# Patient Record
Sex: MALE | Race: WHITE | NOT HISPANIC OR LATINO | Employment: OTHER | URBAN - METROPOLITAN AREA
[De-identification: names, ages, dates, MRNs, and addresses within clinical notes are randomized per-mention and may not be internally consistent; named-entity substitution may affect disease eponyms.]

---

## 2024-10-23 ENCOUNTER — APPOINTMENT (OUTPATIENT)
Dept: DERMATOLOGY | Facility: CLINIC | Age: 74
End: 2024-10-23
Payer: MEDICARE

## 2024-10-23 VITALS — HEART RATE: 76 BPM | DIASTOLIC BLOOD PRESSURE: 75 MMHG | SYSTOLIC BLOOD PRESSURE: 112 MMHG

## 2024-10-23 DIAGNOSIS — D04.4 SQUAMOUS CELL CARCINOMA IN SITU (SCCIS) OF SCALP: ICD-10-CM

## 2024-10-23 PROCEDURE — 99214 OFFICE O/P EST MOD 30 MIN: CPT | Performed by: DERMATOLOGY

## 2024-10-23 PROCEDURE — 17311 MOHS 1 STAGE H/N/HF/G: CPT | Performed by: DERMATOLOGY

## 2024-10-23 RX ORDER — ATORVASTATIN CALCIUM 40 MG/1
40 TABLET, FILM COATED ORAL
COMMUNITY
Start: 2024-01-24

## 2024-10-23 NOTE — PROGRESS NOTES
Mohs Surgery Operative Note    Date of Surgery:  10/23/2024  Surgeon:  Grace Perera MD  Office Location:  [address] [facility]  [address]  [facility] [address]  Dept: [phone]  Dept Fax: [phone]  Referring Provider: Jayson Hilario MD    Assessment/Plan   Pre-procedure:   Obtained informed consent: written from patient  The surgical site was identified and confirmed with the patient.     Intra-operative:   Audible time out called at : 08:48 AM 10/23/24  by: DEONTE MONTOYA RN   Verified patient name, birthdate, site, specimen bottle label & requisition.    The planned procedure(s) was again reviewed with the patient. The risks of bleeding, infection, nerve damage and scarring were reviewed. Written authorization was obtained. The patient identity, surgical site, and planned procedure(s) were verified. The provider acted as both surgeon and pathologist.     Squamous cell carcinoma in situ (SCCIS) of scalp  Frontal Scalp-Medial  Mohs surgery  Consent obtained: written    Universal Protocol:  Procedure explained and questions answered to patient or proxy's satisfaction: Yes    Test results available and properly labeled: Yes    Pathology report reviewed: Yes    External notes reviewed: Yes    Photo or diagram used for site identification: Yes    Site/side marked: Yes    Slide independently reviewed by Mohs surgeon: Yes    Immediately prior to procedure a time out was called: Yes    Patient identity confirmed: verbally with patient  Preparation: Patient was prepped and draped in usual sterile fashion      Anticoagulation:  Is the patient taking prescription anticoagulant and/or aspirin prescribed/recommended by a physician? Yes    Was the anticoagulation regimen changed prior to Mohs? No      Anesthesia:  Anesthesia method: local infiltration  Local anesthetic: lidocaine 2% WITH epi    Procedure Details:  Case ID Number: [id number]  Biopsy accession number:  [id number]  Date of biopsy: 10/9/2024  Pre-Op diagnosis: squamous cell carcinoma  SCC subtype: in situ  Surgical site (from skin exam): Frontal Scalp-Medial  Pre-operative length (cm): 1.3  Pre-operative width (cm): 1.4  Indications for Mohs surgery: anatomic location where tissue conservation is critical  Previously treated? No      Micrographic Surgery Details:  Post-operative length (cm): 1.5  Post-operative width (cm): 1.6  Number of Mohs stages: 1    Stage 1     Comments: The patient was brought into the operating room and placed in the procedure chair in the appropriate position.  The area positive by previous biopsy was identified and confirmed with the patient. The area of clinically obvious tumor was debulked using a curette and/or scalpel as needed. An incision was made following the Mohs approach through the skin. The specimen was taken to the lab, divided into 2 piece(s) and appropriately chromacoded and processed.  Tumor features identified on Mohs section: no tumor identified  Depth of defect: subcutaneous fat    Patient tolerance of procedure: tolerated well, no immediate complications    Reconstruction:  Was the defect reconstructed?: No   Repair: After a discussion with the patient regarding the options for wound closure, a decision was made to proceed with second intention healing.  Dressing/Follow-up: Surgifoam was placed in the wound. A pressure dressing was placed to help stabilize the wound and to minimize the risk of postoperative bleeding. Wound care was discussed, and the patient was given written post-operative wound care instructions.  Hemostasis achieved with: electrodesiccation  Outcome: patient tolerated procedure well with no complications    Post-procedure details: sterile dressing applied and wound care instructions given    Dressing type: Gelfoam and pressure dressing      Repair: After a discussion with the patient regarding the options for wound closure, a decision was made to  proceed with second intention healing.  Dressing F/U: Surgifoam was placed in the wound. A pressure dressing was placed to help stabilize the wound and to minimize the risk of postoperative bleeding. Wound care was discussed, and the patient was given written post-operative wound care instructions.             Wound care was discussed, and the patient was given written post-operative wound care instructions.      The patient will follow up with Grace Perera MD as needed for any post operative problems or concerns, and will follow up with their primary dermatologist as scheduled.

## 2024-10-23 NOTE — PROGRESS NOTES
Office Visit Note  Date: 10/23/2024  Surgeon:  Grace Perera MD  Office Location:  [address] [facility]  [address] [facility] [address]  [facility] [address]  Dept: [phone]  Dept Fax: [phone]  Referring Provider: MD Skye Munoz   Jaime Dominguez is a 74 y.o. male who presents for the following: MOHS Surgery    According to the patient, the lesion has been present for approximately 6 months at the time of diagnosis.  The lesion is not causing symptoms.  The lesion has not been treated previously.    The patient does not have a pacemaker / defibrillator.  The patient does not have a heart valve / joint replacement.    The patient is on blood thinners.  The patient does not have a history of hepatitis B or C.  The patient does not have a history of HIV.  The patient does not have a history of immunosuppression (e.g. organ transplantation, malignancy, medications)    Review of Systems:  No other skin or systemic complaints other than what is documented elsewhere in the note.    MEDICAL HISTORY: clinically relevant history including significant past medical history, medications and allergies was reviewed and documented in Epic.    Objective   Well appearing patient in no apparent distress; mood and affect are within normal limits.  Vital signs: See record.  Noted on the Frontal Scalp-Medial  Is a 1.3 x 1.4 cm scar    The patient confirmed the identified site.    Discussion:  The nature of the diagnosis was explained. The lesion is a skin cancer.  It has a risk of local growth and distant spread. The condition is associated with sun exposure.  Warning signs of non-melanoma skin cancer discussed. Patient was instructed to perform monthly self skin examination.  We recommended that the patient have regular full skin exams given an increased risk of subsequent skin cancers. The patient was instructed to use sun protective behaviors including use of broad spectrum sunscreens  and sun protective clothing to reduce risk of skin cancers.      Risks, benefits, side effects of Mohs surgery were discussed with patient and the patient voiced understanding.  It was explained that even though the cure rate of Mohs is very high it is not 100%. Risks of surgery including but not limited to bleeding, infection, numbness, nerve damage, and scar were reviewed.  Discussion included wound care requirements, activity restrictions, likely scar outcome and time to heal.     After Mohs surgery, the defect may need to be repaired surgically and the scar may be longer than the original lesion.  Reconstruction options, risks, and benefits were reviewed including second intention healing, linear repair (4-1 ratio was explained), local flaps, skin grafts, cartilage grafts and interpolation flaps (the need for multiple surgeries was explained). Possible outcomes were reviewed including likely scar appearance, failure of flap survival, infection, bleeding and the need for revision surgery.     The pathology was reviewed, the photograph was reviewed, and the referring physician's note was reviewed.    Patient elected for Mohs surgery.

## 2024-10-23 NOTE — LETTER
October 29, 2024     Jayson Hilario MD    Patient: Jaime Dominguez   YOB: 1950   Date of Visit: 10/23/2024       Dear Dr. Jayson Hilario MD:    Thank you for referring Jaime Dominguez to me for evaluation. Below are my notes for this consultation.  If you have questions, please do not hesitate to call me. I look forward to following your patient along with you.       Sincerely,     Grace Perera MD      CC: No Recipients  ______________________________________________________________________________________    Mohs Surgery Operative Note    Date of Surgery:  10/23/2024  Surgeon:  Grace Perera MD  Office Location: [address] [address]  Dept: [phone]  Dept Fax: [phone]  Referring Provider: Jayson Hilario MD    Assessment/Plan  Pre-procedure:   Obtained informed consent: written from patient  The surgical site was identified and confirmed with the patient.     Intra-operative:   Audible time out called at : 08:48 AM 10/23/24  by: DEONTE MONTOYA RN   Verified patient name, birthdate, site, specimen bottle label & requisition.    The planned procedure(s) was again reviewed with the patient. The risks of bleeding, infection, nerve damage and scarring were reviewed. Written authorization was obtained. The patient identity, surgical site, and planned procedure(s) were verified. The provider acted as both surgeon and pathologist.     Squamous cell carcinoma in situ (SCCIS) of scalp  Frontal Scalp-Medial  Mohs surgery  Consent obtained: written    Universal Protocol:  Procedure explained and questions answered to patient or proxy's satisfaction: Yes    Test results available and properly labeled: Yes    Pathology report reviewed: Yes    External notes reviewed: Yes    Photo or diagram used for site identification: Yes    Site/side marked: Yes    Slide independently reviewed by Mohs surgeon: Yes    Immediately prior to  procedure a time out was called: Yes    Patient identity confirmed: verbally with patient  Preparation: Patient was prepped and draped in usual sterile fashion      Anticoagulation:  Is the patient taking prescription anticoagulant and/or aspirin prescribed/recommended by a physician? Yes    Was the anticoagulation regimen changed prior to Mohs? No      Anesthesia:  Anesthesia method: local infiltration  Local anesthetic: lidocaine 2% WITH epi    Procedure Details:  Case ID Number: [id number]  Biopsy accession number: [id number]  Date of biopsy: 10/9/2024  Pre-Op diagnosis: squamous cell carcinoma  SCC subtype: in situ  Surgical site (from skin exam): Frontal Scalp-Medial  Pre-operative length (cm): 1.3  Pre-operative width (cm): 1.4  Indications for Mohs surgery: anatomic location where tissue conservation is critical  Previously treated? No      Micrographic Surgery Details:  Post-operative length (cm): 1.5  Post-operative width (cm): 1.6  Number of Mohs stages: 1    Stage 1     Comments: The patient was brought into the operating room and placed in the procedure chair in the appropriate position.  The area positive by previous biopsy was identified and confirmed with the patient. The area of clinically obvious tumor was debulked using a curette and/or scalpel as needed. An incision was made following the Mohs approach through the skin. The specimen was taken to the lab, divided into 2 piece(s) and appropriately chromacoded and processed.  Tumor features identified on Mohs section: no tumor identified  Depth of defect: subcutaneous fat    Patient tolerance of procedure: tolerated well, no immediate complications    Reconstruction:  Was the defect reconstructed?: No   Repair: After a discussion with the patient regarding the options for wound closure, a decision was made to proceed with second intention healing.  Dressing/Follow-up: Surgifoam was placed in the wound. A pressure dressing was placed to help stabilize  the wound and to minimize the risk of postoperative bleeding. Wound care was discussed, and the patient was given written post-operative wound care instructions.  Hemostasis achieved with: electrodesiccation  Outcome: patient tolerated procedure well with no complications    Post-procedure details: sterile dressing applied and wound care instructions given    Dressing type: Gelfoam and pressure dressing      Repair: After a discussion with the patient regarding the options for wound closure, a decision was made to proceed with second intention healing.  Dressing F/U: Surgifoam was placed in the wound. A pressure dressing was placed to help stabilize the wound and to minimize the risk of postoperative bleeding. Wound care was discussed, and the patient was given written post-operative wound care instructions.             Wound care was discussed, and the patient was given written post-operative wound care instructions.      The patient will follow up with Grace Perera MD as needed for any post operative problems or concerns, and will follow up with their primary dermatologist as scheduled.        Office Visit Note  Date: 10/23/2024  Surgeon:  Grace Perera MD  Office Location: [address] [address]  Dept: [phone]  Dept Fax: [phone]  Referring Provider: Jayson Hilario MD    Skye Dominguez is a 74 y.o. male who presents for the following: MOHS Surgery    According to the patient, the lesion has been present for approximately 6 months at the time of diagnosis.  The lesion is not causing symptoms.  The lesion has not been treated previously.    The patient does not have a pacemaker / defibrillator.  The patient does not have a heart valve / joint replacement.    The patient is on blood thinners.  The patient does not have a history of hepatitis B or C.  The patient does not have a history of HIV.  The patient does not  have a history of immunosuppression (e.g. organ transplantation, malignancy, medications)    Review of Systems:  No other skin or systemic complaints other than what is documented elsewhere in the note.    MEDICAL HISTORY: clinically relevant history including significant past medical history, medications and allergies was reviewed and documented in Epic.    Objective  Well appearing patient in no apparent distress; mood and affect are within normal limits.  Vital signs: See record.  Noted on the Frontal Scalp-Medial  Is a 1.3 x 1.4 cm scar    The patient confirmed the identified site.    Discussion:  The nature of the diagnosis was explained. The lesion is a skin cancer.  It has a risk of local growth and distant spread. The condition is associated with sun exposure.  Warning signs of non-melanoma skin cancer discussed. Patient was instructed to perform monthly self skin examination.  We recommended that the patient have regular full skin exams given an increased risk of subsequent skin cancers. The patient was instructed to use sun protective behaviors including use of broad spectrum sunscreens and sun protective clothing to reduce risk of skin cancers.      Risks, benefits, side effects of Mohs surgery were discussed with patient and the patient voiced understanding.  It was explained that even though the cure rate of Mohs is very high it is not 100%. Risks of surgery including but not limited to bleeding, infection, numbness, nerve damage, and scar were reviewed.  Discussion included wound care requirements, activity restrictions, likely scar outcome and time to heal.     After Mohs surgery, the defect may need to be repaired surgically and the scar may be longer than the original lesion.  Reconstruction options, risks, and benefits were reviewed including second intention healing, linear repair (4-1 ratio was explained), local flaps, skin grafts, cartilage grafts and interpolation flaps (the need for multiple  surgeries was explained). Possible outcomes were reviewed including likely scar appearance, failure of flap survival, infection, bleeding and the need for revision surgery.     The pathology was reviewed, the photograph was reviewed, and the referring physician's note was reviewed.    Patient elected for Mohs surgery.

## 2024-11-08 ENCOUNTER — HOSPITAL ENCOUNTER (INPATIENT)
Facility: HOSPITAL | Age: 74
LOS: 2 days | Discharge: HOSPICE | End: 2024-11-10
Attending: EMERGENCY MEDICINE | Admitting: INTERNAL MEDICINE
Payer: MEDICARE

## 2024-11-08 ENCOUNTER — APPOINTMENT (OUTPATIENT)
Dept: RADIOLOGY | Facility: HOSPITAL | Age: 74
DRG: 282 | End: 2024-11-08
Payer: MEDICARE

## 2024-11-08 DIAGNOSIS — I21.3 STEMI (ST ELEVATION MYOCARDIAL INFARCTION) (MULTI): ICD-10-CM

## 2024-11-08 DIAGNOSIS — I21.11 ST ELEVATION (STEMI) MYOCARDIAL INFARCTION INVOLVING RIGHT CORONARY ARTERY (MULTI): ICD-10-CM

## 2024-11-08 DIAGNOSIS — I25.110 CORONARY ARTERY DISEASE INVOLVING NATIVE CORONARY ARTERY OF NATIVE HEART WITH UNSTABLE ANGINA PECTORIS: ICD-10-CM

## 2024-11-08 DIAGNOSIS — I79.8 OTHER DISORDERS OF ARTERIES, ARTERIOLES AND CAPILLARIES IN DISEASES CLASSIFIED ELSEWHERE: ICD-10-CM

## 2024-11-08 DIAGNOSIS — I21.19 ST ELEVATION MYOCARDIAL INFARCTION (STEMI) INVOLVING OTHER CORONARY ARTERY OF INFERIOR WALL: Primary | ICD-10-CM

## 2024-11-08 LAB
ALBUMIN SERPL BCP-MCNC: 4.5 G/DL (ref 3.4–5)
ALP SERPL-CCNC: 46 U/L (ref 33–136)
ALT SERPL W P-5'-P-CCNC: 29 U/L (ref 10–52)
ANION GAP SERPL CALCULATED.3IONS-SCNC: 10 MMOL/L (ref 10–20)
APTT PPP: 23.9 SECONDS (ref 22–32.5)
AST SERPL W P-5'-P-CCNC: 27 U/L (ref 9–39)
BASOPHILS # BLD AUTO: 0.02 X10*3/UL (ref 0–0.1)
BASOPHILS NFR BLD AUTO: 0.2 %
BILIRUB SERPL-MCNC: 0.5 MG/DL (ref 0–1.2)
BUN SERPL-MCNC: 27 MG/DL (ref 6–23)
CALCIUM SERPL-MCNC: 9.3 MG/DL (ref 8.6–10.3)
CARDIAC TROPONIN I PNL SERPL HS: 4 NG/L (ref 0–20)
CHLORIDE SERPL-SCNC: 103 MMOL/L (ref 98–107)
CHOLEST SERPL-MCNC: 228 MG/DL (ref 0–199)
CHOLEST/HDLC SERPL: 6.1 {RATIO}
CO2 SERPL-SCNC: 27 MMOL/L (ref 21–32)
CREAT SERPL-MCNC: 1.06 MG/DL (ref 0.5–1.3)
EGFRCR SERPLBLD CKD-EPI 2021: 75 ML/MIN/1.73M*2
EOSINOPHIL # BLD AUTO: 0.02 X10*3/UL (ref 0–0.4)
EOSINOPHIL NFR BLD AUTO: 0.2 %
ERYTHROCYTE [DISTWIDTH] IN BLOOD BY AUTOMATED COUNT: 12 % (ref 11.5–14.5)
EST. AVERAGE GLUCOSE BLD GHB EST-MCNC: 100 MG/DL
GLUCOSE SERPL-MCNC: 108 MG/DL (ref 74–99)
HBA1C MFR BLD: 5.1 %
HCT VFR BLD AUTO: 42.5 % (ref 41–52)
HDLC SERPL-MCNC: 37.6 MG/DL
HGB BLD-MCNC: 14.5 G/DL (ref 13.5–17.5)
IMM GRANULOCYTES # BLD AUTO: 0.04 X10*3/UL (ref 0–0.5)
IMM GRANULOCYTES NFR BLD AUTO: 0.4 % (ref 0–0.9)
INR PPP: 1.1 (ref 0.9–1.2)
LDLC SERPL CALC-MCNC: 126 MG/DL
LYMPHOCYTES # BLD AUTO: 1.79 X10*3/UL (ref 0.8–3)
LYMPHOCYTES NFR BLD AUTO: 18.6 %
MCH RBC QN AUTO: 31.9 PG (ref 26–34)
MCHC RBC AUTO-ENTMCNC: 34.1 G/DL (ref 32–36)
MCV RBC AUTO: 93 FL (ref 80–100)
MONOCYTES # BLD AUTO: 0.82 X10*3/UL (ref 0.05–0.8)
MONOCYTES NFR BLD AUTO: 8.5 %
NEUTROPHILS # BLD AUTO: 6.93 X10*3/UL (ref 1.6–5.5)
NEUTROPHILS NFR BLD AUTO: 72.1 %
NON HDL CHOLESTEROL: 190 MG/DL (ref 0–149)
NRBC BLD-RTO: 0 /100 WBCS (ref 0–0)
PLATELET # BLD AUTO: 167 X10*3/UL (ref 150–450)
POTASSIUM SERPL-SCNC: 4.1 MMOL/L (ref 3.5–5.3)
PROT SERPL-MCNC: 7.4 G/DL (ref 6.4–8.2)
PROTHROMBIN TIME: 11.1 SECONDS (ref 9.3–12.7)
RBC # BLD AUTO: 4.55 X10*6/UL (ref 4.5–5.9)
SODIUM SERPL-SCNC: 136 MMOL/L (ref 136–145)
TRIGL SERPL-MCNC: 322 MG/DL (ref 0–149)
TSH SERPL-ACNC: 3.07 MIU/L (ref 0.44–3.98)
VLDL: 64 MG/DL (ref 0–40)
WBC # BLD AUTO: 9.6 X10*3/UL (ref 4.4–11.3)

## 2024-11-08 PROCEDURE — 2500000005 HC RX 250 GENERAL PHARMACY W/O HCPCS: Performed by: INTERNAL MEDICINE

## 2024-11-08 PROCEDURE — 2500000004 HC RX 250 GENERAL PHARMACY W/ HCPCS (ALT 636 FOR OP/ED)

## 2024-11-08 PROCEDURE — 94762 N-INVAS EAR/PLS OXIMTRY CONT: CPT

## 2024-11-08 PROCEDURE — 2550000001 HC RX 255 CONTRASTS: Performed by: INTERNAL MEDICINE

## 2024-11-08 PROCEDURE — 2720000007 HC OR 272 NO HCPCS: Performed by: INTERNAL MEDICINE

## 2024-11-08 PROCEDURE — B2111ZZ FLUOROSCOPY OF MULTIPLE CORONARY ARTERIES USING LOW OSMOLAR CONTRAST: ICD-10-PCS | Performed by: INTERNAL MEDICINE

## 2024-11-08 PROCEDURE — 99285 EMERGENCY DEPT VISIT HI MDM: CPT

## 2024-11-08 PROCEDURE — 99152 MOD SED SAME PHYS/QHP 5/>YRS: CPT | Performed by: INTERNAL MEDICINE

## 2024-11-08 PROCEDURE — 99223 1ST HOSP IP/OBS HIGH 75: CPT

## 2024-11-08 PROCEDURE — 2500000001 HC RX 250 WO HCPCS SELF ADMINISTERED DRUGS (ALT 637 FOR MEDICARE OP): Performed by: INTERNAL MEDICINE

## 2024-11-08 PROCEDURE — 83036 HEMOGLOBIN GLYCOSYLATED A1C: CPT | Mod: WESLAB

## 2024-11-08 PROCEDURE — 93458 L HRT ARTERY/VENTRICLE ANGIO: CPT | Performed by: INTERNAL MEDICINE

## 2024-11-08 PROCEDURE — 2500000004 HC RX 250 GENERAL PHARMACY W/ HCPCS (ALT 636 FOR OP/ED): Performed by: EMERGENCY MEDICINE

## 2024-11-08 PROCEDURE — 2020000001 HC ICU ROOM DAILY

## 2024-11-08 PROCEDURE — 85730 THROMBOPLASTIN TIME PARTIAL: CPT

## 2024-11-08 PROCEDURE — 71045 X-RAY EXAM CHEST 1 VIEW: CPT

## 2024-11-08 PROCEDURE — 84484 ASSAY OF TROPONIN QUANT: CPT

## 2024-11-08 PROCEDURE — C1894 INTRO/SHEATH, NON-LASER: HCPCS | Performed by: INTERNAL MEDICINE

## 2024-11-08 PROCEDURE — B2151ZZ FLUOROSCOPY OF LEFT HEART USING LOW OSMOLAR CONTRAST: ICD-10-PCS | Performed by: INTERNAL MEDICINE

## 2024-11-08 PROCEDURE — C1760 CLOSURE DEV, VASC: HCPCS | Performed by: INTERNAL MEDICINE

## 2024-11-08 PROCEDURE — 96374 THER/PROPH/DIAG INJ IV PUSH: CPT | Mod: 59

## 2024-11-08 PROCEDURE — 99222 1ST HOSP IP/OBS MODERATE 55: CPT

## 2024-11-08 PROCEDURE — 2500000001 HC RX 250 WO HCPCS SELF ADMINISTERED DRUGS (ALT 637 FOR MEDICARE OP)

## 2024-11-08 PROCEDURE — 2500000004 HC RX 250 GENERAL PHARMACY W/ HCPCS (ALT 636 FOR OP/ED): Performed by: INTERNAL MEDICINE

## 2024-11-08 PROCEDURE — 71045 X-RAY EXAM CHEST 1 VIEW: CPT | Performed by: RADIOLOGY

## 2024-11-08 PROCEDURE — 80061 LIPID PANEL: CPT

## 2024-11-08 PROCEDURE — 80053 COMPREHEN METABOLIC PANEL: CPT

## 2024-11-08 PROCEDURE — 36415 COLL VENOUS BLD VENIPUNCTURE: CPT

## 2024-11-08 PROCEDURE — 4A023N7 MEASUREMENT OF CARDIAC SAMPLING AND PRESSURE, LEFT HEART, PERCUTANEOUS APPROACH: ICD-10-PCS | Performed by: INTERNAL MEDICINE

## 2024-11-08 PROCEDURE — 85610 PROTHROMBIN TIME: CPT

## 2024-11-08 PROCEDURE — 85025 COMPLETE CBC W/AUTO DIFF WBC: CPT

## 2024-11-08 PROCEDURE — 84443 ASSAY THYROID STIM HORMONE: CPT

## 2024-11-08 RX ORDER — ONDANSETRON HYDROCHLORIDE 2 MG/ML
4 INJECTION, SOLUTION INTRAVENOUS ONCE
Status: DISCONTINUED | OUTPATIENT
Start: 2024-11-08 | End: 2024-11-08

## 2024-11-08 RX ORDER — MORPHINE SULFATE 4 MG/ML
4 INJECTION, SOLUTION INTRAMUSCULAR; INTRAVENOUS ONCE
Status: DISCONTINUED | OUTPATIENT
Start: 2024-11-08 | End: 2024-11-08

## 2024-11-08 RX ORDER — HEPARIN SODIUM 5000 [USP'U]/ML
60 INJECTION, SOLUTION INTRAVENOUS; SUBCUTANEOUS ONCE
Status: COMPLETED | OUTPATIENT
Start: 2024-11-08 | End: 2024-11-08

## 2024-11-08 RX ORDER — SODIUM CHLORIDE, SODIUM LACTATE, POTASSIUM CHLORIDE, CALCIUM CHLORIDE 600; 310; 30; 20 MG/100ML; MG/100ML; MG/100ML; MG/100ML
100 INJECTION, SOLUTION INTRAVENOUS CONTINUOUS
Status: DISCONTINUED | OUTPATIENT
Start: 2024-11-08 | End: 2024-11-09

## 2024-11-08 RX ORDER — ATORVASTATIN CALCIUM 80 MG/1
80 TABLET, FILM COATED ORAL NIGHTLY
Status: DISCONTINUED | OUTPATIENT
Start: 2024-11-08 | End: 2024-11-10 | Stop reason: HOSPADM

## 2024-11-08 RX ORDER — HEPARIN SODIUM 5000 [USP'U]/ML
60 INJECTION, SOLUTION INTRAVENOUS; SUBCUTANEOUS ONCE
Status: DISCONTINUED | OUTPATIENT
Start: 2024-11-08 | End: 2024-11-08

## 2024-11-08 RX ORDER — LIDOCAINE HYDROCHLORIDE 10 MG/ML
INJECTION, SOLUTION EPIDURAL; INFILTRATION; INTRACAUDAL; PERINEURAL AS NEEDED
Status: DISCONTINUED | OUTPATIENT
Start: 2024-11-08 | End: 2024-11-08 | Stop reason: HOSPADM

## 2024-11-08 RX ORDER — HEPARIN SODIUM 10000 [USP'U]/100ML
0-4000 INJECTION, SOLUTION INTRAVENOUS CONTINUOUS
Status: DISCONTINUED | OUTPATIENT
Start: 2024-11-08 | End: 2024-11-10 | Stop reason: HOSPADM

## 2024-11-08 RX ORDER — MORPHINE SULFATE 2 MG/ML
2 INJECTION, SOLUTION INTRAMUSCULAR; INTRAVENOUS EVERY 6 HOURS PRN
Status: DISCONTINUED | OUTPATIENT
Start: 2024-11-08 | End: 2024-11-10 | Stop reason: HOSPADM

## 2024-11-08 RX ORDER — HEPARIN SODIUM 1000 [USP'U]/ML
INJECTION, SOLUTION INTRAVENOUS; SUBCUTANEOUS AS NEEDED
Status: DISCONTINUED | OUTPATIENT
Start: 2024-11-08 | End: 2024-11-08 | Stop reason: HOSPADM

## 2024-11-08 RX ORDER — ACETAMINOPHEN 325 MG/1
650 TABLET ORAL EVERY 6 HOURS PRN
Status: DISCONTINUED | OUTPATIENT
Start: 2024-11-08 | End: 2024-11-10 | Stop reason: HOSPADM

## 2024-11-08 RX ORDER — HEPARIN SODIUM 5000 [USP'U]/ML
2000-4000 INJECTION, SOLUTION INTRAVENOUS; SUBCUTANEOUS AS NEEDED
Status: DISCONTINUED | OUTPATIENT
Start: 2024-11-08 | End: 2024-11-10 | Stop reason: HOSPADM

## 2024-11-08 RX ORDER — ASPIRIN 81 MG/1
81 TABLET ORAL DAILY
Status: DISCONTINUED | OUTPATIENT
Start: 2024-11-08 | End: 2024-11-08

## 2024-11-08 RX ORDER — NITROGLYCERIN 0.4 MG/1
0.4 TABLET SUBLINGUAL EVERY 5 MIN PRN
Status: DISCONTINUED | OUTPATIENT
Start: 2024-11-08 | End: 2024-11-10 | Stop reason: HOSPADM

## 2024-11-08 RX ORDER — ASPIRIN 81 MG/1
81 TABLET ORAL DAILY
Status: DISCONTINUED | OUTPATIENT
Start: 2024-11-08 | End: 2024-11-10 | Stop reason: HOSPADM

## 2024-11-08 RX ORDER — METOPROLOL TARTRATE 25 MG/1
12.5 TABLET, FILM COATED ORAL 2 TIMES DAILY
Status: DISCONTINUED | OUTPATIENT
Start: 2024-11-08 | End: 2024-11-10 | Stop reason: HOSPADM

## 2024-11-08 RX ORDER — MIDAZOLAM HYDROCHLORIDE 1 MG/ML
INJECTION, SOLUTION INTRAMUSCULAR; INTRAVENOUS AS NEEDED
Status: DISCONTINUED | OUTPATIENT
Start: 2024-11-08 | End: 2024-11-08 | Stop reason: HOSPADM

## 2024-11-08 RX ORDER — FENTANYL CITRATE 50 UG/ML
INJECTION, SOLUTION INTRAMUSCULAR; INTRAVENOUS AS NEEDED
Status: DISCONTINUED | OUTPATIENT
Start: 2024-11-08 | End: 2024-11-08 | Stop reason: HOSPADM

## 2024-11-08 RX ORDER — IODIXANOL 320 MG/ML
INJECTION, SOLUTION INTRAVASCULAR AS NEEDED
Status: DISCONTINUED | OUTPATIENT
Start: 2024-11-08 | End: 2024-11-08 | Stop reason: HOSPADM

## 2024-11-08 RX ADMIN — NITROGLYCERIN 0.5 INCH: 20 OINTMENT TOPICAL at 17:51

## 2024-11-08 RX ADMIN — METOPROLOL TARTRATE 12.5 MG: 25 TABLET, FILM COATED ORAL at 20:45

## 2024-11-08 RX ADMIN — HEPARIN SODIUM 1000 UNITS/HR: 10000 INJECTION, SOLUTION INTRAVENOUS at 22:03

## 2024-11-08 RX ADMIN — HEPARIN SODIUM 5000 UNITS: 5000 INJECTION, SOLUTION INTRAVENOUS; SUBCUTANEOUS at 16:14

## 2024-11-08 RX ADMIN — SODIUM CHLORIDE 500 ML: 900 INJECTION, SOLUTION INTRAVENOUS at 17:45

## 2024-11-08 RX ADMIN — SODIUM CHLORIDE, SODIUM LACTATE, POTASSIUM CHLORIDE, AND CALCIUM CHLORIDE 100 ML/HR: 600; 310; 30; 20 INJECTION, SOLUTION INTRAVENOUS at 22:19

## 2024-11-08 RX ADMIN — ATORVASTATIN CALCIUM 80 MG: 80 TABLET, FILM COATED ORAL at 20:45

## 2024-11-08 SDOH — SOCIAL STABILITY: SOCIAL INSECURITY: ARE YOU OR HAVE YOU BEEN THREATENED OR ABUSED PHYSICALLY, EMOTIONALLY, OR SEXUALLY BY ANYONE?: NO

## 2024-11-08 SDOH — SOCIAL STABILITY: SOCIAL NETWORK
DO YOU BELONG TO ANY CLUBS OR ORGANIZATIONS SUCH AS CHURCH GROUPS, UNIONS, FRATERNAL OR ATHLETIC GROUPS, OR SCHOOL GROUPS?: YES

## 2024-11-08 SDOH — ECONOMIC STABILITY: HOUSING INSECURITY: AT ANY TIME IN THE PAST 12 MONTHS, WERE YOU HOMELESS OR LIVING IN A SHELTER (INCLUDING NOW)?: NO

## 2024-11-08 SDOH — SOCIAL STABILITY: SOCIAL INSECURITY: ABUSE: ADULT

## 2024-11-08 SDOH — ECONOMIC STABILITY: TRANSPORTATION INSECURITY: IN THE PAST 12 MONTHS, HAS LACK OF TRANSPORTATION KEPT YOU FROM MEDICAL APPOINTMENTS OR FROM GETTING MEDICATIONS?: NO

## 2024-11-08 SDOH — HEALTH STABILITY: MENTAL HEALTH: EXPERIENCED ANY OF THE FOLLOWING LIFE EVENTS: DEATH OF FAMILY/FRIEND

## 2024-11-08 SDOH — ECONOMIC STABILITY: FOOD INSECURITY: WITHIN THE PAST 12 MONTHS, YOU WORRIED THAT YOUR FOOD WOULD RUN OUT BEFORE YOU GOT THE MONEY TO BUY MORE.: NEVER TRUE

## 2024-11-08 SDOH — SOCIAL STABILITY: SOCIAL INSECURITY: HAS ANYONE EVER THREATENED TO HURT YOUR FAMILY OR YOUR PETS?: NO

## 2024-11-08 SDOH — SOCIAL STABILITY: SOCIAL INSECURITY: ARE YOU MARRIED, WIDOWED, DIVORCED, SEPARATED, NEVER MARRIED, OR LIVING WITH A PARTNER?: MARRIED

## 2024-11-08 SDOH — SOCIAL STABILITY: SOCIAL INSECURITY
WITHIN THE LAST YEAR, HAVE YOU BEEN RAPED OR FORCED TO HAVE ANY KIND OF SEXUAL ACTIVITY BY YOUR PARTNER OR EX-PARTNER?: NO

## 2024-11-08 SDOH — HEALTH STABILITY: MENTAL HEALTH
DO YOU FEEL STRESS - TENSE, RESTLESS, NERVOUS, OR ANXIOUS, OR UNABLE TO SLEEP AT NIGHT BECAUSE YOUR MIND IS TROUBLED ALL THE TIME - THESE DAYS?: NOT AT ALL

## 2024-11-08 SDOH — ECONOMIC STABILITY: INCOME INSECURITY: IN THE PAST 12 MONTHS HAS THE ELECTRIC, GAS, OIL, OR WATER COMPANY THREATENED TO SHUT OFF SERVICES IN YOUR HOME?: NO

## 2024-11-08 SDOH — SOCIAL STABILITY: SOCIAL NETWORK: HOW OFTEN DO YOU ATTEND MEETINGS OF THE CLUBS OR ORGANIZATIONS YOU BELONG TO?: NEVER

## 2024-11-08 SDOH — SOCIAL STABILITY: SOCIAL INSECURITY: WITHIN THE LAST YEAR, HAVE YOU BEEN HUMILIATED OR EMOTIONALLY ABUSED IN OTHER WAYS BY YOUR PARTNER OR EX-PARTNER?: NO

## 2024-11-08 SDOH — SOCIAL STABILITY: SOCIAL INSECURITY: WERE YOU ABLE TO COMPLETE ALL THE BEHAVIORAL HEALTH SCREENINGS?: YES

## 2024-11-08 SDOH — SOCIAL STABILITY: SOCIAL INSECURITY: HAVE YOU HAD THOUGHTS OF HARMING ANYONE ELSE?: NO

## 2024-11-08 SDOH — SOCIAL STABILITY: SOCIAL INSECURITY: HAVE YOU HAD ANY THOUGHTS OF HARMING ANYONE ELSE?: NO

## 2024-11-08 SDOH — SOCIAL STABILITY: SOCIAL INSECURITY
WITHIN THE LAST YEAR, HAVE YOU BEEN KICKED, HIT, SLAPPED, OR OTHERWISE PHYSICALLY HURT BY YOUR PARTNER OR EX-PARTNER?: NO

## 2024-11-08 SDOH — SOCIAL STABILITY: SOCIAL INSECURITY: DO YOU FEEL UNSAFE GOING BACK TO THE PLACE WHERE YOU ARE LIVING?: NO

## 2024-11-08 SDOH — HEALTH STABILITY: PHYSICAL HEALTH
HOW OFTEN DO YOU NEED TO HAVE SOMEONE HELP YOU WHEN YOU READ INSTRUCTIONS, PAMPHLETS, OR OTHER WRITTEN MATERIAL FROM YOUR DOCTOR OR PHARMACY?: NEVER

## 2024-11-08 SDOH — SOCIAL STABILITY: SOCIAL NETWORK: HOW OFTEN DO YOU GET TOGETHER WITH FRIENDS OR RELATIVES?: ONCE A WEEK

## 2024-11-08 SDOH — ECONOMIC STABILITY: HOUSING INSECURITY: IN THE LAST 12 MONTHS, WAS THERE A TIME WHEN YOU WERE NOT ABLE TO PAY THE MORTGAGE OR RENT ON TIME?: NO

## 2024-11-08 SDOH — SOCIAL STABILITY: SOCIAL NETWORK: HOW OFTEN DO YOU ATTEND CHURCH OR RELIGIOUS SERVICES?: MORE THAN 4 TIMES PER YEAR

## 2024-11-08 SDOH — ECONOMIC STABILITY: FOOD INSECURITY: HOW HARD IS IT FOR YOU TO PAY FOR THE VERY BASICS LIKE FOOD, HOUSING, MEDICAL CARE, AND HEATING?: NOT HARD AT ALL

## 2024-11-08 SDOH — ECONOMIC STABILITY: FOOD INSECURITY: WITHIN THE PAST 12 MONTHS, THE FOOD YOU BOUGHT JUST DIDN'T LAST AND YOU DIDN'T HAVE MONEY TO GET MORE.: NEVER TRUE

## 2024-11-08 SDOH — SOCIAL STABILITY: SOCIAL INSECURITY: DO YOU FEEL ANYONE HAS EXPLOITED OR TAKEN ADVANTAGE OF YOU FINANCIALLY OR OF YOUR PERSONAL PROPERTY?: NO

## 2024-11-08 SDOH — SOCIAL STABILITY: SOCIAL INSECURITY: WITHIN THE LAST YEAR, HAVE YOU BEEN AFRAID OF YOUR PARTNER OR EX-PARTNER?: NO

## 2024-11-08 SDOH — SOCIAL STABILITY: SOCIAL NETWORK
IN A TYPICAL WEEK, HOW MANY TIMES DO YOU TALK ON THE PHONE WITH FAMILY, FRIENDS, OR NEIGHBORS?: MORE THAN THREE TIMES A WEEK

## 2024-11-08 SDOH — ECONOMIC STABILITY: HOUSING INSECURITY: IN THE PAST 12 MONTHS, HOW MANY TIMES HAVE YOU MOVED WHERE YOU WERE LIVING?: 0

## 2024-11-08 SDOH — SOCIAL STABILITY: SOCIAL INSECURITY: ARE THERE ANY APPARENT SIGNS OF INJURIES/BEHAVIORS THAT COULD BE RELATED TO ABUSE/NEGLECT?: NO

## 2024-11-08 SDOH — SOCIAL STABILITY: SOCIAL INSECURITY: DOES ANYONE TRY TO KEEP YOU FROM HAVING/CONTACTING OTHER FRIENDS OR DOING THINGS OUTSIDE YOUR HOME?: NO

## 2024-11-08 SDOH — HEALTH STABILITY: PHYSICAL HEALTH: ON AVERAGE, HOW MANY DAYS PER WEEK DO YOU ENGAGE IN MODERATE TO STRENUOUS EXERCISE (LIKE A BRISK WALK)?: 3 DAYS

## 2024-11-08 SDOH — HEALTH STABILITY: PHYSICAL HEALTH: ON AVERAGE, HOW MANY MINUTES DO YOU ENGAGE IN EXERCISE AT THIS LEVEL?: 60 MIN

## 2024-11-08 ASSESSMENT — COGNITIVE AND FUNCTIONAL STATUS - GENERAL
TURNING FROM BACK TO SIDE WHILE IN FLAT BAD: A LITTLE
MOVING FROM LYING ON BACK TO SITTING ON SIDE OF FLAT BED WITH BEDRAILS: A LITTLE
MOBILITY SCORE: 18
CLIMB 3 TO 5 STEPS WITH RAILING: A LITTLE
TOILETING: A LITTLE
DRESSING REGULAR LOWER BODY CLOTHING: A LITTLE
DAILY ACTIVITIY SCORE: 22
PATIENT BASELINE BEDBOUND: NO
WALKING IN HOSPITAL ROOM: A LITTLE
STANDING UP FROM CHAIR USING ARMS: A LITTLE
MOVING TO AND FROM BED TO CHAIR: A LITTLE

## 2024-11-08 ASSESSMENT — LIFESTYLE VARIABLES
AUDIT-C TOTAL SCORE: 4
HAS A RELATIVE, FRIEND, DOCTOR, OR ANOTHER HEALTH PROFESSIONAL EXPRESSED CONCERN ABOUT YOUR DRINKING OR SUGGESTED YOU CUT DOWN: NO
HOW OFTEN DURING THE LAST YEAR HAVE YOU FAILED TO DO WHAT WAS NORMALLY EXPECTED FROM YOU BECAUSE OF DRINKING: NEVER
AUDIT TOTAL SCORE: -1
AUDIT TOTAL SCORE: 0
HOW OFTEN DURING THE LAST YEAR HAVE YOU NEEDED AN ALCOHOLIC DRINK FIRST THING IN THE MORNING TO GET YOURSELF GOING AFTER A NIGHT OF HEAVY DRINKING: NEVER
PRESCIPTION_ABUSE_PAST_12_MONTHS: NO
AUDIT-C TOTAL SCORE: 4
HOW MANY STANDARD DRINKS CONTAINING ALCOHOL DO YOU HAVE ON A TYPICAL DAY: 3 OR 4
HOW OFTEN DURING THE LAST YEAR HAVE YOU HAD A FEELING OF GUILT OR REMORSE AFTER DRINKING: NEVER
HAVE YOU OR SOMEONE ELSE BEEN INJURED AS A RESULT OF YOUR DRINKING: NO
HOW OFTEN DO YOU HAVE 6 OR MORE DRINKS ON ONE OCCASION: NEVER
SKIP TO QUESTIONS 9-10: 0
HOW OFTEN DO YOU HAVE A DRINK CONTAINING ALCOHOL: 2-3 TIMES A WEEK
HOW OFTEN DURING THE LAST YEAR HAVE YOU FOUND THAT YOU WERE NOT ABLE TO STOP DRINKING ONCE YOU HAD STARTED: NEVER
SUBSTANCE_ABUSE_PAST_12_MONTHS: YES

## 2024-11-08 ASSESSMENT — ENCOUNTER SYMPTOMS
SHORTNESS OF BREATH: 1
DIAPHORESIS: 1
NAUSEA: 1

## 2024-11-08 ASSESSMENT — COLUMBIA-SUICIDE SEVERITY RATING SCALE - C-SSRS
6. HAVE YOU EVER DONE ANYTHING, STARTED TO DO ANYTHING, OR PREPARED TO DO ANYTHING TO END YOUR LIFE?: NO
2. HAVE YOU ACTUALLY HAD ANY THOUGHTS OF KILLING YOURSELF?: NO
1. IN THE PAST MONTH, HAVE YOU WISHED YOU WERE DEAD OR WISHED YOU COULD GO TO SLEEP AND NOT WAKE UP?: NO

## 2024-11-08 ASSESSMENT — PATIENT HEALTH QUESTIONNAIRE - PHQ9
SUM OF ALL RESPONSES TO PHQ9 QUESTIONS 1 & 2: 0
1. LITTLE INTEREST OR PLEASURE IN DOING THINGS: NOT AT ALL
2. FEELING DOWN, DEPRESSED OR HOPELESS: NOT AT ALL

## 2024-11-08 ASSESSMENT — ACTIVITIES OF DAILY LIVING (ADL)
HEARING - LEFT EAR: FUNCTIONAL
HEARING - RIGHT EAR: FUNCTIONAL
GROOMING: INDEPENDENT
TOILETING: INDEPENDENT
WALKS IN HOME: INDEPENDENT
JUDGMENT_ADEQUATE_SAFELY_COMPLETE_DAILY_ACTIVITIES: YES
LACK_OF_TRANSPORTATION: NO
PATIENT'S MEMORY ADEQUATE TO SAFELY COMPLETE DAILY ACTIVITIES?: YES
FEEDING YOURSELF: INDEPENDENT
ADEQUATE_TO_COMPLETE_ADL: YES
DRESSING YOURSELF: INDEPENDENT
BATHING: INDEPENDENT

## 2024-11-08 ASSESSMENT — PAIN - FUNCTIONAL ASSESSMENT
PAIN_FUNCTIONAL_ASSESSMENT: 0-10

## 2024-11-08 ASSESSMENT — PAIN SCALES - GENERAL
PAINLEVEL_OUTOF10: 0 - NO PAIN

## 2024-11-08 NOTE — H&P
History Of Present Illness  Jaime Dominguez is a 74 y.o. male presenting with chest pain and syncope. The patient does not have a past cardiac history and does not currently follow with a cardiologist. He does have a past medical history of hyperlipidemia and skin cancer.  The patient reports to me that this morning he was doing yard work and when he came back into the house he began to feel unwell.  He began having significant left-sided chest pain, diaphoresis, shortness of breath and nausea.  The patient notified his wife of this who states that the patient then briefly lost consciousness.  EMS was called and EKG was completed revealing ST elevation in leads II, III and aVF with ST depression in V4 through V6.  A code STEMI was activated en route. The patient was given aspirin, Brilinta and 1 nitroglycerin en route to the emergency department.  On arrival to the urgency department repeat EKG was completed with similar concerning ST changes to the previous.  CBC, CMP, troponin and coag panel were drawn, the patient was given 4000 units of IV heparin and he was taken emergently to the cardiac catheterization lab for diagnostic coronary angiography.     Past Medical History  Past Medical History:   Diagnosis Date    Basal cell carcinoma     Hyperlipidemia        Surgical History  Past Surgical History:   Procedure Laterality Date    SKIN CANCER EXCISION N/A         Social History  He reports that he has never smoked. He has never used smokeless tobacco. He reports that he does not currently use alcohol. He reports that he does not use drugs.    Family History  Family History   Problem Relation Name Age of Onset    Heart attack Mother      Heart attack Brother          Allergies  Patient has no known allergies.    Review of Systems   Constitutional:  Positive for diaphoresis.   Respiratory:  Positive for shortness of breath.    Cardiovascular:  Positive for chest pain.   Gastrointestinal:  Positive for nausea.   All  "other systems reviewed and are negative.       Physical Exam  Constitutional:       General: He is in acute distress.   Cardiovascular:      Rate and Rhythm: Normal rate and regular rhythm.      Pulses: Normal pulses.      Heart sounds: No murmur heard.     No friction rub. No gallop.   Pulmonary:      Breath sounds: Normal breath sounds.   Abdominal:      General: Bowel sounds are normal.      Palpations: Abdomen is soft.   Musculoskeletal:      Right lower leg: No edema.      Left lower leg: No edema.   Skin:     Coloration: Skin is pale.   Neurological:      Mental Status: He is alert and oriented to person, place, and time.   Psychiatric:         Thought Content: Thought content normal.         Judgment: Judgment normal.          Last Recorded Vitals  Blood pressure 124/84, pulse 99, temperature 36.5 °C (97.7 °F), temperature source Temporal, resp. rate 14, height 1.753 m (5' 9\"), SpO2 100%.    Relevant Results  Results for orders placed or performed during the hospital encounter of 11/08/24 (from the past 24 hours)   CBC and Auto Differential   Result Value Ref Range    WBC 9.6 4.4 - 11.3 x10*3/uL    nRBC 0.0 0.0 - 0.0 /100 WBCs    RBC 4.55 4.50 - 5.90 x10*6/uL    Hemoglobin 14.5 13.5 - 17.5 g/dL    Hematocrit 42.5 41.0 - 52.0 %    MCV 93 80 - 100 fL    MCH 31.9 26.0 - 34.0 pg    MCHC 34.1 32.0 - 36.0 g/dL    RDW 12.0 11.5 - 14.5 %    Platelets 167 150 - 450 x10*3/uL    Neutrophils % 72.1 40.0 - 80.0 %    Immature Granulocytes %, Automated 0.4 0.0 - 0.9 %    Lymphocytes % 18.6 13.0 - 44.0 %    Monocytes % 8.5 2.0 - 10.0 %    Eosinophils % 0.2 0.0 - 6.0 %    Basophils % 0.2 0.0 - 2.0 %    Neutrophils Absolute 6.93 (H) 1.60 - 5.50 x10*3/uL    Immature Granulocytes Absolute, Automated 0.04 0.00 - 0.50 x10*3/uL    Lymphocytes Absolute 1.79 0.80 - 3.00 x10*3/uL    Monocytes Absolute 0.82 (H) 0.05 - 0.80 x10*3/uL    Eosinophils Absolute 0.02 0.00 - 0.40 x10*3/uL    Basophils Absolute 0.02 0.00 - 0.10 x10*3/uL        "   Assessment/Plan   Assessment & Plan  Acute ST elevation myocardial infarction (STEMI) of inferior wall (Multi)      Acute ST Elevation Myocardial Infarction  Hyperlipidemia  Basal Cell Carcinoma with recent Mohs Procedure    Impression and Plan:    11/8: Described above.  Patient presenting to [facility] emergency department after sudden onset of chest pain this morning after completing yard work.  He reports diaphoresis, shortness of breath and nausea associated with this left-sided chest pain.  The patient also briefly syncopized as witnessed by his wife.  In the emergency department he does report to me that his chest pain is slightly improved after receiving nitroglycerin en route to the hospital.  EKG revealing ST elevation in leads II, III and aVF with ST depression in leads V4 through V6.  The patient has received IV heparin 4000 units, aspirin, Brilinta and 1 dose of nitroglycerin.  He will be taken emergently to the cardiac catheterization lab for diagnostic coronary angiography with possible percutaneous coronary intervention with Dr. Brady.  I have ordered an echocardiogram to be completed this admission.  Will give further recommendations after cardiac catheterization is complete.         Kinza Ventura, APRN-CNP

## 2024-11-08 NOTE — Clinical Note
Single view of the left ventricle obtained using hand injection. This is an 88F BIBEMS for AMS. Pt was last normal at approximately 1207 AM. Having long with son in [facility]. He took a 5 minute phone call and found her down on the ground. No obvious evidence of trauma. Pt nonverbal, responsive only to pain. FSG in the field 140s. EMS noted R-hemiplegia and facial droop.

## 2024-11-08 NOTE — ED PROVIDER NOTES
HPI   Chief Complaint   Patient presents with    Chest Pain       Patient is a 74-year-old male presents emergency department for code STEMI called from the field.  Patient reports that he was out doing leaves when he became very diaphoretic and nauseous.  He started developing left-sided chest pain and pressure.  He states that he felt very nauseous and felt like he was going to vomit.  Wife reports that when he came inside he had a transient loss of consciousness and possibly passed out for only a few seconds.  Via squad patient had ST elevations in II, III, and aVF.  Patient reports no history of any cardiac disease and does not drink or smoke.  He reports that he does take a daily baby aspirin, but has not for the last 2 days as he was having some nosebleeds because he has very superficial veins in his nose.  Wife reports that he has recently started taking a statin at night, but otherwise does not take any daily medications.  He notes a family history of cardiac disease with his younger brother dying of a heart attack.      History provided by:  Patient, EMS personnel and significant other   used: No            Patient History   Past Medical History:   Diagnosis Date    Basal cell carcinoma     Hyperlipidemia      Past Surgical History:   Procedure Laterality Date    SKIN CANCER EXCISION N/A      Family History   Problem Relation Name Age of Onset    Heart attack Mother      Heart attack Brother       Social History     Tobacco Use    Smoking status: Never    Smokeless tobacco: Never   Substance Use Topics    Alcohol use: Not Currently    Drug use: Never       Physical Exam   ED Triage Vitals   Temp Pulse Resp BP   -- -- -- --      SpO2 Temp src Heart Rate Source Patient Position   -- -- -- --      BP Location FiO2 (%)     -- --       Physical Exam  Constitutional:       Appearance: He is diaphoretic.   Cardiovascular:      Rate and Rhythm: Normal rate and regular rhythm.   Pulmonary:       Effort: Pulmonary effort is normal.      Breath sounds: Normal breath sounds.   Abdominal:      General: Abdomen is flat.      Palpations: Abdomen is soft.   Musculoskeletal:         General: Normal range of motion.   Skin:     General: Skin is warm.   Neurological:      General: No focal deficit present.      Mental Status: He is alert and oriented to person, place, and time.           ED Course & MDM   Diagnoses as of 11/08/24 1647   ST elevation myocardial infarction (STEMI) involving other coronary artery of inferior wall                 No data recorded                                 Medical Decision Making  Patient is a 74-year-old male presents emergency department for evaluation of code STEMI.    EKG was interpreted by attending physician and reviewed by me.    Lab work done today included CMP, CBC, troponins, PT/INR, APTT.  Lab work pending at time of admission and time of departure to the Cath Lab.    Scans done today were interpreted/confirmed by radiologist and also interpreted by me which included chest x-ray.  Chest x-ray pending at time of admission and transfer to Cath Lab.    Medications given at today's visit include IV heparin, IV Zofran, IV fluids, IV morphine    I saw this patient in conjunction with Dr. Ellison.  Prior to arrival with EKG showing STEMI alert, patient was given p.o. Brilinta, aspirin, and nitro prior to arrival.  On arrival he received 4000 units of heparin and was evaluated by cardiologist at bedside Dr. Brady as well as his team as patient has EKG findings concerning for STEMI and RCA distribution.  Patient was medicated and lab work drawn and chest x-ray done and patient immediately transferred to Cath Lab for PCI.  Patient admitted under cardiology service for further care.    The patient/family was counseled on clinical impression, expectations, and plan along with recommendations to admission.  All questions were answered and involved parties were understanding and  agreeable to course of treatment.  Case was discussed with admitting physician and any consultants. Bed type, ED treatment and further ED workup decided by joint decision making with admitting team and any consultants. Patient stable for admission per my assessment and further management of patient will be deferred to the inpatient setting.    Critical care time was billed at 15 minutes by me and is non concurrent with other provider involved.  Time excludes other separately billable procedures.    ** Disclaimer:  Parts of this document were written utilizing a voice to text dictation software.  Note may contain minor transcription or typographical errors that were inadvertently transcribed by the computer software.        Procedure  Procedures     Abida Prieto PA-C  11/08/24 2616

## 2024-11-08 NOTE — POST-PROCEDURE NOTE
Physician Transition of Care Summary  Invasive Cardiovascular Lab    Procedure Date: 11/8/2024  Attending:    * Ale Brady - Primary  Resident/Fellow/Other Assistant: Surgeons and Role:  * No surgeons found with a matching role *    Indications:   Pre-op Diagnosis      * STEMI (ST elevation myocardial infarction) (Multi) [I21.3]    Post-procedure diagnosis:   Post-op Diagnosis     * STEMI (ST elevation myocardial infarction) (Multi) [I21.3]    Procedure(s):   PCI    Left Heart Cath, No LV  74212 - TX L HRT CATH W/NJX L VENTRICULOGRAPHY IMG S&I        Procedure Findings:   Patient underwent emergent cardiac catheterization.    Left main.  There is a distal left main of 90% hazy lesion.  Trifurcation lesion.    Left anterior descending artery.  Left anterior sending artery tortuous vessel has LILY-3 flow nonobstructive disease.  Diagonal 1 has ostial disease    Ramus.  The ramus has LILY II flow may be some ostial disease.    Left circumflex.  The left circumflex is small vessel overall has large obtuse marginal 1.  The proper left circumflex has no significant coronary artery disease.  Obtuse marginal 1 has no significant coronary disease.    Right coronary artery.  Right coronary artery is a right dominant vessel very large vessel tortuous without significant coronary artery disease.  Obtuse marginal 2 actually its aberrant vessel coming off the distal RCA.    Left heart catheterization.  Left ventricular end-diastolic pressure was     Description of the Procedure:   Coronary angiography of vessels.  Left heart catheterization    Complications:   None    Stents/Implants:   Implants       No implant documentation for this case.            Anticoagulation/Antiplatelet Plan:   4000 units of heparin    Estimated Blood Loss:   * No values recorded between 11/8/2024  4:20 PM and 11/8/2024  4:55 PM *    Anesthesia: Moderate Sedation Anesthesia Staff: No anesthesia staff entered.    Any Specimen(s) Removed:   No  specimens collected during this procedure.    Disposition:   Patient will be transferred to intensive care unit.  Recommend CT surgery evaluation.  Patient currently has no chest pain.  No indication for balloon pump and EKG almost normalizing.  There is LILY-3 flow in all his vessels.      Electronically signed by: Ale Brady MD, 11/8/2024 4:55 PM

## 2024-11-08 NOTE — CONSULTS
[facility]       Date:  11/8/2024  Patient:  Jaime Dominguez  YOB: 1950  MRN:  [id number]   Admit Date:  11/8/2024      Chief Complaint   Patient presents with    Chest Pain         History of Present Illness:  niko Dominguez is a 74 y.o. male presenting with chest pain and syncope. The patient does not have a past cardiac history and does not currently follow with a cardiologist. He does have a past medical history of hyperlipidemia, skin cancer ([facility] procedure 10/23/24) and s/p right inguinal hernia repair (3/18/24).  The patient reports to me that this morning he was doing yard work and when he came back into the house he began to feel unwell.  He began having significant left-sided chest pain, diaphoresis, shortness of breath and nausea.  The patient notified his wife of this who states that the patient then briefly lost consciousness.  EMS was called and EKG was completed revealing ST elevation in leads II, III and aVF with ST depression in V4 through V6.  A code STEMI was activated en route. The patient was given aspirin, Brilinta and 1 nitroglycerin en route to the emergency department.  On arrival to the urgency department repeat EKG was completed with similar concerning ST changes to the previous.  CBC, CMP, troponin and coag panel were drawn, the patient was given 4000 units of IV heparin and he was taken emergently to the cardiac catheterization lab for diagnostic coronary angiography.       Interval ICU Events:  11/8: Admitted to ICU following heart catheterization (no stents placed), indicative of triple vessel disease. Awaiting work up from CT surgery. Continue heparin gtt.     Medical History:  Past Medical History:   Diagnosis Date    Basal cell carcinoma     Hyperlipidemia      Past Surgical History:   Procedure Laterality Date    SKIN CANCER EXCISION N/A      Medications Prior to Admission   Medication Sig Dispense Refill Last Dose/Taking    aspirin 81 mg  capsule Aspirin   Unknown    atorvastatin (Lipitor) 40 mg tablet Take 1 tablet (40 mg) by mouth once daily. (Patient not taking: Reported on 11/8/2024)   More than a month     Patient has no known allergies.  Social History     Tobacco Use    Smoking status: Never    Smokeless tobacco: Never   Substance Use Topics    Alcohol use: Not Currently    Drug use: Never     Family History   Problem Relation Name Age of Onset    Heart attack Mother      Heart attack Brother         Hospital Medications:    heparin, 0-4,000 Units/hr          Current Facility-Administered Medications:     acetaminophen (Tylenol) tablet 650 mg, 650 mg, oral, q6h PRN, Ale Brady MD    aspirin EC tablet 81 mg, 81 mg, oral, Daily, Ale Brady MD    atorvastatin (Lipitor) tablet 80 mg, 80 mg, oral, Nightly, Ale Brady MD    heparin (porcine) injection 2,000-4,000 Units, 2,000-4,000 Units, intravenous, PRN, Melony Urbina PA-C    heparin 25,000 Units in dextrose 5% 250 mL (100 Units/mL) infusion (premix), 0-4,000 Units/hr, intravenous, Continuous, Melony Urbina PA-C    metoprolol tartrate (Lopressor) tablet 12.5 mg, 12.5 mg, oral, BID, Melony Urbina PA-C    morphine injection 2 mg, 2 mg, intravenous, q6h PRN, Ale Brady MD    nitroglycerin (Jose G-Bid) 2 % ointment 0.5 inch, 0.5 inch, transdermal, q6h during day, Melony Urbina PA-C, 0.5 inch at 11/08/24 1751    nitroglycerin (Nitrostat) SL tablet 0.4 mg, 0.4 mg, sublingual, q5 min PRN, Ale Brady MD    oxygen (O2) therapy, , inhalation, Continuous PRN - O2/gases, Ale Brady MD    sodium chloride 0.9 % bolus 500 mL, 500 mL, intravenous, Once, Bev Ellison MD, Last Rate: 500 mL/hr at 11/08/24 1745, 500 mL at 11/08/24 1745    Review of Systems:  14 point review of systems was completed and negative except for those specially mention in my HPI    Physical Exam:    Heart Rate:  [67-99]   Temp:  [36.5 °C (97.7 °F)]   Resp:  [11-18]   BP: (124-128)/(84-97)   Height:   "[175.3 cm (5' 9\")]   SpO2:  [100 %]     Physical Exam  Constitutional:       Appearance: Normal appearance.   HENT:      Nose: Nose normal.      Mouth/Throat:      Mouth: Mucous membranes are moist.      Pharynx: Oropharynx is clear.   Eyes:      Conjunctiva/sclera: Conjunctivae normal.      Pupils: Pupils are equal, round, and reactive to light.   Cardiovascular:      Rate and Rhythm: Normal rate and regular rhythm.      Pulses: Normal pulses.      Heart sounds: Normal heart sounds.   Pulmonary:      Effort: Pulmonary effort is normal.      Breath sounds: Normal breath sounds.   Abdominal:      General: Abdomen is flat. Bowel sounds are normal.   Skin:     General: Skin is warm and dry.      Capillary Refill: Capillary refill takes 2 to 3 seconds.   Neurological:      General: No focal deficit present.      Mental Status: He is alert and oriented to person, place, and time.   Psychiatric:         Mood and Affect: Mood normal.         Behavior: Behavior normal.         Objective:    I have reviewed all medications, laboratory results, and imaging pertinent for today's encounter.           Intake/Output Summary (Last 24 hours) at 11/8/2024 1810  Last data filed at 11/8/2024 1703  Gross per 24 hour   Intake --   Output 5 ml   Net -5 ml       Recent Imaging  XR chest 1 view   Final Result   Mild interstitial prominence could be chronic or relate to component   developing interstitial edema. Correlate clinically.        MACRO:   None        Signed by: Kristopher Mariano 11/8/2024 4:23 PM   Dictation workstation:   [id number]      Cardiac Catheterization Procedure    (Results Pending)   Transthoracic Echo (TTE) Complete    (Results Pending)       No echocardiogram results found for the past 14 days    Assessment/Plan:    I am currently managing this critically ill patient for the following problems:    Neuro/Psych/Pain Ctrl/Sedation:  Chest pain-resolved  - Pain: Nitroglycerin PRN, Tylenol PRN  - CAM ICU  - Delirium " precautions    Respiratory/ENT:  No acute concerns  -Room air currently  -Maintain spO2 > 92%    Cardiovascular:  STEMI  Triple Vessel Disease  -Start Metoprolol, ASA, statin   -If HTN, may increased metoprolol   -Continue heparin gtt  -Heart Cath 11/3: LILY 3 flow in all vessels, no stents placed  -CT surgery consult   -Echo ordered   -EKGs as needed  -Continuous cardiac monitoring     GI:  No acute concerns  -Cardiac diet    Renal/Volume Status (Intra & Extravascular):  No acute concerns  -Crt 1.06  -Monitor I/Os    Endocrine  No acute concerns  -Hgb A1c ordered     Infectious Disease:  No acute concerns  -WBCs 9.6  -Monitor for SIRS    Heme/Onc:  No acute concerns  -Continue heparin gtt  -Received Brilinta en route  -Transfuse for Hgb < 7.0    OBGYN/MSK:  No acute concerns  -Ambulatory at baseline    Ethics/Code Status:  Full code    :  DVT Prophylaxis: Heparin gtt  GI Prophylaxis: NA  Bowel Regimen: NA  Diet: Cardiac diet  CVC: NA  Rescue: NA  Hale: NA  Restraints: NA  Dispo: ICU    Critical Care Time:  40 minutes spent in preparing to see patient (I.e. review of medical records), evaluation of diagnostics (I.e. labs, imaging, etc.), documentation, discussing plan of care with patient/ family/ caregiver, and/ or coordination of care with multidisciplinary team. Time does not include completion of procedure time.      Melony Urbina PA-C

## 2024-11-08 NOTE — ED TRIAGE NOTES
Pt came was working outside. Got dizzy and went into house to lay down recognized chest pain and called squad. He was given Asa, nitroglycerin and Brilinta.

## 2024-11-08 NOTE — PROGRESS NOTES
Attestation/Supervisory note for DEANGELO Prieto      The patient is a 74-year-old male presenting to the emergency department by EMS from home for evaluation of left-sided chest pain, shortness of breath and sweatiness.  The patient was reportedly doing hard work about 30 to 45 minutes prior to arrival when he developed the left-sided chest pain, he also felt short of breath and became very sweaty.  EMS was called.  He states that he does take a daily aspirin.  He does also take a statin for hyperlipidemia but he denies having any history of CAD or ACS.  No history of PE or DVT.  No history of hypertension or diabetes.  No recent travel or sick contacts.  No recent surgery.  No recent immobility.  He states he does not have any other chronic medical conditions.  He does not use any blood thinners.  He states that the chest pain is left-sided and substernal.  No better or worse.  No radiation.  It is constant.  He was given aspirin and Brilinta prior to arrival by EMS.  No abdominal pain.  No nausea vomiting.  No diarrhea or constipation.  No urinary complaints.  No cough or congestion.  No fever or chills.  All pertinent positives and negatives are recorded above.  All other systems reviewed and otherwise negative.  Vital signs within normal limits.  Physical exam with a well-nourished well-developed male in no acute distress.  HEENT exam within normal limits.  He has no evidence of airway compromise or respiratory distress.  Abdominal exam is benign.  He does not have any gross motor, neurologic or vascular deficits on exam.  Pulses are equal bilaterally.      EKG with normal sinus rhythm at 60 bpm, normal axis, normal voltage, ST elevation in the inferior leads with some depression in the anterior leads.  Normal T waves      Code STEMI activated.  Dr. Brady, cardiology, responded      Diagnostic labs ordered but the results were pending at the time that the patient was taken to the Cath Lab for intervention.      XR  chest 1 view   Final Result   Mild interstitial prominence could be chronic or relate to component   developing interstitial edema. Correlate clinically.        MACRO:   None        Signed by: Kristopher Mariano 11/8/2024 4:23 PM   Dictation workstation:   [id number]      Cardiac Catheterization Procedure    (Results Pending)   Transthoracic Echo (TTE) Complete    (Results Pending)          The patient has evidence of STEMI on EKG.  It was discussed with cardiology and the patient was taken to cardiac catheterization for further management of this.  Chest x-ray shows some mild interstitial prominence that could be related to developing interstitial edema but otherwise unremarkable.  No evidence of pneumonia or pneumothorax.  No widening of the mediastinum.  Pulses are equal bilaterally.      Impression/diagnosis:  STEMI  Chest pain, left-sided  Dyspnea      Critical care time of  19 minutes billed for management of code STEMI with activation of the code STEMI, consultation with cardiology, initiation of IV heparin, monitoring patient on telemetry, arrangement for cardiac catheterization.  This time excludes time for billable procedures.      critical care time billed for by me is non concurrent with time billed for by DEANGELO Prieto      I personally saw the patient and made/approve the management plan and take responsibility for the patient management.      I independently interpreted the following study (S) EKG and diagnostic labs      I personally discussed the patient's management with the patient, his spouse and the cardiologist      I reviewed the results of the diagnostic labs and diagnostic imaging.  Formal radiology read was completed by the radiologist.      Bev Ellison MD

## 2024-11-09 ENCOUNTER — APPOINTMENT (OUTPATIENT)
Dept: RADIOLOGY | Facility: HOSPITAL | Age: 74
DRG: 282 | End: 2024-11-09
Payer: MEDICARE

## 2024-11-09 ENCOUNTER — APPOINTMENT (OUTPATIENT)
Dept: CARDIOLOGY | Facility: HOSPITAL | Age: 74
End: 2024-11-09
Payer: MEDICARE

## 2024-11-09 ENCOUNTER — APPOINTMENT (OUTPATIENT)
Dept: CARDIOLOGY | Facility: HOSPITAL | Age: 74
DRG: 282 | End: 2024-11-09
Payer: MEDICARE

## 2024-11-09 LAB
ANION GAP SERPL CALCULATED.3IONS-SCNC: 9 MMOL/L (ref 10–20)
AORTIC VALVE MEAN GRADIENT: 3 MMHG
AORTIC VALVE PEAK VELOCITY: 1.17 M/S
APPEARANCE UR: CLEAR
APTT PPP: 35 SECONDS (ref 22–32.5)
APTT PPP: 36.7 SECONDS (ref 22–32.5)
APTT PPP: 55.1 SECONDS (ref 22–32.5)
AV PEAK GRADIENT: 5 MMHG
AVA (PEAK VEL): 2.77 CM2
AVA (VTI): 2.62 CM2
BASOPHILS # BLD AUTO: 0.03 X10*3/UL (ref 0–0.1)
BASOPHILS NFR BLD AUTO: 0.3 %
BILIRUB UR STRIP.AUTO-MCNC: NEGATIVE MG/DL
BUN SERPL-MCNC: 20 MG/DL (ref 6–23)
CALCIUM SERPL-MCNC: 8.7 MG/DL (ref 8.6–10.3)
CHLORIDE SERPL-SCNC: 102 MMOL/L (ref 98–107)
CO2 SERPL-SCNC: 28 MMOL/L (ref 21–32)
COLOR UR: COLORLESS
CREAT SERPL-MCNC: 0.89 MG/DL (ref 0.5–1.3)
EGFRCR SERPLBLD CKD-EPI 2021: >90 ML/MIN/1.73M*2
EJECTION FRACTION APICAL 4 CHAMBER: 55.5
EJECTION FRACTION: 63 %
EOSINOPHIL # BLD AUTO: 0.03 X10*3/UL (ref 0–0.4)
EOSINOPHIL NFR BLD AUTO: 0.3 %
ERYTHROCYTE [DISTWIDTH] IN BLOOD BY AUTOMATED COUNT: 12.1 % (ref 11.5–14.5)
GLUCOSE SERPL-MCNC: 88 MG/DL (ref 74–99)
GLUCOSE UR STRIP.AUTO-MCNC: NORMAL MG/DL
HCT VFR BLD AUTO: 40.1 % (ref 41–52)
HGB BLD-MCNC: 13.9 G/DL (ref 13.5–17.5)
HOLD SPECIMEN: NORMAL
HOLD SPECIMEN: NORMAL
IMM GRANULOCYTES # BLD AUTO: 0.04 X10*3/UL (ref 0–0.5)
IMM GRANULOCYTES NFR BLD AUTO: 0.5 % (ref 0–0.9)
KETONES UR STRIP.AUTO-MCNC: NEGATIVE MG/DL
LEFT ATRIUM VOLUME AREA LENGTH INDEX BSA: 12.1 ML/M2
LEFT VENTRICLE INTERNAL DIMENSION DIASTOLE: 5.18 CM (ref 3.5–6)
LEFT VENTRICULAR OUTFLOW TRACT DIAMETER: 2 CM
LEUKOCYTE ESTERASE UR QL STRIP.AUTO: NEGATIVE
LV EJECTION FRACTION BIPLANE: 58 %
LYMPHOCYTES # BLD AUTO: 2.05 X10*3/UL (ref 0.8–3)
LYMPHOCYTES NFR BLD AUTO: 23.1 %
MAGNESIUM SERPL-MCNC: 2.23 MG/DL (ref 1.6–2.4)
MCH RBC QN AUTO: 32 PG (ref 26–34)
MCHC RBC AUTO-ENTMCNC: 34.7 G/DL (ref 32–36)
MCV RBC AUTO: 92 FL (ref 80–100)
MITRAL VALVE E/A RATIO: 1.2
MONOCYTES # BLD AUTO: 0.81 X10*3/UL (ref 0.05–0.8)
MONOCYTES NFR BLD AUTO: 9.1 %
NEUTROPHILS # BLD AUTO: 5.9 X10*3/UL (ref 1.6–5.5)
NEUTROPHILS NFR BLD AUTO: 66.7 %
NITRITE UR QL STRIP.AUTO: NEGATIVE
NRBC BLD-RTO: 0 /100 WBCS (ref 0–0)
PH UR STRIP.AUTO: 6.5 [PH]
PHOSPHATE SERPL-MCNC: 3.5 MG/DL (ref 2.5–4.9)
PLATELET # BLD AUTO: 173 X10*3/UL (ref 150–450)
POTASSIUM SERPL-SCNC: 4 MMOL/L (ref 3.5–5.3)
PROT UR STRIP.AUTO-MCNC: NEGATIVE MG/DL
RBC # BLD AUTO: 4.34 X10*6/UL (ref 4.5–5.9)
RBC # UR STRIP.AUTO: NEGATIVE /UL
RIGHT VENTRICLE FREE WALL PEAK S': 11.5 CM/S
SODIUM SERPL-SCNC: 135 MMOL/L (ref 136–145)
SP GR UR STRIP.AUTO: 1.01
TRICUSPID ANNULAR PLANE SYSTOLIC EXCURSION: 2.5 CM
UROBILINOGEN UR STRIP.AUTO-MCNC: NORMAL MG/DL
WBC # BLD AUTO: 8.9 X10*3/UL (ref 4.4–11.3)

## 2024-11-09 PROCEDURE — 93306 TTE W/DOPPLER COMPLETE: CPT | Performed by: INTERNAL MEDICINE

## 2024-11-09 PROCEDURE — 93306 TTE W/DOPPLER COMPLETE: CPT

## 2024-11-09 PROCEDURE — 2500000004 HC RX 250 GENERAL PHARMACY W/ HCPCS (ALT 636 FOR OP/ED)

## 2024-11-09 PROCEDURE — 85730 THROMBOPLASTIN TIME PARTIAL: CPT

## 2024-11-09 PROCEDURE — 2500000002 HC RX 250 W HCPCS SELF ADMINISTERED DRUGS (ALT 637 FOR MEDICARE OP, ALT 636 FOR OP/ED): Performed by: INTERNAL MEDICINE

## 2024-11-09 PROCEDURE — 2500000001 HC RX 250 WO HCPCS SELF ADMINISTERED DRUGS (ALT 637 FOR MEDICARE OP): Performed by: INTERNAL MEDICINE

## 2024-11-09 PROCEDURE — 71250 CT THORAX DX C-: CPT

## 2024-11-09 PROCEDURE — 93010 ELECTROCARDIOGRAM REPORT: CPT | Performed by: INTERNAL MEDICINE

## 2024-11-09 PROCEDURE — 2060000001 HC INTERMEDIATE ICU ROOM DAILY

## 2024-11-09 PROCEDURE — 84100 ASSAY OF PHOSPHORUS: CPT

## 2024-11-09 PROCEDURE — 36415 COLL VENOUS BLD VENIPUNCTURE: CPT

## 2024-11-09 PROCEDURE — 85025 COMPLETE CBC W/AUTO DIFF WBC: CPT

## 2024-11-09 PROCEDURE — 81003 URINALYSIS AUTO W/O SCOPE: CPT | Performed by: NURSE PRACTITIONER

## 2024-11-09 PROCEDURE — 2500000001 HC RX 250 WO HCPCS SELF ADMINISTERED DRUGS (ALT 637 FOR MEDICARE OP)

## 2024-11-09 PROCEDURE — 93005 ELECTROCARDIOGRAM TRACING: CPT

## 2024-11-09 PROCEDURE — 80048 BASIC METABOLIC PNL TOTAL CA: CPT

## 2024-11-09 PROCEDURE — 99233 SBSQ HOSP IP/OBS HIGH 50: CPT

## 2024-11-09 PROCEDURE — 83735 ASSAY OF MAGNESIUM: CPT

## 2024-11-09 PROCEDURE — 99291 CRITICAL CARE FIRST HOUR: CPT | Performed by: INTERNAL MEDICINE

## 2024-11-09 PROCEDURE — 87081 CULTURE SCREEN ONLY: CPT | Mod: WESLAB | Performed by: NURSE PRACTITIONER

## 2024-11-09 RX ORDER — MUPIROCIN 20 MG/G
0.5 OINTMENT TOPICAL 2 TIMES DAILY
Status: CANCELLED | OUTPATIENT
Start: 2024-11-09 | End: 2024-11-14

## 2024-11-09 RX ORDER — CHLORHEXIDINE GLUCONATE ORAL RINSE 1.2 MG/ML
15 SOLUTION DENTAL 2 TIMES DAILY
Status: CANCELLED | OUTPATIENT
Start: 2024-11-09 | End: 2024-11-10

## 2024-11-09 RX ORDER — ZOLPIDEM TARTRATE 5 MG/1
5 TABLET ORAL ONCE
Status: COMPLETED | OUTPATIENT
Start: 2024-11-09 | End: 2024-11-09

## 2024-11-09 RX ORDER — CEFAZOLIN SODIUM 2 G/100ML
2 INJECTION, SOLUTION INTRAVENOUS ONCE
Status: CANCELLED | OUTPATIENT
Start: 2024-11-09 | End: 2024-11-09

## 2024-11-09 RX ADMIN — ATORVASTATIN CALCIUM 80 MG: 80 TABLET, FILM COATED ORAL at 20:55

## 2024-11-09 RX ADMIN — ZOLPIDEM TARTRATE 5 MG: 5 TABLET ORAL at 22:20

## 2024-11-09 RX ADMIN — HEPARIN SODIUM 1400 UNITS/HR: 10000 INJECTION, SOLUTION INTRAVENOUS at 18:26

## 2024-11-09 RX ADMIN — METOPROLOL TARTRATE 12.5 MG: 25 TABLET, FILM COATED ORAL at 09:13

## 2024-11-09 RX ADMIN — ASPIRIN 81 MG: 81 TABLET, COATED ORAL at 09:13

## 2024-11-09 RX ADMIN — METOPROLOL TARTRATE 12.5 MG: 25 TABLET, FILM COATED ORAL at 20:52

## 2024-11-09 SDOH — ECONOMIC STABILITY: TRANSPORTATION INSECURITY: IN THE PAST 12 MONTHS, HAS LACK OF TRANSPORTATION KEPT YOU FROM MEDICAL APPOINTMENTS OR FROM GETTING MEDICATIONS?: NO

## 2024-11-09 SDOH — ECONOMIC STABILITY: FOOD INSECURITY: WITHIN THE PAST 12 MONTHS, THE FOOD YOU BOUGHT JUST DIDN'T LAST AND YOU DIDN'T HAVE MONEY TO GET MORE.: NEVER TRUE

## 2024-11-09 SDOH — ECONOMIC STABILITY: INCOME INSECURITY: IN THE PAST 12 MONTHS HAS THE ELECTRIC, GAS, OIL, OR WATER COMPANY THREATENED TO SHUT OFF SERVICES IN YOUR HOME?: NO

## 2024-11-09 SDOH — HEALTH STABILITY: MENTAL HEALTH: HOW OFTEN DO YOU HAVE SIX OR MORE DRINKS ON ONE OCCASION?: NEVER

## 2024-11-09 SDOH — ECONOMIC STABILITY: HOUSING INSECURITY: IN THE PAST 12 MONTHS, HOW MANY TIMES HAVE YOU MOVED WHERE YOU WERE LIVING?: 0

## 2024-11-09 SDOH — ECONOMIC STABILITY: FOOD INSECURITY: WITHIN THE PAST 12 MONTHS, YOU WORRIED THAT YOUR FOOD WOULD RUN OUT BEFORE YOU GOT THE MONEY TO BUY MORE.: NEVER TRUE

## 2024-11-09 SDOH — HEALTH STABILITY: PHYSICAL HEALTH: ON AVERAGE, HOW MANY MINUTES DO YOU ENGAGE IN EXERCISE AT THIS LEVEL?: 60 MIN

## 2024-11-09 SDOH — ECONOMIC STABILITY: HOUSING INSECURITY: AT ANY TIME IN THE PAST 12 MONTHS, WERE YOU HOMELESS OR LIVING IN A SHELTER (INCLUDING NOW)?: NO

## 2024-11-09 SDOH — HEALTH STABILITY: MENTAL HEALTH: HOW OFTEN DO YOU HAVE A DRINK CONTAINING ALCOHOL?: 2-3 TIMES A WEEK

## 2024-11-09 SDOH — SOCIAL STABILITY: SOCIAL INSECURITY: WITHIN THE LAST YEAR, HAVE YOU BEEN HUMILIATED OR EMOTIONALLY ABUSED IN OTHER WAYS BY YOUR PARTNER OR EX-PARTNER?: NO

## 2024-11-09 SDOH — ECONOMIC STABILITY: HOUSING INSECURITY: IN THE LAST 12 MONTHS, WAS THERE A TIME WHEN YOU WERE NOT ABLE TO PAY THE MORTGAGE OR RENT ON TIME?: NO

## 2024-11-09 SDOH — SOCIAL STABILITY: SOCIAL NETWORK: HOW OFTEN DO YOU ATTEND CHURCH OR RELIGIOUS SERVICES?: MORE THAN 4 TIMES PER YEAR

## 2024-11-09 SDOH — HEALTH STABILITY: MENTAL HEALTH: HOW MANY DRINKS CONTAINING ALCOHOL DO YOU HAVE ON A TYPICAL DAY WHEN YOU ARE DRINKING?: 3 OR 4

## 2024-11-09 SDOH — ECONOMIC STABILITY: FOOD INSECURITY: HOW HARD IS IT FOR YOU TO PAY FOR THE VERY BASICS LIKE FOOD, HOUSING, MEDICAL CARE, AND HEATING?: NOT HARD AT ALL

## 2024-11-09 SDOH — SOCIAL STABILITY: SOCIAL INSECURITY: ARE YOU MARRIED, WIDOWED, DIVORCED, SEPARATED, NEVER MARRIED, OR LIVING WITH A PARTNER?: MARRIED

## 2024-11-09 SDOH — SOCIAL STABILITY: SOCIAL INSECURITY: WITHIN THE LAST YEAR, HAVE YOU BEEN AFRAID OF YOUR PARTNER OR EX-PARTNER?: NO

## 2024-11-09 SDOH — SOCIAL STABILITY: SOCIAL NETWORK: HOW OFTEN DO YOU ATTEND MEETINGS OF THE CLUBS OR ORGANIZATIONS YOU BELONG TO?: NEVER

## 2024-11-09 SDOH — HEALTH STABILITY: PHYSICAL HEALTH: ON AVERAGE, HOW MANY DAYS PER WEEK DO YOU ENGAGE IN MODERATE TO STRENUOUS EXERCISE (LIKE A BRISK WALK)?: 3 DAYS

## 2024-11-09 SDOH — SOCIAL STABILITY: SOCIAL NETWORK: HOW OFTEN DO YOU GET TOGETHER WITH FRIENDS OR RELATIVES?: ONCE A WEEK

## 2024-11-09 ASSESSMENT — COGNITIVE AND FUNCTIONAL STATUS - GENERAL
MOBILITY SCORE: 19
TURNING FROM BACK TO SIDE WHILE IN FLAT BAD: A LITTLE
WALKING IN HOSPITAL ROOM: A LITTLE
DAILY ACTIVITIY SCORE: 23
MOVING TO AND FROM BED TO CHAIR: A LITTLE
DRESSING REGULAR LOWER BODY CLOTHING: A LITTLE
STANDING UP FROM CHAIR USING ARMS: A LITTLE
CLIMB 3 TO 5 STEPS WITH RAILING: A LITTLE

## 2024-11-09 ASSESSMENT — PAIN SCALES - GENERAL
PAINLEVEL_OUTOF10: 0 - NO PAIN

## 2024-11-09 ASSESSMENT — ACTIVITIES OF DAILY LIVING (ADL): LACK_OF_TRANSPORTATION: NO

## 2024-11-09 ASSESSMENT — PAIN - FUNCTIONAL ASSESSMENT
PAIN_FUNCTIONAL_ASSESSMENT: 0-10

## 2024-11-09 ASSESSMENT — LIFESTYLE VARIABLES
AUDIT-C TOTAL SCORE: 4
SKIP TO QUESTIONS 9-10: 0

## 2024-11-09 NOTE — PROGRESS NOTES
11/09/24 1648   Discharge Planning   Living Arrangements Spouse/significant other   Support Systems Spouse/significant other;Children   Assistance Needed none   Type of Residence Private residence   Do you have animals or pets at home? No   Who is requesting discharge planning? Provider   Home or Post Acute Services None   Expected Discharge Disposition Home   Does the patient need discharge transport arranged? No   Patient Choice   Provider Choice list and CMS website (https://medicare.gov/care-compare#search) for post-acute Quality and Resource Measure Data were provided and reviewed with: Other (Comment)  (no skilled needs are anticipated for discharge)   Patient / Family choosing to utilize agency / facility established prior to hospitalization No

## 2024-11-09 NOTE — CARE PLAN
Problem: Pain  Goal: Takes deep breaths with improved pain control throughout the shift  Outcome: Progressing  Goal: Turns in bed with improved pain control throughout the shift  Outcome: Progressing  Goal: Walks with improved pain control throughout the shift  Outcome: Progressing  Goal: Performs ADL's with improved pain control throughout shift  Outcome: Progressing  Goal: Participates in PT with improved pain control throughout the shift  Outcome: Progressing  Goal: Free from opioid side effects throughout the shift  Outcome: Progressing  Goal: Free from acute confusion related to pain meds throughout the shift  Outcome: Progressing     Problem: Safety - Adult  Goal: Free from fall injury  Outcome: Progressing   The patient's goals for the shift include pain free    The clinical goals for the shift include no chest pain/arrythmia    Over the shift, the patient did not make progress toward the following goals. Barriers to progression include . Recommendations to address these barriers include .

## 2024-11-09 NOTE — NURSING NOTE
1530hrs:  Having trouble with routine logistics of printing and collecting urinalysis order- tried to dc and replace the order back but still not coming through as usual- no collection button/tab for this specific order. Called lab twice but they cannot help with the above problem. Called [facility] and spoke with Sun, she said she's going to reach out to their Lab department and will give a callback.      1615hrs: callback received from [facility]. After all the troubleshoot but unsuccessful, was instructed to print the order form, label the specimen accordingly and send to lab. Called lab dept and spoke with Darryl, informed of the above details and she acknowledged. Specimen sent with label and order.

## 2024-11-09 NOTE — CONSULTS
[facility]       Date:  11/9/2024  Patient:  Jaime Dominguez  YOB: 1950  MRN:  [id number]   Admit Date:  11/8/2024      Chief Complaint   Patient presents with    Chest Pain         History of Present Illness:  niko Dominguez is a 74 y.o. male presenting with chest pain and syncope. The patient does not have a past cardiac history and does not currently follow with a cardiologist. He does have a past medical history of hyperlipidemia, skin cancer (MOHS procedure 10/23/24) and s/p right inguinal hernia repair (3/18/24).  The patient reports to me that this morning he was doing yard work and when he came back into the house he began to feel unwell.  He began having significant left-sided chest pain, diaphoresis, shortness of breath and nausea.  The patient notified his wife of this who states that the patient then briefly lost consciousness.  EMS was called and EKG was completed revealing ST elevation in leads II, III and aVF with ST depression in V4 through V6.  A code STEMI was activated en route. The patient was given aspirin, Brilinta and 1 nitroglycerin en route to the emergency department.  On arrival to the urgency department repeat EKG was completed with similar concerning ST changes to the previous.  CBC, CMP, troponin and coag panel were drawn, the patient was given 4000 units of IV heparin and he was taken emergently to the cardiac catheterization lab for diagnostic coronary angiography.       Interval ICU Events:  11/8: Admitted to ICU following heart catheterization (no stents placed), indicative of triple vessel disease. Awaiting work up from CT surgery. Continue heparin gtt.     11/9: No acute events overnight. Continue heparin gtt. Downgrade to stepdown.     Medical History:  Past Medical History:   Diagnosis Date    Basal cell carcinoma     Hyperlipidemia      Past Surgical History:   Procedure Laterality Date    SKIN CANCER EXCISION N/A      Medications Prior to  Admission   Medication Sig Dispense Refill Last Dose/Taking    aspirin 81 mg capsule Aspirin   Unknown    atorvastatin (Lipitor) 40 mg tablet Take 1 tablet (40 mg) by mouth once daily. (Patient not taking: Reported on 11/8/2024)   More than a month     Patient has no known allergies.  Social History     Tobacco Use    Smoking status: Never    Smokeless tobacco: Never   Substance Use Topics    Alcohol use: Not Currently    Drug use: Never     Family History   Problem Relation Name Age of Onset    Heart attack Mother      Heart attack Brother         Hospital Medications:    heparin, 0-4,000 Units/hr, Last Rate: 1,200 Units/hr (11/09/24 0521)          Current Facility-Administered Medications:     acetaminophen (Tylenol) tablet 650 mg, 650 mg, oral, q6h PRN, Ale Brady MD    aspirin EC tablet 81 mg, 81 mg, oral, Daily, Ale Brady MD    atorvastatin (Lipitor) tablet 80 mg, 80 mg, oral, Nightly, Ale Brady MD, 80 mg at 11/08/24 2045    heparin (porcine) injection 2,000-4,000 Units, 2,000-4,000 Units, intravenous, PRN, Melony Urbina PA-C    heparin 25,000 Units in dextrose 5% 250 mL (100 Units/mL) infusion (premix), 0-4,000 Units/hr, intravenous, Continuous, Melony Urbina PA-C, Last Rate: 12 mL/hr at 11/09/24 0521, 1,200 Units/hr at 11/09/24 0521    metoprolol tartrate (Lopressor) tablet 12.5 mg, 12.5 mg, oral, BID, Melony Urbina PA-C, 12.5 mg at 11/08/24 2045    morphine injection 2 mg, 2 mg, intravenous, q6h PRN, Ale Brady MD    nitroglycerin (Jose G-Bid) 2 % ointment 0.5 inch, 0.5 inch, transdermal, q6h PRN, Adama Rashid PA-C    nitroglycerin (Nitrostat) SL tablet 0.4 mg, 0.4 mg, sublingual, q5 min PRN, Ale Brady MD    oxygen (O2) therapy, , inhalation, Continuous PRN - O2/gases, Ale Brady MD    Review of Systems:  14 point review of systems was completed and negative except for those specially mention in my HPI    Physical Exam:    Heart Rate:  [61-99]   Temp:  [36.4 °C (97.5  "°F)-36.8 °C (98.2 °F)]   Resp:  [6-20]   BP: ()/(64-97)   Height:  [175.3 cm (5' 9\")]   Weight:  [96.9 kg (213 lb 10 oz)-99.7 kg (219 lb 12.8 oz)]   SpO2:  [94 %-100 %]     Physical Exam  Constitutional:       Appearance: Normal appearance.   HENT:      Nose: Nose normal.      Mouth/Throat:      Mouth: Mucous membranes are moist.      Pharynx: Oropharynx is clear.   Eyes:      Conjunctiva/sclera: Conjunctivae normal.      Pupils: Pupils are equal, round, and reactive to light.   Cardiovascular:      Rate and Rhythm: Normal rate and regular rhythm.      Pulses: Normal pulses.      Heart sounds: Normal heart sounds.   Pulmonary:      Effort: Pulmonary effort is normal.      Breath sounds: Normal breath sounds.   Abdominal:      General: Abdomen is flat. Bowel sounds are normal.   Skin:     General: Skin is warm and dry.      Capillary Refill: Capillary refill takes 2 to 3 seconds.   Neurological:      General: No focal deficit present.      Mental Status: He is alert and oriented to person, place, and time.   Psychiatric:         Mood and Affect: Mood normal.         Behavior: Behavior normal.       Objective:    I have reviewed all medications, laboratory results, and imaging pertinent for today's encounter.           Intake/Output Summary (Last 24 hours) at 11/9/2024 0825  Last data filed at 11/9/2024 0728  Gross per 24 hour   Intake 1687.16 ml   Output 5 ml   Net 1682.16 ml       Recent Imaging  XR chest 1 view   Final Result   Mild interstitial prominence could be chronic or relate to component   developing interstitial edema. Correlate clinically.        MACRO:   None        Signed by: Kristopher Mariano 11/8/2024 4:23 PM   Dictation workstation:   [id number]      Cardiac Catheterization Procedure    (Results Pending)   Transthoracic Echo (TTE) Complete    (Results Pending)       No echocardiogram results found for the past 14 days    Assessment/Plan:    I am currently managing this critically ill patient for " the following problems:    Neuro/Psych/Pain Ctrl/Sedation:  Chest pain-resolved  - Pain: Nitroglycerin PRN, Tylenol PRN  - CAM ICU  - Delirium precautions    Respiratory/ENT:  No acute concerns  -Room air currently  -Maintain spO2 > 92%    Cardiovascular:  STEMI  Triple Vessel Disease  -Continue Metoprolol, ASA, statin   -Continue heparin gtt  -Heart Cath 11/3: 80% stenosis of LAD   -CT surgery consult   -Echo 11/9: EF 60-65%, grade 2 left ventricular diastolic filling with an elevated left atrial pressure.  -EKGs as needed  -Continuous cardiac monitoring     GI:  No acute concerns  -Cardiac diet    Renal/Volume Status (Intra & Extravascular):  No acute concerns  -Crt 0.89 this am  -Monitor I/Os  -Replace electrolyte as indicated     Endocrine  No acute concerns  -Hgb A1c: 5.1    Infectious Disease:  No acute concerns  -WBCs 8.9, afebrile  -Monitor for SIRS    Heme/Onc:  No acute concerns  -Continue heparin gtt  -Transfuse for Hgb < 7.0    OBGYN/MSK:  No acute concerns  -Ambulatory at baseline    Ethics/Code Status:  Full code    :  DVT Prophylaxis: Heparin gtt  GI Prophylaxis: NA  Bowel Regimen: NA  Diet: Cardiac diet  CVC: NA  Mora: NA  Hale: NA  Restraints: NA  Dispo: Downgrade    Critical Care Time:  40 minutes spent in preparing to see patient (I.e. review of medical records), evaluation of diagnostics (I.e. labs, imaging, etc.), documentation, discussing plan of care with patient/ family/ caregiver, and/ or coordination of care with multidisciplinary team. Time does not include completion of procedure time.      Melony Urbina PA-C

## 2024-11-09 NOTE — CONSULTS
"Reason For Consult  Critical coronary artery disease    History Of Present Illness  Jaime Dominguez is a 74 y.o. male presenting with left sided chest pain, shortness of breath, nausea, and diaphoresis while doing yardwork. He came into the house in distress. His wife reported a brief loss of consciousness. EMS was called. He was diagnosed with STEMI in route and given Brilinta at that time. In the ED labwork and CXR were unremarkable. ECG with acute ST elevations. 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 Cardiac surgery has been consulted for CABG evaluation.  He has a strong FH of heart disease. No personal history of prior chest pain.      Past Medical History  Basal cell carcinoma   Hyperlipidemia  Arthritis left hip  Prostate cancer   Epistaxis   Hemorrhoids     Surgical History  Recent skin cancer excision   Bilateral inguinal hernia repair   Prostatectomy  Cervical  spine surgery, fusion of C4-C5 and C5-C6      Social History  Denies tobacco or alcohol use  Vaping/marijuana       Family History  Family History   Problem Relation Name Age of Onset    Heart attack Mother      Heart attack Brother          Allergies  Patient has no known allergies.    Physical Exam  Constitutional:       Normal appearance, well-developed, well-nourished.   HENT:      Head: Normocephalic and atraumatic.   Cardiovascular:      Rate and Rhythm: RRR, no murmur, nl S1/S2.      Pulses: Normal, <3 sec cap refill.   Pulmonary:      Clear bilateral breath sounds.   Abdominal:      Bowel sounds present, soft.   Musculoskeletal:         No gross abnormalities.  Skin:     Warm and dry, no rashes.   Neurological:      Alert, no focal deficits.        Last Recorded Vitals  Blood pressure 116/82, pulse 61, temperature 36.5 °C (97.7 °F), temperature source Oral, resp. rate 14, height 1.753 m (5' 9\"), weight 99.7 kg (219 lb 12.8 oz), SpO2 94%.    Relevant Results  11/8/24 Cardiac cath post-note  Procedure Findings:   Patient " underwent emergent cardiac catheterization.  Left main.  There is a distal left main of 90% hazy lesion.  Trifurcation lesion.  Left anterior descending artery.  Left anterior sending artery tortuous vessel has LILY-3 flow nonobstructive disease.  Diagonal 1 has ostial disease  Ramus.  The ramus has LILY II flow may be some ostial disease.  Left circumflex.  The left circumflex is small vessel overall has large obtuse marginal 1.  The proper left circumflex has no significant coronary artery disease.  Obtuse marginal 1 has no significant coronary disease.  Right coronary artery.  Right coronary artery is a right dominant vessel very large vessel tortuous without significant coronary artery disease.  Obtuse marginal 2 actually its aberrant vessel coming off the distal RCA.        Assessment/Plan   Mr. Dominguez is a 75 yo male who presents as a STEMI. Cardiac catheterization revealed significant LMT disease. His case is to be reviewed for minimally invasive approach. Preop workup underway. Will need Brilinta washout, LD 11/8/24, Pt and family agree with this plan. Thank you for allowing us to participate in his care.     I spent 30 minutes in the professional and overall care of this patient.      Catrina Mahoney, APRN-CNP

## 2024-11-09 NOTE — PROGRESS NOTES
"Jaime Dominguez is a 74 y.o. male on day 1 of admission presenting with ST elevation myocardial infarction (STEMI) involving other coronary artery of inferior wall.    Subjective   Resting comfortably.  No chest pain overnight.  Rare PVCs and 1 triplet overnight.  Hemodynamically stable.  Remains on heparin infusion       Objective     Physical Exam   Gen: NAD   Neck: no JVD, carotid upstroke is brisk and without delay   Heart: rrr, s1s2+ no mrg   Lungs: CTA   Ext: warm no edema  Last Recorded Vitals  Blood pressure 116/82, pulse 61, temperature 36.5 °C (97.7 °F), temperature source Temporal, resp. rate 14, height 1.753 m (5' 9\"), weight 99.7 kg (219 lb 12.8 oz), SpO2 94%.  Intake/Output last 3 Shifts:  I/O last 3 completed shifts:  In: 1067.2 (10.7 mL/kg) [P.O.:240; I.V.:327.2 (3.3 mL/kg); IV Piggyback:500]  Out: 5 (0.1 mL/kg) [Blood:5]  Weight: 99.7 kg       Assessment/Plan   Assessment & Plan  Acute ST elevation myocardial infarction (STEMI) of inferior wall (Multi)    ST elevation myocardial infarction (STEMI) involving other coronary artery of inferior wall    Acute ST Elevation Myocardial Infarction  Hyperlipidemia  Basal Cell Carcinoma with recent Mohs Procedure     Impression and Plan:     11/8: Described above.  Patient presenting to [facility] emergency department after sudden onset of chest pain this morning after completing yard work.  He reports diaphoresis, shortness of breath and nausea associated with this left-sided chest pain.  The patient also briefly syncopized as witnessed by his wife.  In the emergency department he does report to me that his chest pain is slightly improved after receiving nitroglycerin en route to the hospital.  EKG revealing ST elevation in leads II, III and aVF with ST depression in leads V4 through V6.  The patient has received IV heparin 4000 units, aspirin, Brilinta and 1 dose of nitroglycerin.  He will be taken emergently to the cardiac catheterization lab for diagnostic " coronary angiography with possible percutaneous coronary intervention with Dr. Brady.  I have ordered an echocardiogram to be completed this admission.  Will give further recommendations after cardiac catheterization is complete.    11/9: He remains on a heparin infusion and is almost therapeutic.  Cath films reviewed, significant left main and ostial LAD disease of about 80%.  The circumflex is small and nondominant, RCA is extremely dominant giving the right posterior descending artery and a posterolateral/lateral branch.  Cardiothoracic surgery has been consulted, tentative plans are for surgical revascularization.  He has been started on aspirin and a high potency statin as well as a low-dose beta-blocker.  He was given 1 dose of Brilinta prior to arrival we will not continue this.  Remains on a heparin infusion.  He is getting an echocardiogram today.         Thai Castro, DO

## 2024-11-09 NOTE — CARE PLAN
The patient's goals for the shift include pain free    The clinical goals for the shift include maintain hemodynamic stability and chest pain-free      Problem: Skin  Goal: Decreased wound size/increased tissue granulation at next dressing change  Outcome: Progressing  Goal: Participates in plan/prevention/treatment measures  Outcome: Progressing  Goal: Prevent/manage excess moisture  Outcome: Progressing  Goal: Prevent/minimize sheer/friction injuries  Outcome: Progressing  Goal: Promote/optimize nutrition  Outcome: Progressing  Goal: Promote skin healing  Outcome: Progressing     Problem: Pain  Goal: Takes deep breaths with improved pain control throughout the shift  Outcome: Progressing  Goal: Turns in bed with improved pain control throughout the shift  Outcome: Progressing  Goal: Walks with improved pain control throughout the shift  Outcome: Progressing  Goal: Performs ADL's with improved pain control throughout shift  Outcome: Progressing  Goal: Participates in PT with improved pain control throughout the shift  Outcome: Progressing  Goal: Free from opioid side effects throughout the shift  Outcome: Progressing  Goal: Free from acute confusion related to pain meds throughout the shift  Outcome: Progressing     Problem: Pain - Adult  Goal: Verbalizes/displays adequate comfort level or baseline comfort level  Outcome: Progressing     Problem: Safety - Adult  Goal: Free from fall injury  Outcome: Progressing     Problem: Discharge Planning  Goal: Discharge to home or other facility with appropriate resources  Outcome: Progressing     Problem: Chronic Conditions and Co-morbidities  Goal: Patient's chronic conditions and co-morbidity symptoms are monitored and maintained or improved  Outcome: Progressing

## 2024-11-09 NOTE — PROGRESS NOTES
11/09/24 1649   [facility] Disability Status   Are you deaf or do you have serious difficulty hearing? N   Are you blind or do you have serious difficulty seeing, even when wearing glasses? N   Because of a physical, mental, or emotional condition, do you have serious difficulty concentrating, remembering, or making decisions? (5 years old or older) N   Do you have serious difficulty walking or climbing stairs? N   Do you have serious difficulty dressing or bathing? N   Because of a physical, mental, or emotional condition, do you have serious difficulty doing errands alone such as visiting the doctor? N

## 2024-11-09 NOTE — CARE PLAN
Jaime Dominguez is a 74 year old male admitted with STEMI.    Patient lives with his wife in a multi-level house. No use of assistive devices, no issues using the stairs. He is independent with ADLs, daily tasks, and drives. PCP is Dr Frank Mckeon MD.  ADOD 11/15/2024  Patient is to have surgical revascularization later this week. Needs will be redetermined after. Patient is open to [facility] if needed.   Currently, plan is to dc home with no needs, spouse will transport.     Johana Rodriguez RN-BSN

## 2024-11-10 ENCOUNTER — APPOINTMENT (OUTPATIENT)
Dept: CARDIOLOGY | Facility: HOSPITAL | Age: 74
DRG: 003 | End: 2024-11-10
Payer: MEDICARE

## 2024-11-10 ENCOUNTER — HOSPITAL ENCOUNTER (INPATIENT)
Facility: HOSPITAL | Age: 74
DRG: 003 | End: 2024-11-10
Attending: INTERNAL MEDICINE | Admitting: INTERNAL MEDICINE
Payer: MEDICARE

## 2024-11-10 VITALS
HEIGHT: 69 IN | BODY MASS INDEX: 31.4 KG/M2 | TEMPERATURE: 97.3 F | WEIGHT: 212 LBS | RESPIRATION RATE: 16 BRPM | OXYGEN SATURATION: 97 % | HEART RATE: 73 BPM | SYSTOLIC BLOOD PRESSURE: 96 MMHG | DIASTOLIC BLOOD PRESSURE: 74 MMHG

## 2024-11-10 DIAGNOSIS — J94.2 HEMOTHORAX ON LEFT: ICD-10-CM

## 2024-11-10 DIAGNOSIS — Z95.1 S/P CABG (CORONARY ARTERY BYPASS GRAFT): ICD-10-CM

## 2024-11-10 DIAGNOSIS — Z74.09 IMPAIRED MOBILITY: Primary | ICD-10-CM

## 2024-11-10 DIAGNOSIS — I21.3 STEMI (ST ELEVATION MYOCARDIAL INFARCTION) (MULTI): ICD-10-CM

## 2024-11-10 DIAGNOSIS — R57.0 CARDIOGENIC SHOCK (MULTI): ICD-10-CM

## 2024-11-10 DIAGNOSIS — I21.19 ACUTE ST ELEVATION MYOCARDIAL INFARCTION (STEMI) OF INFERIOR WALL (MULTI): ICD-10-CM

## 2024-11-10 DIAGNOSIS — I21.02 ST ELEVATION MYOCARDIAL INFARCTION INVOLVING LEFT ANTERIOR DESCENDING (LAD) CORONARY ARTERY (MULTI): ICD-10-CM

## 2024-11-10 DIAGNOSIS — I25.119 ATHEROSCLEROTIC HEART DISEASE OF NATIVE CORONARY ARTERY WITH UNSPECIFIED ANGINA PECTORIS: ICD-10-CM

## 2024-11-10 DIAGNOSIS — Z01.810 ENCOUNTER FOR PREPROCEDURAL CARDIOVASCULAR EXAMINATION: ICD-10-CM

## 2024-11-10 DIAGNOSIS — I79.8 OTHER DISORDERS OF ARTERIES, ARTERIOLES AND CAPILLARIES IN DISEASES CLASSIFIED ELSEWHERE: ICD-10-CM

## 2024-11-10 LAB
ABO GROUP (TYPE) IN BLOOD: NORMAL
ALBUMIN SERPL BCP-MCNC: 4.6 G/DL (ref 3.4–5)
ANION GAP SERPL CALC-SCNC: 14 MMOL/L (ref 10–20)
ANION GAP SERPL CALCULATED.3IONS-SCNC: 12 MMOL/L (ref 10–20)
ANTIBODY SCREEN: NORMAL
APTT PPP: 52.1 SECONDS (ref 22–32.5)
APTT PPP: 55 SECONDS (ref 27–38)
APTT PPP: 71.3 SECONDS (ref 22–32.5)
BASOPHILS # BLD AUTO: 0.01 X10*3/UL (ref 0–0.1)
BASOPHILS # BLD AUTO: 0.02 X10*3/UL (ref 0–0.1)
BASOPHILS NFR BLD AUTO: 0.1 %
BASOPHILS NFR BLD AUTO: 0.3 %
BNP SERPL-MCNC: 13 PG/ML (ref 0–99)
BUN SERPL-MCNC: 17 MG/DL (ref 6–23)
BUN SERPL-MCNC: 20 MG/DL (ref 6–23)
CALCIUM SERPL-MCNC: 9 MG/DL (ref 8.6–10.3)
CALCIUM SERPL-MCNC: 9.8 MG/DL (ref 8.6–10.6)
CARDIAC TROPONIN I PNL SERPL HS: 2246 NG/L (ref 0–53)
CARDIAC TROPONIN I PNL SERPL HS: 2317 NG/L (ref 0–53)
CHLORIDE SERPL-SCNC: 104 MMOL/L (ref 98–107)
CHLORIDE SERPL-SCNC: 98 MMOL/L (ref 98–107)
CO2 SERPL-SCNC: 25 MMOL/L (ref 21–32)
CO2 SERPL-SCNC: 30 MMOL/L (ref 21–32)
CREAT SERPL-MCNC: 0.86 MG/DL (ref 0.5–1.3)
CREAT SERPL-MCNC: 1.04 MG/DL (ref 0.5–1.3)
EGFRCR SERPLBLD CKD-EPI 2021: 76 ML/MIN/1.73M*2
EGFRCR SERPLBLD CKD-EPI 2021: >90 ML/MIN/1.73M*2
EOSINOPHIL # BLD AUTO: 0.05 X10*3/UL (ref 0–0.4)
EOSINOPHIL # BLD AUTO: 0.06 X10*3/UL (ref 0–0.4)
EOSINOPHIL NFR BLD AUTO: 0.7 %
EOSINOPHIL NFR BLD AUTO: 0.9 %
ERYTHROCYTE [DISTWIDTH] IN BLOOD BY AUTOMATED COUNT: 12 % (ref 11.5–14.5)
ERYTHROCYTE [DISTWIDTH] IN BLOOD BY AUTOMATED COUNT: 12 % (ref 11.5–14.5)
GLUCOSE SERPL-MCNC: 84 MG/DL (ref 74–99)
GLUCOSE SERPL-MCNC: 89 MG/DL (ref 74–99)
HCT VFR BLD AUTO: 40.1 % (ref 41–52)
HCT VFR BLD AUTO: 42.9 % (ref 41–52)
HGB BLD-MCNC: 13.9 G/DL (ref 13.5–17.5)
HGB BLD-MCNC: 14.3 G/DL (ref 13.5–17.5)
IMM GRANULOCYTES # BLD AUTO: 0.03 X10*3/UL (ref 0–0.5)
IMM GRANULOCYTES # BLD AUTO: 0.03 X10*3/UL (ref 0–0.5)
IMM GRANULOCYTES NFR BLD AUTO: 0.4 % (ref 0–0.9)
IMM GRANULOCYTES NFR BLD AUTO: 0.4 % (ref 0–0.9)
INR PPP: 1.1 (ref 0.9–1.1)
LACTATE SERPL-SCNC: 1.2 MMOL/L (ref 0.4–2)
LYMPHOCYTES # BLD AUTO: 1.74 X10*3/UL (ref 0.8–3)
LYMPHOCYTES # BLD AUTO: 2.03 X10*3/UL (ref 0.8–3)
LYMPHOCYTES NFR BLD AUTO: 25.5 %
LYMPHOCYTES NFR BLD AUTO: 28.6 %
MAGNESIUM SERPL-MCNC: 2.22 MG/DL (ref 1.6–2.4)
MAGNESIUM SERPL-MCNC: 2.43 MG/DL (ref 1.6–2.4)
MCH RBC QN AUTO: 31.4 PG (ref 26–34)
MCH RBC QN AUTO: 31.4 PG (ref 26–34)
MCHC RBC AUTO-ENTMCNC: 33.3 G/DL (ref 32–36)
MCHC RBC AUTO-ENTMCNC: 34.7 G/DL (ref 32–36)
MCV RBC AUTO: 91 FL (ref 80–100)
MCV RBC AUTO: 94 FL (ref 80–100)
MONOCYTES # BLD AUTO: 0.62 X10*3/UL (ref 0.05–0.8)
MONOCYTES # BLD AUTO: 0.71 X10*3/UL (ref 0.05–0.8)
MONOCYTES NFR BLD AUTO: 10 %
MONOCYTES NFR BLD AUTO: 9.1 %
NEUTROPHILS # BLD AUTO: 4.27 X10*3/UL (ref 1.6–5.5)
NEUTROPHILS # BLD AUTO: 4.36 X10*3/UL (ref 1.6–5.5)
NEUTROPHILS NFR BLD AUTO: 60 %
NEUTROPHILS NFR BLD AUTO: 64 %
NRBC BLD-RTO: 0 /100 WBCS (ref 0–0)
NRBC BLD-RTO: 0 /100 WBCS (ref 0–0)
PHOSPHATE SERPL-MCNC: 3.5 MG/DL (ref 2.5–4.9)
PHOSPHATE SERPL-MCNC: 3.9 MG/DL (ref 2.5–4.9)
PLATELET # BLD AUTO: 156 X10*3/UL (ref 150–450)
PLATELET # BLD AUTO: 175 X10*3/UL (ref 150–450)
POTASSIUM SERPL-SCNC: 3.7 MMOL/L (ref 3.5–5.3)
POTASSIUM SERPL-SCNC: 4.1 MMOL/L (ref 3.5–5.3)
PROTHROMBIN TIME: 11.9 SECONDS (ref 9.8–12.8)
RBC # BLD AUTO: 4.43 X10*6/UL (ref 4.5–5.9)
RBC # BLD AUTO: 4.56 X10*6/UL (ref 4.5–5.9)
RH FACTOR (ANTIGEN D): NORMAL
SODIUM SERPL-SCNC: 137 MMOL/L (ref 136–145)
SODIUM SERPL-SCNC: 138 MMOL/L (ref 136–145)
UFH PPP CHRO-ACNC: 0.3 IU/ML
UFH PPP CHRO-ACNC: 0.3 IU/ML
WBC # BLD AUTO: 6.8 X10*3/UL (ref 4.4–11.3)
WBC # BLD AUTO: 7.1 X10*3/UL (ref 4.4–11.3)

## 2024-11-10 PROCEDURE — 99232 SBSQ HOSP IP/OBS MODERATE 35: CPT | Performed by: INTERNAL MEDICINE

## 2024-11-10 PROCEDURE — 80048 BASIC METABOLIC PNL TOTAL CA: CPT

## 2024-11-10 PROCEDURE — 2500000004 HC RX 250 GENERAL PHARMACY W/ HCPCS (ALT 636 FOR OP/ED)

## 2024-11-10 PROCEDURE — 85520 HEPARIN ASSAY: CPT

## 2024-11-10 PROCEDURE — 2500000005 HC RX 250 GENERAL PHARMACY W/O HCPCS

## 2024-11-10 PROCEDURE — 83735 ASSAY OF MAGNESIUM: CPT

## 2024-11-10 PROCEDURE — 84484 ASSAY OF TROPONIN QUANT: CPT

## 2024-11-10 PROCEDURE — 85730 THROMBOPLASTIN TIME PARTIAL: CPT

## 2024-11-10 PROCEDURE — 2500000001 HC RX 250 WO HCPCS SELF ADMINISTERED DRUGS (ALT 637 FOR MEDICARE OP)

## 2024-11-10 PROCEDURE — 84100 ASSAY OF PHOSPHORUS: CPT

## 2024-11-10 PROCEDURE — 86850 RBC ANTIBODY SCREEN: CPT

## 2024-11-10 PROCEDURE — 84443 ASSAY THYROID STIM HORMONE: CPT | Performed by: PHYSICIAN ASSISTANT

## 2024-11-10 PROCEDURE — 83605 ASSAY OF LACTIC ACID: CPT

## 2024-11-10 PROCEDURE — 36415 COLL VENOUS BLD VENIPUNCTURE: CPT

## 2024-11-10 PROCEDURE — 2020000001 HC ICU ROOM DAILY

## 2024-11-10 PROCEDURE — 86923 COMPATIBILITY TEST ELECTRIC: CPT

## 2024-11-10 PROCEDURE — 93010 ELECTROCARDIOGRAM REPORT: CPT | Performed by: INTERNAL MEDICINE

## 2024-11-10 PROCEDURE — 85025 COMPLETE CBC W/AUTO DIFF WBC: CPT

## 2024-11-10 PROCEDURE — 93005 ELECTROCARDIOGRAM TRACING: CPT

## 2024-11-10 PROCEDURE — 99223 1ST HOSP IP/OBS HIGH 75: CPT

## 2024-11-10 PROCEDURE — 83880 ASSAY OF NATRIURETIC PEPTIDE: CPT

## 2024-11-10 RX ORDER — HEPARIN SODIUM 10000 [USP'U]/100ML
0-4000 INJECTION, SOLUTION INTRAVENOUS CONTINUOUS
Status: DISCONTINUED | OUTPATIENT
Start: 2024-11-10 | End: 2024-11-13

## 2024-11-10 RX ORDER — POLYETHYLENE GLYCOL 3350 17 G/17G
17 POWDER, FOR SOLUTION ORAL DAILY
Status: DISCONTINUED | OUTPATIENT
Start: 2024-11-10 | End: 2024-11-11

## 2024-11-10 RX ORDER — ATORVASTATIN CALCIUM 80 MG/1
80 TABLET, FILM COATED ORAL NIGHTLY
Status: DISCONTINUED | OUTPATIENT
Start: 2024-11-10 | End: 2024-11-13

## 2024-11-10 RX ORDER — ASPIRIN 81 MG/1
81 TABLET ORAL DAILY
Status: DISCONTINUED | OUTPATIENT
Start: 2024-11-11 | End: 2024-11-13

## 2024-11-10 RX ORDER — TALC
6 POWDER (GRAM) TOPICAL NIGHTLY
Status: DISCONTINUED | OUTPATIENT
Start: 2024-11-10 | End: 2024-11-13

## 2024-11-10 RX ORDER — METOPROLOL TARTRATE 25 MG/1
12.5 TABLET, FILM COATED ORAL 2 TIMES DAILY
Status: DISCONTINUED | OUTPATIENT
Start: 2024-11-10 | End: 2024-11-13

## 2024-11-10 RX ADMIN — ATORVASTATIN CALCIUM 80 MG: 80 TABLET, FILM COATED ORAL at 20:41

## 2024-11-10 RX ADMIN — ASPIRIN 81 MG: 81 TABLET, COATED ORAL at 08:17

## 2024-11-10 RX ADMIN — METOPROLOL TARTRATE 12.5 MG: 25 TABLET, FILM COATED ORAL at 08:17

## 2024-11-10 RX ADMIN — HEPARIN SODIUM 1200 UNITS/HR: 10000 INJECTION, SOLUTION INTRAVENOUS at 17:06

## 2024-11-10 RX ADMIN — HEPARIN SODIUM 1200 UNITS/HR: 10000 INJECTION, SOLUTION INTRAVENOUS at 15:20

## 2024-11-10 RX ADMIN — METOPROLOL TARTRATE 12.5 MG: 25 TABLET, FILM COATED ORAL at 20:41

## 2024-11-10 RX ADMIN — POLYETHYLENE GLYCOL 3350 17 G: 17 POWDER, FOR SOLUTION ORAL at 20:43

## 2024-11-10 RX ADMIN — Medication 6 MG: at 20:41

## 2024-11-10 SDOH — SOCIAL STABILITY: SOCIAL INSECURITY: WITHIN THE LAST YEAR, HAVE YOU BEEN AFRAID OF YOUR PARTNER OR EX-PARTNER?: NO

## 2024-11-10 SDOH — SOCIAL STABILITY: SOCIAL INSECURITY: WITHIN THE LAST YEAR, HAVE YOU BEEN HUMILIATED OR EMOTIONALLY ABUSED IN OTHER WAYS BY YOUR PARTNER OR EX-PARTNER?: NO

## 2024-11-10 SDOH — SOCIAL STABILITY: SOCIAL INSECURITY: ABUSE: ADULT

## 2024-11-10 SDOH — ECONOMIC STABILITY: FOOD INSECURITY: WITHIN THE PAST 12 MONTHS, YOU WORRIED THAT YOUR FOOD WOULD RUN OUT BEFORE YOU GOT THE MONEY TO BUY MORE.: NEVER TRUE

## 2024-11-10 SDOH — SOCIAL STABILITY: SOCIAL INSECURITY: ARE THERE ANY APPARENT SIGNS OF INJURIES/BEHAVIORS THAT COULD BE RELATED TO ABUSE/NEGLECT?: NO

## 2024-11-10 SDOH — ECONOMIC STABILITY: INCOME INSECURITY: IN THE PAST 12 MONTHS HAS THE ELECTRIC, GAS, OIL, OR WATER COMPANY THREATENED TO SHUT OFF SERVICES IN YOUR HOME?: NO

## 2024-11-10 SDOH — SOCIAL STABILITY: SOCIAL INSECURITY: DOES ANYONE TRY TO KEEP YOU FROM HAVING/CONTACTING OTHER FRIENDS OR DOING THINGS OUTSIDE YOUR HOME?: NO

## 2024-11-10 SDOH — SOCIAL STABILITY: SOCIAL INSECURITY: HAVE YOU HAD ANY THOUGHTS OF HARMING ANYONE ELSE?: NO

## 2024-11-10 SDOH — SOCIAL STABILITY: SOCIAL INSECURITY: ARE YOU OR HAVE YOU BEEN THREATENED OR ABUSED PHYSICALLY, EMOTIONALLY, OR SEXUALLY BY ANYONE?: NO

## 2024-11-10 SDOH — SOCIAL STABILITY: SOCIAL INSECURITY: HAS ANYONE EVER THREATENED TO HURT YOUR FAMILY OR YOUR PETS?: NO

## 2024-11-10 SDOH — SOCIAL STABILITY: SOCIAL INSECURITY: DO YOU FEEL UNSAFE GOING BACK TO THE PLACE WHERE YOU ARE LIVING?: NO

## 2024-11-10 SDOH — SOCIAL STABILITY: SOCIAL INSECURITY: DO YOU FEEL ANYONE HAS EXPLOITED OR TAKEN ADVANTAGE OF YOU FINANCIALLY OR OF YOUR PERSONAL PROPERTY?: NO

## 2024-11-10 SDOH — ECONOMIC STABILITY: FOOD INSECURITY: WITHIN THE PAST 12 MONTHS, THE FOOD YOU BOUGHT JUST DIDN'T LAST AND YOU DIDN'T HAVE MONEY TO GET MORE.: NEVER TRUE

## 2024-11-10 SDOH — SOCIAL STABILITY: SOCIAL INSECURITY: WERE YOU ABLE TO COMPLETE ALL THE BEHAVIORAL HEALTH SCREENINGS?: YES

## 2024-11-10 SDOH — SOCIAL STABILITY: SOCIAL INSECURITY: HAVE YOU HAD THOUGHTS OF HARMING ANYONE ELSE?: NO

## 2024-11-10 ASSESSMENT — LIFESTYLE VARIABLES
HOW OFTEN DO YOU HAVE A DRINK CONTAINING ALCOHOL: 2-3 TIMES A WEEK
AUDIT-C TOTAL SCORE: 5
AUDIT-C TOTAL SCORE: 5
HOW MANY STANDARD DRINKS CONTAINING ALCOHOL DO YOU HAVE ON A TYPICAL DAY: 1 OR 2
HOW OFTEN DO YOU HAVE 6 OR MORE DRINKS ON ONE OCCASION: MONTHLY
SKIP TO QUESTIONS 9-10: 0

## 2024-11-10 ASSESSMENT — COLUMBIA-SUICIDE SEVERITY RATING SCALE - C-SSRS
1. IN THE PAST MONTH, HAVE YOU WISHED YOU WERE DEAD OR WISHED YOU COULD GO TO SLEEP AND NOT WAKE UP?: NO
2. HAVE YOU ACTUALLY HAD ANY THOUGHTS OF KILLING YOURSELF?: NO
6. HAVE YOU EVER DONE ANYTHING, STARTED TO DO ANYTHING, OR PREPARED TO DO ANYTHING TO END YOUR LIFE?: NO

## 2024-11-10 ASSESSMENT — ENCOUNTER SYMPTOMS
WHEEZING: 0
APPETITE CHANGE: 0
FREQUENCY: 0
BRUISES/BLEEDS EASILY: 0
ADENOPATHY: 0
WEAKNESS: 0
COUGH: 0
CHILLS: 0
FEVER: 0
BLOOD IN STOOL: 0
POLYDIPSIA: 0
PALPITATIONS: 0
NUMBNESS: 0
EYE REDNESS: 0
NERVOUS/ANXIOUS: 0
BACK PAIN: 0
VOMITING: 0
EYE DISCHARGE: 0
CONSTIPATION: 0
HEADACHES: 0
DYSURIA: 0
ABDOMINAL PAIN: 0
LIGHT-HEADEDNESS: 0
SORE THROAT: 0
NAUSEA: 0
UNEXPECTED WEIGHT CHANGE: 0
DIFFICULTY URINATING: 0
RHINORRHEA: 0
WOUND: 0
MYALGIAS: 0
SHORTNESS OF BREATH: 0
JOINT SWELLING: 0
HEMATURIA: 0
FATIGUE: 0
DIZZINESS: 0
DIARRHEA: 0

## 2024-11-10 ASSESSMENT — ACTIVITIES OF DAILY LIVING (ADL)
GROOMING: INDEPENDENT
ADEQUATE_TO_COMPLETE_ADL: YES
WALKS IN HOME: INDEPENDENT
HEARING - RIGHT EAR: FUNCTIONAL
LACK_OF_TRANSPORTATION: NO
TOILETING: INDEPENDENT
LACK_OF_TRANSPORTATION: NO
JUDGMENT_ADEQUATE_SAFELY_COMPLETE_DAILY_ACTIVITIES: YES
DRESSING YOURSELF: INDEPENDENT
HEARING - LEFT EAR: FUNCTIONAL
BATHING: INDEPENDENT
FEEDING YOURSELF: INDEPENDENT
PATIENT'S MEMORY ADEQUATE TO SAFELY COMPLETE DAILY ACTIVITIES?: YES

## 2024-11-10 ASSESSMENT — PAIN - FUNCTIONAL ASSESSMENT
PAIN_FUNCTIONAL_ASSESSMENT: 0-10
PAIN_FUNCTIONAL_ASSESSMENT: 0-10

## 2024-11-10 ASSESSMENT — COGNITIVE AND FUNCTIONAL STATUS - GENERAL
DAILY ACTIVITIY SCORE: 24
MOVING TO AND FROM BED TO CHAIR: A LITTLE
TURNING FROM BACK TO SIDE WHILE IN FLAT BAD: A LITTLE
PATIENT BASELINE BEDBOUND: NO
WALKING IN HOSPITAL ROOM: A LITTLE
CLIMB 3 TO 5 STEPS WITH RAILING: A LITTLE
STANDING UP FROM CHAIR USING ARMS: A LITTLE
MOBILITY SCORE: 19
MOBILITY SCORE: 24
DRESSING REGULAR LOWER BODY CLOTHING: A LITTLE

## 2024-11-10 ASSESSMENT — PAIN SCALES - GENERAL
PAINLEVEL_OUTOF10: 0 - NO PAIN

## 2024-11-10 ASSESSMENT — PATIENT HEALTH QUESTIONNAIRE - PHQ9
2. FEELING DOWN, DEPRESSED OR HOPELESS: NOT AT ALL
SUM OF ALL RESPONSES TO PHQ9 QUESTIONS 1 & 2: 0
1. LITTLE INTEREST OR PLEASURE IN DOING THINGS: NOT AT ALL

## 2024-11-10 NOTE — Clinical Note
Angioplasty of the middle left anterior descending lesion. Inflation 1: Pressure = 6 emily; Duration = 15 sec. Inflation 2: Pressure = 6 emily; Duration = 15 sec.

## 2024-11-10 NOTE — PROGRESS NOTES
"Jaime Dominguez is a 74 y.o. male on day 2 of admission presenting with ST elevation myocardial infarction (STEMI) involving other coronary artery of inferior wall.    Subjective   No chest pain.  Telemetry shows sinus rhythm without any events.       Objective     Physical Exam   Gen: NAD   Neck: no JVD, carotid upstroke is brisk and without delay   Heart: rrr, s1s2+ no mrg   Lungs: CTA   Ext: warm no edema  Last Recorded Vitals  Blood pressure 116/84, pulse 73, temperature 36.7 °C (98.1 °F), temperature source Temporal, resp. rate 16, height 1.753 m (5' 9\"), weight 96.2 kg (212 lb), SpO2 97%.  Intake/Output last 3 Shifts:  I/O last 3 completed shifts:  In: 3074.4 (32 mL/kg) [P.O.:1280; I.V.:1294.4 (13.5 mL/kg); IV Piggyback:500]  Out: 2150 (22.4 mL/kg) [Urine:2150 (0.6 mL/kg/hr)]  Weight: 96.2 kg       Assessment/Plan   Assessment & Plan  Acute ST elevation myocardial infarction (STEMI) of inferior wall (Multi)    ST elevation myocardial infarction (STEMI) involving other coronary artery of inferior wall    Acute ST Elevation Myocardial Infarction  Hyperlipidemia  Basal Cell Carcinoma with recent Mohs Procedure     Impression and Plan:     11/8: Described above.  Patient presenting to [facility] emergency department after sudden onset of chest pain this morning after completing yard work.  He reports diaphoresis, shortness of breath and nausea associated with this left-sided chest pain.  The patient also briefly syncopized as witnessed by his wife.  In the emergency department he does report to me that his chest pain is slightly improved after receiving nitroglycerin en route to the hospital.  EKG revealing ST elevation in leads II, III and aVF with ST depression in leads V4 through V6.  The patient has received IV heparin 4000 units, aspirin, Brilinta and 1 dose of nitroglycerin.  He will be taken emergently to the cardiac catheterization lab for diagnostic coronary angiography with possible percutaneous coronary " intervention with Dr. Brady.  I have ordered an echocardiogram to be completed this admission.  Will give further recommendations after cardiac catheterization is complete.     11/9: He remains on a heparin infusion and is almost therapeutic.  Cath films reviewed, significant left main and ostial LAD disease of about 80%.  The circumflex is small and nondominant, RCA is extremely dominant giving the right posterior descending artery and a posterolateral/lateral branch.  Cardiothoracic surgery has been consulted, tentative plans are for surgical revascularization.  He has been started on aspirin and a high potency statin as well as a low-dose beta-blocker.  He was given 1 dose of Brilinta prior to arrival we will not continue this.  Remains on a heparin infusion.  He is getting an echocardiogram today.    11/10: Plan is for robotic assisted [facility] at [facility].  Cardiothoracic surgery is facilitating this transfer.  Will keep him on heparin.             Thai Castro, DO

## 2024-11-10 NOTE — NURSING NOTE
Received report, patient sitting up in bed watching tv. Heparin gtt running, currently at 1400 units/hr, therapeutic. Patient aware of POC with plan to transfer downtown for CABG. No current pain, all needs met at this time. Continuing to monitor, call light in reach.

## 2024-11-10 NOTE — Clinical Note
Angioplasty of the middle left anterior descending lesion. Inflation 1: Pressure = 4 emily; Duration = 15 sec. Inflation 2: Pressure = 6 emily; Duration = 16 sec.

## 2024-11-10 NOTE — H&P
History of present illness:  Jamie Dominguez is a 74 y.o. male with a PMHx of HLD, R inguinal hernia repair (3/18/2024), partial C5 corpectomy w/ C4/5, C5/6 ACDF (07/08/20) who presented to [facility] ED on 11/8 for chest pain and syncope. Pt reported that that morning he was doing yard work and when he came back into the house he began to feel unwell.  He began having significant left-sided chest pain, diaphoresis, shortness of breath and nausea.  The patient notified his wife of this who stated that the patient then briefly lost consciousness.  EMS was called and EKG was completed revealing ST elevation in leads II, III and aVF with ST depression in V1-V4.  A code STEMI was activated en route. The patient was given aspirin, Brilinta and 1 nitroglycerin en route to the ED per chart review. In ED, EKG with ST elev II/III/avF with ST depression V1-V4. Patient was given 4000 units of IV heparin and he was taken emergently to the cardiac catheterization lab for diagnostic coronary angiography. LHC 11/8 showed distal L main 90%, LAD tortuous vessel LILY-3 flow nonobstructive disease, diag1 ostial disease, ramus LILY II flow some ostial disease, Lcx large obtuse marginal, proper Lcx no signif CAD, obtuse atif no signif CAD, RCA R dominant vessel very large vessel tortuous no signif CAD, OM2 aberrant vessel coming off distal RCA. Evaluated by cardiac surgery at OSH, transferred to [facility] CICU for evaluation for MIDCAB.     Evaluated patient upon arrival to CICU. He is A&Ox3 and on RA, heparin gtt running. Pt has not had chest pain since being in the ED at OSH on 11/8. Denies any dizziness, lightheadedness, chest pain, SOB, n/v, diarrhea, hematochezia, melena, hematuria, polyuria, or dysuria. Pt reports he had noted worsening fatigue and SOB with moderate exertion over past month. Reported occasional chest pain with severe exertion however no chest pain episodes similar to today. Denies any prior cardiac hx or hx  PCI/CABG etc, however does report significant family hx of multiple deaths in his immediate family from cardiovascular disease (e.g. brother  at age 45 from an MI).     While in the ED:  - Vital Signs: °C 36.6/ /96/ HR 90/ RR 16/ 97% on RA     Labs:  Last CBC:  Lab Results   Component Value Date    WBC 7.1 11/10/2024    HGB 13.9 11/10/2024    HCT 40.1 (L) 11/10/2024    MCV 91 11/10/2024     11/10/2024       Last RFP:  Lab Results   Component Value Date    GLUCOSE 89 11/10/2024    CALCIUM 9.0 11/10/2024     11/10/2024    K 3.7 11/10/2024    CO2 25 11/10/2024     11/10/2024    BUN 17 11/10/2024    CREATININE 0.86 11/10/2024       Last LFTs:  Lab Results   Component Value Date    ALT 29 2024    AST 27 2024    ALKPHOS 46 2024    BILITOT 0.5 2024       Last coags:  Lab Results   Component Value Date    INR 1.1 2024    APTT 52.1 (H) 11/10/2024       Additional labs:  EKG : ST elev II/III/avF with ST depression V1-V4  EKG 11/10 in CICU: ST elev II/III/avF (smaller elev compared to prior), resolution of ST depressions V1-V4    Imaging:  [facility] : Distal L main 90%, LAD tortuous vessel LILY-3 flow nonobstructive disease, diag1 ostial disease, ramus LILY II flow some ostial disease, Lcx large obtuse marginal, proper Lcx no signif CAD, obtuse atif no signif CAD, RCA R dominant vessel very large vessel tortuous no signif CAD,  obtuse marginal 2 aberrant vessel coming off distal RCA    FULL REPORT:  - Left main.  There is a distal left main of 90% hazy lesion.  Trifurcation lesion.  - Left anterior descending artery.  Left anterior sending artery tortuous vessel has LILY-3 flow nonobstructive disease.  Diagonal 1 has ostial disease  - Ramus.  The ramus has LILY II flow may be some ostial disease.  - Left circumflex.  The left circumflex is small vessel overall has large obtuse marginal 1.  The proper left circumflex has no significant coronary artery disease.  Obtuse  marginal 1 has no significant coronary disease.   - Right coronary artery.  Right coronary artery is a right dominant vessel very large vessel tortuous without significant coronary artery disease.  Obtuse marginal 2 actually its aberrant vessel coming off the distal RCA.    TTE 11/8:  1. Left ventricular ejection fraction is normal, by visual estimate at 60-65%.   2. Spectral Doppler shows a Grade II (pseudonormal pattern) of left ventricular diastolic filling with an elevated left atrial pressure.   3. There is normal right ventricular global systolic function.    ED interventions:  Heparin gtt  Aspirin  Atorva 80  Brillinta 11/8    Past Medical History:  See above    Past Surgical History:  He has a past surgical history that includes Skin cancer excision (N/A).     Social history:  Home: lives at home with wife  Smoking: Quit smoking about 35 years ago  Alcohol: 3-5x/wk  Drugs: marijuana    Allergies:  Patient has no known allergies.    Home medications:  Aspirin 81mg daily (stopped taking about 2 days prior to MI d/t nose bleeds)  Atorva 40mg daily (has not taken in years)    Review of systems  Review of Systems   Constitutional:  Negative for appetite change, chills, fatigue, fever and unexpected weight change.   HENT:  Negative for rhinorrhea and sore throat.    Eyes:  Negative for discharge and redness.   Respiratory:  Negative for cough, shortness of breath and wheezing.    Cardiovascular:  Negative for chest pain, palpitations and leg swelling.   Gastrointestinal:  Negative for abdominal pain, blood in stool, constipation, diarrhea, nausea and vomiting.   Endocrine: Negative for polydipsia and polyuria.   Genitourinary:  Negative for difficulty urinating, dysuria, frequency, hematuria and urgency.   Musculoskeletal:  Negative for back pain, joint swelling and myalgias.   Skin:  Negative for rash and wound.   Neurological:  Negative for dizziness, syncope, weakness, light-headedness, numbness and headaches.    Hematological:  Negative for adenopathy. Does not bruise/bleed easily.   Psychiatric/Behavioral:  Negative for behavioral problems. The patient is not nervous/anxious.            Objective     Visit Vitals  BP (!) 147/96 (BP Location: Left arm, Patient Position: Sitting)   Pulse 69   Temp 37 °C (98.6 °F) (Temporal)   Resp 16   Ht 1.829 m (6')   Wt 96.2 kg (212 lb)   SpO2 97%   BMI 28.75 kg/m²   Smoking Status Never   BSA 2.21 m²      Physical exam:  Constitutional: No acute distress, resting comfortably in bed, cooperative  HEENT: Normocephalic, atraumatic, PERRLA, EOMI, moist mucous membranes  Cardiovascular: RRR, normal S1/S2, no murmurs noted  Pulmonary: Clear to auscultation b/l, no wheezes/crackles/rhonchi, no increased work of breathing, no supplemental oxygen  GI: Soft, non-tender, non-distended, normoactive bowel sounds  : No aden catheter  Lower extremities: Warm and well perfused, no lower extremity edema  Neuro: A&O x3, able to move all 4 extremities spontaneously  Skin: No rashes or lesions noted  Psych: Appropriate mood and affect       Assessment/Plan   Jaime Dominguez is a 74 y.o. male with a PMHx of HLD, R inguinal hernia repair (3/18/2024), and partial C5 corpectomy w/ C4/5, C5/6 ACDF (07/08/20) who presented to [facility] ED on 11/8 for chest pain and syncope. EKG w/ ST elevations in leads II, III and aVF with ST depression in V4 through V6.  A code STEMI was activated en route. The patient was given aspirin, Brilinta and 1 nitroglycerin en route to the ED per chart review. Emergent C 11/8 showed distal L main 90%, LAD tortuous vessel LILY-3 flow nonobstructive disease, diag1 ostial disease, Lcx and RCA w/ no significant CAD. Evaluated by cardiac surgery at OSH, transferred to [facility] CICU for evaluation for MIDCAB.       Neuro:  - No active issues    Cardiovascular:  # STEMI  # Distal L main 90% stenosis  # HLD  - EKG 11/8: ST elevations in leads II, III and aVF with ST depression in V4  through V6  - [facility] 11/8: distal L main 90%, LAD tortuous vessel LILY-3 flow nonobstructive disease, diag1 ostial disease, Lcx and RCA w/ no significant CAD  - TTE 11/8: LVEF 60-65%, grade II LV diastolic filling  - HS trop 4, no repeat  - S/p brillinta en route w/ EMS on 11/8  - lipid panel 11/8: , , total chol 228, HDL 37.6  - No chest pain since 11/8  [] C/w aspirin 81mg  [] C/w atorva 80mg at bedtime  [] C/w heparin gtt  [] CT surg consult in am for potential MIDCAB eval  [] Repeat HS trop, if elev trend to peak    Respiratory:  - No active issues    GI:  - No active issues    Renal/:  - No active issues    Heme/Onc:  - No active issues    Endo:  - No active issues  - HbA1C 5.1% on 11/8/24    ID:  - No active issues    MSK/Rheum:  - No active issues    Derm:  # S/p Mohs procedure forehead  [] Consider wound care consult in am    Psych:  - No active issues    Access: 2 PIVs  Tubes/drains: None  O2: RA  Diet: Cardiac diet, npo mn  GI ppx: none  DVT ppx: heparin gtt  Code status: Full code (confirmed on admission)  Surrogate medical decision maker: Mireya (wife, POA) [phone]  Casa (son, #2 for POA) [phone]      Dee Palomares MD   Internal Medicine PGY-3

## 2024-11-10 NOTE — CARE PLAN
Problem: Skin  Goal: Decreased wound size/increased tissue granulation at next dressing change  Outcome: Progressing  Goal: Participates in plan/prevention/treatment measures  Outcome: Progressing  Goal: Prevent/manage excess moisture  Outcome: Progressing  Goal: Prevent/minimize sheer/friction injuries  Outcome: Progressing  Goal: Promote/optimize nutrition  Outcome: Progressing  Goal: Promote skin healing  Outcome: Progressing     Problem: Pain  Goal: Takes deep breaths with improved pain control throughout the shift  Outcome: Progressing  Goal: Turns in bed with improved pain control throughout the shift  Outcome: Progressing  Goal: Walks with improved pain control throughout the shift  Outcome: Progressing  Goal: Performs ADL's with improved pain control throughout shift  Outcome: Progressing  Goal: Participates in PT with improved pain control throughout the shift  Outcome: Progressing  Goal: Free from opioid side effects throughout the shift  Outcome: Progressing  Goal: Free from acute confusion related to pain meds throughout the shift  Outcome: Progressing     Problem: Pain - Adult  Goal: Verbalizes/displays adequate comfort level or baseline comfort level  Outcome: Progressing     Problem: Safety - Adult  Goal: Free from fall injury  Outcome: Progressing     Problem: Discharge Planning  Goal: Discharge to home or other facility with appropriate resources  Outcome: Progressing     Problem: Chronic Conditions and Co-morbidities  Goal: Patient's chronic conditions and co-morbidity symptoms are monitored and maintained or improved  Outcome: Progressing     Problem: Fall/Injury  Goal: Not fall by end of shift  Outcome: Progressing  Goal: Be free from injury by end of the shift  Outcome: Progressing  Goal: Verbalize understanding of personal risk factors for fall in the hospital  Outcome: Progressing  Goal: Verbalize understanding of risk factor reduction measures to prevent injury from fall in the  home  Outcome: Progressing  Goal: Use assistive devices by end of the shift  Outcome: Progressing  Goal: Pace activities to prevent fatigue by end of the shift  Outcome: Progressing   The patient's goals for the shift include pain free    The clinical goals for the shift include no falls this shift.    Over the shift, the patient did not make progress toward the following goals. Barriers to progression include patient being hooked to medical equipment, heparin gtt and telemetry monitor. Recommendations to address these barriers include bed alarm, ambulate with assistance.

## 2024-11-10 NOTE — Clinical Note
Angioplasty of the left anterior descending lesion. Inflation 1: Pressure = 5 emily; Duration = 20 sec. Inflation 2: Pressure = 5 emily; Duration = 20 sec.

## 2024-11-10 NOTE — Clinical Note
Angioplasty of the middle left anterior descending lesion. Inflation 1: Pressure = 10 emily; Duration = 10 sec.

## 2024-11-10 NOTE — NURSING NOTE
Four Eye Skin Check completed by Belle Adamson and Bayron Paula.     Acute findings noted and the following was completed:   head wound was added to LDA, photo taken, wound was mesasured and assessed, Physican notified, assessed for correct mattress (indicate if new mattress added), wound consulted, initiated Nutrition consult if applicable.

## 2024-11-10 NOTE — PROGRESS NOTES
"Jaime Dominguez is a 74 y.o. male on day 2 of admission presenting with ST elevation myocardial infarction (STEMI) involving other coronary artery of inferior wall.    Subjective   \"I feel fine\" No complaints of chest pain or shortness of breath.    Objective   Sitting up, NAD    Physical Exam  Constitutional:       Normal appearance, well-developed, well-nourished.   HENT:      Head: Normocephalic and atraumatic. Head wound from recent Mohs procedure  Cardiovascular:      Rate and Rhythm: RRR, no murmur, nl S1/S2.      Pulses: Normal, <3 sec cap refill.   Pulmonary:      Clear bilateral breath sounds.   Abdominal:      Bowel sounds present, soft.   Musculoskeletal:         No gross abnormalities.  Skin:     Warm and dry, no rashes.   Neurological:      Alert, no focal deficits.       Relevant Results  11/8/24 Cardiac cath post-note  Procedure Findings:   Patient underwent emergent cardiac catheterization.  Left main.  There is a distal left main of 90% hazy lesion.  Trifurcation lesion.  Left anterior descending artery.  Left anterior sending artery tortuous vessel has LILY-3 flow nonobstructive disease.  Diagonal 1 has ostial disease  Ramus.  The ramus has LILY II flow may be some ostial disease.  Left circumflex.  The left circumflex is small vessel overall has large obtuse marginal 1.  The proper left circumflex has no significant coronary artery disease.  Obtuse marginal 1 has no significant coronary disease.  Right coronary artery.  Right coronary artery is a right dominant vessel very large vessel tortuous without significant coronary artery disease.  Obtuse marginal 2 actually its aberrant vessel coming off the distal RCA.     Last Recorded Vitals  Blood pressure 116/84, pulse 73, temperature 36.7 °C (98.1 °F), temperature source Temporal, resp. rate 16, height 1.753 m (5' 9\"), weight 96.2 kg (212 lb), SpO2 97%.  Intake/Output last 3 Shifts:  I/O last 3 completed shifts:  In: 3074.4 (32 mL/kg) [P.O.:1280; " I.V.:1294.4 (13.5 mL/kg); IV Piggyback:500]  Out: 2150 (22.4 mL/kg) [Urine:2150 (0.6 mL/kg/hr)]  Weight: 96.2 kg     ASSESSMENT/PLAN  Admitted with STEMI  Critical coronary disease  Hyperlipidemia  On ASA, statin, BB    Transfer to [facility] for robotic CABG candidacy   Continue Heparin gtt   Brilinta wash out LD 11/8/24    I spent 30 minutes in the professional and overall care of this patient.    Catrina Mahoney, APRN-CNP

## 2024-11-11 ENCOUNTER — APPOINTMENT (OUTPATIENT)
Dept: RADIOLOGY | Facility: HOSPITAL | Age: 74
DRG: 003 | End: 2024-11-11
Payer: MEDICARE

## 2024-11-11 ENCOUNTER — ANESTHESIA EVENT (OUTPATIENT)
Dept: OPERATING ROOM | Facility: HOSPITAL | Age: 74
End: 2024-11-11
Payer: MEDICARE

## 2024-11-11 ENCOUNTER — APPOINTMENT (OUTPATIENT)
Dept: VASCULAR MEDICINE | Facility: HOSPITAL | Age: 74
DRG: 003 | End: 2024-11-11
Payer: MEDICARE

## 2024-11-11 ENCOUNTER — APPOINTMENT (OUTPATIENT)
Dept: CARDIOLOGY | Facility: HOSPITAL | Age: 74
DRG: 003 | End: 2024-11-11
Payer: MEDICARE

## 2024-11-11 ENCOUNTER — APPOINTMENT (OUTPATIENT)
Dept: RESPIRATORY THERAPY | Facility: HOSPITAL | Age: 74
DRG: 003 | End: 2024-11-11
Payer: MEDICARE

## 2024-11-11 LAB
ABO GROUP (TYPE) IN BLOOD: NORMAL
ABO GROUP (TYPE) IN BLOOD: NORMAL
ALBUMIN SERPL BCP-MCNC: 4.1 G/DL (ref 3.4–5)
ALBUMIN SERPL BCP-MCNC: 4.4 G/DL (ref 3.4–5)
ALP SERPL-CCNC: 52 U/L (ref 33–136)
ALT SERPL W P-5'-P-CCNC: 26 U/L (ref 10–52)
ANION GAP BLDA CALCULATED.4IONS-SCNC: 5 MMO/L (ref 10–25)
ANION GAP SERPL CALC-SCNC: 13 MMOL/L (ref 10–20)
ANTIBODY SCREEN: NORMAL
APPEARANCE UR: CLEAR
APTT PPP: 64 SECONDS (ref 27–38)
AST SERPL W P-5'-P-CCNC: 30 U/L (ref 9–39)
BASE EXCESS BLDA CALC-SCNC: 5.4 MMOL/L (ref -2–3)
BASOPHILS # BLD AUTO: 0.02 X10*3/UL (ref 0–0.1)
BASOPHILS NFR BLD AUTO: 0.3 %
BILIRUB DIRECT SERPL-MCNC: 0.2 MG/DL (ref 0–0.3)
BILIRUB SERPL-MCNC: 0.8 MG/DL (ref 0–1.2)
BILIRUB UR STRIP.AUTO-MCNC: NEGATIVE MG/DL
BODY TEMPERATURE: 37 DEGREES CELSIUS
BUN SERPL-MCNC: 19 MG/DL (ref 6–23)
CA-I BLDA-SCNC: 1.2 MMOL/L (ref 1.1–1.33)
CALCIUM SERPL-MCNC: 9.2 MG/DL (ref 8.6–10.6)
CHLORIDE BLDA-SCNC: 102 MMOL/L (ref 98–107)
CHLORIDE SERPL-SCNC: 101 MMOL/L (ref 98–107)
CO2 SERPL-SCNC: 27 MMOL/L (ref 21–32)
COLOR UR: NORMAL
CREAT SERPL-MCNC: 0.87 MG/DL (ref 0.5–1.3)
EGFRCR SERPLBLD CKD-EPI 2021: >90 ML/MIN/1.73M*2
EOSINOPHIL # BLD AUTO: 0.06 X10*3/UL (ref 0–0.4)
EOSINOPHIL NFR BLD AUTO: 0.9 %
ERYTHROCYTE [DISTWIDTH] IN BLOOD BY AUTOMATED COUNT: 12 % (ref 11.5–14.5)
GLUCOSE BLDA-MCNC: 129 MG/DL (ref 74–99)
GLUCOSE SERPL-MCNC: 98 MG/DL (ref 74–99)
GLUCOSE UR STRIP.AUTO-MCNC: NORMAL MG/DL
HCO3 BLDA-SCNC: 29 MMOL/L (ref 22–26)
HCT VFR BLD AUTO: 40.7 % (ref 41–52)
HCT VFR BLD EST: 43 % (ref 41–52)
HGB BLD-MCNC: 13.6 G/DL (ref 13.5–17.5)
HGB BLDA-MCNC: 14.2 G/DL (ref 13.5–17.5)
IMM GRANULOCYTES # BLD AUTO: 0.02 X10*3/UL (ref 0–0.5)
IMM GRANULOCYTES NFR BLD AUTO: 0.3 % (ref 0–0.9)
INR PPP: 1 (ref 0.9–1.1)
KETONES UR STRIP.AUTO-MCNC: NEGATIVE MG/DL
LACTATE BLDA-SCNC: 0.9 MMOL/L (ref 0.4–2)
LEUKOCYTE ESTERASE UR QL STRIP.AUTO: NEGATIVE
LYMPHOCYTES # BLD AUTO: 1.76 X10*3/UL (ref 0.8–3)
LYMPHOCYTES NFR BLD AUTO: 25.7 %
MAGNESIUM SERPL-MCNC: 2.32 MG/DL (ref 1.6–2.4)
MCH RBC QN AUTO: 31.3 PG (ref 26–34)
MCHC RBC AUTO-ENTMCNC: 33.4 G/DL (ref 32–36)
MCV RBC AUTO: 94 FL (ref 80–100)
MONOCYTES # BLD AUTO: 0.74 X10*3/UL (ref 0.05–0.8)
MONOCYTES NFR BLD AUTO: 10.8 %
NEUTROPHILS # BLD AUTO: 4.26 X10*3/UL (ref 1.6–5.5)
NEUTROPHILS NFR BLD AUTO: 62 %
NITRITE UR QL STRIP.AUTO: NEGATIVE
NRBC BLD-RTO: 0 /100 WBCS (ref 0–0)
OXYHGB MFR BLDA: 95.7 % (ref 94–98)
PCO2 BLDA: 38 MM HG (ref 38–42)
PH BLDA: 7.49 PH (ref 7.38–7.42)
PH UR STRIP.AUTO: 7 [PH]
PHOSPHATE SERPL-MCNC: 4 MG/DL (ref 2.5–4.9)
PLATELET # BLD AUTO: 161 X10*3/UL (ref 150–450)
PO2 BLDA: 86 MM HG (ref 85–95)
POTASSIUM BLDA-SCNC: 4.2 MMOL/L (ref 3.5–5.3)
POTASSIUM SERPL-SCNC: 3.8 MMOL/L (ref 3.5–5.3)
PROT SERPL-MCNC: 7.2 G/DL (ref 6.4–8.2)
PROT UR STRIP.AUTO-MCNC: NEGATIVE MG/DL
PROTHROMBIN TIME: 11.6 SECONDS (ref 9.8–12.8)
RBC # BLD AUTO: 4.35 X10*6/UL (ref 4.5–5.9)
RBC # UR STRIP.AUTO: NEGATIVE /UL
RH FACTOR (ANTIGEN D): NORMAL
RH FACTOR (ANTIGEN D): NORMAL
SAO2 % BLDA: 97 % (ref 94–100)
SODIUM BLDA-SCNC: 132 MMOL/L (ref 136–145)
SODIUM SERPL-SCNC: 137 MMOL/L (ref 136–145)
SP GR UR STRIP.AUTO: 1.01
TSH SERPL-ACNC: 3.12 MIU/L (ref 0.44–3.98)
UFH PPP CHRO-ACNC: 0.4 IU/ML
UROBILINOGEN UR STRIP.AUTO-MCNC: NORMAL MG/DL
WBC # BLD AUTO: 6.9 X10*3/UL (ref 4.4–11.3)

## 2024-11-11 PROCEDURE — 94010 BREATHING CAPACITY TEST: CPT | Performed by: INTERNAL MEDICINE

## 2024-11-11 PROCEDURE — 36415 COLL VENOUS BLD VENIPUNCTURE: CPT | Performed by: PHYSICIAN ASSISTANT

## 2024-11-11 PROCEDURE — 84132 ASSAY OF SERUM POTASSIUM: CPT

## 2024-11-11 PROCEDURE — 2500000002 HC RX 250 W HCPCS SELF ADMINISTERED DRUGS (ALT 637 FOR MEDICARE OP, ALT 636 FOR OP/ED)

## 2024-11-11 PROCEDURE — 86901 BLOOD TYPING SEROLOGIC RH(D): CPT | Performed by: PHYSICIAN ASSISTANT

## 2024-11-11 PROCEDURE — 85610 PROTHROMBIN TIME: CPT

## 2024-11-11 PROCEDURE — 36600 WITHDRAWAL OF ARTERIAL BLOOD: CPT

## 2024-11-11 PROCEDURE — 83735 ASSAY OF MAGNESIUM: CPT

## 2024-11-11 PROCEDURE — 99223 1ST HOSP IP/OBS HIGH 75: CPT | Performed by: PHYSICIAN ASSISTANT

## 2024-11-11 PROCEDURE — 36415 COLL VENOUS BLD VENIPUNCTURE: CPT

## 2024-11-11 PROCEDURE — 81003 URINALYSIS AUTO W/O SCOPE: CPT | Performed by: PHYSICIAN ASSISTANT

## 2024-11-11 PROCEDURE — 99291 CRITICAL CARE FIRST HOUR: CPT | Performed by: INTERNAL MEDICINE

## 2024-11-11 PROCEDURE — 74176 CT ABD & PELVIS W/O CONTRAST: CPT

## 2024-11-11 PROCEDURE — 94010 BREATHING CAPACITY TEST: CPT

## 2024-11-11 PROCEDURE — 86923 COMPATIBILITY TEST ELECTRIC: CPT

## 2024-11-11 PROCEDURE — 2500000005 HC RX 250 GENERAL PHARMACY W/O HCPCS: Performed by: PHYSICIAN ASSISTANT

## 2024-11-11 PROCEDURE — 82040 ASSAY OF SERUM ALBUMIN: CPT | Performed by: PHYSICIAN ASSISTANT

## 2024-11-11 PROCEDURE — 2020000001 HC ICU ROOM DAILY

## 2024-11-11 PROCEDURE — 2500000001 HC RX 250 WO HCPCS SELF ADMINISTERED DRUGS (ALT 637 FOR MEDICARE OP): Performed by: PHYSICIAN ASSISTANT

## 2024-11-11 PROCEDURE — 2500000001 HC RX 250 WO HCPCS SELF ADMINISTERED DRUGS (ALT 637 FOR MEDICARE OP)

## 2024-11-11 PROCEDURE — 2500000004 HC RX 250 GENERAL PHARMACY W/ HCPCS (ALT 636 FOR OP/ED)

## 2024-11-11 PROCEDURE — 93005 ELECTROCARDIOGRAM TRACING: CPT

## 2024-11-11 PROCEDURE — 85025 COMPLETE CBC W/AUTO DIFF WBC: CPT

## 2024-11-11 PROCEDURE — 2500000005 HC RX 250 GENERAL PHARMACY W/O HCPCS

## 2024-11-11 PROCEDURE — 93880 EXTRACRANIAL BILAT STUDY: CPT

## 2024-11-11 PROCEDURE — 80053 COMPREHEN METABOLIC PANEL: CPT

## 2024-11-11 PROCEDURE — 85520 HEPARIN ASSAY: CPT

## 2024-11-11 PROCEDURE — 93880 EXTRACRANIAL BILAT STUDY: CPT | Performed by: STUDENT IN AN ORGANIZED HEALTH CARE EDUCATION/TRAINING PROGRAM

## 2024-11-11 RX ORDER — ESOMEPRAZOLE MAGNESIUM 40 MG/1
40 GRANULE, DELAYED RELEASE ORAL
Status: DISCONTINUED | OUTPATIENT
Start: 2024-11-11 | End: 2024-11-13

## 2024-11-11 RX ORDER — PANTOPRAZOLE SODIUM 40 MG/10ML
40 INJECTION, POWDER, LYOPHILIZED, FOR SOLUTION INTRAVENOUS
Status: DISCONTINUED | OUTPATIENT
Start: 2024-11-11 | End: 2024-11-13

## 2024-11-11 RX ORDER — PANTOPRAZOLE SODIUM 40 MG/1
40 TABLET, DELAYED RELEASE ORAL
Status: DISCONTINUED | OUTPATIENT
Start: 2024-11-11 | End: 2024-11-13

## 2024-11-11 RX ORDER — CHLORHEXIDINE GLUCONATE ORAL RINSE 1.2 MG/ML
15 SOLUTION DENTAL 2 TIMES DAILY
Status: COMPLETED | OUTPATIENT
Start: 2024-11-11 | End: 2024-11-11

## 2024-11-11 RX ORDER — AMOXICILLIN 250 MG
1 CAPSULE ORAL DAILY
Status: DISCONTINUED | OUTPATIENT
Start: 2024-11-11 | End: 2024-11-13

## 2024-11-11 RX ORDER — MUPIROCIN 20 MG/G
OINTMENT TOPICAL 2 TIMES DAILY
Status: DISCONTINUED | OUTPATIENT
Start: 2024-11-11 | End: 2024-11-13

## 2024-11-11 RX ORDER — POTASSIUM CHLORIDE 20 MEQ/1
40 TABLET, EXTENDED RELEASE ORAL ONCE
Status: COMPLETED | OUTPATIENT
Start: 2024-11-11 | End: 2024-11-11

## 2024-11-11 RX ADMIN — ASPIRIN 81 MG: 81 TABLET, COATED ORAL at 08:01

## 2024-11-11 RX ADMIN — POTASSIUM CHLORIDE 40 MEQ: 1500 TABLET, EXTENDED RELEASE ORAL at 10:01

## 2024-11-11 RX ADMIN — METOPROLOL TARTRATE 12.5 MG: 25 TABLET, FILM COATED ORAL at 20:20

## 2024-11-11 RX ADMIN — METOPROLOL TARTRATE 12.5 MG: 25 TABLET, FILM COATED ORAL at 08:00

## 2024-11-11 RX ADMIN — PANTOPRAZOLE SODIUM 40 MG: 40 TABLET, DELAYED RELEASE ORAL at 10:01

## 2024-11-11 RX ADMIN — CHLORHEXIDINE GLUCONATE 15 ML: 1.2 SOLUTION ORAL at 20:19

## 2024-11-11 RX ADMIN — ATORVASTATIN CALCIUM 80 MG: 80 TABLET, FILM COATED ORAL at 20:20

## 2024-11-11 RX ADMIN — MUPIROCIN: 20 OINTMENT TOPICAL at 20:20

## 2024-11-11 RX ADMIN — CHLORHEXIDINE GLUCONATE 15 ML: 1.2 SOLUTION ORAL at 11:17

## 2024-11-11 RX ADMIN — Medication 6 MG: at 20:19

## 2024-11-11 RX ADMIN — HEPARIN SODIUM 1200 UNITS/HR: 10000 INJECTION, SOLUTION INTRAVENOUS at 10:40

## 2024-11-11 RX ADMIN — SENNOSIDES AND DOCUSATE SODIUM 1 TABLET: 50; 8.6 TABLET ORAL at 09:00

## 2024-11-11 RX ADMIN — MUPIROCIN: 20 OINTMENT TOPICAL at 10:20

## 2024-11-11 SDOH — ECONOMIC STABILITY: HOUSING INSECURITY: IN THE PAST 12 MONTHS, HOW MANY TIMES HAVE YOU MOVED WHERE YOU WERE LIVING?: 0

## 2024-11-11 SDOH — SOCIAL STABILITY: SOCIAL INSECURITY: WITHIN THE LAST YEAR, HAVE YOU BEEN AFRAID OF YOUR PARTNER OR EX-PARTNER?: NO

## 2024-11-11 SDOH — SOCIAL STABILITY: SOCIAL INSECURITY: WITHIN THE LAST YEAR, HAVE YOU BEEN HUMILIATED OR EMOTIONALLY ABUSED IN OTHER WAYS BY YOUR PARTNER OR EX-PARTNER?: NO

## 2024-11-11 SDOH — ECONOMIC STABILITY: INCOME INSECURITY: IN THE PAST 12 MONTHS HAS THE ELECTRIC, GAS, OIL, OR WATER COMPANY THREATENED TO SHUT OFF SERVICES IN YOUR HOME?: NO

## 2024-11-11 SDOH — SOCIAL STABILITY: SOCIAL INSECURITY: ARE YOU MARRIED, WIDOWED, DIVORCED, SEPARATED, NEVER MARRIED, OR LIVING WITH A PARTNER?: MARRIED

## 2024-11-11 SDOH — ECONOMIC STABILITY: HOUSING INSECURITY: IN THE LAST 12 MONTHS, WAS THERE A TIME WHEN YOU WERE NOT ABLE TO PAY THE MORTGAGE OR RENT ON TIME?: NO

## 2024-11-11 SDOH — ECONOMIC STABILITY: FOOD INSECURITY: WITHIN THE PAST 12 MONTHS, THE FOOD YOU BOUGHT JUST DIDN'T LAST AND YOU DIDN'T HAVE MONEY TO GET MORE.: NEVER TRUE

## 2024-11-11 SDOH — ECONOMIC STABILITY: FOOD INSECURITY: WITHIN THE PAST 12 MONTHS, YOU WORRIED THAT YOUR FOOD WOULD RUN OUT BEFORE YOU GOT THE MONEY TO BUY MORE.: NEVER TRUE

## 2024-11-11 SDOH — ECONOMIC STABILITY: HOUSING INSECURITY: AT ANY TIME IN THE PAST 12 MONTHS, WERE YOU HOMELESS OR LIVING IN A SHELTER (INCLUDING NOW)?: NO

## 2024-11-11 SDOH — ECONOMIC STABILITY: FOOD INSECURITY: HOW HARD IS IT FOR YOU TO PAY FOR THE VERY BASICS LIKE FOOD, HOUSING, MEDICAL CARE, AND HEATING?: NOT VERY HARD

## 2024-11-11 SDOH — ECONOMIC STABILITY: TRANSPORTATION INSECURITY: IN THE PAST 12 MONTHS, HAS LACK OF TRANSPORTATION KEPT YOU FROM MEDICAL APPOINTMENTS OR FROM GETTING MEDICATIONS?: NO

## 2024-11-11 ASSESSMENT — PAIN SCALES - GENERAL
PAINLEVEL_OUTOF10: 0 - NO PAIN

## 2024-11-11 ASSESSMENT — ENCOUNTER SYMPTOMS
EYES NEGATIVE: 1
MUSCULOSKELETAL NEGATIVE: 1
CARDIOVASCULAR NEGATIVE: 1
RESPIRATORY NEGATIVE: 1
HEMATOLOGIC/LYMPHATIC NEGATIVE: 1
CONSTITUTIONAL NEGATIVE: 1
ENDOCRINE NEGATIVE: 1
GASTROINTESTINAL NEGATIVE: 1
NEUROLOGICAL NEGATIVE: 1

## 2024-11-11 ASSESSMENT — PAIN - FUNCTIONAL ASSESSMENT
PAIN_FUNCTIONAL_ASSESSMENT: 0-10

## 2024-11-11 ASSESSMENT — COGNITIVE AND FUNCTIONAL STATUS - GENERAL
MOBILITY SCORE: 24
DAILY ACTIVITIY SCORE: 24

## 2024-11-11 ASSESSMENT — ACTIVITIES OF DAILY LIVING (ADL): LACK_OF_TRANSPORTATION: NO

## 2024-11-11 NOTE — CONSULTS
Reason for Consult: CAD  CARDIAC SURGERY CONSULT NOTE    HISTORY OF PRESENT ILLNESS  Mr. Jaime Dominguez is a 74 y.o. male who initially presented with left sided chest pain, shortness of breath, nausea, and diaphoresis while doing yardwork. He came into the house in distress. His wife reported a brief loss of consciousness. EMS was called. He was diagnosed with STEMI in route and given Brilinta at that time. In the ED labwork and CXR were unremarkable. ECG with acute ST elevations. 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  He has a strong FH of heart disease. No personal history of prior chest pain. Cardiac surgery was consulted for CABG evaluation at [facility].     The patient was transferred to [facility] for robotic MIDCAB evaluation.     LHC: 11/8  CONCLUSIONS:   1. Distal left main hazy lesion of 90% that continues to michel proximal LAD. LILY-3 flow at the level of the left system.   2. 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 There is a bending area in the distal RCA before giving obtuse marginal 3 that could be significant.   3. Recommend coronary artery bypass surgery since patient has LILY-3 flow in all vessels and has severe three-vessel coronary disease. Currently chest pain-free.    TTE 11/9: CONCLUSIONS:   1. Left ventricular ejection fraction is normal, by visual estimate at 60-65%.   2. Spectral Doppler shows a Grade II (pseudonormal pattern) of left ventricular diastolic filling with an elevated left atrial pressure.   3. There is normal right ventricular global systolic function.    Subjective   Past Medical History:   Diagnosis Date    Basal cell carcinoma     Hyperlipidemia      Past Surgical History:   Procedure Laterality Date    CARDIAC CATHETERIZATION N/A 11/8/2024    Procedure: Left Heart Cath, No LV;  Surgeon: Ale Brady MD;  Location: [facility] Cardiac Cath Lab;  Service: Cardiovascular;  Laterality: N/A;    CARDIAC  CATHETERIZATION N/A 11/8/2024    Procedure: PCI;  Surgeon: Ale Brady MD;  Location: [facility] Cardiac Cath Lab;  Service: Cardiovascular;  Laterality: N/A;    SKIN CANCER EXCISION N/A      Social History     Tobacco Use    Smoking status: Never    Smokeless tobacco: Never   Substance Use Topics    Alcohol use: Not Currently    Drug use: Never     Family History   Problem Relation Name Age of Onset    Heart attack Mother      Heart attack Brother         Patient has no known allergies.    Prior to Admission medications    Medication Sig Start Date End Date Taking? Authorizing Provider   aspirin 81 mg EC tablet Take 1 tablet (81 mg) by mouth once daily.   Yes Historical Provider, MD   atorvastatin (Lipitor) 40 mg tablet Take 1 tablet (40 mg) by mouth once daily.  Patient not taking: Reported on 11/8/2024 1/24/24   Historical Provider, MD       Review of Systems  Review of Systems   Constitutional: Negative.    HENT: Negative.     Eyes: Negative.    Respiratory: Negative.     Cardiovascular: Negative.    Gastrointestinal: Negative.    Endocrine: Negative.    Genitourinary: Negative.    Musculoskeletal: Negative.    Neurological: Negative.    Hematological: Negative.            Objective   /71   Pulse 67   Temp 35.8 °C (96.4 °F)   Resp 18   Ht 1.829 m (6')   Wt 95.5 kg (210 lb 8.6 oz)   SpO2 100%   BMI 28.55 kg/m²   0-10 (Numeric) Pain Score: 0 - No pain   Vitals:    11/11/24 0400   Weight: 95.5 kg (210 lb 8.6 oz)          Intake/Output Summary (Last 24 hours) at 11/11/2024 1244  Last data filed at 11/11/2024 0000  Gross per 24 hour   Intake 82.8 ml   Output --   Net 82.8 ml       Physical Exam  Physical Exam  Constitutional:       General: He is not in acute distress.     Appearance: He is not ill-appearing or toxic-appearing.   HENT:      Head: Normocephalic.      Mouth/Throat:      Mouth: Mucous membranes are moist.   Cardiovascular:      Rate and Rhythm: Normal rate and regular rhythm.   Pulmonary:       Effort: Pulmonary effort is normal.      Breath sounds: Normal breath sounds.   Musculoskeletal:         General: Normal range of motion.      Cervical back: Neck supple.      Right lower leg: No edema.      Left lower leg: No edema.   Skin:     General: Skin is warm and dry.   Neurological:      General: No focal deficit present.      Mental Status: He is alert and oriented to person, place, and time.   Psychiatric:         Mood and Affect: Mood normal.         Behavior: Behavior normal.         Medications  Scheduled medications  aspirin, 81 mg, oral, Daily  atorvastatin, 80 mg, oral, Nightly  chlorhexidine, 15 mL, Mouth/Throat, BID  pantoprazole, 40 mg, oral, Daily before breakfast   Or  esomeprazole, 40 mg, nasoduodenal tube, Daily before breakfast   Or  pantoprazole, 40 mg, intravenous, Daily before breakfast  melatonin, 6 mg, oral, Nightly  metoprolol tartrate, 12.5 mg, oral, BID  mupirocin, , Topical, BID  sennosides-docusate sodium, 1 tablet, oral, Daily    Continuous medications  heparin, 0-4,000 Units/hr, Last Rate: 1,200 Units/hr (11/11/24 1040)    PRN medications  PRN medications: heparin    Labs  Results for orders placed or performed during the hospital encounter of 11/10/24 (from the past 24 hours)   Renal function panel   Result Value Ref Range    Glucose 84 74 - 99 mg/dL    Sodium 138 136 - 145 mmol/L    Potassium 4.1 3.5 - 5.3 mmol/L    Chloride 98 98 - 107 mmol/L    Bicarbonate 30 21 - 32 mmol/L    Anion Gap 14 10 - 20 mmol/L    Urea Nitrogen 20 6 - 23 mg/dL    Creatinine 1.04 0.50 - 1.30 mg/dL    eGFR 76 >60 mL/min/1.73m*2    Calcium 9.8 8.6 - 10.6 mg/dL    Phosphorus 3.9 2.5 - 4.9 mg/dL    Albumin 4.6 3.4 - 5.0 g/dL   Magnesium   Result Value Ref Range    Magnesium 2.43 (H) 1.60 - 2.40 mg/dL   Lactate   Result Value Ref Range    Lactate 1.2 0.4 - 2.0 mmol/L   CBC and Auto Differential   Result Value Ref Range    WBC 6.8 4.4 - 11.3 x10*3/uL    nRBC 0.0 0.0 - 0.0 /100 WBCs    RBC 4.56 4.50 - 5.90  x10*6/uL    Hemoglobin 14.3 13.5 - 17.5 g/dL    Hematocrit 42.9 41.0 - 52.0 %    MCV 94 80 - 100 fL    MCH 31.4 26.0 - 34.0 pg    MCHC 33.3 32.0 - 36.0 g/dL    RDW 12.0 11.5 - 14.5 %    Platelets 175 150 - 450 x10*3/uL    Neutrophils % 64.0 40.0 - 80.0 %    Immature Granulocytes %, Automated 0.4 0.0 - 0.9 %    Lymphocytes % 25.5 13.0 - 44.0 %    Monocytes % 9.1 2.0 - 10.0 %    Eosinophils % 0.9 0.0 - 6.0 %    Basophils % 0.1 0.0 - 2.0 %    Neutrophils Absolute 4.36 1.60 - 5.50 x10*3/uL    Immature Granulocytes Absolute, Automated 0.03 0.00 - 0.50 x10*3/uL    Lymphocytes Absolute 1.74 0.80 - 3.00 x10*3/uL    Monocytes Absolute 0.62 0.05 - 0.80 x10*3/uL    Eosinophils Absolute 0.06 0.00 - 0.40 x10*3/uL    Basophils Absolute 0.01 0.00 - 0.10 x10*3/uL   Type and screen   Result Value Ref Range    ABO TYPE A     Rh TYPE POS     ANTIBODY SCREEN NEG    Troponin I, High Sensitivity   Result Value Ref Range    Troponin I, High Sensitivity (CMC) 2,317 (HH) 0 - 53 ng/L   B-type natriuretic peptide   Result Value Ref Range    BNP 13 0 - 99 pg/mL   Electrocardiogram, 12-lead PRN ACS symptoms   Result Value Ref Range    Ventricular Rate 74 BPM    Atrial Rate 74 BPM    RI Interval 182 ms    QRS Duration 96 ms    QT Interval 382 ms    QTC Calculation(Bazett) 424 ms    P Axis 69 degrees    R Axis 56 degrees    T Axis 68 degrees    QRS Count 12 beats    Q Onset 223 ms    P Onset 132 ms    P Offset 186 ms    T Offset 414 ms    QTC Fredericia 410 ms   Heparin Assay, UFH   Result Value Ref Range    Heparin Unfractionated 0.3 See Comment Below for Therapeutic Ranges IU/mL   Coagulation Screen   Result Value Ref Range    Protime 11.9 9.8 - 12.8 seconds    INR 1.1 0.9 - 1.1    aPTT 55 (H) 27 - 38 seconds   Troponin I, High Sensitivity   Result Value Ref Range    Troponin I, High Sensitivity (CMC) 2,246 (HH) 0 - 53 ng/L   Heparin Assay, UFH   Result Value Ref Range    Heparin Unfractionated 0.3 See Comment Below for Therapeutic Ranges IU/mL    TSH with reflex to Free T4 if abnormal   Result Value Ref Range    Thyroid Stimulating Hormone 3.12 0.44 - 3.98 mIU/L   CBC and Auto Differential   Result Value Ref Range    WBC 6.9 4.4 - 11.3 x10*3/uL    nRBC 0.0 0.0 - 0.0 /100 WBCs    RBC 4.35 (L) 4.50 - 5.90 x10*6/uL    Hemoglobin 13.6 13.5 - 17.5 g/dL    Hematocrit 40.7 (L) 41.0 - 52.0 %    MCV 94 80 - 100 fL    MCH 31.3 26.0 - 34.0 pg    MCHC 33.4 32.0 - 36.0 g/dL    RDW 12.0 11.5 - 14.5 %    Platelets 161 150 - 450 x10*3/uL    Neutrophils % 62.0 40.0 - 80.0 %    Immature Granulocytes %, Automated 0.3 0.0 - 0.9 %    Lymphocytes % 25.7 13.0 - 44.0 %    Monocytes % 10.8 2.0 - 10.0 %    Eosinophils % 0.9 0.0 - 6.0 %    Basophils % 0.3 0.0 - 2.0 %    Neutrophils Absolute 4.26 1.60 - 5.50 x10*3/uL    Immature Granulocytes Absolute, Automated 0.02 0.00 - 0.50 x10*3/uL    Lymphocytes Absolute 1.76 0.80 - 3.00 x10*3/uL    Monocytes Absolute 0.74 0.05 - 0.80 x10*3/uL    Eosinophils Absolute 0.06 0.00 - 0.40 x10*3/uL    Basophils Absolute 0.02 0.00 - 0.10 x10*3/uL   Magnesium   Result Value Ref Range    Magnesium 2.32 1.60 - 2.40 mg/dL   Renal Function Panel   Result Value Ref Range    Glucose 98 74 - 99 mg/dL    Sodium 137 136 - 145 mmol/L    Potassium 3.8 3.5 - 5.3 mmol/L    Chloride 101 98 - 107 mmol/L    Bicarbonate 27 21 - 32 mmol/L    Anion Gap 13 10 - 20 mmol/L    Urea Nitrogen 19 6 - 23 mg/dL    Creatinine 0.87 0.50 - 1.30 mg/dL    eGFR >90 >60 mL/min/1.73m*2    Calcium 9.2 8.6 - 10.6 mg/dL    Phosphorus 4.0 2.5 - 4.9 mg/dL    Albumin 4.1 3.4 - 5.0 g/dL   VERIFY ABO/Rh Group Test   Result Value Ref Range    ABO TYPE A     Rh TYPE POS    Hepatic Function Panel   Result Value Ref Range    Albumin 4.4 3.4 - 5.0 g/dL    Bilirubin, Total 0.8 0.0 - 1.2 mg/dL    Bilirubin, Direct 0.2 0.0 - 0.3 mg/dL    Alkaline Phosphatase 52 33 - 136 U/L    ALT 26 10 - 52 U/L    AST 30 9 - 39 U/L    Total Protein 7.2 6.4 - 8.2 g/dL   Coagulation Screen   Result Value Ref Range     Protime 11.6 9.8 - 12.8 seconds    INR 1.0 0.9 - 1.1    aPTT 64 (H) 27 - 38 seconds   Heparin Assay, UFH   Result Value Ref Range    Heparin Unfractionated 0.4 See Comment Below for Therapeutic Ranges IU/mL   Prepare RBC: 4 Units   Result Value Ref Range    PRODUCT CODE J0571F92     Unit Number [id number]     Unit ABO A     Unit RH POS     XM INTEP COMP     Dispense Status XM     Blood Expiration Date 12/3/2024 11:59:00 PM EST     PRODUCT BLOOD TYPE 6200     UNIT VOLUME 350     PRODUCT CODE K8735S23     Unit Number [id number]     Unit ABO A     Unit RH POS     XM INTEP COMP     Dispense Status XM     Blood Expiration Date 12/3/2024 11:59:00 PM EST     PRODUCT BLOOD TYPE 6200     UNIT VOLUME 350     PRODUCT CODE G9866V70     Unit Number [id number]     Unit ABO A     Unit RH POS     XM INTEP COMP     Dispense Status XM     Blood Expiration Date 12/3/2024 11:59:00 PM EST     PRODUCT BLOOD TYPE 6200     UNIT VOLUME 350     PRODUCT CODE P6327O24     Unit Number [id number]     Unit ABO A     Unit RH POS     XM INTEP COMP     Dispense Status XM     Blood Expiration Date 12/3/2024 11:59:00 PM EST     PRODUCT BLOOD TYPE 6200     UNIT VOLUME 350                ASSESSMENT & PLAN  Mr. Jaime Dominguez is a 74 y.o. male who initially presented with left sided chest pain, shortness of breath, nausea, and diaphoresis while doing yardwork. He came into the house in distress. His wife reported a brief loss of consciousness. EMS was called. He was diagnosed with STEMI in route and given Brilinta at that time. In the ED labwork and CXR were unremarkable. ECG with acute ST elevations. 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  He has a strong FH of heart disease. No personal history of prior chest pain. Cardiac surgery was consulted for CABG evaluation [facility].     The patient was transferred to [facility] for robotic MIDCAB evaluation.     [facility]: 11/8  CONCLUSIONS:   1. Distal left main hazy lesion of  90% that continues to michel proximal LAD. LILY-3 flow at the level of the left system.   2. 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 There is a bending area in the distal RCA before giving obtuse marginal 3 that could be significant.   3. Recommend coronary artery bypass surgery since patient has LILY-3 flow in all vessels and has severe three-vessel coronary disease. Currently chest pain-free.    TTE 11/9: CONCLUSIONS:   1. Left ventricular ejection fraction is normal, by visual estimate at 60-65%.   2. Spectral Doppler shows a Grade II (pseudonormal pattern) of left ventricular diastolic filling with an elevated left atrial pressure.   3. There is normal right ventricular global systolic function.    RECOMMENDATIONS/PLAN  Dr. Aldo Harman aware of patient, currently reviewing case and available imaging.   Plan for OR 11/12 for robotic MIDCAB x 2  - LHC & TTE in EMR  - Medical optimization per primary team    Preop risk stratification studies/labs ordered in EMR by our team  --- US Carotids  --- PFTs (spirometry and room air ABG)  --- MRSA, UA/Culture, LFTs, HgbA1c, TSH/T4, Lipid panel  --- CT chest without contrast done  --- CT A/P non con ordered and pending   --- Dental evaluation not indicated for isolated CABG surgeries     Preop orders:  - Continue ASA, high-intensity statin, and BB (or document contraindications for use) for preop CABG patients   - No ACEi/ARBs in the pre-op period (at least 48 hours)  - Hold any SGLT2 inhibitors (Farxiga, Jardiance, etc.) for at least 3 days prior to cardiac surgery to prevent euglycemic DKA  - Hold any daily GLP-1 agonist drugs on the day of surgery. Hold any weekly GLP-1 drugs for 7 days prior to surgery. (Dulaglutide, Exenatide, Liraglutide, Lixisenatide, & Semaglutide)  - No antiplatelets other than ASA, no anticoagulants other than Heparin  - NPO after midnight, blood on hold/T&S, and preop scrubs ordered for  OR 11/12      Will continue to follow along.  Thank you for the consultation.   Patient educated and all questions answered.  Please page the cardiac surgery consult pager [phone] with any questions or changes in patient condition.    Gracie Leon PA-C  Cardiac Surgery Consult [facility]  [facility]  Cardiac Surgery Consult Pager [phone]     11/11/2024  12:44 PM

## 2024-11-11 NOTE — CONSULTS
"Consults  Patient: Barry Dominguez    Procedure Information       Date/Time: 11/12/24 1515    Procedure: MIDCAB x 2 LIMA & Radial, ROBOT-ASSISTED (Left) - second case    Location: [facility] OR 18 / [facility] OR    Surgeons: Rodrigo Harman MD        Transthoracic Echo (TTE) Complete    Height: 1.753 m (5' 9\")   Weight: 99.7 kg (219 lb 12.8 oz)   Blood Pressure: 112/66    Date of Study: 11/9/24   Ordering Provider: Kevin Urbina PA-C   Clinical Indications: STEMI       Reading Physicians  Performing Staff   Cardiology: Thai Castro DO    Tech: Mireya Jordan,         Indications  Priority: Routine  STEMI   Dx: ST elevation myocardial infarction (STEMI) involving other coronary artery of inferior wall [I21.19 (ICD-10-CM)]   Comments: To be read by Dr. Castro     PACS Images     Show images for Transthoracic Echo (TTE) Complete  Interpretation [place] [address]             Phone [phone]     TRANSTHORACIC ECHOCARDIOGRAM REPORT     Patient Name:       BARRY ABEL KYLE     Reading Physician:    [id number] Thai Castro DO  Study Date:         11/9/2024            Ordering Provider:    [id number] KEVIN URBINA  MRN/PID:            [id number]             Fellow:  Accession#:         [id number]         Nurse:  Date of Birth/Age:  1950 / 74 years Sonographer:          Mireya Jordan RDCS  Gender Assigned at  M                    Additional Staff:  Birth:  Height:             175.00 cm            Admit Date:  Weight:             99.00 kg             Admission Status:     Inpatient -                                                                 Routine  BSA / BMI:          2.14 m2 / 32.33      Department Location:  [facility]                "       kg/m2  Blood Pressure: 116 /82 mmHg     Study Type:    TRANSTHORACIC ECHO (TTE) COMPLETE  Diagnosis/ICD: ST elevation (STEMI) myocardial infarction involving other                 coronary artery of inferior wall-I21.19  Indication:    stemi  CPT Codes:     Echo Complete w Full Doppler-[id number]     Patient History:  BMI:               Obese >30  Pertinent History: Chest Pain and Syncope. stemi i.w.     Study Detail: The following Echo studies were performed: 2D, M-Mode, Doppler and                color flow. Technically challenging study due to prominent lung                artifact and body habitus.        PHYSICIAN INTERPRETATION:  Left Ventricle: Left ventricular ejection fraction is normal, by visual estimate at 60-65%. There are no regional wall motion abnormalities. The left ventricular cavity size is normal. There is normal septal and normal posterior left ventricular wall thickness. Spectral Doppler shows a Grade II (pseudonormal pattern) of left ventricular diastolic filling with an elevated left atrial pressure.  Left Atrium: The left atrium is normal in size.  Right Ventricle: The right ventricle is normal in size. There is normal right ventricular global systolic function.  Right Atrium: The right atrium is normal in size.  Aortic Valve: The aortic valve is trileaflet. The aortic valve dimensionless index is 0.84. There is no evidence of aortic valve regurgitation. The peak instantaneous gradient of the aortic valve is 5 mmHg. The mean gradient of the aortic valve is 3 mmHg.  Mitral Valve: The mitral valve is normal in structure. There is trace mitral valve regurgitation.  Tricuspid Valve: The tricuspid valve is structurally normal. No evidence of tricuspid regurgitation.  Pulmonic Valve: The pulmonic valve is not well visualized. The pulmonic valve regurgitation was not well visualized.  Pericardium: No pericardial effusion noted.  Aorta: The aortic root is normal.        CONCLUSIONS:   1. Left  ventricular ejection fraction is normal, by visual estimate at 60-65%.   2. Spectral Doppler shows a Grade II (pseudonormal pattern) of left ventricular diastolic filling with an elevated left atrial pressure.   3. There is normal right ventricular global systolic function.     QUANTITATIVE DATA SUMMARY:     2D MEASUREMENTS:           Normal Ranges:  LAs:             3.00 cm   (2.7-4.0cm)  IVSd:            0.65 cm   (0.6-1.1cm)  LVPWd:           0.84 cm   (0.6-1.1cm)  LVIDd:           5.18 cm   (3.9-5.9cm)  LV Mass Index:   61.8 g/m2        LA VOLUME:                    Normal Ranges:  LA Vol A4C:        26.3 ml    (22+/-6mL/m2)  LA Vol A2C:        25.4 ml  LA Vol BP:         25.9 ml  LA Vol Index A4C:  12.3ml/m2  LA Vol Index A2C:  11.9 ml/m2  LA Vol Index BP:   12.1 ml/m2  LA Area A4C:       12.8 cm2  LA Area A2C:       12.6 cm2  LA Major Axis A4C: 5.3 cm  LA Major Axis A2C: 5.3 cm  LA Volume Index:   11.5 ml/m2  LA Vol A4C:        26.7 ml  LA Vol A2C:        22.8 ml  LA Vol Index BSA:  11.6 ml/m2        LV SYSTOLIC FUNCTION BY 2D PLANIMETRY (MOD):                       Normal Ranges:  EF-A4C View:    55 % (>=55%)  EF-A2C View:    58 %  EF-Biplane:     58 %  EF-Visual:      63 %  LV EF Reported: 63 %        LV DIASTOLIC FUNCTION:           Normal Ranges:  MV Peak E:             0.63 m/s  (0.7-1.2 m/s)  MV Peak A:             0.52 m/s  (0.42-0.7 m/s)  E/A Ratio:             1.20      (1.0-2.2)  MV e'                  0.075 m/s (>8.0)  MV lateral e'          0.07 m/s  MV medial e'           0.08 m/s  E/e' Ratio:            8.45      (<8.0)  a'                     0.07 m/s        MITRAL VALVE:          Normal Ranges:  MV DT:        246 msec (150-240msec)        AORTIC VALVE:                     Normal Ranges:  AoV Vmax:                1.17 m/s (<=1.7m/s)  AoV Peak P.5 mmHg (<20mmHg)  AoV Mean PG:             3.0 mmHg (1.7-11.5mmHg)  LVOT Max Edmond:            1.03 m/s (<=1.1m/s)  AoV VTI:                  24.90 cm (18-25cm)  LVOT VTI:                20.80 cm  LVOT Diameter:           2.00 cm  (1.8-2.4cm)  AoV Area, VTI:           2.62 cm2 (2.5-5.5cm2)  AoV Area,Vmax:           2.77 cm2 (2.5-4.5cm2)  AoV Dimensionless Index: 0.84        RIGHT VENTRICLE:  TAPSE: 24.9 mm  RV s'  0.12 m/s        TRICUSPID VALVE/RVSP:         Normal Ranges:  IVC Diam:             1.69 cm        PULMONIC VALVE:          Normal Ranges:  PV Accel Time:  92 msec  (>120ms)  PV Max Edmond:     1.0 m/s  (0.6-0.9m/s)  PV Max P.2 mmHg        [id number] Thai Castro DO  Electronically signed on 2024 at 12:27:27 PM           ** Final **    Department    Name Address Phone    [address] [address] [phone]      Procedures    Left Heart Cath, No LV   PCI     PACS Images     Show images for Cardiac Catheterization Procedure  Pre Procedure Diagnosis    STEMI    Indications    STEMI (ST elevation myocardial infarction) (Multi) [I21.3 (ICD-10-CM)]     Link to Procedure Log    Procedure Log      Conclusion               [address] [address]             Phone [phone]     Cardiovascular Catheterization Report     Patient Name:     BARRY WRIGHT    Performing Physician:  Nikos Brady MD  Study Date:       2024           Verifying Physician:   Nikos Brady MD  MRN/PID:          [id number]            Cardiologist/Co-Scrub:  Accession#:       [id number]        Ordering Provider:     Nikos BRADY  Date of           1950      Cardiologist:  Birth/Age:        years  Gender:           M                   Fellow:  Encounter#:       [id number]          Surgeon:        Study: Left Heart Cath        Indications:  BARRY WRIGHT is  a 74 year old male who presents with aortic stenosis, hypertension and a chest pain assessment of typical angina. Acute coronary syndrome - STEMI. Patient presented with chest pain happened after working in his backyard. He was cleaning leaves. Retrosternal no radiation. Subsequently he passed out in front of his wife. Then he woke up. EMS was called patient was brought into the emergency room. IN route patient EKG was showing inferior 1 mm ST elevation with ST depression lateral leads. Was taken emergently for cardiac catheterization.        Informed Consent: This patient's clinical condition presents an immediate threat to the life or health of the patient. This was an emergency procedure that, if not done immediately, could lead to serious physical or mental disability or death. Written consent was not able to be obtained from the patient or legal representative prior to the procedure.     Procedure Description:  After infiltration with 2% Lidocaine, the right radial artery was cannulated with a modified Seldinger technique. Subsequently a 6 Croatian sheath was placed in the right radial artery. Selective coronary catheterization was performed using a 5 Fr catheter(s) exchanged over a guide wire to cannulate the coronary arteries.  Additional catheter(s) used to visualize the coronary arteries were: TIG 4.     Coronary Angiography:  The coronary circulation is right dominant.     Left Main Coronary Artery:  The left main has distal hazy lesion of 90%. LILY-3 flow in LAD circumflex and ramus.     Left Anterior Descending Coronary Artery Distribution:  The left anterior descending artery has severe lesion in the proximal segment of 90%. LILY-3 flow. Diagonal 1 has moderate focal lesion of 60% proximally. Diagonal 2 and mid distal LAD patent.     Circumflex Coronary Artery Distribution:  Ostial left circumflex may be involved in the distal left main lesion. Difficult to rule out significant ostial disease. Provides  high obtuse marginal 1 without significant coronary disease. Obtuse marginal 2 larger vessel without significant artery disease. LILY-3 flow. The proper left circumflex become very small and stop at the mid of the LV groove.     Right Coronary Artery Distribution:     The right coronary artery is a right dominant vessel. Patent in the proximal and mid segment. Very tortuous vessel. Actually the RCA distally goes up toward the left AV groove and provides anomalous obtuse marginal 3. And then continues with PDA. At the turn of the distal RCA toward the left AV groove giving obtuse marginal 1. there is sharp bend and area of possible disease although unclear if it is all related to sharp band or there is significant 90% lesion. LILY-3 flow.        Left Ventriculography:  The left ventricular end-diastolic pressure was mildly elevated at 17 mmHg. LV gram showed normal ejection fraction of 50% with mild apical wall hypokinesis. No gradient across the aortic valve.     Hemo Personnel:  +----------------+---------+  Name            Duty       +----------------+---------+  Ale Brady MD 1  +----------------+---------+        Hemodynamic Pressures:     +----+-------------------+---------+------------+-------------+------+---------+  Site     Date Time       Phase    Systolic    Diastolic    ED  Mean mmHg                           Name       mmHg        mmHg      mmHg            +----+-------------------+---------+------------+-------------+------+---------+    AO  11/8/2024 4:37:42 AIR REST         117           81             98                       PM                                                   +----+-------------------+---------+------------+-------------+------+---------+    AO  11/8/2024 4:40:38 AIR REST         109           78             93                       PM                                                    +----+-------------------+---------+------------+-------------+------+---------+    LV  11/8/2024 4:44:50 AIR REST          34           28    30                                PM                                                   +----+-------------------+---------+------------+-------------+------+---------+    LV  11/8/2024 4:44:53 AIR REST          32           27    27                                PM                                                   +----+-------------------+---------+------------+-------------+------+---------+    LV  11/8/2024 4:45:47 AIR REST         138            0    13                                PM                                                   +----+-------------------+---------+------------+-------------+------+---------+    LV  11/8/2024 4:45:57 AIR REST         125            2    19                                PM                                                   +----+-------------------+---------+------------+-------------+------+---------+   LVp  11/8/2024 4:46:03 AIR REST         124            0    17                                PM                                                   +----+-------------------+---------+------------+-------------+------+---------+   AOp  11/8/2024 4:46:14 AIR REST         107          -72             77                       PM                                                   +----+-------------------+---------+------------+-------------+------+---------+    AO  11/8/2024 4:47:57 AIR REST           0            0             -5                       PM                                                   +----+-------------------+---------+------------+-------------+------+---------+        Cardiac Cath Post Procedure Notes:  Post Procedure Diagnosis: Double vessel disease.  Blood Loss:               Estimated blood loss  during the procedure was 10 cc                            mls.  Specimens Removed:        Number of specimen(s) removed: none.     ____________________________________________________________________________________  CONCLUSIONS:   1. Distal left main hazy lesion of 90% that continues to michel proximal LAD. LILY-3 flow at the level of the left system.   2. 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 There is a bending area in the distal RCA before giving obtuse marginal 3 that could be significant.   3. Recommend coronary artery bypass surgery since patient has LILY-3 flow in all vessels and has severe three-vessel coronary disease. Currently chest pain-free.     ICD 10 Codes:  ST elevation (STEMI) myocardial infarction involving right coronary artery-I21.11     CPT Codes:  Left Heart Cath (visualization of coronaries) and LV-[id number]; Moderate Sedation Services initial 15 minutes patient >5 years-[id number]     [id number] Ale Brady MD  Performing Physician  Electronically signed by [id number] Ale Brady MD on 11/11/2024 at 9:07:59 AM      Clinical information reviewed:    Allergies  Meds                Physical Exam    Airway  Mallampati: III  TM distance: <3 FB  Neck ROM: limited  Comments: C5-7 fusion   Cardiovascular    Dental    Pulmonary    Abdominal          Anesthesia Plan    History of general anesthesia?: yes  History of complications of general anesthesia?: no    ASA 4     general     intravenous induction   Anesthetic plan and risks discussed with patient.  Use of blood products discussed with patient who consented to blood products.

## 2024-11-11 NOTE — CARE PLAN
The patient's goals for the shift include      The clinical goals for the shift include remain HDS    Over the shift, the patient did not make progress toward the following goals. Barriers to progression include coronary artery occlusions. Recommendations to address these barriers include continue cardiac monitoring and current plan of care.

## 2024-11-11 NOTE — CARE PLAN
The clinical goals for the shift include Remain safe      Problem: Skin  Goal: Participates in plan/prevention/treatment measures  Outcome: Progressing  Goal: Prevent/manage excess moisture  Outcome: Progressing  Goal: Prevent/minimize sheer/friction injuries  Outcome: Progressing  Goal: Promote/optimize nutrition  Outcome: Progressing     Problem: Pain - Adult  Goal: Verbalizes/displays adequate comfort level or baseline comfort level  Outcome: Progressing     Problem: Safety - Adult  Goal: Free from fall injury  Outcome: Progressing     Problem: Discharge Planning  Goal: Discharge to home or other facility with appropriate resources  Outcome: Progressing     Problem: Chronic Conditions and Co-morbidities  Goal: Patient's chronic conditions and co-morbidity symptoms are monitored and maintained or improved  Outcome: Progressing

## 2024-11-11 NOTE — PROGRESS NOTES
11/11/24 1239   Discharge Planning   Living Arrangements Spouse/significant other   Support Systems Spouse/significant other;Children   Assistance Needed n/a   Type of Residence Private residence   Do you have animals or pets at home? No   Who is requesting discharge planning? Provider   Does the patient need discharge transport arranged? No     - ICU TREATMENT PLAN: Patient was transferred to [facility] CICU from [facility] for evaluation for MIDCAB.  - Payer: [facility] Medicare.  -Support System: Spouse, children  - Planned Disposition: Pending medical outcome and rehab recommendations.  - Additional Information: SDOH and Social Work Discharge Planning assessments were completed with the patient. There were no SDOH issues identified.   - Barriers to discharge: None at this time. SW will continue to follow.

## 2024-11-11 NOTE — H&P (VIEW-ONLY)
Reason for Consult: CAD  CARDIAC SURGERY CONSULT NOTE    HISTORY OF PRESENT ILLNESS  Mr. Jaime Dominguez is a 74 y.o. male who initially presented with left sided chest pain, shortness of breath, nausea, and diaphoresis while doing yardwork. He came into the house in distress. His wife reported a brief loss of consciousness. EMS was called. He was diagnosed with STEMI in route and given Brilinta at that time. In the ED labwork and CXR were unremarkable. ECG with acute ST elevations. 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  He has a strong FH of heart disease. No personal history of prior chest pain. Cardiac surgery was consulted for CABG evaluation at [facility].     The patient was transferred to [facility] for robotic MIDCAB evaluation.     LHC: 11/8  CONCLUSIONS:   1. Distal left main hazy lesion of 90% that continues to michel proximal LAD. LILY-3 flow at the level of the left system.   2. 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 There is a bending area in the distal RCA before giving obtuse marginal 3 that could be significant.   3. Recommend coronary artery bypass surgery since patient has LILY-3 flow in all vessels and has severe three-vessel coronary disease. Currently chest pain-free.    TTE 11/9: CONCLUSIONS:   1. Left ventricular ejection fraction is normal, by visual estimate at 60-65%.   2. Spectral Doppler shows a Grade II (pseudonormal pattern) of left ventricular diastolic filling with an elevated left atrial pressure.   3. There is normal right ventricular global systolic function.    Subjective   Past Medical History:   Diagnosis Date    Basal cell carcinoma     Hyperlipidemia      Past Surgical History:   Procedure Laterality Date    CARDIAC CATHETERIZATION N/A 11/8/2024    Procedure: Left Heart Cath, No LV;  Surgeon: Ale Brady MD;  Location: [facility] Cardiac Cath Lab;  Service: Cardiovascular;  Laterality: N/A;    CARDIAC  CATHETERIZATION N/A 11/8/2024    Procedure: PCI;  Surgeon: Ale Brady MD;  Location: [facility] Cardiac Cath Lab;  Service: Cardiovascular;  Laterality: N/A;    SKIN CANCER EXCISION N/A      Social History     Tobacco Use    Smoking status: Never    Smokeless tobacco: Never   Substance Use Topics    Alcohol use: Not Currently    Drug use: Never     Family History   Problem Relation Name Age of Onset    Heart attack Mother      Heart attack Brother         Patient has no known allergies.    Prior to Admission medications    Medication Sig Start Date End Date Taking? Authorizing Provider   aspirin 81 mg EC tablet Take 1 tablet (81 mg) by mouth once daily.   Yes Historical Provider, MD   atorvastatin (Lipitor) 40 mg tablet Take 1 tablet (40 mg) by mouth once daily.  Patient not taking: Reported on 11/8/2024 1/24/24   Historical Provider, MD       Review of Systems  Review of Systems   Constitutional: Negative.    HENT: Negative.     Eyes: Negative.    Respiratory: Negative.     Cardiovascular: Negative.    Gastrointestinal: Negative.    Endocrine: Negative.    Genitourinary: Negative.    Musculoskeletal: Negative.    Neurological: Negative.    Hematological: Negative.            Objective   /71   Pulse 67   Temp 35.8 °C (96.4 °F)   Resp 18   Ht 1.829 m (6')   Wt 95.5 kg (210 lb 8.6 oz)   SpO2 100%   BMI 28.55 kg/m²   0-10 (Numeric) Pain Score: 0 - No pain   Vitals:    11/11/24 0400   Weight: 95.5 kg (210 lb 8.6 oz)          Intake/Output Summary (Last 24 hours) at 11/11/2024 1244  Last data filed at 11/11/2024 0000  Gross per 24 hour   Intake 82.8 ml   Output --   Net 82.8 ml       Physical Exam  Physical Exam  Constitutional:       General: He is not in acute distress.     Appearance: He is not ill-appearing or toxic-appearing.   HENT:      Head: Normocephalic.      Mouth/Throat:      Mouth: Mucous membranes are moist.   Cardiovascular:      Rate and Rhythm: Normal rate and regular rhythm.   Pulmonary:       Effort: Pulmonary effort is normal.      Breath sounds: Normal breath sounds.   Musculoskeletal:         General: Normal range of motion.      Cervical back: Neck supple.      Right lower leg: No edema.      Left lower leg: No edema.   Skin:     General: Skin is warm and dry.   Neurological:      General: No focal deficit present.      Mental Status: He is alert and oriented to person, place, and time.   Psychiatric:         Mood and Affect: Mood normal.         Behavior: Behavior normal.         Medications  Scheduled medications  aspirin, 81 mg, oral, Daily  atorvastatin, 80 mg, oral, Nightly  chlorhexidine, 15 mL, Mouth/Throat, BID  pantoprazole, 40 mg, oral, Daily before breakfast   Or  esomeprazole, 40 mg, nasoduodenal tube, Daily before breakfast   Or  pantoprazole, 40 mg, intravenous, Daily before breakfast  melatonin, 6 mg, oral, Nightly  metoprolol tartrate, 12.5 mg, oral, BID  mupirocin, , Topical, BID  sennosides-docusate sodium, 1 tablet, oral, Daily    Continuous medications  heparin, 0-4,000 Units/hr, Last Rate: 1,200 Units/hr (11/11/24 1040)    PRN medications  PRN medications: heparin    Labs  Results for orders placed or performed during the hospital encounter of 11/10/24 (from the past 24 hours)   Renal function panel   Result Value Ref Range    Glucose 84 74 - 99 mg/dL    Sodium 138 136 - 145 mmol/L    Potassium 4.1 3.5 - 5.3 mmol/L    Chloride 98 98 - 107 mmol/L    Bicarbonate 30 21 - 32 mmol/L    Anion Gap 14 10 - 20 mmol/L    Urea Nitrogen 20 6 - 23 mg/dL    Creatinine 1.04 0.50 - 1.30 mg/dL    eGFR 76 >60 mL/min/1.73m*2    Calcium 9.8 8.6 - 10.6 mg/dL    Phosphorus 3.9 2.5 - 4.9 mg/dL    Albumin 4.6 3.4 - 5.0 g/dL   Magnesium   Result Value Ref Range    Magnesium 2.43 (H) 1.60 - 2.40 mg/dL   Lactate   Result Value Ref Range    Lactate 1.2 0.4 - 2.0 mmol/L   CBC and Auto Differential   Result Value Ref Range    WBC 6.8 4.4 - 11.3 x10*3/uL    nRBC 0.0 0.0 - 0.0 /100 WBCs    RBC 4.56 4.50 - 5.90  x10*6/uL    Hemoglobin 14.3 13.5 - 17.5 g/dL    Hematocrit 42.9 41.0 - 52.0 %    MCV 94 80 - 100 fL    MCH 31.4 26.0 - 34.0 pg    MCHC 33.3 32.0 - 36.0 g/dL    RDW 12.0 11.5 - 14.5 %    Platelets 175 150 - 450 x10*3/uL    Neutrophils % 64.0 40.0 - 80.0 %    Immature Granulocytes %, Automated 0.4 0.0 - 0.9 %    Lymphocytes % 25.5 13.0 - 44.0 %    Monocytes % 9.1 2.0 - 10.0 %    Eosinophils % 0.9 0.0 - 6.0 %    Basophils % 0.1 0.0 - 2.0 %    Neutrophils Absolute 4.36 1.60 - 5.50 x10*3/uL    Immature Granulocytes Absolute, Automated 0.03 0.00 - 0.50 x10*3/uL    Lymphocytes Absolute 1.74 0.80 - 3.00 x10*3/uL    Monocytes Absolute 0.62 0.05 - 0.80 x10*3/uL    Eosinophils Absolute 0.06 0.00 - 0.40 x10*3/uL    Basophils Absolute 0.01 0.00 - 0.10 x10*3/uL   Type and screen   Result Value Ref Range    ABO TYPE A     Rh TYPE POS     ANTIBODY SCREEN NEG    Troponin I, High Sensitivity   Result Value Ref Range    Troponin I, High Sensitivity (CMC) 2,317 (HH) 0 - 53 ng/L   B-type natriuretic peptide   Result Value Ref Range    BNP 13 0 - 99 pg/mL   Electrocardiogram, 12-lead PRN ACS symptoms   Result Value Ref Range    Ventricular Rate 74 BPM    Atrial Rate 74 BPM    PA Interval 182 ms    QRS Duration 96 ms    QT Interval 382 ms    QTC Calculation(Bazett) 424 ms    P Axis 69 degrees    R Axis 56 degrees    T Axis 68 degrees    QRS Count 12 beats    Q Onset 223 ms    P Onset 132 ms    P Offset 186 ms    T Offset 414 ms    QTC Fredericia 410 ms   Heparin Assay, UFH   Result Value Ref Range    Heparin Unfractionated 0.3 See Comment Below for Therapeutic Ranges IU/mL   Coagulation Screen   Result Value Ref Range    Protime 11.9 9.8 - 12.8 seconds    INR 1.1 0.9 - 1.1    aPTT 55 (H) 27 - 38 seconds   Troponin I, High Sensitivity   Result Value Ref Range    Troponin I, High Sensitivity (CMC) 2,246 (HH) 0 - 53 ng/L   Heparin Assay, UFH   Result Value Ref Range    Heparin Unfractionated 0.3 See Comment Below for Therapeutic Ranges IU/mL    TSH with reflex to Free T4 if abnormal   Result Value Ref Range    Thyroid Stimulating Hormone 3.12 0.44 - 3.98 mIU/L   CBC and Auto Differential   Result Value Ref Range    WBC 6.9 4.4 - 11.3 x10*3/uL    nRBC 0.0 0.0 - 0.0 /100 WBCs    RBC 4.35 (L) 4.50 - 5.90 x10*6/uL    Hemoglobin 13.6 13.5 - 17.5 g/dL    Hematocrit 40.7 (L) 41.0 - 52.0 %    MCV 94 80 - 100 fL    MCH 31.3 26.0 - 34.0 pg    MCHC 33.4 32.0 - 36.0 g/dL    RDW 12.0 11.5 - 14.5 %    Platelets 161 150 - 450 x10*3/uL    Neutrophils % 62.0 40.0 - 80.0 %    Immature Granulocytes %, Automated 0.3 0.0 - 0.9 %    Lymphocytes % 25.7 13.0 - 44.0 %    Monocytes % 10.8 2.0 - 10.0 %    Eosinophils % 0.9 0.0 - 6.0 %    Basophils % 0.3 0.0 - 2.0 %    Neutrophils Absolute 4.26 1.60 - 5.50 x10*3/uL    Immature Granulocytes Absolute, Automated 0.02 0.00 - 0.50 x10*3/uL    Lymphocytes Absolute 1.76 0.80 - 3.00 x10*3/uL    Monocytes Absolute 0.74 0.05 - 0.80 x10*3/uL    Eosinophils Absolute 0.06 0.00 - 0.40 x10*3/uL    Basophils Absolute 0.02 0.00 - 0.10 x10*3/uL   Magnesium   Result Value Ref Range    Magnesium 2.32 1.60 - 2.40 mg/dL   Renal Function Panel   Result Value Ref Range    Glucose 98 74 - 99 mg/dL    Sodium 137 136 - 145 mmol/L    Potassium 3.8 3.5 - 5.3 mmol/L    Chloride 101 98 - 107 mmol/L    Bicarbonate 27 21 - 32 mmol/L    Anion Gap 13 10 - 20 mmol/L    Urea Nitrogen 19 6 - 23 mg/dL    Creatinine 0.87 0.50 - 1.30 mg/dL    eGFR >90 >60 mL/min/1.73m*2    Calcium 9.2 8.6 - 10.6 mg/dL    Phosphorus 4.0 2.5 - 4.9 mg/dL    Albumin 4.1 3.4 - 5.0 g/dL   VERIFY ABO/Rh Group Test   Result Value Ref Range    ABO TYPE A     Rh TYPE POS    Hepatic Function Panel   Result Value Ref Range    Albumin 4.4 3.4 - 5.0 g/dL    Bilirubin, Total 0.8 0.0 - 1.2 mg/dL    Bilirubin, Direct 0.2 0.0 - 0.3 mg/dL    Alkaline Phosphatase 52 33 - 136 U/L    ALT 26 10 - 52 U/L    AST 30 9 - 39 U/L    Total Protein 7.2 6.4 - 8.2 g/dL   Coagulation Screen   Result Value Ref Range     Protime 11.6 9.8 - 12.8 seconds    INR 1.0 0.9 - 1.1    aPTT 64 (H) 27 - 38 seconds   Heparin Assay, UFH   Result Value Ref Range    Heparin Unfractionated 0.4 See Comment Below for Therapeutic Ranges IU/mL   Prepare RBC: 4 Units   Result Value Ref Range    PRODUCT CODE Q0292S65     Unit Number [id number]     Unit ABO A     Unit RH POS     XM INTEP COMP     Dispense Status XM     Blood Expiration Date 12/3/2024 11:59:00 PM EST     PRODUCT BLOOD TYPE 6200     UNIT VOLUME 350     PRODUCT CODE B8699K25     Unit Number [id number]     Unit ABO A     Unit RH POS     XM INTEP COMP     Dispense Status XM     Blood Expiration Date 12/3/2024 11:59:00 PM EST     PRODUCT BLOOD TYPE 6200     UNIT VOLUME 350     PRODUCT CODE M4920T48     Unit Number [id number]     Unit ABO A     Unit RH POS     XM INTEP COMP     Dispense Status XM     Blood Expiration Date 12/3/2024 11:59:00 PM EST     PRODUCT BLOOD TYPE 6200     UNIT VOLUME 350     PRODUCT CODE N5764P33     Unit Number [id number]     Unit ABO A     Unit RH POS     XM INTEP COMP     Dispense Status XM     Blood Expiration Date 12/3/2024 11:59:00 PM EST     PRODUCT BLOOD TYPE 6200     UNIT VOLUME 350                ASSESSMENT & PLAN  Mr. Jaime Dominguez is a 74 y.o. male who initially presented with left sided chest pain, shortness of breath, nausea, and diaphoresis while doing yardwork. He came into the house in distress. His wife reported a brief loss of consciousness. EMS was called. He was diagnosed with STEMI in route and given Brilinta at that time. In the ED labwork and CXR were unremarkable. ECG with acute ST elevations. 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  He has a strong FH of heart disease. No personal history of prior chest pain. Cardiac surgery was consulted for CABG evaluation at [facility].     The patient was transferred to [facility] for robotic MIDCAB evaluation.     [facility]: 11/8  CONCLUSIONS:   1. Distal left main hazy lesion of  90% that continues to michel proximal LAD. LILY-3 flow at the level of the left system.   2. 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 There is a bending area in the distal RCA before giving obtuse marginal 3 that could be significant.   3. Recommend coronary artery bypass surgery since patient has LILY-3 flow in all vessels and has severe three-vessel coronary disease. Currently chest pain-free.    TTE 11/9: CONCLUSIONS:   1. Left ventricular ejection fraction is normal, by visual estimate at 60-65%.   2. Spectral Doppler shows a Grade II (pseudonormal pattern) of left ventricular diastolic filling with an elevated left atrial pressure.   3. There is normal right ventricular global systolic function.    RECOMMENDATIONS/PLAN  Dr. Aldo Harman aware of patient, currently reviewing case and available imaging.   Plan for OR 11/12 for robotic MIDCAB x 2  - LHC & TTE in EMR  - Medical optimization per primary team    Preop risk stratification studies/labs ordered in EMR by our team  --- US Carotids  --- PFTs (spirometry and room air ABG)  --- MRSA, UA/Culture, LFTs, HgbA1c, TSH/T4, Lipid panel  --- CT chest without contrast done  --- CT A/P non con ordered and pending   --- Dental evaluation not indicated for isolated CABG surgeries     Preop orders:  - Continue ASA, high-intensity statin, and BB (or document contraindications for use) for preop CABG patients   - No ACEi/ARBs in the pre-op period (at least 48 hours)  - Hold any SGLT2 inhibitors (Farxiga, Jardiance, etc.) for at least 3 days prior to cardiac surgery to prevent euglycemic DKA  - Hold any daily GLP-1 agonist drugs on the day of surgery. Hold any weekly GLP-1 drugs for 7 days prior to surgery. (Dulaglutide, Exenatide, Liraglutide, Lixisenatide, & Semaglutide)  - No antiplatelets other than ASA, no anticoagulants other than Heparin  - NPO after midnight, blood on hold/T&S, and preop scrubs ordered for  OR 11/12      Will continue to follow along.  Thank you for the consultation.   Patient educated and all questions answered.  Please page the cardiac surgery consult pager [phone] with any questions or changes in patient condition.    Gracie Leon PA-C  Cardiac Surgery Consult [facility]  [facility]  Cardiac Surgery Consult Pager [phone]     11/11/2024  12:44 PM

## 2024-11-11 NOTE — PROGRESS NOTES
Pharmacy Medication History Review    Jaime Dominguez is a 74 y.o. male admitted for STEMI (ST elevation myocardial infarction) ([facility]). Pharmacy reviewed the patient's [id number] medications and allergies for accuracy.    The list below reflects the updated PTA list.   Prior to Admission Medications   Prescriptions Last Dose Informant   aspirin 81 mg EC tablet Past Week Self   Sig: Take 1 tablet (81 mg) by mouth once daily.   atorvastatin (Lipitor) 40 mg tablet More than a month Self   Sig: Take 1 tablet (40 mg) by mouth once daily.   Patient not taking: Reported on 11/8/2024      Facility-Administered Medications: None        The list below reflects the updated allergy list. Please review each documented allergy for additional clarification and justification.  Allergies  Reviewed by Bernie Milian RN on 11/10/2024   No Known Allergies         Patient accepts M2B at discharge. Pharmacy has been updated to [facility].    Sources used to complete the med history include:    [facility]  Pharmacy dispense history  Patient interview Good historian  Chart Review  Care Everywhere     Below are additional concerns with the patient's PTA list.  The patient has not been taking atorvastatin.     Medications ADDED:  none  Medications CHANGED:  none  Medications REMOVED:   none    Dillon Castillo [facility].   Transitions of Care Pharmacist  [facility]  Please reach out via Secure Chat for questions, or if no response call [employer] or [employer]

## 2024-11-11 NOTE — PROGRESS NOTES
Barry Dominguez is a 74 y.o. male on day 1 of admission presenting with STEMI (ST elevation myocardial infarction) (Multi).      Subjective   No acute overnight events. Pt denies dizziness, headache, chest pain, SOB, nausea, vomiting, abdominal pain, or LE swelling. Pt has not had chest pain since initial presentation to ED on 11/8.        Objective     Last Recorded Vitals  /71   Pulse 67   Temp 35.8 °C (96.4 °F)   Resp 18   Wt 95.5 kg (210 lb 8.6 oz)   SpO2 100%   Intake/Output last 3 Shifts:    Intake/Output Summary (Last 24 hours) at 11/11/2024 1336  Last data filed at 11/11/2024 0000  Gross per 24 hour   Intake 82.8 ml   Output --   Net 82.8 ml       Admission Weight  Weight: 96.2 kg (212 lb) (11/10/24 1618)    Daily Weight  11/11/24 : 95.5 kg (210 lb 8.6 oz)    Image Results  Cardiac Catheterization [place] [address]             Phone [phone]    Cardiovascular Catheterization Report    Patient Name:     BARRY DOMINGUEZ    Performing Physician:  [id number] Brady MD  Study Date:       11/8/2024           Verifying Physician:   Nikos Brady MD  MRN/PID:          [id number]            Cardiologist/Co-Scrub:  Accession#:       [id number]        Ordering Provider:     [id number] BRADY  Date of           1950 / 74      Cardiologist:  Birth/Age:        years  Gender:           M                   Fellow:  Encounter#:       [id number]          Surgeon:       Study: Left Heart Cath       Indications:  BARRY DOMINGUEZ is a 74 year old male who presents with aortic stenosis, hypertension and a chest pain assessment of typical angina. Acute coronary syndrome - STEMI. Patient presented with chest pain happened after working in his  backyard. He was cleaning leaves. Retrosternal no radiation. Subsequently he passed out in front of his wife. Then he woke up. EMS was called patient was brought into the emergency room. IN route patient EKG was showing inferior 1 mm ST elevation with ST depression lateral leads. Was taken emergently for cardiac catheterization.       Informed Consent: This patient's clinical condition presents an immediate threat to the life or health of the patient. This was an emergency procedure that, if not done immediately, could lead to serious physical or mental disability or death. Written consent was not able to be obtained from the patient or legal representative prior to the procedure.     Procedure Description:  After infiltration with 2% Lidocaine, the right radial artery was cannulated with a modified Seldinger technique. Subsequently a 6 Algerian sheath was placed in the right radial artery. Selective coronary catheterization was performed using a 5 Fr catheter(s) exchanged over a guide wire to cannulate the coronary arteries.  Additional catheter(s) used to visualize the coronary arteries were: TIG 4.     Coronary Angiography:  The coronary circulation is right dominant.     Left Main Coronary Artery:  The left main has distal hazy lesion of 90%. LILY-3 flow in LAD circumflex and ramus.     Left Anterior Descending Coronary Artery Distribution:  The left anterior descending artery has severe lesion in the proximal segment of 90%. LILY-3 flow. Diagonal 1 has moderate focal lesion of 60% proximally. Diagonal 2 and mid distal LAD patent.     Circumflex Coronary Artery Distribution:  Ostial left circumflex may be involved in the distal left main lesion. Difficult to rule out significant ostial disease. Provides high obtuse marginal 1 without significant coronary disease. Obtuse marginal 2 larger vessel without significant artery disease. LILY-3 flow. The proper left circumflex become very small and stop at the mid of the  LV groove.     Right Coronary Artery Distribution:    The right coronary artery is a right dominant vessel. Patent in the proximal and mid segment. Very tortuous vessel. Actually the RCA distally goes up toward the left AV groove and provides anomalous obtuse marginal 3. And then continues with PDA. At the turn of the distal RCA toward the left AV groove giving obtuse marginal 1. there is sharp bend and area of possible disease although unclear if it is all related to sharp band or there is significant 90% lesion. LILY-3 flow.       Left Ventriculography:  The left ventricular end-diastolic pressure was mildly elevated at 17 mmHg. LV gram showed normal ejection fraction of 50% with mild apical wall hypokinesis. No gradient across the aortic valve.     Hemo Personnel:  +----------------+---------+  Name            Duty       +----------------+---------+  Ale BradyROC MD 1  +----------------+---------+       Hemodynamic Pressures:     +----+-------------------+---------+------------+-------------+------+---------+  Site     Date Time       Phase    Systolic    Diastolic    ED  Mean mmHg                           Name       mmHg        mmHg      mmHg            +----+-------------------+---------+------------+-------------+------+---------+    AO  11/8/2024 4:37:42 AIR REST         117           81             98                       PM                                                   +----+-------------------+---------+------------+-------------+------+---------+    AO  11/8/2024 4:40:38 AIR REST         109           78             93                       PM                                                   +----+-------------------+---------+------------+-------------+------+---------+    LV  11/8/2024 4:44:50 AIR REST          34           28    30                                PM                                                    +----+-------------------+---------+------------+-------------+------+---------+    LV  11/8/2024 4:44:53 AIR REST          32           27    27                                PM                                                   +----+-------------------+---------+------------+-------------+------+---------+    LV  11/8/2024 4:45:47 AIR REST         138            0    13                                PM                                                   +----+-------------------+---------+------------+-------------+------+---------+    LV  11/8/2024 4:45:57 AIR REST         125            2    19                                PM                                                   +----+-------------------+---------+------------+-------------+------+---------+   LVp  11/8/2024 4:46:03 AIR REST         124            0    17                                PM                                                   +----+-------------------+---------+------------+-------------+------+---------+   AOp  11/8/2024 4:46:14 AIR REST         107          -72             77                       PM                                                   +----+-------------------+---------+------------+-------------+------+---------+    AO  11/8/2024 4:47:57 AIR REST           0            0             -5                       PM                                                   +----+-------------------+---------+------------+-------------+------+---------+       Cardiac Cath Post Procedure Notes:  Post Procedure Diagnosis: Double vessel disease.  Blood Loss:               Estimated blood loss during the procedure was 10 cc                            mls.  Specimens Removed:        Number of specimen(s) removed: none.    ____________________________________________________________________________________  CONCLUSIONS:   1. Distal left main  hazy lesion of 90% that continues to michel proximal LAD. LILY-3 flow at the level of the left system.   2. 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 There is a bending area in the distal RCA before giving obtuse marginal 3 that could be significant.   3. Recommend coronary artery bypass surgery since patient has LILY-3 flow in all vessels and has severe three-vessel coronary disease. Currently chest pain-free.    ICD 10 Codes:  ST elevation (STEMI) myocardial infarction involving right coronary artery-I21.11     CPT Codes:  Left Heart Cath (visualization of coronaries) and LV-20295; Moderate Sedation Services initial 15 minutes patient >5 years-14797     [id number] Ale Brady MD  Performing Physician  Electronically signed by [id number] Ale Brady MD on 11/11/2024 at 9:07:59 AM         ** Final **  Electrocardiogram, 12-lead PRN ACS symptoms  Normal sinus rhythm  Incomplete right bundle branch block  Borderline ECG  When compared with ECG of 09-NOV-2024 15:18,  ST elevation now present in Inferior leads  T wave inversion no longer evident in Inferior leads  CT chest wo IV contrast  Narrative: Interpreted By:  Williams Lyles,   STUDY:  CT CHEST WO IV CONTRAST; ;  11/9/2024 3:04 pm      INDICATION:  Signs/Symptoms:preop CABG.          COMPARISON:  None.      ACCESSION NUMBER(S):  [id number]      ORDERING CLINICIAN:  NELDA JENKINS      TECHNIQUE:  Serial axial unenhanced CT images obtained of the chest with EKG  gating. Images reformatted in the coronal and sagittal projection.      All CT examinations are performed with 1 or more of the following  dose reduction techniques: Automated exposure control, adjustment of  mA and/or kv according to patient's size, or use of iterative  reconstruction techniques.      FINDINGS:  Mediastinum demonstrates no lymphadenopathy. Scattered subcentimeter  lymph nodes are demonstrated in the paratracheal and  prevascular  locations esophagus is unremarkable      Heart and great vessels demonstrate mid ascending thoracic aorta to  measure 3.3 cm. Ascending thoracic aorta demonstrates mild vascular  calcification of the left coronary cusp. No significant vascular  calcification of the ascending thoracic aorta. Mild vascular  calcification of the aortic arch. Fluid in the subaortic recess  demonstrated. No cardiomegaly.      Lung parenchyma demonstrates mild basilar dependent atelectasis with  linear appearing scar in the right lower lobe. Also, subtle  ground-glass airspace infiltrate in the anterior segment of the right  upper lobe without airspace consolidation. Other scattered areas of  subpleural ground-glass airspace infiltrate in the right upper lobe  laterally. There is a subpleural linear appearing scar.      Included portions visualized abdomen are unremarkable      Visualized osseous structures demonstrate multilevel anterior  osteophyte formation mid to lower thoracic spine with mild endplate  irregularity in particular lower thoracic spine. No compression  deformity noted.                      Impression: 1. No significant vascular calcification of the ascending thoracic  aorta. No aneurysmal dilatation.      2. Subpleural ground-glass airspace infiltrate in the right upper  lobe laterally may reflect sequela of postinfectious/postinflammatory  etiology with mild linear appearing scar noted.              MACRO:  None      Signed by: Williams Lyles 11/11/2024 8:16 AM  Dictation workstation:   [id number]  Electrocardiogram, 12-lead  Normal sinus rhythm  Inferior infarct , age undetermined  Abnormal ECG  No previous ECGs available      Physical Exam  Constitutional: No acute distress, resting comfortably in bed, cooperative  HEENT: Normocephalic, atraumatic, PERRLA, EOMI, moist mucous membranes  Cardiovascular: RRR, normal S1/S2, no murmurs noted  Pulmonary: Clear to auscultation b/l, no  wheezes/crackles/rhonchi, no increased work of breathing, no supplemental oxygen  GI: Soft, non-tender, non-distended, normoactive bowel sounds  : No aden catheter  Lower extremities: Warm and well perfused, no lower extremity edema  Neuro: A&O x3, able to move all 4 extremities spontaneously  Skin: No rashes or lesions noted  Psych: Appropriate mood and affect       Assessment/Plan   Jaime Dominguez is a 74 y.o. male with a PMHx of HLD, R inguinal hernia repair (3/18/2024), and partial C5 corpectomy w/ C4/5, C5/6 ACDF (07/08/20) who presented to [facility] ED on 11/8 for chest pain and syncope. EKG w/ ST elevations in leads II, III and aVF with ST depression in V4 through V6.  A code STEMI was activated en route. The patient was given aspirin, Brilinta and 1 nitroglycerin en route to the ED per chart review. Emergent C 11/8 showed distal L main 90%, LAD tortuous vessel LILY-3 flow nonobstructive disease, diag1 ostial disease, Lcx and RCA w/ no significant CAD. Evaluated by cardiac surgery at OSH, transferred to [facility] CICU for evaluation for MIDCAB. CT surgery consulted, plan for OR 11/12 for robotic MIDCAB x 2.     Updates 11/11/24:  [] CT surgery consult: plan for OR 11/12 for robotic MIDCAB x 2   Pre-op eval ordered  [] Carotid u/s (ordered, will be completed 11/12 am)  [] CT A/P w/o contrast (ordered, will be completed today)  [] NPO mn          Neuro:  - No active issues     Cardiovascular:  # STEMI  # Distal L main 90% stenosis  # HLD  - EKG 11/8: ST elevations in leads II, III and aVF with ST depression in V4 through V6  - LHC 11/8: distal L main 90%, LAD tortuous vessel LILY-3 flow nonobstructive disease, diag1 ostial disease, Lcx and RCA w/ no significant CAD  - TTE 11/8: LVEF 60-65%, grade II LV diastolic filling  - HS trop 4, no repeat at OSH  - HS trop 2317 > 2246 on admit to CICU (no CP at that time)  - S/p brillinta en route w/ EMS on 11/8  - lipid panel 11/8: , , total chol 228,  HDL 37.6  - No chest pain since 11/8  - Pre-op eval: US Carotids (pending), PFTs (spirometry and room air ABG)(done), MRSA, UA/Culture, LFTs, HgbA1c, TSH/T4, Lipid panel, CT chest without contrast (done), CT A/P non con (pending)  [] C/w aspirin 81mg  [] C/w atorva 80mg at bedtime  [] C/w heparin gtt  [] CT surg consult: plan for OR 11/12 for robotic MIDCAB x 2      Respiratory:  - No active issues     GI:  - No active issues     Renal/:  - No active issues     Heme/Onc:  - No active issues     Endo:  - No active issues  - HbA1C 5.1% on 11/8/24     ID:  - No active issues     MSK/Rheum:  - No active issues     Derm:  # S/p Mohs procedure forehead  - Continue to keep area covered     Psych:  - No active issues     Access: 2 PIVs  Tubes/drains: None  O2: RA  Diet: Cardiac diet, npo mn  GI ppx: none  DVT ppx: heparin gtt  Code status: Full code (confirmed on admission)  Surrogate medical decision maker: Mireya (wife, POA) [phone]  Casa (son, #2 for POA) [phone]        Dee Palomares MD   Internal Medicine PGY-3

## 2024-11-12 ENCOUNTER — HOSPITAL ENCOUNTER (INPATIENT)
Dept: OPERATING ROOM | Facility: HOSPITAL | Age: 74
Discharge: HOME | DRG: 003 | End: 2024-11-12
Payer: MEDICARE

## 2024-11-12 ENCOUNTER — APPOINTMENT (OUTPATIENT)
Dept: RADIOLOGY | Facility: HOSPITAL | Age: 74
DRG: 003 | End: 2024-11-12
Payer: MEDICARE

## 2024-11-12 ENCOUNTER — ANESTHESIA (OUTPATIENT)
Dept: OPERATING ROOM | Facility: HOSPITAL | Age: 74
End: 2024-11-12
Payer: MEDICARE

## 2024-11-12 LAB
ACT BLD: 107 SEC (ref 82–174)
ACT BLD: 120 SEC (ref 82–174)
ACT BLD: 413 SEC (ref 82–174)
ACT BLD: 422 SEC (ref 82–174)
ACT BLD: 429 SEC (ref 82–174)
ACT BLD: 441 SEC (ref 82–174)
ACT BLD: 442 SEC (ref 82–174)
ACT BLD: 443 SEC (ref 82–174)
ACT BLD: 449 SEC (ref 82–174)
ACT BLD: 465 SEC (ref 82–174)
ACT BLD: 487 SEC (ref 82–174)
ACT BLD: 524 SEC (ref 82–174)
ACT BLD: 526 SEC (ref 82–174)
ALBUMIN SERPL BCP-MCNC: 4.4 G/DL (ref 3.4–5)
ANION GAP BLDA CALCULATED.4IONS-SCNC: 10 MMO/L (ref 10–25)
ANION GAP BLDA CALCULATED.4IONS-SCNC: 10 MMO/L (ref 10–25)
ANION GAP BLDA CALCULATED.4IONS-SCNC: 11 MMO/L (ref 10–25)
ANION GAP BLDA CALCULATED.4IONS-SCNC: 13 MMO/L (ref 10–25)
ANION GAP BLDA CALCULATED.4IONS-SCNC: 13 MMO/L (ref 10–25)
ANION GAP BLDA CALCULATED.4IONS-SCNC: 15 MMO/L (ref 10–25)
ANION GAP BLDA CALCULATED.4IONS-SCNC: 16 MMO/L (ref 10–25)
ANION GAP BLDA CALCULATED.4IONS-SCNC: 17 MMO/L (ref 10–25)
ANION GAP BLDA CALCULATED.4IONS-SCNC: 19 MMO/L (ref 10–25)
ANION GAP BLDV CALCULATED.4IONS-SCNC: 14 MMOL/L (ref 10–25)
ANION GAP SERPL CALC-SCNC: 14 MMOL/L (ref 10–20)
APTT PPP: 167 SECONDS (ref 27–38)
ATRIAL RATE: 70 BPM
BASE EXCESS BLDA CALC-SCNC: -0.4 MMOL/L (ref -2–3)
BASE EXCESS BLDA CALC-SCNC: -0.6 MMOL/L (ref -2–3)
BASE EXCESS BLDA CALC-SCNC: -0.8 MMOL/L (ref -2–3)
BASE EXCESS BLDA CALC-SCNC: -0.8 MMOL/L (ref -2–3)
BASE EXCESS BLDA CALC-SCNC: -1.1 MMOL/L (ref -2–3)
BASE EXCESS BLDA CALC-SCNC: -1.5 MMOL/L (ref -2–3)
BASE EXCESS BLDA CALC-SCNC: -1.7 MMOL/L (ref -2–3)
BASE EXCESS BLDA CALC-SCNC: -2.1 MMOL/L (ref -2–3)
BASE EXCESS BLDA CALC-SCNC: -2.3 MMOL/L (ref -2–3)
BASE EXCESS BLDA CALC-SCNC: -3.9 MMOL/L (ref -2–3)
BASE EXCESS BLDA CALC-SCNC: -5.1 MMOL/L (ref -2–3)
BASE EXCESS BLDA CALC-SCNC: -5.8 MMOL/L (ref -2–3)
BASE EXCESS BLDA CALC-SCNC: 0 MMOL/L (ref -2–3)
BASE EXCESS BLDA CALC-SCNC: 1 MMOL/L (ref -2–3)
BASE EXCESS BLDV CALC-SCNC: -0.3 MMOL/L (ref -2–3)
BASOPHILS # BLD AUTO: 0.02 X10*3/UL (ref 0–0.1)
BASOPHILS NFR BLD AUTO: 0.3 %
BLOOD EXPIRATION DATE: NORMAL
BODY TEMPERATURE: 37 DEGREES CELSIUS
BUN SERPL-MCNC: 19 MG/DL (ref 6–23)
CA-I BLDA-SCNC: 0.93 MMOL/L (ref 1.1–1.33)
CA-I BLDA-SCNC: 0.94 MMOL/L (ref 1.1–1.33)
CA-I BLDA-SCNC: 0.95 MMOL/L (ref 1.1–1.33)
CA-I BLDA-SCNC: 0.96 MMOL/L (ref 1.1–1.33)
CA-I BLDA-SCNC: 0.96 MMOL/L (ref 1.1–1.33)
CA-I BLDA-SCNC: 1 MMOL/L (ref 1.1–1.33)
CA-I BLDA-SCNC: 1 MMOL/L (ref 1.1–1.33)
CA-I BLDA-SCNC: 1.02 MMOL/L (ref 1.1–1.33)
CA-I BLDA-SCNC: 1.07 MMOL/L (ref 1.1–1.33)
CA-I BLDA-SCNC: 1.1 MMOL/L (ref 1.1–1.33)
CA-I BLDA-SCNC: 1.1 MMOL/L (ref 1.1–1.33)
CA-I BLDA-SCNC: 1.11 MMOL/L (ref 1.1–1.33)
CA-I BLDA-SCNC: 1.12 MMOL/L (ref 1.1–1.33)
CA-I BLDA-SCNC: 1.28 MMOL/L (ref 1.1–1.33)
CA-I BLDV-SCNC: 0.98 MMOL/L (ref 1.1–1.33)
CALCIUM SERPL-MCNC: 9.1 MG/DL (ref 8.6–10.6)
CFT FORM KAOLIN IND BLD RES TEG: >5 MIN (ref 0.8–2.1)
CHLORIDE BLDA-SCNC: 100 MMOL/L (ref 98–107)
CHLORIDE BLDA-SCNC: 101 MMOL/L (ref 98–107)
CHLORIDE BLDA-SCNC: 102 MMOL/L (ref 98–107)
CHLORIDE BLDA-SCNC: 103 MMOL/L (ref 98–107)
CHLORIDE BLDA-SCNC: 103 MMOL/L (ref 98–107)
CHLORIDE BLDA-SCNC: 104 MMOL/L (ref 98–107)
CHLORIDE BLDA-SCNC: 99 MMOL/L (ref 98–107)
CHLORIDE BLDV-SCNC: 100 MMOL/L (ref 98–107)
CHLORIDE SERPL-SCNC: 100 MMOL/L (ref 98–107)
CLOT ANGLE.KAOLIN INDUCED BLD RES TEG: 41 DEG (ref 63–78)
CLOT INIT KAO IND P HEP NEUT BLD RES TEG: 8.2 MIN (ref 4.3–8.3)
CLOT INIT KAO IND P HEP NEUT BLD RES TEG: >17 MIN (ref 4.6–9.1)
CO2 SERPL-SCNC: 27 MMOL/L (ref 21–32)
COHGB MFR BLDA: 0.8 %
COHGB MFR BLDA: 0.9 %
COHGB MFR BLDA: 1 %
COHGB MFR BLDA: 1 %
COHGB MFR BLDA: 1.1 %
COHGB MFR BLDA: 1.2 %
COHGB MFR BLDA: 1.3 %
COHGB MFR BLDV: 1.2 %
CREAT SERPL-MCNC: 0.87 MG/DL (ref 0.5–1.3)
DISPENSE STATUS: NORMAL
DO-HGB MFR BLDA: 0.5 % (ref 0–5)
DO-HGB MFR BLDA: 0.7 % (ref 0–5)
DO-HGB MFR BLDA: 0.8 % (ref 0–5)
DO-HGB MFR BLDA: 0.9 % (ref 0–5)
DO-HGB MFR BLDA: 0.9 % (ref 0–5)
DO-HGB MFR BLDA: 1 % (ref 0–5)
DO-HGB MFR BLDA: 1.2 % (ref 0–5)
DO-HGB MFR BLDA: 1.2 % (ref 0–5)
DO-HGB MFR BLDA: 1.3 % (ref 0–5)
DO-HGB MFR BLDA: 1.4 % (ref 0–5)
DO-HGB MFR BLDA: 1.4 % (ref 0–5)
DO-HGB MFR BLDA: 1.7 % (ref 0–5)
EGFRCR SERPLBLD CKD-EPI 2021: >90 ML/MIN/1.73M*2
EOSINOPHIL # BLD AUTO: 0.11 X10*3/UL (ref 0–0.4)
EOSINOPHIL NFR BLD AUTO: 1.5 %
ERYTHROCYTE [DISTWIDTH] IN BLOOD BY AUTOMATED COUNT: 11.9 % (ref 11.5–14.5)
FIBRINOGEN BLD CALC-MCNC: 319 MG/DL (ref 278–581)
GLUCOSE BLDA-MCNC: 100 MG/DL (ref 74–99)
GLUCOSE BLDA-MCNC: 102 MG/DL (ref 74–99)
GLUCOSE BLDA-MCNC: 104 MG/DL (ref 74–99)
GLUCOSE BLDA-MCNC: 107 MG/DL (ref 74–99)
GLUCOSE BLDA-MCNC: 112 MG/DL (ref 74–99)
GLUCOSE BLDA-MCNC: 129 MG/DL (ref 74–99)
GLUCOSE BLDA-MCNC: 132 MG/DL (ref 74–99)
GLUCOSE BLDA-MCNC: 132 MG/DL (ref 74–99)
GLUCOSE BLDA-MCNC: 135 MG/DL (ref 74–99)
GLUCOSE BLDA-MCNC: 140 MG/DL (ref 74–99)
GLUCOSE BLDA-MCNC: 152 MG/DL (ref 74–99)
GLUCOSE BLDA-MCNC: 159 MG/DL (ref 74–99)
GLUCOSE BLDA-MCNC: 165 MG/DL (ref 74–99)
GLUCOSE BLDA-MCNC: 188 MG/DL (ref 74–99)
GLUCOSE BLDV-MCNC: 130 MG/DL (ref 74–99)
GLUCOSE SERPL-MCNC: 100 MG/DL (ref 74–99)
HCO3 BLDA-SCNC: 19.9 MMOL/L (ref 22–26)
HCO3 BLDA-SCNC: 20.1 MMOL/L (ref 22–26)
HCO3 BLDA-SCNC: 21.6 MMOL/L (ref 22–26)
HCO3 BLDA-SCNC: 23.2 MMOL/L (ref 22–26)
HCO3 BLDA-SCNC: 23.7 MMOL/L (ref 22–26)
HCO3 BLDA-SCNC: 24.3 MMOL/L (ref 22–26)
HCO3 BLDA-SCNC: 24.8 MMOL/L (ref 22–26)
HCO3 BLDA-SCNC: 24.8 MMOL/L (ref 22–26)
HCO3 BLDA-SCNC: 24.9 MMOL/L (ref 22–26)
HCO3 BLDA-SCNC: 24.9 MMOL/L (ref 22–26)
HCO3 BLDA-SCNC: 25.2 MMOL/L (ref 22–26)
HCO3 BLDA-SCNC: 25.4 MMOL/L (ref 22–26)
HCO3 BLDV-SCNC: 25.4 MMOL/L (ref 22–26)
HCT VFR BLD AUTO: 42 % (ref 41–52)
HCT VFR BLD EST: 29 % (ref 41–52)
HCT VFR BLD EST: 30 % (ref 41–52)
HCT VFR BLD EST: 30 % (ref 41–52)
HCT VFR BLD EST: 32 % (ref 41–52)
HCT VFR BLD EST: 33 % (ref 41–52)
HCT VFR BLD EST: 34 % (ref 41–52)
HCT VFR BLD EST: 34 % (ref 41–52)
HCT VFR BLD EST: 35 % (ref 41–52)
HCT VFR BLD EST: 39 % (ref 41–52)
HCT VFR BLD EST: 41 % (ref 41–52)
HCT VFR BLD EST: 44 % (ref 41–52)
HGB BLD-MCNC: 14.2 G/DL (ref 13.5–17.5)
HGB BLDA-MCNC: 10 G/DL (ref 13.5–17.5)
HGB BLDA-MCNC: 10.5 G/DL (ref 13.5–17.5)
HGB BLDA-MCNC: 10.5 G/DL (ref 13.5–17.5)
HGB BLDA-MCNC: 11.1 G/DL (ref 13.5–17.5)
HGB BLDA-MCNC: 11.1 G/DL (ref 13.5–17.5)
HGB BLDA-MCNC: 11.2 G/DL (ref 13.5–17.5)
HGB BLDA-MCNC: 11.2 G/DL (ref 13.5–17.5)
HGB BLDA-MCNC: 11.4 G/DL (ref 13.5–17.5)
HGB BLDA-MCNC: 11.4 G/DL (ref 13.5–17.5)
HGB BLDA-MCNC: 11.7 G/DL (ref 13.5–17.5)
HGB BLDA-MCNC: 11.7 G/DL (ref 13.5–17.5)
HGB BLDA-MCNC: 11.8 G/DL (ref 13.5–17.5)
HGB BLDA-MCNC: 11.8 G/DL (ref 13.5–17.5)
HGB BLDA-MCNC: 13 G/DL (ref 13.5–17.5)
HGB BLDA-MCNC: 13 G/DL (ref 13.5–17.5)
HGB BLDA-MCNC: 13.5 G/DL (ref 13.5–17.5)
HGB BLDA-MCNC: 13.5 G/DL (ref 13.5–17.5)
HGB BLDA-MCNC: 13.7 G/DL (ref 13.5–17.5)
HGB BLDA-MCNC: 13.7 G/DL (ref 13.5–17.5)
HGB BLDA-MCNC: 13.8 G/DL (ref 13.5–17.5)
HGB BLDA-MCNC: 13.8 G/DL (ref 13.5–17.5)
HGB BLDA-MCNC: 14.5 G/DL (ref 13.5–17.5)
HGB BLDA-MCNC: 14.5 G/DL (ref 13.5–17.5)
HGB BLDA-MCNC: 9.8 G/DL (ref 13.5–17.5)
HGB BLDA-MCNC: 9.8 G/DL (ref 13.5–17.5)
HGB BLDV-MCNC: 11.7 G/DL (ref 13.5–17.5)
HOLD SPECIMEN: NORMAL
IMM GRANULOCYTES # BLD AUTO: 0.02 X10*3/UL (ref 0–0.5)
IMM GRANULOCYTES NFR BLD AUTO: 0.3 % (ref 0–0.9)
INHALED O2 CONCENTRATION: 100 %
INHALED O2 CONCENTRATION: 30 %
INHALED O2 CONCENTRATION: 80 %
INHALED O2 CONCENTRATION: 90 %
INR PPP: 1.1 (ref 0.9–1.1)
LACTATE BLDA-SCNC: 0.6 MMOL/L (ref 0.4–2)
LACTATE BLDA-SCNC: 0.7 MMOL/L (ref 0.4–2)
LACTATE BLDA-SCNC: 0.8 MMOL/L (ref 0.4–2)
LACTATE BLDA-SCNC: 1.1 MMOL/L (ref 0.4–2)
LACTATE BLDA-SCNC: 1.3 MMOL/L (ref 0.4–2)
LACTATE BLDA-SCNC: 1.3 MMOL/L (ref 0.4–2)
LACTATE BLDA-SCNC: 1.4 MMOL/L (ref 0.4–2)
LACTATE BLDA-SCNC: 1.9 MMOL/L (ref 0.4–2)
LACTATE BLDA-SCNC: 2 MMOL/L (ref 0.4–2)
LACTATE BLDA-SCNC: 2.5 MMOL/L (ref 0.4–2)
LACTATE BLDA-SCNC: 3.7 MMOL/L (ref 0.4–2)
LACTATE BLDA-SCNC: 5.5 MMOL/L (ref 0.4–2)
LACTATE BLDV-SCNC: 1.3 MMOL/L (ref 0.4–2)
LYMPHOCYTES # BLD AUTO: 1.98 X10*3/UL (ref 0.8–3)
LYMPHOCYTES NFR BLD AUTO: 26.2 %
MA KAOLIN BLD RES TEG: 46 MM (ref 52–69)
MA KAOLIN+TF BLD RES TEG: 59 MM (ref 52–70)
MA TF IND+IIB-IIIA INH BLD RES TEG: 18 MM (ref 15–32)
MAGNESIUM SERPL-MCNC: 2.25 MG/DL (ref 1.6–2.4)
MCH RBC QN AUTO: 31.3 PG (ref 26–34)
MCHC RBC AUTO-ENTMCNC: 33.8 G/DL (ref 32–36)
MCV RBC AUTO: 93 FL (ref 80–100)
METHGB MFR BLDA: 0 % (ref 0–1.5)
METHGB MFR BLDA: 0.1 % (ref 0–1.5)
METHGB MFR BLDA: 0.3 % (ref 0–1.5)
METHGB MFR BLDA: 0.6 % (ref 0–1.5)
METHGB MFR BLDV: 0.2 % (ref 0–1.5)
MGC ASCENT PFT - FEV1 - PRE: 3.35
MGC ASCENT PFT - FEV1 - PREDICTED: 3.1
MGC ASCENT PFT - FVC - PRE: 4.4
MGC ASCENT PFT - FVC - PREDICTED: 4.15
MONOCYTES # BLD AUTO: 0.72 X10*3/UL (ref 0.05–0.8)
MONOCYTES NFR BLD AUTO: 9.5 %
NEUTROPHILS # BLD AUTO: 4.7 X10*3/UL (ref 1.6–5.5)
NEUTROPHILS NFR BLD AUTO: 62.2 %
NRBC BLD-RTO: 0 /100 WBCS (ref 0–0)
OXYHGB MFR BLDA: 97.2 % (ref 94–98)
OXYHGB MFR BLDA: 97.2 % (ref 94–98)
OXYHGB MFR BLDA: 97.4 % (ref 94–98)
OXYHGB MFR BLDA: 97.4 % (ref 94–98)
OXYHGB MFR BLDA: 97.6 % (ref 94–98)
OXYHGB MFR BLDA: 97.6 % (ref 94–98)
OXYHGB MFR BLDA: 97.7 % (ref 94–98)
OXYHGB MFR BLDA: 97.7 % (ref 94–98)
OXYHGB MFR BLDA: 97.8 % (ref 94–98)
OXYHGB MFR BLDA: 97.8 % (ref 94–98)
OXYHGB MFR BLDA: 97.9 % (ref 94–98)
OXYHGB MFR BLDA: 97.9 % (ref 94–98)
OXYHGB MFR BLDA: 98 % (ref 94–98)
OXYHGB MFR BLDA: 98.1 % (ref 94–98)
OXYHGB MFR BLDA: 98.2 % (ref 94–98)
OXYHGB MFR BLDA: 98.3 % (ref 94–98)
OXYHGB MFR BLDA: 98.3 % (ref 94–98)
OXYHGB MFR BLDV: 84.9 % (ref 45–75)
P AXIS: 13 DEGREES
P OFFSET: 180 MS
P ONSET: 127 MS
PCO2 BLDA: 36 MM HG (ref 38–42)
PCO2 BLDA: 38 MM HG (ref 38–42)
PCO2 BLDA: 40 MM HG (ref 38–42)
PCO2 BLDA: 41 MM HG (ref 38–42)
PCO2 BLDA: 42 MM HG (ref 38–42)
PCO2 BLDA: 44 MM HG (ref 38–42)
PCO2 BLDA: 46 MM HG (ref 38–42)
PCO2 BLDV: 45 MM HG (ref 41–51)
PH BLDA: 7.31 PH (ref 7.38–7.42)
PH BLDA: 7.34 PH (ref 7.38–7.42)
PH BLDA: 7.34 PH (ref 7.38–7.42)
PH BLDA: 7.35 PH (ref 7.38–7.42)
PH BLDA: 7.36 PH (ref 7.38–7.42)
PH BLDA: 7.37 PH (ref 7.38–7.42)
PH BLDA: 7.37 PH (ref 7.38–7.42)
PH BLDA: 7.38 PH (ref 7.38–7.42)
PH BLDA: 7.4 PH (ref 7.38–7.42)
PH BLDA: 7.43 PH (ref 7.38–7.42)
PH BLDV: 7.36 PH (ref 7.33–7.43)
PHOSPHATE SERPL-MCNC: 3.5 MG/DL (ref 2.5–4.9)
PLATELET # BLD AUTO: 171 X10*3/UL (ref 150–450)
PO2 BLDA: 114 MM HG (ref 85–95)
PO2 BLDA: 142 MM HG (ref 85–95)
PO2 BLDA: 169 MM HG (ref 85–95)
PO2 BLDA: 174 MM HG (ref 85–95)
PO2 BLDA: 182 MM HG (ref 85–95)
PO2 BLDA: 237 MM HG (ref 85–95)
PO2 BLDA: 241 MM HG (ref 85–95)
PO2 BLDA: 247 MM HG (ref 85–95)
PO2 BLDA: 275 MM HG (ref 85–95)
PO2 BLDA: 322 MM HG (ref 85–95)
PO2 BLDA: 327 MM HG (ref 85–95)
PO2 BLDA: 386 MM HG (ref 85–95)
PO2 BLDA: 393 MM HG (ref 85–95)
PO2 BLDA: 444 MM HG (ref 85–95)
PO2 BLDV: 55 MM HG (ref 35–45)
POTASSIUM BLDA-SCNC: 3.2 MMOL/L (ref 3.5–5.3)
POTASSIUM BLDA-SCNC: 3.3 MMOL/L (ref 3.5–5.3)
POTASSIUM BLDA-SCNC: 3.9 MMOL/L (ref 3.5–5.3)
POTASSIUM BLDA-SCNC: 4 MMOL/L (ref 3.5–5.3)
POTASSIUM BLDA-SCNC: 4.1 MMOL/L (ref 3.5–5.3)
POTASSIUM BLDA-SCNC: 4.2 MMOL/L (ref 3.5–5.3)
POTASSIUM BLDA-SCNC: 4.2 MMOL/L (ref 3.5–5.3)
POTASSIUM BLDA-SCNC: 4.3 MMOL/L (ref 3.5–5.3)
POTASSIUM BLDA-SCNC: 4.4 MMOL/L (ref 3.5–5.3)
POTASSIUM BLDA-SCNC: 4.4 MMOL/L (ref 3.5–5.3)
POTASSIUM BLDA-SCNC: 4.8 MMOL/L (ref 3.5–5.3)
POTASSIUM BLDA-SCNC: 5.2 MMOL/L (ref 3.5–5.3)
POTASSIUM BLDV-SCNC: 3.9 MMOL/L (ref 3.5–5.3)
POTASSIUM SERPL-SCNC: 4 MMOL/L (ref 3.5–5.3)
PR INTERVAL: 192 MS
PRODUCT BLOOD TYPE: 6200
PRODUCT BLOOD TYPE: 7300
PRODUCT BLOOD TYPE: 7300
PRODUCT CODE: NORMAL
PROTHROMBIN TIME: 12.3 SECONDS (ref 9.8–12.8)
Q ONSET: 223 MS
QRS COUNT: 11 BEATS
QRS DURATION: 96 MS
QT INTERVAL: 400 MS
QTC CALCULATION(BAZETT): 432 MS
QTC FREDERICIA: 421 MS
R AXIS: 19 DEGREES
RBC # BLD AUTO: 4.53 X10*6/UL (ref 4.5–5.9)
SAO2 % BLDA: 100 % (ref 94–100)
SAO2 % BLDA: 98 % (ref 94–100)
SAO2 % BLDA: 99 % (ref 94–100)
SAO2 % BLDV: 86 % (ref 45–75)
SODIUM BLDA-SCNC: 132 MMOL/L (ref 136–145)
SODIUM BLDA-SCNC: 133 MMOL/L (ref 136–145)
SODIUM BLDA-SCNC: 133 MMOL/L (ref 136–145)
SODIUM BLDA-SCNC: 134 MMOL/L (ref 136–145)
SODIUM BLDA-SCNC: 135 MMOL/L (ref 136–145)
SODIUM BLDA-SCNC: 137 MMOL/L (ref 136–145)
SODIUM BLDA-SCNC: 137 MMOL/L (ref 136–145)
SODIUM BLDA-SCNC: 139 MMOL/L (ref 136–145)
SODIUM BLDV-SCNC: 135 MMOL/L (ref 136–145)
SODIUM SERPL-SCNC: 137 MMOL/L (ref 136–145)
STAPHYLOCOCCUS SPEC CULT: NORMAL
T AXIS: -1 DEGREES
T OFFSET: 423 MS
TEST COMMENT: ABNORMAL
UFH PPP CHRO-ACNC: 0.8 IU/ML
UFH PPP CHRO-ACNC: 0.9 IU/ML
UFH PPP CHRO-ACNC: 1 IU/ML
UNIT ABO: NORMAL
UNIT NUMBER: NORMAL
UNIT RH: NORMAL
UNIT VOLUME: 268
UNIT VOLUME: 287
UNIT VOLUME: 350
VENTRICULAR RATE: 70 BPM
WBC # BLD AUTO: 7.6 X10*3/UL (ref 4.4–11.3)
XM INTEP: NORMAL

## 2024-11-12 PROCEDURE — 02HV33Z INSERTION OF INFUSION DEVICE INTO SUPERIOR VENA CAVA, PERCUTANEOUS APPROACH: ICD-10-PCS | Performed by: THORACIC SURGERY (CARDIOTHORACIC VASCULAR SURGERY)

## 2024-11-12 PROCEDURE — 2500000004 HC RX 250 GENERAL PHARMACY W/ HCPCS (ALT 636 FOR OP/ED)

## 2024-11-12 PROCEDURE — A4312 CATH W/O BAG 2-WAY SILICONE: HCPCS | Performed by: THORACIC SURGERY (CARDIOTHORACIC VASCULAR SURGERY)

## 2024-11-12 PROCEDURE — 71045 X-RAY EXAM CHEST 1 VIEW: CPT | Performed by: RADIOLOGY

## 2024-11-12 PROCEDURE — 2720000007 HC OR 272 NO HCPCS: Performed by: THORACIC SURGERY (CARDIOTHORACIC VASCULAR SURGERY)

## 2024-11-12 PROCEDURE — 2500000004 HC RX 250 GENERAL PHARMACY W/ HCPCS (ALT 636 FOR OP/ED): Performed by: THORACIC SURGERY (CARDIOTHORACIC VASCULAR SURGERY)

## 2024-11-12 PROCEDURE — 3600000012 HC PERFUSION TIME - EACH INCREMENTAL 1 MINUTE: Performed by: THORACIC SURGERY (CARDIOTHORACIC VASCULAR SURGERY)

## 2024-11-12 PROCEDURE — 3600000017 HC OR TIME - EACH INCREMENTAL 1 MINUTE - PROCEDURE LEVEL SIX: Performed by: THORACIC SURGERY (CARDIOTHORACIC VASCULAR SURGERY)

## 2024-11-12 PROCEDURE — 2500000005 HC RX 250 GENERAL PHARMACY W/O HCPCS: Performed by: THORACIC SURGERY (CARDIOTHORACIC VASCULAR SURGERY)

## 2024-11-12 PROCEDURE — 2500000002 HC RX 250 W HCPCS SELF ADMINISTERED DRUGS (ALT 637 FOR MEDICARE OP, ALT 636 FOR OP/ED)

## 2024-11-12 PROCEDURE — 99291 CRITICAL CARE FIRST HOUR: CPT | Performed by: INTERNAL MEDICINE

## 2024-11-12 PROCEDURE — 83050 HGB METHEMOGLOBIN QUAN: CPT

## 2024-11-12 PROCEDURE — 3700000001 HC GENERAL ANESTHESIA TIME - INITIAL BASE CHARGE: Performed by: THORACIC SURGERY (CARDIOTHORACIC VASCULAR SURGERY)

## 2024-11-12 PROCEDURE — 82330 ASSAY OF CALCIUM: CPT

## 2024-11-12 PROCEDURE — 36415 COLL VENOUS BLD VENIPUNCTURE: CPT

## 2024-11-12 PROCEDURE — 8E0W4CZ ROBOTIC ASSISTED PROCEDURE OF TRUNK REGION, PERCUTANEOUS ENDOSCOPIC APPROACH: ICD-10-PCS | Performed by: THORACIC SURGERY (CARDIOTHORACIC VASCULAR SURGERY)

## 2024-11-12 PROCEDURE — 85520 HEPARIN ASSAY: CPT

## 2024-11-12 PROCEDURE — 76937 US GUIDE VASCULAR ACCESS: CPT | Performed by: ANESTHESIOLOGY

## 2024-11-12 PROCEDURE — 84132 ASSAY OF SERUM POTASSIUM: CPT

## 2024-11-12 PROCEDURE — A4649 SURGICAL SUPPLIES: HCPCS | Performed by: THORACIC SURGERY (CARDIOTHORACIC VASCULAR SURGERY)

## 2024-11-12 PROCEDURE — P9073 PLATELETS PHERESIS PATH REDU: HCPCS

## 2024-11-12 PROCEDURE — 33534 CABG ARTERIAL TWO: CPT | Performed by: THORACIC SURGERY (CARDIOTHORACIC VASCULAR SURGERY)

## 2024-11-12 PROCEDURE — 33509 NDSC HRV UXTR ART 1 SGM CAB: CPT | Performed by: THORACIC SURGERY (CARDIOTHORACIC VASCULAR SURGERY)

## 2024-11-12 PROCEDURE — 03BC4ZZ EXCISION OF LEFT RADIAL ARTERY, PERCUTANEOUS ENDOSCOPIC APPROACH: ICD-10-PCS | Performed by: THORACIC SURGERY (CARDIOTHORACIC VASCULAR SURGERY)

## 2024-11-12 PROCEDURE — P9045 ALBUMIN (HUMAN), 5%, 250 ML: HCPCS | Mod: JZ

## 2024-11-12 PROCEDURE — 84100 ASSAY OF PHOSPHORUS: CPT

## 2024-11-12 PROCEDURE — 021009W BYPASS CORONARY ARTERY, ONE ARTERY FROM AORTA WITH AUTOLOGOUS VENOUS TISSUE, OPEN APPROACH: ICD-10-PCS | Performed by: THORACIC SURGERY (CARDIOTHORACIC VASCULAR SURGERY)

## 2024-11-12 PROCEDURE — 85347 COAGULATION TIME ACTIVATED: CPT

## 2024-11-12 PROCEDURE — 85025 COMPLETE CBC W/AUTO DIFF WBC: CPT

## 2024-11-12 PROCEDURE — 36556 INSERT NON-TUNNEL CV CATH: CPT | Performed by: ANESTHESIOLOGY

## 2024-11-12 PROCEDURE — A33533 PR CABG, ARTERIAL, SINGLE: Performed by: ANESTHESIOLOGY

## 2024-11-12 PROCEDURE — 2500000001 HC RX 250 WO HCPCS SELF ADMINISTERED DRUGS (ALT 637 FOR MEDICARE OP)

## 2024-11-12 PROCEDURE — 71045 X-RAY EXAM CHEST 1 VIEW: CPT

## 2024-11-12 PROCEDURE — 33572 OPEN CORONARY ENDARTERECTOMY: CPT | Performed by: STUDENT IN AN ORGANIZED HEALTH CARE EDUCATION/TRAINING PROGRAM

## 2024-11-12 PROCEDURE — 82375 ASSAY CARBOXYHB QUANT: CPT

## 2024-11-12 PROCEDURE — 02100Z9 BYPASS CORONARY ARTERY, ONE ARTERY FROM LEFT INTERNAL MAMMARY, OPEN APPROACH: ICD-10-PCS | Performed by: THORACIC SURGERY (CARDIOTHORACIC VASCULAR SURGERY)

## 2024-11-12 PROCEDURE — 2500000005 HC RX 250 GENERAL PHARMACY W/O HCPCS

## 2024-11-12 PROCEDURE — 85384 FIBRINOGEN ACTIVITY: CPT

## 2024-11-12 PROCEDURE — 5A1221Z PERFORMANCE OF CARDIAC OUTPUT, CONTINUOUS: ICD-10-PCS | Performed by: THORACIC SURGERY (CARDIOTHORACIC VASCULAR SURGERY)

## 2024-11-12 PROCEDURE — 2020000001 HC ICU ROOM DAILY

## 2024-11-12 PROCEDURE — 33572 OPEN CORONARY ENDARTERECTOMY: CPT | Performed by: THORACIC SURGERY (CARDIOTHORACIC VASCULAR SURGERY)

## 2024-11-12 PROCEDURE — 83735 ASSAY OF MAGNESIUM: CPT

## 2024-11-12 PROCEDURE — 3600000018 HC OR TIME - INITIAL BASE CHARGE - PROCEDURE LEVEL SIX: Performed by: THORACIC SURGERY (CARDIOTHORACIC VASCULAR SURGERY)

## 2024-11-12 PROCEDURE — 82810 BLOOD GASES O2 SAT ONLY: CPT

## 2024-11-12 PROCEDURE — 33509 NDSC HRV UXTR ART 1 SGM CAB: CPT | Performed by: STUDENT IN AN ORGANIZED HEALTH CARE EDUCATION/TRAINING PROGRAM

## 2024-11-12 PROCEDURE — 3600000011 HC PERFUSION TIME - INITIAL BASE CHARGE: Performed by: THORACIC SURGERY (CARDIOTHORACIC VASCULAR SURGERY)

## 2024-11-12 PROCEDURE — 99100 ANES PT EXTEME AGE<1 YR&>70: CPT | Performed by: ANESTHESIOLOGY

## 2024-11-12 PROCEDURE — 3700000002 HC GENERAL ANESTHESIA TIME - EACH INCREMENTAL 1 MINUTE: Performed by: THORACIC SURGERY (CARDIOTHORACIC VASCULAR SURGERY)

## 2024-11-12 PROCEDURE — 85730 THROMBOPLASTIN TIME PARTIAL: CPT

## 2024-11-12 PROCEDURE — 36430 TRANSFUSION BLD/BLD COMPNT: CPT | Mod: GC

## 2024-11-12 PROCEDURE — 33534 CABG ARTERIAL TWO: CPT | Performed by: STUDENT IN AN ORGANIZED HEALTH CARE EDUCATION/TRAINING PROGRAM

## 2024-11-12 RX ORDER — POTASSIUM CHLORIDE 14.9 MG/ML
INJECTION INTRAVENOUS AS NEEDED
Status: DISCONTINUED | OUTPATIENT
Start: 2024-11-12 | End: 2024-11-13

## 2024-11-12 RX ORDER — PAPAVERINE HYDROCHLORIDE 30 MG/ML
INJECTION INTRAMUSCULAR; INTRAVENOUS AS NEEDED
Status: DISCONTINUED | OUTPATIENT
Start: 2024-11-12 | End: 2024-11-13 | Stop reason: HOSPADM

## 2024-11-12 RX ORDER — MAGNESIUM SULFATE HEPTAHYDRATE 500 MG/ML
INJECTION, SOLUTION INTRAMUSCULAR; INTRAVENOUS AS NEEDED
Status: DISCONTINUED | OUTPATIENT
Start: 2024-11-12 | End: 2024-11-13

## 2024-11-12 RX ORDER — LIDOCAINE HCL/PF 100 MG/5ML
SYRINGE (ML) INTRAVENOUS AS NEEDED
Status: DISCONTINUED | OUTPATIENT
Start: 2024-11-12 | End: 2024-11-13

## 2024-11-12 RX ORDER — CEFAZOLIN 1 G/1
INJECTION, POWDER, FOR SOLUTION INTRAVENOUS AS NEEDED
Status: DISCONTINUED | OUTPATIENT
Start: 2024-11-12 | End: 2024-11-13

## 2024-11-12 RX ORDER — PHENYLEPHRINE HYDROCHLORIDE 10 MG/ML
INJECTION INTRAVENOUS AS NEEDED
Status: DISCONTINUED | OUTPATIENT
Start: 2024-11-12 | End: 2024-11-13

## 2024-11-12 RX ORDER — LIDOCAINE HYDROCHLORIDE 10 MG/ML
INJECTION, SOLUTION INFILTRATION; PERINEURAL AS NEEDED
Status: DISCONTINUED | OUTPATIENT
Start: 2024-11-12 | End: 2024-11-13

## 2024-11-12 RX ORDER — PROPOFOL 10 MG/ML
INJECTION, EMULSION INTRAVENOUS AS NEEDED
Status: DISCONTINUED | OUTPATIENT
Start: 2024-11-12 | End: 2024-11-13

## 2024-11-12 RX ORDER — NOREPINEPHRINE BITARTRATE 0.03 MG/ML
INJECTION, SOLUTION INTRAVENOUS CONTINUOUS PRN
Status: DISCONTINUED | OUTPATIENT
Start: 2024-11-12 | End: 2024-11-13

## 2024-11-12 RX ORDER — PHENYLEPHRINE 10 MG/250 ML(40 MCG/ML)IN 0.9 % SOD.CHLORIDE INTRAVENOUS
CONTINUOUS PRN
Status: DISCONTINUED | OUTPATIENT
Start: 2024-11-12 | End: 2024-11-13

## 2024-11-12 RX ORDER — SODIUM CHLORIDE, SODIUM GLUCONATE, SODIUM ACETATE, POTASSIUM CHLORIDE AND MAGNESIUM CHLORIDE 30; 37; 368; 526; 502 MG/100ML; MG/100ML; MG/100ML; MG/100ML; MG/100ML
INJECTION, SOLUTION INTRAVENOUS CONTINUOUS PRN
Status: DISCONTINUED | OUTPATIENT
Start: 2024-11-12 | End: 2024-11-13

## 2024-11-12 RX ORDER — FENTANYL CITRATE 50 UG/ML
INJECTION, SOLUTION INTRAMUSCULAR; INTRAVENOUS AS NEEDED
Status: DISCONTINUED | OUTPATIENT
Start: 2024-11-12 | End: 2024-11-13

## 2024-11-12 RX ORDER — ALBUMIN HUMAN 50 G/1000ML
SOLUTION INTRAVENOUS AS NEEDED
Status: DISCONTINUED | OUTPATIENT
Start: 2024-11-12 | End: 2024-11-13

## 2024-11-12 RX ORDER — ESMOLOL HYDROCHLORIDE 10 MG/ML
INJECTION INTRAVENOUS AS NEEDED
Status: DISCONTINUED | OUTPATIENT
Start: 2024-11-12 | End: 2024-11-13

## 2024-11-12 RX ORDER — HEPARIN SODIUM 1000 [USP'U]/ML
INJECTION, SOLUTION INTRAVENOUS; SUBCUTANEOUS AS NEEDED
Status: DISCONTINUED | OUTPATIENT
Start: 2024-11-12 | End: 2024-11-13

## 2024-11-12 RX ORDER — EPINEPHRINE IN 0.9 % SOD CHLOR 4MG/250ML
PLASTIC BAG, INJECTION (ML) INTRAVENOUS CONTINUOUS PRN
Status: DISCONTINUED | OUTPATIENT
Start: 2024-11-12 | End: 2024-11-13

## 2024-11-12 RX ORDER — ROCURONIUM BROMIDE 10 MG/ML
INJECTION, SOLUTION INTRAVENOUS AS NEEDED
Status: DISCONTINUED | OUTPATIENT
Start: 2024-11-12 | End: 2024-11-13

## 2024-11-12 RX ORDER — CALCIUM CHLORIDE INJECTION 100 MG/ML
INJECTION, SOLUTION INTRAVENOUS AS NEEDED
Status: DISCONTINUED | OUTPATIENT
Start: 2024-11-12 | End: 2024-11-13

## 2024-11-12 RX ORDER — INDOMETHACIN 25 MG/1
CAPSULE ORAL AS NEEDED
Status: DISCONTINUED | OUTPATIENT
Start: 2024-11-12 | End: 2024-11-13

## 2024-11-12 RX ORDER — PROTAMINE SULFATE 10 MG/ML
INJECTION, SOLUTION INTRAVENOUS AS NEEDED
Status: DISCONTINUED | OUTPATIENT
Start: 2024-11-12 | End: 2024-11-13

## 2024-11-12 RX ORDER — MIDAZOLAM HYDROCHLORIDE 1 MG/ML
INJECTION INTRAMUSCULAR; INTRAVENOUS AS NEEDED
Status: DISCONTINUED | OUTPATIENT
Start: 2024-11-12 | End: 2024-11-13

## 2024-11-12 RX ORDER — GLYCOPYRROLATE 0.2 MG/ML
INJECTION INTRAMUSCULAR; INTRAVENOUS AS NEEDED
Status: DISCONTINUED | OUTPATIENT
Start: 2024-11-12 | End: 2024-11-13

## 2024-11-12 RX ORDER — METHADONE IN SOD CHLOR,ISO-OSM 10 MG/ML
SYRINGE (ML) INTRAVENOUS AS NEEDED
Status: DISCONTINUED | OUTPATIENT
Start: 2024-11-12 | End: 2024-11-13

## 2024-11-12 RX ORDER — SODIUM CHLORIDE 0.9 G/100ML
IRRIGANT IRRIGATION AS NEEDED
Status: DISCONTINUED | OUTPATIENT
Start: 2024-11-12 | End: 2024-11-13 | Stop reason: HOSPADM

## 2024-11-12 RX ORDER — DIPHENHYDRAMINE HYDROCHLORIDE 50 MG/ML
INJECTION INTRAMUSCULAR; INTRAVENOUS AS NEEDED
Status: DISCONTINUED | OUTPATIENT
Start: 2024-11-12 | End: 2024-11-13

## 2024-11-12 RX ORDER — FAMOTIDINE 10 MG/ML
INJECTION INTRAVENOUS AS NEEDED
Status: DISCONTINUED | OUTPATIENT
Start: 2024-11-12 | End: 2024-11-13

## 2024-11-12 RX ADMIN — SENNOSIDES AND DOCUSATE SODIUM 1 TABLET: 50; 8.6 TABLET ORAL at 09:22

## 2024-11-12 RX ADMIN — ASPIRIN 81 MG: 81 TABLET, COATED ORAL at 09:21

## 2024-11-12 RX ADMIN — PANTOPRAZOLE SODIUM 40 MG: 40 TABLET, DELAYED RELEASE ORAL at 09:22

## 2024-11-12 RX ADMIN — HEPARIN SODIUM 1000 UNITS/HR: 10000 INJECTION, SOLUTION INTRAVENOUS at 12:18

## 2024-11-12 RX ADMIN — MUPIROCIN: 20 OINTMENT TOPICAL at 09:23

## 2024-11-12 RX ADMIN — METOPROLOL TARTRATE 12.5 MG: 25 TABLET, FILM COATED ORAL at 09:21

## 2024-11-12 ASSESSMENT — PAIN SCALES - GENERAL
PAINLEVEL_OUTOF10: 0 - NO PAIN

## 2024-11-12 ASSESSMENT — PAIN - FUNCTIONAL ASSESSMENT
PAIN_FUNCTIONAL_ASSESSMENT: 0-10

## 2024-11-12 NOTE — DISCHARGE SUMMARY
Discharge Diagnosis  ST elevation myocardial infarction (STEMI) involving other coronary artery of inferior wall    Issues Requiring Follow-Up  Surgical revascularization     Test Results Pending At Discharge  Pending Labs       Order Current Status    Extra Urine Gray Tube Collected (11/09/24 1512)    Urinalysis with Reflex Culture and Microscopic In process            Hospital Course  11/8/24 Admitted with STEMI  11/10/24 Transferred to [facility] for surgical revascularization plans    Pertinent Physical Exam At Time of Discharge  Physical Exam  See Progress note    Home Medications     Medication List      ASK your doctor about these medications     aspirin 81 mg EC tablet   atorvastatin 40 mg tablet; Commonly known as: Lipitor       Outpatient Follow-Up  As per [facility] stay direction       Catrina Mahoney, APRN-CNP

## 2024-11-12 NOTE — PROGRESS NOTES
Jaime Dominguez is a 74 y.o. male on day 2 of admission presenting with STEMI (ST elevation myocardial infarction) (Multi).      Subjective   No acute overnight events, has been NPO since midnight. Pt denies dizziness, headache, chest pain, SOB, nausea, vomiting, abdominal pain, or LE swelling. Pt still has not had chest pain since initial presentation to ED on 11/8.        Objective     Last Recorded Vitals  /82   Pulse 75   Temp 36.1 °C (97 °F)   Resp 19   Wt 93.4 kg (205 lb 14.6 oz)   SpO2 (!) 81%   Intake/Output last 3 Shifts:    Intake/Output Summary (Last 24 hours) at 11/12/2024 1522  Last data filed at 11/12/2024 1515  Gross per 24 hour   Intake 360 ml   Output 25 ml   Net 335 ml       Admission Weight  Weight: 96.2 kg (212 lb) (11/10/24 1618)    Daily Weight  11/12/24 : 93.4 kg (205 lb 14.6 oz)    Image Results  Electrocardiogram, 12-lead  Normal sinus rhythm  Inferior infarct , age undetermined  Abnormal ECG  No previous ECGs available  Confirmed by Ale Brady ([id number]) on 11/12/2024 9:24:11 AM  Pulmonary function testing  Normal spirometry.      Physical Exam  Constitutional: No acute distress, resting comfortably in bed, cooperative  HEENT: Normocephalic, atraumatic, PERRLA, EOMI, moist mucous membranes  Cardiovascular: RRR, normal S1/S2, no murmurs noted  Pulmonary: Clear to auscultation b/l, no wheezes/crackles/rhonchi, no increased work of breathing, no supplemental oxygen  GI: Soft, non-tender, non-distended, normoactive bowel sounds  : No aden catheter  Lower extremities: Warm and well perfused, no lower extremity edema  Neuro: A&O x3, able to move all 4 extremities spontaneously  Skin: No rashes or lesions noted  Psych: Appropriate mood and affect       Assessment/Plan   Jaime Dominguez is a 74 y.o. male with a PMHx of HLD, R inguinal hernia repair (3/18/2024), and partial C5 corpectomy w/ C4/5, C5/6 ACDF (07/08/20) who presented to [facility] ED on 11/8 for chest pain and syncope.  EKG w/ ST elevations in leads II, III and aVF with ST depression in V4 through V6.  A code STEMI was activated en route. The patient was given aspirin, Brilinta and 1 nitroglycerin en route to the ED per chart review. Emergent [facility] 11/8 showed distal L main 90%, LAD tortuous vessel LILY-3 flow nonobstructive disease, diag1 ostial disease, Lcx and RCA w/ no significant CAD. Evaluated by cardiac surgery at OSH, transferred to [facility] CICU for evaluation for MIDCAB. Completed pre-op eval workup. CT surgery consulted, plan for OR 11/12 for robotic MIDCAB x 2.     Updates 11/12/24:  [] Pending OR today for robotic MIDCAB x 2        Neuro:  - No active issues     Cardiovascular:  # STEMI  # Distal L main 90% stenosis  # HLD  - EKG 11/8: ST elevations in leads II, III and aVF with ST depression in V4 through V6  - [facility] 11/8: distal L main 90%, LAD tortuous vessel LILY-3 flow nonobstructive disease, diag1 ostial disease, Lcx and RCA w/ no significant CAD  - TTE 11/8: LVEF 60-65%, grade II LV diastolic filling  - HS trop 4, no repeat at OSH  - HS trop 2317 > 2246 on admit to CICU (no CP at that time)  - S/p brillinta en route w/ EMS on 11/8  - lipid panel 11/8: , , total chol 228, HDL 37.6  - No chest pain since 11/8  - Pre-op eval: US Carotids (pending), PFTs (spirometry and room air ABG)(done), MRSA, UA/Culture, LFTs, HgbA1c, TSH/T4, Lipid panel, CT chest without contrast (done), CT A/P non con (pending)  [] C/w aspirin 81mg  [] C/w atorva 80mg at bedtime  [] C/w heparin gtt  [] CT surg consult: plan for OR 11/12 for robotic MIDCAB x 2      Respiratory:  - No active issues     GI:  - No active issues     Renal/:  - No active issues     Heme/Onc:  - No active issues     Endo:  - No active issues  - HbA1C 5.1% on 11/8/24     ID:  - No active issues     MSK/Rheum:  - No active issues     Derm:  # S/p Mohs procedure forehead  - Continue to keep area covered     Psych:  - No active issues     Access: 2  PIVs  Tubes/drains: None  O2: RA  Diet: NPO  GI ppx: none  DVT ppx: heparin gtt  Code status: Full code (confirmed on admission)  Surrogate medical decision maker: Mireya (wife, POA) [phone]  Casa (son, #2 for POA) [phone]        Dee Palomares MD   Internal Medicine PGY-3

## 2024-11-12 NOTE — ANESTHESIA PROCEDURE NOTES
Peripheral IV  Date/Time: 11/12/2024 2:48 PM      Placement  Needle size: 16 G  Laterality: right  Location: hand  Site prep: alcohol  Technique: anatomical landmarks  Attempts: 1

## 2024-11-12 NOTE — ANESTHESIA PROCEDURE NOTES
Airway  Date/Time: 11/12/2024 2:45 PM  Urgency: elective    Airway not difficult    Staffing  Performed: attending and ROSSY   Authorized by: Jaleel Chavez MD    Performed by: EDELMIRA Ly  Patient location during procedure: OR    Indications and Patient Condition  Indications for airway management: anesthesia and airway protection  Sedation level: deep  Preoxygenated: yes  Patient position: sniffing  MILS not maintained throughout  Mask difficulty assessment: 2 - vent by mask + OA or adjuvant +/- NMBA    Final Airway Details  Final airway type: endotracheal airway      Successful airway: ETT - double lumen left  Cuffed: yes   Successful intubation technique: video laryngoscopy  Facilitating devices/methods: intubating stylet  Endotracheal tube insertion site: oral  Blade: Domingo  Blade size: #4  ETT DL size (fr): 39  Cormack-Lehane Classification: grade I - full view of glottis  Placement verified by: chest auscultation, bronchoscopy and capnometry   Measured from: teeth  ETT to teeth (cm): 30  Number of attempts at approach: 1

## 2024-11-12 NOTE — ANESTHESIA PREPROCEDURE EVALUATION
Patient: Jaime Dominguez    Procedure Information       Anesthesia Start Date/Time: 11/12/24 140    Procedure: MIDCAB x 2 LIMA & Radial, ROBOT-ASSISTED - second case    Location: [facility] OR  / [facility] OR    Surgeons: Rodrigo Harman MD            Relevant Problems   Cardiac   (+) Acute ST elevation myocardial infarction (STEMI) of inferior wall (Multi)   (+) ST elevation myocardial infarction (STEMI) involving other coronary artery of inferior wall   (+) STEMI (ST elevation myocardial infarction) (Multi)       Clinical information reviewed:    Allergies  Meds               NPO Detail:  No data recorded     Physical Exam    Airway  Mallampati: II  Neck ROM: limited     Cardiovascular   Rhythm: regular  Rate: normal     Dental    Pulmonary    Abdominal        Anesthesia Plan    History of general anesthesia?: yes  History of complications of general anesthesia?: no    ASA 4     general   (Plan GA by BRIGID.  Art line, CVP, EZEQUIEL, possible postop blocks.)  Anesthetic plan and risks discussed with patient and spouse.    Plan discussed with CAA.

## 2024-11-12 NOTE — ANESTHESIA PROCEDURE NOTES
Central Venous Line:    Date/Time: 11/12/2024 3:06 PM    A central venous line was placed in the OR for the following indication(s): central venous access and CVP monitoring.  Staffing  Performed: attending and ROSSY   Authorized by: Jaleel Chavez MD    Performed by: EDELMIRA Ly    Sterility preparation included the following: provider hand hygiene performed prior to central venous catheter insertion, all 5 sterile barriers used (gloves, gown, cap, mask, large sterile drape) during central venous catheter insertion, antiseptic used during central venous catheter insertion and skin prep agent completely dried prior to procedure.  The patient was placed in Trendelenburg position.    Right internal jugular vein was prepped.    The site was prepped with Chlorhexidine.  Size: 9 Fr   Length: 11.5  Number of Lumens: double lumen    This catheter was not an oximetric catheter.    During the procedure, the following specific steps were taken: target vein identified, needle advanced into vein and blood aspirated and guidewire advanced into vein.  Seldinger technique used.  Procedure performed using ultrasound guidance.  Sterile gel and probe cover used in ultrasound-guided central venous catheter insertion.    Intravenous verification was obtained by ultrasound, venous blood return and manometry.      Post insertion care included: all ports aspirated, all ports flushed easily, guidewire removed intact, Biopatch applied, line sutured in place and dressing applied.    During the procedure the patient experienced: patient tolerated procedure well with no complications.           images stored in chart

## 2024-11-12 NOTE — CARE PLAN
The patient's goals for the shift include      The clinical goals for the shift include Remain safe    Over the shift, the patient did not make progress toward the following goals. Barriers to progression include cardiac disease, lines and IV's. Recommendations to address these barriers include frequent education and continue with current plan of care.

## 2024-11-13 ENCOUNTER — APPOINTMENT (OUTPATIENT)
Dept: CARDIOLOGY | Facility: HOSPITAL | Age: 74
DRG: 003 | End: 2024-11-13
Payer: MEDICARE

## 2024-11-13 ENCOUNTER — APPOINTMENT (OUTPATIENT)
Dept: RADIOLOGY | Facility: HOSPITAL | Age: 74
DRG: 003 | End: 2024-11-13
Payer: MEDICARE

## 2024-11-13 LAB
ALBUMIN SERPL BCP-MCNC: 3.5 G/DL (ref 3.4–5)
ALBUMIN SERPL BCP-MCNC: 4.9 G/DL (ref 3.4–5)
ALBUMIN SERPL BCP-MCNC: 4.9 G/DL (ref 3.4–5)
ALP SERPL-CCNC: 21 U/L (ref 33–136)
ALT SERPL W P-5'-P-CCNC: 35 U/L (ref 10–52)
ANION GAP BLDA CALCULATED.4IONS-SCNC: 13 MMO/L (ref 10–25)
ANION GAP BLDA CALCULATED.4IONS-SCNC: 15 MMO/L (ref 10–25)
ANION GAP BLDA CALCULATED.4IONS-SCNC: 15 MMO/L (ref 10–25)
ANION GAP BLDA CALCULATED.4IONS-SCNC: 16 MMO/L (ref 10–25)
ANION GAP BLDA CALCULATED.4IONS-SCNC: 17 MMO/L (ref 10–25)
ANION GAP BLDA CALCULATED.4IONS-SCNC: 17 MMO/L (ref 10–25)
ANION GAP BLDA CALCULATED.4IONS-SCNC: 18 MMO/L (ref 10–25)
ANION GAP BLDA CALCULATED.4IONS-SCNC: 18 MMO/L (ref 10–25)
ANION GAP BLDA CALCULATED.4IONS-SCNC: 22 MMO/L (ref 10–25)
ANION GAP BLDA CALCULATED.4IONS-SCNC: 22 MMO/L (ref 10–25)
ANION GAP BLDV CALCULATED.4IONS-SCNC: 11 MMOL/L (ref 10–25)
ANION GAP BLDV CALCULATED.4IONS-SCNC: 17 MMOL/L (ref 10–25)
ANION GAP BLDV CALCULATED.4IONS-SCNC: 18 MMOL/L (ref 10–25)
ANION GAP BLDV CALCULATED.4IONS-SCNC: 19 MMOL/L (ref 10–25)
ANION GAP SERPL CALC-SCNC: 17 MMOL/L (ref 10–20)
ANION GAP SERPL CALC-SCNC: 18 MMOL/L (ref 10–20)
APTT PPP: 30 SECONDS (ref 27–38)
AST SERPL W P-5'-P-CCNC: 180 U/L (ref 9–39)
ATRIAL RATE: 74 BPM
BASE EXCESS BLDA CALC-SCNC: -1.7 MMOL/L (ref -2–3)
BASE EXCESS BLDA CALC-SCNC: -10.7 MMOL/L (ref -2–3)
BASE EXCESS BLDA CALC-SCNC: -2.7 MMOL/L (ref -2–3)
BASE EXCESS BLDA CALC-SCNC: -3.2 MMOL/L (ref -2–3)
BASE EXCESS BLDA CALC-SCNC: -6.3 MMOL/L (ref -2–3)
BASE EXCESS BLDA CALC-SCNC: -6.7 MMOL/L (ref -2–3)
BASE EXCESS BLDA CALC-SCNC: -7.2 MMOL/L (ref -2–3)
BASE EXCESS BLDA CALC-SCNC: -7.4 MMOL/L (ref -2–3)
BASE EXCESS BLDA CALC-SCNC: -9.1 MMOL/L (ref -2–3)
BASE EXCESS BLDA CALC-SCNC: -9.5 MMOL/L (ref -2–3)
BASE EXCESS BLDV CALC-SCNC: -3.6 MMOL/L (ref -2–3)
BASE EXCESS BLDV CALC-SCNC: -5.7 MMOL/L (ref -2–3)
BASE EXCESS BLDV CALC-SCNC: -8.4 MMOL/L (ref -2–3)
BASE EXCESS BLDV CALC-SCNC: -9.7 MMOL/L (ref -2–3)
BILIRUB DIRECT SERPL-MCNC: 0.4 MG/DL (ref 0–0.3)
BILIRUB SERPL-MCNC: 0.9 MG/DL (ref 0–1.2)
BLOOD EXPIRATION DATE: NORMAL
BODY TEMPERATURE: 37 DEGREES CELSIUS
BUN SERPL-MCNC: 19 MG/DL (ref 6–23)
BUN SERPL-MCNC: 24 MG/DL (ref 6–23)
CA-I BLD-SCNC: 1.1 MMOL/L (ref 1.1–1.33)
CA-I BLD-SCNC: 1.12 MMOL/L (ref 1.1–1.33)
CA-I BLDA-SCNC: 1.01 MMOL/L (ref 1.1–1.33)
CA-I BLDA-SCNC: 1.01 MMOL/L (ref 1.1–1.33)
CA-I BLDA-SCNC: 1.03 MMOL/L (ref 1.1–1.33)
CA-I BLDA-SCNC: 1.04 MMOL/L (ref 1.1–1.33)
CA-I BLDA-SCNC: 1.05 MMOL/L (ref 1.1–1.33)
CA-I BLDA-SCNC: 1.07 MMOL/L (ref 1.1–1.33)
CA-I BLDA-SCNC: 1.07 MMOL/L (ref 1.1–1.33)
CA-I BLDA-SCNC: 1.14 MMOL/L (ref 1.1–1.33)
CA-I BLDA-SCNC: 1.14 MMOL/L (ref 1.1–1.33)
CA-I BLDA-SCNC: 1.15 MMOL/L (ref 1.1–1.33)
CA-I BLDV-SCNC: 1.01 MMOL/L (ref 1.1–1.33)
CA-I BLDV-SCNC: 1.09 MMOL/L (ref 1.1–1.33)
CA-I BLDV-SCNC: 1.09 MMOL/L (ref 1.1–1.33)
CA-I BLDV-SCNC: 1.15 MMOL/L (ref 1.1–1.33)
CALCIUM SERPL-MCNC: 8.4 MG/DL (ref 8.6–10.6)
CALCIUM SERPL-MCNC: 8.6 MG/DL (ref 8.6–10.6)
CARDIAC TROPONIN I PNL SERPL HS: ABNORMAL NG/L (ref 0–53)
CARDIAC TROPONIN I PNL SERPL HS: ABNORMAL NG/L (ref 0–53)
CHLORIDE BLDA-SCNC: 104 MMOL/L (ref 98–107)
CHLORIDE BLDA-SCNC: 105 MMOL/L (ref 98–107)
CHLORIDE BLDA-SCNC: 105 MMOL/L (ref 98–107)
CHLORIDE BLDA-SCNC: 107 MMOL/L (ref 98–107)
CHLORIDE BLDA-SCNC: 107 MMOL/L (ref 98–107)
CHLORIDE BLDA-SCNC: 108 MMOL/L (ref 98–107)
CHLORIDE BLDV-SCNC: 100 MMOL/L (ref 98–107)
CHLORIDE BLDV-SCNC: 104 MMOL/L (ref 98–107)
CHLORIDE BLDV-SCNC: 105 MMOL/L (ref 98–107)
CHLORIDE BLDV-SCNC: 108 MMOL/L (ref 98–107)
CHLORIDE SERPL-SCNC: 104 MMOL/L (ref 98–107)
CHLORIDE SERPL-SCNC: 106 MMOL/L (ref 98–107)
CO2 SERPL-SCNC: 20 MMOL/L (ref 21–32)
CO2 SERPL-SCNC: 23 MMOL/L (ref 21–32)
COHGB MFR BLDA: 0 %
CREAT SERPL-MCNC: 1.19 MG/DL (ref 0.5–1.3)
CREAT SERPL-MCNC: 1.44 MG/DL (ref 0.5–1.3)
DISPENSE STATUS: NORMAL
DO-HGB MFR BLDA: 3.4 % (ref 0–5)
EGFRCR SERPLBLD CKD-EPI 2021: 52 ML/MIN/1.73M*2
EGFRCR SERPLBLD CKD-EPI 2021: 65 ML/MIN/1.73M*2
EJECTION FRACTION: 63 %
ERYTHROCYTE [DISTWIDTH] IN BLOOD BY AUTOMATED COUNT: 12.1 % (ref 11.5–14.5)
ERYTHROCYTE [DISTWIDTH] IN BLOOD BY AUTOMATED COUNT: 12.5 % (ref 11.5–14.5)
ERYTHROCYTE [DISTWIDTH] IN BLOOD BY AUTOMATED COUNT: 12.5 % (ref 11.5–14.5)
FIBRINOGEN PPP-MCNC: 164 MG/DL (ref 200–400)
GLUCOSE BLD MANUAL STRIP-MCNC: 108 MG/DL (ref 74–99)
GLUCOSE BLD MANUAL STRIP-MCNC: 123 MG/DL (ref 74–99)
GLUCOSE BLD MANUAL STRIP-MCNC: 154 MG/DL (ref 74–99)
GLUCOSE BLD MANUAL STRIP-MCNC: 189 MG/DL (ref 74–99)
GLUCOSE BLD MANUAL STRIP-MCNC: 95 MG/DL (ref 74–99)
GLUCOSE BLDA-MCNC: 121 MG/DL (ref 74–99)
GLUCOSE BLDA-MCNC: 121 MG/DL (ref 74–99)
GLUCOSE BLDA-MCNC: 131 MG/DL (ref 74–99)
GLUCOSE BLDA-MCNC: 138 MG/DL (ref 74–99)
GLUCOSE BLDA-MCNC: 144 MG/DL (ref 74–99)
GLUCOSE BLDA-MCNC: 149 MG/DL (ref 74–99)
GLUCOSE BLDA-MCNC: 160 MG/DL (ref 74–99)
GLUCOSE BLDA-MCNC: 164 MG/DL (ref 74–99)
GLUCOSE BLDA-MCNC: 186 MG/DL (ref 74–99)
GLUCOSE BLDA-MCNC: 189 MG/DL (ref 74–99)
GLUCOSE BLDV-MCNC: 121 MG/DL (ref 74–99)
GLUCOSE BLDV-MCNC: 129 MG/DL (ref 74–99)
GLUCOSE BLDV-MCNC: 168 MG/DL (ref 74–99)
GLUCOSE BLDV-MCNC: 190 MG/DL (ref 74–99)
GLUCOSE SERPL-MCNC: 111 MG/DL (ref 74–99)
GLUCOSE SERPL-MCNC: 137 MG/DL (ref 74–99)
HCO3 BLDA-SCNC: 15.9 MMOL/L (ref 22–26)
HCO3 BLDA-SCNC: 16.4 MMOL/L (ref 22–26)
HCO3 BLDA-SCNC: 16.9 MMOL/L (ref 22–26)
HCO3 BLDA-SCNC: 17.3 MMOL/L (ref 22–26)
HCO3 BLDA-SCNC: 17.7 MMOL/L (ref 22–26)
HCO3 BLDA-SCNC: 18.1 MMOL/L (ref 22–26)
HCO3 BLDA-SCNC: 19 MMOL/L (ref 22–26)
HCO3 BLDA-SCNC: 20.8 MMOL/L (ref 22–26)
HCO3 BLDA-SCNC: 21.7 MMOL/L (ref 22–26)
HCO3 BLDA-SCNC: 22.9 MMOL/L (ref 22–26)
HCO3 BLDV-SCNC: 18 MMOL/L (ref 22–26)
HCO3 BLDV-SCNC: 18.8 MMOL/L (ref 22–26)
HCO3 BLDV-SCNC: 20.7 MMOL/L (ref 22–26)
HCO3 BLDV-SCNC: 22.1 MMOL/L (ref 22–26)
HCT VFR BLD AUTO: 22.5 % (ref 41–52)
HCT VFR BLD AUTO: 29.2 % (ref 41–52)
HCT VFR BLD AUTO: 35.2 % (ref 41–52)
HCT VFR BLD EST: 23 % (ref 41–52)
HCT VFR BLD EST: 24 % (ref 41–52)
HCT VFR BLD EST: 25 % (ref 41–52)
HCT VFR BLD EST: 25 % (ref 41–52)
HCT VFR BLD EST: 26 % (ref 41–52)
HCT VFR BLD EST: 29 % (ref 41–52)
HCT VFR BLD EST: 30 % (ref 41–52)
HCT VFR BLD EST: 32 % (ref 41–52)
HCT VFR BLD EST: 32 % (ref 41–52)
HCT VFR BLD EST: 33 % (ref 41–52)
HCT VFR BLD EST: 34 % (ref 41–52)
HCT VFR BLD EST: 37 % (ref 41–52)
HGB BLD-MCNC: 12 G/DL (ref 13.5–17.5)
HGB BLD-MCNC: 7.8 G/DL (ref 13.5–17.5)
HGB BLD-MCNC: 9.8 G/DL (ref 13.5–17.5)
HGB BLDA-MCNC: 10.5 G/DL (ref 13.5–17.5)
HGB BLDA-MCNC: 10.5 G/DL (ref 13.5–17.5)
HGB BLDA-MCNC: 10.7 G/DL (ref 13.5–17.5)
HGB BLDA-MCNC: 11.2 G/DL (ref 13.5–17.5)
HGB BLDA-MCNC: 12.4 G/DL (ref 13.5–17.5)
HGB BLDA-MCNC: 7.6 G/DL (ref 13.5–17.5)
HGB BLDA-MCNC: 7.9 G/DL (ref 13.5–17.5)
HGB BLDA-MCNC: 8.2 G/DL (ref 13.5–17.5)
HGB BLDA-MCNC: 8.2 G/DL (ref 13.5–17.5)
HGB BLDA-MCNC: 8.7 G/DL (ref 13.5–17.5)
HGB BLDA-MCNC: 9.8 G/DL (ref 13.5–17.5)
HGB BLDV-MCNC: 10 G/DL (ref 13.5–17.5)
HGB BLDV-MCNC: 11.1 G/DL (ref 13.5–17.5)
HGB BLDV-MCNC: 7.9 G/DL (ref 13.5–17.5)
HGB BLDV-MCNC: 8.1 G/DL (ref 13.5–17.5)
INHALED O2 CONCENTRATION: 36 %
INHALED O2 CONCENTRATION: 36 %
INHALED O2 CONCENTRATION: 37 %
INHALED O2 CONCENTRATION: 37 %
INHALED O2 CONCENTRATION: 38 %
INHALED O2 CONCENTRATION: 40 %
INHALED O2 CONCENTRATION: 50 %
INHALED O2 CONCENTRATION: 50 %
INR PPP: 1.3 (ref 0.9–1.1)
LACTATE BLDA-SCNC: 4.1 MMOL/L (ref 0.4–2)
LACTATE BLDA-SCNC: 4.2 MMOL/L (ref 0.4–2)
LACTATE BLDA-SCNC: 5 MMOL/L (ref 0.4–2)
LACTATE BLDA-SCNC: 6.2 MMOL/L (ref 0.4–2)
LACTATE BLDA-SCNC: 6.7 MMOL/L (ref 0.4–2)
LACTATE BLDA-SCNC: 6.9 MMOL/L (ref 0.4–2)
LACTATE BLDA-SCNC: 7.2 MMOL/L (ref 0.4–2)
LACTATE BLDA-SCNC: 8.2 MMOL/L (ref 0.4–2)
LACTATE BLDA-SCNC: 9.9 MMOL/L (ref 0.4–2)
LACTATE BLDA-SCNC: 9.9 MMOL/L (ref 0.4–2)
LACTATE BLDV-SCNC: 4.4 MMOL/L (ref 0.4–2)
LACTATE BLDV-SCNC: 7.9 MMOL/L (ref 0.4–2)
LACTATE BLDV-SCNC: 7.9 MMOL/L (ref 0.4–2)
LACTATE BLDV-SCNC: 8 MMOL/L (ref 0.4–2)
LACTATE BLDV-SCNC: 9 MMOL/L (ref 0.4–2)
MAGNESIUM SERPL-MCNC: 2.38 MG/DL (ref 1.6–2.4)
MAGNESIUM SERPL-MCNC: 2.77 MG/DL (ref 1.6–2.4)
MCH RBC QN AUTO: 31.7 PG (ref 26–34)
MCH RBC QN AUTO: 32 PG (ref 26–34)
MCH RBC QN AUTO: 32.6 PG (ref 26–34)
MCHC RBC AUTO-ENTMCNC: 33.6 G/DL (ref 32–36)
MCHC RBC AUTO-ENTMCNC: 34.1 G/DL (ref 32–36)
MCHC RBC AUTO-ENTMCNC: 34.7 G/DL (ref 32–36)
MCV RBC AUTO: 94 FL (ref 80–100)
MCV RBC AUTO: 94 FL (ref 80–100)
MCV RBC AUTO: 95 FL (ref 80–100)
METHGB MFR BLDA: 0 % (ref 0–1.5)
NRBC BLD-RTO: 0 /100 WBCS (ref 0–0)
OXYHGB MFR BLDA: 95.4 % (ref 94–98)
OXYHGB MFR BLDA: 96 % (ref 94–98)
OXYHGB MFR BLDA: 96.6 % (ref 94–98)
OXYHGB MFR BLDA: 96.6 % (ref 94–98)
OXYHGB MFR BLDA: 97 % (ref 94–98)
OXYHGB MFR BLDA: 97 % (ref 94–98)
OXYHGB MFR BLDA: 97.1 % (ref 94–98)
OXYHGB MFR BLDA: 97.3 % (ref 94–98)
OXYHGB MFR BLDA: 97.5 % (ref 94–98)
OXYHGB MFR BLDA: 97.5 % (ref 94–98)
OXYHGB MFR BLDA: 97.7 % (ref 94–98)
OXYHGB MFR BLDV: 51.8 % (ref 45–75)
OXYHGB MFR BLDV: 55.8 % (ref 45–75)
OXYHGB MFR BLDV: 67.9 % (ref 45–75)
OXYHGB MFR BLDV: 69.9 % (ref 45–75)
P AXIS: 69 DEGREES
P OFFSET: 186 MS
P ONSET: 132 MS
PCO2 BLDA: 30 MM HG (ref 38–42)
PCO2 BLDA: 30 MM HG (ref 38–42)
PCO2 BLDA: 32 MM HG (ref 38–42)
PCO2 BLDA: 35 MM HG (ref 38–42)
PCO2 BLDA: 35 MM HG (ref 38–42)
PCO2 BLDA: 36 MM HG (ref 38–42)
PCO2 BLDA: 37 MM HG (ref 38–42)
PCO2 BLDA: 37 MM HG (ref 38–42)
PCO2 BLDV: 42 MM HG (ref 41–51)
PCO2 BLDV: 44 MM HG (ref 41–51)
PCO2 BLDV: 45 MM HG (ref 41–51)
PCO2 BLDV: 46 MM HG (ref 41–51)
PH BLDA: 7.24 PH (ref 7.38–7.42)
PH BLDA: 7.28 PH (ref 7.38–7.42)
PH BLDA: 7.28 PH (ref 7.38–7.42)
PH BLDA: 7.31 PH (ref 7.38–7.42)
PH BLDA: 7.33 PH (ref 7.38–7.42)
PH BLDA: 7.37 PH (ref 7.38–7.42)
PH BLDA: 7.38 PH (ref 7.38–7.42)
PH BLDA: 7.4 PH (ref 7.38–7.42)
PH BLDA: 7.4 PH (ref 7.38–7.42)
PH BLDA: 7.42 PH (ref 7.38–7.42)
PH BLDV: 7.2 PH (ref 7.33–7.43)
PH BLDV: 7.23 PH (ref 7.33–7.43)
PH BLDV: 7.28 PH (ref 7.33–7.43)
PH BLDV: 7.33 PH (ref 7.33–7.43)
PHOSPHATE SERPL-MCNC: 3.6 MG/DL (ref 2.5–4.9)
PHOSPHATE SERPL-MCNC: 6 MG/DL (ref 2.5–4.9)
PLATELET # BLD AUTO: 153 X10*3/UL (ref 150–450)
PLATELET # BLD AUTO: 159 X10*3/UL (ref 150–450)
PLATELET # BLD AUTO: 92 X10*3/UL (ref 150–450)
PO2 BLDA: 128 MM HG (ref 85–95)
PO2 BLDA: 148 MM HG (ref 85–95)
PO2 BLDA: 153 MM HG (ref 85–95)
PO2 BLDA: 163 MM HG (ref 85–95)
PO2 BLDA: 164 MM HG (ref 85–95)
PO2 BLDA: 201 MM HG (ref 85–95)
PO2 BLDA: 439 MM HG (ref 85–95)
PO2 BLDA: 79 MM HG (ref 85–95)
PO2 BLDA: 85 MM HG (ref 85–95)
PO2 BLDA: 95 MM HG (ref 85–95)
PO2 BLDV: 34 MM HG (ref 35–45)
PO2 BLDV: 36 MM HG (ref 35–45)
PO2 BLDV: 45 MM HG (ref 35–45)
PO2 BLDV: 47 MM HG (ref 35–45)
POTASSIUM BLDA-SCNC: 3.6 MMOL/L (ref 3.5–5.3)
POTASSIUM BLDA-SCNC: 3.7 MMOL/L (ref 3.5–5.3)
POTASSIUM BLDA-SCNC: 3.9 MMOL/L (ref 3.5–5.3)
POTASSIUM BLDA-SCNC: 3.9 MMOL/L (ref 3.5–5.3)
POTASSIUM BLDA-SCNC: 4.1 MMOL/L (ref 3.5–5.3)
POTASSIUM BLDA-SCNC: 4.1 MMOL/L (ref 3.5–5.3)
POTASSIUM BLDA-SCNC: 4.4 MMOL/L (ref 3.5–5.3)
POTASSIUM BLDA-SCNC: 4.5 MMOL/L (ref 3.5–5.3)
POTASSIUM BLDA-SCNC: 4.5 MMOL/L (ref 3.5–5.3)
POTASSIUM BLDA-SCNC: 4.6 MMOL/L (ref 3.5–5.3)
POTASSIUM BLDV-SCNC: 4.2 MMOL/L (ref 3.5–5.3)
POTASSIUM BLDV-SCNC: 4.3 MMOL/L (ref 3.5–5.3)
POTASSIUM BLDV-SCNC: 4.3 MMOL/L (ref 3.5–5.3)
POTASSIUM BLDV-SCNC: 4.5 MMOL/L (ref 3.5–5.3)
POTASSIUM SERPL-SCNC: 3.8 MMOL/L (ref 3.5–5.3)
POTASSIUM SERPL-SCNC: 4.1 MMOL/L (ref 3.5–5.3)
PR INTERVAL: 182 MS
PRODUCT BLOOD TYPE: 6200
PRODUCT CODE: NORMAL
PROT SERPL-MCNC: 5.9 G/DL (ref 6.4–8.2)
PROTHROMBIN TIME: 14.5 SECONDS (ref 9.8–12.8)
Q ONSET: 223 MS
QRS COUNT: 12 BEATS
QRS DURATION: 96 MS
QT INTERVAL: 382 MS
QTC CALCULATION(BAZETT): 424 MS
QTC FREDERICIA: 410 MS
R AXIS: 56 DEGREES
RBC # BLD AUTO: 2.39 X10*6/UL (ref 4.5–5.9)
RBC # BLD AUTO: 3.09 X10*6/UL (ref 4.5–5.9)
RBC # BLD AUTO: 3.75 X10*6/UL (ref 4.5–5.9)
SAO2 % BLDA: 97 % (ref 94–100)
SAO2 % BLDA: 98 % (ref 94–100)
SAO2 % BLDA: 99 % (ref 94–100)
SAO2 % BLDV: 53 % (ref 45–75)
SAO2 % BLDV: 56 % (ref 45–75)
SAO2 % BLDV: 69 % (ref 45–75)
SAO2 % BLDV: 71 % (ref 45–75)
SODIUM BLDA-SCNC: 136 MMOL/L (ref 136–145)
SODIUM BLDA-SCNC: 137 MMOL/L (ref 136–145)
SODIUM BLDA-SCNC: 138 MMOL/L (ref 136–145)
SODIUM BLDA-SCNC: 138 MMOL/L (ref 136–145)
SODIUM BLDV-SCNC: 135 MMOL/L (ref 136–145)
SODIUM BLDV-SCNC: 135 MMOL/L (ref 136–145)
SODIUM BLDV-SCNC: 137 MMOL/L (ref 136–145)
SODIUM BLDV-SCNC: 137 MMOL/L (ref 136–145)
SODIUM SERPL-SCNC: 140 MMOL/L (ref 136–145)
SODIUM SERPL-SCNC: 140 MMOL/L (ref 136–145)
T AXIS: 68 DEGREES
T OFFSET: 414 MS
UNIT ABO: NORMAL
UNIT NUMBER: NORMAL
UNIT RH: NORMAL
UNIT VOLUME: 350
VENTRICULAR RATE: 74 BPM
WBC # BLD AUTO: 10.5 X10*3/UL (ref 4.4–11.3)
WBC # BLD AUTO: 18.7 X10*3/UL (ref 4.4–11.3)
WBC # BLD AUTO: 31.2 X10*3/UL (ref 4.4–11.3)
XM INTEP: NORMAL

## 2024-11-13 PROCEDURE — 85384 FIBRINOGEN ACTIVITY: CPT | Performed by: NURSE PRACTITIONER

## 2024-11-13 PROCEDURE — 93005 ELECTROCARDIOGRAM TRACING: CPT

## 2024-11-13 PROCEDURE — 2500000002 HC RX 250 W HCPCS SELF ADMINISTERED DRUGS (ALT 637 FOR MEDICARE OP, ALT 636 FOR OP/ED): Performed by: STUDENT IN AN ORGANIZED HEALTH CARE EDUCATION/TRAINING PROGRAM

## 2024-11-13 PROCEDURE — 2500000001 HC RX 250 WO HCPCS SELF ADMINISTERED DRUGS (ALT 637 FOR MEDICARE OP): Performed by: NURSE PRACTITIONER

## 2024-11-13 PROCEDURE — 83735 ASSAY OF MAGNESIUM: CPT | Performed by: NURSE PRACTITIONER

## 2024-11-13 PROCEDURE — 2500000005 HC RX 250 GENERAL PHARMACY W/O HCPCS: Performed by: NURSE PRACTITIONER

## 2024-11-13 PROCEDURE — 84132 ASSAY OF SERUM POTASSIUM: CPT | Performed by: NURSE PRACTITIONER

## 2024-11-13 PROCEDURE — P9045 ALBUMIN (HUMAN), 5%, 250 ML: HCPCS | Mod: JZ | Performed by: STUDENT IN AN ORGANIZED HEALTH CARE EDUCATION/TRAINING PROGRAM

## 2024-11-13 PROCEDURE — 82947 ASSAY GLUCOSE BLOOD QUANT: CPT

## 2024-11-13 PROCEDURE — 2500000004 HC RX 250 GENERAL PHARMACY W/ HCPCS (ALT 636 FOR OP/ED)

## 2024-11-13 PROCEDURE — 84132 ASSAY OF SERUM POTASSIUM: CPT | Performed by: STUDENT IN AN ORGANIZED HEALTH CARE EDUCATION/TRAINING PROGRAM

## 2024-11-13 PROCEDURE — 99291 CRITICAL CARE FIRST HOUR: CPT | Performed by: STUDENT IN AN ORGANIZED HEALTH CARE EDUCATION/TRAINING PROGRAM

## 2024-11-13 PROCEDURE — P9047 ALBUMIN (HUMAN), 25%, 50ML: HCPCS | Mod: JZ | Performed by: STUDENT IN AN ORGANIZED HEALTH CARE EDUCATION/TRAINING PROGRAM

## 2024-11-13 PROCEDURE — 2500000004 HC RX 250 GENERAL PHARMACY W/ HCPCS (ALT 636 FOR OP/ED): Mod: JZ | Performed by: STUDENT IN AN ORGANIZED HEALTH CARE EDUCATION/TRAINING PROGRAM

## 2024-11-13 PROCEDURE — 84484 ASSAY OF TROPONIN QUANT: CPT | Performed by: STUDENT IN AN ORGANIZED HEALTH CARE EDUCATION/TRAINING PROGRAM

## 2024-11-13 PROCEDURE — 2020000001 HC ICU ROOM DAILY

## 2024-11-13 PROCEDURE — 2500000004 HC RX 250 GENERAL PHARMACY W/ HCPCS (ALT 636 FOR OP/ED): Mod: JZ | Performed by: NURSE PRACTITIONER

## 2024-11-13 PROCEDURE — 85027 COMPLETE CBC AUTOMATED: CPT | Performed by: NURSE PRACTITIONER

## 2024-11-13 PROCEDURE — 71045 X-RAY EXAM CHEST 1 VIEW: CPT

## 2024-11-13 PROCEDURE — 82375 ASSAY CARBOXYHB QUANT: CPT

## 2024-11-13 PROCEDURE — 85610 PROTHROMBIN TIME: CPT | Performed by: NURSE PRACTITIONER

## 2024-11-13 PROCEDURE — P9016 RBC LEUKOCYTES REDUCED: HCPCS

## 2024-11-13 PROCEDURE — 71045 X-RAY EXAM CHEST 1 VIEW: CPT | Performed by: RADIOLOGY

## 2024-11-13 PROCEDURE — 2500000001 HC RX 250 WO HCPCS SELF ADMINISTERED DRUGS (ALT 637 FOR MEDICARE OP): Performed by: STUDENT IN AN ORGANIZED HEALTH CARE EDUCATION/TRAINING PROGRAM

## 2024-11-13 PROCEDURE — 2500000004 HC RX 250 GENERAL PHARMACY W/ HCPCS (ALT 636 FOR OP/ED): Performed by: STUDENT IN AN ORGANIZED HEALTH CARE EDUCATION/TRAINING PROGRAM

## 2024-11-13 PROCEDURE — 84075 ASSAY ALKALINE PHOSPHATASE: CPT | Performed by: STUDENT IN AN ORGANIZED HEALTH CARE EDUCATION/TRAINING PROGRAM

## 2024-11-13 PROCEDURE — 94002 VENT MGMT INPAT INIT DAY: CPT

## 2024-11-13 PROCEDURE — 37799 UNLISTED PX VASCULAR SURGERY: CPT | Performed by: NURSE PRACTITIONER

## 2024-11-13 PROCEDURE — 83605 ASSAY OF LACTIC ACID: CPT | Performed by: NURSE PRACTITIONER

## 2024-11-13 PROCEDURE — 2500000004 HC RX 250 GENERAL PHARMACY W/ HCPCS (ALT 636 FOR OP/ED): Performed by: NURSE PRACTITIONER

## 2024-11-13 PROCEDURE — 82330 ASSAY OF CALCIUM: CPT | Performed by: NURSE PRACTITIONER

## 2024-11-13 PROCEDURE — 93010 ELECTROCARDIOGRAM REPORT: CPT | Performed by: INTERNAL MEDICINE

## 2024-11-13 PROCEDURE — 2500000005 HC RX 250 GENERAL PHARMACY W/O HCPCS: Performed by: STUDENT IN AN ORGANIZED HEALTH CARE EDUCATION/TRAINING PROGRAM

## 2024-11-13 PROCEDURE — 82435 ASSAY OF BLOOD CHLORIDE: CPT | Performed by: NURSE PRACTITIONER

## 2024-11-13 PROCEDURE — 36430 TRANSFUSION BLD/BLD COMPNT: CPT

## 2024-11-13 PROCEDURE — 84132 ASSAY OF SERUM POTASSIUM: CPT

## 2024-11-13 RX ORDER — MAGNESIUM SULFATE HEPTAHYDRATE 40 MG/ML
2 INJECTION, SOLUTION INTRAVENOUS EVERY 6 HOURS PRN
Status: DISCONTINUED | OUTPATIENT
Start: 2024-11-13 | End: 2024-11-25

## 2024-11-13 RX ORDER — ALBUMIN HUMAN 50 G/1000ML
25 SOLUTION INTRAVENOUS ONCE
Status: COMPLETED | OUTPATIENT
Start: 2024-11-13 | End: 2024-11-13

## 2024-11-13 RX ORDER — CLOPIDOGREL BISULFATE 75 MG/1
75 TABLET ORAL DAILY
Status: DISCONTINUED | OUTPATIENT
Start: 2024-11-13 | End: 2024-11-15

## 2024-11-13 RX ORDER — ALBUMIN HUMAN 50 G/1000ML
12.5 SOLUTION INTRAVENOUS ONCE
Status: DISCONTINUED | OUTPATIENT
Start: 2024-11-13 | End: 2024-11-13

## 2024-11-13 RX ORDER — HYDROMORPHONE HYDROCHLORIDE 0.2 MG/ML
0.2 INJECTION INTRAMUSCULAR; INTRAVENOUS; SUBCUTANEOUS EVERY 2 HOUR PRN
Status: DISCONTINUED | OUTPATIENT
Start: 2024-11-13 | End: 2024-11-17

## 2024-11-13 RX ORDER — NALOXONE HYDROCHLORIDE 0.4 MG/ML
0.2 INJECTION, SOLUTION INTRAMUSCULAR; INTRAVENOUS; SUBCUTANEOUS EVERY 5 MIN PRN
Status: DISCONTINUED | OUTPATIENT
Start: 2024-11-13 | End: 2024-12-04 | Stop reason: HOSPADM

## 2024-11-13 RX ORDER — EPINEPHRINE IN 0.9 % SOD CHLOR 4MG/250ML
0.04 PLASTIC BAG, INJECTION (ML) INTRAVENOUS CONTINUOUS
Status: DISCONTINUED | OUTPATIENT
Start: 2024-11-13 | End: 2024-11-20

## 2024-11-13 RX ORDER — ONDANSETRON 4 MG/1
4 TABLET, FILM COATED ORAL EVERY 8 HOURS PRN
Status: DISCONTINUED | OUTPATIENT
Start: 2024-11-13 | End: 2024-12-04 | Stop reason: HOSPADM

## 2024-11-13 RX ORDER — NAPROXEN SODIUM 220 MG/1
81 TABLET, FILM COATED ORAL DAILY
Status: DISCONTINUED | OUTPATIENT
Start: 2024-11-13 | End: 2024-11-25

## 2024-11-13 RX ORDER — CALCIUM GLUCONATE 20 MG/ML
2 INJECTION, SOLUTION INTRAVENOUS EVERY 6 HOURS PRN
Status: DISCONTINUED | OUTPATIENT
Start: 2024-11-13 | End: 2024-11-25

## 2024-11-13 RX ORDER — ACETAMINOPHEN 10 MG/ML
1000 INJECTION, SOLUTION INTRAVENOUS EVERY 6 HOURS SCHEDULED
Status: COMPLETED | OUTPATIENT
Start: 2024-11-13 | End: 2024-11-14

## 2024-11-13 RX ORDER — ONDANSETRON HYDROCHLORIDE 2 MG/ML
4 INJECTION, SOLUTION INTRAVENOUS EVERY 8 HOURS PRN
Status: DISCONTINUED | OUTPATIENT
Start: 2024-11-13 | End: 2024-12-04 | Stop reason: HOSPADM

## 2024-11-13 RX ORDER — POTASSIUM CHLORIDE 1.5 G/1.58G
20 POWDER, FOR SOLUTION ORAL EVERY 6 HOURS PRN
Status: DISCONTINUED | OUTPATIENT
Start: 2024-11-13 | End: 2024-11-25

## 2024-11-13 RX ORDER — OXYCODONE HYDROCHLORIDE 5 MG/1
10 TABLET ORAL EVERY 4 HOURS PRN
Status: DISCONTINUED | OUTPATIENT
Start: 2024-11-13 | End: 2024-11-18

## 2024-11-13 RX ORDER — ALBUMIN HUMAN 50 G/1000ML
12.5 SOLUTION INTRAVENOUS ONCE
Status: COMPLETED | OUTPATIENT
Start: 2024-11-13 | End: 2024-11-13

## 2024-11-13 RX ORDER — ALBUMIN HUMAN 250 G/1000ML
25 SOLUTION INTRAVENOUS ONCE
Status: COMPLETED | OUTPATIENT
Start: 2024-11-13 | End: 2024-11-13

## 2024-11-13 RX ORDER — PROPOFOL 10 MG/ML
0-50 INJECTION, EMULSION INTRAVENOUS CONTINUOUS
Status: DISCONTINUED | OUTPATIENT
Start: 2024-11-13 | End: 2024-11-13

## 2024-11-13 RX ORDER — POTASSIUM CHLORIDE 1.5 G/1.58G
40 POWDER, FOR SOLUTION ORAL EVERY 6 HOURS PRN
Status: DISCONTINUED | OUTPATIENT
Start: 2024-11-13 | End: 2024-11-25

## 2024-11-13 RX ORDER — POTASSIUM CHLORIDE 14.9 MG/ML
20 INJECTION INTRAVENOUS EVERY 6 HOURS PRN
Status: DISCONTINUED | OUTPATIENT
Start: 2024-11-13 | End: 2024-11-21

## 2024-11-13 RX ORDER — OXYCODONE HYDROCHLORIDE 5 MG/1
5 TABLET ORAL EVERY 4 HOURS PRN
Status: DISCONTINUED | OUTPATIENT
Start: 2024-11-13 | End: 2024-11-18

## 2024-11-13 RX ORDER — INSULIN LISPRO 100 [IU]/ML
0-5 INJECTION, SOLUTION INTRAVENOUS; SUBCUTANEOUS EVERY 4 HOURS
Status: DISCONTINUED | OUTPATIENT
Start: 2024-11-13 | End: 2024-11-25

## 2024-11-13 RX ORDER — HYDROMORPHONE HYDROCHLORIDE 0.2 MG/ML
0.2 INJECTION INTRAMUSCULAR; INTRAVENOUS; SUBCUTANEOUS
Status: DISCONTINUED | OUTPATIENT
Start: 2024-11-13 | End: 2024-11-13

## 2024-11-13 RX ORDER — CEFAZOLIN SODIUM 2 G/100ML
2 INJECTION, SOLUTION INTRAVENOUS EVERY 8 HOURS
Status: COMPLETED | OUTPATIENT
Start: 2024-11-13 | End: 2024-11-15

## 2024-11-13 RX ORDER — AMOXICILLIN 250 MG
2 CAPSULE ORAL 2 TIMES DAILY
Status: DISCONTINUED | OUTPATIENT
Start: 2024-11-13 | End: 2024-12-04 | Stop reason: HOSPADM

## 2024-11-13 RX ORDER — POTASSIUM CHLORIDE 20 MEQ/1
40 TABLET, EXTENDED RELEASE ORAL EVERY 6 HOURS PRN
Status: DISCONTINUED | OUTPATIENT
Start: 2024-11-13 | End: 2024-11-25

## 2024-11-13 RX ORDER — LIDOCAINE HYDROCHLORIDE ANHYDROUS AND DEXTROSE MONOHYDRATE .8; 5 G/100ML; G/100ML
0.5 INJECTION, SOLUTION INTRAVENOUS CONTINUOUS
Status: DISCONTINUED | OUTPATIENT
Start: 2024-11-13 | End: 2024-11-14

## 2024-11-13 RX ORDER — ACETAMINOPHEN 325 MG/1
650 TABLET ORAL EVERY 6 HOURS
Status: DISCONTINUED | OUTPATIENT
Start: 2024-11-13 | End: 2024-11-13

## 2024-11-13 RX ORDER — SODIUM CHLORIDE, SODIUM LACTATE, POTASSIUM CHLORIDE, CALCIUM CHLORIDE 600; 310; 30; 20 MG/100ML; MG/100ML; MG/100ML; MG/100ML
20 INJECTION, SOLUTION INTRAVENOUS CONTINUOUS
Status: ACTIVE | OUTPATIENT
Start: 2024-11-13 | End: 2024-11-14

## 2024-11-13 RX ORDER — ATORVASTATIN CALCIUM 80 MG/1
80 TABLET, FILM COATED ORAL NIGHTLY
Status: DISCONTINUED | OUTPATIENT
Start: 2024-11-13 | End: 2024-12-04 | Stop reason: HOSPADM

## 2024-11-13 RX ORDER — MAGNESIUM SULFATE HEPTAHYDRATE 40 MG/ML
2 INJECTION, SOLUTION INTRAVENOUS ONCE
Status: COMPLETED | OUTPATIENT
Start: 2024-11-13 | End: 2024-11-13

## 2024-11-13 RX ORDER — NOREPINEPHRINE BITARTRATE/D5W 8 MG/250ML
0-1 PLASTIC BAG, INJECTION (ML) INTRAVENOUS CONTINUOUS
Status: DISCONTINUED | OUTPATIENT
Start: 2024-11-13 | End: 2024-11-23

## 2024-11-13 RX ORDER — MAGNESIUM SULFATE HEPTAHYDRATE 40 MG/ML
4 INJECTION, SOLUTION INTRAVENOUS EVERY 6 HOURS PRN
Status: DISCONTINUED | OUTPATIENT
Start: 2024-11-13 | End: 2024-11-25

## 2024-11-13 RX ORDER — CALCIUM GLUCONATE 20 MG/ML
1 INJECTION, SOLUTION INTRAVENOUS EVERY 6 HOURS PRN
Status: DISCONTINUED | OUTPATIENT
Start: 2024-11-13 | End: 2024-11-25

## 2024-11-13 RX ORDER — POLYETHYLENE GLYCOL 3350 17 G/17G
17 POWDER, FOR SOLUTION ORAL 2 TIMES DAILY
Status: DISCONTINUED | OUTPATIENT
Start: 2024-11-13 | End: 2024-12-04 | Stop reason: HOSPADM

## 2024-11-13 RX ORDER — NAPROXEN SODIUM 220 MG/1
81 TABLET, FILM COATED ORAL DAILY
Status: DISCONTINUED | OUTPATIENT
Start: 2024-11-13 | End: 2024-11-13

## 2024-11-13 RX ORDER — SODIUM CHLORIDE, SODIUM LACTATE, POTASSIUM CHLORIDE, CALCIUM CHLORIDE 600; 310; 30; 20 MG/100ML; MG/100ML; MG/100ML; MG/100ML
5 INJECTION, SOLUTION INTRAVENOUS CONTINUOUS
Status: ACTIVE | OUTPATIENT
Start: 2024-11-13 | End: 2024-11-14

## 2024-11-13 RX ORDER — POTASSIUM CHLORIDE 29.8 MG/ML
40 INJECTION INTRAVENOUS EVERY 6 HOURS PRN
Status: DISCONTINUED | OUTPATIENT
Start: 2024-11-13 | End: 2024-11-21

## 2024-11-13 RX ORDER — PANTOPRAZOLE SODIUM 40 MG/10ML
40 INJECTION, POWDER, LYOPHILIZED, FOR SOLUTION INTRAVENOUS
Status: DISCONTINUED | OUTPATIENT
Start: 2024-11-13 | End: 2024-11-14

## 2024-11-13 RX ORDER — HEPARIN SODIUM 5000 [USP'U]/ML
5000 INJECTION, SOLUTION INTRAVENOUS; SUBCUTANEOUS EVERY 8 HOURS
Status: DISCONTINUED | OUTPATIENT
Start: 2024-11-13 | End: 2024-11-15

## 2024-11-13 RX ORDER — POTASSIUM CHLORIDE 20 MEQ/1
20 TABLET, EXTENDED RELEASE ORAL EVERY 6 HOURS PRN
Status: DISCONTINUED | OUTPATIENT
Start: 2024-11-13 | End: 2024-11-25

## 2024-11-13 RX ORDER — PANTOPRAZOLE SODIUM 40 MG/1
40 TABLET, DELAYED RELEASE ORAL
Status: DISCONTINUED | OUTPATIENT
Start: 2024-11-13 | End: 2024-11-14

## 2024-11-13 RX ADMIN — ALBUMIN HUMAN 25 G: 0.05 INJECTION, SOLUTION INTRAVENOUS at 01:27

## 2024-11-13 RX ADMIN — MAGNESIUM SULFATE HEPTAHYDRATE 2 G: 40 INJECTION, SOLUTION INTRAVENOUS at 17:57

## 2024-11-13 RX ADMIN — POLYETHYLENE GLYCOL 3350 17 G: 17 POWDER, FOR SOLUTION ORAL at 03:28

## 2024-11-13 RX ADMIN — SENNOSIDES AND DOCUSATE SODIUM 2 TABLET: 50; 8.6 TABLET ORAL at 09:15

## 2024-11-13 RX ADMIN — SODIUM CHLORIDE, SODIUM LACTATE, POTASSIUM CHLORIDE, AND CALCIUM CHLORIDE 500 ML: 600; 310; 30; 20 INJECTION, SOLUTION INTRAVENOUS at 01:05

## 2024-11-13 RX ADMIN — EPINEPHRINE IN SODIUM CHLORIDE 0.02 MCG/KG/MIN: 16 INJECTION INTRAVENOUS at 17:49

## 2024-11-13 RX ADMIN — ATORVASTATIN CALCIUM 80 MG: 80 TABLET, FILM COATED ORAL at 03:28

## 2024-11-13 RX ADMIN — ALBUMIN HUMAN 25 G: 0.05 INJECTION, SOLUTION INTRAVENOUS at 04:23

## 2024-11-13 RX ADMIN — HYDROMORPHONE HYDROCHLORIDE 0.2 MG: 0.2 INJECTION, SOLUTION INTRAMUSCULAR; INTRAVENOUS; SUBCUTANEOUS at 17:22

## 2024-11-13 RX ADMIN — PROPOFOL 40 MCG/KG/MIN: 10 INJECTION, EMULSION INTRAVENOUS at 06:01

## 2024-11-13 RX ADMIN — POTASSIUM CHLORIDE 20 MEQ: 14.9 INJECTION, SOLUTION INTRAVENOUS at 02:54

## 2024-11-13 RX ADMIN — OXYCODONE HYDROCHLORIDE 10 MG: 5 TABLET ORAL at 22:56

## 2024-11-13 RX ADMIN — SENNOSIDES AND DOCUSATE SODIUM 2 TABLET: 50; 8.6 TABLET ORAL at 21:14

## 2024-11-13 RX ADMIN — HYDROMORPHONE HYDROCHLORIDE 0.2 MG: 0.2 INJECTION, SOLUTION INTRAMUSCULAR; INTRAVENOUS; SUBCUTANEOUS at 10:50

## 2024-11-13 RX ADMIN — CLOPIDOGREL BISULFATE 75 MG: 75 TABLET ORAL at 10:50

## 2024-11-13 RX ADMIN — SODIUM CHLORIDE, POTASSIUM CHLORIDE, SODIUM LACTATE AND CALCIUM CHLORIDE 250 ML: 600; 310; 30; 20 INJECTION, SOLUTION INTRAVENOUS at 12:17

## 2024-11-13 RX ADMIN — ALBUMIN HUMAN 25 G: 0.05 INJECTION, SOLUTION INTRAVENOUS at 08:05

## 2024-11-13 RX ADMIN — INSULIN LISPRO 1 UNITS: 100 INJECTION, SOLUTION INTRAVENOUS; SUBCUTANEOUS at 06:31

## 2024-11-13 RX ADMIN — HYDROMORPHONE HYDROCHLORIDE 0.2 MG: 0.2 INJECTION, SOLUTION INTRAMUSCULAR; INTRAVENOUS; SUBCUTANEOUS at 21:46

## 2024-11-13 RX ADMIN — ACETAMINOPHEN 650 MG: 325 TABLET ORAL at 07:26

## 2024-11-13 RX ADMIN — OXYCODONE 5 MG: 5 TABLET ORAL at 03:32

## 2024-11-13 RX ADMIN — HYDROMORPHONE HYDROCHLORIDE 0.2 MG: 0.2 INJECTION, SOLUTION INTRAMUSCULAR; INTRAVENOUS; SUBCUTANEOUS at 13:17

## 2024-11-13 RX ADMIN — ATORVASTATIN CALCIUM 80 MG: 80 TABLET, FILM COATED ORAL at 21:14

## 2024-11-13 RX ADMIN — CALCIUM CHLORIDE 1 G: 100 INJECTION INTRAVENOUS; INTRAVENTRICULAR at 12:03

## 2024-11-13 RX ADMIN — CEFAZOLIN SODIUM 2 G: 2 INJECTION, SOLUTION INTRAVENOUS at 15:05

## 2024-11-13 RX ADMIN — INSULIN LISPRO 1 UNITS: 100 INJECTION, SOLUTION INTRAVENOUS; SUBCUTANEOUS at 03:41

## 2024-11-13 RX ADMIN — HYDROMORPHONE HYDROCHLORIDE 0.2 MG: 0.2 INJECTION, SOLUTION INTRAMUSCULAR; INTRAVENOUS; SUBCUTANEOUS at 15:20

## 2024-11-13 RX ADMIN — HYDROMORPHONE HYDROCHLORIDE 0.2 MG: 0.2 INJECTION, SOLUTION INTRAMUSCULAR; INTRAVENOUS; SUBCUTANEOUS at 02:11

## 2024-11-13 RX ADMIN — CEFAZOLIN SODIUM 2 G: 2 INJECTION, SOLUTION INTRAVENOUS at 07:26

## 2024-11-13 RX ADMIN — VASOPRESSIN 0.03 UNITS/MIN: 0.2 INJECTION INTRAVENOUS at 12:12

## 2024-11-13 RX ADMIN — PANTOPRAZOLE SODIUM 40 MG: 40 INJECTION, POWDER, LYOPHILIZED, FOR SOLUTION INTRAVENOUS at 07:26

## 2024-11-13 RX ADMIN — ACETAMINOPHEN 650 MG: 325 TABLET ORAL at 12:56

## 2024-11-13 RX ADMIN — ASPIRIN 81 MG 81 MG: 81 TABLET ORAL at 03:28

## 2024-11-13 RX ADMIN — CEFAZOLIN SODIUM 2 G: 2 INJECTION, SOLUTION INTRAVENOUS at 23:54

## 2024-11-13 RX ADMIN — ACETAMINOPHEN 650 MG: 325 TABLET ORAL at 03:28

## 2024-11-13 RX ADMIN — ACETAMINOPHEN 1000 MG: 1000 INJECTION, SOLUTION INTRAVENOUS at 17:58

## 2024-11-13 RX ADMIN — ALBUMIN HUMAN 12.5 G: 0.05 INJECTION, SOLUTION INTRAVENOUS at 11:54

## 2024-11-13 RX ADMIN — POLYETHYLENE GLYCOL 3350 17 G: 17 POWDER, FOR SOLUTION ORAL at 09:15

## 2024-11-13 RX ADMIN — ALBUMIN HUMAN 25 G: 0.25 SOLUTION INTRAVENOUS at 09:15

## 2024-11-13 RX ADMIN — HYDROCORTISONE SODIUM SUCCINATE 100 MG: 100 INJECTION, POWDER, FOR SOLUTION INTRAMUSCULAR; INTRAVENOUS at 16:16

## 2024-11-13 RX ADMIN — PROPOFOL 40 MCG/KG/MIN: 10 INJECTION, EMULSION INTRAVENOUS at 01:47

## 2024-11-13 RX ADMIN — ALBUMIN HUMAN 12.5 G: 0.05 INJECTION, SOLUTION INTRAVENOUS at 10:37

## 2024-11-13 RX ADMIN — NOREPINEPHRINE BITARTRATE 0.1 MCG/KG/MIN: 8 INJECTION, SOLUTION INTRAVENOUS at 17:23

## 2024-11-13 RX ADMIN — SODIUM CHLORIDE, SODIUM LACTATE, POTASSIUM CHLORIDE, AND CALCIUM CHLORIDE 500 ML: 600; 310; 30; 20 INJECTION, SOLUTION INTRAVENOUS at 03:03

## 2024-11-13 RX ADMIN — ALBUMIN HUMAN 25 G: 0.25 SOLUTION INTRAVENOUS at 10:59

## 2024-11-13 RX ADMIN — HEPARIN SODIUM 5000 UNITS: 5000 INJECTION, SOLUTION INTRAVENOUS; SUBCUTANEOUS at 15:05

## 2024-11-13 RX ADMIN — POLYETHYLENE GLYCOL 3350 17 G: 17 POWDER, FOR SOLUTION ORAL at 21:14

## 2024-11-13 RX ADMIN — LIDOCAINE HYDROCHLORIDE 0.5 MG/MIN: 8 INJECTION, SOLUTION INTRAVENOUS at 17:57

## 2024-11-13 ASSESSMENT — PAIN - FUNCTIONAL ASSESSMENT
PAIN_FUNCTIONAL_ASSESSMENT: CPOT (CRITICAL CARE PAIN OBSERVATION TOOL)
PAIN_FUNCTIONAL_ASSESSMENT: 0-10
PAIN_FUNCTIONAL_ASSESSMENT: CPOT (CRITICAL CARE PAIN OBSERVATION TOOL)
PAIN_FUNCTIONAL_ASSESSMENT: 0-10
PAIN_FUNCTIONAL_ASSESSMENT: 0-10
PAIN_FUNCTIONAL_ASSESSMENT: CPOT (CRITICAL CARE PAIN OBSERVATION TOOL)

## 2024-11-13 ASSESSMENT — PAIN DESCRIPTION - LOCATION
LOCATION: CHEST
LOCATION: CHEST

## 2024-11-13 ASSESSMENT — PAIN SCALES - GENERAL
PAINLEVEL_OUTOF10: 6
PAINLEVEL_OUTOF10: 8
PAINLEVEL_OUTOF10: 7
PAINLEVEL_OUTOF10: 4
PAINLEVEL_OUTOF10: 8
PAINLEVEL_OUTOF10: 0 - NO PAIN
PAINLEVEL_OUTOF10: 9
PAINLEVEL_OUTOF10: 5 - MODERATE PAIN
PAINLEVEL_OUTOF10: 4
PAIN_LEVEL: 0
PAINLEVEL_OUTOF10: 0 - NO PAIN
PAINLEVEL_OUTOF10: 10 - WORST POSSIBLE PAIN
PAINLEVEL_OUTOF10: 5 - MODERATE PAIN

## 2024-11-13 ASSESSMENT — ACTIVITIES OF DAILY LIVING (ADL): LACK_OF_TRANSPORTATION: NO

## 2024-11-13 ASSESSMENT — PAIN SCALES - PAIN ASSESSMENT IN ADVANCED DEMENTIA (PAINAD): TOTALSCORE: MEDICATION (SEE MAR)

## 2024-11-13 ASSESSMENT — PAIN DESCRIPTION - ORIENTATION
ORIENTATION: MID
ORIENTATION: MID

## 2024-11-13 NOTE — H&P
CTICU History & Physical    Subjective   HPI:  This is a 74 year old male with a PMH significant for basal cell carcinoma and HPL who presented to [facility] ED on 11/8 complaining of left sided chest pain, diaphoresis, SOB nausea and syncope. ECG showed ST elevation in the inferior leads and ST depression in anterior septal leads. LHC showed 90% occlusion of the LAD. Transferred to [facility] on 11/10 for cardiac surgery workup.   11/12 s/p MidCAB x2 converted to full sternotomy w/ LIMA prolonged as a Y graft with a radial to LAD OM w/ Dr. Aldo Harman and Dr. Stanley    Cardiac Testing:     TTE: 11/9  CONCLUSIONS:   1. Left ventricular ejection fraction is normal, by visual estimate at 60-65%.   2. Spectral Doppler shows a Grade II (pseudonormal pattern) of left ventricular diastolic filling with an elevated left atrial pressure.   3. There is normal right ventricular global systolic function.    LHC: 11/08  CONCLUSIONS:   1. Distal left main hazy lesion of 90% that continues to michel proximal LAD. LILY-3 flow at the level of the left system.   2. 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 There is a bending area in the distal RCA before giving obtuse marginal 3 that could be significant.   3. Recommend coronary artery bypass surgery since patient has LILY-3 flow in all vessels and has severe three-vessel coronary disease. Currently chest pain-free.     Procedure/Surgeon: MIDCABG x 2, ROBOTIC assisted w/ Dr. Aldo Harman  Frontliner/Anesthesia: Dr. Chavez  Out of OR Time (document on ventilator card): 1230     OR Course/Issues: Challenging robotic course with eventual transition to full sternotomy     CPB time: 347 min  Cross clamp time: 31 min  Circ arrest time: none  Echo Pre/Post: Normal BV  Chest Tubes/Drains: 2 meds, L pleural   Temporary wires location/setting: VVI50      Fluids  Crystalloid: 1.3L  Colloid: 750 mL  Cellsaver: 1140mL  Products: 10 pack  platelets  EBL: 200 mL  UOP: 710mL     Anesthesia  Intubation: Video laryngoscope w/ Grade 1 view, 39L SEBASTIAN secured @ 30cm - swtiched to 8.5mm ETT @ 22cm over exchange catheter  Intravenous Access: RIJ MAC w/ mini MAC,  R brachial a line  AICD: none  PPM: none  Regional anesthesia: none  Benzodiazepine dose/last administration: 2mg midazolam total  Opioid dose/last administration: 1000mcg fentanyl total, 15mg methadone  NMB dose/last administration: 230mg rocuronium total  TOF/ reversal given: Reversed upon arrival to ICU  Antibiotic time: Ancef 2g 2253  Temperature on admission to ICU: 36.2    Past Medical History:   Diagnosis Date    Basal cell carcinoma     CAD (coronary artery disease)     Hyperlipidemia     STEMI (ST elevation myocardial infarction) (Multi)      Past Surgical History:   Procedure Laterality Date    CARDIAC CATHETERIZATION N/A 11/08/2024    Procedure: Left Heart Cath, No LV;  Surgeon: Ale Brady MD;  Location: [facility] Cardiac Cath Lab;  Service: Cardiovascular;  Laterality: N/A;    CARDIAC CATHETERIZATION N/A 11/08/2024    Procedure: PCI;  Surgeon: Ale Brady MD;  Location: [facility] Cardiac Cath Lab;  Service: Cardiovascular;  Laterality: N/A;    CERVICAL FUSION      C5-7    SKIN CANCER EXCISION N/A      Medications Prior to Admission   Medication Sig Dispense Refill Last Dose/Taking    aspirin 81 mg EC tablet Take 1 tablet (81 mg) by mouth once daily.   Past Week    atorvastatin (Lipitor) 40 mg tablet Take 1 tablet (40 mg) by mouth once daily. (Patient not taking: Reported on 11/8/2024)   More than a month     Patient has no known allergies.  Social History     Tobacco Use    Smoking status: Never    Smokeless tobacco: Never   Substance Use Topics    Alcohol use: Not Currently    Drug use: Never     Family History   Problem Relation Name Age of Onset    Heart attack Mother      Heart attack Brother         Review of Systems:  DELONTE    Objective   Vitals:  Most Recent:  Vitals:    11/12/24 1400    BP: 113/82   Pulse: 75   Resp: 19   Temp:    SpO2: (!) 81%       24hr Min/Max:  Temp  Min: 36.1 °C (97 °F)  Max: 36.4 °C (97.5 °F)  Pulse  Min: 59  Max: 77  BP  Min: 90/66  Max: 118/82  Resp  Min: 12  Max: 26  SpO2  Min: 81 %  Max: 100 %    I/O:  I/O last 2 completed shifts:  In: 360 (3.9 mL/kg) [I.V.:360 (3.9 mL/kg)]  Out: 220 (2.4 mL/kg) [Urine:220 (0.1 mL/kg/hr)]  Weight: 93.4 kg     LDA:  CVC 11/12/24 Double lumen Right Internal jugular (Active)   Placement Date/Time: 11/12/24 (c) 1506   Hand Hygiene Performed Prior to CVC Insertion: Yes  Site Prep: Chlorhexidine   Site Prep Agent has Completely Dried Before Insertion: Yes  All 5 Sterile Barriers Used (Gloves, Gown, Cap, Mask, Large Sterile Ivone...   Number of days: 0       ETT  39 Fr (Active)   Placement Date/Time: 11/12/24 (c) 1445   Mask Ventilation: Vent by mask + OA or adjuvant +/- NMBA  Technique: Video laryngoscopy  ETT Type: ETT - double lumen left  Double Lumen Tube Size: 39 Fr  Cuffed: Yes  Laryngoscope: Domingo  Blade Size: 4  Lo...   Number of days: 0       Urethral Catheter Temperature probe 16 Fr. (Active)   Placement Date/Time: 11/12/24 1452   Placed by: BRIANDA DENIS  Hand Hygiene Completed: Yes  Catheter Type: Temperature probe  Tube Size (Fr.): 16 Fr.  Catheter Balloon Size: 10 mL  Urine Returned: Yes   Number of days: 0       Physical Exam:   Constitutional: Intubated and sedated on mechanical ventilation in NAD   Neuro: Neuro intact without obvious focal deficits, on sedation.  PERRL  CV: RRR.  S1S2.  SR on monitor.  AV wires set VVI50  Pulm: CTAB on mechanical ventilation.  CT's with appropriate serosang output and no airleak.  : clear yellow urine fvia aden  GI: S/ND/NT. Hypoactive BS. OGT in place with bilious output.  Extremities: NV exam intact x 4.  No edema.  Skin: WDI.  Postop dressings intact.  Chest tube dressings intact.    Psych: DELONTE, sedated         Lab Review:  Results from last 7 days   Lab Units 11/12/24  9007   WBC  AUTO x10*3/uL 7.6   HEMOGLOBIN g/dL 14.2   HEMATOCRIT % 42.0   PLATELETS AUTO x10*3/uL 171     Results from last 7 days   Lab Units 11/12/24  0416 11/11/24  0401   SODIUM mmol/L 137 137   POTASSIUM mmol/L 4.0 3.8   CHLORIDE mmol/L 100 101   CO2 mmol/L 27 27   BUN mg/dL 19 19   CREATININE mg/dL 0.87 0.87   CALCIUM mg/dL 9.1 9.2   PROTEIN TOTAL g/dL  --  7.2   BILIRUBIN TOTAL mg/dL  --  0.8   ALK PHOS U/L  --  52   ALT U/L  --  26   AST U/L  --  30   GLUCOSE mg/dL 100* 98     Results from last 7 days   Lab Units 11/12/24  0416   MAGNESIUM mg/dL 2.25     Results from last 7 days   Lab Units 11/12/24  1927   POCT PH, ARTERIAL pH 7.37*   POCT PCO2, ARTERIAL mm Hg 41   POCT PO2, ARTERIAL mm Hg 237*   POCT HCO3 CALCULATED, ARTERIAL mmol/L 23.7   POCT BASE EXCESS, ARTERIAL mmol/L -1.5       Most recent labs and imaging reviewed.    Daily Risk Screen  Intubated: Yes, hemodynamic stability  Central line: Yes, critical I/O  Hale: Yes, critical I/O    Assessment/Plan     Assessment:     Plan:  NEURO:  No PMH. Patient is intubated and sedated on propofol infusion. Acute post operative pain.   -->  - Serial neuro and pain assessments   - Continue propofol until NMB reversal, then daily sedation vacation at minimum   - NMB reversal when normothermic and hemodynamically stable.    - Scheduled Tylenol   - PRN oxycodone  - PRN dilaudid for pain   - PT Consult, OOB to chair as tolerated, chair position if not tolerated   - CAM ICU score qshift  - Sleep/wake cycle hygiene  -     CV:  Patient has a history of HPL. Is now status post MIDCABG x 2 with conversion to full sternotomy LIMA prolonged as a Y graft with a radial to LAD OM. Pre/Post EF: normal BIV Arrived to CTICU on epi 0.04. A/V epicardial wires set VVI @ 50.-->  - Maintain goal MAP 70-90  - wean epi for ScVO2 >65  - Mixed venous and CI Q4H  - Volume resuscitate as clinically indicated  - Maintain epicardial wires set VVI @ 50  - If CABG is 2/2 STEMI/unstable angina, will  receive Plavix POD2  - Start statin  - Start ASA       PULM:  No history of pulmonary disease.  Currently intubated on ventilator. Chest tubes 2x meds and 1 L pleural.-->  - F/u post op CXR  - Once reversed, wean ventilator settings towards CPAP & extubation   - Wean FiO2 maintaining SpO2 >92%.   - IS q1h and OOB to chair when extubated  - Chest tubes to wall suction.    GI:  PMH of n/a.  OG in place.-->  - Continue PPI until extubated  - NPO, will perform bedside swallow eval post extubation   - Colace/senna BID and miralax BID        :  CSA-TREE Risk Score low.  No history of renal disease, baseline creatinine 0.87. Creatinine stable post-op. Aden in place and making adequate UOP. -->  - Continue aden catheter for strict I/Os.  - Goal UOP 0.5ml/kg/hr  - RFP as clinically indicated  - Replete electrolytes per CTICU protocol     ENDO: No PMH. A1c: 5.1-->  - Maintain BG <180, insulin per CTICU protocol     HEME:  Acute blood loss anemia-->    - Monitor drain output volume and characteristics  - CBC, coags, and fibrinogen post op and as clinically indicated  - Start ASA 6hrs post-op for CABG  - If CABG is 2/2 STEMI/unstable angina, will receive Plavix POD2  - SQH tomorrow   - SCDs for DVT prophylaxis.  - Last type and screen: 11/11     ID:  Afebrile, no current indications of infection. MRSA negative.-->  - Trend temp q4h  - Periop cefazolin x 48hrs     Skin:  No active skin issues.  - preventative Mepilex dressings in place on sacrum and heels  - change preventative Mepilex weekly or more frequently as indicated (when moist/soiled)   - every shift skin assessment per nursing and weekly ICU skin rounds  - moisture barrier to be applied with sandro care  - active skin problems addressed with nursing on daily rounds     Proph:  SCDs  PPI     G:  Line  Right IJ MAC w Minimac placed 11/12  R brachial a-line placed 11/12  Aden 11/12    The indications and risks/benefits of non-violent/non self-destructive restraints were  discussed.     F: Family: will update at bedside postoperatively.     A,B,C,D,E,F,G: reviewed    Seen and discussed with ICU attending Dr. Cervantes     Dispo: CTICU care for now.    CTICU TEAM PHONE [phone]

## 2024-11-13 NOTE — ANESTHESIA PROCEDURE NOTES
Airway  Date/Time: 11/12/2024 12:28 AM  Urgency: elective    Airway not difficult    Staffing  Performed: resident   Authorized by: Cayden James MD    Performed by: Jayson Mendze MD  Patient location during procedure: OR    Indications and Patient Condition  Indications for airway management: anesthesia  Spontaneous Ventilation: absent  Sedation level: deep  Preoxygenated: yes  Patient position: sniffing  Mask difficulty assessment: 0 - not attempted    Final Airway Details  Final airway type: endotracheal airway      Successful airway: ETT  Cuffed: yes   Successful intubation technique: exchange catheter  Endotracheal tube insertion site: oral  ETT size (mm): 8.5  Placement verified by: chest auscultation and capnometry   Measured from: teeth  ETT to teeth (cm): 22  Number of attempts at approach: 1  Number of other approaches attempted: 0    Additional Comments  Replaced SEBASTIAN w/ SLT over exchange catheter.

## 2024-11-13 NOTE — CARE PLAN
Problem: Skin  Goal: Participates in plan/prevention/treatment measures  Outcome: Progressing  Flowsheets (Taken 11/13/2024 0839)  Participates in plan/prevention/treatment measures:   Discuss with provider PT/OT consult   Elevate heels  Goal: Prevent/manage excess moisture  Outcome: Progressing  Goal: Prevent/minimize sheer/friction injuries  Outcome: Progressing  Flowsheets (Taken 11/13/2024 0839)  Prevent/minimize sheer/friction injuries:   Turn/reposition every 2 hours/use positioning/transfer devices   Use pull sheet   HOB 30 degrees or less  Goal: Promote/optimize nutrition  Outcome: Progressing     Problem: Pain - Adult  Goal: Verbalizes/displays adequate comfort level or baseline comfort level  Outcome: Progressing     Problem: Safety - Adult  Goal: Free from fall injury  Outcome: Progressing     Problem: Discharge Planning  Goal: Discharge to home or other facility with appropriate resources  Outcome: Progressing     Problem: Chronic Conditions and Co-morbidities  Goal: Patient's chronic conditions and co-morbidity symptoms are monitored and maintained or improved  Outcome: Progressing     Problem: Safety - Medical Restraint  Goal: Remains free of injury from restraints (Restraint for Interference with Medical Device)  Outcome: Progressing  Goal: Free from restraint(s) (Restraint for Interference with Medical Device)  Outcome: Progressing

## 2024-11-13 NOTE — OP NOTE
MIDCAB x 3 LIMA & Radial, ROBOT-ASSISTED, CONVERTED TO STERNOTOMY Operative Note     Date: 11/10/2024 - 2024  OR Location: [facility] OR    Name: Jaime Dominguez, : 1950, Age: 74 y.o., MRN: [id number], Sex: male    Diagnosis  Pre-op Diagnosis      * STEMI (ST elevation myocardial infarction) (Multi) [I21.3] Post-op Diagnosis     * STEMI (ST elevation myocardial infarction) (Multi) [I21.3]     Procedures  MIDCAB x 3 LIMA & Radial, ROBOT-ASSISTED, CONVERTED TO STERNOTOMY  13965 - NC CABG W/ARTERIAL GRAFT SINGLE ARTERIAL GRAFT  1.  Mini invasive robotic assisted CABG x 2 converted to full sternotomy.  2.  CABG x 2 with LIMA  prolonged as a Y graft with a radial to LAD OM.  3.  Endoscopic radial harvest.  4.  Left femoral artery and vein cannulation for peripheral bypass.  5.  Partial LAD endarterectomy    Surgeons      * Rodrigo Harman - Primary    Resident/Fellow/Other Assistant:  There was no qualified resident for this opeation .   Cierra Stanley MD was my assistant she performed the femoral cannulation and decannulation.  She was the first assistant with most of the procedure.  She also performed the left thoracotomy.  Staff:   Circulator: Eden  Circulator: Colleen Cornejoub Person: Juli  Scrub Person: Angie  Surgical Assistant: Frank  Surgical Assistant: Mack  Surgical Assistant: Preston  Scrub Person: Mirtha  Surgical Assistant: Sang  Surgical Assistant: Calvin Joy Circulator: Carolina  Relief Scrub: Izaiah Joy Circulator: Jerica    Anesthesia Staff:   Anesthesiologist: Cayden James MD; Jaleel Chavez MD  C-AA: EDELMIRA Ly  Anesthesia Resident: Crow Dao DO; Jayson Mendez MD  Perfusionist: Yessica Rebolledo  ROSSY: Savanah Miramontes    Procedure Summary  Anesthesia: Anesthesia type not filed in the log.  ASA: ASA status not filed in the log.  Estimated Blood Loss: 200 mL  Intra-op Medications:   Administrations occurring from 24 1414 to 24 1414:    Medication Name Total Dose   sodium chloride 0.9 % irrigation solution 3,000 mL   papaverine injection 300 mg   albumin human bottle 5% 500 mL   aspirin EC tablet 81 mg Cannot be calculated   atorvastatin (Lipitor) tablet 80 mg Cannot be calculated   calcium chloride 100 mg/mL syringe 1 g   ceFAZolin (Ancef) vial 1 g 4 g   diphenhydrAMINE 50 mg/mL 25 mg   electrolyte-A (Plasmalyte-A) Cannot be calculated   EPINEPHrine (Adrenalin) 4 mg in sodium chloride 0.9% 250 mL (16 mcg/mL) infusion (premix) 5.55 mg   esmolol (Brevibloc) injection 20 mg   famotidine (Pepcid) 10 mg/mL 20 mg   fentaNYL (Sublimaze) injection 50 mcg/mL 1,000 mcg   glycopyrrolate (Robinul) injection 0.2 mg   heparin injection 1,000 units/mL 65,000 Units   heparin bolus from bag 2,000-4,000 Units Cannot be calculated   insulin regular (HumuLIN R,NovoLIN R) 100 Units in sodium chloride 0.9% 100 mL (1 Units/mL) infusion 16.43 Units   insulin regular (HumuLIN R, NovoLIN R) bolus from bag 2 Units   lidocaine (cardiac) injection 2% prefilled syringe 100 mg   lidocaine (Xylocaine) injection 1 % 100 mg   magnesium sulfate 50 % injection 2 g   melatonin tablet 6 mg Cannot be calculated   methadone (Dolophine) injection 15 mg   metoprolol tartrate (Lopressor) tablet 12.5 mg Cannot be calculated   midazolam PF (Versed) injection 1 mg/mL 2 mg   mupirocin (Bactroban) 2 % ointment Cannot be calculated   norepinephrine (Levophed) 8 mg in sodium chloride 0.9% 250 mL (0.032 mg/mL) infusion (premix) 0.79 mg   norepinephrine (Levophed) 16 mcg/mL syringe for Anesthesia 132 mcg   phenylephrine (Beck-Synephrine) 10 mg in sodium chloride 0.9% 250 mL (0.04 mg/mL) infusion (premix) 2.76 mg   phenylephrine (Beck-Synephrine) injection 360 mcg   propofol (Diprivan) injection 10 mg/mL 50 mg   protamine injection 400 mg   rocuronium (ZeMuron) 50 mg/5 mL injection 210 mg   sennosides-docusate sodium (Cheryle-Colace) 8.6-50 mg per tablet 1 tablet Cannot be calculated   sodium  bicarbonate injection 1 mEq/mL 25 mEq   tranexamic acid (Cyklokapron) 6,250 mg in sodium chloride 0.9% 312.5 mL (20 mg/mL) infusion 7,796.64 mg              Anesthesia Record               Intraprocedure I/O Totals          Intake    PLATELETS 555.00 mL    electrolyte-A (Plasmalyte-A) 1300.00 mL    Norepinephrine Drip 0.00 mL    The total shown is the total volume documented since Anesthesia Start was filed.    Tranexamic Acid 0.00 mL    The total shown is the total volume documented since Anesthesia Start was filed.    Insulin Drip 0.00 mL    The total shown is the total volume documented since Anesthesia Start was filed.    Epinephrine Drip 0.00 mL    The total shown is the total volume documented since Anesthesia Start was filed.    Phenylephrine Drip 0.00 mL    The total shown is the total volume documented since Anesthesia Start was filed.    Cell Saver 890 mL    Total Intake 2745 mL       Output    Urine 510 mL    Total Output 510 mL       Net    Net Volume 2235 mL          Specimen: No specimens collected              Drains and/or Catheters:   Urethral Catheter Temperature probe 16 Fr. (Active)       Tourniquet Times:         Implants:     Findings: 1.There were no pericardial adhesions or effusions.   2. The ascending aorta was soft without evidence of atherosclerosis.   3. The heart was normal sinus rhythm and demonstrated normal left ventricular function.    4.The patient had severe diffuse coronary artery disease, however we were able to find locations on the OM and LAD that was suitable for grafting.  The LAD was heavily calcified requiring partial endarterectomy  5.  The conduits were suitable for grafting.  6.  The flow of the graft after the protamine were acceptable.  7.  Both the left femoral artery and vein were suitable for peripheral cannulation    Indications: Jaime Dominguez is an 74 y.o. male who is having surgery for STEMI (ST elevation myocardial infarction) (Multi) [I21.3]. Mr. Jaime ABEL  Angelica is a 74 y.o. male who initially presented with left sided chest pain, shortness of breath, nausea, and diaphoresis while doing yardwork. He came into the house in distress. His wife reported a brief loss of consciousness. EMS was called. He was diagnosed with STEMI in route and given Brilinta at that time. In the ED labwork and CXR were unremarkable. ECG with acute ST elevations. 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   After reviewing the studies we extensively discussed with the patient the option to include a mini invasive robotic assist double bypass or a full sternotomy.  He clearly understood the risk of a mini invasive approach in terms of conversion and he agreed to proceed.  The patient was seen in the preoperative area. The risks, benefits, complications, treatment options, non-operative alternatives, expected recovery and outcomes were discussed with the patient. The possibilities of reaction to medication, pulmonary aspiration, injury to surrounding structures, bleeding, recurrent infection, the need for additional procedures, failure to diagnose a condition, and creating a complication requiring transfusion or operation were discussed with the patient. The patient concurred with the proposed plan, giving informed consent.  The site of surgery was properly noted/marked if necessary per policy. The patient has been actively warmed in preoperative area. Preoperative antibiotics have been ordered and given within 1 hours of incision. Venous thrombosis prophylaxis are not indicated.    Procedure Details: After informed consent, and positive identification, the patient was brought to the operative theatre. They were placed on the operating room table in the supine position and an arterial monitoring line was placed. They subsequently  underwent induction of anesthesia, and kristyn-tracheal intubation.  A EZEQUIEL probe was placed in the esophagus, and central intravenous access obtained. They were then  prepped and draped in a sterile surgical fashion.      A surgical time-out was performed with the correct patient, correct procedure, laterality, timing and dosage of antibiotics, availability of blood, administration of beta blocker, fire risk, and sterility of  instruments were all verified, before beginning the case.   The case was performed as follows.  1.  Robotic harvest of the mammary.  The patient was placed on single right lung ventilation. Three 8mm robotic ports were placed at the 2nd, 5th and 7th intercostal spaces under direct visualization.  Pneumothorax was induced to 8mmHg and the robot was docked.  The mammary was harvested from the second intercostal space distal to the bifurcation.  Heparin was administered and the mammary divided distally.  It was tacked in placed along the medial aspect of the pericardium.  The pericardium was opened and the LAD identified. The robot was then undocked.  The trocar site at the 5th intercostal space was extended several centimeters and the thoratrack retractor was placed.   2.  In the meantime the left radial artery was obtained endoscopically.   3.  The first thing that we performed was the Y graft between the mammary and the radial artery.  We prepared the mammary at the site where we performed the T graft and when we open we discover a partial dissection.  In order to so that we The mammary and perform a end-to-end anastomosis with a radial using 8 oh in a running fashion.  The distal to inches of the mammary was anastomosed just above the anastomosis.  In that way we have the mammary prolonged with a radial and sure Y graft with the distal mammary.  My plan was to perform the mammary to LAD and the radial to the OM.  When we check the flow we realized that we have a proximal dissection in the mammary around extending to centimeters from the anastomosis.  We redid the radial to mammary end-to-end anastomosis proximally and excellent flow was obtained in both  limbs.  4.  We exposed the femoral artery and vein and performed the peripheral cannulation using the Seldinger technique and EZEQUIEL guidance.  We used a 25 cannula in the femoral vein that was advanced all the way up to the right atrium.  We used a 19 arterial cannula in the femoral artery.  5.  The patient was placed on on cardiopulmonary bypass uneventfully and excellent flows were achieved.  The heart was decompressed and the OM was exposed using the off-pump stabilizer starfish.  6.  Distal anastomosis were performed was the radial to the OM.  The OM was a large vessel around 2 mm in diameter, we use a 2 mm intracoronary shunt to perform an end-to-side anastomosis using 7-0 Prolene in a running fashion.  Excellent flow was observed using the flow probe.  7.  We then exposed the LAD.  The LAD was diffusely diseased and intramyocardial at the level of the second diagonal.  Proximal to that was severely calcified.  We opened the LAD and a 2 mm intracoronary shunt was used to perform an end-to-side anastomosis using the distal LAD.  There was a quite difficult anastomosis and after we finish the flow was not satisfactory.  At this point we decided to convert the patient to full sternotomy.  8.  We performed a full sternotomy, expose the heart and using an antegrade cardioplegia needle on the ascending aorta we crossclamped the aorta and arrested the heart using antegrade cardioplegia.  9.  After the heart was arrested we exposed the LAD, just at the level of the bifurcation with the second diagonal that have an ostial plaque.  We performed endarterectomy of the plaque and using part of the radial that was not used for the bypass with patch around an inch of the LAD from just above the second diagonal, the ostium of the second diagonal and within the patch around 1 cm distal to the diagonal.  This radial was anastomosed to the to the mammary.  With this we achieved excellent flow in both the LAD bypass and the OM  bypass.  10.  The patient was easily weaned from cardiopulmonary bypass with some inotropic support uneventfully.  The patient was decannulated and the protamine was given.    Hemostasis was obtained, mediastinal and bilateral pleural chest tubes were placed, right ventricular pacemaker wires were placed on the diaphragmatic surface of the RV, and the wounds were closed in the standard fashion.  The patient tolerated the procedure well and was brought to the intensive care unit in stable condition.  To sponge counts, instrument counts, and needle counts were reported correct by the circulating nurse.  There were no specimens sent to pathology.  The drains included mediastinal and pleural chest tube.    Complications:  None; patient tolerated the procedure well.    Disposition: ICU - intubated and hemodynamically stable.  Condition: stable         Task Performed by RNFA or Surgical Assistant:  Calvin Mccarthy was a second assistant during the whole procedure.  He was the first assistant for the femoral collation decannulation.  He was responsible of the close of the patient and the chest tube placement.      Additional Details: The patient required conversion for robotic assisted mini invasive CABG to full sternotomy    Attending Attestation: I was present and scrubbed for the entire procedure.    Rodrigo Harman  Phone Number: [phone]

## 2024-11-13 NOTE — PROGRESS NOTES
CTICU Progress Note  Jaime Silveiratie/[id number]    Admit Date: 11/10/2024  Hospital Length of Stay: 3   ICU Length of Stay: 10h   CT SURGEON:     SUBJECTIVE:   This AM, administered 500 5% albumin and 100mL 25% albumin. Epi at 0.03 and levo 0.07. Pt intubated and sedated.     MEDICATIONS  Infusions:  EPINEPHrine, Last Rate: 0.02 mcg/kg/min (11/13/24 1032)  lactated Ringer's, Last Rate: 20 mL/hr (11/13/24 1031)  lactated Ringer's, Last Rate: 5 mL/hr (11/13/24 0600)  norepinephrine, Last Rate: 0.08 mcg/kg/min (11/13/24 1039)  propofol, Last Rate: Stopped (11/13/24 1036)      Scheduled:  acetaminophen, 650 mg, q6h  albumin human, 25 g, Once  aspirin, 81 mg, Daily  atorvastatin, 80 mg, Nightly  calcium chloride, 1 g, Once  ceFAZolin, 2 g, q8h  clopidogrel, 75 mg, Daily  heparin (porcine), 5,000 Units, q8h  insulin lispro, 0-5 Units, q4h  pantoprazole, 40 mg, Daily before breakfast   Or  pantoprazole, 40 mg, Daily before breakfast  polyethylene glycol, 17 g, BID  sennosides-docusate sodium, 2 tablet, BID      PRN:  calcium gluconate, 1 g, q6h PRN  calcium gluconate, 2 g, q6h PRN  HYDROmorphone, 0.2 mg, q15 min PRN  magnesium sulfate, 2 g, q6h PRN  magnesium sulfate, 4 g, q6h PRN  naloxone, 0.2 mg, q5 min PRN  ondansetron, 4 mg, q8h PRN   Or  ondansetron, 4 mg, q8h PRN  oxyCODONE, 10 mg, q4h PRN  oxyCODONE, 5 mg, q4h PRN  oxygen, , Continuous PRN - O2/gases  potassium chloride CR, 20 mEq, q6h PRN   Or  potassium chloride, 20 mEq, q6h PRN  potassium chloride CR, 40 mEq, q6h PRN   Or  potassium chloride, 40 mEq, q6h PRN  potassium chloride, 20 mEq, q6h PRN  potassium chloride, 40 mEq, q6h PRN        PHYSICAL EXAM:   Visit Vitals  /82   Pulse 90   Temp 36.3 °C (97.3 °F) (Temporal)   Resp 20   Ht 1.829 m (6')   Wt 93.4 kg (205 lb 14.6 oz)   SpO2 100%   BMI 27.93 kg/m²   Smoking Status Never   BSA 2.18 m²     Wt Readings from Last 5 Encounters:   11/12/24 93.4 kg (205 lb 14.6 oz)   11/10/24 96.2 kg (212 lb)      INTAKE/OUTPUT:  I/O last 3 completed shifts:  In: 6877.3 (73.6 mL/kg) [I.V.:1054.8 (11.3 mL/kg); Blood:2695; NG/GT:90; IV Piggyback:3037.5]  Out: 1795 (19.2 mL/kg) [Urine:1145 (0.3 mL/kg/hr); Blood:250; Chest Tube:400]  Weight: 93.4 kg        LDA:  CVC 11/12/24 Double lumen Right Internal jugular (Active)   Placement Date/Time: 11/12/24 (c) 1506   Hand Hygiene Performed Prior to CVC Insertion: Yes  Site Prep: Chlorhexidine   Site Prep Agent has Completely Dried Before Insertion: Yes  All 5 Sterile Barriers Used (Gloves, Gown, Cap, Mask, Large Sterile Ivone...   Number of days: 0       Arterial Line 11/12/24 Right (Active)   Placement Date/Time: 11/12/24 1900   Orientation: Right  Placed by: OR   Number of days: 0       Pacer Wires (Active)   Placement Date/Time: 11/12/24 2320   Placed by: Dr. Aldo Harman  Type of Pacing Wires: Ventricular   Number of days: 0       ETT  8.5 mm (Active)   Placement Date/Time: 11/12/24 (c) 0028   Mask Ventilation: Not attempted  Technique: Exchange catheter  ETT Type: ETT - single  Single Lumen Tube Size: 8.5 mm  Cuffed: Yes  Location: Oral  Airway Insertion Attempts: 1  Placement Verification: Ausculta...   Number of days: 1       Urethral Catheter Temperature probe 16 Fr. (Active)   Placement Date/Time: 11/12/24 1452   Placed by: BRIANDA DENIS  Hand Hygiene Completed: Yes  Catheter Type: Temperature probe  Tube Size (Fr.): 16 Fr.  Catheter Balloon Size: 10 mL  Urine Returned: Yes   Number of days: 0       Chest Tube 1 Left Pleural (Active)   Placement Date: 11/13/24   Tube Number: 1  Chest Tube Orientation: Left  Chest Tube Location: Pleural   Number of days: 0       NG/OG/Feeding Tube Center mouth (Active)   Placement Date: 11/13/24   Tube Location: Center mouth   Number of days: 0       Y Chest Tube 1 and 2 Mediastinal Mediastinal (Active)   Placement Date: 11/13/24   Chest Tube Location 1: Mediastinal  Chest Tube Location 2: Mediastinal   Number of days: 0         Vent  settings:  Vent Mode: Pressure support  FiO2 (%):  [40 %-50 %] 40 %  S RR:  [16] 16  S VT:  [620 mL] 620 mL  PEEP/CPAP (cm H2O):  [5 cm H20-8 cm H20] 5 cm H20  MI SUP:  [5 cm H20] 5 cm H20  MAP (cm H2O):  [12-14] 14    Physical Exam:   Constitutional: Intubated and sedated on mechanical ventilation in NAD   Neuro: Neuro intact without obvious focal deficits, on sedation.  PERRL  CV: RRR.  S1S2.  SR on monitor.  AV wires set VVI50  Pulm: CTAB on mechanical ventilation.  CT's with appropriate serosang output and no airleak.  : clear yellow urine fvia aden  GI: S/ND/NT. Hypoactive BS. OGT in place with bilious output.  Extremities: NV exam intact x 4.  No edema.  Skin: WDI.  Postop dressings intact.  Chest tube dressings intact.    Psych: DELONTE, sedated      Images:     Invasive Hemodynamics:    Most Recent Range Past 24hrs   BP (Art) 83/50 Arterial Line BP 1  Min: 53/34  Max: 120/64   MAP(Art) 63 mmHg Arterial Line MAP 1 (mmHg)   Min: 41 mmHg  Max: 88 mmHg   RA/CVP   No data recorded   PA   No data recorded   PA(mean)   No data recorded   CO   No data recorded   CI   No data recorded   Mixed Venous   No data recorded   SVR    No data recorded     If Devices present, use phrases:    For LVAD, .lhcticulvad   For ECMO, .lhcticuecmo  For Impella, .lhcticuimpella    Daily Risk Screen:  Hemodynamic monitoring  critically ill patient who need accurate urinary output measurements  they are planned for extubation trial later today/tonight      Assessment/Plan   Assessment:     Plan:  NEURO:  No PMH. Patient is intubated and sedated on propofol infusion. Acute post operative pain.   -->  - Serial neuro and pain assessments   - Wean propofol,   - Scheduled Tylenol   - PRN oxycodone  - PRN dilaudid for pain   - PT Consult, OOB to chair as tolerated, chair position if not tolerated   - CAM ICU score qshift  - Sleep/wake cycle hygiene       CV:  Patient has a history of HPL. Is now status post MIDCABG x 2 with conversion to full  sternotomy LIMA prolonged as a Y graft with a radial to LAD OM. Pre/Post EF: normal BIV Arrived to CTICU on epi 0.04. A/V epicardial wires set VVI @ 50.-->  - Wean levo while maintaining,MAP 70-90  - wean epi for ScVO2 >65  - Mixed venous and CI Q4H  - Volume resuscitate as clinically indicated  - Maintain epicardial wires set VVI @ 50  - Plavix   - Start statin  - Start ASA       PULM:  No history of pulmonary disease.  Currently intubated on ventilator. Chest tubes 2x meds and 1 L pleural.-->  - F/u post op CXR  - wean ventilator settings towards CPAP & extubation   - Wean FiO2 maintaining SpO2 >92%.   - IS q1h and OOB to chair when extubated  - Chest tubes to wall suction.     GI:  PMH of n/a.  OG in place.-->  - Continue PPI until extubated  - NPO, will perform bedside swallow eval post extubation   - Colace/senna BID and miralax BID     :  CSA-TREE Risk Score low.  No history of renal disease, baseline creatinine 0.87. Creatinine stable post-op. Aden in place and making adequate UOP. -->  - Continue aden catheter for strict I/Os.  - Goal UOP 0.5ml/kg/hr  - RFP as clinically indicated  - Replete electrolytes per CTICU protocol     ENDO: No PMH. A1c: 5.1-->  - Maintain BG <180, insulin per CTICU protocol     HEME:  Acute blood loss anemia-->    - Monitor drain output volume and characteristics  - CBC, coags, and fibrinogen post op and as clinically indicated  - C/w ASA   - Plavix POD1  - SQH today  - SCDs for DVT prophylaxis.  - Last type and screen: 11/11     ID:  Febrile. MRSA negative.WBC uptrending -->  - continue to monitor WBC  - Trend temp q4h  - Periop cefazolin x 48hrs     Skin:  No active skin issues.  - preventative Mepilex dressings in place on sacrum and heels  - change preventative Mepilex weekly or more frequently as indicated (when moist/soiled)   - every shift skin assessment per nursing and weekly ICU skin rounds  - moisture barrier to be applied with sandro care  - active skin problems  addressed with nursing on daily rounds     Proph:  SCDs  PPI     G:  Line  Right IJ MAC w Minimac placed 11/12  R brachial a-line placed 11/12  Hale 11/12     The indications and risks/benefits of non-violent/non self-destructive restraints were discussed.      F: Family: will update at bedside postoperatively.     A,B,C,D,E,F,G: reviewed     Seen and discussed with ICU attending Dr. Cervantes     Dispo: CTICU care for now.    CTICU TEAM PHONE [id number]       Restraints: The indications and risks/benefits of non-violent/non self-destructive restraints were discussed and are indicated for the safety of the patient.    Code status: Full Code      A,B,C,D,E,F,G: reviewed     Dispo: CTICU care for now.    CTICU TEAM PHONE [phone]

## 2024-11-13 NOTE — ANESTHESIA POSTPROCEDURE EVALUATION
Patient: Jaime Dominguez    Procedure Summary       Date: 11/12/24 Room / Location: [facility] OR 18 / [facility] [place]    Anesthesia Start: 1409 Anesthesia Stop: 11/13/24 0101    Procedure: MIDCAB x 2 LIMA & ENDOSCOPIC Radial HARVEST, ROBOT-ASSISTED, CONVERTED TO STERNOTOMY, FEMORAL CANNULATION Diagnosis:       STEMI (ST elevation myocardial infarction) (Multi)      (STEMI (ST elevation myocardial infarction) (Multi) [I21.3])    Surgeons: Rodrigo Harman MD Responsible Provider: Cayden James MD    Anesthesia Type: general ASA Status: 4            Anesthesia Type: general    Vitals Value Taken Time   /65 11/13/24 0101   Temp 36.6 °C (97.88 °F) 11/13/24 0100   Pulse 90 11/13/24 0100   Resp 16 11/13/24 0100   SpO2 84 % 11/13/24 0048   Vitals shown include unfiled device data.    Anesthesia Post Evaluation    Patient location during evaluation: ICU  Patient participation: complete - patient cannot participate  Level of consciousness: sedated  Pain score: 0  Pain management: adequate  Multimodal analgesia pain management approach  Airway patency: patent  Cardiovascular status: acceptable and hemodynamically stable  Respiratory status: acceptable, ETT and intubated  Hydration status: acceptable  Postoperative Nausea and Vomiting: none        There were no known notable events for this encounter.

## 2024-11-13 NOTE — PROGRESS NOTES
11/13/24 1242   Discharge Planning   Living Arrangements Spouse/significant other   Support Systems Spouse/significant other;Children   Assistance Needed IPTA   Type of Residence Private residence  (Split level - 5 up and 5 down. Bed/bath up.)   Number of Stairs to Enter Residence 4   Do you have animals or pets at home? No   Who is requesting discharge planning? Provider   Home or Post Acute Services In home services;Other (Comment)   Type of Home Care Services Other (Comment)   Expected Discharge Disposition Othe   Does the patient need discharge transport arranged? No  (If home = no / If to post acute facility = yes)   Financial Resource Strain   How hard is it for you to pay for the very basics like food, housing, medical care, and heating? Not hard   Housing Stability   In the last 12 months, was there a time when you were not able to pay the mortgage or rent on time? N   At any time in the past 12 months, were you homeless or living in a shelter (including now)? N   Transportation Needs   In the past 12 months, has lack of transportation kept you from medical appointments or from getting medications? no   In the past 12 months, has lack of transportation kept you from meetings, work, or from getting things needed for daily living? No   Patient Choice   Patient / Family choosing to utilize agency / facility established prior to hospitalization No     DC Assessment with son but advised to meet with wife and/or patient for SDOH assessment.      Dana Christianson (LSW, MSW)      Second message left for health department to call office.

## 2024-11-14 ENCOUNTER — APPOINTMENT (OUTPATIENT)
Dept: RADIOLOGY | Facility: HOSPITAL | Age: 74
DRG: 003 | End: 2024-11-14
Payer: MEDICARE

## 2024-11-14 ENCOUNTER — ANESTHESIA (OUTPATIENT)
Dept: OPERATING ROOM | Facility: HOSPITAL | Age: 74
End: 2024-11-14
Payer: MEDICARE

## 2024-11-14 ENCOUNTER — APPOINTMENT (OUTPATIENT)
Dept: CARDIOLOGY | Facility: HOSPITAL | Age: 74
DRG: 003 | End: 2024-11-14
Payer: MEDICARE

## 2024-11-14 ENCOUNTER — ANESTHESIA EVENT (OUTPATIENT)
Dept: OPERATING ROOM | Facility: HOSPITAL | Age: 74
End: 2024-11-14
Payer: MEDICARE

## 2024-11-14 PROBLEM — R57.0 CARDIOGENIC SHOCK (MULTI): Status: ACTIVE | Noted: 2024-11-10

## 2024-11-14 PROBLEM — Z98.890 ON INTRA-AORTIC BALLOON PUMP ASSIST: Status: ACTIVE | Noted: 2024-11-14

## 2024-11-14 PROBLEM — N17.9 AKI (ACUTE KIDNEY INJURY) (CMS-HCC): Status: ACTIVE | Noted: 2024-11-14

## 2024-11-14 LAB
ABO GROUP (TYPE) IN BLOOD: NORMAL
ACT BLD: 313 SEC (ref 83–199)
ACT BLD: 376 SEC (ref 83–199)
ACT BLD: 385 SEC (ref 83–199)
ACT BLD: NORMAL S
ALBUMIN SERPL BCP-MCNC: 3.8 G/DL (ref 3.4–5)
ALBUMIN SERPL BCP-MCNC: 4.4 G/DL (ref 3.4–5)
ANION GAP BLDA CALCULATED.4IONS-SCNC: 11 MMO/L (ref 10–25)
ANION GAP BLDA CALCULATED.4IONS-SCNC: 12 MMO/L
ANION GAP BLDA CALCULATED.4IONS-SCNC: 12 MMO/L (ref 10–25)
ANION GAP BLDA CALCULATED.4IONS-SCNC: 13 MMO/L
ANION GAP BLDA CALCULATED.4IONS-SCNC: 15 MMO/L (ref 10–25)
ANION GAP BLDA CALCULATED.4IONS-SCNC: 8 MMO/L (ref 10–25)
ANION GAP BLDA CALCULATED.4IONS-SCNC: 9 MMO/L (ref 10–25)
ANION GAP BLDA CALCULATED.4IONS-SCNC: 9 MMO/L (ref 10–25)
ANION GAP BLDMV CALCULATED.4IONS-SCNC: 13 MMO/L (ref 10–25)
ANION GAP BLDMV CALCULATED.4IONS-SCNC: 16 MMO/L (ref 10–25)
ANION GAP BLDV CALCULATED.4IONS-SCNC: 11 MMOL/L (ref 10–25)
ANION GAP SERPL CALC-SCNC: 15 MMOL/L (ref 10–20)
ANION GAP SERPL CALC-SCNC: 19 MMOL/L (ref 10–20)
ANTIBODY SCREEN: NORMAL
APTT PPP: >200 SECONDS (ref 27–38)
ATRIAL RATE: 94 BPM
BASE EXCESS BLDA CALC-SCNC: -0.8 MMOL/L
BASE EXCESS BLDA CALC-SCNC: -0.9 MMOL/L
BASE EXCESS BLDA CALC-SCNC: -1.8 MMOL/L (ref -2–3)
BASE EXCESS BLDA CALC-SCNC: -4.9 MMOL/L (ref -2–3)
BASE EXCESS BLDA CALC-SCNC: -6 MMOL/L (ref -2–3)
BASE EXCESS BLDA CALC-SCNC: 0 MMOL/L (ref -2–3)
BASE EXCESS BLDA CALC-SCNC: 0.1 MMOL/L (ref -2–3)
BASE EXCESS BLDA CALC-SCNC: 0.5 MMOL/L (ref -2–3)
BASE EXCESS BLDMV CALC-SCNC: -2 MMOL/L (ref -2–3)
BASE EXCESS BLDMV CALC-SCNC: -4.7 MMOL/L (ref -2–3)
BASE EXCESS BLDV CALC-SCNC: -4.7 MMOL/L (ref -2–3)
BLOOD EXPIRATION DATE: NORMAL
BODY SURFACE AREA: 2.18 M2
BODY TEMPERATURE: 37 DEGREES CELSIUS
BUN SERPL-MCNC: 27 MG/DL (ref 6–23)
BUN SERPL-MCNC: 27 MG/DL (ref 6–23)
CA-I BLD-SCNC: 1.05 MMOL/L (ref 1.1–1.33)
CA-I BLD-SCNC: 1.07 MMOL/L (ref 1.1–1.33)
CA-I BLDA-SCNC: 1.01 MMOL/L (ref 1.1–1.33)
CA-I BLDA-SCNC: 1.05 MMOL/L (ref 1.1–1.33)
CA-I BLDA-SCNC: 1.07 MMOL/L (ref 1.1–1.33)
CA-I BLDA-SCNC: 1.08 MMOL/L (ref 1.1–1.33)
CA-I BLDA-SCNC: 1.09 MMOL/L (ref 1.1–1.33)
CA-I BLDA-SCNC: 1.1 MMOL/L (ref 1.1–1.33)
CA-I BLDMV-SCNC: 1.06 MMOL/L (ref 1.1–1.33)
CA-I BLDMV-SCNC: 1.11 MMOL/L (ref 1.1–1.33)
CA-I BLDV-SCNC: 1.1 MMOL/L (ref 1.1–1.33)
CALCIUM SERPL-MCNC: 7.5 MG/DL (ref 8.6–10.6)
CALCIUM SERPL-MCNC: 8.2 MG/DL (ref 8.6–10.6)
CARDIAC TROPONIN I PNL SERPL HS: ABNORMAL NG/L (ref 0–53)
CARDIAC TROPONIN I PNL SERPL HS: ABNORMAL NG/L (ref 0–53)
CHLORIDE BLD-SCNC: 103 MMOL/L (ref 98–107)
CHLORIDE BLD-SCNC: 103 MMOL/L (ref 98–107)
CHLORIDE BLDA-SCNC: 101 MMOL/L (ref 98–107)
CHLORIDE BLDA-SCNC: 101 MMOL/L (ref 98–107)
CHLORIDE BLDA-SCNC: 102 MMOL/L (ref 98–107)
CHLORIDE BLDA-SCNC: 104 MMOL/L (ref 98–107)
CHLORIDE BLDA-SCNC: 108 MMOL/L (ref 98–107)
CHLORIDE BLDV-SCNC: 104 MMOL/L (ref 98–107)
CHLORIDE SERPL-SCNC: 101 MMOL/L (ref 98–107)
CHLORIDE SERPL-SCNC: 102 MMOL/L (ref 98–107)
CO2 SERPL-SCNC: 21 MMOL/L (ref 21–32)
CO2 SERPL-SCNC: 23 MMOL/L (ref 21–32)
CREAT SERPL-MCNC: 1.02 MG/DL (ref 0.5–1.3)
CREAT SERPL-MCNC: 1.3 MG/DL (ref 0.5–1.3)
DISPENSE STATUS: NORMAL
EGFRCR SERPLBLD CKD-EPI 2021: 58 ML/MIN/1.73M*2
EGFRCR SERPLBLD CKD-EPI 2021: 78 ML/MIN/1.73M*2
ERYTHROCYTE [DISTWIDTH] IN BLOOD BY AUTOMATED COUNT: 13.1 % (ref 11.5–14.5)
ERYTHROCYTE [DISTWIDTH] IN BLOOD BY AUTOMATED COUNT: 14.5 % (ref 11.5–14.5)
FIBRINOGEN PPP-MCNC: 441 MG/DL (ref 200–400)
GLUCOSE BLD MANUAL STRIP-MCNC: 125 MG/DL (ref 74–99)
GLUCOSE BLD MANUAL STRIP-MCNC: 157 MG/DL (ref 74–99)
GLUCOSE BLD MANUAL STRIP-MCNC: 99 MG/DL (ref 74–99)
GLUCOSE BLD-MCNC: 134 MG/DL (ref 74–99)
GLUCOSE BLD-MCNC: 134 MG/DL (ref 74–99)
GLUCOSE BLDA-MCNC: 118 MG/DL (ref 74–99)
GLUCOSE BLDA-MCNC: 120 MG/DL (ref 74–99)
GLUCOSE BLDA-MCNC: 128 MG/DL (ref 74–99)
GLUCOSE BLDA-MCNC: 129 MG/DL (ref 74–99)
GLUCOSE BLDA-MCNC: 134 MG/DL (ref 74–99)
GLUCOSE BLDA-MCNC: 135 MG/DL (ref 74–99)
GLUCOSE BLDA-MCNC: 135 MG/DL (ref 74–99)
GLUCOSE BLDA-MCNC: 148 MG/DL (ref 74–99)
GLUCOSE BLDV-MCNC: 162 MG/DL (ref 74–99)
GLUCOSE SERPL-MCNC: 124 MG/DL (ref 74–99)
GLUCOSE SERPL-MCNC: 128 MG/DL (ref 74–99)
HCO3 BLDA-SCNC: 18.3 MMOL/L (ref 22–26)
HCO3 BLDA-SCNC: 19.2 MMOL/L (ref 22–26)
HCO3 BLDA-SCNC: 22.8 MMOL/L
HCO3 BLDA-SCNC: 23.1 MMOL/L
HCO3 BLDA-SCNC: 23.1 MMOL/L (ref 22–26)
HCO3 BLDA-SCNC: 23.3 MMOL/L (ref 22–26)
HCO3 BLDA-SCNC: 23.8 MMOL/L (ref 22–26)
HCO3 BLDA-SCNC: 24.7 MMOL/L (ref 22–26)
HCO3 BLDMV-SCNC: 20.4 MMOL/L (ref 22–26)
HCO3 BLDMV-SCNC: 23.1 MMOL/L (ref 22–26)
HCO3 BLDV-SCNC: 21.1 MMOL/L (ref 22–26)
HCT VFR BLD AUTO: 23.9 % (ref 41–52)
HCT VFR BLD AUTO: 25.6 % (ref 41–52)
HCT VFR BLD EST: 22 % (ref 41–52)
HCT VFR BLD EST: 23 % (ref 41–52)
HCT VFR BLD EST: 25 % (ref 41–52)
HCT VFR BLD EST: 26 % (ref 41–52)
HCT VFR BLD EST: 27 % (ref 41–52)
HCT VFR BLD EST: 28 % (ref 41–52)
HGB BLD-MCNC: 8 G/DL (ref 13.5–17.5)
HGB BLD-MCNC: 8.6 G/DL (ref 13.5–17.5)
HGB BLDA-MCNC: 7.4 G/DL (ref 13.5–17.5)
HGB BLDA-MCNC: 7.5 G/DL (ref 13.5–17.5)
HGB BLDA-MCNC: 8.3 G/DL (ref 13.5–17.5)
HGB BLDA-MCNC: 8.5 G/DL (ref 13.5–17.5)
HGB BLDA-MCNC: 8.6 G/DL (ref 13.5–17.5)
HGB BLDA-MCNC: 9.2 G/DL (ref 13.5–17.5)
HGB BLDA-MCNC: 9.2 G/DL (ref 13.5–17.5)
HGB BLDA-MCNC: 9.3 G/DL (ref 13.5–17.5)
HGB BLDMV-MCNC: 8.6 G/DL (ref 13.5–17.5)
HGB BLDMV-MCNC: 8.8 G/DL (ref 13.5–17.5)
HGB BLDV-MCNC: 8.9 G/DL (ref 13.5–17.5)
INHALED O2 CONCENTRATION: 100 %
INHALED O2 CONCENTRATION: 100 %
INHALED O2 CONCENTRATION: 30 %
INHALED O2 CONCENTRATION: 36 %
INHALED O2 CONCENTRATION: 40 %
INHALED O2 CONCENTRATION: 50 %
INR PPP: 2 (ref 0.9–1.1)
LACTATE BLDA-SCNC: 1.9 MMOL/L (ref 0.4–2)
LACTATE BLDA-SCNC: 2.1 MMOL/L (ref 0.4–2)
LACTATE BLDA-SCNC: 3.1 MMOL/L (ref 0.4–2)
LACTATE BLDA-SCNC: 4.3 MMOL/L (ref 0.4–2)
LACTATE BLDA-SCNC: 4.6 MMOL/L (ref 0.4–2)
LACTATE BLDA-SCNC: 4.8 MMOL/L (ref 0.4–2)
LACTATE BLDA-SCNC: 6.8 MMOL/L (ref 0.4–2)
LACTATE BLDA-SCNC: ABNORMAL MMOL/L
LACTATE BLDMV-SCNC: 4.6 MMOL/L (ref 0.4–2)
LACTATE BLDMV-SCNC: 5.7 MMOL/L (ref 0.4–2)
LACTATE BLDV-SCNC: 7.4 MMOL/L (ref 0.4–2)
LIDOCAIN SERPL-MCNC: 1 UG/ML (ref 1–5)
MAGNESIUM SERPL-MCNC: 2.73 MG/DL (ref 1.6–2.4)
MAGNESIUM SERPL-MCNC: 2.74 MG/DL (ref 1.6–2.4)
MCH RBC QN AUTO: 31 PG (ref 26–34)
MCH RBC QN AUTO: 31.5 PG (ref 26–34)
MCHC RBC AUTO-ENTMCNC: 33.5 G/DL (ref 32–36)
MCHC RBC AUTO-ENTMCNC: 33.6 G/DL (ref 32–36)
MCV RBC AUTO: 93 FL (ref 80–100)
MCV RBC AUTO: 94 FL (ref 80–100)
NRBC BLD-RTO: 0 /100 WBCS (ref 0–0)
NRBC BLD-RTO: 0 /100 WBCS (ref 0–0)
OXYHGB MFR BLDA: 90.9 %
OXYHGB MFR BLDA: 93.4 % (ref 94–98)
OXYHGB MFR BLDA: 94.3 % (ref 94–98)
OXYHGB MFR BLDA: 95 % (ref 94–98)
OXYHGB MFR BLDA: 96.7 %
OXYHGB MFR BLDA: 96.7 % (ref 94–98)
OXYHGB MFR BLDA: 96.9 % (ref 94–98)
OXYHGB MFR BLDA: 98.1 % (ref 94–98)
OXYHGB MFR BLDMV: 50.6 % (ref 45–75)
OXYHGB MFR BLDMV: 50.8 % (ref 45–75)
OXYHGB MFR BLDV: 63.5 % (ref 45–75)
P AXIS: 39 DEGREES
P OFFSET: 186 MS
P ONSET: 136 MS
PCO2 BLDA: 31 MM HG (ref 38–42)
PCO2 BLDA: 31 MM HG (ref 38–42)
PCO2 BLDA: 32 MM HG
PCO2 BLDA: 32 MM HG (ref 38–42)
PCO2 BLDA: 32 MM HG (ref 38–42)
PCO2 BLDA: 34 MM HG
PCO2 BLDA: 39 MM HG (ref 38–42)
PCO2 BLDA: 39 MM HG (ref 38–42)
PCO2 BLDMV: 37 MM HG (ref 41–51)
PCO2 BLDMV: 40 MM HG (ref 41–51)
PCO2 BLDV: 41 MM HG (ref 41–51)
PH BLDA: 7.38 PH (ref 7.38–7.42)
PH BLDA: 7.38 PH (ref 7.38–7.42)
PH BLDA: 7.4 PH (ref 7.38–7.42)
PH BLDA: 7.41 PH (ref 7.38–7.42)
PH BLDA: 7.44 PH
PH BLDA: 7.46 PH
PH BLDA: 7.47 PH (ref 7.38–7.42)
PH BLDA: 7.48 PH (ref 7.38–7.42)
PH BLDMV: 7.35 PH (ref 7.33–7.43)
PH BLDMV: 7.37 PH (ref 7.33–7.43)
PH BLDV: 7.32 PH (ref 7.33–7.43)
PHOSPHATE SERPL-MCNC: 3 MG/DL (ref 2.5–4.9)
PHOSPHATE SERPL-MCNC: 3.8 MG/DL (ref 2.5–4.9)
PLATELET # BLD AUTO: 90 X10*3/UL (ref 150–450)
PLATELET # BLD AUTO: 93 X10*3/UL (ref 150–450)
PO2 BLDA: 144 MM HG (ref 85–95)
PO2 BLDA: 430 MM HG
PO2 BLDA: 62 MM HG
PO2 BLDA: 67 MM HG (ref 85–95)
PO2 BLDA: 67 MM HG (ref 85–95)
PO2 BLDA: 78 MM HG (ref 85–95)
PO2 BLDA: 89 MM HG (ref 85–95)
PO2 BLDA: 90 MM HG (ref 85–95)
PO2 BLDMV: 33 MM HG (ref 35–45)
PO2 BLDMV: 34 MM HG (ref 35–45)
PO2 BLDV: 38 MM HG (ref 35–45)
POTASSIUM BLDA-SCNC: 3.7 MMOL/L (ref 3.5–5.3)
POTASSIUM BLDA-SCNC: 4 MMOL/L (ref 3.5–5.3)
POTASSIUM BLDA-SCNC: 4.4 MMOL/L (ref 3.5–5.3)
POTASSIUM BLDA-SCNC: 4.6 MMOL/L (ref 3.5–5.3)
POTASSIUM BLDA-SCNC: 4.7 MMOL/L (ref 3.5–5.3)
POTASSIUM BLDA-SCNC: 4.8 MMOL/L (ref 3.5–5.3)
POTASSIUM BLDMV-SCNC: 4 MMOL/L (ref 3.5–5.3)
POTASSIUM BLDMV-SCNC: 4.1 MMOL/L (ref 3.5–5.3)
POTASSIUM BLDV-SCNC: 4 MMOL/L (ref 3.5–5.3)
POTASSIUM SERPL-SCNC: 3.9 MMOL/L (ref 3.5–5.3)
POTASSIUM SERPL-SCNC: 4.3 MMOL/L (ref 3.5–5.3)
PR INTERVAL: 168 MS
PRODUCT BLOOD TYPE: 6200
PRODUCT CODE: NORMAL
PROTHROMBIN TIME: 22.2 SECONDS (ref 9.8–12.8)
Q ONSET: 220 MS
QRS COUNT: 15 BEATS
QRS DURATION: 86 MS
QT INTERVAL: 350 MS
QTC CALCULATION(BAZETT): 437 MS
QTC FREDERICIA: 406 MS
R AXIS: 51 DEGREES
RBC # BLD AUTO: 2.58 X10*6/UL (ref 4.5–5.9)
RBC # BLD AUTO: 2.73 X10*6/UL (ref 4.5–5.9)
RH FACTOR (ANTIGEN D): NORMAL
SAO2 % BLDA: 100 % (ref 94–100)
SAO2 % BLDA: 93 %
SAO2 % BLDA: 96 % (ref 94–100)
SAO2 % BLDA: 97 % (ref 94–100)
SAO2 % BLDA: 97 % (ref 94–100)
SAO2 % BLDA: 98 % (ref 94–100)
SAO2 % BLDA: 98 % (ref 94–100)
SAO2 % BLDA: 99 %
SAO2 % BLDMV: 52 % (ref 45–75)
SAO2 % BLDMV: 52 % (ref 45–75)
SAO2 % BLDV: 65 % (ref 45–75)
SODIUM BLDA-SCNC: 132 MMOL/L (ref 136–145)
SODIUM BLDA-SCNC: 133 MMOL/L (ref 136–145)
SODIUM BLDA-SCNC: 135 MMOL/L (ref 136–145)
SODIUM BLDMV-SCNC: 135 MMOL/L (ref 136–145)
SODIUM BLDMV-SCNC: 135 MMOL/L (ref 136–145)
SODIUM BLDV-SCNC: 132 MMOL/L (ref 136–145)
SODIUM SERPL-SCNC: 136 MMOL/L (ref 136–145)
SODIUM SERPL-SCNC: 137 MMOL/L (ref 136–145)
T AXIS: 43 DEGREES
T OFFSET: 395 MS
UFH PPP CHRO-ACNC: 0.1 IU/ML
UNIT ABO: NORMAL
UNIT NUMBER: NORMAL
UNIT RH: NORMAL
UNIT VOLUME: 350
VENTRICULAR RATE: 94 BPM
WBC # BLD AUTO: 14 X10*3/UL (ref 4.4–11.3)
WBC # BLD AUTO: 17.1 X10*3/UL (ref 4.4–11.3)
XM INTEP: NORMAL

## 2024-11-14 PROCEDURE — 2500000004 HC RX 250 GENERAL PHARMACY W/ HCPCS (ALT 636 FOR OP/ED): Mod: JZ

## 2024-11-14 PROCEDURE — 93005 ELECTROCARDIOGRAM TRACING: CPT

## 2024-11-14 PROCEDURE — C9600 PERC DRUG-EL COR STENT SING: HCPCS | Mod: LD | Performed by: INTERNAL MEDICINE

## 2024-11-14 PROCEDURE — 2500000004 HC RX 250 GENERAL PHARMACY W/ HCPCS (ALT 636 FOR OP/ED): Performed by: STUDENT IN AN ORGANIZED HEALTH CARE EDUCATION/TRAINING PROGRAM

## 2024-11-14 PROCEDURE — 84132 ASSAY OF SERUM POTASSIUM: CPT | Performed by: STUDENT IN AN ORGANIZED HEALTH CARE EDUCATION/TRAINING PROGRAM

## 2024-11-14 PROCEDURE — 99291 CRITICAL CARE FIRST HOUR: CPT | Performed by: STUDENT IN AN ORGANIZED HEALTH CARE EDUCATION/TRAINING PROGRAM

## 2024-11-14 PROCEDURE — 74018 RADEX ABDOMEN 1 VIEW: CPT

## 2024-11-14 PROCEDURE — 93321 DOPPLER ECHO F-UP/LMTD STD: CPT | Performed by: INTERNAL MEDICINE

## 2024-11-14 PROCEDURE — 76937 US GUIDE VASCULAR ACCESS: CPT | Performed by: INTERNAL MEDICINE

## 2024-11-14 PROCEDURE — 83735 ASSAY OF MAGNESIUM: CPT | Performed by: NURSE PRACTITIONER

## 2024-11-14 PROCEDURE — 33947 ECMO/ECLS INITIATION ARTERY: CPT | Performed by: THORACIC SURGERY (CARDIOTHORACIC VASCULAR SURGERY)

## 2024-11-14 PROCEDURE — 2500000005 HC RX 250 GENERAL PHARMACY W/O HCPCS: Performed by: INTERNAL MEDICINE

## 2024-11-14 PROCEDURE — 93010 ELECTROCARDIOGRAM REPORT: CPT | Performed by: INTERNAL MEDICINE

## 2024-11-14 PROCEDURE — 93458 L HRT ARTERY/VENTRICLE ANGIO: CPT | Performed by: INTERNAL MEDICINE

## 2024-11-14 PROCEDURE — P9047 ALBUMIN (HUMAN), 25%, 50ML: HCPCS | Mod: JZ

## 2024-11-14 PROCEDURE — 80176 ASSAY OF LIDOCAINE: CPT | Performed by: STUDENT IN AN ORGANIZED HEALTH CARE EDUCATION/TRAINING PROGRAM

## 2024-11-14 PROCEDURE — 33947 ECMO/ECLS INITIATION ARTERY: CPT | Performed by: INTERNAL MEDICINE

## 2024-11-14 PROCEDURE — 2500000004 HC RX 250 GENERAL PHARMACY W/ HCPCS (ALT 636 FOR OP/ED): Performed by: NURSE PRACTITIONER

## 2024-11-14 PROCEDURE — 85347 COAGULATION TIME ACTIVATED: CPT

## 2024-11-14 PROCEDURE — 33949 ECMO/ECLS DAILY MGMT ARTERY: CPT

## 2024-11-14 PROCEDURE — 84132 ASSAY OF SERUM POTASSIUM: CPT | Performed by: NURSE PRACTITIONER

## 2024-11-14 PROCEDURE — 74018 RADEX ABDOMEN 1 VIEW: CPT | Performed by: RADIOLOGY

## 2024-11-14 PROCEDURE — 2500000002 HC RX 250 W HCPCS SELF ADMINISTERED DRUGS (ALT 637 FOR MEDICARE OP, ALT 636 FOR OP/ED): Performed by: STUDENT IN AN ORGANIZED HEALTH CARE EDUCATION/TRAINING PROGRAM

## 2024-11-14 PROCEDURE — C1874 STENT, COATED/COV W/DEL SYS: HCPCS | Performed by: INTERNAL MEDICINE

## 2024-11-14 PROCEDURE — 85347 COAGULATION TIME ACTIVATED: CPT | Performed by: INTERNAL MEDICINE

## 2024-11-14 PROCEDURE — 2720000007 HC OR 272 NO HCPCS: Performed by: INTERNAL MEDICINE

## 2024-11-14 PROCEDURE — 71045 X-RAY EXAM CHEST 1 VIEW: CPT | Performed by: RADIOLOGY

## 2024-11-14 PROCEDURE — 86901 BLOOD TYPING SEROLOGIC RH(D): CPT | Performed by: STUDENT IN AN ORGANIZED HEALTH CARE EDUCATION/TRAINING PROGRAM

## 2024-11-14 PROCEDURE — 85384 FIBRINOGEN ACTIVITY: CPT

## 2024-11-14 PROCEDURE — 84132 ASSAY OF SERUM POTASSIUM: CPT

## 2024-11-14 PROCEDURE — 2500000001 HC RX 250 WO HCPCS SELF ADMINISTERED DRUGS (ALT 637 FOR MEDICARE OP)

## 2024-11-14 PROCEDURE — 84295 ASSAY OF SERUM SODIUM: CPT

## 2024-11-14 PROCEDURE — 2500000001 HC RX 250 WO HCPCS SELF ADMINISTERED DRUGS (ALT 637 FOR MEDICARE OP): Performed by: STUDENT IN AN ORGANIZED HEALTH CARE EDUCATION/TRAINING PROGRAM

## 2024-11-14 PROCEDURE — 82435 ASSAY OF BLOOD CHLORIDE: CPT

## 2024-11-14 PROCEDURE — 71045 X-RAY EXAM CHEST 1 VIEW: CPT

## 2024-11-14 PROCEDURE — 2500000001 HC RX 250 WO HCPCS SELF ADMINISTERED DRUGS (ALT 637 FOR MEDICARE OP): Performed by: NURSE PRACTITIONER

## 2024-11-14 PROCEDURE — 82947 ASSAY GLUCOSE BLOOD QUANT: CPT

## 2024-11-14 PROCEDURE — 2500000004 HC RX 250 GENERAL PHARMACY W/ HCPCS (ALT 636 FOR OP/ED)

## 2024-11-14 PROCEDURE — 93308 TTE F-UP OR LMTD: CPT | Performed by: INTERNAL MEDICINE

## 2024-11-14 PROCEDURE — 027034Z DILATION OF CORONARY ARTERY, ONE ARTERY WITH DRUG-ELUTING INTRALUMINAL DEVICE, PERCUTANEOUS APPROACH: ICD-10-PCS | Performed by: INTERNAL MEDICINE

## 2024-11-14 PROCEDURE — 02HP32Z INSERTION OF MONITORING DEVICE INTO PULMONARY TRUNK, PERCUTANEOUS APPROACH: ICD-10-PCS | Performed by: ANESTHESIOLOGY

## 2024-11-14 PROCEDURE — C1760 CLOSURE DEV, VASC: HCPCS | Performed by: INTERNAL MEDICINE

## 2024-11-14 PROCEDURE — 2780000003 HC OR 278 NO HCPCS: Performed by: INTERNAL MEDICINE

## 2024-11-14 PROCEDURE — C1887 CATHETER, GUIDING: HCPCS | Performed by: INTERNAL MEDICINE

## 2024-11-14 PROCEDURE — 93325 DOPPLER ECHO COLOR FLOW MAPG: CPT

## 2024-11-14 PROCEDURE — 2500000005 HC RX 250 GENERAL PHARMACY W/O HCPCS: Performed by: NURSE PRACTITIONER

## 2024-11-14 PROCEDURE — 5A1522G EXTRACORPOREAL OXYGENATION, MEMBRANE, PERIPHERAL VENO-ARTERIAL: ICD-10-PCS | Performed by: INTERNAL MEDICINE

## 2024-11-14 PROCEDURE — 33952 ECMO/ECLS INSJ PRPH CANNULA: CPT | Performed by: THORACIC SURGERY (CARDIOTHORACIC VASCULAR SURGERY)

## 2024-11-14 PROCEDURE — 2500000001 HC RX 250 WO HCPCS SELF ADMINISTERED DRUGS (ALT 637 FOR MEDICARE OP): Performed by: INTERNAL MEDICINE

## 2024-11-14 PROCEDURE — B41G1ZZ FLUOROSCOPY OF LEFT LOWER EXTREMITY ARTERIES USING LOW OSMOLAR CONTRAST: ICD-10-PCS | Performed by: INTERNAL MEDICINE

## 2024-11-14 PROCEDURE — 99292 CRITICAL CARE ADDL 30 MIN: CPT | Performed by: STUDENT IN AN ORGANIZED HEALTH CARE EDUCATION/TRAINING PROGRAM

## 2024-11-14 PROCEDURE — 84484 ASSAY OF TROPONIN QUANT: CPT | Performed by: STUDENT IN AN ORGANIZED HEALTH CARE EDUCATION/TRAINING PROGRAM

## 2024-11-14 PROCEDURE — B2111ZZ FLUOROSCOPY OF MULTIPLE CORONARY ARTERIES USING LOW OSMOLAR CONTRAST: ICD-10-PCS | Performed by: INTERNAL MEDICINE

## 2024-11-14 PROCEDURE — 82330 ASSAY OF CALCIUM: CPT | Performed by: NURSE PRACTITIONER

## 2024-11-14 PROCEDURE — C1894 INTRO/SHEATH, NON-LASER: HCPCS | Performed by: INTERNAL MEDICINE

## 2024-11-14 PROCEDURE — 85027 COMPLETE CBC AUTOMATED: CPT | Performed by: NURSE PRACTITIONER

## 2024-11-14 PROCEDURE — 2500000002 HC RX 250 W HCPCS SELF ADMINISTERED DRUGS (ALT 637 FOR MEDICARE OP, ALT 636 FOR OP/ED): Performed by: INTERNAL MEDICINE

## 2024-11-14 PROCEDURE — 85610 PROTHROMBIN TIME: CPT

## 2024-11-14 PROCEDURE — 93503 INSERT/PLACE HEART CATHETER: CPT | Performed by: STUDENT IN AN ORGANIZED HEALTH CARE EDUCATION/TRAINING PROGRAM

## 2024-11-14 PROCEDURE — 2020000001 HC ICU ROOM DAILY

## 2024-11-14 PROCEDURE — 93503 INSERT/PLACE HEART CATHETER: CPT | Mod: GC | Performed by: STUDENT IN AN ORGANIZED HEALTH CARE EDUCATION/TRAINING PROGRAM

## 2024-11-14 PROCEDURE — 4A023N7 MEASUREMENT OF CARDIAC SAMPLING AND PRESSURE, LEFT HEART, PERCUTANEOUS APPROACH: ICD-10-PCS | Performed by: INTERNAL MEDICINE

## 2024-11-14 PROCEDURE — 5A02210 ASSISTANCE WITH CARDIAC OUTPUT USING BALLOON PUMP, CONTINUOUS: ICD-10-PCS | Performed by: STUDENT IN AN ORGANIZED HEALTH CARE EDUCATION/TRAINING PROGRAM

## 2024-11-14 PROCEDURE — 85027 COMPLETE CBC AUTOMATED: CPT

## 2024-11-14 PROCEDURE — 93308 TTE F-UP OR LMTD: CPT

## 2024-11-14 PROCEDURE — 2500000004 HC RX 250 GENERAL PHARMACY W/ HCPCS (ALT 636 FOR OP/ED): Performed by: INTERNAL MEDICINE

## 2024-11-14 PROCEDURE — C1769 GUIDE WIRE: HCPCS | Performed by: INTERNAL MEDICINE

## 2024-11-14 PROCEDURE — C1725 CATH, TRANSLUMIN NON-LASER: HCPCS | Performed by: INTERNAL MEDICINE

## 2024-11-14 PROCEDURE — 93458 L HRT ARTERY/VENTRICLE ANGIO: CPT | Mod: 59 | Performed by: INTERNAL MEDICINE

## 2024-11-14 PROCEDURE — 99291 CRITICAL CARE FIRST HOUR: CPT | Performed by: INTERNAL MEDICINE

## 2024-11-14 PROCEDURE — P9016 RBC LEUKOCYTES REDUCED: HCPCS

## 2024-11-14 PROCEDURE — 92928 PRQ TCAT PLMT NTRAC ST 1 LES: CPT | Performed by: INTERNAL MEDICINE

## 2024-11-14 PROCEDURE — 37799 UNLISTED PX VASCULAR SURGERY: CPT | Performed by: NURSE PRACTITIONER

## 2024-11-14 PROCEDURE — 85520 HEPARIN ASSAY: CPT | Performed by: STUDENT IN AN ORGANIZED HEALTH CARE EDUCATION/TRAINING PROGRAM

## 2024-11-14 PROCEDURE — 86923 COMPATIBILITY TEST ELECTRIC: CPT

## 2024-11-14 PROCEDURE — 36430 TRANSFUSION BLD/BLD COMPNT: CPT

## 2024-11-14 PROCEDURE — 84132 ASSAY OF SERUM POTASSIUM: CPT | Performed by: THORACIC SURGERY (CARDIOTHORACIC VASCULAR SURGERY)

## 2024-11-14 PROCEDURE — 93325 DOPPLER ECHO COLOR FLOW MAPG: CPT | Performed by: INTERNAL MEDICINE

## 2024-11-14 DEVICE — STENT ONYXNG30038UX ONYX 3.00X38RX
Type: IMPLANTABLE DEVICE | Site: HEART | Status: FUNCTIONAL
Brand: ONYX FRONTIER™

## 2024-11-14 RX ORDER — ALBUMIN HUMAN 250 G/1000ML
25 SOLUTION INTRAVENOUS ONCE
Status: COMPLETED | OUTPATIENT
Start: 2024-11-14 | End: 2024-11-15

## 2024-11-14 RX ORDER — HEPARIN SODIUM 1000 [USP'U]/ML
INJECTION, SOLUTION INTRAVENOUS; SUBCUTANEOUS AS NEEDED
Status: DISCONTINUED | OUTPATIENT
Start: 2024-11-14 | End: 2024-11-14 | Stop reason: HOSPADM

## 2024-11-14 RX ORDER — NALOXONE HYDROCHLORIDE 0.4 MG/ML
0.2 INJECTION, SOLUTION INTRAMUSCULAR; INTRAVENOUS; SUBCUTANEOUS EVERY 5 MIN PRN
Status: DISCONTINUED | OUTPATIENT
Start: 2024-11-14 | End: 2024-12-04 | Stop reason: HOSPADM

## 2024-11-14 RX ORDER — SODIUM NITROPRUSSIDE 25 MG/ML
INJECTION INTRAVENOUS CONTINUOUS PRN
Status: COMPLETED | OUTPATIENT
Start: 2024-11-14 | End: 2024-11-14

## 2024-11-14 RX ORDER — DEXMEDETOMIDINE HYDROCHLORIDE 4 UG/ML
0-1.5 INJECTION, SOLUTION INTRAVENOUS CONTINUOUS
Status: DISCONTINUED | OUTPATIENT
Start: 2024-11-14 | End: 2024-11-14

## 2024-11-14 RX ORDER — PANTOPRAZOLE SODIUM 40 MG/1
40 TABLET, DELAYED RELEASE ORAL
Status: DISCONTINUED | OUTPATIENT
Start: 2024-11-15 | End: 2024-11-19

## 2024-11-14 RX ORDER — HEPARIN SODIUM 10000 [USP'U]/100ML
0-4000 INJECTION, SOLUTION INTRAVENOUS CONTINUOUS
Status: DISCONTINUED | OUTPATIENT
Start: 2024-11-14 | End: 2024-11-14

## 2024-11-14 RX ORDER — PANTOPRAZOLE SODIUM 40 MG/10ML
40 INJECTION, POWDER, LYOPHILIZED, FOR SOLUTION INTRAVENOUS
Status: DISCONTINUED | OUTPATIENT
Start: 2024-11-15 | End: 2024-11-17

## 2024-11-14 RX ORDER — PROPOFOL 10 MG/ML
INJECTION, EMULSION INTRAVENOUS CONTINUOUS PRN
Status: DISCONTINUED | OUTPATIENT
Start: 2024-11-14 | End: 2024-11-16 | Stop reason: HOSPADM

## 2024-11-14 RX ORDER — ALBUMIN HUMAN 250 G/1000ML
SOLUTION INTRAVENOUS
Status: COMPLETED
Start: 2024-11-14 | End: 2024-11-15

## 2024-11-14 RX ORDER — HYDROMORPHONE HYDROCHLORIDE 0.2 MG/ML
0.2 INJECTION INTRAMUSCULAR; INTRAVENOUS; SUBCUTANEOUS
Status: DISCONTINUED | OUTPATIENT
Start: 2024-11-14 | End: 2024-11-17

## 2024-11-14 RX ORDER — SODIUM CHLORIDE 9 MG/ML
100 INJECTION, SOLUTION INTRAVENOUS CONTINUOUS
Status: ACTIVE | OUTPATIENT
Start: 2024-11-14 | End: 2024-11-14

## 2024-11-14 RX ORDER — FENTANYL CITRATE 50 UG/ML
25 INJECTION, SOLUTION INTRAMUSCULAR; INTRAVENOUS EVERY 5 MIN PRN
Status: DISCONTINUED | OUTPATIENT
Start: 2024-11-14 | End: 2024-11-18

## 2024-11-14 RX ORDER — OXYCODONE HYDROCHLORIDE 5 MG/1
5 TABLET ORAL EVERY 4 HOURS PRN
Status: DISCONTINUED | OUTPATIENT
Start: 2024-11-14 | End: 2024-11-17

## 2024-11-14 RX ORDER — POLYETHYLENE GLYCOL 3350 17 G/17G
17 POWDER, FOR SOLUTION ORAL DAILY
Status: DISCONTINUED | OUTPATIENT
Start: 2024-11-14 | End: 2024-11-17

## 2024-11-14 RX ORDER — ASPIRIN 325 MG
TABLET ORAL AS NEEDED
Status: DISCONTINUED | OUTPATIENT
Start: 2024-11-14 | End: 2024-11-14 | Stop reason: HOSPADM

## 2024-11-14 RX ORDER — LIDOCAINE HYDROCHLORIDE 10 MG/ML
INJECTION, SOLUTION EPIDURAL; INFILTRATION; INTRACAUDAL; PERINEURAL AS NEEDED
Status: DISCONTINUED | OUTPATIENT
Start: 2024-11-14 | End: 2024-11-14 | Stop reason: HOSPADM

## 2024-11-14 RX ORDER — LIDOCAINE HYDROCHLORIDE 10 MG/ML
INJECTION, SOLUTION INFILTRATION; PERINEURAL
Status: DISPENSED
Start: 2024-11-14 | End: 2024-11-14

## 2024-11-14 RX ORDER — SODIUM CHLORIDE, SODIUM LACTATE, POTASSIUM CHLORIDE, CALCIUM CHLORIDE 600; 310; 30; 20 MG/100ML; MG/100ML; MG/100ML; MG/100ML
5 INJECTION, SOLUTION INTRAVENOUS CONTINUOUS
Status: ACTIVE | OUTPATIENT
Start: 2024-11-14 | End: 2024-11-15

## 2024-11-14 RX ORDER — NITROGLYCERIN 40 MG/100ML
INJECTION INTRAVENOUS AS NEEDED
Status: DISCONTINUED | OUTPATIENT
Start: 2024-11-14 | End: 2024-11-14 | Stop reason: HOSPADM

## 2024-11-14 RX ORDER — ACETAMINOPHEN 325 MG/1
650 TABLET ORAL EVERY 4 HOURS
Status: DISCONTINUED | OUTPATIENT
Start: 2024-11-14 | End: 2024-11-24

## 2024-11-14 RX ORDER — GABAPENTIN 100 MG/1
200 CAPSULE ORAL 3 TIMES DAILY
Status: DISCONTINUED | OUTPATIENT
Start: 2024-11-14 | End: 2024-11-18

## 2024-11-14 RX ORDER — MIDAZOLAM HYDROCHLORIDE 1 MG/ML
INJECTION INTRAMUSCULAR; INTRAVENOUS CONTINUOUS PRN
Status: DISCONTINUED | OUTPATIENT
Start: 2024-11-14 | End: 2024-11-16 | Stop reason: HOSPADM

## 2024-11-14 RX ORDER — LIDOCAINE HYDROCHLORIDE 10 MG/ML
3 INJECTION, SOLUTION INFILTRATION; PERINEURAL ONCE
Status: COMPLETED | OUTPATIENT
Start: 2024-11-14 | End: 2024-11-14

## 2024-11-14 RX ORDER — SODIUM CHLORIDE, SODIUM LACTATE, POTASSIUM CHLORIDE, CALCIUM CHLORIDE 600; 310; 30; 20 MG/100ML; MG/100ML; MG/100ML; MG/100ML
20 INJECTION, SOLUTION INTRAVENOUS CONTINUOUS
Status: ACTIVE | OUTPATIENT
Start: 2024-11-14 | End: 2024-11-15

## 2024-11-14 RX ORDER — ACETAMINOPHEN 650 MG/1
650 SUPPOSITORY RECTAL EVERY 4 HOURS
Status: DISCONTINUED | OUTPATIENT
Start: 2024-11-14 | End: 2024-11-21

## 2024-11-14 RX ORDER — FENTANYL CITRATE 50 UG/ML
INJECTION, SOLUTION INTRAMUSCULAR; INTRAVENOUS AS NEEDED
Status: DISCONTINUED | OUTPATIENT
Start: 2024-11-14 | End: 2024-11-14 | Stop reason: HOSPADM

## 2024-11-14 RX ORDER — DEXMEDETOMIDINE HYDROCHLORIDE 4 UG/ML
0-1.5 INJECTION, SOLUTION INTRAVENOUS CONTINUOUS
Status: DISCONTINUED | OUTPATIENT
Start: 2024-11-14 | End: 2024-11-17

## 2024-11-14 RX ORDER — SODIUM CHLORIDE, SODIUM LACTATE, POTASSIUM CHLORIDE, CALCIUM CHLORIDE 600; 310; 30; 20 MG/100ML; MG/100ML; MG/100ML; MG/100ML
50 INJECTION, SOLUTION INTRAVENOUS CONTINUOUS
Status: ACTIVE | OUTPATIENT
Start: 2024-11-14 | End: 2024-11-15

## 2024-11-14 RX ORDER — PROPOFOL 10 MG/ML
0-50 INJECTION, EMULSION INTRAVENOUS CONTINUOUS
Status: DISCONTINUED | OUTPATIENT
Start: 2024-11-14 | End: 2024-11-15

## 2024-11-14 RX ORDER — VANCOMYCIN HYDROCHLORIDE 1 G/200ML
INJECTION, SOLUTION INTRAVENOUS CONTINUOUS PRN
Status: COMPLETED | OUTPATIENT
Start: 2024-11-14 | End: 2024-11-14

## 2024-11-14 RX ORDER — MIDAZOLAM HYDROCHLORIDE 1 MG/ML
INJECTION, SOLUTION INTRAMUSCULAR; INTRAVENOUS AS NEEDED
Status: DISCONTINUED | OUTPATIENT
Start: 2024-11-14 | End: 2024-11-14 | Stop reason: HOSPADM

## 2024-11-14 RX ORDER — FENTANYL CITRATE 50 UG/ML
INJECTION, SOLUTION INTRAMUSCULAR; INTRAVENOUS CONTINUOUS PRN
Status: DISCONTINUED | OUTPATIENT
Start: 2024-11-14 | End: 2024-11-16 | Stop reason: HOSPADM

## 2024-11-14 RX ADMIN — POTASSIUM CHLORIDE 20 MEQ: 14.9 INJECTION, SOLUTION INTRAVENOUS at 07:38

## 2024-11-14 RX ADMIN — FENTANYL CITRATE 25 MCG: 50 INJECTION INTRAMUSCULAR; INTRAVENOUS at 04:55

## 2024-11-14 RX ADMIN — SODIUM CHLORIDE, POTASSIUM CHLORIDE, SODIUM LACTATE AND CALCIUM CHLORIDE 500 ML: 600; 310; 30; 20 INJECTION, SOLUTION INTRAVENOUS at 18:00

## 2024-11-14 RX ADMIN — FENTANYL CITRATE 25 MCG: 50 INJECTION INTRAMUSCULAR; INTRAVENOUS at 05:00

## 2024-11-14 RX ADMIN — SENNOSIDES AND DOCUSATE SODIUM 2 TABLET: 50; 8.6 TABLET ORAL at 21:33

## 2024-11-14 RX ADMIN — CEFAZOLIN SODIUM 2 G: 2 INJECTION, SOLUTION INTRAVENOUS at 07:41

## 2024-11-14 RX ADMIN — TICAGRELOR 180 MG: 90 TABLET ORAL at 18:21

## 2024-11-14 RX ADMIN — ACETAMINOPHEN 650 MG: 325 TABLET ORAL at 21:33

## 2024-11-14 RX ADMIN — ATORVASTATIN CALCIUM 80 MG: 80 TABLET, FILM COATED ORAL at 21:32

## 2024-11-14 RX ADMIN — INSULIN LISPRO 1 UNITS: 100 INJECTION, SOLUTION INTRAVENOUS; SUBCUTANEOUS at 00:45

## 2024-11-14 RX ADMIN — SODIUM CHLORIDE, POTASSIUM CHLORIDE, SODIUM LACTATE AND CALCIUM CHLORIDE 5 ML/HR: 600; 310; 30; 20 INJECTION, SOLUTION INTRAVENOUS at 07:00

## 2024-11-14 RX ADMIN — ACETAMINOPHEN 1000 MG: 1000 INJECTION, SOLUTION INTRAVENOUS at 00:00

## 2024-11-14 RX ADMIN — ALBUMIN HUMAN 25 G: 0.25 SOLUTION INTRAVENOUS at 23:22

## 2024-11-14 RX ADMIN — HYDROMORPHONE HYDROCHLORIDE 0.2 MG: 0.2 INJECTION, SOLUTION INTRAMUSCULAR; INTRAVENOUS; SUBCUTANEOUS at 23:21

## 2024-11-14 RX ADMIN — SODIUM CHLORIDE, POTASSIUM CHLORIDE, SODIUM LACTATE AND CALCIUM CHLORIDE 20 ML/HR: 600; 310; 30; 20 INJECTION, SOLUTION INTRAVENOUS at 07:00

## 2024-11-14 RX ADMIN — HYDROMORPHONE HYDROCHLORIDE 0.2 MG: 0.2 INJECTION, SOLUTION INTRAMUSCULAR; INTRAVENOUS; SUBCUTANEOUS at 09:09

## 2024-11-14 RX ADMIN — OXYCODONE HYDROCHLORIDE 10 MG: 5 TABLET ORAL at 03:52

## 2024-11-14 RX ADMIN — CEFAZOLIN SODIUM 2 G: 2 INJECTION, SOLUTION INTRAVENOUS at 17:00

## 2024-11-14 RX ADMIN — CALCIUM GLUCONATE 1 G: 20 INJECTION, SOLUTION INTRAVENOUS at 06:40

## 2024-11-14 RX ADMIN — OXYCODONE HYDROCHLORIDE 10 MG: 5 TABLET ORAL at 19:49

## 2024-11-14 RX ADMIN — HYDROMORPHONE HYDROCHLORIDE 0.2 MG: 0.2 INJECTION, SOLUTION INTRAMUSCULAR; INTRAVENOUS; SUBCUTANEOUS at 17:21

## 2024-11-14 RX ADMIN — EPINEPHRINE IN SODIUM CHLORIDE 0.06 MCG/KG/MIN: 16 INJECTION INTRAVENOUS at 08:42

## 2024-11-14 RX ADMIN — LIDOCAINE HYDROCHLORIDE 3 ML: 10 INJECTION, SOLUTION INFILTRATION; PERINEURAL at 17:22

## 2024-11-14 RX ADMIN — ACETAMINOPHEN 1000 MG: 1000 INJECTION, SOLUTION INTRAVENOUS at 09:12

## 2024-11-14 RX ADMIN — DEXMEDETOMIDINE HYDROCHLORIDE 1 MCG/KG/HR: 400 INJECTION INTRAVENOUS at 04:52

## 2024-11-14 RX ADMIN — CALCIUM GLUCONATE 1 G: 20 INJECTION, SOLUTION INTRAVENOUS at 18:28

## 2024-11-14 RX ADMIN — NOREPINEPHRINE BITARTRATE 0.08 MCG/KG/MIN: 8 INJECTION, SOLUTION INTRAVENOUS at 23:34

## 2024-11-14 RX ADMIN — HYDROMORPHONE HYDROCHLORIDE 0.2 MG: 0.2 INJECTION, SOLUTION INTRAMUSCULAR; INTRAVENOUS; SUBCUTANEOUS at 02:26

## 2024-11-14 RX ADMIN — ALBUMIN HUMAN 25 G: 250 SOLUTION INTRAVENOUS at 23:22

## 2024-11-14 RX ADMIN — CEFAZOLIN SODIUM 2 G: 2 INJECTION, SOLUTION INTRAVENOUS at 23:34

## 2024-11-14 RX ADMIN — FENTANYL CITRATE 25 MCG: 50 INJECTION INTRAMUSCULAR; INTRAVENOUS at 05:27

## 2024-11-14 RX ADMIN — VASOPRESSIN 0.01 UNITS/MIN: 0.2 INJECTION INTRAVENOUS at 00:54

## 2024-11-14 RX ADMIN — ACETAMINOPHEN 1000 MG: 1000 INJECTION, SOLUTION INTRAVENOUS at 17:30

## 2024-11-14 ASSESSMENT — PAIN DESCRIPTION - ORIENTATION
ORIENTATION: MID
ORIENTATION: MID

## 2024-11-14 ASSESSMENT — PAIN DESCRIPTION - LOCATION
LOCATION: CHEST
LOCATION: GROIN
LOCATION: CHEST

## 2024-11-14 ASSESSMENT — PAIN SCALES - GENERAL
PAINLEVEL_OUTOF10: 9
PAINLEVEL_OUTOF10: 0 - NO PAIN
PAINLEVEL_OUTOF10: 7
PAINLEVEL_OUTOF10: 9
PAINLEVEL_OUTOF10: 7

## 2024-11-14 ASSESSMENT — PAIN - FUNCTIONAL ASSESSMENT
PAIN_FUNCTIONAL_ASSESSMENT: 0-10

## 2024-11-14 NOTE — PROGRESS NOTES
Occupational Therapy                 Therapy Communication Note    Patient Name: Jaime Dominguez  MRN: [id number]  Department: [id number]  Room: [id number]/*  Today's Date: 11/14/2024     Discipline: Occupational Therapy    Missed Visit Reason: Missed Visit Reason:  (Pt with recent fem IABP placed. and currently off unit. Hold OT at this time.)    Missed Time: Attempt

## 2024-11-14 NOTE — SIGNIFICANT EVENT
I was called at this morning about 2 hours ago to evaluate the patient who had undergone coronary artery bypass graft surgery yesterday and was not doing well.  The patient had an attempt at the robotic revascularization and that was not successful and ended up with a median sternotomy and a LIMA to the LAD and a free radial from the LIMA to the OM.    After discussing the case with Dr. Sharad Rangel I evaluated the patient's in the CT ICU and had a long discussion with the family.  We brought the patient to the Cath Lab because of the worsening troponin and hemodynamics.  On arrival to the Cath Lab the patient was on 3 pressors including norepinephrine, epinephrine, and vasopressin.  An intra-aortic balloon pump was previously placed.  A left heart catheterization revealed a patent left main trunk and the circumflex coronary artery with a competitive flow from the bypass graft to an OM.  There was no evidence of antegrade flow in the LAD.  Subsequently the right coronary artery was injected which showed a widely patent RCA.    We then performed the angiogram of the LIMA.  Angiography revealed no evidence of flow from the LIMA into the LAD.  There was flow from the radial bypass into the OM however there appeared to be a lesion at the site of the anastomosis about 70 to 80%.    We subsequently convened a sharp call with Paddy, Gold Chavarria, Saba Gabriel, Sharad Santos.  After long discussion and review of the data it was determined that an ECMO was necessary to unload the left ventricle and support the patient at this time.  The options regarding revascularization will repeat open heart surgery, percutaneous intervention, or medical therapy with ECMO alone.  The team felt the risk of open heart surgery would be too catastrophic and likely unsuccessful.  Medical therapy was also concerning because of ongoing chest pain and a rise in the troponin and worsening of the myocardial infarction.  At this point it  was decided to consider a percutaneous approach knowing the risk and concerns about perforation in the setting of a recent anastomosis and a friable LAD.    After this discussion I met in the presence of Dr. Sharad Rangel with the patient's wife and his 2 sons.  We explained the current situation regarding the patient's cardiogenic shock and active myocardial infarction.  I reviewed the left heart cath with the family.  We then discussed the option of stabilizing the patient on ECMO and the 3 options for revascularization.  After long discussion including a call and a conference call with Dr. Alba it was decided to proceed with the placement of the ECMO and an attempt at LAD percutaneous intervention.  Again I emphasized to the family that a PCI to the LAD would be considered risky in the setting of a recent anastomosis and endarterectomy and the risk could be catastrophic including death perforation and tamponade.  The family understood the risks and the benefits and eventually agreed to ECMO plus percutaneous intervention to the LAD to improve the blood flow to this territory.    Giancarlo Quiles,

## 2024-11-14 NOTE — SIGNIFICANT EVENT
Patient Return to Unit s/p LHC     Findings:  LM: distal 30-40%  LAD: mid 100% stenosis at anastomosis site   LCX: competitive flow in the OM without significant stenosis  RCA: patent  LIMA to LAD: Y graft with anastomosis to the LAD which is not patent and anastomosis to the OM which appears patent    Pt cannulated on VA ECMO via L Fem d/t shock state. Patient is now s/p salvage PCI of the mid LAD with a [place] 3.0 x 38 HEATHER.    N: Ax O x4, received 4mg versed and 150mcg fentanyl intraop   CV: Status post MIDCABG x 2 with conversion to full sternotomy LIMA prolonged as a Y graft with a radial to LAD OM on 11/12. Last night, increasing pressor support and R fem IABP placed. Troponin elevated and ST elevation in anteroseptal leads, c/f ACS and decision made to [facility]. Now s/p PCI of mid LAD with HEATHER. Pt cannulated on VA ECMO for cardiogenic shock state 2/2 multiple failed grafts.   - Ticagrelor load and start BID tomorrow with aspirin  - continue to wean epi; now at 0.04  - f/up KUB and CXR following cannulation and placement.   - Maintain IABP 1:1  - TR band  ECMO:     Most Recent Range Past 24hrs   Flow 3.47 Patient Flow (L/min)  Min: 3.47  Max: 3.47   Speed 2900 Circuit Flow (RPM)  Min: 2900  Max: 2900   Sweep   No data recorded   Pulm: On SFM, May require CPAP overnight  GI: NPO. NGT placement for decompression and meds   : maintain foleys for strict I/Os. Good UOP intraprocedure   Heme: Received 2uPRBC intra-op. GOAL Hgb>8. Follow up heparin assay q4h. If below therapeutic level (0.3-0.5), will start low intensity heparin gtt with NO bolus  Endo: SSI  ID: Afebrile, trend temp       Rachael Roa MD  PGY-4/ CA-3  Dept. of Anesthesiology and Perioperative Medicine

## 2024-11-14 NOTE — CARE PLAN
CARDIAC SURGERY PLAN OF CARE    Following discussion of Mr. Dominguez's case with Dr. Bonilla, Dr. Mckeon, and Dr. Gabriel, we have decided to proceed with cardiac cath this morning to assess the patency of our bypass grafts.     I updated the patient's family with our plan of care by phone.     Please call with questions or concerns.     Cierra Stanley MD  Cardiac Surgeon

## 2024-11-14 NOTE — PROGRESS NOTES
CTICU Progress Note  Jaime Dominguez/[id number]    Admit Date: 11/10/2024  Hospital Length of Stay: 4   ICU Length of Stay: 1d 6h   CT SURGEON:     SUBJECTIVE:   Overnight, echo revealed apical hypokinesis. [place] uptrended from 4 to 6.9. PAC placed. CI 2.5 on epi 0.06. Given high pressor requirement, decision made to place balloon pump. Pt now s/p R femoral IABP with Dr. Stanley 11/14/24. 1upRBC given and Hgb incremented from 7.9 to 8.6. This AM, pt on levo 0.06 and epi 0.06. Troponins 73k. EKG s/f ST elevation in anteroseptal leads. Cardiac surgery team considering LHC due to increased risk of graft failure.     MEDICATIONS  Infusions:  dexmedeTOMIDine, Last Rate: 1 mcg/kg/hr (11/14/24 0452)  EPINEPHrine, Last Rate: 0.06 mcg/kg/min (11/13/24 2155)  lidocaine, Last Rate: 0.5 mg/min (11/13/24 1900)  norepinephrine, Last Rate: 0.06 mcg/kg/min (11/14/24 0624)  vasopressin, Last Rate: Stopped (11/14/24 0622)      Scheduled:  acetaminophen, 1,000 mg, q6h POWER  aspirin, 81 mg, Daily  atorvastatin, 80 mg, Nightly  ceFAZolin, 2 g, q8h  [Held by provider] clopidogrel, 75 mg, Daily  [Held by provider] heparin (porcine), 5,000 Units, q8h  insulin lispro, 0-5 Units, q4h  lidocaine, 3 mL, Once  lidocaine, ,   pantoprazole, 40 mg, Daily before breakfast   Or  pantoprazole, 40 mg, Daily before breakfast  perflutren lipid microspheres, 0.5-10 mL of dilution, Once in imaging  perflutren lipid microspheres, 0.5-10 mL of dilution, Once in imaging  perflutren protein A microsphere, 0.5 mL, Once in imaging  perflutren protein A microsphere, 0.5 mL, Once in imaging  polyethylene glycol, 17 g, BID  sennosides-docusate sodium, 2 tablet, BID  sulfur hexafluoride microsphr, 2 mL, Once in imaging  sulfur hexafluoride microsphr, 2 mL, Once in imaging      PRN:  calcium gluconate, 1 g, q6h PRN  calcium gluconate, 2 g, q6h PRN  fentaNYL, 25 mcg, q5 min PRN  HYDROmorphone, 0.2 mg, q2h PRN  lidocaine, ,   magnesium sulfate, 2 g, q6h PRN  magnesium  sulfate, 4 g, q6h PRN  naloxone, 0.2 mg, q5 min PRN  ondansetron, 4 mg, q8h PRN   Or  ondansetron, 4 mg, q8h PRN  oxyCODONE, 10 mg, q4h PRN  oxyCODONE, 5 mg, q4h PRN  oxygen, , Continuous PRN - O2/gases  potassium chloride CR, 20 mEq, q6h PRN   Or  potassium chloride, 20 mEq, q6h PRN  potassium chloride CR, 40 mEq, q6h PRN   Or  potassium chloride, 40 mEq, q6h PRN  potassium chloride, 20 mEq, q6h PRN  potassium chloride, 40 mEq, q6h PRN        PHYSICAL EXAM:   Visit Vitals  /56   Pulse 87   Temp 37.9 °C (100.2 °F)   Resp 15   Ht 1.829 m (6')   Wt 93.4 kg (205 lb 14.6 oz)   SpO2 100%   BMI 27.93 kg/m²   Smoking Status Never   BSA 2.18 m²     Wt Readings from Last 5 Encounters:   11/12/24 93.4 kg (205 lb 14.6 oz)   11/10/24 96.2 kg (212 lb)     INTAKE/OUTPUT:  I/O last 3 completed shifts:  In: 9641.8 (103.2 mL/kg) [I.V.:1529.3 (16.4 mL/kg); Blood:4245; NG/GT:130; IV Piggyback:3737.5]  Out: 3225 (34.5 mL/kg) [Urine:1915 (0.6 mL/kg/hr); Blood:250; Chest Tube:1060]  Weight: 93.4 kg        LDA:  CVC 11/12/24 Double lumen Right Internal jugular (Active)   Placement Date/Time: 11/12/24 (c) 1506   Hand Hygiene Performed Prior to CVC Insertion: Yes  Site Prep: Chlorhexidine   Site Prep Agent has Completely Dried Before Insertion: Yes  All 5 Sterile Barriers Used (Gloves, Gown, Cap, Mask, Large Sterile Ivone...   Number of days: 0       Arterial Line 11/12/24 Right (Active)   Placement Date/Time: 11/12/24 1900   Orientation: Right  Placed by: OR   Number of days: 0       Pacer Wires (Active)   Placement Date/Time: 11/12/24 2320   Placed by: Dr. Aldo Harman  Type of Pacing Wires: Ventricular   Number of days: 0       ETT  8.5 mm (Active)   Placement Date/Time: 11/12/24 (c) 0028   Mask Ventilation: Not attempted  Technique: Exchange catheter  ETT Type: ETT - single  Single Lumen Tube Size: 8.5 mm  Cuffed: Yes  Location: Oral  Airway Insertion Attempts: 1  Placement Verification: Ausculta...   Number of days: 1       Urethral  Catheter Temperature probe 16 Fr. (Active)   Placement Date/Time: 11/12/24 1452   Placed by: BRIANDA DENIS  Hand Hygiene Completed: Yes  Catheter Type: Temperature probe  Tube Size (Fr.): 16 Fr.  Catheter Balloon Size: 10 mL  Urine Returned: Yes   Number of days: 0       Chest Tube 1 Left Pleural (Active)   Placement Date: 11/13/24   Tube Number: 1  Chest Tube Orientation: Left  Chest Tube Location: Pleural   Number of days: 0       NG/OG/Feeding Tube Center mouth (Active)   Placement Date: 11/13/24   Tube Location: Center mouth   Number of days: 0       Y Chest Tube 1 and 2 Mediastinal Mediastinal (Active)   Placement Date: 11/13/24   Chest Tube Location 1: Mediastinal  Chest Tube Location 2: Mediastinal   Number of days: 0         Vent settings:  Vent Mode: Pressure support  FiO2 (%):  [40 %] 40 %  S RR:  [16] 16  S VT:  [620 mL] 620 mL  PEEP/CPAP (cm H2O):  [0 cm H20-8 cm H20] 5 cm H20  IN SUP:  [0 cm H20-5 cm H20] 5 cm H20  MAP (cm H2O):  [14] 14    Physical Exam:   Constitutional: Intubated and sedated on mechanical ventilation in NAD   Neuro: Neuro intact without obvious focal deficits, on sedation.  PERRL  CV: RRR.  S1S2.  SR on monitor.  AV wires set VVI60  Pulm: CTAB .  CT's with appropriate serosang output and no airleak.  : clear yellow urine fvia aden  GI: S/ND/NT. Hypoactive BS. OGT in place with bilious output.  Extremities: NV exam intact x 4.  No edema.  Skin: WDI.  Postop dressings intact.  Chest tube dressings intact.    Psych: DELONTE, sedated      Images:     Invasive Hemodynamics:    Most Recent Range Past 24hrs   BP (Art) 98/51 Arterial Line BP 1  Min: 78/47  Max: 108/58   MAP(Art) 73 mmHg Arterial Line MAP 1 (mmHg)   Min: 58 mmHg  Max: 79 mmHg   RA/CVP   No data recorded   PA 37/19 PAP  Min: 20/6  Max: 45/28   PA(mean) 25 mmHg PAP (Mean)  Min: 13 mmHg  Max: 35 mmHg   CO   No data recorded   CI   No data recorded   Mixed Venous   No data recorded   SVR    No data recorded     If Devices  present, use phrases:    For LVAD, .lhcticulvad   For ECMO, .lhcticuecmo  For Impella, .lhcticuimpella    Daily Risk Screen:  Hemodynamic monitoring  critically ill patient who need accurate urinary output measurements  they are planned for extubation trial later today/tonight      Assessment/Plan   Assessment: Patient is a 74 year old male with a PMH significant for basal cell carcinoma and HPL  s/p MidCAB x2 converted to full sternotomy w/ LIMA prolonged as a Y graft with a radial to LAD OM w/ Dr. Aldo Harman and Dr. Stanley. CPB time 347min. Course c/b increasing pressor requirement and echo revealing apical hypo-akinesis with subsequent IABP placement on 11/14 for further support.      Plan:  NEURO:  No PMH. Patient is intubated and sedated on propofol infusion. Acute post operative pain. AxO x4. Pt continues to endorse pain -->  - Serial neuro and pain assessments   - Scheduled IV Tylenol   - PRN oxycodone  - PRN dilaudid for pain   - APS consult for pain   - Lidocaine gtt at 0.5mg/min. Lidocaine level 1.0--> dc at 24h (1800 11/14)  - PT Consult  - CAM ICU score qshift  - Sleep/wake cycle hygiene       CV:  Patient has a history of HLD. Is now status post MIDCABG x 2 with conversion to full sternotomy LIMA prolonged as a Y graft with a radial to LAD OM. Pre/Post EF: normal BIV Arrived to CTICU on epi 0.04. A/V epicardial wires set VVI @ 60.Course c/b increasing pressor requirement and echo revealing apical hypo-akinesis with subsequent IABP placement on 11/14 for further support. -->  - Given uptrending troponin (45k-->73K) and c/f graft failure, pt will undergo LHC today  - IABP placed for hemodynamic support in setting of high vasopressor requirement. 1:1 setting   - Follow up repeat troponin  - Formal TTE today   - Wean vaso then levo while maintaining MAP 70-90  - wean epi for SVO2 >65  - Mixed venous and CI Q4H  - Volume resuscitate as clinically indicated  - Maintain epicardial wires set VVI @ 60  - Plavix    -c/w statin and asa       PULM:  No history of pulmonary disease.  Currently intubated on ventilator. Chest tubes 2x meds and 1 L pleural. Output 70cc/8h from meds and L pleural 190cc/8h-->  - Cxr s/f increased interstitial edema and pleural effusion L>R  - On 4L NC. Wean FiO2 maintaining SpO2 >92%.   - IS q1h and OOB to chair when extubated  - Chest tubes to wall suction. Leave in place until pt further mobilizes      GI:  no PMH. Gastric Bubble on KUB ->  - NPO  - Consider NG for decompression  - Colace/senna BID and miralax BID     :  CSA-TREE Risk Score low.  No history of renal disease, baseline creatinine 0.87. Creatinine stable post-op. Aden in place and making adequate UOP. -->  - Continue aden catheter for strict I/Os.  - Goal UOP 0.5ml/kg/hr  - RFP as clinically indicated  - Replete electrolytes per CTICU protocol  - consider lasix post LHC     ENDO: No PMH. A1c: 5.1-->  - Maintain BG <180, insulin per CTICU protocol     HEME:  Acute blood loss anemia-->    - Monitor drain output volume and characteristics  - CBC, coags, and fibrinogen post op and as clinically indicated  - C/w ASA   - Plavix - held for APS  - SQH today- held for APS  - SCDs for DVT prophylaxis.  - Last type and screen: 11/11     ID:  Febrile. MRSA negative.WBC downtrending -->  - continue to monitor WBC  - Trend temp q4h  - Periop cefazolin x 48hrs     Skin:  No active skin issues.  - preventative Mepilex dressings in place on sacrum and heels  - change preventative Mepilex weekly or more frequently as indicated (when moist/soiled)   - every shift skin assessment per nursing and weekly ICU skin rounds  - moisture barrier to be applied with sandro care  - active skin problems addressed with nursing on daily rounds     Proph:  SCDs  SQH-held for APS  PPI     G:  Line  Right IJ MAC w Minimac placed 11/12  R brachial a-line placed 11/12  Aden 11/12     The indications and risks/benefits of non-violent/non self-destructive restraints were  discussed.      F: Family: will update at bedside.      A,B,C,D,E,F,G: reviewed     Seen and discussed with ICU attending Dr. Bonilla     Dispo: CTICU care for now.    CTICU TEAM PHONE [phone]       Restraints: The indications and risks/benefits of non-violent/non self-destructive restraints were discussed and are indicated for the safety of the patient.    Code status: Full Code      A,B,C,D,E,F,G: reviewed     Dispo: CTICU care for now.    CTICU TEAM PHONE [phone]

## 2024-11-14 NOTE — SIGNIFICANT EVENT
Discussed with Dr. Stanley, attending cardiac surgeon, regarding limited TTE findings including concern for hypo-akinesis of LV apex. RV appeared to have normal function based on limited windows obtained by CICU fellow. As patient is otherwise asymptomatic with signs of adequate end organ perfusion (extremities warm and well perfused, appropriate UOP and mentation) without increasing pressor requirements, decision made to check troponins and continue to monitor clinical status. Patient to receive formal TTE in AM.     Jaime Ta MD  CTICU

## 2024-11-14 NOTE — DISCHARGE INSTRUCTIONS
Don't forget to “Keep Your Move in the Tube!!”     Please refer to the “Move in the Tube” handout.  -- Load bearing activities can be completed if you are “staying in the tube.” If you are attempting load bearing activities, let pain be your guide with when trying an activity “out of the tube”. If an activity hurts or is uncomfortable go back to doing it while you stay “in the tube”. There is no time limit to “stay in the tube”.     -- Non-load bearing activities, which are your activities of daily living, can be completed with your arms “out of the tube” as long as you remain pain free. Some activities of daily living examples are dressing, personal care, showering, washing hair, and toilet hygiene.     Don't forget to KEEP YOUR MOVE IN THE TUBE and think of a T. Slim dinosaur!    ============================      Additional Post-Operative Instructions:  - Remember to use your Incentive spirometer 10x/hr while awake. Remember to cough and deep breath.  - No NSAIDs (common over-the-counter NSAIDs are ibuprofen/Motrin/Advil, naproxen/Naprosyn/Aleve) for 3 months after cardiac surgery; if NSAIDs needed after 3 months, clear use with cardiologist before starting.       Your home medications may have changed after surgery. Carefully compare this list with your prescription bottles at home and set aside any medications you are told to not take so you do not confuse them. Do not dispose of any medications until your follow-up, since your doctors may restart some at your follow-up appointments. It is important to bring a complete, current list of your medications to any medical appointments or hospitalizations.    For any questions about your discharge, please call the cardiac surgery office at [phone]     LEFT GROIN INCISION CARE POST DISCHARGE:  - DRY DRESSING ( APPLY DRY GAUZE TO LEFT GROIN INCISION THEN COVER IT WITH DRY TAPE)

## 2024-11-14 NOTE — CARE PLAN
CARDIAC SURGERY PLAN OF CARE    Called to bedside to evaluate Mr. Dominguez.   S/p Minimally invasive robotic-assisted CABG x2 (LIMA Y graft with radial to LAD and OM) converted to full sternotomy.     Complains of chest pain, unclear if surgical site pain.   Troponin 73,314 from 65,732 from 63,067 from 45,685.   TTE with LV hypokinesis. Suspect postoperative myocardial stunning.   ST elevation improving on EKG this morning compared to postop EKG.   Currently on Epi 0.06, Levophed 0.04, and vasopressin 0.01. Last CI 2.2.     Right femoral IABP placed at bedside to optimize coronary perfusion (see procedure note).   Coronary bypass grafts had excellent flow on intraoperative Medistim flow probe analysis.   Will consider cardiac cath if the patient worsens clinically despite IABP placement.     Plan of care discussed with Dr. Cervantes and the patient's wife and son by phone.   Please call with questions or concerns.     Cierra Stanley MD  Cardiac Surgeon

## 2024-11-14 NOTE — PROCEDURES
Insertion of Left Femoral VA ECMO      Procedure:  - Ultrasound-guided access to femoral artery and vein  - Initiation of VA ECMO   - Antegrade femoral artery cannulation      Operators:   - Giancarlo Quiles (Cardiologist)   - Ronda Yi (Cardiac Surgeon)    Indication:  - Post-CABG cardiogenic shock due to failure of coronary grafts    Procedure details:  - Left Femoral Artery cannula (17F)+ Left Femoral Vein cannula (25F)  - Antegrade perfusion cannula (8F)    No procedural complications  No vascular complications      Ronda iY MD  Cardiac Surgery

## 2024-11-14 NOTE — NURSING NOTE
Patient had right femoral intra-aortic balloon pumped placed due to cardiogenic shock.  Patient provided verbal consent.  Time out was performed, the patient was identified.  Moderate sedation was used (Fentanyl and Precedex - see MAR).      The patient was positioned supine. He was prepped and draped in sterile fashion. A time out was performed verifying the correct patient, procedure, and site. The right common femoral artery was accessed percutaneously under ultrasound guidance and guidewire access was obtained. An 8 Japanese sheath was placed using Seldinger technique. A 50cc balloon pump was then advanced through the sheath. The sheath and balloon pump were secured with interrupted 2-0 silk sutures. Appropriate balloon pump position was confirmed by CXR and KUB. The patient tolerated the procedure well.

## 2024-11-14 NOTE — POST-PROCEDURE NOTE
Physician Transition of Care Summary  Invasive Cardiovascular Lab    Procedure Date: 11/14/2024  Attending:    * Giancarlo Quiles - Primary  Resident/Fellow/Other Assistant: Surgeons and Role:     * Romeo Boston MD - Assisting     * Ronda Montaño MD - Assisting     * Ronda Montez MD - Fellow     * Regina Vega MD - Fellow    Indications:   Pre-op Diagnosis      * STEMI (ST elevation myocardial infarction) (Multi) [I21.3]     * Cardiogenic shock (Multi) [R57.0]    Post-procedure diagnosis:   Post-op Diagnosis     * STEMI (ST elevation myocardial infarction) (Multi) [I21.3]     * Cardiogenic shock (Multi) [R57.0]    Procedure(s):     * Left Heart Cath, With LV, Angriography And Grafts    * ECMO    * PCI TONNY Stent- Coronary    Procedure Findings:   Access:  Right Radial 6 Fr -- TR Band  Left Femoral Artery ECMO Cannula  Left Femoral Vein ECMO Cannula    Description of the Procedure:   Patient is s/p CABG on 11/13/2024 arrived in fulminant cardiogenic shock despite IABP placement and 3 pressors.   A right femoral diagnostic cath was done with the following findings:    LM: distal 30-40%  LAD: mid 100% stenosis at anastomosis site   LCX: competitive flow in the OM without significant stenosis  RCA: patent  LIMA to LAD: Y graft with anastomosis to the LAD which is not patent and anastomosis to the OM which appears patent    Due to patient's profound shock, ECMO cannulation was done with the aforementioned access sites and the plan was made jointly with cardiac surgery to pursue salvage PCI of the mid LAD. He was given ASA and loaded with Ticagrelor. Patient is now s/p salvage PCI of the mid LAD with a [place] 3.0 x 38 TONNY. Following PCI and ECMO cannulation, patient's pressors were able to be weaned substantially and he left the cath lab (without intubation) on ECMO and IABP support.     Complications:   NA    Stents/Implants:   Implants       Stent    Stent, [place] Tonny, 3.00 X 38rx -  [id number] - Implanted        Inventory item: STENT, RUSLAN FRONTIER HEATHER, 3.00 X 38RX Model/Cat number: [id number]    : [employer] Lot number: [id number]    Device identifier: [id number]        As of 11/14/2024       Status: Implanted                              Anticoagulation/Antiplatelet Plan:   ASA/Ticagrelor loading in the lab  ASA + Ticagrelor for 6 months w/o interruption followed by lifelong ASA    Estimated Blood Loss:   * No values recorded between 11/14/2024  9:40 AM and 11/14/2024  2:49 PM *    Anesthesia: Moderate Sedation Anesthesia Staff: No anesthesia staff entered.    Any Specimen(s) Removed:   Order Name Source Comment Collection Info Order Time   BLOOD GAS ARTERIAL FULL PANEL Blood, Arterial  Collected By: Jerel Kruse RN 11/14/2024 10:26 AM     Release result to [facility]   Immediate        PREPARE RBC    11/14/2024  1:51 PM     Transfusion indications   Extracorporeal Membrane Oxygenation          Has consent been obtained?   Yes          Does patient have a hemoglobinopathy?   No          Open chest alert communicated to Blood Bank?   N/A            Disposition:   CTICU      Electronically signed by: Regina Vega MD, 11/14/2024 2:49 PM

## 2024-11-14 NOTE — PROCEDURES
Procedures    Pulmonary artery catheter placement:    Timeout performed and patient identity verified. Patient with pre-existing MAC line with introducer port. Mini-MAC removed from port. Introducer sterilized and patient draped in usual fashion. All ports of the swan were flushed and the distal balloon was confirmed to be intact. Catheter was inserted into the introducer and with monitor guidance was advanced into the pulmonary artery with waveform confirmation. PAC was locked at 48cm. CXR pending. No complications.    Jaime Ta MD  CTICU

## 2024-11-14 NOTE — PROGRESS NOTES
Patient requires ECMO support.     ECMO:  Cannulation Date: Nov. 14, 2024  ECMO Indication: Cardiogenic Shock  Current Goals of Care: Full Code    ECMO Cannula Configuration: L fem-fem VA-ECMO    ECMO Interrogation:  Drainage cannula site, cannula, pump, oxygenator, return cannula and return cannula site clinically inspected. RPM and sweep reviewed and adjusted appropriate to clinical context.    ECMO circuit run from drainage cannula to pump to oxygenator to return cannula: Yes  Pre/post PaO2 adequate: Yes  LV Unloading/Pulse Pressure: IABP at 1:1; Excellent pulsatility  Distal Perfusion: L DPC in place, adequate pulses    ECMO Settings:     Most Recent Range Past 24hrs   Flow 3.47 Patient Flow (L/min)  Min: 3.47  Max: 3.47   Speed 2900 Circuit Flow (RPM)  Min: 2900  Max: 2900   Sweep   No data recorded     IABP in situ 1:1. Position noted to be high in the cath lab and appropriately repositioned.    Invasive Hemodynamics:    Most Recent Range Past 24hrs   BP (Art) 93/44 Arterial Line BP 1  Min: 90/53  Max: 120/50   MAP(Art) 67 mmHg Arterial Line MAP 1 (mmHg)   Min: 66 mmHg  Max: 81 mmHg   RA/CVP   No data recorded   PA 36/15 PAP  Min: 20/6  Max: 45/28   PA(mean) 22 mmHg PAP (Mean)  Min: 13 mmHg  Max: 35 mmHg   CO   No data recorded   CI   No data recorded   Mixed Venous   No data recorded   SVR    No data recorded     Hemodynamic Management:  dexmedeTOMIDine, 0-1.5 mcg/kg/hr, Last Rate: Stopped (11/14/24 0555)  EPINEPHrine, 0.06 mcg/kg/min, Last Rate: 0.04 mcg/kg/min (11/14/24 1438)  lactated Ringer's, 50 mL/hr  lidocaine, 0.5 mg/min, Last Rate: 0.5 mg/min (11/14/24 0700)  norepinephrine, 0-1 mcg/kg/min, Last Rate: Stopped (11/14/24 1400)  propofol, 0-50 mcg/kg/min  vasopressin, 0-0.03 Units/min, Last Rate: Stopped (11/14/24 1221)        Bleeding/Clot Issues:   Anticoagulation/Monitoring: Anticoagulation status and intensity discussed  Plan: none but will plan to re-assess for systemic heparin with Xa monitoring  later today  Presence of cannula bleeding: None at present  Presence of oxygenator clot: None    Management Issues:  Road Trips planned for today? N  Mobility Planned today? N  Weaning Plan/date: TBD    Communication:  Discussed with Cardiothoracic surgeon, Perfusion, Palliative care (consulted entered and physical/occupational therapy)    Family Updates/Concerns? Family updated. All questions appeared be answered adequately.

## 2024-11-14 NOTE — PROGRESS NOTES
Physical Therapy                 Therapy Communication Note    Patient Name: Jaime Dominguez  MRN: [id number]  Department: [facility] [place] [facility] CTU  Room: 02/02-A  Today's Date: 11/14/2024     Discipline: Physical Therapy    Missed Visit Reason: Missed Visit Reason:  (Fem iABP placed last night.  Will hold PT at this time and re-attempt once medically appropriate.)    Missed Time: Attempt

## 2024-11-14 NOTE — ANESTHESIA PREPROCEDURE EVALUATION
Patient: Jaime Dominguez    Procedure Information       Date/Time: 11/14/24 1235    Procedure: Creation Bypass Graft Coronary Artery    Location: [facility] OR [facility] / Virtual [facility]    Surgeons: Rajan Alba MD            Relevant Problems   Cardiac   (+) Acute ST elevation myocardial infarction (STEMI) of inferior wall (Multi)   (+) ST elevation myocardial infarction (STEMI) involving other coronary artery of inferior wall   (+) STEMI (ST elevation myocardial infarction) (Multi)       Clinical information reviewed:    Allergies  Meds               NPO Detail:  No data recorded     PHYSICAL EXAM    Anesthesia Plan

## 2024-11-14 NOTE — PROCEDURES
Right Femoral Intra-aortic Balloon Pump Placement    Date/Time: 11/14/2024 6:11 AM    Performed by: Cierra Stanley MD  Authorized by: Cierra Stanley MD    Consent:     Consent obtained:  Verbal    Consent given by:  Patient    Risks, benefits, and alternatives were discussed: yes    Universal protocol:     Procedure explained and questions answered to patient or proxy's satisfaction: yes      Required blood products, implants, devices, and special equipment available: yes      Site/side marked: yes      Immediately prior to procedure, a time out was called: yes      Patient identity confirmed:  Verbally with patient, hospital-assigned identification number and provided demographic data  Indications:     Indications:  Cardiogenic shock  Pre-procedure details:     Skin preparation:  Chlorhexidine    Preparation: Patient was prepped and draped in the usual sterile fashion    Sedation:     Sedation type:  Moderate sedation (Fentanyl and Precedex)  Anesthesia:     Anesthesia method:  Local infiltration    Local anesthetic:  Lidocaine 1% w/o epi (10cc)  Procedure specific details:      The patient was positioned supine. He was prepped and draped in sterile fashion. A time out was performed verifying the correct patient, procedure, and site. The right common femoral artery was accessed percutaneously under ultrasound guidance and guidewire access was obtained. An 8 Citizen of the [place] sheath was placed using Seldinger technique. A 50cc balloon pump was then advanced through the sheath. The sheath and balloon pump were secured with interrupted 2-0 silk sutures. Appropriate balloon pump position was confirmed by CXR. The patient tolerated the procedure well.   Post-procedure details:     Procedure completion:  Tolerated well, no immediate complications    Cierra Stanley MD  Cardiac Surgeon

## 2024-11-14 NOTE — INTERVAL H&P NOTE
H&P reviewed. The patient was examined and there are no changes to the H&P. Patient with ongoing chest pain which he reports are sharp and pressure like in nature, he has had these pains before but never this intense. R fem IABP in place, 2 chest tubes and aden present. 2+ bilateral radial pulses palpable, 1+ bilateral DP pulses palpable. GFR 58, will order pre-cath hydration and notify primary team.

## 2024-11-14 NOTE — INTERVAL H&P NOTE
H&P reviewed. The patient has been complaining of chest pain, Has apical hypokinesis on echo and has had urgent IABP placement since the procedure.

## 2024-11-15 ENCOUNTER — APPOINTMENT (OUTPATIENT)
Dept: RADIOLOGY | Facility: HOSPITAL | Age: 74
DRG: 003 | End: 2024-11-15
Payer: MEDICARE

## 2024-11-15 ENCOUNTER — APPOINTMENT (OUTPATIENT)
Dept: CARDIOLOGY | Facility: HOSPITAL | Age: 74
DRG: 003 | End: 2024-11-15
Payer: MEDICARE

## 2024-11-15 LAB
ALBUMIN SERPL BCP-MCNC: 3.2 G/DL (ref 3.4–5)
ALBUMIN SERPL BCP-MCNC: 3.5 G/DL (ref 3.4–5)
ANION GAP BLDA CALCULATED.4IONS-SCNC: 5 MMO/L (ref 10–25)
ANION GAP BLDA CALCULATED.4IONS-SCNC: 7 MMO/L (ref 10–25)
ANION GAP BLDA CALCULATED.4IONS-SCNC: 8 MMO/L (ref 10–25)
ANION GAP BLDA CALCULATED.4IONS-SCNC: 9 MMO/L (ref 10–25)
ANION GAP BLDA CALCULATED.4IONS-SCNC: 9 MMO/L (ref 10–25)
ANION GAP BLDV CALCULATED.4IONS-SCNC: 7 MMOL/L (ref 10–25)
ANION GAP SERPL CALC-SCNC: 11 MMOL/L (ref 10–20)
ANION GAP SERPL CALC-SCNC: 12 MMOL/L (ref 10–20)
APTT PPP: 36 SECONDS (ref 27–38)
BASE EXCESS BLDA CALC-SCNC: -0.6 MMOL/L (ref -2–3)
BASE EXCESS BLDA CALC-SCNC: -0.9 MMOL/L (ref -2–3)
BASE EXCESS BLDA CALC-SCNC: -0.9 MMOL/L (ref -2–3)
BASE EXCESS BLDA CALC-SCNC: -2.7 MMOL/L (ref -2–3)
BASE EXCESS BLDA CALC-SCNC: 1.8 MMOL/L (ref -2–3)
BASE EXCESS BLDV CALC-SCNC: -1.3 MMOL/L (ref -2–3)
BLOOD EXPIRATION DATE: NORMAL
BODY TEMPERATURE: 37 DEGREES CELSIUS
BUN SERPL-MCNC: 28 MG/DL (ref 6–23)
BUN SERPL-MCNC: 32 MG/DL (ref 6–23)
CA-I BLD-SCNC: 1.07 MMOL/L (ref 1.1–1.33)
CA-I BLD-SCNC: 1.07 MMOL/L (ref 1.1–1.33)
CA-I BLDA-SCNC: 1.01 MMOL/L (ref 1.1–1.33)
CA-I BLDA-SCNC: 1.03 MMOL/L (ref 1.1–1.33)
CA-I BLDA-SCNC: 1.08 MMOL/L (ref 1.1–1.33)
CA-I BLDA-SCNC: 1.09 MMOL/L (ref 1.1–1.33)
CA-I BLDA-SCNC: 1.1 MMOL/L (ref 1.1–1.33)
CA-I BLDV-SCNC: 1.08 MMOL/L (ref 1.1–1.33)
CALCIUM SERPL-MCNC: 7.3 MG/DL (ref 8.6–10.6)
CALCIUM SERPL-MCNC: 7.5 MG/DL (ref 8.6–10.6)
CARDIAC TROPONIN I PNL SERPL HS: ABNORMAL NG/L (ref 0–53)
CARDIAC TROPONIN I PNL SERPL HS: ABNORMAL NG/L (ref 0–53)
CHLORIDE BLDA-SCNC: 104 MMOL/L (ref 98–107)
CHLORIDE BLDA-SCNC: 106 MMOL/L (ref 98–107)
CHLORIDE BLDA-SCNC: 107 MMOL/L (ref 98–107)
CHLORIDE BLDA-SCNC: 107 MMOL/L (ref 98–107)
CHLORIDE BLDA-SCNC: 111 MMOL/L (ref 98–107)
CHLORIDE BLDV-SCNC: 107 MMOL/L (ref 98–107)
CHLORIDE SERPL-SCNC: 102 MMOL/L (ref 98–107)
CHLORIDE SERPL-SCNC: 103 MMOL/L (ref 98–107)
CK SERPL-CCNC: 1736 U/L (ref 0–325)
CO2 SERPL-SCNC: 26 MMOL/L (ref 21–32)
CO2 SERPL-SCNC: 27 MMOL/L (ref 21–32)
CREAT SERPL-MCNC: 0.97 MG/DL (ref 0.5–1.3)
CREAT SERPL-MCNC: 1 MG/DL (ref 0.5–1.3)
DISPENSE STATUS: NORMAL
EGFRCR SERPLBLD CKD-EPI 2021: 80 ML/MIN/1.73M*2
EGFRCR SERPLBLD CKD-EPI 2021: 83 ML/MIN/1.73M*2
EJECTION FRACTION APICAL 4 CHAMBER: 45
EJECTION FRACTION: 31 %
ERYTHROCYTE [DISTWIDTH] IN BLOOD BY AUTOMATED COUNT: 15.2 % (ref 11.5–14.5)
ERYTHROCYTE [DISTWIDTH] IN BLOOD BY AUTOMATED COUNT: 15.8 % (ref 11.5–14.5)
ERYTHROCYTE [DISTWIDTH] IN BLOOD BY AUTOMATED COUNT: 16.3 % (ref 11.5–14.5)
GLUCOSE BLD MANUAL STRIP-MCNC: 151 MG/DL (ref 74–99)
GLUCOSE BLDA-MCNC: 121 MG/DL (ref 74–99)
GLUCOSE BLDA-MCNC: 135 MG/DL (ref 74–99)
GLUCOSE BLDA-MCNC: 137 MG/DL (ref 74–99)
GLUCOSE BLDA-MCNC: 137 MG/DL (ref 74–99)
GLUCOSE BLDA-MCNC: 154 MG/DL (ref 74–99)
GLUCOSE BLDV-MCNC: 131 MG/DL (ref 74–99)
GLUCOSE SERPL-MCNC: 128 MG/DL (ref 74–99)
GLUCOSE SERPL-MCNC: 149 MG/DL (ref 74–99)
HCO3 BLDA-SCNC: 22 MMOL/L (ref 22–26)
HCO3 BLDA-SCNC: 22.3 MMOL/L (ref 22–26)
HCO3 BLDA-SCNC: 23.2 MMOL/L (ref 22–26)
HCO3 BLDA-SCNC: 23.6 MMOL/L (ref 22–26)
HCO3 BLDA-SCNC: 25.5 MMOL/L (ref 22–26)
HCO3 BLDV-SCNC: 25.9 MMOL/L (ref 22–26)
HCT VFR BLD AUTO: 19.1 % (ref 41–52)
HCT VFR BLD AUTO: 22.1 % (ref 41–52)
HCT VFR BLD AUTO: 22.8 % (ref 41–52)
HCT VFR BLD AUTO: 25.8 % (ref 41–52)
HCT VFR BLD AUTO: 26.1 % (ref 41–52)
HCT VFR BLD EST: 18 % (ref 41–52)
HCT VFR BLD EST: 20 % (ref 41–52)
HCT VFR BLD EST: 21 % (ref 41–52)
HCT VFR BLD EST: 24 % (ref 41–52)
HCT VFR BLD EST: 24 % (ref 41–52)
HCT VFR BLD EST: 26 % (ref 41–52)
HGB BLD-MCNC: 6.9 G/DL (ref 13.5–17.5)
HGB BLD-MCNC: 7.6 G/DL (ref 13.5–17.5)
HGB BLD-MCNC: 7.8 G/DL (ref 13.5–17.5)
HGB BLD-MCNC: 8.7 G/DL (ref 13.5–17.5)
HGB BLD-MCNC: 8.8 G/DL (ref 13.5–17.5)
HGB BLDA-MCNC: 6 G/DL (ref 13.5–17.5)
HGB BLDA-MCNC: 7.1 G/DL (ref 13.5–17.5)
HGB BLDA-MCNC: 8 G/DL (ref 13.5–17.5)
HGB BLDA-MCNC: 8 G/DL (ref 13.5–17.5)
HGB BLDA-MCNC: 8.5 G/DL (ref 13.5–17.5)
HGB BLDV-MCNC: 6.6 G/DL (ref 13.5–17.5)
INHALED O2 CONCENTRATION: 100 %
INHALED O2 CONCENTRATION: 100 %
INHALED O2 CONCENTRATION: 34 %
INHALED O2 CONCENTRATION: 36 %
INR PPP: 1.3 (ref 0.9–1.1)
LACTATE BLDA-SCNC: 1.1 MMOL/L (ref 0.4–2)
LACTATE BLDA-SCNC: 1.3 MMOL/L (ref 0.4–2)
LACTATE BLDA-SCNC: 1.6 MMOL/L (ref 0.4–2)
LACTATE BLDA-SCNC: 1.6 MMOL/L (ref 0.4–2)
LACTATE BLDA-SCNC: 2 MMOL/L (ref 0.4–2)
LACTATE BLDV-SCNC: 1.3 MMOL/L (ref 0.4–2)
MAGNESIUM SERPL-MCNC: 2.86 MG/DL (ref 1.6–2.4)
MAGNESIUM SERPL-MCNC: 2.88 MG/DL (ref 1.6–2.4)
MCH RBC QN AUTO: 29.4 PG (ref 26–34)
MCH RBC QN AUTO: 29.7 PG (ref 26–34)
MCH RBC QN AUTO: 29.9 PG (ref 26–34)
MCH RBC QN AUTO: 30.4 PG (ref 26–34)
MCH RBC QN AUTO: 30.7 PG (ref 26–34)
MCHC RBC AUTO-ENTMCNC: 33.3 G/DL (ref 32–36)
MCHC RBC AUTO-ENTMCNC: 33.7 G/DL (ref 32–36)
MCHC RBC AUTO-ENTMCNC: 33.7 G/DL (ref 32–36)
MCHC RBC AUTO-ENTMCNC: 35.3 G/DL (ref 32–36)
MCHC RBC AUTO-ENTMCNC: 36.1 G/DL (ref 32–36)
MCV RBC AUTO: 84 FL (ref 80–100)
MCV RBC AUTO: 85 FL (ref 80–100)
MCV RBC AUTO: 87 FL (ref 80–100)
MCV RBC AUTO: 90 FL (ref 80–100)
MCV RBC AUTO: 90 FL (ref 80–100)
MYOGLOBIN SERPL-MCNC: 1096 UG/L
NRBC BLD-RTO: 0 /100 WBCS (ref 0–0)
NRBC BLD-RTO: 0 /100 WBCS (ref 0–0)
NRBC BLD-RTO: 0.2 /100 WBCS (ref 0–0)
NRBC BLD-RTO: 0.3 /100 WBCS (ref 0–0)
NRBC BLD-RTO: 0.5 /100 WBCS (ref 0–0)
OXYHGB MFR BLDA: 96 % (ref 94–98)
OXYHGB MFR BLDA: 96.7 % (ref 94–98)
OXYHGB MFR BLDA: 97.3 % (ref 94–98)
OXYHGB MFR BLDA: 97.6 % (ref 94–98)
OXYHGB MFR BLDA: 98.3 % (ref 94–98)
OXYHGB MFR BLDV: 65.7 % (ref 45–75)
PCO2 BLDA: 27 MM HG (ref 38–42)
PCO2 BLDA: 30 MM HG (ref 38–42)
PCO2 BLDA: 35 MM HG (ref 38–42)
PCO2 BLDA: 35 MM HG (ref 38–42)
PCO2 BLDA: 49 MM HG (ref 38–42)
PCO2 BLDV: 59 MM HG (ref 41–51)
PH BLDA: 7.29 PH (ref 7.38–7.42)
PH BLDA: 7.43 PH (ref 7.38–7.42)
PH BLDA: 7.47 PH (ref 7.38–7.42)
PH BLDA: 7.48 PH (ref 7.38–7.42)
PH BLDA: 7.52 PH (ref 7.38–7.42)
PH BLDV: 7.25 PH (ref 7.33–7.43)
PHOSPHATE SERPL-MCNC: 3 MG/DL (ref 2.5–4.9)
PHOSPHATE SERPL-MCNC: 3.1 MG/DL (ref 2.5–4.9)
PLATELET # BLD AUTO: 49 X10*3/UL (ref 150–450)
PLATELET # BLD AUTO: 55 X10*3/UL (ref 150–450)
PLATELET # BLD AUTO: 69 X10*3/UL (ref 150–450)
PLATELET # BLD AUTO: 70 X10*3/UL (ref 150–450)
PLATELET # BLD AUTO: 85 X10*3/UL (ref 150–450)
PO2 BLDA: 106 MM HG (ref 85–95)
PO2 BLDA: 163 MM HG (ref 85–95)
PO2 BLDA: 364 MM HG (ref 85–95)
PO2 BLDA: 92 MM HG (ref 85–95)
PO2 BLDA: 95 MM HG (ref 85–95)
PO2 BLDV: 43 MM HG (ref 35–45)
POTASSIUM BLDA-SCNC: 4.1 MMOL/L (ref 3.5–5.3)
POTASSIUM BLDA-SCNC: 4.7 MMOL/L (ref 3.5–5.3)
POTASSIUM BLDA-SCNC: 4.8 MMOL/L (ref 3.5–5.3)
POTASSIUM BLDA-SCNC: 5 MMOL/L (ref 3.5–5.3)
POTASSIUM BLDA-SCNC: 5.5 MMOL/L (ref 3.5–5.3)
POTASSIUM BLDV-SCNC: 4.4 MMOL/L (ref 3.5–5.3)
POTASSIUM SERPL-SCNC: 4.6 MMOL/L (ref 3.5–5.3)
POTASSIUM SERPL-SCNC: 5 MMOL/L (ref 3.5–5.3)
PRODUCT BLOOD TYPE: 6200
PRODUCT CODE: NORMAL
PROTHROMBIN TIME: 14.1 SECONDS (ref 9.8–12.8)
RBC # BLD AUTO: 2.25 X10*6/UL (ref 4.5–5.9)
RBC # BLD AUTO: 2.54 X10*6/UL (ref 4.5–5.9)
RBC # BLD AUTO: 2.63 X10*6/UL (ref 4.5–5.9)
RBC # BLD AUTO: 2.89 X10*6/UL (ref 4.5–5.9)
RBC # BLD AUTO: 2.96 X10*6/UL (ref 4.5–5.9)
RIGHT VENTRICLE FREE WALL PEAK S': 6.31 CM/S
SAO2 % BLDA: 100 % (ref 94–100)
SAO2 % BLDA: 100 % (ref 94–100)
SAO2 % BLDA: 99 % (ref 94–100)
SAO2 % BLDV: 68 % (ref 45–75)
SODIUM BLDA-SCNC: 132 MMOL/L (ref 136–145)
SODIUM BLDA-SCNC: 132 MMOL/L (ref 136–145)
SODIUM BLDA-SCNC: 133 MMOL/L (ref 136–145)
SODIUM BLDA-SCNC: 133 MMOL/L (ref 136–145)
SODIUM BLDA-SCNC: 135 MMOL/L (ref 136–145)
SODIUM BLDV-SCNC: 135 MMOL/L (ref 136–145)
SODIUM SERPL-SCNC: 135 MMOL/L (ref 136–145)
SODIUM SERPL-SCNC: 136 MMOL/L (ref 136–145)
UNIT ABO: NORMAL
UNIT NUMBER: NORMAL
UNIT RH: NORMAL
UNIT VOLUME: 350
WBC # BLD AUTO: 11.8 X10*3/UL (ref 4.4–11.3)
WBC # BLD AUTO: 12.2 X10*3/UL (ref 4.4–11.3)
WBC # BLD AUTO: 12.3 X10*3/UL (ref 4.4–11.3)
WBC # BLD AUTO: 12.8 X10*3/UL (ref 4.4–11.3)
WBC # BLD AUTO: 15.8 X10*3/UL (ref 4.4–11.3)
XM INTEP: NORMAL

## 2024-11-15 PROCEDURE — 85027 COMPLETE CBC AUTOMATED: CPT | Performed by: NURSE PRACTITIONER

## 2024-11-15 PROCEDURE — 2500000005 HC RX 250 GENERAL PHARMACY W/O HCPCS: Performed by: NURSE PRACTITIONER

## 2024-11-15 PROCEDURE — 93321 DOPPLER ECHO F-UP/LMTD STD: CPT | Performed by: INTERNAL MEDICINE

## 2024-11-15 PROCEDURE — 2500000004 HC RX 250 GENERAL PHARMACY W/ HCPCS (ALT 636 FOR OP/ED): Performed by: STUDENT IN AN ORGANIZED HEALTH CARE EDUCATION/TRAINING PROGRAM

## 2024-11-15 PROCEDURE — 36430 TRANSFUSION BLD/BLD COMPNT: CPT

## 2024-11-15 PROCEDURE — 82330 ASSAY OF CALCIUM: CPT | Performed by: NURSE PRACTITIONER

## 2024-11-15 PROCEDURE — 99497 ADVNCD CARE PLAN 30 MIN: CPT

## 2024-11-15 PROCEDURE — 84132 ASSAY OF SERUM POTASSIUM: CPT | Performed by: NURSE PRACTITIONER

## 2024-11-15 PROCEDURE — 93308 TTE F-UP OR LMTD: CPT | Performed by: INTERNAL MEDICINE

## 2024-11-15 PROCEDURE — 2500000001 HC RX 250 WO HCPCS SELF ADMINISTERED DRUGS (ALT 637 FOR MEDICARE OP): Performed by: STUDENT IN AN ORGANIZED HEALTH CARE EDUCATION/TRAINING PROGRAM

## 2024-11-15 PROCEDURE — 84484 ASSAY OF TROPONIN QUANT: CPT | Performed by: STUDENT IN AN ORGANIZED HEALTH CARE EDUCATION/TRAINING PROGRAM

## 2024-11-15 PROCEDURE — 99223 1ST HOSP IP/OBS HIGH 75: CPT

## 2024-11-15 PROCEDURE — 74018 RADEX ABDOMEN 1 VIEW: CPT

## 2024-11-15 PROCEDURE — 82947 ASSAY GLUCOSE BLOOD QUANT: CPT

## 2024-11-15 PROCEDURE — 37799 UNLISTED PX VASCULAR SURGERY: CPT | Performed by: NURSE PRACTITIONER

## 2024-11-15 PROCEDURE — 83735 ASSAY OF MAGNESIUM: CPT | Performed by: NURSE PRACTITIONER

## 2024-11-15 PROCEDURE — 85610 PROTHROMBIN TIME: CPT | Performed by: STUDENT IN AN ORGANIZED HEALTH CARE EDUCATION/TRAINING PROGRAM

## 2024-11-15 PROCEDURE — 2500000002 HC RX 250 W HCPCS SELF ADMINISTERED DRUGS (ALT 637 FOR MEDICARE OP, ALT 636 FOR OP/ED): Performed by: STUDENT IN AN ORGANIZED HEALTH CARE EDUCATION/TRAINING PROGRAM

## 2024-11-15 PROCEDURE — 2500000004 HC RX 250 GENERAL PHARMACY W/ HCPCS (ALT 636 FOR OP/ED): Performed by: NURSE PRACTITIONER

## 2024-11-15 PROCEDURE — 93325 DOPPLER ECHO COLOR FLOW MAPG: CPT | Performed by: INTERNAL MEDICINE

## 2024-11-15 PROCEDURE — 2500000001 HC RX 250 WO HCPCS SELF ADMINISTERED DRUGS (ALT 637 FOR MEDICARE OP): Performed by: NURSE PRACTITIONER

## 2024-11-15 PROCEDURE — 99292 CRITICAL CARE ADDL 30 MIN: CPT | Performed by: STUDENT IN AN ORGANIZED HEALTH CARE EDUCATION/TRAINING PROGRAM

## 2024-11-15 PROCEDURE — 71250 CT THORAX DX C-: CPT | Performed by: RADIOLOGY

## 2024-11-15 PROCEDURE — 74176 CT ABD & PELVIS W/O CONTRAST: CPT

## 2024-11-15 PROCEDURE — 99291 CRITICAL CARE FIRST HOUR: CPT | Performed by: STUDENT IN AN ORGANIZED HEALTH CARE EDUCATION/TRAINING PROGRAM

## 2024-11-15 PROCEDURE — 71045 X-RAY EXAM CHEST 1 VIEW: CPT

## 2024-11-15 PROCEDURE — 71045 X-RAY EXAM CHEST 1 VIEW: CPT | Performed by: RADIOLOGY

## 2024-11-15 PROCEDURE — 82550 ASSAY OF CK (CPK): CPT | Performed by: STUDENT IN AN ORGANIZED HEALTH CARE EDUCATION/TRAINING PROGRAM

## 2024-11-15 PROCEDURE — P9016 RBC LEUKOCYTES REDUCED: HCPCS

## 2024-11-15 PROCEDURE — P9045 ALBUMIN (HUMAN), 5%, 250 ML: HCPCS | Mod: JZ | Performed by: STUDENT IN AN ORGANIZED HEALTH CARE EDUCATION/TRAINING PROGRAM

## 2024-11-15 PROCEDURE — 2020000001 HC ICU ROOM DAILY

## 2024-11-15 PROCEDURE — 83605 ASSAY OF LACTIC ACID: CPT | Performed by: NURSE PRACTITIONER

## 2024-11-15 PROCEDURE — 33949 ECMO/ECLS DAILY MGMT ARTERY: CPT

## 2024-11-15 PROCEDURE — 85730 THROMBOPLASTIN TIME PARTIAL: CPT | Performed by: STUDENT IN AN ORGANIZED HEALTH CARE EDUCATION/TRAINING PROGRAM

## 2024-11-15 PROCEDURE — 84484 ASSAY OF TROPONIN QUANT: CPT | Performed by: NURSE PRACTITIONER

## 2024-11-15 PROCEDURE — 2500000004 HC RX 250 GENERAL PHARMACY W/ HCPCS (ALT 636 FOR OP/ED): Performed by: EMERGENCY MEDICINE

## 2024-11-15 PROCEDURE — 83874 ASSAY OF MYOGLOBIN: CPT | Performed by: STUDENT IN AN ORGANIZED HEALTH CARE EDUCATION/TRAINING PROGRAM

## 2024-11-15 PROCEDURE — 74176 CT ABD & PELVIS W/O CONTRAST: CPT | Performed by: RADIOLOGY

## 2024-11-15 PROCEDURE — 2500000001 HC RX 250 WO HCPCS SELF ADMINISTERED DRUGS (ALT 637 FOR MEDICARE OP)

## 2024-11-15 PROCEDURE — 2500000004 HC RX 250 GENERAL PHARMACY W/ HCPCS (ALT 636 FOR OP/ED)

## 2024-11-15 PROCEDURE — 93325 DOPPLER ECHO COLOR FLOW MAPG: CPT

## 2024-11-15 PROCEDURE — 2500000001 HC RX 250 WO HCPCS SELF ADMINISTERED DRUGS (ALT 637 FOR MEDICARE OP): Performed by: EMERGENCY MEDICINE

## 2024-11-15 PROCEDURE — 33958 ECMO/ECLS REPOS PERPH CNULA: CPT | Performed by: THORACIC SURGERY (CARDIOTHORACIC VASCULAR SURGERY)

## 2024-11-15 RX ORDER — HEPARIN SODIUM 10000 [USP'U]/100ML
0-4000 INJECTION, SOLUTION INTRAVENOUS CONTINUOUS
Status: DISCONTINUED | OUTPATIENT
Start: 2024-11-15 | End: 2024-11-18

## 2024-11-15 RX ORDER — ALBUMIN HUMAN 50 G/1000ML
25 SOLUTION INTRAVENOUS ONCE
Status: DISCONTINUED | OUTPATIENT
Start: 2024-11-15 | End: 2024-11-15

## 2024-11-15 RX ORDER — HEPARIN SODIUM 10000 [USP'U]/100ML
0-4000 INJECTION, SOLUTION INTRAVENOUS CONTINUOUS
Status: DISCONTINUED | OUTPATIENT
Start: 2024-11-15 | End: 2024-11-15 | Stop reason: SDUPTHER

## 2024-11-15 RX ORDER — ALBUMIN HUMAN 50 G/1000ML
25 SOLUTION INTRAVENOUS ONCE
Status: COMPLETED | OUTPATIENT
Start: 2024-11-15 | End: 2024-11-15

## 2024-11-15 RX ORDER — MIDAZOLAM HYDROCHLORIDE 1 MG/ML
1 INJECTION INTRAMUSCULAR; INTRAVENOUS ONCE
Status: DISCONTINUED | OUTPATIENT
Start: 2024-11-15 | End: 2024-11-15

## 2024-11-15 RX ORDER — ACETAMINOPHEN 500 MG
5 TABLET ORAL NIGHTLY PRN
Status: DISCONTINUED | OUTPATIENT
Start: 2024-11-15 | End: 2024-11-21

## 2024-11-15 RX ORDER — FENTANYL CITRATE 50 UG/ML
100 INJECTION, SOLUTION INTRAMUSCULAR; INTRAVENOUS ONCE
Status: COMPLETED | OUTPATIENT
Start: 2024-11-15 | End: 2024-11-15

## 2024-11-15 RX ORDER — HEPARIN SODIUM 1000 [USP'U]/ML
INJECTION, SOLUTION INTRAVENOUS; SUBCUTANEOUS
Status: DISPENSED
Start: 2024-11-15 | End: 2024-11-15

## 2024-11-15 RX ADMIN — AMIODARONE HYDROCHLORIDE 1 MG/MIN: 1.8 INJECTION, SOLUTION INTRAVENOUS at 16:30

## 2024-11-15 RX ADMIN — POLYETHYLENE GLYCOL 3350 17 G: 17 POWDER, FOR SOLUTION ORAL at 20:56

## 2024-11-15 RX ADMIN — HYDROMORPHONE HYDROCHLORIDE 0.2 MG: 0.2 INJECTION, SOLUTION INTRAMUSCULAR; INTRAVENOUS; SUBCUTANEOUS at 03:58

## 2024-11-15 RX ADMIN — ACETAMINOPHEN 650 MG: 325 TABLET ORAL at 10:32

## 2024-11-15 RX ADMIN — AMIODARONE HYDROCHLORIDE 1 MG/MIN: 1.8 INJECTION, SOLUTION INTRAVENOUS at 22:00

## 2024-11-15 RX ADMIN — AMIODARONE HYDROCHLORIDE 1 MG/MIN: 1.8 INJECTION, SOLUTION INTRAVENOUS at 23:54

## 2024-11-15 RX ADMIN — HEPARIN SODIUM 500 UNITS/HR: 10000 INJECTION, SOLUTION INTRAVENOUS at 20:51

## 2024-11-15 RX ADMIN — POLYETHYLENE GLYCOL 3350 17 G: 17 POWDER, FOR SOLUTION ORAL at 10:34

## 2024-11-15 RX ADMIN — CALCIUM GLUCONATE 2 G: 20 INJECTION, SOLUTION INTRAVENOUS at 17:25

## 2024-11-15 RX ADMIN — SODIUM CHLORIDE, POTASSIUM CHLORIDE, SODIUM LACTATE AND CALCIUM CHLORIDE 500 ML: 600; 310; 30; 20 INJECTION, SOLUTION INTRAVENOUS at 17:00

## 2024-11-15 RX ADMIN — PERFLUTREN 10 ML OF DILUTION: 6.52 INJECTION, SUSPENSION INTRAVENOUS at 08:30

## 2024-11-15 RX ADMIN — Medication 5 MG: at 20:56

## 2024-11-15 RX ADMIN — AMIODARONE HYDROCHLORIDE 150 MG: 1.5 INJECTION, SOLUTION INTRAVENOUS at 22:12

## 2024-11-15 RX ADMIN — PANTOPRAZOLE SODIUM 40 MG: 40 INJECTION, POWDER, LYOPHILIZED, FOR SOLUTION INTRAVENOUS at 10:33

## 2024-11-15 RX ADMIN — HYDROMORPHONE HYDROCHLORIDE 0.2 MG: 0.2 INJECTION, SOLUTION INTRAMUSCULAR; INTRAVENOUS; SUBCUTANEOUS at 04:36

## 2024-11-15 RX ADMIN — AMIODARONE HYDROCHLORIDE 150 MG: 1.5 INJECTION, SOLUTION INTRAVENOUS at 22:47

## 2024-11-15 RX ADMIN — CALCIUM GLUCONATE 2 G: 20 INJECTION, SOLUTION INTRAVENOUS at 03:15

## 2024-11-15 RX ADMIN — AMIODARONE HYDROCHLORIDE 360 MG: 1.8 INJECTION, SOLUTION INTRAVENOUS at 09:55

## 2024-11-15 RX ADMIN — EPINEPHRINE IN SODIUM CHLORIDE 0.04 MCG/KG/MIN: 16 INJECTION INTRAVENOUS at 19:52

## 2024-11-15 RX ADMIN — DEXMEDETOMIDINE HYDROCHLORIDE 0.4 MCG/KG/HR: 400 INJECTION INTRAVENOUS at 10:24

## 2024-11-15 RX ADMIN — ACETAMINOPHEN 650 MG: 325 TABLET ORAL at 20:56

## 2024-11-15 RX ADMIN — TICAGRELOR 90 MG: 90 TABLET ORAL at 10:33

## 2024-11-15 RX ADMIN — ALBUMIN HUMAN 25 G: 0.05 INJECTION, SOLUTION INTRAVENOUS at 10:30

## 2024-11-15 RX ADMIN — TICAGRELOR 90 MG: 90 TABLET ORAL at 20:56

## 2024-11-15 RX ADMIN — ASPIRIN 81 MG 81 MG: 81 TABLET ORAL at 10:33

## 2024-11-15 RX ADMIN — SENNOSIDES AND DOCUSATE SODIUM 2 TABLET: 50; 8.6 TABLET ORAL at 10:34

## 2024-11-15 RX ADMIN — DEXMEDETOMIDINE HYDROCHLORIDE 0.2 MCG/KG/HR: 400 INJECTION INTRAVENOUS at 23:32

## 2024-11-15 RX ADMIN — FENTANYL CITRATE 50 MCG: 50 INJECTION, SOLUTION INTRAMUSCULAR; INTRAVENOUS at 07:50

## 2024-11-15 RX ADMIN — AMIODARONE HYDROCHLORIDE 1 MG/MIN: 1.8 INJECTION, SOLUTION INTRAVENOUS at 09:55

## 2024-11-15 RX ADMIN — SENNOSIDES AND DOCUSATE SODIUM 2 TABLET: 50; 8.6 TABLET ORAL at 20:56

## 2024-11-15 RX ADMIN — SODIUM CHLORIDE, POTASSIUM CHLORIDE, SODIUM LACTATE AND CALCIUM CHLORIDE 500 ML: 600; 310; 30; 20 INJECTION, SOLUTION INTRAVENOUS at 08:00

## 2024-11-15 RX ADMIN — ATORVASTATIN CALCIUM 80 MG: 80 TABLET, FILM COATED ORAL at 20:56

## 2024-11-15 RX ADMIN — OXYCODONE HYDROCHLORIDE 10 MG: 5 TABLET ORAL at 10:33

## 2024-11-15 RX ADMIN — AMIODARONE HYDROCHLORIDE 150 MG: 1.5 INJECTION, SOLUTION INTRAVENOUS at 09:45

## 2024-11-15 RX ADMIN — NOREPINEPHRINE BITARTRATE 0.04 MCG/KG/MIN: 8 INJECTION, SOLUTION INTRAVENOUS at 19:51

## 2024-11-15 ASSESSMENT — PAIN - FUNCTIONAL ASSESSMENT: PAIN_FUNCTIONAL_ASSESSMENT: 0-10

## 2024-11-15 ASSESSMENT — PAIN SCALES - GENERAL
PAINLEVEL_OUTOF10: 6
PAINLEVEL_OUTOF10: 7
PAINLEVEL_OUTOF10: 3

## 2024-11-15 NOTE — NURSING NOTE
Multidisciplinary ECMO Rounds Note    Attendance:  CT Surgeon: Dr. Stanley  CTICU Attending: Dr. Bonilla  Palliative Care Attending: N  Physical Therapy Present (Y/N): N  Perfusion Present (Y/N): Y    ECMO Indication: cardiogenic shock  ECMO Cannula Configuration: Left fem fem VA    Changes made to Hemodynamic/Ventilator/ECMO Management: vaso was weaned off this morning, Amio was started for arrhthymias. Pt is still requiring high dose pressors at this time, CT scan is planned for this afternoon to rule out bleed    Circuit Concerns: None at this time  Bleeding Concerns: Pt received 3 units of RBC over night for large amount of bleeding from cannula site. 2 more units have been given today. Dr. Doug Montaño placed extra sutures at the site this morning for added securement, Bleeding from site has slowed, but pt is still requiring transfusions. CT scan pending.   Clotting/Ischemia Concerns: None  Anticoagulation Plan/Monitoring: None at this time, will reassess once hematocrit and hgb remains stable     Mobility Plan/Candidacy: Not at this time, pt has Right fem IABP in place.   Nutrition: NPO  Patient/Family Concerns: None  Nursing Concerns: None    Patients Minimally Acceptable Outcome: Unable to assess at this time  Barriers to decannulation/anticipated decannulation date:Echo was done at bedside this morning. Final read is still not back. Left anterior wall seemed to be akinetic. Dr. Stanley and CTICU team will reassess function on Sunday.     ECMO:     Most Recent Range Past 24hrs   Flow 3.5 Patient Flow (L/min)  Min: 3.26  Max: 3.61   Speed 2580 Circuit Flow (RPM)  Min: 2580  Max: 2900   Sweep 1 Sweep Gas Flow Rate (L/min)  Min: 1  Max: 2       amiodarone, 1 mg/min, Last Rate: 1 mg/min (11/15/24 0955)   Followed by  amiodarone, 0.5 mg/min  dexmedeTOMIDine, 0-1.5 mcg/kg/hr, Last Rate: Stopped (11/15/24 1145)  EPINEPHrine, 0.04 mcg/kg/min, Last Rate: 0.04 mcg/kg/min (11/15/24 0700)  heparin, 0-4,000 Units/hr  lactated  Ringer's, 50 mL/hr  norepinephrine, 0-1 mcg/kg/min, Last Rate: 0.1 mcg/kg/min (11/15/24 1120)  vasopressin, 0-0.03 Units/min, Last Rate: Stopped (11/15/24 0900)

## 2024-11-15 NOTE — PROGRESS NOTES
CTICU Progress Note  Jaime Dominguez/[id number]    Admit Date: 11/10/2024  Hospital Length of Stay: 5   ICU Length of Stay: 2d 7h   CT SURGEON:     SUBJECTIVE:   Overnight, oozing noted from arterial cannula of ECMO site. CTS aware. Pt received total 3upRBC. Pt also received 25% albumin. Lactate cleared  IABP 1:1  Levo at 0.1  Epi at 0.04  Vaso at 0.03  This AM, per CTS, fem arterial cannula migrated out. Was placed back in appropriate positioning and fixed.     MEDICATIONS  Infusions:  dexmedeTOMIDine, Last Rate: Stopped (11/15/24 0616)  EPINEPHrine, Last Rate: 0.04 mcg/kg/min (11/15/24 0700)  heparin  lactated Ringer's  norepinephrine, Last Rate: 0.1 mcg/kg/min (11/15/24 0700)  vasopressin, Last Rate: 0.03 Units/min (11/15/24 0700)      Scheduled:  acetaminophen, 650 mg, q4h   Or  acetaminophen, 650 mg, q4h  albumin human, 25 g, Once  aspirin, 81 mg, Daily  atorvastatin, 80 mg, Nightly  [Held by provider] gabapentin, 200 mg, TID  heparin, 4,000 Units, Once  insulin lispro, 0-5 Units, q4h  lactated Ringer's, 500 mL, Once  pantoprazole, 40 mg, Daily before breakfast   Or  pantoprazole, 40 mg, Daily before breakfast  perflutren lipid microspheres, 0.5-10 mL of dilution, Once in imaging  perflutren lipid microspheres, 0.5-10 mL of dilution, Once in imaging  perflutren lipid microspheres, 0.5-10 mL of dilution, Once in imaging  perflutren protein A microsphere, 0.5 mL, Once in imaging  perflutren protein A microsphere, 0.5 mL, Once in imaging  perflutren protein A microsphere, 0.5 mL, Once in imaging  polyethylene glycol, 17 g, BID  polyethylene glycol, 17 g, Daily  sennosides-docusate sodium, 2 tablet, BID  sulfur hexafluoride microsphr, 2 mL, Once in imaging  sulfur hexafluoride microsphr, 2 mL, Once in imaging  sulfur hexafluoride microsphr, 2 mL, Once in imaging  ticagrelor, 90 mg, BID      PRN:  calcium gluconate, 1 g, q6h PRN  calcium gluconate, 2 g, q6h PRN  fentaNYL, 25 mcg, q5 min PRN  HYDROmorphone, 0.2 mg,  q2h PRN  HYDROmorphone, 0.2 mg, q15 min PRN  magnesium sulfate, 2 g, q6h PRN  magnesium sulfate, 4 g, q6h PRN  naloxone, 0.2 mg, q5 min PRN  naloxone, 0.2 mg, q5 min PRN  ondansetron, 4 mg, q8h PRN   Or  ondansetron, 4 mg, q8h PRN  oxyCODONE, 10 mg, q4h PRN  oxyCODONE, 5 mg, q4h PRN  oxyCODONE, 5 mg, q4h PRN  oxygen, , Continuous PRN - O2/gases  potassium chloride CR, 20 mEq, q6h PRN   Or  potassium chloride, 20 mEq, q6h PRN  potassium chloride CR, 40 mEq, q6h PRN   Or  potassium chloride, 40 mEq, q6h PRN  potassium chloride, 20 mEq, q6h PRN  potassium chloride, 40 mEq, q6h PRN        PHYSICAL EXAM:   Visit Vitals  BP (!) 87/39   Pulse 92   Temp 37.4 °C (99.3 °F) (Core)   Resp 11   Ht 1.829 m (6')   Wt 93.4 kg (205 lb 14.6 oz)   SpO2 95%   BMI 27.93 kg/m²   Smoking Status Never   BSA 2.18 m²     Wt Readings from Last 5 Encounters:   11/12/24 93.4 kg (205 lb 14.6 oz)   11/10/24 96.2 kg (212 lb)     INTAKE/OUTPUT:  I/O last 3 completed shifts:  In: 4325 (46.3 mL/kg) [I.V.:1825 (19.5 mL/kg); Blood:1150; NG/GT:50; IV Piggyback:1300]  Out: 4030 (43.1 mL/kg) [Urine:2045 (0.6 mL/kg/hr); Emesis/NG output:900; Blood:25; Chest Tube:1060]  Weight: 93.4 kg        LDA:  CVC 11/12/24 Double lumen Right Internal jugular (Active)   Placement Date/Time: 11/12/24 (c) 5050   Hand Hygiene Performed Prior to CVC Insertion: Yes  Site Prep: Chlorhexidine   Site Prep Agent has Completely Dried Before Insertion: Yes  All 5 Sterile Barriers Used (Gloves, Gown, Cap, Mask, Large Sterile Ivone...   Number of days: 0       Arterial Line 11/12/24 Right (Active)   Placement Date/Time: 11/12/24 1900   Orientation: Right  Placed by: OR   Number of days: 0       Pacer Wires (Active)   Placement Date/Time: 11/12/24 2320   Placed by: Dr. Aldo Harman  Type of Pacing Wires: Ventricular   Number of days: 0       ETT  8.5 mm (Active)   Placement Date/Time: 11/12/24 (c) 0028   Mask Ventilation: Not attempted  Technique: Exchange catheter  ETT Type: ETT - single   Single Lumen Tube Size: 8.5 mm  Cuffed: Yes  Location: Oral  Airway Insertion Attempts: 1  Placement Verification: Ausculta...   Number of days: 1       Urethral Catheter Temperature probe 16 Fr. (Active)   Placement Date/Time: 11/12/24 1452   Placed by: BRIANDA DENIS  Hand Hygiene Completed: Yes  Catheter Type: Temperature probe  Tube Size (Fr.): 16 Fr.  Catheter Balloon Size: 10 mL  Urine Returned: Yes   Number of days: 0       Chest Tube 1 Left Pleural (Active)   Placement Date: 11/13/24   Tube Number: 1  Chest Tube Orientation: Left  Chest Tube Location: Pleural   Number of days: 0       NG/OG/Feeding Tube Center mouth (Active)   Placement Date: 11/13/24   Tube Location: Center mouth   Number of days: 0       Y Chest Tube 1 and 2 Mediastinal Mediastinal (Active)   Placement Date: 11/13/24   Chest Tube Location 1: Mediastinal  Chest Tube Location 2: Mediastinal   Number of days: 0         Vent settings:       Physical Exam:   Constitutional: Intubated and sedated on mechanical ventilation in NAD   Neuro: Neuro intact without obvious focal deficits, on sedation.  PERRL  CV: RRR.  S1S2.  SR on monitor.  AV wires set VVI60  Pulm: CTAB .  CT's with appropriate serosang output and no airleak.  : clear yellow urine fvia aden  GI: S/ND/NT. Hypoactive BS. OGT in place with bilious output.  Extremities: NV exam intact x 4.  No edema.  Skin: WDI.  Postop dressings intact.  Chest tube dressings intact.    Psych: DELONTE, sedated      Images:     Invasive Hemodynamics:    Most Recent Range Past 24hrs   BP (Art) 111/44 Arterial Line BP 1  Min: 73/45  Max: 124/48   MAP(Art) 75 mmHg Arterial Line MAP 1 (mmHg)   Min: 53 mmHg  Max: 84 mmHg   RA/CVP   No data recorded   PA 18/3 PAP  Min: 6/1  Max: 36/15   PA(mean) 9 mmHg PAP (Mean)  Min: -8 mmHg  Max: 23 mmHg   CO   No data recorded   CI   No data recorded   Mixed Venous   No data recorded   SVR    No data recorded     If Devices present, use phrases:    For LVAD, .lhcticulvad    For ECMO, .cticuecmo  For Impella, .lhcticuimpella    Daily Risk Screen:  Hemodynamic monitoring  critically ill patient who need accurate urinary output measurements  they are planned for extubation trial later today/tonight      Assessment/Plan   Assessment: Patient is a 74 year old male with a PMH significant for basal cell carcinoma and HPL  s/p MidCAB x2 converted to full sternotomy w/ LIMA prolonged as a Y graft with a radial to LAD OM w/ Dr. Aldo Harman and Dr. Stanley. CPB time 347min. Course c/b increasing pressor requirement and echo revealing apical hypo-akinesis with subsequent IABP placement on 11/14 for further support. Overnight on 11/14, Troponin elevated and ST elevation in anteroseptal leads, c/f ACS and decision made to [facility]. Now s/p PCI of mid LAD with HEATHER. Pt cannulated on VA ECMO for cardiogenic shock state 2/2 multiple failed grafts.      Plan:  NEURO:  No PMH. Patient is intubated and sedated on propofol infusion. Acute post operative pain. AxO x4. Pt cmore lethargic and somnolent today -->  - prn precedex gtt for sedation/anxiety   - Serial neuro and pain assessments   - Scheduled IV Tylenol   - PRN oxycodone  - PRN dilaudid for pain   - PT Consult  - CAM ICU score qshift  - Sleep/wake cycle hygiene       CV:  Patient has a history of HLD. Is now status post MIDCABG x 2 with conversion to full sternotomy LIMA prolonged as a Y graft with a radial to LAD OM. Pre/Post EF: normal BIV Arrived to CTICU on epi 0.04. A/V epicardial wires set VVI @ 60.Course c/b increasing pressor requirement and echo revealing apical hypo-akinesis with subsequent IABP placement on 11/14 for further support. Troponin elevated and ST elevation in anteroseptal leads, c/f ACS and decision made to [facility] 11/14. Now s/p PCI of mid LAD with HEATHER. Pt cannulated on VA ECMO for cardiogenic shock state 2/2 multiple failed grafts 11/14. -->  - IABP 1:1 setting; placement appropriate per CXR  - Formal TTE today  - Repeat troponin     - Wean levo while maintaining MAP 70-90  - wean epi for SVO2 >65  - Volume resuscitate as clinically indicated  - Maintain epicardial wires set VVI @ 60- issue with inappropriate sensing/pacing;  will troubleshoot   - Ticagrelor BID with aspirin   -c/w statin     ECMO:     Most Recent Range Past 24hrs   Flow 3.46 Patient Flow (L/min)  Min: 3.26  Max: 3.61   Speed 2580 Circuit Flow (RPM)  Min: 2580  Max: 2900   Sweep 1 Sweep Gas Flow Rate (L/min)  Min: 1  Max: 2    FiO2 100%      PULM:  No history of pulmonary disease.  Currently intubated on ventilator. Chest tubes 2x meds and 1 L pleural. Output minimal-->  - Cxr s/f increased interstitial edema and pleural effusion L>R  - On 4L NC. Wean FiO2 maintaining SpO2 >92%.   - IS q1h and OOB to chair when extubated  - Chest tubes to wall suction. Leave in place until pt further mobilizes      GI:  no PMH. Gastric Bubble on KUB. NG placed 11/14, 900cc output in past 24h ->  - follow up KUB  - Colace/senna BID and miralax BID  - PPI     :  CSA-TREE Risk Score low.  No history of renal disease, baseline creatinine 0.87. Creatinine stable post-op. Aden in place and making adequate UOP. -->  - Continue aden catheter for strict I/Os.  - Goal UOP 0.5ml/kg/hr  - RFP as clinically indicated  - Replete electrolytes per CTICU protocol  - consider lasix post LHC     ENDO: No PMH. A1c: 5.1-->  - Maintain BG <180, insulin per CTICU protocol     HEME:  Acute blood loss anemia.Overnight 11/15, oozing noted from arterial cannula of ECMO site. CTS aware and fixed cannula positioning. Pt received total 3upRBC. -->    - Monitor drain output volume and characteristics  - CBC, coags, and fibrinogen post op and as clinically indicated  - additional 2uPRBC  - C/w ASA   - Ticagrelor BID  - will restart heparin gtt few hours post hemostasis achieved; assay 0.1  - SCDs for DVT prophylaxis.  - Last type and screen: 11/14     ID:  Febrile. MRSA negative.WBC downtrending -->  - continue to monitor  WBC  - Trend temp q4h     Skin:  No active skin issues.  - preventative Mepilex dressings in place on sacrum and heels  - change preventative Mepilex weekly or more frequently as indicated (when moist/soiled)   - every shift skin assessment per nursing and weekly ICU skin rounds  - moisture barrier to be applied with sandro care  - active skin problems addressed with nursing on daily rounds     Proph:  SCDs  PPI     G:  Line  Right IJ MAC w [place] placed 11/12  R brachial a-line placed 11/12--> switch to R radial  Hale 11/12     The indications and risks/benefits of non-violent/non self-destructive restraints were discussed.      F: Family: will update at bedside.      A,B,C,D,E,F,G: reviewed     Seen and discussed with ICU attending Dr. Bonilla     Dispo: CTICU care for now.    CTICU TEAM PHONE [phone]       Restraints: The indications and risks/benefits of non-violent/non self-destructive restraints were discussed and are indicated for the safety of the patient.    Code status: Full Code      Rachael Roa MD  PGY-4/ CA-3  Dept. of Anesthesiology and Perioperative Medicine

## 2024-11-15 NOTE — CONSULTS
Inpatient consult to Palliative Care  Consult performed by: EUGENIA Leggett-CNP  Consult ordered by: Lucy Costa PA-C          Palliative Medicine Consult  Complex medical decision making, symptom management, patient/family support    History obtained from chart review including ED note, H&P, patient's daily progress notes, review of lab/test results, and discussion with primary team and bedside RN.    Subjective    History of Present Illness  Mr. Jaime Dominguez is a 74 y.o. male with a PMH significant for basal cell carcinoma and HPL who initially presented to [facility] with left sided chest pain, shortness of breath, nausea, and diaphoresis while doing yardwork. He came into the house in distress. His wife reported a brief loss of consciousness. EMS was called. He was diagnosed with STEMI in route and given Brilinta at that time. In the ED labwork and CXR were unremarkable. ECG with acute ST elevations. 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  He has a strong FH of heart disease. No personal history of prior chest pain. Cardiac surgery was consulted for CABG evaluation at [facility].      The patient was transferred to [facility] for robotic MIDCAB evaluation.     [place] [facility] [place] the following procedures:  MIDCAB x 3 LIMA & Radial, ROBOT-ASSISTED, CONVERTED TO STERNOTOMY  11563 - NV CABG W/ARTERIAL GRAFT SINGLE ARTERIAL GRAFT  1.  Mini invasive robotic assisted CABG x 2 converted to full sternotomy.  2.  CABG x 2 with LIMA  prolonged as a Y graft with a radial to LAD OM.  3.  Endoscopic radial harvest.  4.  Left femoral artery and vein cannulation for peripheral bypass.  5.  Partial LAD endarterectomy    Post operatively, pt was noted to have ongoing chest pain with elevate troponin and cardiogenic shock, requiring IABP placement 11/13, followed by [facility] on 11/14. On, 11/14 pt was cardiac catheterized for further investigation, notable for large lesion on anastomosed LAD. Options  "of safest treatment discussed and on 11/14, PCI with HEATHER to LAD was successful.    Introduction to Palliative Medicine  Met with pt at bedside, wife Mireya and dtr.   Patient alert and oriented, has capacity to make their own medical decisions at this time.   Staff present: Ruchi Yao CNP,   Palliative Medicine was introduced as a specialty service for patients with serious illness to help with symptom management, improve quality of life, assist with goals of care conversations, navigate complex decision making, and provide support to patients and families. Support and empathy was provided throughout the encounter. Provided reflective listening and presence.     Symptoms  Nurse requested to allow pt to rest d/t having a \"bad night\" r/t bleeding. Unable to assess at this time.    Palliative Medicine Social History:  The patient is  to spouse for 44 years. 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 Pt's sonya names are \"Dread\" \"Pleaser\" given to him by friends. Pt enjoys gardening and yard work along with his family. Prior to hospitalization, pt was very independent with all ADLs and needs. Pt lives with wife in a split level home.    Objective    Last Recorded Vitals  BP (!) 87/39   Pulse 92   Temp 37.4 °C (99.3 °F) (Core)   Resp 11   Ht 1.829 m (6')   Wt 93.4 kg (205 lb 14.6 oz)   SpO2 95%   BMI 27.93 kg/m²      Physical Exam  HENT:      Head: Normocephalic.   Cardiovascular:      Rate and Rhythm: Regular rhythm.   Pulmonary:      Effort: Pulmonary effort is normal.   Abdominal:      General: There is distension.          Relevant Results  Results for orders placed or performed during the hospital encounter of 11/10/24 (from the past 24 hours)   BLOOD GAS ARTERIAL FULL PANEL   Result Value Ref Range    POCT pH, Arterial 7.48 (H) 7.38 - 7.42 pH    POCT pCO2, Arterial 32 (L) 38 - 42 mm Hg    POCT pO2, Arterial 90 85 - 95 mm Hg    POCT SO2, Arterial 98 94 - 100 %    POCT Oxy Hemoglobin, " Arterial 96.9 94.0 - 98.0 %    POCT Hematocrit Calculated, Arterial 26.0 (L) 41.0 - 52.0 %    POCT Sodium, Arterial 132 (L) 136 - 145 mmol/L    POCT Potassium, Arterial 4.4 3.5 - 5.3 mmol/L    POCT Chloride, Arterial 102 98 - 107 mmol/L    POCT Ionized Calcium, Arterial 1.10 1.10 - 1.33 mmol/L    POCT Glucose, Arterial 135 (H) 74 - 99 mg/dL    POCT Lactate, Arterial 4.6 (HH) 0.4 - 2.0 mmol/L    POCT Base Excess, Arterial 0.5 -2.0 - 3.0 mmol/L    POCT HCO3 Calculated, Arterial 23.8 22.0 - 26.0 mmol/L    POCT Hemoglobin, Arterial 8.6 (L) 13.5 - 17.5 g/dL    POCT Anion Gap, Arterial 11 10 - 25 mmo/L    Patient Temperature 37.0 degrees Celsius    FiO2 30 %   ACTIVATED CLOTTING TIME LOW   Result Value Ref Range    POCT Activated Clotting Time Low Range 313 (H) 83 - 199 sec   ACTIVATED CLOTTING TIME LOW   Result Value Ref Range    POCT Activated Clotting Time Low Range 385 (H) 83 - 199 sec   ACTIVATED CLOTTING TIME LOW   Result Value Ref Range    POCT Activated Clotting Time Low Range     ECMO Arterial Full Panel Unsolicited   Result Value Ref Range    POCT pH, Arterial ECMO 7.46 Reference range not established pH    POCT pCO2, Arterial ECMO 32 Reference range not established mm Hg    POCT pO2,  Arterial ECMO 62 Reference range not established mm Hg    POCT SO2, Arterial ECMO 93 Reference range not established %    POCT Oxy Hemoglobin, Arterial ECMO 90.9 Reference range not established %    POCT Hematocrit Calculated, Arterial ECMO 22.0 (L) 41.0 - 52.0 %    POCT Sodium, Arterial  ECMO 132 (L) 136 - 145 mmol/L    POCT Potassium, Arterial  ECMO 4.7 3.5 - 5.3 mmol/L    POCT Chloride, Arterial  ECMO 101 98 - 107 mmol/L    POCT Ionized Calcium, Arterial  ECMO 1.07 (L) 1.10 - 1.33 mmol/L    POCT Glucose, Arterial  ECMO 134 (H) 74 - 99 mg/dL    POCT Lactate Arterial  ECMO      POCT Base Excess, Arterial ECMO -0.8 Reference range not established mmol/L    POCT HCO3 Calculated, Arterial ECMO 22.8 Reference range not established  mmol/L    POCT Hemoglobin, Arterial  ECMO 7.4 (L) 13.5 - 17.5 g/dL    POCT Anion Gap, Arterial  ECMO 13 Reference range not established mmo/L    Patient Temperature 37.0 degrees Celsius    FiO2 100 %   Prepare RBC: 2 Units   Result Value Ref Range    PRODUCT CODE T1310E58     Unit Number [id number]     Unit ABO A     Unit RH POS     XM INTEP COMP     Dispense Status IS     Blood Expiration Date 12/4/2024 11:59:00 PM EST     PRODUCT BLOOD TYPE 6200     UNIT VOLUME 350     PRODUCT CODE V7870X56     Unit Number [id number]     Unit ABO A     Unit RH POS     XM INTEP COMP     Dispense Status IS     Blood Expiration Date 12/4/2024 11:59:00 PM EST     PRODUCT BLOOD TYPE 6200     UNIT VOLUME 350    Prepare RBC: 4 Units   Result Value Ref Range    PRODUCT CODE R0957J68     Unit Number [id number]     Unit ABO A     Unit RH POS     XM INTEP COMP     Dispense Status TR     Blood Expiration Date 12/4/2024 11:59:00 PM EST     PRODUCT BLOOD TYPE 6200     UNIT VOLUME 350     PRODUCT CODE [id number]     Unit Number [id number]     Unit ABO A     Unit RH POS     XM INTEP COMP     Dispense Status RE     Blood Expiration Date 12/4/2024 11:59:00 PM EST     PRODUCT BLOOD TYPE 6200     UNIT VOLUME 350     PRODUCT CODE [id number]     Unit Number [id number]     Unit ABO A     Unit RH POS     XM INTEP COMP     Dispense Status RE     Blood Expiration Date 12/4/2024 11:59:00 PM EST     PRODUCT BLOOD TYPE 6200     UNIT VOLUME 350     PRODUCT CODE F1553E38     Unit Number [id number]     Unit ABO A     Unit RH POS     XM INTEP COMP     Dispense Status RE     Blood Expiration Date 12/4/2024 11:59:00 PM EST     PRODUCT BLOOD TYPE 6200     UNIT VOLUME 350    ECMO Arterial Full Panel Unsolicited   Result Value Ref Range    POCT pH, Arterial ECMO 7.44 Reference range not established pH    POCT pCO2, Arterial ECMO 34 Reference range not established mm Hg    POCT pO2,  Arterial ECMO 430 Reference range not established mm Hg     POCT SO2, Arterial ECMO 99 Reference range not established %    POCT Oxy Hemoglobin, Arterial ECMO 96.7 Reference range not established %    POCT Hematocrit Calculated, Arterial ECMO 23.0 (L) 41.0 - 52.0 %    POCT Sodium, Arterial  ECMO 132 (L) 136 - 145 mmol/L    POCT Potassium, Arterial  ECMO 4.4 3.5 - 5.3 mmol/L    POCT Chloride, Arterial  ECMO 101 98 - 107 mmol/L    POCT Ionized Calcium, Arterial  ECMO 1.08 (L) 1.10 - 1.33 mmol/L    POCT Glucose, Arterial  ECMO 135 (H) 74 - 99 mg/dL    POCT Lactate Arterial  ECMO 4.8 (HH) 0.4 - 2.0 mmol/L    POCT Base Excess, Arterial ECMO -0.9 Reference range not established mmol/L    POCT HCO3 Calculated, Arterial ECMO 23.1 Reference range not established mmol/L    POCT Hemoglobin, Arterial  ECMO 7.5 (L) 13.5 - 17.5 g/dL    POCT Anion Gap, Arterial  ECMO 12 Reference range not established mmo/L    Patient Temperature 37.0 degrees Celsius    FiO2 100 %   ACTIVATED CLOTTING TIME LOW   Result Value Ref Range    POCT Activated Clotting Time Low Range 376 (H) 83 - 199 sec   Type and screen   Result Value Ref Range    ABO TYPE A     Rh TYPE POS     ANTIBODY SCREEN NEG    Fibrinogen   Result Value Ref Range    Fibrinogen 441 (H) 200 - 400 mg/dL   CBC   Result Value Ref Range    WBC 14.0 (H) 4.4 - 11.3 x10*3/uL    nRBC 0.0 0.0 - 0.0 /100 WBCs    RBC 2.58 (L) 4.50 - 5.90 x10*6/uL    Hemoglobin 8.0 (L) 13.5 - 17.5 g/dL    Hematocrit 23.9 (L) 41.0 - 52.0 %    MCV 93 80 - 100 fL    MCH 31.0 26.0 - 34.0 pg    MCHC 33.5 32.0 - 36.0 g/dL    RDW 14.5 11.5 - 14.5 %    Platelets 90 (L) 150 - 450 x10*3/uL   Renal Function Panel   Result Value Ref Range    Glucose 124 (H) 74 - 99 mg/dL    Sodium 136 136 - 145 mmol/L    Potassium 4.3 3.5 - 5.3 mmol/L    Chloride 102 98 - 107 mmol/L    Bicarbonate 23 21 - 32 mmol/L    Anion Gap 15 10 - 20 mmol/L    Urea Nitrogen 27 (H) 6 - 23 mg/dL    Creatinine 1.02 0.50 - 1.30 mg/dL    eGFR 78 >60 mL/min/1.73m*2    Calcium 7.5 (L) 8.6 - 10.6 mg/dL    Phosphorus  3.0 2.5 - 4.9 mg/dL    Albumin 3.8 3.4 - 5.0 g/dL   Coagulation Screen   Result Value Ref Range    Protime 22.2 (H) 9.8 - 12.8 seconds    INR 2.0 (H) 0.9 - 1.1    aPTT >200 (HH) 27 - 38 seconds   Magnesium   Result Value Ref Range    Magnesium 2.73 (H) 1.60 - 2.40 mg/dL   Calcium, Ionized   Result Value Ref Range    POCT Calcium, Ionized 1.05 (L) 1.1 - 1.33 mmol/L   POCT GLUCOSE   Result Value Ref Range    POCT Glucose 125 (H) 74 - 99 mg/dL   Blood Gas Arterial Full Panel Unsolicited   Result Value Ref Range    POCT pH, Arterial 7.38 7.38 - 7.42 pH    POCT pCO2, Arterial 39 38 - 42 mm Hg    POCT pO2, Arterial 144 (H) 85 - 95 mm Hg    POCT SO2, Arterial 100 94 - 100 %    POCT Oxy Hemoglobin, Arterial 98.1 (H) 94.0 - 98.0 %    POCT Hematocrit Calculated, Arterial 25.0 (L) 41.0 - 52.0 %    POCT Sodium, Arterial 132 (L) 136 - 145 mmol/L    POCT Potassium, Arterial 4.4 3.5 - 5.3 mmol/L    POCT Chloride, Arterial 104 98 - 107 mmol/L    POCT Ionized Calcium, Arterial 1.05 (L) 1.10 - 1.33 mmol/L    POCT Glucose, Arterial 128 (H) 74 - 99 mg/dL    POCT Lactate, Arterial 3.1 (H) 0.4 - 2.0 mmol/L    POCT Base Excess, Arterial -1.8 -2.0 - 3.0 mmol/L    POCT HCO3 Calculated, Arterial 23.1 22.0 - 26.0 mmol/L    POCT Hemoglobin, Arterial 8.3 (L) 13.5 - 17.5 g/dL    POCT Anion Gap, Arterial 9 (L) 10 - 25 mmo/L    Patient Temperature 37.0 degrees Celsius   Heparin Assay   Result Value Ref Range    Heparin Unfractionated 0.1 See Comment Below for Therapeutic Ranges IU/mL   Blood Gas Arterial Full Panel   Result Value Ref Range    POCT pH, Arterial 7.47 (H) 7.38 - 7.42 pH    POCT pCO2, Arterial 32 (L) 38 - 42 mm Hg    POCT pO2, Arterial 67 (L) 85 - 95 mm Hg    POCT SO2, Arterial 97 94 - 100 %    POCT Oxy Hemoglobin, Arterial 94.3 94.0 - 98.0 %    POCT Hematocrit Calculated, Arterial 28.0 (L) 41.0 - 52.0 %    POCT Sodium, Arterial 132 (L) 136 - 145 mmol/L    POCT Potassium, Arterial 4.6 3.5 - 5.3 mmol/L    POCT Chloride, Arterial 104 98  - 107 mmol/L    POCT Ionized Calcium, Arterial 1.10 1.10 - 1.33 mmol/L    POCT Glucose, Arterial 118 (H) 74 - 99 mg/dL    POCT Lactate, Arterial 1.9 0.4 - 2.0 mmol/L    POCT Base Excess, Arterial 0.0 -2.0 - 3.0 mmol/L    POCT HCO3 Calculated, Arterial 23.3 22.0 - 26.0 mmol/L    POCT Hemoglobin, Arterial 9.2 (L) 13.5 - 17.5 g/dL    POCT Anion Gap, Arterial 9 (L) 10 - 25 mmo/L    Patient Temperature 37.0 degrees Celsius    FiO2 36 %   CBC   Result Value Ref Range    WBC 15.8 (H) 4.4 - 11.3 x10*3/uL    nRBC 0.0 0.0 - 0.0 /100 WBCs    RBC 2.89 (L) 4.50 - 5.90 x10*6/uL    Hemoglobin 8.8 (L) 13.5 - 17.5 g/dL    Hematocrit 26.1 (L) 41.0 - 52.0 %    MCV 90 80 - 100 fL    MCH 30.4 26.0 - 34.0 pg    MCHC 33.7 32.0 - 36.0 g/dL    RDW 15.8 (H) 11.5 - 14.5 %    Platelets 85 (L) 150 - 450 x10*3/uL   Blood Gas Arterial Full Panel   Result Value Ref Range    POCT pH, Arterial 7.41 7.38 - 7.42 pH    POCT pCO2, Arterial 39 38 - 42 mm Hg    POCT pO2, Arterial 67 (L) 85 - 95 mm Hg    POCT SO2, Arterial 96 94 - 100 %    POCT Oxy Hemoglobin, Arterial 93.4 (L) 94.0 - 98.0 %    POCT Hematocrit Calculated, Arterial 28.0 (L) 41.0 - 52.0 %    POCT Sodium, Arterial 132 (L) 136 - 145 mmol/L    POCT Potassium, Arterial 4.8 3.5 - 5.3 mmol/L    POCT Chloride, Arterial 104 98 - 107 mmol/L    POCT Ionized Calcium, Arterial 1.10 1.10 - 1.33 mmol/L    POCT Glucose, Arterial 129 (H) 74 - 99 mg/dL    POCT Lactate, Arterial 2.1 (H) 0.4 - 2.0 mmol/L    POCT Base Excess, Arterial 0.1 -2.0 - 3.0 mmol/L    POCT HCO3 Calculated, Arterial 24.7 22.0 - 26.0 mmol/L    POCT Hemoglobin, Arterial 9.2 (L) 13.5 - 17.5 g/dL    POCT Anion Gap, Arterial 8 (L) 10 - 25 mmo/L    Patient Temperature 37.0 degrees Celsius    FiO2 36 %   Renal Function Panel   Result Value Ref Range    Glucose 128 (H) 74 - 99 mg/dL    Sodium 136 136 - 145 mmol/L    Potassium 4.6 3.5 - 5.3 mmol/L    Chloride 102 98 - 107 mmol/L    Bicarbonate 27 21 - 32 mmol/L    Anion Gap 12 10 - 20 mmol/L     Urea Nitrogen 28 (H) 6 - 23 mg/dL    Creatinine 1.00 0.50 - 1.30 mg/dL    eGFR 80 >60 mL/min/1.73m*2    Calcium 7.5 (L) 8.6 - 10.6 mg/dL    Phosphorus 3.0 2.5 - 4.9 mg/dL    Albumin 3.5 3.4 - 5.0 g/dL   Magnesium   Result Value Ref Range    Magnesium 2.86 (H) 1.60 - 2.40 mg/dL   Calcium, Ionized   Result Value Ref Range    POCT Calcium, Ionized 1.07 (L) 1.1 - 1.33 mmol/L   Blood Gas Arterial Full Panel   Result Value Ref Range    POCT pH, Arterial 7.47 (H) 7.38 - 7.42 pH    POCT pCO2, Arterial 35 (L) 38 - 42 mm Hg    POCT pO2, Arterial 92 85 - 95 mm Hg    POCT SO2, Arterial 99 94 - 100 %    POCT Oxy Hemoglobin, Arterial 96.0 94.0 - 98.0 %    POCT Hematocrit Calculated, Arterial 24.0 (L) 41.0 - 52.0 %    POCT Sodium, Arterial 132 (L) 136 - 145 mmol/L    POCT Potassium, Arterial 4.7 3.5 - 5.3 mmol/L    POCT Chloride, Arterial 104 98 - 107 mmol/L    POCT Ionized Calcium, Arterial 1.08 (L) 1.10 - 1.33 mmol/L    POCT Glucose, Arterial 137 (H) 74 - 99 mg/dL    POCT Lactate, Arterial 1.6 0.4 - 2.0 mmol/L    POCT Base Excess, Arterial 1.8 -2.0 - 3.0 mmol/L    POCT HCO3 Calculated, Arterial 25.5 22.0 - 26.0 mmol/L    POCT Hemoglobin, Arterial 8.0 (L) 13.5 - 17.5 g/dL    POCT Anion Gap, Arterial 7 (L) 10 - 25 mmo/L    Patient Temperature 37.0 degrees Celsius    FiO2 36 %   CBC   Result Value Ref Range    WBC 11.8 (H) 4.4 - 11.3 x10*3/uL    nRBC 0.0 0.0 - 0.0 /100 WBCs    RBC 2.25 (L) 4.50 - 5.90 x10*6/uL    Hemoglobin 6.9 (L) 13.5 - 17.5 g/dL    Hematocrit 19.1 (L) 41.0 - 52.0 %    MCV 85 80 - 100 fL    MCH 30.7 26.0 - 34.0 pg    MCHC 36.1 (H) 32.0 - 36.0 g/dL    RDW 15.2 (H) 11.5 - 14.5 %    Platelets 69 (L) 150 - 450 x10*3/uL   Prepare RBC: 1 Units   Result Value Ref Range    PRODUCT CODE F7480G60     Unit Number [id number]     Unit ABO A     Unit RH POS     XM INTEP COMP     Dispense Status TR     Blood Expiration Date 11/16/2024 11:59:00 PM EST     PRODUCT BLOOD TYPE 6200     UNIT VOLUME 350    Blood Gas Arterial  Full Panel   Result Value Ref Range    POCT pH, Arterial 7.29 (L) 7.38 - 7.42 pH    POCT pCO2, Arterial 49 (H) 38 - 42 mm Hg    POCT pO2, Arterial 364 (H) 85 - 95 mm Hg    POCT SO2, Arterial 100 94 - 100 %    POCT Oxy Hemoglobin, Arterial 97.3 94.0 - 98.0 %    POCT Hematocrit Calculated, Arterial 18.0 (L) 41.0 - 52.0 %    POCT Sodium, Arterial 135 (L) 136 - 145 mmol/L    POCT Potassium, Arterial 4.1 3.5 - 5.3 mmol/L    POCT Chloride, Arterial 111 (H) 98 - 107 mmol/L    POCT Ionized Calcium, Arterial 1.01 (L) 1.10 - 1.33 mmol/L    POCT Glucose, Arterial 121 (H) 74 - 99 mg/dL    POCT Lactate, Arterial 1.3 0.4 - 2.0 mmol/L    POCT Base Excess, Arterial -2.7 (L) -2.0 - 3.0 mmol/L    POCT HCO3 Calculated, Arterial 23.6 22.0 - 26.0 mmol/L    POCT Hemoglobin, Arterial 6.0 (LL) 13.5 - 17.5 g/dL    POCT Anion Gap, Arterial 5 (L) 10 - 25 mmo/L    Patient Temperature 37.0 degrees Celsius    FiO2 100 %   Blood Gas Venous Full Panel   Result Value Ref Range    POCT pH, Venous 7.25 (LL) 7.33 - 7.43 pH    POCT pCO2, Venous 59 (H) 41 - 51 mm Hg    POCT pO2, Venous 43 35 - 45 mm Hg    POCT SO2, Venous 68 45 - 75 %    POCT Oxy Hemoglobin, Venous 65.7 45.0 - 75.0 %    POCT Hematocrit Calculated, Venous 20.0 (L) 41.0 - 52.0 %    POCT Sodium, Venous 135 (L) 136 - 145 mmol/L    POCT Potassium, Venous 4.4 3.5 - 5.3 mmol/L    POCT Chloride, Venous 107 98 - 107 mmol/L    POCT Ionized Calicum, Venous 1.08 (L) 1.10 - 1.33 mmol/L    POCT Glucose, Venous 131 (H) 74 - 99 mg/dL    POCT Lactate, Venous 1.3 0.4 - 2.0 mmol/L    POCT Base Excess, Venous -1.3 -2.0 - 3.0 mmol/L    POCT HCO3 Calculated, Venous 25.9 22.0 - 26.0 mmol/L    POCT Hemoglobin, Venous 6.6 (L) 13.5 - 17.5 g/dL    POCT Anion Gap, Venous 7.0 (L) 10.0 - 25.0 mmol/L    Patient Temperature 37.0 degrees Celsius    FiO2 100 %   Blood Gas Arterial Full Panel   Result Value Ref Range    POCT pH, Arterial 7.52 (H) 7.38 - 7.42 pH    POCT pCO2, Arterial 27 (L) 38 - 42 mm Hg    POCT pO2,  Arterial 106 (H) 85 - 95 mm Hg    POCT SO2, Arterial 100 94 - 100 %    POCT Oxy Hemoglobin, Arterial 98.3 (H) 94.0 - 98.0 %    POCT Hematocrit Calculated, Arterial 21.0 (L) 41.0 - 52.0 %    POCT Sodium, Arterial 133 (L) 136 - 145 mmol/L    POCT Potassium, Arterial 4.8 3.5 - 5.3 mmol/L    POCT Chloride, Arterial 107 98 - 107 mmol/L    POCT Ionized Calcium, Arterial 1.09 (L) 1.10 - 1.33 mmol/L    POCT Glucose, Arterial 135 (H) 74 - 99 mg/dL    POCT Lactate, Arterial 1.6 0.4 - 2.0 mmol/L    POCT Base Excess, Arterial -0.6 -2.0 - 3.0 mmol/L    POCT HCO3 Calculated, Arterial 22.0 22.0 - 26.0 mmol/L    POCT Hemoglobin, Arterial 7.1 (L) 13.5 - 17.5 g/dL    POCT Anion Gap, Arterial 9 (L) 10 - 25 mmo/L    Patient Temperature 37.0 degrees Celsius    FiO2 36 %   CBC   Result Value Ref Range    WBC 12.8 (H) 4.4 - 11.3 x10*3/uL    nRBC 0.3 (H) 0.0 - 0.0 /100 WBCs    RBC 2.54 (L) 4.50 - 5.90 x10*6/uL    Hemoglobin 7.6 (L) 13.5 - 17.5 g/dL    Hematocrit 22.8 (L) 41.0 - 52.0 %    MCV 90 80 - 100 fL    MCH 29.9 26.0 - 34.0 pg    MCHC 33.3 32.0 - 36.0 g/dL    RDW 15.8 (H) 11.5 - 14.5 %    Platelets 70 (L) 150 - 450 x10*3/uL   Prepare RBC: 1 Units   Result Value Ref Range    PRODUCT CODE [id number]     Unit Number [id number]     Unit ABO A     Unit RH POS     XM INTEP COMP     Dispense Status TR     Blood Expiration Date 11/29/2024 11:59:00 PM EST     PRODUCT BLOOD TYPE 6200     UNIT VOLUME 350    Blood Gas Arterial Full Panel   Result Value Ref Range    POCT pH, Arterial 7.48 (H) 7.38 - 7.42 pH    POCT pCO2, Arterial 30 (L) 38 - 42 mm Hg    POCT pO2, Arterial 163 (H) 85 - 95 mm Hg    POCT SO2, Arterial 99 94 - 100 %    POCT Oxy Hemoglobin, Arterial 97.6 94.0 - 98.0 %    POCT Hematocrit Calculated, Arterial 24.0 (L) 41.0 - 52.0 %    POCT Sodium, Arterial 132 (L) 136 - 145 mmol/L    POCT Potassium, Arterial 5.5 (H) 3.5 - 5.3 mmol/L    POCT Chloride, Arterial 107 98 - 107 mmol/L    POCT Ionized Calcium, Arterial 1.10 1.10 - 1.33  mmol/L    POCT Glucose, Arterial 137 (H) 74 - 99 mg/dL    POCT Lactate, Arterial 2.0 0.4 - 2.0 mmol/L    POCT Base Excess, Arterial -0.9 -2.0 - 3.0 mmol/L    POCT HCO3 Calculated, Arterial 22.3 22.0 - 26.0 mmol/L    POCT Hemoglobin, Arterial 8.0 (L) 13.5 - 17.5 g/dL    POCT Anion Gap, Arterial 8 (L) 10 - 25 mmo/L    Patient Temperature 37.0 degrees Celsius    FiO2 36 %   Prepare RBC: 1 Units   Result Value Ref Range    PRODUCT CODE P0585W86     Unit Number [id number]     Unit ABO A     Unit RH POS     XM INTEP COMP     Dispense Status XM     Blood Expiration Date 12/13/2024 11:59:00 PM EST     PRODUCT BLOOD TYPE 6200     UNIT VOLUME 350    Prepare RBC: 1 Units   Result Value Ref Range    PRODUCT CODE Y8232K09     Unit Number [id number]     Unit ABO A     Unit RH POS     XM INTEP COMP     Dispense Status XM     Blood Expiration Date 12/13/2024 11:59:00 PM EST     PRODUCT BLOOD TYPE 6200     UNIT VOLUME 350       XR abdomen 1 view, XR abdomen 1 view  Narrative: STUDY:  XR ABDOMEN 1 VIEW;  11/14/2024 5:55 pm; 11/14/2024 5:56 pm      INDICATION:  Signs/Symptoms:post VA ECMO, IABP repositioning; Signs/Symptoms:Post  NGT.          COMPARISON:  Same day abdominal radiographs and CT abdomen pelvis 11/11/2024      ACCESSION NUMBER(S):  [id number]; [id number]      ORDERING CLINICIAN:  BRITNI LINTON      FINDINGS:  2 subsequent radiographs of the abdomen were provided. Device  placement will be described based on the most recent radiograph time  stamped 4:38 p.m..      Femoral ECMO cannula with tip overlying the right atrium.  Postsurgical changes from median sternotomy.  Partially visualized [place] catheter with tip overlying the right  main pulmonary artery. Enteric tube seen coursing below the level  diaphragm with tip overlying the expected location of the stomach.      Nonobstructive bowel gas pattern.  limited evaluation of  pneumoperitoneum on supine imaging, however no gross evidence of free  air is  noted.      Blunting of the left costophrenic angle with adjacent streaky and  hazy opacities favored to represent atelectasis.      Osseous structures demonstrate no acute bony changes.      Impression: 1.  Enteric tube with tip overlying the expected location of the  stomach. IABP radiopaque marker not visualized, likely out of the  field of view.  2. Nonobstructive bowel gas pattern.  3. Small left pleural effusion with adjacent atelectasis.  4. Postsurgical changes and medical devices as described above.      I personally reviewed the images/study and I agree with Lucy Wright DO's (radiology resident) findings as stated. This study  was interpreted at [place], [place].      MACRO:  None          Dictation workstation:   [id number]  Cardiac Catheterization Procedure  This study is a surgery completed in an invasive cardiovascular space.   Please see OpNote on Notes tab for findings.  XR chest 1 view, XR abdomen 1 view  Narrative: Interpreted By:  Unruly Macias  and Roland Layne   STUDY:  XR CHEST 1 VIEW; XR ABDOMEN 1 VIEW;  11/14/2024 6:20 am      INDICATION:  Signs/Symptoms:Post IABP placement.          COMPARISON:  Chest x-ray 11/14/2024 2:47 a.m..      ACCESSION NUMBER(S):  [id number]; [id number]      ORDERING CLINICIAN:  BRITNI LINTON      FINDINGS:  AP radiograph of the chest was provided.      Patient is mildly rotated to the left      Postoperative findings of median sternotomy. Right IJ approach  pulmonary arterial catheter with tip projecting over the proximal  right main pulmonary artery. Proximal radiopaque marker of the  intra-aortic balloon pump projects superior to the aortic knob with a  distal radiopaque marker of an intra-aortic balloon pump projecting  over the level of the T11-T12 intervertebral disc space. Left sided  pleural catheter/chest tube. Lower cervical spinal fusion      CARDIOMEDIASTINAL SILHOUETTE:  Cardiomediastinal  silhouette is stable in size and configuration.      LUNGS:  Bilateral perihilar and bibasilar interstitial hazy opacities,  grossly stable as compared to prior. Blunting of the left  costophrenic angle with left lower lung field opacities. There is no  evidence of pneumothorax.      ABDOMEN:  Gaseous gastric prominence. Nonobstructive bowel gas pattern. Limited  evaluation of pneumoperitoneum on supine imaging, however no gross  evidence of free air is noted.      BONES:  No acute osseous changes.      Impression: 1. Interval placement of intra-aortic balloon pump. Recommend  advancement of the intra-aortic balloon pump given that the proximal  and distal radiopaque markers are more superior than ideal. Other  medical devices as described above.  2. Stable pulmonary interstitial and alveolar edema.  3. Stable small left pleural effusion with associated left basilar  subsegmental atelectasis.      I personally reviewed the images/study and I agree with the findings  as stated by Xi Watkins MD (PGY-2). This study was interpreted at  [facility], [place].      MACRO:  None      Signed by: Unruly Macias 11/14/2024 2:21 PM  Dictation workstation:   [id number]  XR chest 1 view  Narrative: Interpreted By:  Unruly Macias,  and Mahamed Palacios   STUDY:  XR CHEST 1 VIEW;  11/14/2024 2:47 am      INDICATION:  Signs/Symptoms:S/P PAC placement.      COMPARISON:  Chest radiograph 11/13/2024      ACCESSION NUMBER(S):  [id number]      ORDERING CLINICIAN:  ELKE MCRAE      FINDINGS:  Semi-erect AP radiograph of the chest was provided.      Interval placement of right internal jugular pulmonary artery  catheter with distal tip projecting over the distal main pulmonary  artery. Status post median sternotomy with intact sternal cerclage  wires.      CARDIOMEDIASTINAL SILHOUETTE:  Cardiomediastinal silhouette is stable in size and configuration.      LUNGS:  Low lung  volumes contributing to bronchovascular crowding. There is  increased interstitial prominence and central pulmonary vascular  congestion when compared to prior with interval development of small  bilateral pleural effusions and bibasilar atelectasis.      ABDOMEN:  No remarkable upper abdominal findings.      BONES:  No acute osseous changes.      Impression: 1.  Findings suggestive of pulmonary edema with small bilateral  pleural effusions.  2. Interval placement of pulmonary artery catheter with distal tip  projecting over the expected location of the distal main pulmonary  artery.      I personally reviewed the images/study and I agree with the findings  as stated by Philip Reid MD (Radiology Resident).      MACRO:  None      Signed by: Unruly Macias 11/14/2024 1:40 PM  Dictation workstation:   [id number]  Electrocardiogram 12-lead PRN for arrhythmia  Normal sinus rhythm  Anteroseptal infarct (cited on or before 13-NOV-2024)  Abnormal ECG  When compared with ECG of 14-NOV-2024 03:42,  No significant change was found  Confirmed by Lucas Mckeon ([id number]) on 11/14/2024 10:24:16 AM  ECG 12 Lead  Sinus rhythm with occasional ventricular-paced complexes  Low voltage QRS  Anterolateral infarct , possibly acute  ** ** ACUTE MI / STEMI ** **  Abnormal ECG  When compared with ECG of 10-NOV-2024 16:30,  Electronic ventricular pacemaker has replaced Sinus rhythm     Encounter Date: 11/10/24   Electrocardiogram 12-lead PRN for arrhythmia   Result Value    Ventricular Rate 94    Atrial Rate 94    HI Interval 168    QRS Duration 86    QT Interval 350    QTC Calculation(Bazett) 437    P Axis 39    R Axis 51    T Axis 43    QRS Count 15    Q Onset 220    P Onset 136    P Offset 186    T Offset 395    QTC Fredericia 406    Narrative    Normal sinus rhythm  Anteroseptal infarct (cited on or before 13-NOV-2024)  Abnormal ECG  When compared with ECG of 14-NOV-2024 03:42,  No significant change was found  Confirmed by  Lucas Mckeon ([id number]) on 11/14/2024 10:24:16 AM        Allergies  Patient has no known allergies.    Scheduled medications  acetaminophen, 650 mg, oral, q4h   Or  acetaminophen, 650 mg, rectal, q4h  albumin human, 25 g, intravenous, Once  aspirin, 81 mg, oral, Daily  atorvastatin, 80 mg, oral, Nightly  fentaNYL, 100 mcg, intravenous, Once  [Held by provider] gabapentin, 200 mg, oral, TID  heparin, 4,000 Units, intravenous, Once  insulin lispro, 0-5 Units, subcutaneous, q4h  lactated Ringer's, 500 mL, intravenous, Once  pantoprazole, 40 mg, oral, Daily before breakfast   Or  pantoprazole, 40 mg, intravenous, Daily before breakfast  perflutren lipid microspheres, 0.5-10 mL of dilution, intravenous, Once in imaging  perflutren lipid microspheres, 0.5-10 mL of dilution, intravenous, Once in imaging  perflutren lipid microspheres, 0.5-10 mL of dilution, intravenous, Once in imaging  perflutren protein A microsphere, 0.5 mL, intravenous, Once in imaging  perflutren protein A microsphere, 0.5 mL, intravenous, Once in imaging  perflutren protein A microsphere, 0.5 mL, intravenous, Once in imaging  polyethylene glycol, 17 g, oral, BID  polyethylene glycol, 17 g, oral, Daily  sennosides-docusate sodium, 2 tablet, oral, BID  sulfur hexafluoride microsphr, 2 mL, intravenous, Once in imaging  sulfur hexafluoride microsphr, 2 mL, intravenous, Once in imaging  sulfur hexafluoride microsphr, 2 mL, intravenous, Once in imaging  ticagrelor, 90 mg, oral, BID      Continuous medications  dexmedeTOMIDine, 0-1.5 mcg/kg/hr, Last Rate: Stopped (11/15/24 0616)  EPINEPHrine, 0.04 mcg/kg/min, Last Rate: 0.04 mcg/kg/min (11/15/24 0700)  heparin, 0-4,000 Units/hr  lactated Ringer's, 50 mL/hr  norepinephrine, 0-1 mcg/kg/min, Last Rate: 0.1 mcg/kg/min (11/15/24 0700)  vasopressin, 0-0.03 Units/min, Last Rate: 0.03 Units/min (11/15/24 0700)      PRN medications  PRN medications: calcium gluconate, calcium gluconate, fentaNYL, HYDROmorphone,  HYDROmorphone, magnesium sulfate, magnesium sulfate, naloxone, naloxone, ondansetron **OR** ondansetron, oxyCODONE, oxyCODONE, oxyCODONE, oxygen, potassium chloride CR **OR** potassium chloride, potassium chloride CR **OR** potassium chloride, potassium chloride, potassium chloride     Assessment/Plan    Mr. Jaime Dominguez is a 74 y.o. male with a PMH significant for basal cell carcinoma and HPL who initially presented to [facility] with left sided chest pain, shortness of breath, nausea, and diaphoresis while doing yardwork. He came into the house in distress. His wife reported a brief loss of consciousness. EMS was called. He was diagnosed with STEMI in route and given Brilinta at that time. In the ED labwork and CXR were unremarkable. ECG with acute ST elevations. 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  He has a strong FH of heart disease. No personal history of prior chest pain. Cardiac surgery was consulted for CABG evaluation at [facility].      The patient was transferred to [facility] for robotic MIDCAB evaluation.     At [facility], pt underwent the following procedures:  MIDCAB x 3 LIMA & Radial, ROBOT-ASSISTED, CONVERTED TO STERNOTOMY  67749 - NC CABG W/ARTERIAL GRAFT SINGLE ARTERIAL GRAFT  1.  Mini invasive robotic assisted CABG x 2 converted to full sternotomy.  2.  CABG x 2 with LIMA  prolonged as a Y graft with a radial to LAD OM.  3.  Endoscopic radial harvest.  4.  Left femoral artery and vein cannulation for peripheral bypass.  5.  Partial LAD endarterectomy    Post operatively, pt was noted to have ongoing chest pain with elevate troponin and cardiogenic shock, requiring IABP placement 11/13, followed by VA ECMO on 11/14. On, 11/14 pt was cardiac catheterized for further investigation, notable for large lesion on anastomosed LAD. Options of safest treatment discussed and on 11/14, PCI with HEATHER to LAD was successful.    11/15: Palliative consulted for family support.    Palliative  Performance Scale (PPS): 10-20    ----------------------------------------------------------------------------------------------------------------------------------------------------------------------------------------------------------------------------------------------------------------------------------------------------------------------------------------------------------------------  Advanced Care Planning  Patient and/or family consented to a voluntary Advanced Care Planning meeting.   Serious Illness Assessment and Counseling:  Life Limiting Disease: STEMI IABP and VA ECMO with LAD re-stenosis posing threat to life or function.     Disease Specific Information Provided/Prognosis Discussed: Patient's current clinical condition, including diagnosis, prognosis, and management plan were discussed.   Counseling provided on plan and  hospital course.  Mireya asking if a pacemaker would be beneficially at this time.      Understanding/Overall Impression: Family expressing fair understanding of overall health status and severity of illness.     Goals/Hopes: Discussion ensued about patient's goals for their medical care going forward. Allowed family time to talk about pt's current quality of life, disease course/progression, and symptom and treatment burden. Discussed goal is to hopefully get back to life as it was.    Fears/Worries/Concerns: That pt will not get through this    Patient's Perception of Functional Status: was independent prior to hospitalization.    Minimal Acceptable Quality of Life/Maximal [place] Tolerable for the Possibility of More Time: Counseling provided on the concept of MAO/Maximal [place]. Mireya expressing that pt would never want to be in a health state where they were dependent on other's for ADLs/toileting needs, lacked his mobility or independence.      Advanced Directives:  Surrogate Health Care Decision Maker: Mireya Dominguez (Spouse)  [phone]   HPOA: on file  Living will: not on  file  Patient does not have a HPOA or living will. Counseling provided on benefit of completing advanced directives in order to establish person to make one's medical decision if patient were unable to speak for themselves and to make their health care wishes and treatment limitations to both hospital staff and their designated HPOA. Social work to help patient complete prior to discharge.     Code Status: Decision to keep code status Full code at this time.     I spent 22 minutes in providing separately identifiable ACP services with the patient and/or surrogate decision maker in a voluntary conversation discussing the patient's wishes and goals as detailed in the above note.   ----------------------------------------------------------------------------------------------------------------------------------------------------------------------------------------------------------------------------------------------------------------------------------------------------------------------------------------------------------------------    #Complex Medical Decision Making  #Goals of Care  #Advanced Care Planning  - Code status: FULL CODE  - Surrogate decision maker: Mireya Dominguez (Spouse)  [phone]   - Goals are mix of survival and time and improved quality of life  - 11/15: Met with pt, wife and dtr at bedside. Gleaned more information r/t pt's life, lifestyle, goals, wishes and health preferences.  Palliative spent time to answer questions and worries. Family aware of day by day monitoring of heart function and healing r/t anastomotic blockage PCI. Maintained a positive and non-anxious environment. Family and pt very happy for the consult and extra supports placed to assist pt and family. See below.    Palliative will continue to follow and navigate care based on hospital course and recovery.        #Psychosocial Support  - Music Therapy- ordered. 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  - Spiritual Care Support- Mandaeism, ordered.  - Art/expressive Therapy- ordered      Plan of Care discussed with: Updated primary and bedside RN on goals of care decision, medication adjustments, and code status     Medical Decision Making was high level due to high complexity of problems, extensive data review, and high risk of management/treatment.     - STEMI IABP and VA ECMO with LAD re-stenosis posing threat to life or function.   - Reviewed external notes from   - Reviews results from  which were used in decision making for   - Recommended the following tests:   - Assessment required independent historian: primary  - Independent interpretation of test: labs and imaging  - Discussion of management with: nursing and primary  - Drug therapy requiring intensive monitoring for toxicity: insulin  - Decision regarding elective major surgery with identified patient or procedure risk factors:   - Decision regarding emergency major surgery:   - Decision regarding hospitalization or escalation of hospital-level care:   - Decision not to resuscitate or to de-escalate care because of poor prognosis:   - Parenteral controlled substances: epi, heparin, vaso, precedex.    Thank you for allowing us to participate in the care of this patient. Palliative will continue to follow as needed. Palliative medicine is available Monday-Friday, 8a-6p. Please contact team with any questions or concerns.  Team pager [phone] (weekdays)  Katja Yao CNP (on EPIC secure chat)

## 2024-11-15 NOTE — PROGRESS NOTES
Please see earlier notes regarding patient's condition.  After obtaining full consent from the patient's wife and his 2 sons the patient was taken to the cardiac catheterization laboratory under emergent condition in full cardiogenic shock supported on 3 pressors and intra-aortic balloon pump.    First with assistance from Dr. Yi cardiac surgeon we placed a VA ECMO in the left femoral region.  I personally placed antegrade 8 Kyr[place]z sheath.  ECMO was connected and functioning at 4.5 L.    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  Via the right radial approach we performed a complex endovascular reconstruction of the totally occluded left anterior descending artery at the site of the anastomosis of the LIMA to the LAD and the prior LAD endarterectomy.  The procedure was complex and requires multiple attempts at wiring the LAD which was eventually successful.    After gradual predilatation of the occluded LAD with a 2.0mm followed by 2.5mm balloon, 3.0mm by 38 drug-eluting stents was placed in the proximal to mid LAD.  The proximal portion of the stent was then postdilated with a 3.5 x 12 noncompliant balloon.    Subsequent angiogram revealed widely patent left anterior descending with a wraparound around the apex.    At this point the patient was weaned off of norepinephrine and vasopressin.  Blood pressure at the end of the procedure was a systolic of about 135-140/75-80 diastolic.    A TR band was placed in the right wrist.  And the patient was safely transferred to the cardiothoracic intensive care unit.    I personally met the family again reviewed the angiogram and had a long discussion with the patient his wife his 2 sons and his daughter and the rest of his family at length.    We will continue to support the patient at this time with ECMO, dual antiplatelet therapy, and hemodynamic support.    Giancarlo Quiles DO

## 2024-11-15 NOTE — CONSULTS
Wound Care Consult     Visit Date: 11/15/2024      Patient Name: Jaime Dominguez         MRN: [id number]           YOB: 1950     Reason for Consult: erythema to buttock, scrotum bruise        Wound Assessment:  Wound 11/09/24 Incision Head Upper (Active)   Wound Image   11/09/24 2306   Site Assessment Brown;Pink 11/14/24 0800   Cheryle-Wound Assessment Clean;Dry;Intact 11/14/24 0400   Shape circular 11/09/24 0800   Wound Length (cm) 1 cm 11/09/24 0800   Wound Width (cm) 1 cm 11/09/24 0800   Wound Surface Area (cm^2) 1 cm^2 11/09/24 0800   Wound Depth (cm) 0 cm 11/09/24 0800   Wound Volume (cm^3) 0 cm^3 11/09/24 0800   Margins Well-defined edges 11/14/24 0800   Closure Open to air 11/14/24 0400   Treatments Cleansed;Moisturizing cream 11/09/24 0800   Drainage Description None 11/14/24 0400   Drainage Amount None 11/14/24 0800   Dressing Open to air 11/14/24 0800   Dressing Changed New 11/09/24 2306   Dressing Status Clean;Dry 11/12/24 1200       Wound 11/12/24 Incision Sternum (Active)   Site Assessment Clean;Dry;Intact 11/14/24 0800   Cheryle-Wound Assessment Clean;Dry;Intact 11/14/24 0400   Margins Attached edges 11/13/24 0800   Closure Approximated 11/13/24 0800   Sutures/Staple Line Approximated 11/13/24 0400   Drainage Description None 11/13/24 0800   Drainage Amount None 11/14/24 0800   Dressing Dry dressing 11/14/24 0800   Dressing Status Clean;Dry;Occlusive 11/14/24 0400       Wound 11/12/24 Incision Chest Left (Active)   Site Assessment Clean;Dry;Intact 11/14/24 0800   Cheryle-Wound Assessment Ecchymotic 11/14/24 0800   State of Healing Closed wound edges 11/13/24 0800   Margins Attached edges 11/14/24 0800   Closure Approximated 11/13/24 0800   Sutures/Staple Line Approximated 11/14/24 0800   Drainage Description None 11/13/24 0800   Drainage Amount None 11/14/24 0800   Dressing Open to air 11/14/24 0800       Wound 11/12/24 Incision Groin Left (Active)   Site Assessment Clean;Dry;Intact 11/14/24  0800   Cheryle-Wound Assessment Clean;Dry 11/13/24 0800   Drainage Amount None 11/14/24 0800   Dressing Dry dressing 11/14/24 0800   Dressing Status Clean;Dry;Occlusive 11/13/24 0800       Wound 11/12/24 Incision Arm Left (Active)   Site Assessment Clean;Dry;Intact 11/14/24 0800   Cheryle-Wound Assessment Clean;Dry 11/13/24 0800   Drainage Description None 11/13/24 0800   Drainage Amount None 11/14/24 0800   Dressing Dry dressing 11/14/24 0800   Dressing Status Clean;Dry;Occlusive 11/13/24 0800       Wound 11/14/24 Other (comment) Buttock Left (Active)   Wound Image   11/15/24 1050   Site Assessment Blanchable erythema 11/15/24 1050   Cheryle-Wound Assessment Blanchable erythema 11/15/24 1050   Non-staged Wound Description Not applicable 11/15/24 1050   Pressure Injury Stage U 11/14/24 1557   Drainage Description None 11/15/24 1050   Drainage Amount None 11/15/24 1050   Dressing Silicone border dressing 11/15/24 1050   Dressing Status Clean;Dry;Occlusive 11/14/24 1557       Wound 11/15/24 Other (comment) Scrotum (Active)   Wound Image   11/15/24 1049   Site Assessment Purple;Red 11/15/24 1049   Drainage Description None 11/15/24 1049   Drainage Amount None 11/15/24 1049   Dressing Open to air 11/15/24 1049       Wound Team Summary Assessment:   Left buttock with blanchable erythema; continue mepilex border dressing and strict q2 hour turns.  For scrotum, elevate as needed and apply criticaid barrier ointment.     Patient must be turned and repositioned every 2 hours while in bed.    Marilynn Talavera RN  11/15/2024  10:51 AM

## 2024-11-15 NOTE — PROGRESS NOTES
Subjective Data:  Emergent cath yesterday with ECMO placement and successful PCI of the LAD. ECMO site bleeding overnight requiring multiple units pRBCs, repositioning and sutures per Cardiac Surgery. Pt remains on epi and levo. Vaso stopped early this morning. IABP placed by CTICU currently 1:1. On aspirin and Brilinta. TTE pending.     Objective Data:  Last Recorded Vitals:  Vitals:    11/15/24 1144 11/15/24 1200 11/15/24 1245 11/15/24 1300   BP: (!) 82/33      Pulse: 78 77  80   Resp: 14 13  14   Temp: 36 °C (96.8 °F) 36 °C (96.8 °F) 35.8 °C (96.4 °F) 36.3 °C (97.3 °F)   TempSrc: Core Core Core Core   SpO2: 100% 99%  99%   Weight:       Height:           Last Labs:  CBC - 11/15/2024:  5:16 AM  12.8 7.6 70    22.8      CMP - 11/15/2024:  1:42 AM  7.5 5.9 180 --- 0.9   3.0 3.5 35 21      PTT - 11/14/2024:  3:23 PM  2.0   22.2 >200     TROPHS   Date/Time Value Ref Range Status   11/15/2024 01:42 AM 67,971 0 - 53 ng/L Final   11/14/2024 03:54 AM 73,314 0 - 53 ng/L Final     Comment:     Previous result verified on 11/13/2024 2154 on specimen/case [id number] called with component [facility] for procedure Troponin I, High Sensitivity with value 45,685 ng/L.   11/14/2024 12:20 AM 65,732 0 - 53 ng/L Final     Comment:     Previous result verified on 11/13/2024 2154 on specimen/case [id number] called with component [facility] for procedure Troponin I, High Sensitivity with value 45,685 ng/L.   11/08/2024 04:12 PM 4 0 - 20 ng/L Final     BNP   Date/Time Value Ref Range Status   11/10/2024 04:34 PM 13 0 - 99 pg/mL Final     HGBA1C   Date/Time Value Ref Range Status   11/08/2024 04:12 PM 5.1 See comment % Final     LDLCALC   Date/Time Value Ref Range Status   11/08/2024 04:12  <=99 mg/dL Final     Comment:                                 Near   Borderline      AGE      Desirable  Optimal    High     High     Very High     0-19 Y     0 - 109     ---    110-129   >/= 130     ----    20-24 Y     0 - 119     ---    120-159    >/= 160     ----      >24 Y     0 -  99   100-129  130-159   160-189     >/=190       VLDL   Date/Time Value Ref Range Status   11/08/2024 04:12 PM 64 0 - 40 mg/dL Final      Last I/O:  I/O last 3 completed shifts:  In: 4525 (48.4 mL/kg) [I.V.:2025 (21.7 mL/kg); Blood:1150; NG/GT:50; IV Piggyback:1300]  Out: 4030 (43.1 mL/kg) [Urine:2045 (0.6 mL/kg/hr); Emesis/NG output:900; Blood:25; Chest Tube:1060]  Weight: 93.4 kg     Past Cardiology Tests (Last 3 Years):  EKG:  Electrocardiogram 12-lead PRN for arrhythmia 11/14/2024      ECG 12 Lead 11/13/2024 (Preliminary)      Electrocardiogram, 12-lead PRN ACS symptoms 11/10/2024      Electrocardiogram, 12-lead 11/09/2024    Echo:  Transthoracic Echo (TTE) Limited 11/15/2024      Anesthesia Intraoperative Transesophageal Echocardiogram 11/12/2024      Transthoracic Echo (TTE) Complete 11/09/2024    Ejection Fractions:  EF   Date/Time Value Ref Range Status   11/15/2024 09:11 AM 31 %    11/12/2024 10:24 AM 63 %    11/09/2024 09:18 AM 63 %      Cath:  Cardiac Catheterization Procedure 11/14/2024      Cardiac Catheterization Procedure 11/08/2024    Stress Test:  No results found for this or any previous visit from the past 1095 days.    Cardiac Imaging:  No results found for this or any previous visit from the past 1095 days.      Inpatient Medications:  Scheduled medications   Medication Dose Route Frequency    acetaminophen  650 mg oral q4h    Or    acetaminophen  650 mg rectal q4h    amiodarone        aspirin  81 mg oral Daily    atorvastatin  80 mg oral Nightly    [Held by provider] gabapentin  200 mg oral TID    heparin  4,000 Units intravenous Once    heparin        insulin lispro  0-5 Units subcutaneous q4h    pantoprazole  40 mg oral Daily before breakfast    Or    pantoprazole  40 mg intravenous Daily before breakfast    perflutren lipid microspheres  0.5-10 mL of dilution intravenous Once in imaging    perflutren lipid microspheres  0.5-10 mL of dilution intravenous  Once in imaging    perflutren protein A microsphere  0.5 mL intravenous Once in imaging    perflutren protein A microsphere  0.5 mL intravenous Once in imaging    perflutren protein A microsphere  0.5 mL intravenous Once in imaging    polyethylene glycol  17 g oral BID    polyethylene glycol  17 g oral Daily    sennosides-docusate sodium  2 tablet oral BID    sulfur hexafluoride microsphr  2 mL intravenous Once in imaging    sulfur hexafluoride microsphr  2 mL intravenous Once in imaging    sulfur hexafluoride microsphr  2 mL intravenous Once in imaging    ticagrelor  90 mg oral BID     PRN medications   Medication    amiodarone    calcium gluconate    calcium gluconate    fentaNYL    heparin    HYDROmorphone    HYDROmorphone    magnesium sulfate    magnesium sulfate    naloxone    naloxone    ondansetron    Or    ondansetron    oxyCODONE    oxyCODONE    oxyCODONE    oxygen    potassium chloride CR    Or    potassium chloride    potassium chloride CR    Or    potassium chloride    potassium chloride    potassium chloride     Continuous Medications   Medication Dose Last Rate    amiodarone  1 mg/min 1 mg/min (11/15/24 0955)    Followed by    amiodarone  0.5 mg/min      dexmedeTOMIDine  0-1.5 mcg/kg/hr Stopped (11/15/24 1145)    EPINEPHrine  0.04 mcg/kg/min 0.04 mcg/kg/min (11/15/24 0700)    heparin  0-4,000 Units/hr      lactated Ringer's  50 mL/hr      norepinephrine  0-1 mcg/kg/min 0.09 mcg/kg/min (11/15/24 1330)    vasopressin  0-0.03 Units/min Stopped (11/15/24 0900)       Physical Exam:  General: lying in bed, sedated  Skin: warm and dry   Cardiac: S1S2  Pulm: CTA anterior, CTs in place  GI: soft, nontender, NGT  Extremities: no LE edema, left groin ECMO site, rt fem IABP site  Neuro: sedated     Assessment/Plan   Patient is a 74 year old male with a PMH significant for basal cell carcinoma and HPL s/p MidCAB x2 converted to full sternotomy w/ LIMA prolonged as a Y graft with a radial to LAD OM w/ Dr. Aldo Harman  and Dr. Stanley. CPB time 347min. Course c/b increasing pressor requirement and echo revealing apical hypo-akinesis with subsequent IABP placement on 11/14 for further support. Overnight on 11/14, Troponin elevated and ST elevation in anteroseptal leads, c/f ACS and decision made to [facility]. Now s/p PCI of mid LAD with HEATHER. Pt cannulated on VA ECMO for cardiogenic shock state 2/2 multiple failed grafts.     11/14  Right Radial 6 Fr -- TR Band  Left Femoral Artery ECMO Cannula  Left Femoral Vein ECMO Cannula     Patient is s/p CABG on 11/13/2024 arrived in fulminant cardiogenic shock despite IABP placement and 3 pressors.   A right femoral diagnostic cath was done with the following findings:     LM: distal 30-40%  LAD: mid 100% stenosis at anastomosis site   LCX: competitive flow in the OM without significant stenosis  RCA: patent  LIMA to LAD: Y graft with anastomosis to the LAD which is not patent and anastomosis to the OM which appears patent     Due to patient's profound shock, ECMO cannulation was done with the aforementioned access sites and the plan was made jointly with cardiac surgery to pursue salvage PCI of the mid LAD. He was given ASA and loaded with Ticagrelor. Patient is now s/p salvage PCI of the mid LAD with a [place] 3.0 x 38 HEATHER. Following PCI and ECMO cannulation, patient's pressors were able to be weaned substantially and he left the cath lab (without intubation) on ECMO and IABP support.     ASA/Ticagrelor loading in the lab    Overnight bleeding from ECMO site requiring multiple units pRBCs, repositioned and sutured by Cardiac Surgery    11/15 IABP 1:1, left fem ECMO, on epi and levo (vaso currently weaned off), amio started for afib, pending TTE     Interventional Recs  - appreciate excellent CTICU care   - ASA + Ticagrelor for 6 months w/o interruption followed by lifelong ASA  - cont statin   - no BB currently, requiring pressors  - IABP and ECMO management per CTICU     Interventional  Cardiology will follow.     CICU Fellow Pager: [phone] anytime  Endovascular /Limb Salvage Team Pager: [phone] for day coverage (8am-5pm)  Interventional Cardiology Fellow Pager: [phone] (7AM-5PM) Night coverage: [facility] Cross Cover [id number]  Night coverage: [facility] [id number]        Silvana Zaragoza, APRN-CNP

## 2024-11-15 NOTE — PROGRESS NOTES
Spiritual Care Visit    Clinical Encounter Type  Visited With: Patient not available (Pt sleeping and family not present at time of this visit.)  Routine Visit: Introduction  Continue Visiting: Yes  Crisis Visit: Critical care

## 2024-11-15 NOTE — PROCEDURES
Repositioning of Arterial ECMO cannula      Patient was bleeding overnight from the arterial puncture site around the arterial ECMO cannula. On inspection, seems that the cannula has dislodged slightly. Also, patient is on DAPT.    Under aseptic technique, the ECMO arterial cannula was repositioned and extra stitches were added to secure it in place.    Bleeding stopped  Dressing was applied      Ronda Yi MD

## 2024-11-15 NOTE — PROGRESS NOTES
Patient requires ECMO support.     ECMO:  Cannulation Date: Nov. 14, 2024  ECMO Indication: Cardiogenic Shock  Current Goals of Care: Full Code    ECMO Cannula Configuration: L fem-fem VA-ECMO    ECMO Interrogation:  Drainage cannula site, cannula, pump, oxygenator, return cannula and return cannula site clinically inspected. RPM and sweep reviewed and adjusted appropriate to clinical context.    ECMO circuit run from drainage cannula to pump to oxygenator to return cannula: Yes  Pre/post PaO2 adequate: Yes  LV Unloading/Pulse Pressure: IABP at 1:1; Low pulsatility this morning  Distal Perfusion: L DPC in place, adequate pulses    ECMO Settings:     Most Recent Range Past 24hrs   Flow 3.61 Patient Flow (L/min)  Min: 3.26  Max: 3.61   Speed 2900 Circuit Flow (RPM)  Min: 2900  Max: 2900   Sweep 1 Sweep Gas Flow Rate (L/min)  Min: 1  Max: 2     IABP in situ 1:1. Position noted to be high in the cath lab and appropriately repositioned.    Invasive Hemodynamics:    Most Recent Range Past 24hrs   BP (Art) 88/41 Arterial Line BP 1  Min: 73/45  Max: 124/48   MAP(Art) 61 mmHg Arterial Line MAP 1 (mmHg)   Min: 53 mmHg  Max: 84 mmHg   RA/CVP   No data recorded   PA 15/6 PAP  Min: 6/1  Max: 36/15   PA(mean) 8 mmHg PAP (Mean)  Min: -8 mmHg  Max: 23 mmHg   CO   No data recorded   CI   No data recorded   Mixed Venous   No data recorded   SVR    No data recorded     Hemodynamic Management:  dexmedeTOMIDine, 0-1.5 mcg/kg/hr, Last Rate: Stopped (11/15/24 0616)  EPINEPHrine, 0.04 mcg/kg/min, Last Rate: 0.04 mcg/kg/min (11/15/24 0700)  lactated Ringer's, 50 mL/hr  norepinephrine, 0-1 mcg/kg/min, Last Rate: 0.1 mcg/kg/min (11/15/24 0700)  vasopressin, 0-0.03 Units/min, Last Rate: 0.03 Units/min (11/15/24 0700)        Bleeding/Clot Issues:   Anticoagulation/Monitoring: Anticoagulation status and intensity discussed  Plan: none but will plan to re-assess for systemic heparin with Xa monitoring later today is cannula site bleeding  improved  Presence of cannula bleeding: significant bleeding overnight - examined by surgical team this morning - appears cannula may have moved with patient movemenbt  Presence of oxygenator clot: None    Management Issues:  Road Trips planned for today? CT scan; perfusion aware  Mobility Planned today? N  Weaning Plan/date: TBD    Communication:  Discussed with Cardiothoracic surgeon, Perfusion, Palliative care (consulted entered and physical/occupational therapy)    Family Updates/Concerns? Family updated. All questions appeared be answered adequately.    ECMO ROUNDS:  The following staff  were in attendance during formal interdisciplinary ECMO team rounds today:    Cardiac Surgery: Lizeth  CTICU: Bonilla  Palliative Care: N/A  ECMO Coordinator: Cathy   Perfusion: Present    In addition to review of  blood test results, diet, imaging/procedure results, issues/problems expressed by family, medications, the following topics were discussed:    Bleeding from cannula site, at least externally, improved, however ongoing concerns for occult bleeding - will plan to send for a CT today  Plan to maintain on ECMO for next 24-48 hours and then re-assess LV function  No circuit issues at present - will need to start heparin when appropriate; continue with DAPT  Palliative care to be consult  Family update provided

## 2024-11-15 NOTE — PROGRESS NOTES
Physical Therapy                 Therapy Communication Note    Patient Name: Jaime Dominguez  MRN: [id number]  Department: [facility] CTU  Room: 05/05-A  Today's Date: 11/15/2024     Discipline: Physical Therapy    Missed Visit Reason:  (Pt with ECMO and fem iABP.  Will hold PT and re-attempt once medically appropriate.)    Missed Time: Attempt

## 2024-11-16 ENCOUNTER — APPOINTMENT (OUTPATIENT)
Dept: CARDIOLOGY | Facility: HOSPITAL | Age: 74
DRG: 003 | End: 2024-11-16
Payer: MEDICARE

## 2024-11-16 ENCOUNTER — APPOINTMENT (OUTPATIENT)
Dept: RADIOLOGY | Facility: HOSPITAL | Age: 74
DRG: 003 | End: 2024-11-16
Payer: MEDICARE

## 2024-11-16 PROBLEM — R41.0 DELIRIUM: Status: ACTIVE | Noted: 2024-11-16

## 2024-11-16 LAB
ALBUMIN SERPL BCP-MCNC: 3 G/DL (ref 3.4–5)
ALBUMIN SERPL BCP-MCNC: 3.1 G/DL (ref 3.4–5)
ANION GAP BLDA CALCULATED.4IONS-SCNC: 5 MMO/L
ANION GAP BLDA CALCULATED.4IONS-SCNC: 5 MMO/L (ref 10–25)
ANION GAP BLDA CALCULATED.4IONS-SCNC: 5 MMO/L (ref 10–25)
ANION GAP BLDA CALCULATED.4IONS-SCNC: 7 MMO/L (ref 10–25)
ANION GAP BLDA CALCULATED.4IONS-SCNC: 8 MMO/L (ref 10–25)
ANION GAP BLDMV CALCULATED.4IONS-SCNC: 6 MMO/L (ref 10–25)
ANION GAP BLDV CALCULATED.4IONS-SCNC: 5 MMO/L
ANION GAP SERPL CALC-SCNC: 10 MMOL/L (ref 10–20)
ANION GAP SERPL CALC-SCNC: 12 MMOL/L (ref 10–20)
APPEARANCE UR: CLEAR
BASE EXCESS BLDA CALC-SCNC: 1.6 MMOL/L
BASE EXCESS BLDA CALC-SCNC: 3.2 MMOL/L (ref -2–3)
BASE EXCESS BLDA CALC-SCNC: 3.2 MMOL/L (ref -2–3)
BASE EXCESS BLDA CALC-SCNC: 4.4 MMOL/L (ref -2–3)
BASE EXCESS BLDA CALC-SCNC: 4.9 MMOL/L (ref -2–3)
BASE EXCESS BLDMV CALC-SCNC: 4.2 MMOL/L (ref -2–3)
BASE EXCESS BLDV CALC-SCNC: 1.1 MMOL/L
BILIRUB UR STRIP.AUTO-MCNC: NEGATIVE MG/DL
BLOOD EXPIRATION DATE: NORMAL
BODY TEMPERATURE: 37 DEGREES CELSIUS
BUN SERPL-MCNC: 21 MG/DL (ref 6–23)
BUN SERPL-MCNC: 27 MG/DL (ref 6–23)
CA-I BLD-SCNC: 1.07 MMOL/L (ref 1.1–1.33)
CA-I BLD-SCNC: 1.1 MMOL/L (ref 1.1–1.33)
CA-I BLDA-SCNC: 1.07 MMOL/L (ref 1.1–1.33)
CA-I BLDA-SCNC: 1.1 MMOL/L (ref 1.1–1.33)
CA-I BLDA-SCNC: 1.11 MMOL/L (ref 1.1–1.33)
CA-I BLDA-SCNC: 1.12 MMOL/L (ref 1.1–1.33)
CA-I BLDA-SCNC: 1.13 MMOL/L (ref 1.1–1.33)
CA-I BLDMV-SCNC: 1.11 MMOL/L (ref 1.1–1.33)
CA-I BLDV-SCNC: 1.1 MMOL/L (ref 1.1–1.33)
CALCIUM SERPL-MCNC: 7.4 MG/DL (ref 8.6–10.6)
CALCIUM SERPL-MCNC: 7.4 MG/DL (ref 8.6–10.6)
CHLORIDE BLD-SCNC: 103 MMOL/L (ref 98–107)
CHLORIDE BLDA-SCNC: 101 MMOL/L (ref 98–107)
CHLORIDE BLDA-SCNC: 103 MMOL/L (ref 98–107)
CHLORIDE BLDA-SCNC: 104 MMOL/L (ref 98–107)
CHLORIDE BLDA-SCNC: 105 MMOL/L (ref 98–107)
CHLORIDE BLDA-SCNC: 106 MMOL/L (ref 98–107)
CHLORIDE BLDV-SCNC: 107 MMOL/L (ref 98–107)
CHLORIDE SERPL-SCNC: 102 MMOL/L (ref 98–107)
CHLORIDE SERPL-SCNC: 103 MMOL/L (ref 98–107)
CK SERPL-CCNC: 1135 U/L (ref 0–325)
CO2 SERPL-SCNC: 25 MMOL/L (ref 21–32)
CO2 SERPL-SCNC: 26 MMOL/L (ref 21–32)
COLOR UR: YELLOW
CREAT SERPL-MCNC: 0.71 MG/DL (ref 0.5–1.3)
CREAT SERPL-MCNC: 0.77 MG/DL (ref 0.5–1.3)
DISPENSE STATUS: NORMAL
EGFRCR SERPLBLD CKD-EPI 2021: >90 ML/MIN/1.73M*2
EGFRCR SERPLBLD CKD-EPI 2021: >90 ML/MIN/1.73M*2
EJECTION FRACTION APICAL 4 CHAMBER: 34.7
EJECTION FRACTION: 34 %
ERYTHROCYTE [DISTWIDTH] IN BLOOD BY AUTOMATED COUNT: 16.4 % (ref 11.5–14.5)
ERYTHROCYTE [DISTWIDTH] IN BLOOD BY AUTOMATED COUNT: 16.4 % (ref 11.5–14.5)
GLUCOSE BLD MANUAL STRIP-MCNC: 117 MG/DL (ref 74–99)
GLUCOSE BLD MANUAL STRIP-MCNC: 120 MG/DL (ref 74–99)
GLUCOSE BLD MANUAL STRIP-MCNC: 122 MG/DL (ref 74–99)
GLUCOSE BLD MANUAL STRIP-MCNC: 141 MG/DL (ref 74–99)
GLUCOSE BLD-MCNC: 122 MG/DL (ref 74–99)
GLUCOSE BLDA-MCNC: 112 MG/DL (ref 74–99)
GLUCOSE BLDA-MCNC: 121 MG/DL (ref 74–99)
GLUCOSE BLDA-MCNC: 122 MG/DL (ref 74–99)
GLUCOSE BLDA-MCNC: 127 MG/DL (ref 74–99)
GLUCOSE BLDA-MCNC: 128 MG/DL (ref 74–99)
GLUCOSE BLDV-MCNC: 106 MG/DL (ref 74–99)
GLUCOSE SERPL-MCNC: 113 MG/DL (ref 74–99)
GLUCOSE SERPL-MCNC: 123 MG/DL (ref 74–99)
GLUCOSE UR STRIP.AUTO-MCNC: ABNORMAL MG/DL
HCO3 BLDA-SCNC: 25.7 MMOL/L (ref 22–26)
HCO3 BLDA-SCNC: 25.9 MMOL/L (ref 22–26)
HCO3 BLDA-SCNC: 26.7 MMOL/L (ref 22–26)
HCO3 BLDA-SCNC: 27.2 MMOL/L
HCO3 BLDA-SCNC: 29.2 MMOL/L (ref 22–26)
HCO3 BLDMV-SCNC: 29.2 MMOL/L (ref 22–26)
HCO3 BLDV-SCNC: 26.6 MMOL/L
HCT VFR BLD AUTO: 22.7 % (ref 41–52)
HCT VFR BLD AUTO: 23.5 % (ref 41–52)
HCT VFR BLD AUTO: 25.5 % (ref 41–52)
HCT VFR BLD EST: 22 % (ref 41–52)
HCT VFR BLD EST: 24 % (ref 41–52)
HCT VFR BLD EST: 24 % (ref 41–52)
HCT VFR BLD EST: 25 % (ref 41–52)
HCT VFR BLD EST: 25 % (ref 41–52)
HCT VFR BLD EST: 26 % (ref 41–52)
HCT VFR BLD EST: 43 % (ref 41–52)
HGB BLD-MCNC: 7.7 G/DL (ref 13.5–17.5)
HGB BLD-MCNC: 8 G/DL (ref 13.5–17.5)
HGB BLD-MCNC: 8.8 G/DL (ref 13.5–17.5)
HGB BLDA-MCNC: 14.2 G/DL (ref 13.5–17.5)
HGB BLDA-MCNC: 8.1 G/DL (ref 13.5–17.5)
HGB BLDA-MCNC: 8.4 G/DL (ref 13.5–17.5)
HGB BLDA-MCNC: 8.4 G/DL (ref 13.5–17.5)
HGB BLDA-MCNC: 8.6 G/DL (ref 13.5–17.5)
HGB BLDMV-MCNC: 8.1 G/DL (ref 13.5–17.5)
HGB BLDV-MCNC: 7.3 G/DL (ref 13.5–17.5)
INHALED O2 CONCENTRATION: 100 %
INHALED O2 CONCENTRATION: 100 %
INHALED O2 CONCENTRATION: 36 %
INHALED O2 CONCENTRATION: 36 %
INHALED O2 CONCENTRATION: 40 %
KETONES UR STRIP.AUTO-MCNC: ABNORMAL MG/DL
LACTATE BLDA-SCNC: 0.7 MMOL/L (ref 0.4–2)
LACTATE BLDA-SCNC: 0.7 MMOL/L (ref 0.4–2)
LACTATE BLDA-SCNC: 0.9 MMOL/L (ref 0.4–2)
LACTATE BLDA-SCNC: 1 MMOL/L (ref 0.4–2)
LACTATE BLDA-SCNC: 1.1 MMOL/L (ref 0.4–2)
LACTATE BLDMV-SCNC: 0.7 MMOL/L (ref 0.4–2)
LACTATE BLDV-SCNC: 0.9 MMOL/L (ref 0.4–2)
LEFT VENTRICLE INTERNAL DIMENSION DIASTOLE: 3.9 CM (ref 3.5–6)
LEUKOCYTE ESTERASE UR QL STRIP.AUTO: NEGATIVE
MAGNESIUM SERPL-MCNC: 2.47 MG/DL (ref 1.6–2.4)
MCH RBC QN AUTO: 28.9 PG (ref 26–34)
MCH RBC QN AUTO: 29.5 PG (ref 26–34)
MCHC RBC AUTO-ENTMCNC: 34 G/DL (ref 32–36)
MCHC RBC AUTO-ENTMCNC: 34.5 G/DL (ref 32–36)
MCV RBC AUTO: 85 FL (ref 80–100)
MCV RBC AUTO: 86 FL (ref 80–100)
MUCOUS THREADS #/AREA URNS AUTO: ABNORMAL /LPF
MYOGLOBIN SERPL-MCNC: 316 UG/L
NITRITE UR QL STRIP.AUTO: NEGATIVE
NRBC BLD-RTO: 0.4 /100 WBCS (ref 0–0)
NRBC BLD-RTO: 0.5 /100 WBCS (ref 0–0)
OXYHGB MFR BLDA: 95.7 % (ref 94–98)
OXYHGB MFR BLDA: 96.7 % (ref 94–98)
OXYHGB MFR BLDA: 97.5 % (ref 94–98)
OXYHGB MFR BLDA: 97.5 % (ref 94–98)
OXYHGB MFR BLDA: 97.6 %
OXYHGB MFR BLDMV: 57.4 % (ref 45–75)
OXYHGB MFR BLDV: 82.9 %
PCO2 BLDA: 30 MM HG (ref 38–42)
PCO2 BLDA: 31 MM HG (ref 38–42)
PCO2 BLDA: 32 MM HG (ref 38–42)
PCO2 BLDA: 41 MM HG (ref 38–42)
PCO2 BLDA: 47 MM HG
PCO2 BLDMV: 45 MM HG (ref 41–51)
PCO2 BLDV: 46 MM HG
PH BLDA: 7.37 PH
PH BLDA: 7.46 PH (ref 7.38–7.42)
PH BLDA: 7.53 PH (ref 7.38–7.42)
PH BLDA: 7.53 PH (ref 7.38–7.42)
PH BLDA: 7.54 PH (ref 7.38–7.42)
PH BLDMV: 7.42 PH (ref 7.33–7.43)
PH BLDV: 7.37 PH
PH UR STRIP.AUTO: 6 [PH]
PHOSPHATE SERPL-MCNC: 2 MG/DL (ref 2.5–4.9)
PHOSPHATE SERPL-MCNC: 2 MG/DL (ref 2.5–4.9)
PLATELET # BLD AUTO: 67 X10*3/UL (ref 150–450)
PLATELET # BLD AUTO: 71 X10*3/UL (ref 150–450)
PO2 BLDA: 118 MM HG (ref 85–95)
PO2 BLDA: 122 MM HG (ref 85–95)
PO2 BLDA: 131 MM HG (ref 85–95)
PO2 BLDA: 461 MM HG
PO2 BLDA: 82 MM HG (ref 85–95)
PO2 BLDMV: 35 MM HG (ref 35–45)
PO2 BLDV: 51 MM HG
POTASSIUM BLDA-SCNC: 4.1 MMOL/L (ref 3.5–5.3)
POTASSIUM BLDA-SCNC: 4.2 MMOL/L (ref 3.5–5.3)
POTASSIUM BLDA-SCNC: 4.3 MMOL/L (ref 3.5–5.3)
POTASSIUM BLDA-SCNC: 4.5 MMOL/L (ref 3.5–5.3)
POTASSIUM BLDA-SCNC: 4.6 MMOL/L (ref 3.5–5.3)
POTASSIUM BLDMV-SCNC: 4.2 MMOL/L (ref 3.5–5.3)
POTASSIUM BLDV-SCNC: 4 MMOL/L (ref 3.5–5.3)
POTASSIUM SERPL-SCNC: 4 MMOL/L (ref 3.5–5.3)
POTASSIUM SERPL-SCNC: 4.4 MMOL/L (ref 3.5–5.3)
PRODUCT BLOOD TYPE: 6200
PRODUCT CODE: NORMAL
PROT UR STRIP.AUTO-MCNC: ABNORMAL MG/DL
RBC # BLD AUTO: 2.77 X10*6/UL (ref 4.5–5.9)
RBC # BLD AUTO: 2.98 X10*6/UL (ref 4.5–5.9)
RBC # UR STRIP.AUTO: ABNORMAL /UL
RBC #/AREA URNS AUTO: >20 /HPF
RIGHT VENTRICLE FREE WALL PEAK S': 6.31 CM/S
RIGHT VENTRICLE PEAK SYSTOLIC PRESSURE: 21 MMHG
SAO2 % BLDA: 100 %
SAO2 % BLDA: 100 % (ref 94–100)
SAO2 % BLDA: 98 % (ref 94–100)
SAO2 % BLDA: 98 % (ref 94–100)
SAO2 % BLDA: 99 % (ref 94–100)
SAO2 % BLDMV: 59 % (ref 45–75)
SAO2 % BLDV: 85 %
SODIUM BLDA-SCNC: 131 MMOL/L (ref 136–145)
SODIUM BLDA-SCNC: 132 MMOL/L (ref 136–145)
SODIUM BLDA-SCNC: 132 MMOL/L (ref 136–145)
SODIUM BLDA-SCNC: 133 MMOL/L (ref 136–145)
SODIUM BLDA-SCNC: 134 MMOL/L (ref 136–145)
SODIUM BLDMV-SCNC: 134 MMOL/L (ref 136–145)
SODIUM BLDV-SCNC: 135 MMOL/L (ref 136–145)
SODIUM SERPL-SCNC: 135 MMOL/L (ref 136–145)
SODIUM SERPL-SCNC: 135 MMOL/L (ref 136–145)
SP GR UR STRIP.AUTO: 1.03
TRICUSPID ANNULAR PLANE SYSTOLIC EXCURSION: 1 CM
UFH PPP CHRO-ACNC: 0.1 IU/ML
UFH PPP CHRO-ACNC: 0.2 IU/ML
UNIT ABO: NORMAL
UNIT NUMBER: NORMAL
UNIT RH: NORMAL
UNIT VOLUME: 350
UROBILINOGEN UR STRIP.AUTO-MCNC: NORMAL MG/DL
WBC # BLD AUTO: 17.3 X10*3/UL (ref 4.4–11.3)
WBC # BLD AUTO: 19.1 X10*3/UL (ref 4.4–11.3)
WBC #/AREA URNS AUTO: ABNORMAL /HPF
XM INTEP: NORMAL

## 2024-11-16 PROCEDURE — 33949 ECMO/ECLS DAILY MGMT ARTERY: CPT

## 2024-11-16 PROCEDURE — 36620 INSERTION CATHETER ARTERY: CPT | Performed by: STUDENT IN AN ORGANIZED HEALTH CARE EDUCATION/TRAINING PROGRAM

## 2024-11-16 PROCEDURE — 71045 X-RAY EXAM CHEST 1 VIEW: CPT | Performed by: RADIOLOGY

## 2024-11-16 PROCEDURE — 36415 COLL VENOUS BLD VENIPUNCTURE: CPT | Performed by: STUDENT IN AN ORGANIZED HEALTH CARE EDUCATION/TRAINING PROGRAM

## 2024-11-16 PROCEDURE — 87040 BLOOD CULTURE FOR BACTERIA: CPT | Performed by: STUDENT IN AN ORGANIZED HEALTH CARE EDUCATION/TRAINING PROGRAM

## 2024-11-16 PROCEDURE — 84132 ASSAY OF SERUM POTASSIUM: CPT | Performed by: NURSE PRACTITIONER

## 2024-11-16 PROCEDURE — 2500000004 HC RX 250 GENERAL PHARMACY W/ HCPCS (ALT 636 FOR OP/ED): Performed by: STUDENT IN AN ORGANIZED HEALTH CARE EDUCATION/TRAINING PROGRAM

## 2024-11-16 PROCEDURE — 2500000002 HC RX 250 W HCPCS SELF ADMINISTERED DRUGS (ALT 637 FOR MEDICARE OP, ALT 636 FOR OP/ED): Performed by: STUDENT IN AN ORGANIZED HEALTH CARE EDUCATION/TRAINING PROGRAM

## 2024-11-16 PROCEDURE — 2500000001 HC RX 250 WO HCPCS SELF ADMINISTERED DRUGS (ALT 637 FOR MEDICARE OP): Performed by: STUDENT IN AN ORGANIZED HEALTH CARE EDUCATION/TRAINING PROGRAM

## 2024-11-16 PROCEDURE — 93321 DOPPLER ECHO F-UP/LMTD STD: CPT | Performed by: INTERNAL MEDICINE

## 2024-11-16 PROCEDURE — 87086 URINE CULTURE/COLONY COUNT: CPT | Performed by: STUDENT IN AN ORGANIZED HEALTH CARE EDUCATION/TRAINING PROGRAM

## 2024-11-16 PROCEDURE — 37799 UNLISTED PX VASCULAR SURGERY: CPT | Performed by: NURSE PRACTITIONER

## 2024-11-16 PROCEDURE — 82550 ASSAY OF CK (CPK): CPT | Performed by: STUDENT IN AN ORGANIZED HEALTH CARE EDUCATION/TRAINING PROGRAM

## 2024-11-16 PROCEDURE — 2020000001 HC ICU ROOM DAILY

## 2024-11-16 PROCEDURE — 87075 CULTR BACTERIA EXCEPT BLOOD: CPT | Performed by: STUDENT IN AN ORGANIZED HEALTH CARE EDUCATION/TRAINING PROGRAM

## 2024-11-16 PROCEDURE — 2500000004 HC RX 250 GENERAL PHARMACY W/ HCPCS (ALT 636 FOR OP/ED)

## 2024-11-16 PROCEDURE — 2500000004 HC RX 250 GENERAL PHARMACY W/ HCPCS (ALT 636 FOR OP/ED): Performed by: NURSE PRACTITIONER

## 2024-11-16 PROCEDURE — 82947 ASSAY GLUCOSE BLOOD QUANT: CPT

## 2024-11-16 PROCEDURE — 82330 ASSAY OF CALCIUM: CPT | Performed by: NURSE PRACTITIONER

## 2024-11-16 PROCEDURE — 85027 COMPLETE CBC AUTOMATED: CPT | Performed by: NURSE PRACTITIONER

## 2024-11-16 PROCEDURE — 93010 ELECTROCARDIOGRAM REPORT: CPT | Performed by: INTERNAL MEDICINE

## 2024-11-16 PROCEDURE — 71045 X-RAY EXAM CHEST 1 VIEW: CPT

## 2024-11-16 PROCEDURE — 85520 HEPARIN ASSAY: CPT | Performed by: NURSE PRACTITIONER

## 2024-11-16 PROCEDURE — 36430 TRANSFUSION BLD/BLD COMPNT: CPT

## 2024-11-16 PROCEDURE — P9016 RBC LEUKOCYTES REDUCED: HCPCS

## 2024-11-16 PROCEDURE — 83735 ASSAY OF MAGNESIUM: CPT | Performed by: NURSE PRACTITIONER

## 2024-11-16 PROCEDURE — 93325 DOPPLER ECHO COLOR FLOW MAPG: CPT | Performed by: INTERNAL MEDICINE

## 2024-11-16 PROCEDURE — 2500000004 HC RX 250 GENERAL PHARMACY W/ HCPCS (ALT 636 FOR OP/ED): Mod: JZ | Performed by: NURSE PRACTITIONER

## 2024-11-16 PROCEDURE — 99291 CRITICAL CARE FIRST HOUR: CPT | Performed by: INTERNAL MEDICINE

## 2024-11-16 PROCEDURE — 84132 ASSAY OF SERUM POTASSIUM: CPT | Performed by: STUDENT IN AN ORGANIZED HEALTH CARE EDUCATION/TRAINING PROGRAM

## 2024-11-16 PROCEDURE — 81001 URINALYSIS AUTO W/SCOPE: CPT | Performed by: STUDENT IN AN ORGANIZED HEALTH CARE EDUCATION/TRAINING PROGRAM

## 2024-11-16 PROCEDURE — 37799 UNLISTED PX VASCULAR SURGERY: CPT | Performed by: STUDENT IN AN ORGANIZED HEALTH CARE EDUCATION/TRAINING PROGRAM

## 2024-11-16 PROCEDURE — 2500000001 HC RX 250 WO HCPCS SELF ADMINISTERED DRUGS (ALT 637 FOR MEDICARE OP): Performed by: NURSE PRACTITIONER

## 2024-11-16 PROCEDURE — C8924 2D TTE W OR W/O FOL W/CON,FU: HCPCS

## 2024-11-16 PROCEDURE — 36620 INSERTION CATHETER ARTERY: CPT | Mod: GC | Performed by: STUDENT IN AN ORGANIZED HEALTH CARE EDUCATION/TRAINING PROGRAM

## 2024-11-16 PROCEDURE — 93005 ELECTROCARDIOGRAM TRACING: CPT

## 2024-11-16 PROCEDURE — 93308 TTE F-UP OR LMTD: CPT | Performed by: INTERNAL MEDICINE

## 2024-11-16 PROCEDURE — 2500000001 HC RX 250 WO HCPCS SELF ADMINISTERED DRUGS (ALT 637 FOR MEDICARE OP)

## 2024-11-16 PROCEDURE — 83874 ASSAY OF MYOGLOBIN: CPT | Performed by: STUDENT IN AN ORGANIZED HEALTH CARE EDUCATION/TRAINING PROGRAM

## 2024-11-16 PROCEDURE — 99291 CRITICAL CARE FIRST HOUR: CPT | Performed by: STUDENT IN AN ORGANIZED HEALTH CARE EDUCATION/TRAINING PROGRAM

## 2024-11-16 PROCEDURE — 85014 HEMATOCRIT: CPT | Performed by: EMERGENCY MEDICINE

## 2024-11-16 PROCEDURE — 2500000005 HC RX 250 GENERAL PHARMACY W/O HCPCS: Performed by: NURSE PRACTITIONER

## 2024-11-16 RX ORDER — MAGNESIUM SULFATE HEPTAHYDRATE 40 MG/ML
4 INJECTION, SOLUTION INTRAVENOUS ONCE
Status: DISCONTINUED | OUTPATIENT
Start: 2024-11-16 | End: 2024-11-17

## 2024-11-16 RX ORDER — CALCIUM GLUCONATE 20 MG/ML
1 INJECTION, SOLUTION INTRAVENOUS ONCE
Status: COMPLETED | OUTPATIENT
Start: 2024-11-16 | End: 2024-11-16

## 2024-11-16 RX ADMIN — TICAGRELOR 90 MG: 90 TABLET ORAL at 09:15

## 2024-11-16 RX ADMIN — DEXMEDETOMIDINE HYDROCHLORIDE 0.4 MCG/KG/HR: 400 INJECTION INTRAVENOUS at 06:43

## 2024-11-16 RX ADMIN — MAGNESIUM SULFATE HEPTAHYDRATE 2 G: 40 INJECTION, SOLUTION INTRAVENOUS at 17:34

## 2024-11-16 RX ADMIN — MAGNESIUM SULFATE HEPTAHYDRATE 2 G: 40 INJECTION, SOLUTION INTRAVENOUS at 15:31

## 2024-11-16 RX ADMIN — OXYCODONE HYDROCHLORIDE 10 MG: 5 TABLET ORAL at 00:14

## 2024-11-16 RX ADMIN — POLYETHYLENE GLYCOL 3350 17 G: 17 POWDER, FOR SOLUTION ORAL at 09:26

## 2024-11-16 RX ADMIN — SENNOSIDES AND DOCUSATE SODIUM 2 TABLET: 50; 8.6 TABLET ORAL at 09:26

## 2024-11-16 RX ADMIN — AMIODARONE HYDROCHLORIDE 1 MG/MIN: 1.8 INJECTION, SOLUTION INTRAVENOUS at 18:36

## 2024-11-16 RX ADMIN — OXYCODONE HYDROCHLORIDE 10 MG: 5 TABLET ORAL at 19:56

## 2024-11-16 RX ADMIN — AMIODARONE HYDROCHLORIDE 1 MG/MIN: 1.8 INJECTION, SOLUTION INTRAVENOUS at 13:10

## 2024-11-16 RX ADMIN — OXYCODONE 5 MG: 5 TABLET ORAL at 14:05

## 2024-11-16 RX ADMIN — EPINEPHRINE IN SODIUM CHLORIDE 0.04 MCG/KG/MIN: 16 INJECTION INTRAVENOUS at 14:46

## 2024-11-16 RX ADMIN — ACETAMINOPHEN 650 MG: 325 TABLET ORAL at 09:25

## 2024-11-16 RX ADMIN — ATORVASTATIN CALCIUM 80 MG: 80 TABLET, FILM COATED ORAL at 20:01

## 2024-11-16 RX ADMIN — ACETAMINOPHEN 650 MG: 325 TABLET ORAL at 00:55

## 2024-11-16 RX ADMIN — ASPIRIN 81 MG 81 MG: 81 TABLET ORAL at 09:26

## 2024-11-16 RX ADMIN — NOREPINEPHRINE BITARTRATE 0.06 MCG/KG/MIN: 8 INJECTION, SOLUTION INTRAVENOUS at 13:40

## 2024-11-16 RX ADMIN — PANTOPRAZOLE SODIUM 40 MG: 40 INJECTION, POWDER, LYOPHILIZED, FOR SOLUTION INTRAVENOUS at 06:09

## 2024-11-16 RX ADMIN — ACETAMINOPHEN 650 MG: 325 TABLET ORAL at 17:45

## 2024-11-16 RX ADMIN — PERFLUTREN 10 ML OF DILUTION: 6.52 INJECTION, SUSPENSION INTRAVENOUS at 10:45

## 2024-11-16 RX ADMIN — ACETAMINOPHEN 650 MG: 325 TABLET ORAL at 05:50

## 2024-11-16 RX ADMIN — TICAGRELOR 90 MG: 90 TABLET ORAL at 20:02

## 2024-11-16 RX ADMIN — HYDROMORPHONE HYDROCHLORIDE 0.2 MG: 0.2 INJECTION, SOLUTION INTRAMUSCULAR; INTRAVENOUS; SUBCUTANEOUS at 08:38

## 2024-11-16 RX ADMIN — ACETAMINOPHEN 650 MG: 325 TABLET ORAL at 13:35

## 2024-11-16 RX ADMIN — SENNOSIDES AND DOCUSATE SODIUM 2 TABLET: 50; 8.6 TABLET ORAL at 20:02

## 2024-11-16 RX ADMIN — POLYETHYLENE GLYCOL 3350 17 G: 17 POWDER, FOR SOLUTION ORAL at 09:15

## 2024-11-16 RX ADMIN — AMIODARONE HYDROCHLORIDE 1 MG/MIN: 1.8 INJECTION, SOLUTION INTRAVENOUS at 05:50

## 2024-11-16 RX ADMIN — AMIODARONE HYDROCHLORIDE 1 MG/MIN: 1.8 INJECTION, SOLUTION INTRAVENOUS at 13:09

## 2024-11-16 RX ADMIN — CALCIUM GLUCONATE 1 G: 20 INJECTION, SOLUTION INTRAVENOUS at 17:00

## 2024-11-16 RX ADMIN — CALCIUM GLUCONATE 1 G: 20 INJECTION, SOLUTION INTRAVENOUS at 15:31

## 2024-11-16 ASSESSMENT — PAIN SCALES - GENERAL
PAINLEVEL_OUTOF10: 7
PAINLEVEL_OUTOF10: 7
PAINLEVEL_OUTOF10: 8
PAINLEVEL_OUTOF10: 0 - NO PAIN
PAINLEVEL_OUTOF10: 5 - MODERATE PAIN
PAINLEVEL_OUTOF10: 0 - NO PAIN
PAINLEVEL_OUTOF10: 0 - NO PAIN
PAINLEVEL_OUTOF10: 2

## 2024-11-16 ASSESSMENT — PAIN - FUNCTIONAL ASSESSMENT
PAIN_FUNCTIONAL_ASSESSMENT: 0-10

## 2024-11-16 ASSESSMENT — PAIN DESCRIPTION - DESCRIPTORS: DESCRIPTORS: ACHING;BURNING

## 2024-11-16 NOTE — PROCEDURES
"Arterial Puncture/Cannulation    Date/Time: 11/16/2024 4:15 PM    Performed by: Rachael Roa MD  Authorized by: Sharad Bonilla MD  Consent: Verbal consent obtained.  Consent given by: patient  Patient understanding: patient states understanding of the procedure being performed  Patient consent: the patient's understanding of the procedure matches consent given  Procedure consent: procedure consent matches procedure scheduled  Relevant documents: relevant documents present and verified  Test results: test results available and properly labeled  Site marked: the operative site was marked  Imaging studies: imaging studies available  Patient identity confirmed: verbally with patient  Time out: Immediately prior to procedure a \"time out\" was called to verify the correct patient, procedure, equipment, support staff and site/side marked as required.  Preparation: Patient was prepped and draped in the usual sterile fashion.  Local anesthesia used: yes  Anesthesia: local infiltration    Anesthesia:  Local anesthesia used: yes  Local Anesthetic: lidocaine 1% without epinephrine    Sedation:  Patient sedated: no    Patient tolerance: patient tolerated the procedure well with no immediate complications  Comments: The patient was prepped and draped in the usual sterile manner using chlorhexidine scrub. Approx 3cc of 1% lidocaine was infiltrated under the skin for local anesthesia. The R radial artery was visualized via ultrasound guidance and a 20g angiocath was used to cannulate the vessel. Pulsatile, arterial blood was visualized and a wire was passed through the catheter into the lumen of the vessel. The angiocath was then advanced over the wire. A pressure transducer was then put in line that revealed a good wave-form. The patient tolerated the procedure well without any immediate complications. The area was cleaned and a transparent dressing was applied.                "

## 2024-11-16 NOTE — PROGRESS NOTES
Patient requires ECMO support.     ECMO:  Cannulation Date: Nov. 14, 2024  ECMO Indication: Cardiogenic Shock  Current Goals of Care: Full Code    ECMO Cannula Configuration: L fem-fem VA-ECMO    ECMO Interrogation:  Drainage cannula site, cannula, pump, oxygenator, return cannula and return cannula site clinically inspected. RPM and sweep reviewed and adjusted appropriate to clinical context.    ECMO circuit run from drainage cannula to pump to oxygenator to return cannula: Yes  Pre/post PaO2 adequate: Yes  LV Unloading/Pulse Pressure: IABP at 1:1; Good pulsatility this morning  Distal Perfusion: L DPC in place, adequate pulses    ECMO Settings:     Most Recent Range Past 24hrs   Flow 3.64 Patient Flow (L/min)  Min: 3.26  Max: 3.65   Speed 2580 Circuit Flow (RPM)  Min: 2580  Max: 2580   Sweep 0.5 Sweep Gas Flow Rate (L/min)  Min: 0.5  Max: 1     IABP in situ 1:1. Position noted to be high in the cath lab and appropriately repositioned.    Invasive Hemodynamics:    Most Recent Range Past 24hrs   BP (Art) 116/60 Arterial Line BP 1  Min: 79/44  Max: 136/54   MAP(Art) 77 mmHg Arterial Line MAP 1 (mmHg)   Min: 53 mmHg  Max: 115 mmHg   RA/CVP   No data recorded   PA 22/13 PAP  Min: 12/3  Max: 29/15   PA(mean) 16 mmHg PAP (Mean)  Min: 2 mmHg  Max: 21 mmHg   CO   No data recorded   CI   No data recorded   Mixed Venous   No data recorded   SVR    No data recorded     Hemodynamic Management:  amiodarone, 1 mg/min, Last Rate: 1 mg/min (11/16/24 0600)  dexmedeTOMIDine, 0-1.5 mcg/kg/hr, Last Rate: 0.4 mcg/kg/hr (11/16/24 0643)  EPINEPHrine, 0.04 mcg/kg/min, Last Rate: 0.04 mcg/kg/min (11/16/24 0600)  heparin, 0-4,000 Units/hr, Last Rate: 700 Units/hr (11/16/24 0600)  norepinephrine, 0-1 mcg/kg/min, Last Rate: 0.06 mcg/kg/min (11/16/24 0630)  vasopressin, 0-0.03 Units/min, Last Rate: Stopped (11/15/24 0900)        Bleeding/Clot Issues:   Anticoagulation/Monitoring: Anticoagulation status and intensity discussed  Plan: Systemic  heparin with Xa monitoring  Presence of cannula bleeding: No significant external bleeding overnight  Presence of oxygenator clot: None    Management Issues:  Road Trips planned for today? N  Mobility Planned today? N  Weaning Plan/date: TBD    Communication:  Discussed with Cardiothoracic surgeon, Perfusion, Palliative care (consulted entered and physical/occupational therapy)    Family Updates/Concerns? Family updated. All questions appeared be answered adequately.    ECMO ROUNDS (11/15):  The following staff  were in attendance during formal interdisciplinary ECMO team rounds today:    Cardiac Surgery: Lizeth  CTICU: Bonilla  Palliative Care: N/A  ECMO Coordinator: Cathy   Perfusion: Present    In addition to review of  blood test results, diet, imaging/procedure results, issues/problems expressed by family, medications, the following topics were discussed:    Bleeding from cannula site, at least externally, improved, however ongoing concerns for occult bleeding - CT undertaken and reviewed by surgical team - anterior mediastinal collection and L groin hematoma noted.  Plan to maintain on full ECMO until Sunday s and then re-assess LV function  No circuit issues at present - will need to start heparin when appropriate; continue with DAPT  Palliative care to be consult  Family update provided

## 2024-11-16 NOTE — CONSULTS
Advanced Heart Failure Initial Consultation Note   Consulting Team: CTICU  Primary Cardiologist:  Reason for Consult: ECMO    Subjective   Jaime Dominguez is a 74 year old male with a PMH significant for basal cell carcinoma and HLD who initially presented to [facility] ED on 11/8 complaining of left sided chest pain, diaphoresis, SOB nausea and syncope. ECG showed ST elevation in the inferior leads and ST depression in anterior septal leads. LHC showed 90% occlusion of the LAD. Transferred to [facility] on 11/10 for cardiac surgery workup.  He is now s/p MidCAB x2 converted to full sternotomy w/ LIMA prolonged as a Y graft with a radial to LAD OM w/ Dr. Aldo Harman and Dr. Stanley. CPB time 347min. Course c/b increasing pressor requirement and echo revealing apical hypo-akinesis with subsequent IABP placement on 11/14 for further support. Overnight on 11/14, Troponin elevated and ST elevation in anteroseptal leads, c/f ACS and decision made to [facility]. Now s/p PCI of mid LAD with HEATHER. Pt cannulated on VA ECMO for cardiogenic shock state 2/2 multiple failed grafts. HF was engaged for multidisciplinary decision making regarding cardiogenic shock.     Patient denies any complaints this morning.     Review of Systems  Otherwise, limited cardiovascular review of systems is negative.    Past Medical History:   Diagnosis Date    Basal cell carcinoma     CAD (coronary artery disease)     Hyperlipidemia     STEMI (ST elevation myocardial infarction) (Multi)      Past Surgical History:   Procedure Laterality Date    CARDIAC CATHETERIZATION N/A 11/08/2024    Procedure: Left Heart Cath, No LV;  Surgeon: Ale Brady MD;  Location: [facility] Cardiac Cath Lab;  Service: Cardiovascular;  Laterality: N/A;    CARDIAC CATHETERIZATION N/A 11/08/2024    Procedure: PCI;  Surgeon: Ale Brady MD;  Location: [facility] Cardiac Cath Lab;  Service: Cardiovascular;  Laterality: N/A;    CERVICAL FUSION      C5-7    SKIN CANCER EXCISION N/A      Social History      Socioeconomic History    Marital status:      Spouse name: Not on file    Number of children: Not on file    Years of education: Not on file    Highest education level: Not on file   Occupational History    Not on file   Tobacco Use    Smoking status: Never    Smokeless tobacco: Never   Substance and Sexual Activity    Alcohol use: Not Currently    Drug use: Never    Sexual activity: Not on file   Other Topics Concern    Not on file   Social History Narrative    Not on file     Social Drivers of Health     Financial Resource Strain: Low Risk  (11/13/2024)    Overall Financial Resource Strain (CARDIA)     Difficulty of Paying Living Expenses: Not hard at all   Food Insecurity: No Food Insecurity (11/11/2024)    Hunger Vital Sign     Worried About Running Out of Food in the Last Year: Never true     Ran Out of Food in the Last Year: Never true   Transportation Needs: No Transportation Needs (11/13/2024)    PRAPARE - Transportation     Lack of Transportation (Medical): No     Lack of Transportation (Non-Medical): No   Physical Activity: Sufficiently Active (11/9/2024)    Exercise Vital Sign     Days of Exercise per Week: 3 days     Minutes of Exercise per Session: 60 min   Stress: No Stress Concern Present (11/9/2024)    [employer] - Occupational Stress Questionnaire     Feeling of Stress : Not at all   Social Connections: Socially Integrated (11/11/2024)    Social Connection and Isolation Panel [NHANES]     Frequency of Communication with Friends and Family: More than three times a week     Frequency of Social Gatherings with Friends and Family: Once a week     Attends [employer] Services: More than 4 times per year     Active Member of Clubs or Organizations: Yes     Attends Club or Organization Meetings: Never     Marital Status:    Intimate Partner Violence: Not At Risk (11/11/2024)    Humiliation, Afraid, Rape, and Kick questionnaire     Fear of Current or Ex-Partner:  No     Emotionally Abused: No     Physically Abused: No     Sexually Abused: No   Housing Stability: Low Risk  (11/13/2024)    Housing Stability Vital Sign     Unable to Pay for Housing in the Last Year: No     Number of Times Moved in the Last Year: 0     Homeless in the Last Year: No     Family History   Problem Relation Name Age of Onset    Heart attack Mother      Heart attack Brother         Current Outpatient Medications   Medication Instructions    aspirin 81 mg EC tablet Take 1 tablet (81 mg) by mouth once daily.    atorvastatin (LIPITOR) 40 mg, Daily RT        Objective   Physical Exam  Vitals:    11/16/24 1500   BP:    Pulse: 73   Resp: 19   Temp: 36.9 °C (98.4 °F)   SpO2: 100%     GEN: frail appearing male, NAD.  CV: irregularly irregular, no m/r/g.   LUNGS: no respiratory distress on NC.  ABD: Soft, NT/ND.  SKIN: Warm, well perfused. LE edema: mild pitting edema  MSK: No deformities.  EXT: IABP and VA ECMO in place  NEURO: No focal deficits.    I have personally reviewed the following images and laboratory findings:    ECG: NSR    Results for orders placed during the hospital encounter of 11/10/24    Transthoracic Echo (TTE) Limited    [facility], [address] [place] [address]  Tel [phone] and Fax [phone]    TRANSTHORACIC ECHOCARDIOGRAM REPORT      Patient Name:       BARRY Vincent Physician:    [id number] Mily Singletary MD  Study Date:         11/16/2024          Ordering Provider:    [id number] MARINO BURGOS  MRN/PID:            [id number]            Fellow:  Accession#:         [id number]        Nurse:  Date of Birth/Age:  1950 / 74      Sonographer:          Mannie whyte                                     JENNIE  Gender assigned at  M                   Additional Staff:  Birth:  Height:             182.88 cm           Admit Date:           11/10/2024  Weight:             92.99 kg            Admission Status:     Inpatient - STAT  BSA  / BMI:          2.15 m2 / 27.80     Encounter#:           [id number]  kg/m2  Blood Pressure:     98/52 mmHg          Department Location:  [facility]    Study Type:    TRANSTHORACIC ECHO (TTE) LIMITED  Diagnosis/ICD: Cardiogenic shock-R57.0  Indication:    pericardial effusion assess for tamponade  CPT Code:      Echo Limited-89925; Doppler Limited-96360; Color Doppler-01381    Patient History:  Pertinent History: STEMI, VA ECMO, s/p mid CAB x2 converted to sternotomy, s/p  PCI of mid LAD.    Study Detail: The following Echo studies were performed: 2D, M-Mode, Doppler and  color flow. Technically challenging study due to body habitus,  poor acoustic windows and patient lying in supine position.  Definity used as a contrast agent for endocardial border  definition. Total contrast used for this procedure was 3.0 mL via  IV push.      PHYSICIAN INTERPRETATION:  Left Ventricle: Left ventricular ejection fraction is moderately decreased, calculated by Lambert's biplane at 34%. Left venticular wall motion is abnormal. The left ventricular cavity size is normal. There is normal septal and normal posterior left ventricular wall thickness. There is left ventricular concentric remodeling. Left ventricular diastolic filling was not assessed. There is no definite left ventricular thrombus visualized.  LV Wall Scoring:  The mid and apical anterior septum, apical anterior segment, apical inferior  segment, and apex are akinetic. The apical lateral segment and mid anterior  segment are hypokinetic.    Left Atrium: The left atrium was not assessed.  Right Ventricle: The right ventricle is normal in size. There is reduced right ventricular systolic function. A device is visualized in the right ventricle.  Right Atrium: The right atrium was not well visualized. There is a device visualized in the right atrium.  Aortic Valve: The aortic valve was not well visualized. Aortic valve regurgitation was not assessed.  Mitral Valve: The  mitral valve is normal in structure. Mitral valve regurgitation was not assessed.  Tricuspid Valve: The tricuspid valve is structurally normal. There is trace to mild tricuspid regurgitation. The Doppler estimated RVSP is within normal limits at 21.0 mmHg.  Pulmonic Valve: The pulmonic valve is not well visualized. Pulmonic valve regurgitation was not assessed.  Pericardium: Trivial pericardial effusion. There apperas to be a trivial pericardial effusion, however thre is also a oderate layer of echodense material overlying the RV of unclear etiology. NO obivous RA or RV collapse though not well seen and MV and V inflows not assessed for respiratory variation ( pt in afib so unable to assess given variable RR intervals) and IVC not well seen. Overall unable to determine hemodyanic significance of the material overlyin the RV.  Aorta: The aortic root is abnormal. The aortic root appears moderately dilated and measures 4.40 cm.  In comparison to the previous echocardiogram(s): Compared with the prior exam from 11/15/2024, there are no significant changes. The LAD territory wall motion abnormality appears to be similar. LIV has been present on prior studies.      CONCLUSIONS:  1. Multiple segmental abnormalities exist. See findings.  2. Left ventricular ejection fraction is moderately decreased, calculated by Lambert's biplane at 34%.  3. Abnormal left venticular wall motion.  4. No left ventricular thrombus visualized.  5. There is reduced right ventricular systolic function.  6. There apperas to be a trivial pericardial effusion, however thre is also a oderate layer of echodense material overlying the RV of unclear etiology. NO obivous RA or RV collapse though not well seen and MV and V inflows not assessed for respiratory variation ( pt in afib so unable to assess given variable RR intervals) and IVC not well seen. Overall unable to determine hemodyanic significance of the material overlyin the RV.  7. Right  ventricular systolic pressure is within normal limits.  8. Moderately dilated aortic root.  9. There is LIV of the subvalvular apparatus and MV leaflets of unclear significance. LVOT gradinets not assessed nor was there color Doppler on the LVOT to see if there were acceleration of flow to suggest obstructiom. ( Does not appear to have LIV -septal contact on 2D images).  10. Compared with the prior exam from 11/15/2024, there are no significant changes. The LAD territory wall motion abnormality appears to be similar. LIV has been present on prior studies.    QUANTITATIVE DATA SUMMARY:    2D MEASUREMENTS:          Normal Ranges:  Ao Root d:       4.40 cm  (2.0-3.7cm)  LAs:             2.70 cm  (2.7-4.0cm)  IVSd:            1.00 cm  (0.6-1.1cm)  LVPWd:           1.00 cm  (0.6-1.1cm)  LVIDd:           3.90 cm  (3.9-5.9cm)  LVIDs:           3.00 cm  LV Mass Index:   57 g/m2  LVEDV Index:     52 ml/m2  LV % FS          23.1 %      LV SYSTOLIC FUNCTION BY 2D PLANIMETRY (MOD):  Normal Ranges:  EF-A4C View:    35 % (>=55%)  EF-A2C View:    36 %  EF-Biplane:     34 %  LV EF Reported: 34 %      RIGHT VENTRICLE:  TAPSE: 10.1 mm  RV s'  0.06 m/s      TRICUSPID VALVE/RVSP:          Normal Ranges:  Peak TR Velocity:     2.12 m/s  RV Syst Pressure:     21 mmHg  (< 30mmHg)      [id number] Mily Singletary MD  Electronically signed on 11/16/2024 at 12:08:03 PM      Wall Scoring        ** Final **       Imaging  11/15 CT C/A/P:  1. Extensive postsurgical changes of the chest with multiple   mediastinal hematomas, the largest measuring up to 6.8 cm as seen   just superolateral to the left ventricle. Trace pneumomediastinum.   Mediastinal drains in place, however not appearing to communicate   within the hematomas.   2. Small bilateral pleural effusions with loculated components and   gas throughout the left pleural effusion.   3. Left femoral approach ECMO cannulas in place. Heterogeneous   hyperdense collection in the left inguinal region  suspicious for   hematoma, with few scattered foci of surrounding gas.   4. Scattered ground-glass opacities throughout the right lung,   possibly related to edema or atelectasis, however infectious or   inflammatory process is not entirely excluded.   5. Enlarged right infraclavicular lymph node and additional nodular   soft tissue density in the left retroclavicular region, possibly   another enlarged lymph node or focally dilated left supraclavicular   vessels.   6. Acute nondisplaced left posterior 2nd rib fracture.   7. Trace ascites. No intra-abdominal collection or pneumoperitoneum.   8. Coolidge-Grayson catheter tip terminating of the pulmonary artery   bifurcation. Inflated IABP in place with superior marker at the   distal aortic arch.     Lab Review   Results for orders placed or performed during the hospital encounter of 11/10/24 (from the past 24 hours)   aPTT - baseline   Result Value Ref Range    aPTT 36 27 - 38 seconds   Protime-INR   Result Value Ref Range    Protime 14.1 (H) 9.8 - 12.8 seconds    INR 1.3 (H) 0.9 - 1.1   CBC   Result Value Ref Range    WBC 12.3 (H) 4.4 - 11.3 x10*3/uL    nRBC 0.5 (H) 0.0 - 0.0 /100 WBCs    RBC 2.63 (L) 4.50 - 5.90 x10*6/uL    Hemoglobin 7.8 (L) 13.5 - 17.5 g/dL    Hematocrit 22.1 (L) 41.0 - 52.0 %    MCV 84 80 - 100 fL    MCH 29.7 26.0 - 34.0 pg    MCHC 35.3 32.0 - 36.0 g/dL    RDW 16.3 (H) 11.5 - 14.5 %    Platelets 55 (L) 150 - 450 x10*3/uL   Prepare RBC: 1 Units   Result Value Ref Range    PRODUCT CODE A3072I41     Unit Number [id number]     Unit ABO A     Unit RH POS     XM INTEP COMP     Dispense Status TR     Blood Expiration Date 12/4/2024 11:59:00 PM EST     PRODUCT BLOOD TYPE 6200     UNIT VOLUME 350    Calcium, Ionized   Result Value Ref Range    POCT Calcium, Ionized 1.10 1.1 - 1.33 mmol/L   CBC   Result Value Ref Range    WBC 17.3 (H) 4.4 - 11.3 x10*3/uL    nRBC 0.5 (H) 0.0 - 0.0 /100 WBCs    RBC 2.77 (L) 4.50 - 5.90 x10*6/uL    Hemoglobin 8.0 (L) 13.5 -  17.5 g/dL    Hematocrit 23.5 (L) 41.0 - 52.0 %    MCV 85 80 - 100 fL    MCH 28.9 26.0 - 34.0 pg    MCHC 34.0 32.0 - 36.0 g/dL    RDW 16.4 (H) 11.5 - 14.5 %    Platelets 67 (L) 150 - 450 x10*3/uL   Magnesium   Result Value Ref Range    Magnesium 2.47 (H) 1.60 - 2.40 mg/dL   Renal Function Panel   Result Value Ref Range    Glucose 123 (H) 74 - 99 mg/dL    Sodium 135 (L) 136 - 145 mmol/L    Potassium 4.4 3.5 - 5.3 mmol/L    Chloride 103 98 - 107 mmol/L    Bicarbonate 26 21 - 32 mmol/L    Anion Gap 10 10 - 20 mmol/L    Urea Nitrogen 27 (H) 6 - 23 mg/dL    Creatinine 0.77 0.50 - 1.30 mg/dL    eGFR >90 >60 mL/min/1.73m*2    Calcium 7.4 (L) 8.6 - 10.6 mg/dL    Phosphorus 2.0 (L) 2.5 - 4.9 mg/dL    Albumin 3.0 (L) 3.4 - 5.0 g/dL   Heparin Assay, UFH   Result Value Ref Range    Heparin Unfractionated 0.1 See Comment Below for Therapeutic Ranges IU/mL   Blood Gas Arterial Full Panel   Result Value Ref Range    POCT pH, Arterial 7.54 (H) 7.38 - 7.42 pH    POCT pCO2, Arterial 30 (L) 38 - 42 mm Hg    POCT pO2, Arterial 118 (H) 85 - 95 mm Hg    POCT SO2, Arterial 98 94 - 100 %    POCT Oxy Hemoglobin, Arterial 96.7 94.0 - 98.0 %    POCT Hematocrit Calculated, Arterial 25.0 (L) 41.0 - 52.0 %    POCT Sodium, Arterial 132 (L) 136 - 145 mmol/L    POCT Potassium, Arterial 4.6 3.5 - 5.3 mmol/L    POCT Chloride, Arterial 104 98 - 107 mmol/L    POCT Ionized Calcium, Arterial 1.11 1.10 - 1.33 mmol/L    POCT Glucose, Arterial 127 (H) 74 - 99 mg/dL    POCT Lactate, Arterial 0.9 0.4 - 2.0 mmol/L    POCT Base Excess, Arterial 3.2 (H) -2.0 - 3.0 mmol/L    POCT HCO3 Calculated, Arterial 25.7 22.0 - 26.0 mmol/L    POCT Hemoglobin, Arterial 8.4 (L) 13.5 - 17.5 g/dL    POCT Anion Gap, Arterial 7 (L) 10 - 25 mmo/L    Patient Temperature 37.0 degrees Celsius    FiO2 36 %   ECMO VENOUS FULL PANEL   Result Value Ref Range    POCT pH, Venous ECMO 7.37 Reference range not established pH    POCT pCO2, Venous ECMO 46 Reference range not established mm Hg     POCT pO2,  Venous  ECMO 51 Reference range not established mm Hg    POCT SO2, Venous ECMO 85 Reference range not established %    POCT Oxy Hemoglobin, Venous ECMO 82.9 Reference range not established %    POCT Hematocrit Calculated, Venous ECMO 22.0 (L) 41.0 - 52.0 %    POCT Sodium, Venous ECMO 135 (L) 136 - 145 mmol/L    POCT Potassium, Venous ECMO 4.0 3.5 - 5.3 mmol/L    POCT Chloride, Venous ECMO 107 98 - 107 mmol/L    POCT Ionized Calcium, Venous ECMO 1.10 1.10 - 1.33 mmol/L    POCT Glucose, Venous ECMO 106 (H) 74 - 99 mg/dL    POCT Lactate Venous ECMO 0.9 0.4 - 2.0 mmol/L    POCT Base Excess, Venous ECMO 1.1 Reference range not established mmol/L    POCT HCO3 Calculated, Venous ECMO 26.6 Reference range not established mmol/L    POCT Hemoglobin, Venous ECMO 7.3 (L) 13.5 - 17.5 g/dL    POCT Anion Gap, Venous ECMO 5 Reference range not established mmo/L    Patient Temperature 37.0 degrees Celsius    FiO2 100 %   ECMO ARTERIAL FULL PANEL   Result Value Ref Range    POCT pH, Arterial ECMO 7.37 Reference range not established pH    POCT pCO2, Arterial ECMO 47 Reference range not established mm Hg    POCT pO2,  Arterial ECMO 461 Reference range not established mm Hg    POCT SO2, Arterial ECMO 100 Reference range not established %    POCT Oxy Hemoglobin, Arterial ECMO 97.6 Reference range not established %    POCT Hematocrit Calculated, Arterial ECMO 26.0 (L) 41.0 - 52.0 %    POCT Sodium, Arterial  ECMO 134 (L) 136 - 145 mmol/L    POCT Potassium, Arterial  ECMO 4.1 3.5 - 5.3 mmol/L    POCT Chloride, Arterial  ECMO 106 98 - 107 mmol/L    POCT Ionized Calcium, Arterial  ECMO 1.13 1.10 - 1.33 mmol/L    POCT Glucose, Arterial  ECMO 112 (H) 74 - 99 mg/dL    POCT Lactate Arterial  ECMO 1.1 0.4 - 2.0 mmol/L    POCT Base Excess, Arterial ECMO 1.6 Reference range not established mmol/L    POCT HCO3 Calculated, Arterial ECMO 27.2 Reference range not established mmol/L    POCT Hemoglobin, Arterial  ECMO 8.6 (L) 13.5 - 17.5  g/dL    POCT Anion Gap, Arterial  ECMO 5 Reference range not established mmo/L    Patient Temperature 37.0 degrees Celsius    FiO2 100 %   Hemoglobin and hematocrit, blood   Result Value Ref Range    Hemoglobin 7.7 (L) 13.5 - 17.5 g/dL    Hematocrit 22.7 (L) 41.0 - 52.0 %   Blood Gas Arterial Full Panel   Result Value Ref Range    POCT pH, Arterial 7.46 (H) 7.38 - 7.42 pH    POCT pCO2, Arterial 41 38 - 42 mm Hg    POCT pO2, Arterial 131 (H) 85 - 95 mm Hg    POCT SO2, Arterial 100 94 - 100 %    POCT Oxy Hemoglobin, Arterial 97.5 94.0 - 98.0 %    POCT Hematocrit Calculated, Arterial 25.0 (L) 41.0 - 52.0 %    POCT Sodium, Arterial 133 (L) 136 - 145 mmol/L    POCT Potassium, Arterial 4.5 3.5 - 5.3 mmol/L    POCT Chloride, Arterial 103 98 - 107 mmol/L    POCT Ionized Calcium, Arterial 1.12 1.10 - 1.33 mmol/L    POCT Glucose, Arterial 128 (H) 74 - 99 mg/dL    POCT Lactate, Arterial 0.7 0.4 - 2.0 mmol/L    POCT Base Excess, Arterial 4.9 (H) -2.0 - 3.0 mmol/L    POCT HCO3 Calculated, Arterial 29.2 (H) 22.0 - 26.0 mmol/L    POCT Hemoglobin, Arterial 8.4 (L) 13.5 - 17.5 g/dL    POCT Anion Gap, Arterial 5 (L) 10 - 25 mmo/L    Patient Temperature 37.0 degrees Celsius    FiO2 36 %   Heparin Assay, UFH   Result Value Ref Range    Heparin Unfractionated 0.2 See Comment Below for Therapeutic Ranges IU/mL   POCT GLUCOSE   Result Value Ref Range    POCT Glucose 122 (H) 74 - 99 mg/dL   Prepare RBC: 1 Units   Result Value Ref Range    PRODUCT CODE P6585P56     Unit Number [id number]     Unit ABO A     Unit RH POS     XM INTEP COMP     Dispense Status IS     Blood Expiration Date 12/4/2024 11:59:00 PM EST     PRODUCT BLOOD TYPE 6200     UNIT VOLUME 350    Blood Gas Arterial Full Panel   Result Value Ref Range    POCT pH, Arterial 7.53 (H) 7.38 - 7.42 pH    POCT pCO2, Arterial 31 (L) 38 - 42 mm Hg    POCT pO2, Arterial 122 (H) 85 - 95 mm Hg    POCT SO2, Arterial 99 94 - 100 %    POCT Oxy Hemoglobin, Arterial 97.5 94.0 - 98.0 %     POCT Hematocrit Calculated, Arterial 24.0 (L) 41.0 - 52.0 %    POCT Sodium, Arterial 132 (L) 136 - 145 mmol/L    POCT Potassium, Arterial 4.3 3.5 - 5.3 mmol/L    POCT Chloride, Arterial 105 98 - 107 mmol/L    POCT Ionized Calcium, Arterial 1.10 1.10 - 1.33 mmol/L    POCT Glucose, Arterial 121 (H) 74 - 99 mg/dL    POCT Lactate, Arterial 0.7 0.4 - 2.0 mmol/L    POCT Base Excess, Arterial 3.2 (H) -2.0 - 3.0 mmol/L    POCT HCO3 Calculated, Arterial 25.9 22.0 - 26.0 mmol/L    POCT Hemoglobin, Arterial 8.1 (L) 13.5 - 17.5 g/dL    POCT Anion Gap, Arterial 5 (L) 10 - 25 mmo/L    Patient Temperature 37.0 degrees Celsius    FiO2 40 %   BLOOD GAS MIXED VENOUS FULL PANEL   Result Value Ref Range    POCT pH, Mixed 7.42 7.33 - 7.43 pH    POCT pCO2, Mixed 45 41 - 51 mm Hg    POCT pO2, Mixed 35 35 - 45 mm Hg    POCT SO2, Mixed 59 45 - 75 %    POCT Oxy Hemoglobin, Mixed 57.4 45.0 - 75.0 %    POCT Hematocrit Calculated, Mixed 24.0 (L) 41.0 - 52.0 %    POCT Sodium, Mixed 134 (L) 136 - 145 mmol/L    POCT Potassium, Mixed 4.2 3.5 - 5.3 mmol/L    POCT Chloride, Mixed 103 98 - 107 mmol/L    POCT Ionized Calcium, Mixed 1.11 1.10 - 1.33 mmol/L    POCT Glucose, Mixed 122 (H) 74 - 99 mg/dL    POCT Lactate, Mixed 0.7 0.4 - 2.0 mmol/L    POCT Base Excess, Mixed 4.2 (H) -2.0 - 3.0 mmol/L    POCT HCO3 Calculated, Mixed 29.2 (H) 22.0 - 26.0 mmol/L    POCT Hemoglobin, Mixed 8.1 (L) 13.5 - 17.5 g/dL    POCT Anion Gap, Mixed 6 (L) 10 - 25 mmo/L    Patient Temperature 37.0 degrees Celsius    FiO2 40 %   Transthoracic Echo (TTE) Limited   Result Value Ref Range    Tricuspid annular plane systolic excursion 1.0 cm    LV EF 34 %    RV free wall pk S' 6.31 cm/s    LVIDd 3.90 cm    RVSP 21.0 mmHg    LV A4C EF 34.7    POCT GLUCOSE   Result Value Ref Range    POCT Glucose 120 (H) 74 - 99 mg/dL   Blood Culture    Specimen: Peripheral Venipuncture; Blood culture   Result Value Ref Range    Blood Culture Loaded on Instrument - Culture in progress    Blood Gas  Arterial Full Panel   Result Value Ref Range    POCT pH, Arterial 7.53 (H) 7.38 - 7.42 pH    POCT pCO2, Arterial 32 (L) 38 - 42 mm Hg    POCT pO2, Arterial 82 (L) 85 - 95 mm Hg    POCT SO2, Arterial 98 94 - 100 %    POCT Oxy Hemoglobin, Arterial 95.7 94.0 - 98.0 %    POCT Hematocrit Calculated, Arterial 43.0 41.0 - 52.0 %    POCT Sodium, Arterial 131 (L) 136 - 145 mmol/L    POCT Potassium, Arterial 4.2 3.5 - 5.3 mmol/L    POCT Chloride, Arterial 101 98 - 107 mmol/L    POCT Ionized Calcium, Arterial 1.07 (L) 1.10 - 1.33 mmol/L    POCT Glucose, Arterial 122 (H) 74 - 99 mg/dL    POCT Lactate, Arterial 1.0 0.4 - 2.0 mmol/L    POCT Base Excess, Arterial 4.4 (H) -2.0 - 3.0 mmol/L    POCT HCO3 Calculated, Arterial 26.7 (H) 22.0 - 26.0 mmol/L    POCT Hemoglobin, Arterial 14.2 13.5 - 17.5 g/dL    POCT Anion Gap, Arterial 8 (L) 10 - 25 mmo/L    Patient Temperature 37.0 degrees Celsius    FiO2 40 %   Calcium, Ionized   Result Value Ref Range    POCT Calcium, Ionized 1.07 (L) 1.1 - 1.33 mmol/L   CBC   Result Value Ref Range    WBC 19.1 (H) 4.4 - 11.3 x10*3/uL    nRBC 0.4 (H) 0.0 - 0.0 /100 WBCs    RBC 2.98 (L) 4.50 - 5.90 x10*6/uL    Hemoglobin 8.8 (L) 13.5 - 17.5 g/dL    Hematocrit 25.5 (L) 41.0 - 52.0 %    MCV 86 80 - 100 fL    MCH 29.5 26.0 - 34.0 pg    MCHC 34.5 32.0 - 36.0 g/dL    RDW 16.4 (H) 11.5 - 14.5 %    Platelets 71 (L) 150 - 450 x10*3/uL   Heparin Assay, UFH   Result Value Ref Range    Heparin Unfractionated 0.2 See Comment Below for Therapeutic Ranges IU/mL   Renal Function Panel   Result Value Ref Range    Glucose 113 (H) 74 - 99 mg/dL    Sodium 135 (L) 136 - 145 mmol/L    Potassium 4.0 3.5 - 5.3 mmol/L    Chloride 102 98 - 107 mmol/L    Bicarbonate 25 21 - 32 mmol/L    Anion Gap 12 10 - 20 mmol/L    Urea Nitrogen 21 6 - 23 mg/dL    Creatinine 0.71 0.50 - 1.30 mg/dL    eGFR >90 >60 mL/min/1.73m*2    Calcium 7.4 (L) 8.6 - 10.6 mg/dL    Phosphorus 2.0 (L) 2.5 - 4.9 mg/dL    Albumin 3.1 (L) 3.4 - 5.0 g/dL   Creatine  Kinase   Result Value Ref Range    Creatine Kinase 1,135 (H) 0 - 325 U/L   POCT GLUCOSE   Result Value Ref Range    POCT Glucose 117 (H) 74 - 99 mg/dL     Troponin I, High Sensitivity   Date/Time Value Ref Range Status   11/08/2024 04:12 PM 4 0 - 20 ng/L Final     Troponin I, High Sensitivity ([facility])   Date/Time Value Ref Range Status   11/15/2024 12:28 PM 44,705 (HH) 0 - 53 ng/L Final     Comment:     Previous result verified on 11/15/2024 1020 on specimen/case [id number] called with component TRPHS for procedure Troponin I, High Sensitivity with value 67,971 ng/L.   11/15/2024 01:42 AM 67,971 (HH) 0 - 53 ng/L Final   11/14/2024 03:54 AM 73,314 (HH) 0 - 53 ng/L Final     Comment:     Previous result verified on 11/13/2024 2154 on specimen/case [id number] called with component [facility] for procedure Troponin I, High Sensitivity with value 45,685 ng/L.     BNP   Date/Time Value Ref Range Status   11/10/2024 04:34 PM 13 0 - 99 pg/mL Final     Triglycerides   Date/Time Value Ref Range Status   11/08/2024 04:12  (H) 0 - 149 mg/dL Final     Comment:     Age              Desirable        Borderline         High        Very High  SEX:B           mg/dL             mg/dL               mg/dL      mg/dL  <=14D                       ----               ----        ----  15D-365D                    ----               ----        ----  1Y-9Y           0-74               75-99             >=100       ----  10Y-19Y        0-89                            >=130       ----  20Y-24Y        0-114             115-149             >=150      ----  >= 25Y         0-149             150-199             200-499    >=500      Venipuncture immediately after or during the administration of Metamizole may lead to falsely low results. Testing should be performed immediately prior to Metamizole dosing.        Assessment and Plan     Jaime Dominguez is a 74 year old male with a PMH significant for basal cell carcinoma and  HLD who initially presented to [facility] ED on 11/8 complaining of left sided chest pain, diaphoresis, SOB nausea and syncope. ECG showed ST elevation in the inferior leads and ST depression in anterior septal leads. C showed 90% occlusion of the LAD. Transferred to [facility] on 11/10 for cardiac surgery workup.  He is now s/p MidCAB x2 converted to full sternotomy w/ LIMA prolonged as a Y graft with a radial to LAD OM w/ Dr. Aldo Harman and Dr. Stanley. CPB time 347min. Course c/b increasing pressor requirement and echo revealing apical hypo-akinesis with subsequent IABP placement on 11/14 for further support. Overnight on 11/14, Troponin elevated and ST elevation in anteroseptal leads, c/f ACS and decision made to [facility]. Now s/p PCI of mid LAD with HEATHER. Pt cannulated on VA ECMO for cardiogenic shock state 2/2 multiple failed grafts. HF was engaged for multidisciplinary decision making regarding cardiogenic shock.     Acute HFrEF  ICM  CAD s/p CABG and PCI   Cardiogenic shock s/p VA ECMO and IABP   - TTE 11/16: LVEF 31%, abnormal wall motion; reduced RVSF.   - On epi and norepi. Wean norepi as able.  - Mechanical support: VA ECMO, IABP. Plan for ECMO turndown trail on Monday per primary team.   - Diuresis: IV diuresis per primary, could use additional diuresis per exam today.   - GDMT on hold with current MCS  - Mechanical support: VA ECMO, IABP  - Strict I/Os, Daily Na < 2g daily, fluid restriction.  - Could be considered for advanced therapies. Age prohibits from OHT, however, could be considered for LVAD if necessary.     For any questions or concerns, feel free to reach out via [employer] chat or page at [phone].This plan was discussed with Dr. Doug Castro, HF attending.          SIGNATURE: Kira Alvares DO PATIENT NAME: Jaime Dominguez   DATE/TIME: November 16, 2024 3:50 PM MRN: [id number]

## 2024-11-16 NOTE — PROGRESS NOTES
CTICU Progress Note  Jaime Dominguez/[id number]    Admit Date: 11/10/2024  Hospital Length of Stay: 6   ICU Length of Stay: 3d 7h   CT SURGEON:     SUBJECTIVE:   Pt has run of Afib RVR overnight. Received amio bolus x2. CT result s/f multiple mediastinal hematomas, the largest measuring up to 6.8cm as seen just superolateral to the left ventricle and heterogeneous hyperdense collection in the left inguinal region suspicious for hematoma. Pt received 1upRBC.     MEDICATIONS  Infusions:  amiodarone, Last Rate: 1 mg/min (11/16/24 0700)  dexmedeTOMIDine, Last Rate: 0.4 mcg/kg/hr (11/16/24 0700)  EPINEPHrine, Last Rate: 0.04 mcg/kg/min (11/16/24 0700)  heparin, Last Rate: 700 Units/hr (11/16/24 0700)  norepinephrine, Last Rate: 0.06 mcg/kg/min (11/16/24 0700)  vasopressin, Last Rate: Stopped (11/15/24 0900)      Scheduled:  acetaminophen, 650 mg, q4h   Or  acetaminophen, 650 mg, q4h  aspirin, 81 mg, Daily  atorvastatin, 80 mg, Nightly  [Held by provider] gabapentin, 200 mg, TID  heparin, 4,000 Units, Once  insulin lispro, 0-5 Units, q4h  pantoprazole, 40 mg, Daily before breakfast   Or  pantoprazole, 40 mg, Daily before breakfast  perflutren lipid microspheres, 0.5-10 mL of dilution, Once in imaging  perflutren lipid microspheres, 0.5-10 mL of dilution, Once in imaging  perflutren protein A microsphere, 0.5 mL, Once in imaging  perflutren protein A microsphere, 0.5 mL, Once in imaging  perflutren protein A microsphere, 0.5 mL, Once in imaging  polyethylene glycol, 17 g, BID  polyethylene glycol, 17 g, Daily  sennosides-docusate sodium, 2 tablet, BID  sulfur hexafluoride microsphr, 2 mL, Once in imaging  sulfur hexafluoride microsphr, 2 mL, Once in imaging  sulfur hexafluoride microsphr, 2 mL, Once in imaging  ticagrelor, 90 mg, BID      PRN:  calcium gluconate, 1 g, q6h PRN  calcium gluconate, 2 g, q6h PRN  fentaNYL, 25 mcg, q5 min PRN  HYDROmorphone, 0.2 mg, q2h PRN  HYDROmorphone, 0.2 mg, q15 min PRN  magnesium sulfate,  2 g, q6h PRN  magnesium sulfate, 4 g, q6h PRN  melatonin, 5 mg, Nightly PRN  naloxone, 0.2 mg, q5 min PRN  naloxone, 0.2 mg, q5 min PRN  ondansetron, 4 mg, q8h PRN   Or  ondansetron, 4 mg, q8h PRN  oxyCODONE, 10 mg, q4h PRN  oxyCODONE, 5 mg, q4h PRN  oxyCODONE, 5 mg, q4h PRN  oxygen, , Continuous PRN - O2/gases  potassium chloride CR, 20 mEq, q6h PRN   Or  potassium chloride, 20 mEq, q6h PRN  potassium chloride CR, 40 mEq, q6h PRN   Or  potassium chloride, 40 mEq, q6h PRN  potassium chloride, 20 mEq, q6h PRN  potassium chloride, 40 mEq, q6h PRN        PHYSICAL EXAM:   Visit Vitals  BP (!) 90/47   Pulse 91   Temp 36.9 °C (98.4 °F) (Core)   Resp 15   Ht 1.829 m (6')   Wt 93.4 kg (205 lb 14.6 oz)   SpO2 98%   BMI 27.93 kg/m²   Smoking Status Never   BSA 2.18 m²     Wt Readings from Last 5 Encounters:   11/12/24 93.4 kg (205 lb 14.6 oz)   11/10/24 96.2 kg (212 lb)     INTAKE/OUTPUT:  I/O last 3 completed shifts:  In: 7180.2 (76.9 mL/kg) [I.V.:3180.2 (34 mL/kg); Blood:2200; NG/GT:100; IV Piggyback:1700]  Out: 4150 (44.4 mL/kg) [Urine:2730 (0.8 mL/kg/hr); Emesis/NG output:350; Chest Tube:1070]  Weight: 93.4 kg        LDA:  CVC 11/12/24 Double lumen Right Internal jugular (Active)   Placement Date/Time: 11/12/24 (c) 1508   Hand Hygiene Performed Prior to CVC Insertion: Yes  Site Prep: Chlorhexidine   Site Prep Agent has Completely Dried Before Insertion: Yes  All 5 Sterile Barriers Used (Gloves, Gown, Cap, Mask, Large Sterile Ivone...   Number of days: 0       Arterial Line 11/12/24 Right (Active)   Placement Date/Time: 11/12/24 1900   Orientation: Right  Placed by: OR   Number of days: 0       Pacer Wires (Active)   Placement Date/Time: 11/12/24 2320   Placed by: Dr. Aldo Harman  Type of Pacing Wires: Ventricular   Number of days: 0       ETT  8.5 mm (Active)   Placement Date/Time: 11/12/24 (c) 0028   Mask Ventilation: Not attempted  Technique: Exchange catheter  ETT Type: ETT - single  Single Lumen Tube Size: 8.5 mm  Cuffed:  Yes  Location: Oral  Airway Insertion Attempts: 1  Placement Verification: Ausculta...   Number of days: 1       Urethral Catheter Temperature probe 16 Fr. (Active)   Placement Date/Time: 11/12/24 1452   Placed by: BRIANDA DENIS  Hand Hygiene Completed: Yes  Catheter Type: Temperature probe  Tube Size (Fr.): 16 Fr.  Catheter Balloon Size: 10 mL  Urine Returned: Yes   Number of days: 0       Chest Tube 1 Left Pleural (Active)   Placement Date: 11/13/24   Tube Number: 1  Chest Tube Orientation: Left  Chest Tube Location: Pleural   Number of days: 0       NG/OG/Feeding Tube Center mouth (Active)   Placement Date: 11/13/24   Tube Location: Center mouth   Number of days: 0       Y Chest Tube 1 and 2 Mediastinal Mediastinal (Active)   Placement Date: 11/13/24   Chest Tube Location 1: Mediastinal  Chest Tube Location 2: Mediastinal   Number of days: 0         Vent settings:       Physical Exam:   Constitutional: Intubated and sedated on mechanical ventilation in NAD   Neuro: Neuro intact without obvious focal deficits, on sedation.  PERRL  CV: RRR.  S1S2.  SR on monitor.  AV wires set [id number]  Pulm: CTAB .  CT's with appropriate serosang output and no airleak.  : clear yellow urine fvia aden  GI: S/ND/NT. Hypoactive BS. OGT in place with bilious output.  Extremities: NV exam intact x 4.  No edema.  Skin: WDI.  Postop dressings intact.  Chest tube dressings intact.    Psych: DELONTE, sedated      Images:     Invasive Hemodynamics:    Most Recent Range Past 24hrs   BP (Art) 100/52 Arterial Line BP 1  Min: 79/44  Max: 136/54   MAP(Art) 67 mmHg Arterial Line MAP 1 (mmHg)   Min: 53 mmHg  Max: 115 mmHg   RA/CVP   No data recorded   PA 24/15 PAP  Min: 12/3  Max: 29/15   PA(mean) 19 mmHg PAP (Mean)  Min: 2 mmHg  Max: 21 mmHg   CO   No data recorded   CI   No data recorded   Mixed Venous   No data recorded   SVR    No data recorded     If Devices present, use phrases:    For LVAD, .lhcticulvad   For ECMO, .lhcticuecmo  For Impella,  .lhcticuimpella    Daily Risk Screen:  Hemodynamic monitoring  critically ill patient who need accurate urinary output measurements  they are planned for extubation trial later today/tonight      Assessment/Plan   Assessment: Patient is a 74 year old male with a PMH significant for basal cell carcinoma and HPL  s/p MidCAB x2 converted to full sternotomy w/ LIMA prolonged as a Y graft with a radial to LAD OM w/ Dr. Aldo Harman and Dr. Stanley. CPB time 347min. Course c/b increasing pressor requirement and echo revealing apical hypo-akinesis with subsequent IABP placement on 11/14 for further support. Overnight on 11/14, Troponin elevated and ST elevation in anteroseptal leads, c/f ACS and decision made to [facility]. Now s/p PCI of mid LAD with HEATHER. Pt cannulated on VA ECMO for cardiogenic shock state 2/2 multiple failed grafts.      Plan:  NEURO:  No PMH. Pt asleep this AM -->  - prn precedex gtt for sedation/anxiety   - Serial neuro and pain assessments   - Scheduled IV Tylenol   - PRN oxycodone  - PRN dilaudid for pain   - PT Consult  - CAM ICU score qshift  - passive ROM  - Sleep/wake cycle hygiene       CV:  Patient has a history of HLD. Is now status post MIDCABG x 2 with conversion to full sternotomy LIMA prolonged as a Y graft with a radial to LAD OM. Pre/Post EF: normal BIV Arrived to CTICU on epi 0.04. A/V epicardial wires set VVI @ 60.Course c/b increasing pressor requirement and echo revealing apical hypo-akinesis with subsequent IABP placement on 11/14 for further support. Troponin elevated and ST elevation in anteroseptal leads, c/f ACS and decision made to [facility] 11/14. Now s/p PCI of mid LAD with HEATHER. Pt cannulated on VA ECMO for cardiogenic shock state 2/2 multiple failed grafts 11/14. TTE yesterday s/f EF 31%, abnormal ventricular wall motion, reduced RV function, abnormal, apex, mis and apical anterior septum and mid and apical inferior septum. CT 11/15 s/f  multiple mediastinal hematomas, the largest measuring  up to 6.8cm as seen just superolateral to the left ventricle-->  - IABP 1:1 setting, EKG trigger   - TTE today to follow up pericardial fluid collection   - Wean levo while maintaining MAP 70-90  - wean epi for SVO2 >65  - Volume resuscitate as clinically indicated  - Maintain epicardial wires set VVI @ 40  - Ticagrelor BID with aspirin   -c/w statin     ECMO:     Most Recent Range Past 24hrs   Flow 3.52 Patient Flow (L/min)  Min: 3.26  Max: 3.65   Speed 2580 Circuit Flow (RPM)  Min: 2580  Max: 2580   Sweep 0.5 Sweep Gas Flow Rate (L/min)  Min: 0.5  Max: 1    FiO2 100%    PULM:  No history of pulmonary disease.  Currently intubated on ventilator. Chest tubes 2x meds and 1 L pleural. Output minimal-->  - Cxr s/f increased interstitial edema and pleural effusion L>R  - On 4L NC. Wean FiO2 maintaining SpO2 >92%.   - IS q1h and OOB to chair when extubated  - Chest tubes to wall suction. Leave in place until pt further mobilizes      GI:  no PMH. Gastric Bubble on KUB. NG placed 11/14, 900cc output initially. Minimal output since. Gastric bubble resolved.-->   - Plan to remove NG  - swallow. Plan to advance diet   - Colace/senna BID and miralax BID  - PPI     :  CSA-TREE Risk Score low.  No history of renal disease, baseline creatinine 0.87. Creatinine stable post-op. Aden in place and making adequate UOP. -->  - Continue aden catheter for strict I/Os.  - Goal UOP 0.5ml/kg/hr  - RFP as clinically indicated  - Replete electrolytes per CTICU protocol       ENDO: No PMH. A1c: 5.1-->  - Maintain BG <180, insulin per CTICU protocol     HEME:  Acute blood loss anemia.Overnight 11/15, oozing noted from arterial cannula of ECMO site. CTS aware and fixed cannula positioning. Pt received total 5upRBC yesterday. 1upRBC given overnight. CT scan s/f heterogeneous hyperdense collection in the left inguinal region suspicious for hematoma  -->    - Monitor drain output volume and characteristics  - CBC, coags, and fibrinogen post op  and as clinically indicated  - additional 1upRBC  - C/w ASA   - Ticagrelor BID  - On low dose heparin gtt ; assay 0.2  - SCDs for DVT prophylaxis.  - Last type and screen: 11/14     ID:  Febrile. MRSA negative.WBC downtrending -->  - sent cxs  - continue to monitor WBC  - Trend temp q4h     Skin:  No active skin issues.  - preventative Mepilex dressings in place on sacrum and heels  - change preventative Mepilex weekly or more frequently as indicated (when moist/soiled)   - every shift skin assessment per nursing and weekly ICU skin rounds  - moisture barrier to be applied with sandro care  - active skin problems addressed with nursing on daily rounds     Proph:  SCDs  PPI  Hep gtt     G:  Line  Right IJ MAC w Minimac placed 11/12  R brachial a-line placed 11/12--> switch to R radial  Hale 11/12     The indications and risks/benefits of non-violent/non self-destructive restraints were discussed.      F: Family: will update at bedside.      A,B,C,D,E,F,G: reviewed     Seen and discussed with ICU attending Dr. Bonilla     Dispo: CTICU care for now.    CTICU TEAM PHONE [phone]       Restraints: The indications and risks/benefits of non-violent/non self-destructive restraints were discussed and are indicated for the safety of the patient.    Code status: Full Code      Rachael Roa MD  PGY-4/ CA-3  Dept. of Anesthesiology and Perioperative Medicine

## 2024-11-16 NOTE — PROGRESS NOTES
Jaime Dominguez is a 74 y.o. male on day 6 of admission presenting with STEMI (ST elevation myocardial infarction) (Multi).    Subjective   - C/w IABP 1:1, well position on xray.     Physical Exam  AO x 3  CVS- normal s1, s2, no murmurs  Resp -CTAB  Abd- Soft, NT, ND  Neuro  No gross motor. Sensory deficits   Groin access sites no hematoma, swelling   No LE edema      Last Recorded Vitals  Blood pressure 109/59, pulse 80, temperature 37 °C (98.6 °F), resp. rate 23, height 1.829 m (6'), weight 93.4 kg (205 lb 14.6 oz), SpO2 94%.    Last Labs:  CBC - 11/16/2024:  1:57 PM  19.1 8.8 71    25.5      BMP  135  102  21                  ----------------<113     4.0  25  0.71     CMP - 11/16/2024:  1:57 PM  7.4 5.9 180 --- 0.9   2.0 3.1 35 21      PTT - 11/15/2024:  9:16 PM  1.3   14.1 36     Troponin I, High Sensitivity   Date/Time Value Ref Range Status   11/08/2024 04:12 PM 4 0 - 20 ng/L Final     Troponin I, High Sensitivity (CMC)   Date/Time Value Ref Range Status   11/15/2024 12:28 PM 44,705 (HH) 0 - 53 ng/L Final     Comment:     Previous result verified on 11/15/2024 1020 on specimen/case [id number] called with component [facility] for procedure Troponin I, High Sensitivity with value 67,971 ng/L.     BNP   Date/Time Value Ref Range Status   11/10/2024 04:34 PM 13 0 - 99 pg/mL Final        Last I/O:  I/O last 3 completed shifts:  In: 7180.2 (76.9 mL/kg) [I.V.:3180.2 (34 mL/kg); Blood:2200; NG/GT:100; IV Piggyback:1700]  Out: 4150 (44.4 mL/kg) [Urine:2730 (0.8 mL/kg/hr); Emesis/NG output:350; Chest Tube:1070]  Weight: 93.4 kg     Ejection Fractions:  EF   Date/Time Value Ref Range Status   11/16/2024 11:05 AM 34 %    11/15/2024 09:11 AM 31 %    11/12/2024 10:24 AM 63 %           Assessment/Plan   Assessment & Plan  STEMI (ST elevation myocardial infarction) (Multi)    TREE (acute kidney injury) (CMS-HCC)    On intra-aortic balloon pump assist    Delirium    Cardiogenic shock (Multi)      Plan    No changes in the  intervention cardiology stand point.    I spent 50 minutes in the professional and overall care of this patient.      Malvin Reed MD

## 2024-11-17 ENCOUNTER — APPOINTMENT (OUTPATIENT)
Dept: RADIOLOGY | Facility: HOSPITAL | Age: 74
DRG: 003 | End: 2024-11-17
Payer: MEDICARE

## 2024-11-17 LAB
ALBUMIN SERPL BCP-MCNC: 2.7 G/DL (ref 3.4–5)
ALBUMIN SERPL BCP-MCNC: 2.8 G/DL (ref 3.4–5)
ANION GAP BLDA CALCULATED.4IONS-SCNC: 4 MMO/L
ANION GAP BLDA CALCULATED.4IONS-SCNC: 7 MMO/L (ref 10–25)
ANION GAP BLDA CALCULATED.4IONS-SCNC: 8 MMO/L (ref 10–25)
ANION GAP BLDMV CALCULATED.4IONS-SCNC: 4 MMO/L (ref 10–25)
ANION GAP BLDMV CALCULATED.4IONS-SCNC: 6 MMO/L (ref 10–25)
ANION GAP BLDMV CALCULATED.4IONS-SCNC: 6 MMO/L (ref 10–25)
ANION GAP BLDV CALCULATED.4IONS-SCNC: 3 MMO/L
ANION GAP SERPL CALC-SCNC: 11 MMOL/L (ref 10–20)
ANION GAP SERPL CALC-SCNC: 9 MMOL/L (ref 10–20)
ATRIAL RATE: 87 BPM
BACTERIA BLD CULT: NORMAL
BACTERIA BLD CULT: NORMAL
BACTERIA UR CULT: NO GROWTH
BASE EXCESS BLDA CALC-SCNC: 2.5 MMOL/L (ref -2–3)
BASE EXCESS BLDA CALC-SCNC: 4.1 MMOL/L (ref -2–3)
BASE EXCESS BLDA CALC-SCNC: 6.3 MMOL/L
BASE EXCESS BLDMV CALC-SCNC: 5.5 MMOL/L (ref -2–3)
BASE EXCESS BLDMV CALC-SCNC: 5.5 MMOL/L (ref -2–3)
BASE EXCESS BLDMV CALC-SCNC: 6.2 MMOL/L (ref -2–3)
BASE EXCESS BLDV CALC-SCNC: 5.5 MMOL/L
BLOOD EXPIRATION DATE: NORMAL
BLOOD EXPIRATION DATE: NORMAL
BODY TEMPERATURE: 37 DEGREES CELSIUS
BUN SERPL-MCNC: 16 MG/DL (ref 6–23)
BUN SERPL-MCNC: 17 MG/DL (ref 6–23)
CA-I BLD-SCNC: 1.09 MMOL/L (ref 1.1–1.33)
CA-I BLD-SCNC: 1.12 MMOL/L (ref 1.1–1.33)
CA-I BLDA-SCNC: 1.08 MMOL/L (ref 1.1–1.33)
CA-I BLDA-SCNC: 1.09 MMOL/L (ref 1.1–1.33)
CA-I BLDA-SCNC: 1.13 MMOL/L (ref 1.1–1.33)
CA-I BLDMV-SCNC: 1.1 MMOL/L (ref 1.1–1.33)
CA-I BLDMV-SCNC: 1.11 MMOL/L (ref 1.1–1.33)
CA-I BLDMV-SCNC: 1.12 MMOL/L (ref 1.1–1.33)
CA-I BLDV-SCNC: 1.12 MMOL/L (ref 1.1–1.33)
CALCIUM SERPL-MCNC: 7.3 MG/DL (ref 8.6–10.6)
CALCIUM SERPL-MCNC: 7.4 MG/DL (ref 8.6–10.6)
CHLORIDE BLD-SCNC: 99 MMOL/L (ref 98–107)
CHLORIDE BLDA-SCNC: 100 MMOL/L (ref 98–107)
CHLORIDE BLDA-SCNC: 101 MMOL/L (ref 98–107)
CHLORIDE BLDA-SCNC: 99 MMOL/L (ref 98–107)
CHLORIDE BLDV-SCNC: 99 MMOL/L (ref 98–107)
CHLORIDE SERPL-SCNC: 100 MMOL/L (ref 98–107)
CHLORIDE SERPL-SCNC: 99 MMOL/L (ref 98–107)
CO2 SERPL-SCNC: 28 MMOL/L (ref 21–32)
CO2 SERPL-SCNC: 31 MMOL/L (ref 21–32)
CREAT SERPL-MCNC: 0.64 MG/DL (ref 0.5–1.3)
CREAT SERPL-MCNC: 0.65 MG/DL (ref 0.5–1.3)
DISPENSE STATUS: NORMAL
DISPENSE STATUS: NORMAL
EGFRCR SERPLBLD CKD-EPI 2021: >90 ML/MIN/1.73M*2
EGFRCR SERPLBLD CKD-EPI 2021: >90 ML/MIN/1.73M*2
ERYTHROCYTE [DISTWIDTH] IN BLOOD BY AUTOMATED COUNT: 16.4 % (ref 11.5–14.5)
ERYTHROCYTE [DISTWIDTH] IN BLOOD BY AUTOMATED COUNT: 16.8 % (ref 11.5–14.5)
GLUCOSE BLD MANUAL STRIP-MCNC: 109 MG/DL (ref 74–99)
GLUCOSE BLD MANUAL STRIP-MCNC: 117 MG/DL (ref 74–99)
GLUCOSE BLD MANUAL STRIP-MCNC: 130 MG/DL (ref 74–99)
GLUCOSE BLD MANUAL STRIP-MCNC: 141 MG/DL (ref 74–99)
GLUCOSE BLD-MCNC: 118 MG/DL (ref 74–99)
GLUCOSE BLD-MCNC: 124 MG/DL (ref 74–99)
GLUCOSE BLD-MCNC: 146 MG/DL (ref 74–99)
GLUCOSE BLDA-MCNC: 118 MG/DL (ref 74–99)
GLUCOSE BLDA-MCNC: 122 MG/DL (ref 74–99)
GLUCOSE BLDA-MCNC: 141 MG/DL (ref 74–99)
GLUCOSE BLDV-MCNC: 114 MG/DL (ref 74–99)
GLUCOSE SERPL-MCNC: 114 MG/DL (ref 74–99)
GLUCOSE SERPL-MCNC: 138 MG/DL (ref 74–99)
HCO3 BLDA-SCNC: 25.9 MMOL/L (ref 22–26)
HCO3 BLDA-SCNC: 27.4 MMOL/L (ref 22–26)
HCO3 BLDA-SCNC: 31.2 MMOL/L
HCO3 BLDMV-SCNC: 29.9 MMOL/L (ref 22–26)
HCO3 BLDMV-SCNC: 30.5 MMOL/L (ref 22–26)
HCO3 BLDMV-SCNC: 30.6 MMOL/L (ref 22–26)
HCO3 BLDV-SCNC: 31 MMOL/L
HCT VFR BLD AUTO: 22.3 % (ref 41–52)
HCT VFR BLD AUTO: 23.3 % (ref 41–52)
HCT VFR BLD EST: 23 % (ref 41–52)
HCT VFR BLD EST: 24 % (ref 41–52)
HCT VFR BLD EST: 25 % (ref 41–52)
HCT VFR BLD EST: 26 % (ref 41–52)
HCT VFR BLD EST: 26 % (ref 41–52)
HCT VFR BLD EST: 28 % (ref 41–52)
HCT VFR BLD EST: 38 % (ref 41–52)
HGB BLD-MCNC: 7.5 G/DL (ref 13.5–17.5)
HGB BLD-MCNC: 7.9 G/DL (ref 13.5–17.5)
HGB BLDA-MCNC: 12.7 G/DL (ref 13.5–17.5)
HGB BLDA-MCNC: 8.2 G/DL (ref 13.5–17.5)
HGB BLDA-MCNC: 8.5 G/DL (ref 13.5–17.5)
HGB BLDMV-MCNC: 8.1 G/DL (ref 13.5–17.5)
HGB BLDMV-MCNC: 8.8 G/DL (ref 13.5–17.5)
HGB BLDMV-MCNC: 9.2 G/DL (ref 13.5–17.5)
HGB BLDV-MCNC: 7.5 G/DL (ref 13.5–17.5)
HOLD SPECIMEN: NORMAL
HOLD SPECIMEN: NORMAL
INHALED O2 CONCENTRATION: 100 %
INHALED O2 CONCENTRATION: 100 %
INHALED O2 CONCENTRATION: 40 %
LACTATE BLDA-SCNC: 0.9 MMOL/L (ref 0.4–2)
LACTATE BLDA-SCNC: 1 MMOL/L (ref 0.4–2)
LACTATE BLDA-SCNC: 1 MMOL/L (ref 0.4–2)
LACTATE BLDMV-SCNC: 0.8 MMOL/L (ref 0.4–2)
LACTATE BLDMV-SCNC: 0.9 MMOL/L (ref 0.4–2)
LACTATE BLDMV-SCNC: 1 MMOL/L (ref 0.4–2)
LACTATE BLDV-SCNC: 0.8 MMOL/L (ref 0.4–2)
LDH SERPL L TO P-CCNC: 450 U/L (ref 84–246)
MAGNESIUM SERPL-MCNC: 2.1 MG/DL (ref 1.6–2.4)
MAGNESIUM SERPL-MCNC: 2.46 MG/DL (ref 1.6–2.4)
MCH RBC QN AUTO: 29 PG (ref 26–34)
MCH RBC QN AUTO: 29.1 PG (ref 26–34)
MCHC RBC AUTO-ENTMCNC: 33.6 G/DL (ref 32–36)
MCHC RBC AUTO-ENTMCNC: 33.9 G/DL (ref 32–36)
MCV RBC AUTO: 86 FL (ref 80–100)
MCV RBC AUTO: 86 FL (ref 80–100)
NRBC BLD-RTO: 0.2 /100 WBCS (ref 0–0)
NRBC BLD-RTO: 0.2 /100 WBCS (ref 0–0)
OXYHGB MFR BLDA: 96.6 % (ref 94–98)
OXYHGB MFR BLDA: 96.8 % (ref 94–98)
OXYHGB MFR BLDA: 97.7 %
OXYHGB MFR BLDMV: 52.9 % (ref 45–75)
OXYHGB MFR BLDMV: 57 % (ref 45–75)
OXYHGB MFR BLDMV: 58.7 % (ref 45–75)
OXYHGB MFR BLDV: 86.1 %
P AXIS: 70 DEGREES
P OFFSET: 186 MS
P ONSET: 138 MS
PCO2 BLDA: 34 MM HG (ref 38–42)
PCO2 BLDA: 36 MM HG (ref 38–42)
PCO2 BLDA: 46 MM HG
PCO2 BLDMV: 42 MM HG (ref 41–51)
PCO2 BLDMV: 43 MM HG (ref 41–51)
PCO2 BLDMV: 46 MM HG (ref 41–51)
PCO2 BLDV: 50 MM HG
PH BLDA: 7.44 PH
PH BLDA: 7.49 PH (ref 7.38–7.42)
PH BLDA: 7.49 PH (ref 7.38–7.42)
PH BLDMV: 7.43 PH (ref 7.33–7.43)
PH BLDMV: 7.46 PH (ref 7.33–7.43)
PH BLDMV: 7.46 PH (ref 7.33–7.43)
PH BLDV: 7.4 PH
PHOSPHATE SERPL-MCNC: 2.2 MG/DL (ref 2.5–4.9)
PHOSPHATE SERPL-MCNC: 2.3 MG/DL (ref 2.5–4.9)
PLATELET # BLD AUTO: 66 X10*3/UL (ref 150–450)
PLATELET # BLD AUTO: 80 X10*3/UL (ref 150–450)
PO2 BLDA: 101 MM HG (ref 85–95)
PO2 BLDA: 114 MM HG (ref 85–95)
PO2 BLDA: 495 MM HG
PO2 BLDMV: 32 MM HG (ref 35–45)
PO2 BLDMV: 35 MM HG (ref 35–45)
PO2 BLDMV: 35 MM HG (ref 35–45)
PO2 BLDV: 54 MM HG
POTASSIUM BLDA-SCNC: 3.8 MMOL/L (ref 3.5–5.3)
POTASSIUM BLDA-SCNC: 4 MMOL/L (ref 3.5–5.3)
POTASSIUM BLDA-SCNC: 4 MMOL/L (ref 3.5–5.3)
POTASSIUM BLDMV-SCNC: 4 MMOL/L (ref 3.5–5.3)
POTASSIUM BLDMV-SCNC: 4.2 MMOL/L (ref 3.5–5.3)
POTASSIUM BLDMV-SCNC: 4.9 MMOL/L (ref 3.5–5.3)
POTASSIUM BLDV-SCNC: 3.7 MMOL/L (ref 3.5–5.3)
POTASSIUM SERPL-SCNC: 3.7 MMOL/L (ref 3.5–5.3)
POTASSIUM SERPL-SCNC: 3.8 MMOL/L (ref 3.5–5.3)
PR INTERVAL: 160 MS
PRODUCT BLOOD TYPE: 6200
PRODUCT BLOOD TYPE: 6200
PRODUCT CODE: NORMAL
PRODUCT CODE: NORMAL
Q ONSET: 218 MS
QRS COUNT: 14 BEATS
QRS DURATION: 78 MS
QT INTERVAL: 376 MS
QTC CALCULATION(BAZETT): 452 MS
QTC FREDERICIA: 425 MS
R AXIS: 69 DEGREES
RBC # BLD AUTO: 2.58 X10*6/UL (ref 4.5–5.9)
RBC # BLD AUTO: 2.72 X10*6/UL (ref 4.5–5.9)
SAO2 % BLDA: 100 %
SAO2 % BLDA: 99 % (ref 94–100)
SAO2 % BLDA: 99 % (ref 94–100)
SAO2 % BLDMV: 54 % (ref 45–75)
SAO2 % BLDMV: 59 % (ref 45–75)
SAO2 % BLDMV: 61 % (ref 45–75)
SAO2 % BLDV: 87 %
SODIUM BLDA-SCNC: 130 MMOL/L (ref 136–145)
SODIUM BLDA-SCNC: 131 MMOL/L (ref 136–145)
SODIUM BLDA-SCNC: 131 MMOL/L (ref 136–145)
SODIUM BLDMV-SCNC: 130 MMOL/L (ref 136–145)
SODIUM BLDMV-SCNC: 130 MMOL/L (ref 136–145)
SODIUM BLDMV-SCNC: 131 MMOL/L (ref 136–145)
SODIUM BLDV-SCNC: 129 MMOL/L (ref 136–145)
SODIUM SERPL-SCNC: 134 MMOL/L (ref 136–145)
SODIUM SERPL-SCNC: 136 MMOL/L (ref 136–145)
T AXIS: 55 DEGREES
T OFFSET: 406 MS
UFH PPP CHRO-ACNC: 0.3 IU/ML
UNIT ABO: NORMAL
UNIT ABO: NORMAL
UNIT NUMBER: NORMAL
UNIT NUMBER: NORMAL
UNIT RH: NORMAL
UNIT RH: NORMAL
UNIT VOLUME: 350
UNIT VOLUME: 350
VENTRICULAR RATE: 87 BPM
WBC # BLD AUTO: 17.8 X10*3/UL (ref 4.4–11.3)
WBC # BLD AUTO: 18.4 X10*3/UL (ref 4.4–11.3)
XM INTEP: NORMAL
XM INTEP: NORMAL

## 2024-11-17 PROCEDURE — 99233 SBSQ HOSP IP/OBS HIGH 50: CPT | Performed by: INTERNAL MEDICINE

## 2024-11-17 PROCEDURE — 84132 ASSAY OF SERUM POTASSIUM: CPT | Performed by: NURSE PRACTITIONER

## 2024-11-17 PROCEDURE — 2020000001 HC ICU ROOM DAILY

## 2024-11-17 PROCEDURE — 2500000004 HC RX 250 GENERAL PHARMACY W/ HCPCS (ALT 636 FOR OP/ED): Performed by: STUDENT IN AN ORGANIZED HEALTH CARE EDUCATION/TRAINING PROGRAM

## 2024-11-17 PROCEDURE — 37799 UNLISTED PX VASCULAR SURGERY: CPT | Performed by: NURSE PRACTITIONER

## 2024-11-17 PROCEDURE — 2500000005 HC RX 250 GENERAL PHARMACY W/O HCPCS: Performed by: STUDENT IN AN ORGANIZED HEALTH CARE EDUCATION/TRAINING PROGRAM

## 2024-11-17 PROCEDURE — 83735 ASSAY OF MAGNESIUM: CPT | Performed by: NURSE PRACTITIONER

## 2024-11-17 PROCEDURE — 82330 ASSAY OF CALCIUM: CPT | Performed by: NURSE PRACTITIONER

## 2024-11-17 PROCEDURE — 2500000001 HC RX 250 WO HCPCS SELF ADMINISTERED DRUGS (ALT 637 FOR MEDICARE OP): Performed by: STUDENT IN AN ORGANIZED HEALTH CARE EDUCATION/TRAINING PROGRAM

## 2024-11-17 PROCEDURE — 33949 ECMO/ECLS DAILY MGMT ARTERY: CPT

## 2024-11-17 PROCEDURE — 2500000001 HC RX 250 WO HCPCS SELF ADMINISTERED DRUGS (ALT 637 FOR MEDICARE OP)

## 2024-11-17 PROCEDURE — P9016 RBC LEUKOCYTES REDUCED: HCPCS

## 2024-11-17 PROCEDURE — 99291 CRITICAL CARE FIRST HOUR: CPT | Performed by: STUDENT IN AN ORGANIZED HEALTH CARE EDUCATION/TRAINING PROGRAM

## 2024-11-17 PROCEDURE — 85027 COMPLETE CBC AUTOMATED: CPT | Performed by: STUDENT IN AN ORGANIZED HEALTH CARE EDUCATION/TRAINING PROGRAM

## 2024-11-17 PROCEDURE — 2500000001 HC RX 250 WO HCPCS SELF ADMINISTERED DRUGS (ALT 637 FOR MEDICARE OP): Performed by: NURSE PRACTITIONER

## 2024-11-17 PROCEDURE — 84132 ASSAY OF SERUM POTASSIUM: CPT | Performed by: STUDENT IN AN ORGANIZED HEALTH CARE EDUCATION/TRAINING PROGRAM

## 2024-11-17 PROCEDURE — 71045 X-RAY EXAM CHEST 1 VIEW: CPT | Performed by: RADIOLOGY

## 2024-11-17 PROCEDURE — 83615 LACTATE (LD) (LDH) ENZYME: CPT

## 2024-11-17 PROCEDURE — 2500000002 HC RX 250 W HCPCS SELF ADMINISTERED DRUGS (ALT 637 FOR MEDICARE OP, ALT 636 FOR OP/ED): Performed by: STUDENT IN AN ORGANIZED HEALTH CARE EDUCATION/TRAINING PROGRAM

## 2024-11-17 PROCEDURE — 80069 RENAL FUNCTION PANEL: CPT | Performed by: NURSE PRACTITIONER

## 2024-11-17 PROCEDURE — 85027 COMPLETE CBC AUTOMATED: CPT | Performed by: NURSE PRACTITIONER

## 2024-11-17 PROCEDURE — 37799 UNLISTED PX VASCULAR SURGERY: CPT | Performed by: STUDENT IN AN ORGANIZED HEALTH CARE EDUCATION/TRAINING PROGRAM

## 2024-11-17 PROCEDURE — 36430 TRANSFUSION BLD/BLD COMPNT: CPT

## 2024-11-17 PROCEDURE — 85520 HEPARIN ASSAY: CPT | Performed by: STUDENT IN AN ORGANIZED HEALTH CARE EDUCATION/TRAINING PROGRAM

## 2024-11-17 PROCEDURE — 2500000002 HC RX 250 W HCPCS SELF ADMINISTERED DRUGS (ALT 637 FOR MEDICARE OP, ALT 636 FOR OP/ED)

## 2024-11-17 PROCEDURE — 2500000004 HC RX 250 GENERAL PHARMACY W/ HCPCS (ALT 636 FOR OP/ED): Performed by: NURSE PRACTITIONER

## 2024-11-17 PROCEDURE — 71045 X-RAY EXAM CHEST 1 VIEW: CPT

## 2024-11-17 PROCEDURE — 82947 ASSAY GLUCOSE BLOOD QUANT: CPT

## 2024-11-17 RX ORDER — AMIODARONE HYDROCHLORIDE 200 MG/1
400 TABLET ORAL 2 TIMES DAILY
Status: DISCONTINUED | OUTPATIENT
Start: 2024-11-17 | End: 2024-11-26

## 2024-11-17 RX ORDER — DEXMEDETOMIDINE HYDROCHLORIDE 4 UG/ML
0-1.5 INJECTION, SOLUTION INTRAVENOUS CONTINUOUS
Status: DISCONTINUED | OUTPATIENT
Start: 2024-11-17 | End: 2024-11-18

## 2024-11-17 RX ORDER — HYDROMORPHONE HYDROCHLORIDE 0.2 MG/ML
0.2 INJECTION INTRAMUSCULAR; INTRAVENOUS; SUBCUTANEOUS EVERY 2 HOUR PRN
Status: DISCONTINUED | OUTPATIENT
Start: 2024-11-17 | End: 2024-11-18

## 2024-11-17 RX ORDER — SODIUM,POTASSIUM PHOSPHATES 280-250MG
1 POWDER IN PACKET (EA) ORAL 4 TIMES DAILY
Status: COMPLETED | OUTPATIENT
Start: 2024-11-17 | End: 2024-11-17

## 2024-11-17 RX ORDER — FUROSEMIDE 10 MG/ML
20 INJECTION INTRAMUSCULAR; INTRAVENOUS ONCE
Status: DISCONTINUED | OUTPATIENT
Start: 2024-11-17 | End: 2024-11-17

## 2024-11-17 RX ADMIN — CALCIUM GLUCONATE 1 G: 20 INJECTION, SOLUTION INTRAVENOUS at 15:59

## 2024-11-17 RX ADMIN — CALCIUM GLUCONATE 1 G: 20 INJECTION, SOLUTION INTRAVENOUS at 05:21

## 2024-11-17 RX ADMIN — TICAGRELOR 90 MG: 90 TABLET ORAL at 09:00

## 2024-11-17 RX ADMIN — DEXMEDETOMIDINE HYDROCHLORIDE 0.2 MCG/KG/HR: 400 INJECTION INTRAVENOUS at 06:51

## 2024-11-17 RX ADMIN — POTASSIUM CHLORIDE 20 MEQ: 14.9 INJECTION, SOLUTION INTRAVENOUS at 05:22

## 2024-11-17 RX ADMIN — Medication 4 L/MIN: at 20:00

## 2024-11-17 RX ADMIN — ACETAMINOPHEN 650 MG: 325 TABLET ORAL at 13:15

## 2024-11-17 RX ADMIN — TICAGRELOR 90 MG: 90 TABLET ORAL at 20:04

## 2024-11-17 RX ADMIN — POTASSIUM & SODIUM PHOSPHATES POWDER PACK 280-160-250 MG 1 PACKET: 280-160-250 PACK at 09:15

## 2024-11-17 RX ADMIN — POLYETHYLENE GLYCOL 3350 17 G: 17 POWDER, FOR SOLUTION ORAL at 20:04

## 2024-11-17 RX ADMIN — AMIODARONE HYDROCHLORIDE 400 MG: 200 TABLET ORAL at 09:15

## 2024-11-17 RX ADMIN — SENNOSIDES AND DOCUSATE SODIUM 2 TABLET: 50; 8.6 TABLET ORAL at 09:15

## 2024-11-17 RX ADMIN — AMIODARONE HYDROCHLORIDE 1 MG/MIN: 1.8 INJECTION, SOLUTION INTRAVENOUS at 06:52

## 2024-11-17 RX ADMIN — AMIODARONE HYDROCHLORIDE 1 MG/MIN: 1.8 INJECTION, SOLUTION INTRAVENOUS at 01:03

## 2024-11-17 RX ADMIN — ACETAMINOPHEN 650 MG: 325 TABLET ORAL at 17:25

## 2024-11-17 RX ADMIN — POTASSIUM CHLORIDE 40 MEQ: 400 INJECTION, SOLUTION INTRAVENOUS at 16:12

## 2024-11-17 RX ADMIN — SENNOSIDES AND DOCUSATE SODIUM 2 TABLET: 50; 8.6 TABLET ORAL at 20:04

## 2024-11-17 RX ADMIN — OXYCODONE 5 MG: 5 TABLET ORAL at 20:04

## 2024-11-17 RX ADMIN — OXYCODONE 5 MG: 5 TABLET ORAL at 15:31

## 2024-11-17 RX ADMIN — HYDROMORPHONE HYDROCHLORIDE 0.2 MG: 0.2 INJECTION, SOLUTION INTRAMUSCULAR; INTRAVENOUS; SUBCUTANEOUS at 05:21

## 2024-11-17 RX ADMIN — ONDANSETRON 4 MG: 2 INJECTION INTRAMUSCULAR; INTRAVENOUS at 20:49

## 2024-11-17 RX ADMIN — PANTOPRAZOLE SODIUM 40 MG: 40 TABLET, DELAYED RELEASE ORAL at 08:00

## 2024-11-17 RX ADMIN — ATORVASTATIN CALCIUM 80 MG: 80 TABLET, FILM COATED ORAL at 20:04

## 2024-11-17 RX ADMIN — ASPIRIN 81 MG 81 MG: 81 TABLET ORAL at 09:00

## 2024-11-17 RX ADMIN — ACETAMINOPHEN 650 MG: 325 TABLET ORAL at 10:06

## 2024-11-17 RX ADMIN — ACETAMINOPHEN 650 MG: 325 TABLET ORAL at 21:38

## 2024-11-17 RX ADMIN — HEPARIN SODIUM 900 UNITS/HR: 10000 INJECTION, SOLUTION INTRAVENOUS at 00:57

## 2024-11-17 RX ADMIN — POTASSIUM & SODIUM PHOSPHATES POWDER PACK 280-160-250 MG 1 PACKET: 280-160-250 PACK at 13:14

## 2024-11-17 RX ADMIN — EPINEPHRINE IN SODIUM CHLORIDE 0.04 MCG/KG/MIN: 16 INJECTION INTRAVENOUS at 23:50

## 2024-11-17 RX ADMIN — POLYETHYLENE GLYCOL 3350 17 G: 17 POWDER, FOR SOLUTION ORAL at 09:15

## 2024-11-17 RX ADMIN — AMIODARONE HYDROCHLORIDE 400 MG: 200 TABLET ORAL at 20:04

## 2024-11-17 RX ADMIN — EPINEPHRINE IN SODIUM CHLORIDE 0.04 MCG/KG/MIN: 16 INJECTION INTRAVENOUS at 04:31

## 2024-11-17 ASSESSMENT — PAIN - FUNCTIONAL ASSESSMENT
PAIN_FUNCTIONAL_ASSESSMENT: 0-10

## 2024-11-17 ASSESSMENT — PAIN DESCRIPTION - LOCATION
LOCATION: CHEST
LOCATION: CHEST

## 2024-11-17 ASSESSMENT — PAIN SCALES - WONG BAKER: WONGBAKER_NUMERICALRESPONSE: HURTS LITTLE MORE

## 2024-11-17 ASSESSMENT — PAIN SCALES - GENERAL
PAINLEVEL_OUTOF10: 0 - NO PAIN
PAINLEVEL_OUTOF10: 0 - NO PAIN
PAINLEVEL_OUTOF10: 2
PAINLEVEL_OUTOF10: 6
PAINLEVEL_OUTOF10: 2
PAINLEVEL_OUTOF10: 0 - NO PAIN
PAINLEVEL_OUTOF10: 1
PAINLEVEL_OUTOF10: 7
PAINLEVEL_OUTOF10: 5 - MODERATE PAIN

## 2024-11-17 NOTE — PROGRESS NOTES
Patient requires ECMO support.     ECMO:  Cannulation Date: Nov. 14, 2024  ECMO Indication: Cardiogenic Shock  Current Goals of Care: Full Code    ECMO Cannula Configuration: L fem-fem VA-ECMO    ECMO Interrogation: Drainage cannula site, cannula, pump, oxygenator, return cannula and return cannula site clinically inspected. RPM and sweep reviewed and adjusted appropriate to clinical context.    ECMO circuit run from drainage cannula to pump to oxygenator to return cannula: Yes  Pre/post PaO2 adequate: Yes  LV Unloading/Pulse Pressure: IABP at 1:1; Good pulsatility this morning  Distal Perfusion: L DPC in place, adequate pulses    ECMO Settings:     Most Recent Range Past 24hrs   Flow 3.39 Patient Flow (L/min)  Min: 3.3  Max: 3.67   Speed 2580 Circuit Flow (RPM)  Min: 2580  Max: 2580   Sweep 0.5 Sweep Gas Flow Rate (L/min)  Min: 0.5  Max: 0.5     IABP in situ 1:1. Position noted to be high in the cath lab and appropriately repositioned.    Invasive Hemodynamics:    Most Recent Range Past 24hrs   BP (Art) 122/45 Arterial Line BP 1  Min: 86/36  Max: 145/59   MAP(Art) 74 mmHg Arterial Line MAP 1 (mmHg)   Min: 55 mmHg  Max: 92 mmHg   RA/CVP   No data recorded   PA 18/9 PAP  Min: 13/7  Max: 28/11   PA(mean) 13 mmHg PAP (Mean)  Min: 8 mmHg  Max: 20 mmHg   CO   No data recorded   CI   No data recorded   Mixed Venous   No data recorded   SVR    No data recorded     Hemodynamic Management:  amiodarone, 1 mg/min, Last Rate: 1 mg/min (11/17/24 0700)  dexmedeTOMIDine, 0-1.5 mcg/kg/hr, Last Rate: Stopped (11/17/24 0703)  EPINEPHrine, 0.04 mcg/kg/min, Last Rate: 0.04 mcg/kg/min (11/17/24 0700)  heparin, 0-4,000 Units/hr, Last Rate: 900 Units/hr (11/17/24 0700)  norepinephrine, 0-1 mcg/kg/min, Last Rate: 0.04 mcg/kg/min (11/17/24 0700)  vasopressin, 0-0.03 Units/min, Last Rate: Stopped (11/15/24 0900)        Bleeding/Clot Issues:   Anticoagulation/Monitoring: Anticoagulation status and intensity discussed  Plan: Systemic heparin  with Xa monitoring (currently sub-therapeutic 0.2)  Presence of cannula bleeding: No significant external bleeding at present; L groin hematoma stable in size  Presence of oxygenator clot: None    Management Issues:  Road Trips planned for today? N  Mobility Planned today? N  Weaning Plan/date: Will start slow ECMO turn down today    Communication:  Discussed with Cardiothoracic surgeon, Perfusion, Palliative care (consulted entered and physical/occupational therapy)    Family Updates/Concerns? Family updated. All questions appeared be answered adequately.    ECMO ROUNDS (11/17):  The following staff were in attendance during formal interdisciplinary ECMO team rounds today:    Cardiac Surgery: Lizeth  CTICU: Bonilla    In addition to review of blood test results, diet, imaging/procedure results, issues/problems expressed by family, medications, the following topics were discussed:    Plan for slow ECMO turn down; may need to bridge with an Impella. Plan for TTE tomorrow.   Palliative care to be consult  Family update provided by surgeon and myself

## 2024-11-17 NOTE — PROGRESS NOTES
Jaime Dominguez is a 74 y.o. male on day 7 of admission presenting with STEMI (ST elevation myocardial infarction) (Multi).    Subjective   - C/w IABP 1:1, well position on xray.      Physical Exam  AO x 3  CVS- normal s1, s2, no murmurs  Resp -CTAB  Abd- Soft, NT, ND  Neuro  No gross motor. Sensory deficits   Groin access sites no hematoma, swelling   No LE edema        Last Recorded Vitals  Blood pressure 109/59, pulse 80, temperature 37 °C (98.6 °F), resp. rate 11, height 1.829 m (6'), weight 93.4 kg (205 lb 14.6 oz), SpO2 98%.  Intake/Output last 3 Shifts:  I/O last 3 completed shifts:  In: 4086.4 (43.8 mL/kg) [I.V.:2686.4 (28.8 mL/kg); Blood:1400]  Out: 4095 (43.8 mL/kg) [Urine:3155 (0.9 mL/kg/hr); Chest Tube:940]  Weight: 93.4 kg     Relevant Results    Last Labs:  CBC - 11/17/2024: 12:03 AM  17.8 7.9 66    23.3      BMP  136  100  17                  ----------------<114     3.8  31  0.64     CMP - 11/17/2024:  3:17 AM  7.3 5.9 180 --- 0.9   2.2 2.8 35 21      PTT - 11/15/2024:  9:16 PM  1.3   14.1 36     Troponin I, High Sensitivity   Date/Time Value Ref Range Status   11/08/2024 04:12 PM 4 0 - 20 ng/L Final     Troponin I, High Sensitivity (CMC)   Date/Time Value Ref Range Status   11/15/2024 12:28 PM 44,705 (HH) 0 - 53 ng/L Final     Comment:     Previous result verified on 11/15/2024 1020 on specimen/case [id number] called with component [facility] for procedure Troponin I, High Sensitivity with value 67,971 ng/L.     BNP   Date/Time Value Ref Range Status   11/10/2024 04:34 PM 13 0 - 99 pg/mL Final        Last I/O:  I/O last 3 completed shifts:  In: 4086.4 (43.8 mL/kg) [I.V.:2686.4 (28.8 mL/kg); Blood:1400]  Out: 4095 (43.8 mL/kg) [Urine:3155 (0.9 mL/kg/hr); Chest Tube:940]  Weight: 93.4 kg     Ejection Fractions:  EF   Date/Time Value Ref Range Status   11/16/2024 11:05 AM 34 %    11/15/2024 09:11 AM 31 %    11/12/2024 10:24 AM 63 %           Assessment/Plan   Assessment & Plan  STEMI (ST elevation  myocardial infarction) (Multi)    TREE (acute kidney injury) (CMS-HCC)    On intra-aortic balloon pump assist    Delirium    Cardiogenic shock (Multi)    Plan  - keep follow up with primary team  - intervention cardiology will be following up the balloon pump, with the primary team     I spent 50 minutes in the professional and overall care of this patient.      Malvin Reed MD

## 2024-11-17 NOTE — PROGRESS NOTES
CTICU Progress Note  Jaime Dominguez/[id number]    Admit Date: 11/10/2024  Hospital Length of Stay: 7   ICU Length of Stay: 4d 7h   CT SURGEON:     SUBJECTIVE:   OLIVIA. Pt slept overnight. CAM negative     MEDICATIONS  Infusions:  amiodarone, Last Rate: 1 mg/min (11/17/24 0700)  dexmedeTOMIDine, Last Rate: Stopped (11/17/24 0703)  EPINEPHrine, Last Rate: 0.04 mcg/kg/min (11/17/24 0700)  heparin, Last Rate: 900 Units/hr (11/17/24 0700)  norepinephrine, Last Rate: 0.04 mcg/kg/min (11/17/24 0700)  vasopressin, Last Rate: Stopped (11/15/24 0900)      Scheduled:  acetaminophen, 650 mg, q4h   Or  acetaminophen, 650 mg, q4h  aspirin, 81 mg, Daily  atorvastatin, 80 mg, Nightly  [Held by provider] gabapentin, 200 mg, TID  heparin, 4,000 Units, Once  insulin lispro, 0-5 Units, q4h  magnesium sulfate, 4 g, Once  oxygen, , Continuous - Inhalation  pantoprazole, 40 mg, Daily before breakfast   Or  pantoprazole, 40 mg, Daily before breakfast  perflutren lipid microspheres, 0.5-10 mL of dilution, Once in imaging  perflutren protein A microsphere, 0.5 mL, Once in imaging  perflutren protein A microsphere, 0.5 mL, Once in imaging  perflutren protein A microsphere, 0.5 mL, Once in imaging  polyethylene glycol, 17 g, BID  polyethylene glycol, 17 g, Daily  sennosides-docusate sodium, 2 tablet, BID  sulfur hexafluoride microsphr, 2 mL, Once in imaging  sulfur hexafluoride microsphr, 2 mL, Once in imaging  sulfur hexafluoride microsphr, 2 mL, Once in imaging  ticagrelor, 90 mg, BID      PRN:  calcium gluconate, 1 g, q6h PRN  calcium gluconate, 2 g, q6h PRN  fentaNYL, 25 mcg, q5 min PRN  HYDROmorphone, 0.2 mg, q2h PRN  HYDROmorphone, 0.2 mg, q15 min PRN  magnesium sulfate, 2 g, q6h PRN  magnesium sulfate, 4 g, q6h PRN  melatonin, 5 mg, Nightly PRN  naloxone, 0.2 mg, q5 min PRN  naloxone, 0.2 mg, q5 min PRN  ondansetron, 4 mg, q8h PRN   Or  ondansetron, 4 mg, q8h PRN  oxyCODONE, 10 mg, q4h PRN  oxyCODONE, 5 mg, q4h PRN  oxyCODONE, 5 mg, q4h  PRN  potassium chloride CR, 20 mEq, q6h PRN   Or  potassium chloride, 20 mEq, q6h PRN  potassium chloride CR, 40 mEq, q6h PRN   Or  potassium chloride, 40 mEq, q6h PRN  potassium chloride, 20 mEq, q6h PRN  potassium chloride, 40 mEq, q6h PRN        PHYSICAL EXAM:   Visit Vitals  /59   Pulse 71   Temp 36.9 °C (98.4 °F)   Resp 18   Ht 1.829 m (6')   Wt 93.4 kg (205 lb 14.6 oz)   SpO2 92%   BMI 27.93 kg/m²   Smoking Status Never   BSA 2.18 m²     Wt Readings from Last 5 Encounters:   11/12/24 93.4 kg (205 lb 14.6 oz)   11/10/24 96.2 kg (212 lb)     INTAKE/OUTPUT:  I/O last 3 completed shifts:  In: 4086.4 (43.8 mL/kg) [I.V.:2686.4 (28.8 mL/kg); Blood:1400]  Out: 4095 (43.8 mL/kg) [Urine:3155 (0.9 mL/kg/hr); Chest Tube:940]  Weight: 93.4 kg        LDA:  CVC 11/12/24 Double lumen Right Internal jugular (Active)   Placement Date/Time: 11/12/24 (c) 1506   Hand Hygiene Performed Prior to CVC Insertion: Yes  Site Prep: Chlorhexidine   Site Prep Agent has Completely Dried Before Insertion: Yes  All 5 Sterile Barriers Used (Gloves, Gown, Cap, Mask, Large Sterile Ivone...   Number of days: 0       Arterial Line 11/12/24 Right (Active)   Placement Date/Time: 11/12/24 1900   Orientation: Right  Placed by: OR   Number of days: 0       Pacer Wires (Active)   Placement Date/Time: 11/12/24 2320   Placed by: Dr. Aldo Harman  Type of Pacing Wires: Ventricular   Number of days: 0       ETT  8.5 mm (Active)   Placement Date/Time: 11/12/24 (c) 0028   Mask Ventilation: Not attempted  Technique: Exchange catheter  ETT Type: ETT - single  Single Lumen Tube Size: 8.5 mm  Cuffed: Yes  Location: Oral  Airway Insertion Attempts: 1  Placement Verification: Ausculta...   Number of days: 1       Urethral Catheter Temperature probe 16 Fr. (Active)   Placement Date/Time: 11/12/24 1452   Placed by: BRIANDA DENIS  Hand Hygiene Completed: Yes  Catheter Type: Temperature probe  Tube Size (Fr.): 16 Fr.  Catheter Balloon Size: 10 mL  Urine Returned: Yes    Number of days: 0       Chest Tube 1 Left Pleural (Active)   Placement Date: 11/13/24   Tube Number: 1  Chest Tube Orientation: Left  Chest Tube Location: Pleural   Number of days: 0       NG/OG/Feeding Tube Center mouth (Active)   Placement Date: 11/13/24   Tube Location: Center mouth   Number of days: 0       Y Chest Tube 1 and 2 Mediastinal Mediastinal (Active)   Placement Date: 11/13/24   Chest Tube Location 1: Mediastinal  Chest Tube Location 2: Mediastinal   Number of days: 0         Vent settings:       Physical Exam:   Constitutional: Intubated and sedated on mechanical ventilation in NAD   Neuro: Neuro intact without obvious focal deficits, on sedation.  PERRL  CV: RRR.  S1S2.  SR on monitor.  AV wires set [id number]  Pulm: CTAB .  CT's with appropriate serosang output and no airleak.  : clear yellow urine fvia aden. Scrotal edema   GI: S/ND/NT. Hypoactive BS. OGT in place with bilious output.  Extremities: NV exam intact x 4.  No edema. Cannula site not oozing.   Skin: WDI.  Postop dressings intact.  Chest tube dressings intact.    Psych: DELONTE, sedated      Images:     Invasive Hemodynamics:    Most Recent Range Past 24hrs   BP (Art) 122/45 Arterial Line BP 1  Min: 86/36  Max: 145/59   MAP(Art) 74 mmHg Arterial Line MAP 1 (mmHg)   Min: 55 mmHg  Max: 92 mmHg   RA/CVP   No data recorded   PA 18/9 PAP  Min: 13/7  Max: 28/11   PA(mean) 13 mmHg PAP (Mean)  Min: 8 mmHg  Max: 20 mmHg   CO   No data recorded   CI   No data recorded   Mixed Venous   No data recorded   SVR    No data recorded     If Devices present, use phrases:    For LVAD, .lhcticulvad   For ECMO, .lhcticuecmo  For Impella, .lhcticuimpella    Daily Risk Screen:  Hemodynamic monitoring  critically ill patient who need accurate urinary output measurements  they are planned for extubation trial later today/tonight      Assessment/Plan   Assessment: Patient is a 74 year old male with a PMH significant for basal cell carcinoma and HPL  s/p MidCAB x2  converted to full sternotomy w/ LIMA prolonged as a Y graft with a radial to LAD OM w/ Dr. Aldo Harman and Dr. Stanley. CPB time 347min. Course c/b increasing pressor requirement and echo revealing apical hypo-akinesis with subsequent IABP placement on 11/14 for further support. Overnight on 11/14, Troponin elevated and ST elevation in anteroseptal leads, c/f ACS and decision made to [facility]. Now s/p PCI of mid LAD with HEATHER. Pt cannulated on VA ECMO for cardiogenic shock state 2/2 multiple failed grafts.      Plan:  NEURO:  No PMH. Pt asleep this AM. Intermittent precedex overnight -->  - prn precedex gtt for sedation/anxiety   - Serial neuro and pain assessments   - Scheduled IV Tylenol   - PRN oxycodone  - PRN dilaudid for pain   - PT Consult  - CAM ICU score qshift  - passive ROM  - Sleep/wake cycle hygiene       CV:  Patient has a history of HLD. Is now status post MIDCABG x 2 with conversion to full sternotomy LIMA prolonged as a Y graft with a radial to LAD OM. Pre/Post EF: normal BIV Arrived to CTICU on epi 0.04. A/V epicardial wires set VVI @ 60.Course c/b increasing pressor requirement and echo revealing apical hypo-akinesis with subsequent IABP placement on 11/14 for further support. Troponin elevated and ST elevation in anteroseptal leads, c/f ACS and decision made to [facility] 11/14. Now s/p PCI of mid LAD with HEATHER. Pt cannulated on VA ECMO for cardiogenic shock state 2/2 multiple failed grafts 11/14. TTE yesterday s/f EF 31%, abnormal ventricular wall motion, reduced RV function, abnormal, apex, mis and apical anterior septum and mid and apical inferior septum. CT 11/15 s/f  multiple mediastinal hematomas, the largest measuring up to 6.8cm as seen just superolateral to the left ventricle. Repeat TTE 11/16 s/f EF 34%, pericardial effusion and echodense material overlying RV, no tamponade physiology-->  - IABP 1:1 setting, EKG trigger   - transition from amio gtt to PO amio 400mg BID   - Planned ECMO turn down--> will  reduced flow by 0.5 and recheck SVO2 (today 59)  - TTE tomorrow   - Wean levo while maintaining MAP 70-90  - wean epi for SVO2 >65  - Volume resuscitate as clinically indicated  - Maintain epicardial wires set VVI @ 40  - Ticagrelor BID with aspirin   -c/w statin     ECMO:     Most Recent Range Past 24hrs   Flow 3.39 Patient Flow (L/min)  Min: 3.3  Max: 3.67   Speed 2580 Circuit Flow (RPM)  Min: 2580  Max: 2580   Sweep 0.5 Sweep Gas Flow Rate (L/min)  Min: 0.5  Max: 0.5    FiO2 100%    PULM:  No history of pulmonary disease.  Chest tubes 2x meds and 1 L pleural. Output minimal-->  - Cxr s/f increased interstitial edema and pleural effusion L>R  - On 4L NC. Wean FiO2 maintaining SpO2 >92%.   - IS q1h and OOB to chair when extubated  - Chest tubes to wall suction. Leave in place until pt further mobilizes      GI:  no PMH.-->   - on cardiac diet  - Colace/senna BID and miralax BID  - PPI     :  CSA-TREE Risk Score low.  No history of renal disease, baseline creatinine 0.87. Creatinine stable post-op. Aden in place and making adequate UOP (50-100cc/h). -->  - Continue aden catheter for strict I/Os.  - Goal UOP 0.5ml/kg/hr  - RFP as clinically indicated  - Replete electrolytes per CTICU protocol       ENDO: No PMH. A1c: 5.1-->  - Maintain BG <180, insulin per CTICU protocol     HEME:  Acute blood loss anemia.Overnight 11/15, oozing noted from arterial cannula of ECMO site. CTS aware and fixed cannula positioning. Pt received total 5upRBC yesterday. 1upRBC given overnight. CT scan s/f heterogeneous hyperdense collection in the left inguinal region suspicious for hematoma. Hgb 7.9  -->    - Monitor drain output volume and characteristics  - CBC, coags, and fibrinogen post op and as clinically indicated  - C/w ASA   - Ticagrelor BID  - On low dose heparin gtt ; assay 0.2  - SCDs for DVT prophylaxis.  - Last type and screen: 11/14     ID:  Febrile. MRSA negative.WBC downtrending -->  - sent cxs- Blood cx NGTD  - continue  to monitor WBC  - Trend temp q4h     Skin:  No active skin issues.  - scrotal edema- will elevate   - L groin cannula site soft and dry (no oozing), no concern for compartment syndrome ( CK and myoglobin downtrend)  - preventative Mepilex dressings in place on sacrum and heels  - change preventative Mepilex weekly or more frequently as indicated (when moist/soiled)   - every shift skin assessment per nursing and weekly ICU skin rounds  - moisture barrier to be applied with sandro care  - active skin problems addressed with nursing on daily rounds     Proph:  SCDs  PPI  Hep gtt     G:  Line  Right IJ MAC w Minimac placed 11/12  R radial a-line placed 11/16  Hale 11/12     The indications and risks/benefits of non-violent/non self-destructive restraints were discussed.      F: Family: will update at bedside.      A,B,C,D,E,F,G: reviewed     Seen and discussed with ICU attending Dr. Bonilla     Dispo: CTICU care for now.    CTICU TEAM PHONE [phone]       Restraints: The indications and risks/benefits of non-violent/non self-destructive restraints were discussed and are indicated for the safety of the patient.    Code status: Full Code      Rachael Roa MD  PGY-4/ CA-3  Dept. of Anesthesiology and Perioperative Medicine

## 2024-11-17 NOTE — PROGRESS NOTES
Subjective Data:  Remains on epi 0.04 and norepi 0.04.   Remains on VA ECMO and IABP 1:1 support.   Some SOB. No other acute complaints.      Objective Data:  Last Recorded Vitals:  Vitals:    11/17/24 1000 11/17/24 1015 11/17/24 1030 11/17/24 1100   BP:       Pulse: 73 82 75 79   Resp: 24 16 21 18   Temp: 37 °C (98.6 °F) 37 °C (98.6 °F) 37 °C (98.6 °F) 36.9 °C (98.4 °F)   TempSrc:       SpO2: 100% 94% 96% 98%   Weight:       Height:           Last Labs:  CBC - 11/17/2024: 12:03 AM  17.8 7.9 66    23.3      CMP - 11/17/2024:  3:17 AM  7.3 5.9 180 --- 0.9   2.2 2.8 35 21      PTT - 11/15/2024:  9:16 PM  1.3   14.1 36     TROPHS   Date/Time Value Ref Range Status   11/15/2024 12:28 PM 44,705 0 - 53 ng/L Final     Comment:     Previous result verified on 11/15/2024 1020 on specimen/case [id number] called with component TRPHS for procedure Troponin I, High Sensitivity with value 67,971 ng/L.   11/15/2024 01:42 AM 67,971 0 - 53 ng/L Final   11/14/2024 03:54 AM 73,314 0 - 53 ng/L Final     Comment:     Previous result verified on 11/13/2024 2154 on specimen/case [id number] called with component TRPHS for procedure Troponin I, High Sensitivity with value 45,685 ng/L.   11/08/2024 04:12 PM 4 0 - 20 ng/L Final     BNP   Date/Time Value Ref Range Status   11/10/2024 04:34 PM 13 0 - 99 pg/mL Final     HGBA1C   Date/Time Value Ref Range Status   11/08/2024 04:12 PM 5.1 See comment % Final     LDLCALC   Date/Time Value Ref Range Status   11/08/2024 04:12  <=99 mg/dL Final     Comment:                                 Near   Borderline      AGE      Desirable  Optimal    High     High     Very High     0-19 Y     0 - 109     ---    110-129   >/= 130     ----    20-24 Y     0 - 119     ---    120-159   >/= 160     ----      >24 Y     0 -  99   100-129  130-159   160-189     >/=190       VLDL   Date/Time Value Ref Range Status   11/08/2024 04:12 PM 64 0 - 40 mg/dL Final      Last I/O:  I/O last 3 completed  shifts:  In: 4086.4 (43.8 mL/kg) [I.V.:2686.4 (28.8 mL/kg); Blood:1400]  Out: 4095 (43.8 mL/kg) [Urine:3155 (0.9 mL/kg/hr); Chest Tube:940]  Weight: 93.4 kg     Echo:  Transthoracic Echo (TTE) Limited 11/16/2024  CONCLUSIONS:  1. Multiple segmental abnormalities exist. See findings.  2. Left ventricular ejection fraction is moderately decreased, calculated by Lambert's biplane at 34%.  3. Abnormal left venticular wall motion.  4. No left ventricular thrombus visualized.  5. There is reduced right ventricular systolic function.  6. There apperas to be a trivial pericardial effusion, however thre is also a oderate layer of echodense material overlying the RV of unclear etiology. NO obivous RA or RV collapse though not well seen and MV and V inflows not assessed for respiratory variation ( pt in afib so unable to assess given variable RR intervals) and IVC not well seen. Overall unable to determine hemodyanic significance of the material overlyin the RV.  7. Right ventricular systolic pressure is within normal limits.  8. Moderately dilated aortic root.  9. There is LIV of the subvalvular apparatus and MV leaflets of unclear significance. LVOT gradinets not assessed nor was there color Doppler on the LVOT to see if there were acceleration of flow to suggest obstructiom. ( Does not appear to have LIV -septal contact on 2D images).  10. Compared with the prior exam from 11/15/2024, there are no significant changes. The LAD territory wall motion abnormality appears to be similar. LIV has been present on prior studies.     Ejection Fractions:  EF   Date/Time Value Ref Range Status   11/16/2024 11:05 AM 34 %    11/15/2024 09:11 AM 31 %    11/12/2024 10:24 AM 63 %      Inpatient Medications:  Scheduled medications   Medication Dose Route Frequency    acetaminophen  650 mg oral q4h    Or    acetaminophen  650 mg rectal q4h    amiodarone  400 mg oral BID    aspirin  81 mg oral Daily    atorvastatin  80 mg oral Nightly    [Held  by provider] gabapentin  200 mg oral TID    insulin lispro  0-5 Units subcutaneous q4h    oxygen   inhalation Continuous - Inhalation    pantoprazole  40 mg oral Daily before breakfast    polyethylene glycol  17 g oral BID    potassium, sodium phosphates  1 packet oral 4x daily    sennosides-docusate sodium  2 tablet oral BID    ticagrelor  90 mg oral BID     PRN medications   Medication    calcium gluconate    calcium gluconate    fentaNYL    HYDROmorphone    magnesium sulfate    magnesium sulfate    melatonin    naloxone    naloxone    ondansetron    Or    ondansetron    oxyCODONE    oxyCODONE    potassium chloride CR    Or    potassium chloride    potassium chloride CR    Or    potassium chloride    potassium chloride    potassium chloride     Continuous Medications   Medication Dose Last Rate    amiodarone  0.5 mg/min      EPINEPHrine  0.04 mcg/kg/min 0.04 mcg/kg/min (11/17/24 1100)    heparin  0-4,000 Units/hr 900 Units/hr (11/17/24 1100)    norepinephrine  0-1 mcg/kg/min 0.04 mcg/kg/min (11/17/24 1100)     Physical Exam:  GEN: frail appearing male, NAD.  CV: irregularly irregular, no m/r/g.   LUNGS: no respiratory distress on NC.  ABD: Soft, NT/ND.  SKIN: Warm, well perfused. LE edema: mild pitting edema  MSK: No deformities.  EXT: IABP and VA ECMO in place  NEURO: No focal deficits.     Assessment/Plan   Jaime Dominguez is a 74 year old male with a PMH significant for basal cell carcinoma and HLD who initially presented to [facility] ED on 11/8 complaining of left sided chest pain, diaphoresis, SOB nausea and syncope. ECG showed ST elevation in the inferior leads and ST depression in anterior septal leads. LHC showed 90% occlusion of the LAD. Transferred to [facility] on 11/10 for cardiac surgery workup.  He is now s/p MidCAB x2 converted to full sternotomy w/ LIMA prolonged as a Y graft with a radial to LAD OM w/ Dr. Aldo Harman and Dr. Stanley. CPB time 347min. Course c/b increasing pressor requirement and echo revealing  apical hypo-akinesis with subsequent IABP placement on 11/14 for further support. Overnight on 11/14, Troponin elevated and ST elevation in anteroseptal leads, c/f ACS and decision made to [facility]. Now s/p PCI of mid LAD with HEATHER. Pt cannulated on VA ECMO for cardiogenic shock state 2/2 multiple failed grafts. HF was engaged for multidisciplinary decision making regarding cardiogenic shock.      Acute HFrEF  ICM  CAD s/p CABG and PCI   Cardiogenic shock s/p VA ECMO and IABP   - TTE 11/16: LVEF 31%, abnormal wall motion; reduced RVSF.   - On epi and norepi. Wean norepi as able.  - Mechanical support: VA ECMO, IABP. Plan for ECMO turndown trail on Monday per primary team with ECHO. May consider Impella for support after decannulation.   - Diuresis: IV diuresis per primary, could use additional diuresis per exam today.   - GDMT on hold with current MCS  - Mechanical support: VA ECMO, IABP  - Strict I/Os, Daily Na < 2g daily, fluid restriction.  - Could be considered for advanced therapies. Age prohibits from OHT, however, could be considered for LVAD evaluation if necessary.      For any questions or concerns, feel free to reach out via [employer] chat or page at [phone].This plan was discussed with Dr. Doug Castro, HF attending.     Kira Alvares DO  HF Fellow

## 2024-11-18 ENCOUNTER — APPOINTMENT (OUTPATIENT)
Dept: RADIOLOGY | Facility: HOSPITAL | Age: 74
DRG: 003 | End: 2024-11-18
Payer: MEDICARE

## 2024-11-18 ENCOUNTER — APPOINTMENT (OUTPATIENT)
Dept: CARDIOLOGY | Facility: HOSPITAL | Age: 74
DRG: 003 | End: 2024-11-18
Payer: MEDICARE

## 2024-11-18 ENCOUNTER — ANESTHESIA EVENT (OUTPATIENT)
Dept: OPERATING ROOM | Facility: HOSPITAL | Age: 74
End: 2024-11-18
Payer: MEDICARE

## 2024-11-18 VITALS
HEART RATE: 80 BPM | RESPIRATION RATE: 16 BRPM | HEIGHT: 72 IN | BODY MASS INDEX: 27.89 KG/M2 | TEMPERATURE: 99 F | OXYGEN SATURATION: 98 % | SYSTOLIC BLOOD PRESSURE: 115 MMHG | DIASTOLIC BLOOD PRESSURE: 49 MMHG | WEIGHT: 205.91 LBS

## 2024-11-18 PROBLEM — J94.2 HEMOTHORAX ON LEFT: Status: ACTIVE | Noted: 2024-11-10

## 2024-11-18 LAB
ABO GROUP (TYPE) IN BLOOD: NORMAL
ALBUMIN SERPL BCP-MCNC: 2.5 G/DL (ref 3.4–5)
ALBUMIN SERPL BCP-MCNC: 2.6 G/DL (ref 3.4–5)
ANION GAP BLDMV CALCULATED.4IONS-SCNC: 6 MMO/L (ref 10–25)
ANION GAP BLDMV CALCULATED.4IONS-SCNC: 6 MMO/L (ref 10–25)
ANION GAP SERPL CALC-SCNC: 10 MMOL/L (ref 10–20)
ANION GAP SERPL CALC-SCNC: 13 MMOL/L (ref 10–20)
ANTIBODY SCREEN: NORMAL
APPEARANCE UR: CLEAR
BASE EXCESS BLDMV CALC-SCNC: 3.3 MMOL/L (ref -2–3)
BASE EXCESS BLDMV CALC-SCNC: 3.3 MMOL/L (ref -2–3)
BILIRUB UR STRIP.AUTO-MCNC: NEGATIVE MG/DL
BLOOD EXPIRATION DATE: NORMAL
BODY SURFACE AREA: 2.19 M2
BODY TEMPERATURE: 37 DEGREES CELSIUS
BODY TEMPERATURE: 37 DEGREES CELSIUS
BUN SERPL-MCNC: 15 MG/DL (ref 6–23)
BUN SERPL-MCNC: 18 MG/DL (ref 6–23)
CA-I BLD-SCNC: 1.05 MMOL/L (ref 1.1–1.33)
CA-I BLD-SCNC: 1.1 MMOL/L (ref 1.1–1.33)
CA-I BLDMV-SCNC: 1.06 MMOL/L (ref 1.1–1.33)
CA-I BLDMV-SCNC: 1.09 MMOL/L (ref 1.1–1.33)
CALCIUM SERPL-MCNC: 7.1 MG/DL (ref 8.6–10.6)
CALCIUM SERPL-MCNC: 7.2 MG/DL (ref 8.6–10.6)
CHLORIDE BLD-SCNC: 101 MMOL/L (ref 98–107)
CHLORIDE BLD-SCNC: 104 MMOL/L (ref 98–107)
CHLORIDE SERPL-SCNC: 100 MMOL/L (ref 98–107)
CHLORIDE SERPL-SCNC: 99 MMOL/L (ref 98–107)
CO2 SERPL-SCNC: 25 MMOL/L (ref 21–32)
CO2 SERPL-SCNC: 28 MMOL/L (ref 21–32)
COLOR UR: YELLOW
CREAT SERPL-MCNC: 0.66 MG/DL (ref 0.5–1.3)
CREAT SERPL-MCNC: 0.68 MG/DL (ref 0.5–1.3)
DISPENSE STATUS: NORMAL
EGFRCR SERPLBLD CKD-EPI 2021: >90 ML/MIN/1.73M*2
EGFRCR SERPLBLD CKD-EPI 2021: >90 ML/MIN/1.73M*2
EJECTION FRACTION: 43 %
EJECTION FRACTION: 43 %
ERYTHROCYTE [DISTWIDTH] IN BLOOD BY AUTOMATED COUNT: 15.8 % (ref 11.5–14.5)
ERYTHROCYTE [DISTWIDTH] IN BLOOD BY AUTOMATED COUNT: 15.9 % (ref 11.5–14.5)
ERYTHROCYTE [DISTWIDTH] IN BLOOD BY AUTOMATED COUNT: 16.1 % (ref 11.5–14.5)
ERYTHROCYTE [DISTWIDTH] IN BLOOD BY AUTOMATED COUNT: 16.2 % (ref 11.5–14.5)
GLUCOSE BLD MANUAL STRIP-MCNC: 115 MG/DL (ref 74–99)
GLUCOSE BLD MANUAL STRIP-MCNC: 118 MG/DL (ref 74–99)
GLUCOSE BLD MANUAL STRIP-MCNC: 120 MG/DL (ref 74–99)
GLUCOSE BLD MANUAL STRIP-MCNC: 127 MG/DL (ref 74–99)
GLUCOSE BLD MANUAL STRIP-MCNC: 55 MG/DL (ref 74–99)
GLUCOSE BLD MANUAL STRIP-MCNC: 69 MG/DL (ref 74–99)
GLUCOSE BLD-MCNC: 105 MG/DL (ref 74–99)
GLUCOSE BLD-MCNC: 113 MG/DL (ref 74–99)
GLUCOSE SERPL-MCNC: 111 MG/DL (ref 74–99)
GLUCOSE SERPL-MCNC: 129 MG/DL (ref 74–99)
GLUCOSE UR STRIP.AUTO-MCNC: NORMAL MG/DL
HCO3 BLDMV-SCNC: 27.8 MMOL/L (ref 22–26)
HCO3 BLDMV-SCNC: 28.5 MMOL/L (ref 22–26)
HCT VFR BLD AUTO: 19.9 % (ref 41–52)
HCT VFR BLD AUTO: 21.1 % (ref 41–52)
HCT VFR BLD AUTO: 22.9 % (ref 41–52)
HCT VFR BLD AUTO: 23.6 % (ref 41–52)
HCT VFR BLD EST: 21 % (ref 41–52)
HCT VFR BLD EST: ABNORMAL %
HGB BLD-MCNC: 6.7 G/DL (ref 13.5–17.5)
HGB BLD-MCNC: 7.1 G/DL (ref 13.5–17.5)
HGB BLD-MCNC: 7.6 G/DL (ref 13.5–17.5)
HGB BLD-MCNC: 8 G/DL (ref 13.5–17.5)
HGB BLDMV-MCNC: 7.1 G/DL (ref 13.5–17.5)
HGB BLDMV-MCNC: <3 G/DL (ref 13.5–17.5)
HYALINE CASTS #/AREA URNS AUTO: ABNORMAL /LPF
INHALED O2 CONCENTRATION: 100 %
INHALED O2 CONCENTRATION: 100 %
KETONES UR STRIP.AUTO-MCNC: NEGATIVE MG/DL
LACTATE BLDMV-SCNC: 0.7 MMOL/L (ref 0.4–2)
LACTATE BLDMV-SCNC: 0.7 MMOL/L (ref 0.4–2)
LDH SERPL L TO P-CCNC: 370 U/L (ref 84–246)
LEUKOCYTE ESTERASE UR QL STRIP.AUTO: NEGATIVE
MAGNESIUM SERPL-MCNC: 2.03 MG/DL (ref 1.6–2.4)
MAGNESIUM SERPL-MCNC: 2.34 MG/DL (ref 1.6–2.4)
MCH RBC QN AUTO: 28.9 PG (ref 26–34)
MCH RBC QN AUTO: 29.5 PG (ref 26–34)
MCH RBC QN AUTO: 29.6 PG (ref 26–34)
MCH RBC QN AUTO: 29.8 PG (ref 26–34)
MCHC RBC AUTO-ENTMCNC: 33.2 G/DL (ref 32–36)
MCHC RBC AUTO-ENTMCNC: 33.6 G/DL (ref 32–36)
MCHC RBC AUTO-ENTMCNC: 33.7 G/DL (ref 32–36)
MCHC RBC AUTO-ENTMCNC: 33.9 G/DL (ref 32–36)
MCV RBC AUTO: 85 FL (ref 80–100)
MCV RBC AUTO: 88 FL (ref 80–100)
MCV RBC AUTO: 88 FL (ref 80–100)
MCV RBC AUTO: 89 FL (ref 80–100)
MRSA DNA SPEC QL NAA+PROBE: NOT DETECTED
MUCOUS THREADS #/AREA URNS AUTO: ABNORMAL /LPF
NITRITE UR QL STRIP.AUTO: NEGATIVE
NRBC BLD-RTO: 0.3 /100 WBCS (ref 0–0)
NRBC BLD-RTO: 0.6 /100 WBCS (ref 0–0)
NRBC BLD-RTO: 0.9 /100 WBCS (ref 0–0)
NRBC BLD-RTO: 1 /100 WBCS (ref 0–0)
OXYHGB MFR BLDMV: 56.3 % (ref 45–75)
OXYHGB MFR BLDMV: ABNORMAL %
PCO2 BLDMV: 41 MM HG (ref 41–51)
PCO2 BLDMV: 46 MM HG (ref 41–51)
PH BLDMV: 7.4 PH (ref 7.33–7.43)
PH BLDMV: 7.44 PH (ref 7.33–7.43)
PH UR STRIP.AUTO: 5.5 [PH]
PHOSPHATE SERPL-MCNC: 3.8 MG/DL (ref 2.5–4.9)
PHOSPHATE SERPL-MCNC: 4.8 MG/DL (ref 2.5–4.9)
PLATELET # BLD AUTO: 112 X10*3/UL (ref 150–450)
PLATELET # BLD AUTO: 114 X10*3/UL (ref 150–450)
PLATELET # BLD AUTO: 118 X10*3/UL (ref 150–450)
PLATELET # BLD AUTO: 87 X10*3/UL (ref 150–450)
PO2 BLDMV: 35 MM HG (ref 35–45)
PO2 BLDMV: 35 MM HG (ref 35–45)
POTASSIUM BLDMV-SCNC: 4.2 MMOL/L (ref 3.5–5.3)
POTASSIUM BLDMV-SCNC: 4.5 MMOL/L (ref 3.5–5.3)
POTASSIUM SERPL-SCNC: 4.2 MMOL/L (ref 3.5–5.3)
POTASSIUM SERPL-SCNC: 4.6 MMOL/L (ref 3.5–5.3)
PRODUCT BLOOD TYPE: 6200
PRODUCT CODE: NORMAL
PROT UR STRIP.AUTO-MCNC: ABNORMAL MG/DL
RBC # BLD AUTO: 2.25 X10*6/UL (ref 4.5–5.9)
RBC # BLD AUTO: 2.41 X10*6/UL (ref 4.5–5.9)
RBC # BLD AUTO: 2.57 X10*6/UL (ref 4.5–5.9)
RBC # BLD AUTO: 2.77 X10*6/UL (ref 4.5–5.9)
RBC # UR STRIP.AUTO: ABNORMAL /UL
RBC #/AREA URNS AUTO: ABNORMAL /HPF
RH FACTOR (ANTIGEN D): NORMAL
SAO2 % BLDMV: 58 % (ref 45–75)
SAO2 % BLDMV: ABNORMAL %
SODIUM BLDMV-SCNC: 131 MMOL/L (ref 136–145)
SODIUM BLDMV-SCNC: 134 MMOL/L (ref 136–145)
SODIUM SERPL-SCNC: 133 MMOL/L (ref 136–145)
SODIUM SERPL-SCNC: 133 MMOL/L (ref 136–145)
SP GR UR STRIP.AUTO: 1.02
SQUAMOUS #/AREA URNS AUTO: ABNORMAL /HPF
UFH PPP CHRO-ACNC: 0.3 IU/ML
UNIT ABO: NORMAL
UNIT NUMBER: NORMAL
UNIT RH: NORMAL
UNIT VOLUME: 350
UROBILINOGEN UR STRIP.AUTO-MCNC: NORMAL MG/DL
WBC # BLD AUTO: 18.8 X10*3/UL (ref 4.4–11.3)
WBC # BLD AUTO: 21.9 X10*3/UL (ref 4.4–11.3)
WBC # BLD AUTO: 22.9 X10*3/UL (ref 4.4–11.3)
WBC # BLD AUTO: 25.3 X10*3/UL (ref 4.4–11.3)
WBC #/AREA URNS AUTO: ABNORMAL /HPF
XM INTEP: NORMAL

## 2024-11-18 PROCEDURE — 85027 COMPLETE CBC AUTOMATED: CPT

## 2024-11-18 PROCEDURE — 97165 OT EVAL LOW COMPLEX 30 MIN: CPT | Mod: GO

## 2024-11-18 PROCEDURE — 2500000004 HC RX 250 GENERAL PHARMACY W/ HCPCS (ALT 636 FOR OP/ED)

## 2024-11-18 PROCEDURE — 33949 ECMO/ECLS DAILY MGMT ARTERY: CPT

## 2024-11-18 PROCEDURE — 93010 ELECTROCARDIOGRAM REPORT: CPT | Performed by: INTERNAL MEDICINE

## 2024-11-18 PROCEDURE — 2500000001 HC RX 250 WO HCPCS SELF ADMINISTERED DRUGS (ALT 637 FOR MEDICARE OP)

## 2024-11-18 PROCEDURE — 2500000005 HC RX 250 GENERAL PHARMACY W/O HCPCS: Performed by: NURSE PRACTITIONER

## 2024-11-18 PROCEDURE — 84132 ASSAY OF SERUM POTASSIUM: CPT

## 2024-11-18 PROCEDURE — 36430 TRANSFUSION BLD/BLD COMPNT: CPT

## 2024-11-18 PROCEDURE — 2500000002 HC RX 250 W HCPCS SELF ADMINISTERED DRUGS (ALT 637 FOR MEDICARE OP, ALT 636 FOR OP/ED)

## 2024-11-18 PROCEDURE — 85027 COMPLETE CBC AUTOMATED: CPT | Performed by: NURSE PRACTITIONER

## 2024-11-18 PROCEDURE — 99233 SBSQ HOSP IP/OBS HIGH 50: CPT | Performed by: INTERNAL MEDICINE

## 2024-11-18 PROCEDURE — 85520 HEPARIN ASSAY: CPT | Performed by: NURSE PRACTITIONER

## 2024-11-18 PROCEDURE — 2500000004 HC RX 250 GENERAL PHARMACY W/ HCPCS (ALT 636 FOR OP/ED): Performed by: NURSE PRACTITIONER

## 2024-11-18 PROCEDURE — 87040 BLOOD CULTURE FOR BACTERIA: CPT

## 2024-11-18 PROCEDURE — 93308 TTE F-UP OR LMTD: CPT | Performed by: INTERNAL MEDICINE

## 2024-11-18 PROCEDURE — P9016 RBC LEUKOCYTES REDUCED: HCPCS

## 2024-11-18 PROCEDURE — 84132 ASSAY OF SERUM POTASSIUM: CPT | Performed by: NURSE PRACTITIONER

## 2024-11-18 PROCEDURE — 82947 ASSAY GLUCOSE BLOOD QUANT: CPT

## 2024-11-18 PROCEDURE — 81001 URINALYSIS AUTO W/SCOPE: CPT

## 2024-11-18 PROCEDURE — 74176 CT ABD & PELVIS W/O CONTRAST: CPT | Performed by: RADIOLOGY

## 2024-11-18 PROCEDURE — 93005 ELECTROCARDIOGRAM TRACING: CPT

## 2024-11-18 PROCEDURE — 2500000005 HC RX 250 GENERAL PHARMACY W/O HCPCS

## 2024-11-18 PROCEDURE — 82330 ASSAY OF CALCIUM: CPT

## 2024-11-18 PROCEDURE — 99291 CRITICAL CARE FIRST HOUR: CPT

## 2024-11-18 PROCEDURE — C8924 2D TTE W OR W/O FOL W/CON,FU: HCPCS

## 2024-11-18 PROCEDURE — 71250 CT THORAX DX C-: CPT | Performed by: RADIOLOGY

## 2024-11-18 PROCEDURE — 36415 COLL VENOUS BLD VENIPUNCTURE: CPT

## 2024-11-18 PROCEDURE — 2500000004 HC RX 250 GENERAL PHARMACY W/ HCPCS (ALT 636 FOR OP/ED): Performed by: EMERGENCY MEDICINE

## 2024-11-18 PROCEDURE — 37799 UNLISTED PX VASCULAR SURGERY: CPT | Performed by: STUDENT IN AN ORGANIZED HEALTH CARE EDUCATION/TRAINING PROGRAM

## 2024-11-18 PROCEDURE — 99233 SBSQ HOSP IP/OBS HIGH 50: CPT | Performed by: NURSE PRACTITIONER

## 2024-11-18 PROCEDURE — 82330 ASSAY OF CALCIUM: CPT | Performed by: NURSE PRACTITIONER

## 2024-11-18 PROCEDURE — 83735 ASSAY OF MAGNESIUM: CPT

## 2024-11-18 PROCEDURE — 83615 LACTATE (LD) (LDH) ENZYME: CPT

## 2024-11-18 PROCEDURE — 2500000004 HC RX 250 GENERAL PHARMACY W/ HCPCS (ALT 636 FOR OP/ED): Performed by: STUDENT IN AN ORGANIZED HEALTH CARE EDUCATION/TRAINING PROGRAM

## 2024-11-18 PROCEDURE — 83735 ASSAY OF MAGNESIUM: CPT | Performed by: NURSE PRACTITIONER

## 2024-11-18 PROCEDURE — 71045 X-RAY EXAM CHEST 1 VIEW: CPT | Performed by: RADIOLOGY

## 2024-11-18 PROCEDURE — 86900 BLOOD TYPING SEROLOGIC ABO: CPT | Performed by: EMERGENCY MEDICINE

## 2024-11-18 PROCEDURE — 86923 COMPATIBILITY TEST ELECTRIC: CPT

## 2024-11-18 PROCEDURE — 71045 X-RAY EXAM CHEST 1 VIEW: CPT

## 2024-11-18 PROCEDURE — 84132 ASSAY OF SERUM POTASSIUM: CPT | Performed by: STUDENT IN AN ORGANIZED HEALTH CARE EDUCATION/TRAINING PROGRAM

## 2024-11-18 PROCEDURE — 2020000001 HC ICU ROOM DAILY

## 2024-11-18 PROCEDURE — 87640 STAPH A DNA AMP PROBE: CPT

## 2024-11-18 PROCEDURE — 74176 CT ABD & PELVIS W/O CONTRAST: CPT

## 2024-11-18 RX ORDER — DEXTROSE 50 % IN WATER (D50W) INTRAVENOUS SYRINGE
12.5 ONCE
Status: COMPLETED | OUTPATIENT
Start: 2024-11-18 | End: 2024-11-18

## 2024-11-18 RX ORDER — OXYCODONE HYDROCHLORIDE 5 MG/1
5 TABLET ORAL EVERY 4 HOURS PRN
Status: DISCONTINUED | OUTPATIENT
Start: 2024-11-18 | End: 2024-11-26

## 2024-11-18 RX ORDER — VANCOMYCIN HYDROCHLORIDE
1250 EVERY 12 HOURS
Status: DISCONTINUED | OUTPATIENT
Start: 2024-11-19 | End: 2024-11-20

## 2024-11-18 RX ORDER — SODIUM CHLORIDE, SODIUM LACTATE, POTASSIUM CHLORIDE, CALCIUM CHLORIDE 600; 310; 30; 20 MG/100ML; MG/100ML; MG/100ML; MG/100ML
5 INJECTION, SOLUTION INTRAVENOUS CONTINUOUS
Status: DISCONTINUED | OUTPATIENT
Start: 2024-11-18 | End: 2024-11-19

## 2024-11-18 RX ORDER — SODIUM CHLORIDE, SODIUM LACTATE, POTASSIUM CHLORIDE, CALCIUM CHLORIDE 600; 310; 30; 20 MG/100ML; MG/100ML; MG/100ML; MG/100ML
20 INJECTION, SOLUTION INTRAVENOUS CONTINUOUS
Status: ACTIVE | OUTPATIENT
Start: 2024-11-18 | End: 2024-11-19

## 2024-11-18 RX ORDER — OXYCODONE HYDROCHLORIDE 5 MG/1
2.5 TABLET ORAL EVERY 4 HOURS PRN
Status: DISCONTINUED | OUTPATIENT
Start: 2024-11-18 | End: 2024-11-26

## 2024-11-18 RX ORDER — VANCOMYCIN HYDROCHLORIDE 1 G/20ML
INJECTION, POWDER, LYOPHILIZED, FOR SOLUTION INTRAVENOUS DAILY PRN
Status: DISCONTINUED | OUTPATIENT
Start: 2024-11-18 | End: 2024-11-20

## 2024-11-18 RX ORDER — BISACODYL 10 MG/1
10 SUPPOSITORY RECTAL ONCE
Status: DISCONTINUED | OUTPATIENT
Start: 2024-11-18 | End: 2024-11-19

## 2024-11-18 RX ORDER — VANCOMYCIN 2 GRAM/500 ML IN 0.9 % SODIUM CHLORIDE INTRAVENOUS
2000 ONCE
Status: COMPLETED | OUTPATIENT
Start: 2024-11-18 | End: 2024-11-18

## 2024-11-18 RX ORDER — MAGNESIUM SULFATE HEPTAHYDRATE 40 MG/ML
2 INJECTION, SOLUTION INTRAVENOUS ONCE
Status: DISCONTINUED | OUTPATIENT
Start: 2024-11-18 | End: 2024-11-18

## 2024-11-18 RX ORDER — DEXTROSE 50 % IN WATER (D50W) INTRAVENOUS SYRINGE
Status: COMPLETED
Start: 2024-11-18 | End: 2024-11-18

## 2024-11-18 RX ADMIN — TICAGRELOR 90 MG: 90 TABLET ORAL at 10:51

## 2024-11-18 RX ADMIN — AMIODARONE HYDROCHLORIDE 400 MG: 200 TABLET ORAL at 10:52

## 2024-11-18 RX ADMIN — SODIUM CHLORIDE, POTASSIUM CHLORIDE, SODIUM LACTATE AND CALCIUM CHLORIDE 5 ML/HR: 600; 310; 30; 20 INJECTION, SOLUTION INTRAVENOUS at 07:00

## 2024-11-18 RX ADMIN — AMIODARONE HYDROCHLORIDE 400 MG: 200 TABLET ORAL at 20:14

## 2024-11-18 RX ADMIN — CALCIUM GLUCONATE 2 G: 20 INJECTION, SOLUTION INTRAVENOUS at 14:19

## 2024-11-18 RX ADMIN — OXYCODONE HYDROCHLORIDE 5 MG: 5 TABLET ORAL at 12:19

## 2024-11-18 RX ADMIN — ACETAMINOPHEN 650 MG: 325 TABLET ORAL at 01:40

## 2024-11-18 RX ADMIN — Medication 4 L/MIN: at 20:23

## 2024-11-18 RX ADMIN — TICAGRELOR 90 MG: 90 TABLET ORAL at 20:13

## 2024-11-18 RX ADMIN — NOREPINEPHRINE BITARTRATE 0.08 MCG/KG/MIN: 8 INJECTION, SOLUTION INTRAVENOUS at 06:44

## 2024-11-18 RX ADMIN — AMIODARONE HYDROCHLORIDE 0.5 MG/MIN: 1.8 INJECTION, SOLUTION INTRAVENOUS at 23:16

## 2024-11-18 RX ADMIN — PANTOPRAZOLE SODIUM 40 MG: 40 TABLET, DELAYED RELEASE ORAL at 10:56

## 2024-11-18 RX ADMIN — HEPARIN SODIUM 900 UNITS/HR: 10000 INJECTION, SOLUTION INTRAVENOUS at 03:41

## 2024-11-18 RX ADMIN — AMIODARONE HYDROCHLORIDE 1 MG/MIN: 1.8 INJECTION, SOLUTION INTRAVENOUS at 17:05

## 2024-11-18 RX ADMIN — SODIUM CHLORIDE, POTASSIUM CHLORIDE, SODIUM LACTATE AND CALCIUM CHLORIDE 20 ML/HR: 600; 310; 30; 20 INJECTION, SOLUTION INTRAVENOUS at 07:00

## 2024-11-18 RX ADMIN — ASPIRIN 81 MG 81 MG: 81 TABLET ORAL at 10:51

## 2024-11-18 RX ADMIN — DEXMEDETOMIDINE HYDROCHLORIDE 0.6 MCG/KG/HR: 400 INJECTION INTRAVENOUS at 04:08

## 2024-11-18 RX ADMIN — DEXTROSE MONOHYDRATE 12.5 G: 25 INJECTION, SOLUTION INTRAVENOUS at 21:04

## 2024-11-18 RX ADMIN — NOREPINEPHRINE BITARTRATE 0.08 MCG/KG/MIN: 8 INJECTION, SOLUTION INTRAVENOUS at 08:30

## 2024-11-18 RX ADMIN — SENNOSIDES AND DOCUSATE SODIUM 2 TABLET: 50; 8.6 TABLET ORAL at 20:14

## 2024-11-18 RX ADMIN — PERFLUTREN 10 ML OF DILUTION: 6.52 INJECTION, SUSPENSION INTRAVENOUS at 08:30

## 2024-11-18 RX ADMIN — Medication 2000 MG: at 20:51

## 2024-11-18 RX ADMIN — OXYCODONE HYDROCHLORIDE 5 MG: 5 TABLET ORAL at 20:13

## 2024-11-18 RX ADMIN — PIPERACILLIN SODIUM AND TAZOBACTAM SODIUM 4.5 G: 4; .5 INJECTION, SOLUTION INTRAVENOUS at 19:52

## 2024-11-18 RX ADMIN — AMIODARONE HYDROCHLORIDE 150 MG: 1.5 INJECTION, SOLUTION INTRAVENOUS at 16:20

## 2024-11-18 RX ADMIN — POLYETHYLENE GLYCOL 3350 17 G: 17 POWDER, FOR SOLUTION ORAL at 20:14

## 2024-11-18 RX ADMIN — DEXTROSE 50 % IN WATER (D50W) INTRAVENOUS SYRINGE 12.5 G: at 21:04

## 2024-11-18 RX ADMIN — ACETAMINOPHEN 650 MG: 325 TABLET ORAL at 05:30

## 2024-11-18 RX ADMIN — MAGNESIUM SULFATE HEPTAHYDRATE 2 G: 40 INJECTION, SOLUTION INTRAVENOUS at 06:16

## 2024-11-18 ASSESSMENT — PAIN SCALES - GENERAL
PAINLEVEL_OUTOF10: 0 - NO PAIN
PAINLEVEL_OUTOF10: 0 - NO PAIN
PAINLEVEL_OUTOF10: 5 - MODERATE PAIN
PAINLEVEL_OUTOF10: 0 - NO PAIN
PAINLEVEL_OUTOF10: 0 - NO PAIN
PAINLEVEL_OUTOF10: 1
PAINLEVEL_OUTOF10: 0 - NO PAIN

## 2024-11-18 ASSESSMENT — PAIN DESCRIPTION - ORIENTATION: ORIENTATION: LEFT

## 2024-11-18 ASSESSMENT — ACTIVITIES OF DAILY LIVING (ADL)
BATHING_ASSISTANCE: MODERATE
ADL_ASSISTANCE: INDEPENDENT

## 2024-11-18 ASSESSMENT — PAIN - FUNCTIONAL ASSESSMENT
PAIN_FUNCTIONAL_ASSESSMENT: 0-10

## 2024-11-18 ASSESSMENT — COGNITIVE AND FUNCTIONAL STATUS - GENERAL
DRESSING REGULAR LOWER BODY CLOTHING: A LOT
DAILY ACTIVITIY SCORE: 15
PERSONAL GROOMING: A LITTLE
DRESSING REGULAR UPPER BODY CLOTHING: A LITTLE
HELP NEEDED FOR BATHING: A LOT
TOILETING: TOTAL

## 2024-11-18 ASSESSMENT — PAIN DESCRIPTION - LOCATION: LOCATION: CHEST

## 2024-11-18 NOTE — PROGRESS NOTES
Spiritual Care Visit    Clinical Encounter Type  Visited With: Patient  Routine Visit: Introduction  Continue Visiting: Yes  Referral From: Family  Referral To:     Confucianist Encounters  Confucianist Needs: Prayer         Sacramental Encounters  Sacrament of Sick-Anointing: Anointed    Patient received the Sacrament of the Anointing of the Sick by Fr. Davion Bro,  Congregation .      Within the Congregation [employer] the Sacrament of the Sick, also known as the Anointing of the Sick, is a prayer in which we invoke the healing power of God.  Although considered part of 'Last Rites' the Anointing of the Sick, along with Eucharist and Reconciliation, is a repeatable sacrament and should be received by anyone about to undergo surgery, those in recovery and the elderly.  Those who are actively dying should receive the Anointing of the Sick for physical and spiritual healing.

## 2024-11-18 NOTE — PROGRESS NOTES
Subjective Data:  Increased pressor demands overnight. Drop in Hgb and getting transfused. Remains on epi 0.04 and norepi 0.08.   Remains on VA ECMO and IABP 1:1 support.   No acute complaints.      Objective Data:  Last Recorded Vitals:  Vitals:    11/18/24 0600 11/18/24 0647 11/18/24 0700 11/18/24 0702   BP:  (!) 81/42  (!) 110/39   Pulse: 83 82 81 79   Resp: 18 16 21 14   Temp: 36.8 °C (98.2 °F) 36.5 °C (97.7 °F) 36.9 °C (98.4 °F) 36.9 °C (98.4 °F)   TempSrc:  Core  Core   SpO2: 97% 97% 96% 95%   Weight: 94 kg (207 lb 3.7 oz)      Height:           Last Labs:  CBC - 11/18/2024:  2:13 AM  18.8 6.7 87    19.9      CMP - 11/18/2024:  2:13 AM  7.1 5.9 180 --- 0.9   3.8 2.5 35 21      PTT - 11/15/2024:  9:16 PM  1.3   14.1 36     TROPHS   Date/Time Value Ref Range Status   11/15/2024 12:28 PM 44,705 0 - 53 ng/L Final     Comment:     Previous result verified on 11/15/2024 1020 on specimen/case [id number] called with component TRPHS for procedure Troponin I, High Sensitivity with value 67,971 ng/L.   11/15/2024 01:42 AM 67,971 0 - 53 ng/L Final   11/14/2024 03:54 AM 73,314 0 - 53 ng/L Final     Comment:     Previous result verified on 11/13/2024 2154 on specimen/case [id number] called with component TRPHS for procedure Troponin I, High Sensitivity with value 45,685 ng/L.   11/08/2024 04:12 PM 4 0 - 20 ng/L Final     BNP   Date/Time Value Ref Range Status   11/10/2024 04:34 PM 13 0 - 99 pg/mL Final     HGBA1C   Date/Time Value Ref Range Status   11/08/2024 04:12 PM 5.1 See comment % Final     LDLCALC   Date/Time Value Ref Range Status   11/08/2024 04:12  <=99 mg/dL Final     Comment:                                 Near   Borderline      AGE      Desirable  Optimal    High     High     Very High     0-19 Y     0 - 109     ---    110-129   >/= 130     ----    20-24 Y     0 - 119     ---    120-159   >/= 160     ----      >24 Y     0 -  99   100-129  130-159   160-189     >/=190       VLDL   Date/Time  Value Ref Range Status   11/08/2024 04:12 PM 64 0 - 40 mg/dL Final      Last I/O:  I/O last 3 completed shifts:  In: 2879.8 (30.6 mL/kg) [I.V.:1729.8 (18.4 mL/kg); Blood:1050; IV Piggyback:100]  Out: 4265 (45.4 mL/kg) [Urine:2625 (0.8 mL/kg/hr); Chest Tube:1640]  Weight: 94 kg     Echo:  Transthoracic Echo (TTE) Limited 11/16/2024  CONCLUSIONS:  1. Multiple segmental abnormalities exist. See findings.  2. Left ventricular ejection fraction is moderately decreased, calculated by Lambert's biplane at 34%.  3. Abnormal left venticular wall motion.  4. No left ventricular thrombus visualized.  5. There is reduced right ventricular systolic function.  6. There apperas to be a trivial pericardial effusion, however thre is also a oderate layer of echodense material overlying the RV of unclear etiology. NO obivous RA or RV collapse though not well seen and MV and V inflows not assessed for respiratory variation ( pt in afib so unable to assess given variable RR intervals) and IVC not well seen. Overall unable to determine hemodyanic significance of the material overlyin the RV.  7. Right ventricular systolic pressure is within normal limits.  8. Moderately dilated aortic root.  9. There is LIV of the subvalvular apparatus and MV leaflets of unclear significance. LVOT gradinets not assessed nor was there color Doppler on the LVOT to see if there were acceleration of flow to suggest obstructiom. ( Does not appear to have LIV -septal contact on 2D images).  10. Compared with the prior exam from 11/15/2024, there are no significant changes. The LAD territory wall motion abnormality appears to be similar. LIV has been present on prior studies.     Ejection Fractions:  EF   Date/Time Value Ref Range Status   11/16/2024 11:05 AM 34 %    11/15/2024 09:11 AM 31 %    11/12/2024 10:24 AM 63 %      Inpatient Medications:  Scheduled medications   Medication Dose Route Frequency    acetaminophen  650 mg oral q4h    Or    acetaminophen   650 mg rectal q4h    amiodarone  400 mg oral BID    aspirin  81 mg oral Daily    atorvastatin  80 mg oral Nightly    [Held by provider] gabapentin  200 mg oral TID    insulin lispro  0-5 Units subcutaneous q4h    oxygen   inhalation Continuous - Inhalation    pantoprazole  40 mg oral Daily before breakfast    polyethylene glycol  17 g oral BID    sennosides-docusate sodium  2 tablet oral BID    ticagrelor  90 mg oral BID     PRN medications   Medication    calcium gluconate    calcium gluconate    fentaNYL    HYDROmorphone    magnesium sulfate    magnesium sulfate    melatonin    naloxone    naloxone    ondansetron    Or    ondansetron    oxyCODONE    oxyCODONE    potassium chloride CR    Or    potassium chloride    potassium chloride CR    Or    potassium chloride    potassium chloride    potassium chloride     Continuous Medications   Medication Dose Last Rate    amiodarone  0.5 mg/min Stopped (11/17/24 1000)    dexmedeTOMIDine  0-1.5 mcg/kg/hr Stopped (11/18/24 0638)    EPINEPHrine  0.04 mcg/kg/min 0.04 mcg/kg/min (11/18/24 0700)    heparin  0-4,000 Units/hr 900 Units/hr (11/18/24 0700)    norepinephrine  0-1 mcg/kg/min 0.08 mcg/kg/min (11/18/24 0700)     Physical Exam:  GEN: frail appearing male, NAD.  CV: irregularly irregular, no m/r/g.   LUNGS: no respiratory distress on NC.  ABD: Soft, NT/ND.  SKIN: Warm, well perfused. LE edema: mild pitting edema  MSK: No deformities.  EXT: IABP and VA ECMO in place  NEURO: No focal deficits.     Assessment/Plan   Jaime Dominguez is a 74 year old male with a PMH significant for basal cell carcinoma and HLD who initially presented to [facility] ED on 11/8 complaining of left sided chest pain, diaphoresis, SOB nausea and syncope. ECG showed ST elevation in the inferior leads and ST depression in anterior septal leads. LHC showed 90% occlusion of the LAD. Transferred to [facility] on 11/10 for cardiac surgery workup.  He is now s/p MidCAB x2 converted to full sternotomy w/ LIMA  prolonged as a Y graft with a radial to LAD OM w/ Dr. Aldo Harman and Dr. Stanley. CPB time 347min. Course c/b increasing pressor requirement and echo revealing apical hypo-akinesis with subsequent IABP placement on 11/14 for further support. Overnight on 11/14, Troponin elevated and ST elevation in anteroseptal leads, c/f ACS and decision made to [facility]. Now s/p PCI of mid LAD with HEATHER. Pt cannulated on VA ECMO for cardiogenic shock state 2/2 multiple failed grafts. HF was engaged for multidisciplinary decision making regarding cardiogenic shock.      Acute HFrEF  ICM  CAD s/p CABG and PCI   Cardiogenic shock s/p VA ECMO and IABP   - TTE 11/16: LVEF 31%, abnormal wall motion; reduced RVSF.   - On epi and norepi. Wean norepi as able.  - Mechanical support: VA ECMO, IABP. Underwent ECMO turndown trail this morning and tolerating flows of 1.5L. Plan for decannulation in OR tomorrow.  May consider Impella for support after decannulation.   - Diuresis: IV diuresis per primary.  - GDMT on hold with current MCS  - Mechanical support: VA ECMO, IABP  - Strict I/Os, Daily Na < 2g daily, fluid restriction.  - Could be considered for advanced therapies. Age prohibits from OHT, however, could be considered for LVAD evaluation if necessary.     Acute on chronic anemia  Concern for increase pressor requirement in setting of downtrending Hgb.  -transfusion per primary  -agree with through investigation with CT if H/H continues to downtrend  -plan for bronchoscopy tomorrow in OR with decannulation      For any questions or concerns, feel free to reach out via [employer] chat or page at [phone].This plan was discussed with Dr. Doug Castro, HF attending.     Kira Alvares DO  HF Fellow

## 2024-11-18 NOTE — PROGRESS NOTES
CTICU Progress Note  Jaime Dominguez/[id number]    Admit Date: 11/10/2024  Hospital Length of Stay: 8   ICU Length of Stay: 5d 6h   CT SURGEON: Dr. Aldo Harman    SUBJECTIVE:   Hemodynamics acceptable on provided therapy. Remains on norepinephrine 0.08mcg/kg/min, epinephrine 0.04mcg/kg/min, ECMO, and IABP. Oxygenating well on 4L NC.    MEDICATIONS  Infusions:  amiodarone, Last Rate: Stopped (11/17/24 1000)  dexmedeTOMIDine, Last Rate: Stopped (11/18/24 0638)  EPINEPHrine, Last Rate: 0.04 mcg/kg/min (11/18/24 0700)  heparin, Last Rate: 900 Units/hr (11/18/24 0700)  norepinephrine, Last Rate: 0.08 mcg/kg/min (11/18/24 0700)      Scheduled:  acetaminophen, 650 mg, q4h   Or  acetaminophen, 650 mg, q4h  amiodarone, 400 mg, BID  aspirin, 81 mg, Daily  atorvastatin, 80 mg, Nightly  [Held by provider] gabapentin, 200 mg, TID  insulin lispro, 0-5 Units, q4h  oxygen, , Continuous - Inhalation  pantoprazole, 40 mg, Daily before breakfast  polyethylene glycol, 17 g, BID  sennosides-docusate sodium, 2 tablet, BID  ticagrelor, 90 mg, BID      PRN:  calcium gluconate, 1 g, q6h PRN  calcium gluconate, 2 g, q6h PRN  fentaNYL, 25 mcg, q5 min PRN  HYDROmorphone, 0.2 mg, q2h PRN  magnesium sulfate, 2 g, q6h PRN  magnesium sulfate, 4 g, q6h PRN  melatonin, 5 mg, Nightly PRN  naloxone, 0.2 mg, q5 min PRN  naloxone, 0.2 mg, q5 min PRN  ondansetron, 4 mg, q8h PRN   Or  ondansetron, 4 mg, q8h PRN  oxyCODONE, 10 mg, q4h PRN  oxyCODONE, 5 mg, q4h PRN  potassium chloride CR, 20 mEq, q6h PRN   Or  potassium chloride, 20 mEq, q6h PRN  potassium chloride CR, 40 mEq, q6h PRN   Or  potassium chloride, 40 mEq, q6h PRN  potassium chloride, 20 mEq, q6h PRN  potassium chloride, 40 mEq, q6h PRN        PHYSICAL EXAM:   Visit Vitals  BP (!) 110/39   Pulse 79   Temp 36.9 °C (98.4 °F) (Core)   Resp 14   Ht 1.829 m (6')   Wt 94 kg (207 lb 3.7 oz)   SpO2 95%   BMI 28.11 kg/m²   Smoking Status Never   BSA 2.19 m²     Wt Readings from Last 5 Encounters:   11/18/24  94 kg (207 lb 3.7 oz)   11/10/24 96.2 kg (212 lb)     INTAKE/OUTPUT:  I/O last 3 completed shifts:  In: 2879.8 (30.6 mL/kg) [I.V.:1729.8 (18.4 mL/kg); Blood:1050; IV Piggyback:100]  Out: 4265 (45.4 mL/kg) [Urine:2625 (0.8 mL/kg/hr); Chest Tube:1640]  Weight: 94 kg          Vent settings:  FiO2 (%):  [40 %] 40 %    Physical Exam:   Constitutional: Male patient lying in ICU bed in no acute distress  Neuro: A+Ox3, no obvious focal deficits, CAM negative  CV: RRR. S1S2. SR/SA with PACS on monitor. AV wires present  Pulm: CTAB. CT's with appropriate serosang output and no airleak.  : Clear yellow urine ia aden. Scrotal edema   GI: S/ND/NT. Hypoactive BS.  Extremities: NV exam intact x 4. No edema. Cannula site with small ooze.   Skin: WDI. Postop dressings intact. Chest tube dressings intact.    Psych: Calm and cooperative    Daily Risk Screen  Central line: Hemodynamic instability requiring parenteral medication  Aden: Accurate I/Os in critically ill    Images: Reviewed    Invasive Hemodynamics:    Most Recent Range Past 24hrs   BP (Art) 110/39 Arterial Line BP 1  Min: 78/37  Max: 143/59   MAP(Art) 69 mmHg Arterial Line MAP 1 (mmHg)   Min: 54 mmHg  Max: 84 mmHg   RA/CVP   No data recorded   PA 15/7 PAP  Min: 9/0  Max: 30/7   PA(mean) 11 mmHg PAP (Mean)  Min: 5 mmHg  Max: 33 mmHg     ECMO:     Most Recent Range Past 24hrs   Flow 2.66 Patient Flow (L/min)  Min: 2.24  Max: 3.41   Speed 2150 Circuit Flow (RPM)  Min: 2150  Max: 2580   Sweep 0.5 Sweep Gas Flow Rate (L/min)  Min: 0.5  Max: 0.5     Assessment/Plan   Jaime Dominguez is a 74 year old male with a PMH significant for basal cell carcinoma and HPL s/p MidCAB x2 converted to full sternotomy w/ LIMA prolonged as a Y graft with a radial to LAD OM w/ Dr. Aldo Harman and Dr. Stanley. CPB time 347min. Course c/b increasing pressor requirement and echo revealing apical hypo-akinesis with subsequent IABP placement on 11/14 for further support. Overnight on 11/14, Troponin  elevated and ST elevation in anteroseptal leads, c/f ACS and decision made to [facility]. Now s/p PCI of mid LAD with HEATHER. Pt cannulated on VA ECMO for cardiogenic shock state 2/2 multiple failed grafts.      Plan:  NEURO: No PMH. Precedex infusion overnight for anxiety. A+Ox3, CAM negative, no obvious focal deficits. -->  - Nightly precedex gtt for sedation/anxiety   - Serial neuro and pain assessments   - Scheduled Tylenol   - Decrease PRN oxycodone to 2.5mg for moderate and 5mg for severe  - Discontinue dilaudid  - Continue melatonin  - PT Consult  - CAM ICU score qshift  - Passive ROM  - Sleep/wake cycle hygiene     CV: Patient has a history of HLD. Is now status post MIDCABG x 2 with conversion to full sternotomy LIMA prolonged as a Y graft with a radial to LAD OM. Pre/Post EF: normal BIV Arrived to CTICU on epi 0.04. A/V epicardial wires set VVI @ 40. Course c/b increasing pressor requirement and echo revealing apical hypo-akinesis with subsequent IABP placement on 11/14 for further support. Troponin elevated and ST elevation in anteroseptal leads, c/f ACS and decision made to [facility] 11/14. Now s/p PCI of mid LAD with HEATHER. Pt cannulated on VA ECMO for cardiogenic shock state 2/2 multiple failed grafts 11/14. Most recent TTE on 11/16 with LVEF 34% and reduced RV. NSR, SA with PACs. Hypotension on norepinephrine  0.08mcg/kg/min infusion. Remains on epinephrine 0.04mcg/kg/min. ECMO 2150rpm/2.6L/100%/SW0.5 with adequate pulsatility and pulse pressure. IABP at 1:1. -->  - Maintain IABP 1:1 setting for LV venting, EKG trigger   - Continue PO amiodarone 400mg BID  - Plan for ECMO turn down trial with echo today  - Wean norepinephrine while maintaining MAP 70-90  - Continue epinephrine 0.04mcg/kg/min for inotropy  - Maintain epicardial wires set VVI @ 40  - Continue aspirin and Ticagrelor 90mg BID  - Continue with atorvastatin 80mg nightly     PULM: No history of pulmonary disease. Chest tubes 2x meds and 1 L pleural. Output  minimal. Morning CXR with worsening left lung aeration and opacities. Currently oxygenating well on 4L NC with minimal sputum production. -->  - Daily CXR  - Bedside L chest POCUS to assess for pleural effusion or hemothorax  - Wean FiO2 maintaining SpO2 >92%.   - IS q1h and OOB to chair  - Chest tubes to wall suction. Leave in place until pt further mobilizes      GI: No PMH. Passed bedside swallow, but having difficulty with PO meds. -->   - Cardiac diet  - Colace/senna BID and miralax BID  - PPI  - Corpak today if unable to eat or take PO meds     : CSA-TREE Risk Score low. No history of renal disease, baseline creatinine 0.87. Creatinine stable post-op. Aden in place and making adequate UOP (50-100cc/h). Net negative 800ml in last 24 hours. -->  - Continue aden catheter for strict I/Os.  - Goal UOP 0.5ml/kg/hr  - Assess volume status this afternoon after products transfused  - RFP as clinically indicated  - Replete electrolytes per CTICU protocol     ENDO: No PMH. A1c: 5.1. Euglycemia. -->  - Maintain BG <180, insulin per CTICU protocol     HEME: Acute blood loss anemia. Overnight 11/15, oozing noted from arterial cannula of ECMO site. CTS aware and fixed cannula positioning. CT scan s/f heterogeneous hyperdense collection in the left inguinal region suspicious for hematoma. Hgb 6.7. 1u PRBCs transfusing on assessment this morning. -->    - Monitor drain output volume and characteristics  - Daily CBC and prn  - Continue aspirin and Ticagrelor BID  - On low dose heparin gtt ; assay 0.3  - 1u PRBCs for total of 2u. Consider CT scan if hgb does not increment on afternoon CBC  - SCDs for DVT prophylaxis.  - Last type and screen: 11/18     ID: Febrile. MRSA negative. WBC stable. UA negative, blood culture pending. -->  - Follow up blood culture results  - Continue to monitor WBC  - Trend temp q4h     Skin: No active skin issues. -->  - Scrotal edema- will elevate   - L groin cannula site soft and dry (no oozing),  no concern for compartment syndrome ( CK and myoglobin downtrend)  - preventative Mepilex dressings in place on sacrum and heels  - change preventative Mepilex weekly or more frequently as indicated (when moist/soiled)   - every shift skin assessment per nursing and weekly ICU skin rounds  - moisture barrier to be applied with sandro care  - active skin problems addressed with nursing on daily rounds     Proph:  SCDs  PPI  Hep gtt     G: Line  Right IJ MAC w Minimac placed 11/12  R radial a-line placed 11/16  Hale 11/12     F: Family: will update at bedside.     Restraints: Not indicated.    Code status: Full Code    I personally spent 55 minutes of critical care time directly and personally managing the patient exclusive of separately billable procedures.     A,B,C,D,E,F,G: reviewed    Discussed with Dr. Bonilla.  Dispo: CTICU care for now.    CTICU TEAM PHONE [phone]

## 2024-11-18 NOTE — PROGRESS NOTES
Unit  met with patient and wife. 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 Strong family support of 3 sons and one daughter along with siblings. Patient discussed having some delirium at night and asked if music could be played via blue tooth speaker via his mobile [email or web address]. Plan is to arrange for blue tooth speaker for music at night and 2nd order entered in [email or web address] for music therapy. Patient and his wife shared some amazing stories from career in teaching. Per team plan was to decannulate from ECMO today but OR booked with plans for decannulation tomorrow. Patient's 2 sons also have dogs, one of which is a [place] bull dog patient is very fond of and hopes maybe when out of ICU, can get permission to visit. Plan is to continue to meet periodically for support AND any developing needs.         Dana Christianson (LSW, MSW)

## 2024-11-18 NOTE — PROGRESS NOTES
Occupational Therapy    Evaluation    Patient Name: Jaime Dominguez  MRN: [id number]  Today's Date: 11/18/2024  Room: 05/05  Time Calculation  Start Time: 1236  Stop Time: 1306  Time Calculation (min): 30 min    Assessment  IP OT Assessment  OT Assessment: Overall decreased strength and endurance limiting occupational performance.  Prognosis: Good  Barriers to Discharge:  (Medical acuity)  Evaluation/Treatment Tolerance: Patient tolerated treatment well  Medical Staff Made Aware: Yes  End of Session Communication: Bedside nurse  End of Session Patient Position: Bed, 3 rail up, Alarm off, not on at start of session, Alarm off, caregiver present  Plan:  Inpatient Plan  Treatment Interventions: ADL retraining, Functional transfer training, UE strengthening/ROM, Endurance training, Neuromuscular reeducation, Compensatory technique education  OT Frequency: 2 times per week  OT Discharge Recommendations:  (TBD. Fem ECMO/IABP current limitations.)  Equipment Recommended upon Discharge:  (TBD)  OT Recommended Transfer Status: Assist of 2  OT - OK to Discharge: Yes  OT Assessment  OT Assessment Results: Decreased ADL status, Decreased endurance, Decreased fine motor control, Decreased functional mobility  Prognosis: Good  Barriers to Discharge:  (Medical acuity)  Evaluation/Treatment Tolerance: Patient tolerated treatment well  Medical Staff Made Aware: Yes    Subjective   Current Problem:  1. Cardiogenic shock (Multi)  General    Right Femoral Intra-aortic Balloon Pump Placement    Case Request Cath Lab: Left Heart Cath, With LV, Angriography And Grafts    Case Request Cath Lab: Left Heart Cath, With LV, Angriography And Grafts    Cardiac Catheterization Procedure    Cardiac Catheterization Procedure    Transthoracic Echo (TTE) Limited    Transthoracic Echo (TTE) Limited    Transthoracic Echo (TTE) Limited    Transthoracic Echo (TTE) Limited    Arterial Puncture/Cannulation    Arterial Puncture/Cannulation    Transthoracic  Echo (TTE) Limited    Transthoracic Echo (TTE) Limited    Case Request Operating Room: Removal Extracorporeal Membrane Oxygenation    Case Request Operating Room: Removal Extracorporeal Membrane Oxygenation      2. STEMI (ST elevation myocardial infarction) (Multi)  Case Request Operating Room: MIDCAB x 2 LIMA & Radial, ROBOT-ASSISTED    Case Request Operating Room: MIDCAB x 2 LIMA & Radial, ROBOT-ASSISTED    Vascular US Carotid Artery Duplex Bilateral    Vascular US Carotid Artery Duplex Bilateral    Anesthesia Intraoperative Transesophageal Echocardiogram    Anesthesia Intraoperative Transesophageal Echocardiogram    Case Request Cath Lab: Left Heart Cath, With LV, Angriography And Grafts    Case Request Cath Lab: Left Heart Cath, With LV, Angriography And Grafts    Cardiac Catheterization Procedure    Cardiac Catheterization Procedure    Case Request Operating Room: Creation Bypass Graft Coronary Artery    Case Request Operating Room: Creation Bypass Graft Coronary Artery    CANCELED: Vascular US Carotid Artery Duplex Bilateral    CANCELED: Vascular US Carotid Artery Duplex Bilateral    CANCELED: Transthoracic Echo (TTE) Limited    CANCELED: Transthoracic Echo (TTE) Limited    CANCELED: Transthoracic Echo (TTE) Complete    CANCELED: Transthoracic Echo (TTE) Complete      3. Other disorders of arteries, arterioles and capillaries in diseases classified elsewhere  Vascular US Carotid Artery Duplex Bilateral    Vascular US Carotid Artery Duplex Bilateral    CANCELED: Vascular US Carotid Artery Duplex Bilateral    CANCELED: Vascular US Carotid Artery Duplex Bilateral      4. Encounter for preprocedural cardiovascular examination  Vascular US Carotid Artery Duplex Bilateral      5. ST elevation myocardial infarction involving left anterior descending (LAD) coronary artery (Multi)  Transthoracic Echo (TTE) Limited    Transthoracic Echo (TTE) Limited      6. Atherosclerotic heart disease of native coronary artery with  unspecified angina pectoris  Anesthesia Intraoperative Transesophageal Echocardiogram      7. Acute ST elevation myocardial infarction (STEMI) of inferior wall (Multi)          General:  Reason for Referral: 75 y/o male now s/p MidCAB x2 converted to full sternotomy w/ LIMA prolonged as a Y graft with a radial to LAD OM w/ Dr. Aldo Harman and Dr. Stanley. Pt cannulated on VA ECMO for cardiogenic shock state 2/2 multiple failed grafts. Fem IABP in place RLE.  Past Medical History Relevant to Rehab: basal cell carcinoma and HPL  Prior to Session Communication: Bedside nurse  Patient Position Received: Bed, 3 rail up, Alarm off, not on at start of session (HOB<30o)  Family/Caregiver Present: Yes  Caregiver Feedback: Wife present during part of session  General Comment: Pt is pleasant and cooperative. Session limited to bed level tasks this date d/t fem ECMO and fem IABP. Pt emotional at times and is receptive to use of ICU diary. Observed blood pooling between legs, presumably from ECMO site. RN notifed and aware. (fem IABP (1:1), Aug 83. ECMO: 100% FiO2, .5 Sweep. Epi .04, Levo .08.)   Precautions:  Medical Precautions: Cardiac precautions, Fall precautions, Oxygen therapy device and L/min, Chest tube (CTx2)  Post-Surgical Precautions: Move in the Tube  Precautions Comment: MAP: 70-90, vvi@60. Fem IABP: Avoid R hip flex/ABD >30o and no excessive hip ext.  Vital Signs:  Vital Signs (Past 2hrs)        Date/Time Vitals Session Patient Position Pulse Resp SpO2 BP MAP (mmHg)    11/18/24 1200 --  --  95  20  100 %  --  --     11/18/24 1236 Pre OT  Lying  111  16  100 %  118/38  --     11/18/24 1237 Post OT  Lying  137  24  100 %  105/31  --     11/18/24 1300 --  --  108  21  --  --  --                   Pain:  Pain Assessment  Pain Assessment: 0-10  0-10 (Numeric) Pain Score: 0 - No pain  Lines/Tubes/Drains:  CVC 11/12/24 Double lumen Right Internal jugular (Active)   Number of days: 5       Arterial Line 11/16/24 Right Radial  (Active)   Number of days: 1       Intra Aortic Balloon Pump (Active)   Number of days: 4       Pacer Wires (Active)   Number of days: 5       Arterial Sheath 11/14/24 1158 8 Fr. Left Femoral (Active)   Number of days: 4       Arterial Sheath 11/14/24 1213 Other (Comment) Left Femoral (Active)   Number of days: 4       Venous Sheath 11/14/24 1213 Other (Comment) Left Femoral (Active)   Number of days: 4       Pulmonary Artery Catheter Internal jugular (Active)   Number of days: 4       Urethral Catheter Temperature probe 16 Fr. (Active)   Number of days: 5       Chest Tube 1 Left Pleural (Active)   Number of days: 5       Y Chest Tube 1 and 2 Mediastinal Mediastinal (Active)   Number of days: 5         Objective   Cognition:  Overall Cognitive Status: Within Functional Limits  Arousal/Alertness: Appropriate responses to stimuli  Orientation Level: Oriented X4  Following Commands: Follows all commands and directions without difficulty  Cognition Comments: Pt is receptive to education on use of ICU Diary to assist with maintaining cogition and orientation and to assist with stress management.           Home Living:  Type of Home: House  Lives With: Spouse  Home Adaptive Equipment: None  Home Layout: Multi-level, Bed/bath upstairs  Home Access: Stairs to enter with rails  Entrance Stairs-Number of Steps: 1+1  Bathroom Shower/Tub: Tub/shower unit  Bathroom Equipment: Grab bars in shower   Prior Function:  Level of [place]: Independent with ADLs and functional transfers, Independent with homemaking with ambulation  Receives Help From: Family  ADL Assistance: Independent  Homemaking Assistance: Independent  Ambulatory Assistance: Independent  Vocational: Retired ()  Leisure: Avid golfer  Prior Function Comments: (+)drives, denies falls  IADL History:     ADL:  Grooming Assistance: Stand by  Grooming Deficit: Setup  Bathing Assistance: Moderate  Bathing Deficit:  (LE)  UE Dressing Assistance: Stand by  UE  Dressing Deficit: Setup  LE Dressing Assistance: Maximal  Toileting Assistance with Device: Maximal  ADL Comments: LB ADLs limited by ECMO/IABP at this time  Activity Tolerance:  Endurance: Decreased tolerance for upright activites  Early Mobility/Exercise Safety Screen: Proceed with mobilization - No exclusion criteria met  Activity Tolerance Comments: Bed level session this date d/t fem ECMO/IABP  Balance:     Bed Mobility/Transfers: Bed Mobility  Bed Mobility: Yes  Bed Mobility 1  Bed Mobility 1: Rolling right, Rolling left  Level of Assistance 1: Moderate assistance  Bed Mobility Comments 1: Able to use UE to assist and maintain grasp on bedrail.  Functional Mobility  Functional Mobility Performed: No (Deferred this date d/t fem ECMO/IABP)   and Transfers  Transfer: No (Deferred this date d/t fem ECMO/IABP)  IADL's:      Vision:     and Vision - Complex Assessment  Ocular Range of Motion: Within Functional Limits  Sensation:  Light Touch: No apparent deficits  Sensation Comment: Sensation intact BUE/BLE. Observed able to wiggle toes and flex/ext ankle BLE.  Strength:  Strength Comments: BUE strength not formally assessed. Observed through task completion to be at least 3+/5. Instructed on completion of BUE AROM 3x daily and gross grasp/release blue foam block.  Perception:  Inattention/Neglect: Appears intact  Coordination:  Movements are Fluid and Coordinated: Yes   Hand Function:  Hand Function  Gross Grasp: Functional  Coordination: Functional  Extremities:  ,  ,  , and      Outcome Measures: [facility] Daily Activity  Putting on and taking off regular lower body clothing: A lot  Bathing (including washing, rinsing, drying): A lot  Putting on and taking off regular upper body clothing: A little  Toileting, which includes using toilet, bedpan or urinal: Total  Taking care of personal grooming such as brushing teeth: A little  Eating Meals: None  Daily Activity - Total Score: 15        ICU Mobility Screen  Early  Mobility/Exercise Safety Screen: Proceed with mobilization - No exclusion criteria met,          Education Documentation  Body Mechanics, taught by Kira Cole OT at 11/18/2024  1:45 PM.  Learner: Patient  Readiness: Acceptance  Method: Explanation  Response: Verbalizes Understanding    Precautions, taught by Kira Cole OT at 11/18/2024  1:45 PM.  Learner: Patient  Readiness: Acceptance  Method: Explanation  Response: Verbalizes Understanding    Home Exercise Program, taught by Kira Cole OT at 11/18/2024  1:45 PM.  Learner: Patient  Readiness: Acceptance  Method: Explanation  Response: Verbalizes Understanding    ADL Training, taught by Kira Cole OT at 11/18/2024  1:45 PM.  Learner: Patient  Readiness: Acceptance  Method: Explanation  Response: Verbalizes Understanding    Education Comments  No comments found.        Goals:     Encounter Problems       Encounter Problems (Active)       ADLs       Patient will complete grooming tasks with Sup in order to maximize functional Indep with task completion.        Start:  11/18/24    Expected End:  12/02/24               COGNITION/SAFETY       Patient will use ICU/hospital diary with independent level of assistance to allow improved recall of hospital events.       Start:  11/18/24    Expected End:  12/02/24               EXERCISE/STRENGTHENING       Pt will increase endurance to tolerate 15-20min of activity with no more than 1 rest break in order to increase ability to engage in ADL completion.        Start:  11/18/24    Expected End:  12/02/24 11/18/24 at 1:47 PM   Kira Cole OT   Rehab Office: [phone]

## 2024-11-18 NOTE — NURSING NOTE
Multidisciplinary ECMO Rounds Note    Attendance:  CT Surgeon: Dr. Stanley  CTICU Attending: Dr. Bonilla  Palliative Care Attending: N  Physical Therapy Present (Y/N): N  Perfusion Present (Y/N): Y    ECMO Indication: cardiogenic shock  ECMO Cannula Configuration: Left fem-fem [facility] ECMP    Changes made to Hemodynamic/Ventilator/ECMO Management: Patient remains on epi/levo    Circuit Concerns: ECMO arterial PaO2- 495 (from 11/17)-no concerns at this time  Bleeding Concerns: Left groin site was very oozy earlier in the day. Dr. Stanley at bedside to assess--no active bleeding noted.   Clotting/Ischemia Concerns: None    Anticoagulation Plan/Monitoring: Continuous IV heparin-therapeutic    Mobility Plan/Candidacy: not at this time  Nutrition: adult cardiac diet   Patient/Family Concerns: None  Nursing Concerns: None      Barriers to decannulation/anticipated decannulation date: Plan to decannulate tomorrow (11/19) with possible impella 5.5 insertion if needed.    Plan for CT scan today-perfusion aware.     ECMO:     Most Recent Range Past 24hrs   Flow 2.52 Patient Flow (L/min)  Min: 2.24  Max: 2.95   Speed 2220 Circuit Flow (RPM)  Min: 2150  Max: 2330   Sweep 0.5 Sweep Gas Flow Rate (L/min)  Min: 0.5  Max: 0.5       EPINEPHrine, 0.04 mcg/kg/min, Last Rate: 0.04 mcg/kg/min (11/18/24 0845)  heparin, 0-4,000 Units/hr, Last Rate: 900 Units/hr (11/18/24 0700)  lactated Ringer's, 5 mL/hr, Last Rate: 5 mL/hr (11/18/24 0700)  lactated Ringer's, 20 mL/hr, Last Rate: 20 mL/hr (11/18/24 0700)  norepinephrine, 0-1 mcg/kg/min, Last Rate: 0.08 mcg/kg/min (11/18/24 0830)

## 2024-11-18 NOTE — PROGRESS NOTES
Patient requires ECMO support.     ECMO:  Cannulation Date: Nov. 14, 2024  ECMO Indication: Cardiogenic Shock  Current Goals of Care: Full Code    ECMO Cannula Configuration: L fem-fem VA-ECMO    ECMO Interrogation: Drainage cannula site, cannula, pump, oxygenator, return cannula and return cannula site clinically inspected. RPM and sweep reviewed and adjusted appropriate to clinical context.    ECMO circuit run from drainage cannula to pump to oxygenator to return cannula: Yes  Pre/post PaO2 adequate: Yes  LV Unloading/Pulse Pressure: IABP at 1:1; Good pulsatility this morning  Distal Perfusion: L DPC in place, adequate pulses    ECMO Settings:     Most Recent Range Past 24hrs   Flow 2.71 Patient Flow (L/min)  Min: 2.24  Max: 2.98   Speed 2220 Circuit Flow (RPM)  Min: 2150  Max: 2330   Sweep 0.5 Sweep Gas Flow Rate (L/min)  Min: 0.5  Max: 0.5     IABP in situ 1:1. Position noted to be high in the cath lab and appropriately repositioned.    Invasive Hemodynamics:    Most Recent Range Past 24hrs   BP (Art) 110/35 Arterial Line BP 1  Min: 78/37  Max: 143/59   MAP(Art) 61 mmHg Arterial Line MAP 1 (mmHg)   Min: 54 mmHg  Max: 84 mmHg   RA/CVP   No data recorded   PA 16/6 PAP  Min: 9/0  Max: 30/7   PA(mean) 10 mmHg PAP (Mean)  Min: 5 mmHg  Max: 33 mmHg   CO   No data recorded   CI   No data recorded   Mixed Venous   No data recorded   SVR    No data recorded     Hemodynamic Management:  amiodarone, 0.5 mg/min, Last Rate: Stopped (11/17/24 1000)  dexmedeTOMIDine, 0-1.5 mcg/kg/hr, Last Rate: Stopped (11/18/24 0638)  EPINEPHrine, 0.04 mcg/kg/min, Last Rate: 0.04 mcg/kg/min (11/18/24 0700)  heparin, 0-4,000 Units/hr, Last Rate: 900 Units/hr (11/18/24 0700)  norepinephrine, 0-1 mcg/kg/min, Last Rate: 0.08 mcg/kg/min (11/18/24 0700)        Bleeding/Clot Issues:   Anticoagulation/Monitoring: Anticoagulation status and intensity discussed  Plan: Systemic heparin with Xa monitoring (currently sub-therapeutic 0.2)  Presence of  cannula bleeding: No significant external bleeding at present; L groin hematoma stable in size  Presence of oxygenator clot: None    Management Issues:  Road Trips planned for today? N  Mobility Planned today? N  Weaning Plan/date: Will undertake ECMO turn down today    Communication:  Discussed with Cardiothoracic surgeon, Perfusion, Palliative care (consulted entered and physical/occupational therapy)    Family Updates/Concerns? Family updated. All questions appeared be answered adequately.    ECMO ROUNDS (11/18):  The following staff were in attendance during formal interdisciplinary ECMO team rounds today:    Cardiac Surgery: Lizeth  CTICU: Chad  HF Cardiology: Discussed this morning with HF team  Perfusion: Present  ECMO Coordinator: Pablo     In addition to review of blood test results, diet, imaging/procedure results, issues/problems expressed by family, medications, the following topics were discussed:    ECMO turn-down - able to get to 1.5L without any change in hemodynamics. 1L was met with decrease in BP however we are likely behind on an intravascular volume standpoint.  Will plan to ensure adequate intravascular volume and obtain a CT scan to evaluate left chest. Current plan to go to the OR - patient may need to bridge with an Impella. Plan for TTE tomorrow in the OR.   Palliative care to be consult  Family update provided by surgeon and myself

## 2024-11-18 NOTE — PROGRESS NOTES
Jaime Dominguez is a 74 y.o. male on day 8 of admission presenting with STEMI (ST elevation myocardial infarction) (Multi).    Subjective   Patient discussed in morning handover, patient examined and chart reviewed. Meds, labs and radiology reviewed during rapid and full interdisciplinary rounds this morning.    Surgeon: Justin  DOS: Nov. 12, 2024  Procedure: MIDCABG x 2 with conversion to full sternotomy LIMA prolonged as a Y graft with a radial to LAD OM     Airway: grade I - full view of glottis   EF: Normal post-op; Now ~30-35%    FULL Code  Emergency Sternotomy: Y/N; Exp POD#10 -     HPI:   11/8 - Patient initially presented with left sided chest pain, shortness of breath, nausea, and diaphoresis while doing yardwork. He came into the house in distress. His wife reported a brief loss of consciousness. EMS was called. He was diagnosed with STEMI in route and given Brilinta at that time. In the ED labwork and CXR were unremarkable. ECG with acute ST elevations. 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  He has a strong FH of heart disease. No personal history of prior chest pain. Cardiac surgery was consulted for CABG evaluation at [facility]. The patient was transferred to [facility] for robotic MIDCAB evaluation.     PMH:  CAD  Hyperlipidemia  Skin cancer     Course in Hospital:    11/12 - Index CABG x 2    11/13-14: Progressive soft BP with chest wall/incision discomfort. Towards the later afternoon, patient noted to have an upward trending CVP and vasopressor requirement.  Bedside POCUS demonstrating ?RWMA in cornelio-apical LV. ECG undertaken with ST seg. changes. Subsequent TnI added to afternoon bloodwork demonstrated markedly elevated level. Cardiac surgeon placed an IABP overnight and patient taken to the cath lab (see Dr. Quiles's note). After extensive discussion with the Heart Team and the family, decision reached to place the patient on VA-ECMO and undertake high-risk PCI of the LAD,  which was carried out successfully.     11/14 - Significant bleeding from cannulation overnight requiring exploration at the bedside. TTE: EF ~30-35% with RWMA in anterior, anteroseptal and anteroapical walls. Ongoing issues with volume seeking hemodynamics prompted a CT C/A/P - ~6cm collection noted in anterior mediastinum and L groin hematoma but nil else identified.    Overnight: Drop in hemoglobin again, however acceptable hemodynamics on reduce ECMO flow (~2.5L) and IABP with stable inotropic support     Objective     Last Recorded Vitals  Blood pressure (!) 110/39, pulse 79, temperature 36.9 °C (98.4 °F), temperature source Core, resp. rate 14, height 1.829 m (6'), weight 94 kg (207 lb 3.7 oz), SpO2 95%.    Invasive Hemodynamics:    Most Recent Range Past 24hrs   BP (Art) 110/39 Arterial Line BP 1  Min: 78/37  Max: 143/59   MAP(Art) 69 mmHg Arterial Line MAP 1 (mmHg)   Min: 54 mmHg  Max: 84 mmHg   RA/CVP   No data recorded   PA 15/7 PAP  Min: 9/0  Max: 30/7   PA(mean) 11 mmHg PAP (Mean)  Min: 5 mmHg  Max: 33 mmHg   CO   No data recorded   CI   No data recorded   Mixed Venous   No data recorded   SVR    No data recorded     Intake/Output last 3 Shifts:  I/O last 3 completed shifts:  In: 2879.8 (30.6 mL/kg) [I.V.:1729.8 (18.4 mL/kg); Blood:1050; IV Piggyback:100]  Out: 4265 (45.4 mL/kg) [Urine:2625 (0.8 mL/kg/hr); Chest Tube:1640]  Weight: 94 kg     Physical Exam  Constitutional:       General: He is not in acute distress.     Appearance: He is not ill-appearing or diaphoretic.   Eyes:      Pupils: Pupils are equal, round, and reactive to light.   Cardiovascular:      Rate and Rhythm: Regular rhythm. Tachycardia present.      Comments: See separate note for ECMO    Epi: 0.04  Norepi: 0.08    IABP 1:1 in adequate position on CXR    Lines:  RIJ CVL with PAC  R rad. art. line   Pulmonary:      Effort: Pulmonary effort is normal. No tachypnea, accessory muscle usage or respiratory distress.      Breath sounds:  Examination of the left-upper field reveals decreased breath sounds. Examination of the left-middle field reveals decreased breath sounds. Examination of the right-lower field reveals decreased breath sounds. Examination of the left-lower field reveals decreased breath sounds. Decreased breath sounds present.   Abdominal:      General: Abdomen is flat. Bowel sounds are normal.      Palpations: Abdomen is soft.      Tenderness: There is no abdominal tenderness. There is no guarding or rebound.   Genitourinary:     Comments: Hale in situ: Clear urine  Neurological:      General: No focal deficit present.      Mental Status: He is easily aroused.      Comments: CAM-ICU negative  RASS 0 - -1       Assessment/Plan   Principal Problem:    STEMI (ST elevation myocardial infarction) (Multi)  Active Problems:    TREE (acute kidney injury) (CMS-HCC)    On intra-aortic balloon pump assist    Delirium    Cardiogenic shock (Multi)    CXR: Increased opacity on L hemithorax this morning.     Bedside POCUS not able to appreciate a large pleural collection, however loss of obvious lung sliding with a visible pleura at the chest wall. Likely has a mucus plug    ICU Liberation Bundle:  A-Analgesia: Tylenol and opioids at lowest effective dose  B-SBT: not intubated  C-RASS Target: 0  D-CAM: negative  E-Mobilization Plan: Stage 1-2 today  F - Family Last Update: to be update by the team    Daily Checklist:  HOB > 30 degrees: Unable to achieve due to IABP and ECMO  Feeding: p.o. intake   Thromboprophylaxis: Heparin systemically and SCDs  Ulcer Prophylaxis: PPI  Glucose Control: Target 110-180; < 180 achieved; no hypoglycemia  Line to removed: None to remove at present  Bowel Care: Bowel regime ordered  De-escalation of Antibiotics: Completed routine anti-microbial prophylaxis    Plan:  Turn down trial this morning to assess hemodynamics on lower flow  Continue with chest physio  Transfuse to a Hb > 8.0 and optimize intra-vascular volume  - target Hb > 8.0 for today  Will plan SBFT to ensure able to receive meds  Maintain current inotropic support    DISPO: CTICU    I spent 56 minutes in the professional and overall care of this patient.    This critically ill patient continues to be at-risk for clinically significant deterioration / failure due to the above mentioned dysfunctional, unstable organ systems.  I have personally identified and managed all complex critical care issues to prevent aforementioned clinical deterioration.  Critical care time is spent at bedside and/or the immediate area and has included, but is not limited to, the review of diagnostic tests, labs, radiographs, serial assessments of hemodynamics, respiratory status, ventilatory management, and family updates.  Time spent in procedures and teaching are reported separately.    Sharad Bonilla MD

## 2024-11-18 NOTE — PROGRESS NOTES
Subjective Data:  Turn down trial this AM, plan for ECMO decannulation tomorrow. Patient denies any CP or SOB, only some chest wall discomfort with position change and movement.     Objective Data:  Last Recorded Vitals:  Vitals:    11/18/24 1237 11/18/24 1300 11/18/24 1400 11/18/24 1500   BP: (!) 105/31      Pulse: (!) 137 108 105 101   Resp: 24 21 17 22   Temp:  37.5 °C (99.5 °F) 37.4 °C (99.3 °F) 37.4 °C (99.3 °F)   TempSrc:  Core Core Core   SpO2: 100% 100%     Weight:       Height:           Last Labs:  CBC - 11/18/2024:  1:37 PM  21.9 8.0 114    23.6      CMP - 11/18/2024:  1:37 PM  7.2 5.9 180 --- 0.9   4.8 2.6 35 21      PTT - 11/15/2024:  9:16 PM  1.3   14.1 36     TROPHS   Date/Time Value Ref Range Status   11/15/2024 12:28 PM 44,705 0 - 53 ng/L Final     Comment:     Previous result verified on 11/15/2024 1020 on specimen/case [id number] called with component TRPHS for procedure Troponin I, High Sensitivity with value 67,971 ng/L.   11/15/2024 01:42 AM 67,971 0 - 53 ng/L Final   11/14/2024 03:54 AM 73,314 0 - 53 ng/L Final     Comment:     Previous result verified on 11/13/2024 2154 on specimen/case [id number] called with component TRPHS for procedure Troponin I, High Sensitivity with value 45,685 ng/L.   11/08/2024 04:12 PM 4 0 - 20 ng/L Final     BNP   Date/Time Value Ref Range Status   11/10/2024 04:34 PM 13 0 - 99 pg/mL Final     HGBA1C   Date/Time Value Ref Range Status   11/08/2024 04:12 PM 5.1 See comment % Final     LDLCALC   Date/Time Value Ref Range Status   11/08/2024 04:12  <=99 mg/dL Final     Comment:                                 Near   Borderline      AGE      Desirable  Optimal    High     High     Very High     0-19 Y     0 - 109     ---    110-129   >/= 130     ----    20-24 Y     0 - 119     ---    120-159   >/= 160     ----      >24 Y     0 -  99   100-129  130-159   160-189     >/=190       VLDL   Date/Time Value Ref Range Status   11/08/2024 04:12 PM 64 0 - 40  mg/dL Final      Last I/O:  I/O last 3 completed shifts:  In: 2879.8 (30.6 mL/kg) [I.V.:1729.8 (18.4 mL/kg); Blood:1050; IV Piggyback:100]  Out: 4265 (45.4 mL/kg) [Urine:2625 (0.8 mL/kg/hr); Chest Tube:1640]  Weight: 94 kg     Past Cardiology Tests (Last 3 Years):  EKG:  Electrocardiogram 12-lead PRN for arrhythmia 11/14/2024      ECG 12 Lead 11/13/2024 (Preliminary)      Electrocardiogram, 12-lead PRN ACS symptoms 11/10/2024      Electrocardiogram, 12-lead 11/09/2024    Echo:  Transthoracic Echo (TTE) Limited 11/15/2024      Anesthesia Intraoperative Transesophageal Echocardiogram 11/12/2024      Transthoracic Echo (TTE) Complete 11/09/2024    Ejection Fractions:  EF   Date/Time Value Ref Range Status   11/18/2024 08:45 AM 43 %    11/16/2024 11:05 AM 34 %    11/15/2024 09:11 AM 31 %      Cath:  Cardiac Catheterization Procedure 11/14/2024      Cardiac Catheterization Procedure 11/08/2024    Stress Test:  No results found for this or any previous visit from the past 1095 days.    Cardiac Imaging:  No results found for this or any previous visit from the past 1095 days.      Inpatient Medications:  Scheduled medications   Medication Dose Route Frequency    acetaminophen  650 mg oral q4h    Or    acetaminophen  650 mg rectal q4h    amiodarone  400 mg oral BID    aspirin  81 mg oral Daily    atorvastatin  80 mg oral Nightly    bisacodyl  10 mg rectal Once    insulin lispro  0-5 Units subcutaneous q4h    oxygen   inhalation Continuous - Inhalation    pantoprazole  40 mg oral Daily before breakfast    polyethylene glycol  17 g oral BID    sennosides-docusate sodium  2 tablet oral BID    ticagrelor  90 mg oral BID     PRN medications   Medication    calcium gluconate    calcium gluconate    magnesium sulfate    magnesium sulfate    melatonin    naloxone    naloxone    ondansetron    Or    ondansetron    oxyCODONE    oxyCODONE    potassium chloride CR    Or    potassium chloride    potassium chloride CR    Or    potassium  chloride    potassium chloride    potassium chloride     Continuous Medications   Medication Dose Last Rate    EPINEPHrine  0.04 mcg/kg/min 0.04 mcg/kg/min (11/18/24 0845)    heparin  0-4,000 Units/hr 900 Units/hr (11/18/24 0700)    lactated Ringer's  5 mL/hr 5 mL/hr (11/18/24 0700)    lactated Ringer's  20 mL/hr 20 mL/hr (11/18/24 0700)    norepinephrine  0-1 mcg/kg/min 0.08 mcg/kg/min (11/18/24 0830)       Physical Exam:  General: lying flat in bed, alert, appears comfortable  Skin: warm and dry   Cardiac: S1S2  Pulm: CTA anterior, CTs in place  GI: soft, nontender  Extremities: no LE edema, left groin ECMO site, rt fem IABP site  Neuro: no focal deficits      Assessment/Plan   Patient is a 74 year old male with a PMH significant for basal cell carcinoma and HPL s/p MidCAB x2 converted to full sternotomy w/ LIMA prolonged as a Y graft with a radial to LAD OM w/ Dr. Aldo Harman and Dr. Stanley. CPB time 347min. Course c/b increasing pressor requirement and echo revealing apical hypo-akinesis with subsequent IABP placement on 11/14 for further support. Overnight on 11/14, Troponin elevated and ST elevation in anteroseptal leads, c/f ACS and decision made to [facility]. Now s/p PCI of mid LAD with HEATHER. Pt cannulated on VA ECMO for cardiogenic shock state 2/2 multiple failed grafts.     11/14  Right Radial 6 Fr -- TR Band  Left Femoral Artery ECMO Cannula  Left Femoral Vein ECMO Cannula     Patient is s/p CABG on 11/13/2024 arrived in fulminant cardiogenic shock despite IABP placement and 3 pressors.   A right femoral diagnostic cath was done with the following findings:     LM: distal 30-40%  LAD: mid 100% stenosis at anastomosis site   LCX: competitive flow in the OM without significant stenosis  RCA: patent  LIMA to LAD: Y graft with anastomosis to the LAD which is not patent and anastomosis to the OM which appears patent     Due to patient's profound shock, ECMO cannulation was done with the aforementioned access sites and  the plan was made jointly with cardiac surgery to pursue salvage PCI of the mid LAD. He was given ASA and loaded with Ticagrelor. Patient is now s/p salvage PCI of the mid LAD with a Cowen El[place] 3.0 x 38 HEATHER. Following PCI and ECMO cannulation, patient's pressors were able to be weaned substantially and he left the cath lab (without intubation) on ECMO and IABP support.     ASA/Ticagrelor loading in the lab    Overnight bleeding from ECMO site requiring multiple units pRBCs, repositioned and sutured by Cardiac Surgery    11/15 IABP 1:1, left fem ECMO, on epi and levo (vaso currently weaned off), amio started for afib, pending TTE     Interventional Recs:   - cont telemetry care per PCI protocol  - cont DAPT with Brilinta (6 months) and aspirin (lifelong)  - provided education on compliance with DAPT  - at discharge, PLEASE send 90 day prescription of Brilinta to [facility] (for price check and Meds to Beds) and remaining refills to patient's home pharmacy   - continue high intensity statin  - no BB with pessors  - please ensure cardiology follow up appointment after discharge in 1-3 weeks  - patient referred to cardiac rehab   - 5lb weight restriction for 5 days for right radial access  - no lifting anything heavier than 10 lbs for one week for groin access   - appreciate excellent CTICU care   - IABP (placed 11/14 AM by CTICU) and ECMO management per CTICU     Interventional Cardiology will follow.     CICU Fellow Pager: [phone] anytime  Endovascular /Limb Salvage Team Pager: [phone] for day coverage (8am-5pm)  Interventional Cardiology Fellow Pager: [phone] (7AM-5PM) Night coverage: [facility] Cross Cover [id number]  [id number]     I spent 30 minutes in the professional and overall care of this patient.        Gabrielle Lozada, APRN-CNP

## 2024-11-18 NOTE — PROGRESS NOTES
Physical Therapy                 Therapy Communication Note    Patient Name: Jaime Dominguez  MRN: [id number]  [place] [facility] [place] [facility]  Room: 05/05-A  Today's Date: 11/18/2024     Discipline: Physical Therapy    Missed Visit Reason:  (Pt remains on ECMO this date.  Turn down trial today with possible decannulation per RN.  Will hold PT at this time and re-attempt once medically appropriate.)    Missed Time: Attempt

## 2024-11-19 ENCOUNTER — HOSPITAL ENCOUNTER (INPATIENT)
Dept: OPERATING ROOM | Facility: HOSPITAL | Age: 74
Discharge: HOME | DRG: 003 | End: 2024-11-19
Payer: MEDICARE

## 2024-11-19 ENCOUNTER — APPOINTMENT (OUTPATIENT)
Dept: RADIOLOGY | Facility: HOSPITAL | Age: 74
DRG: 003 | End: 2024-11-19
Payer: MEDICARE

## 2024-11-19 ENCOUNTER — ANESTHESIA (OUTPATIENT)
Dept: OPERATING ROOM | Facility: HOSPITAL | Age: 74
End: 2024-11-19
Payer: MEDICARE

## 2024-11-19 LAB
ACT BLD: 93 SEC (ref 82–174)
ALBUMIN SERPL BCP-MCNC: 2.6 G/DL (ref 3.4–5)
ALBUMIN SERPL BCP-MCNC: 2.6 G/DL (ref 3.4–5)
ANION GAP BLDA CALCULATED.4IONS-SCNC: 10 MMO/L (ref 10–25)
ANION GAP BLDA CALCULATED.4IONS-SCNC: 2 MMO/L
ANION GAP BLDA CALCULATED.4IONS-SCNC: 6 MMO/L (ref 10–25)
ANION GAP BLDA CALCULATED.4IONS-SCNC: 8 MMO/L (ref 10–25)
ANION GAP BLDA CALCULATED.4IONS-SCNC: 9 MMO/L (ref 10–25)
ANION GAP BLDV CALCULATED.4IONS-SCNC: 7 MMO/L
ANION GAP SERPL CALC-SCNC: 10 MMOL/L (ref 10–20)
ANION GAP SERPL CALC-SCNC: 11 MMOL/L (ref 10–20)
APTT PPP: 26 SECONDS (ref 27–38)
ATRIAL RATE: 98 BPM
BASE EXCESS BLDA CALC-SCNC: 0.5 MMOL/L (ref -2–3)
BASE EXCESS BLDA CALC-SCNC: 1.9 MMOL/L (ref -2–3)
BASE EXCESS BLDA CALC-SCNC: 3.7 MMOL/L (ref -2–3)
BASE EXCESS BLDA CALC-SCNC: 4.1 MMOL/L
BASE EXCESS BLDA CALC-SCNC: 4.7 MMOL/L (ref -2–3)
BASE EXCESS BLDV CALC-SCNC: 4 MMOL/L
BLOOD EXPIRATION DATE: NORMAL
BODY TEMPERATURE: 37 DEGREES CELSIUS
BUN SERPL-MCNC: 20 MG/DL (ref 6–23)
BUN SERPL-MCNC: 24 MG/DL (ref 6–23)
CA-I BLD-SCNC: 1.11 MMOL/L (ref 1.1–1.33)
CA-I BLD-SCNC: 1.13 MMOL/L (ref 1.1–1.33)
CA-I BLDA-SCNC: 0.93 MMOL/L (ref 1.1–1.33)
CA-I BLDA-SCNC: 1.02 MMOL/L (ref 1.1–1.33)
CA-I BLDA-SCNC: 1.05 MMOL/L (ref 1.1–1.33)
CA-I BLDA-SCNC: 1.09 MMOL/L (ref 1.1–1.33)
CA-I BLDA-SCNC: 1.11 MMOL/L (ref 1.1–1.33)
CA-I BLDV-SCNC: 1.13 MMOL/L (ref 1.1–1.33)
CALCIUM SERPL-MCNC: 7.3 MG/DL (ref 8.6–10.6)
CALCIUM SERPL-MCNC: 7.4 MG/DL (ref 8.6–10.6)
CFT FORM KAOLIN IND BLD RES TEG: 1.2 MIN (ref 0.8–2.1)
CFT FORM KAOLIN IND BLD RES TEG: 1.3 MIN (ref 0.8–2.1)
CHLORIDE BLDA-SCNC: 100 MMOL/L (ref 98–107)
CHLORIDE BLDA-SCNC: 102 MMOL/L (ref 98–107)
CHLORIDE BLDA-SCNC: 103 MMOL/L (ref 98–107)
CHLORIDE BLDA-SCNC: 99 MMOL/L (ref 98–107)
CHLORIDE BLDA-SCNC: 99 MMOL/L (ref 98–107)
CHLORIDE BLDV-SCNC: 98 MMOL/L (ref 98–107)
CHLORIDE SERPL-SCNC: 98 MMOL/L (ref 98–107)
CHLORIDE SERPL-SCNC: 99 MMOL/L (ref 98–107)
CLOT ANGLE.KAOLIN INDUCED BLD RES TEG: 72 DEG (ref 63–78)
CLOT ANGLE.KAOLIN INDUCED BLD RES TEG: 75 DEG (ref 63–78)
CLOT INIT KAO IND P HEP NEUT BLD RES TEG: 6.3 MIN (ref 4.3–8.3)
CLOT INIT KAO IND P HEP NEUT BLD RES TEG: 7.7 MIN (ref 4.3–8.3)
CLOT INIT KAO IND P HEP NEUT BLD RES TEG: 7.7 MIN (ref 4.6–9.1)
CLOT INIT KAO IND P HEP NEUT BLD RES TEG: 8 MIN (ref 4.6–9.1)
CO2 SERPL-SCNC: 28 MMOL/L (ref 21–32)
CO2 SERPL-SCNC: 29 MMOL/L (ref 21–32)
COHGB MFR BLDA: 1.1 %
COHGB MFR BLDA: 1.7 %
CREAT SERPL-MCNC: 0.66 MG/DL (ref 0.5–1.3)
CREAT SERPL-MCNC: 0.8 MG/DL (ref 0.5–1.3)
DISPENSE STATUS: NORMAL
DO-HGB MFR BLDA: 0.8 % (ref 0–5)
DO-HGB MFR BLDA: 1.9 % (ref 0–5)
EGFRCR SERPLBLD CKD-EPI 2021: >90 ML/MIN/1.73M*2
EGFRCR SERPLBLD CKD-EPI 2021: >90 ML/MIN/1.73M*2
EJECTION FRACTION APICAL 4 CHAMBER: 34
EJECTION FRACTION: 33 %
ERYTHROCYTE [DISTWIDTH] IN BLOOD BY AUTOMATED COUNT: 15.2 % (ref 11.5–14.5)
ERYTHROCYTE [DISTWIDTH] IN BLOOD BY AUTOMATED COUNT: 15.9 % (ref 11.5–14.5)
FIBRINOGEN BLD CALC-MCNC: 436 MG/DL (ref 278–581)
FIBRINOGEN BLD CALC-MCNC: 447 MG/DL (ref 278–581)
FIBRINOGEN PPP-MCNC: 410 MG/DL (ref 200–400)
GLUCOSE BLD MANUAL STRIP-MCNC: 103 MG/DL (ref 74–99)
GLUCOSE BLD MANUAL STRIP-MCNC: 128 MG/DL (ref 74–99)
GLUCOSE BLD MANUAL STRIP-MCNC: 137 MG/DL (ref 74–99)
GLUCOSE BLDA-MCNC: 129 MG/DL (ref 74–99)
GLUCOSE BLDA-MCNC: 135 MG/DL (ref 74–99)
GLUCOSE BLDA-MCNC: 136 MG/DL (ref 74–99)
GLUCOSE BLDA-MCNC: 149 MG/DL (ref 74–99)
GLUCOSE BLDA-MCNC: 160 MG/DL (ref 74–99)
GLUCOSE BLDV-MCNC: 140 MG/DL (ref 74–99)
GLUCOSE SERPL-MCNC: 131 MG/DL (ref 74–99)
GLUCOSE SERPL-MCNC: 136 MG/DL (ref 74–99)
HCO3 BLDA-SCNC: 22.4 MMOL/L (ref 22–26)
HCO3 BLDA-SCNC: 26.6 MMOL/L (ref 22–26)
HCO3 BLDA-SCNC: 27.7 MMOL/L (ref 22–26)
HCO3 BLDA-SCNC: 27.9 MMOL/L (ref 22–26)
HCO3 BLDA-SCNC: 30.6 MMOL/L
HCO3 BLDV-SCNC: 31 MMOL/L
HCT VFR BLD AUTO: 20.8 % (ref 41–52)
HCT VFR BLD AUTO: 25.2 % (ref 41–52)
HCT VFR BLD EST: 21 % (ref 41–52)
HCT VFR BLD EST: 21 % (ref 41–52)
HCT VFR BLD EST: 23 % (ref 41–52)
HCT VFR BLD EST: 27 % (ref 41–52)
HCT VFR BLD EST: 31 % (ref 41–52)
HCT VFR BLD EST: 39 % (ref 41–52)
HGB BLD-MCNC: 7.3 G/DL (ref 13.5–17.5)
HGB BLD-MCNC: 8.6 G/DL (ref 13.5–17.5)
HGB BLDA-MCNC: 10.2 G/DL (ref 13.5–17.5)
HGB BLDA-MCNC: 10.2 G/DL (ref 13.5–17.5)
HGB BLDA-MCNC: 13.1 G/DL (ref 13.5–17.5)
HGB BLDA-MCNC: 7 G/DL (ref 13.5–17.5)
HGB BLDA-MCNC: 7.7 G/DL (ref 13.5–17.5)
HGB BLDA-MCNC: 8.9 G/DL (ref 13.5–17.5)
HGB BLDA-MCNC: 8.9 G/DL (ref 13.5–17.5)
HGB BLDV-MCNC: 7 G/DL (ref 13.5–17.5)
HOLD SPECIMEN: NORMAL
INHALED O2 CONCENTRATION: 100 %
INHALED O2 CONCENTRATION: 100 %
INHALED O2 CONCENTRATION: 28 %
INHALED O2 CONCENTRATION: 90 %
INR PPP: 1.2 (ref 0.9–1.1)
LACTATE BLDA-SCNC: 0.9 MMOL/L (ref 0.4–2)
LACTATE BLDA-SCNC: 1.3 MMOL/L (ref 0.4–2)
LACTATE BLDA-SCNC: 1.8 MMOL/L (ref 0.4–2)
LACTATE BLDV-SCNC: 0.7 MMOL/L (ref 0.4–2)
MA KAOLIN BLD RES TEG: 63 MM (ref 52–69)
MA KAOLIN BLD RES TEG: 64 MM (ref 52–69)
MA KAOLIN+TF BLD RES TEG: 62 MM (ref 52–70)
MA KAOLIN+TF BLD RES TEG: 64 MM (ref 52–70)
MA TF IND+IIB-IIIA INH BLD RES TEG: 24 MM (ref 15–32)
MA TF IND+IIB-IIIA INH BLD RES TEG: 24 MM (ref 15–32)
MAGNESIUM SERPL-MCNC: 2.13 MG/DL (ref 1.6–2.4)
MAGNESIUM SERPL-MCNC: 2.35 MG/DL (ref 1.6–2.4)
MCH RBC QN AUTO: 29.2 PG (ref 26–34)
MCH RBC QN AUTO: 30.2 PG (ref 26–34)
MCHC RBC AUTO-ENTMCNC: 34.1 G/DL (ref 32–36)
MCHC RBC AUTO-ENTMCNC: 35.1 G/DL (ref 32–36)
MCV RBC AUTO: 85 FL (ref 80–100)
MCV RBC AUTO: 86 FL (ref 80–100)
METHGB MFR BLDA: 1.1 % (ref 0–1.5)
METHGB MFR BLDA: 1.2 % (ref 0–1.5)
NRBC BLD-RTO: 0.5 /100 WBCS (ref 0–0)
NRBC BLD-RTO: 1.1 /100 WBCS (ref 0–0)
OXYHGB MFR BLDA: 95.3 % (ref 94–98)
OXYHGB MFR BLDA: 95.3 % (ref 94–98)
OXYHGB MFR BLDA: 96.7 % (ref 94–98)
OXYHGB MFR BLDA: 96.9 % (ref 94–98)
OXYHGB MFR BLDA: 96.9 % (ref 94–98)
OXYHGB MFR BLDA: 97.5 % (ref 94–98)
OXYHGB MFR BLDA: 97.6 %
OXYHGB MFR BLDV: 79.5 %
PCO2 BLDA: 25 MM HG (ref 38–42)
PCO2 BLDA: 35 MM HG (ref 38–42)
PCO2 BLDA: 39 MM HG (ref 38–42)
PCO2 BLDA: 41 MM HG (ref 38–42)
PCO2 BLDA: 58 MM HG
PCO2 BLDV: 63 MM HG
PH BLDA: 7.33 PH
PH BLDA: 7.42 PH (ref 7.38–7.42)
PH BLDA: 7.46 PH (ref 7.38–7.42)
PH BLDA: 7.51 PH (ref 7.38–7.42)
PH BLDA: 7.56 PH (ref 7.38–7.42)
PH BLDV: 7.3 PH
PHOSPHATE SERPL-MCNC: 3.5 MG/DL (ref 2.5–4.9)
PHOSPHATE SERPL-MCNC: 5.7 MG/DL (ref 2.5–4.9)
PLATELET # BLD AUTO: 116 X10*3/UL (ref 150–450)
PLATELET # BLD AUTO: 119 X10*3/UL (ref 150–450)
PO2 BLDA: 102 MM HG (ref 85–95)
PO2 BLDA: 145 MM HG (ref 85–95)
PO2 BLDA: 214 MM HG (ref 85–95)
PO2 BLDA: 429 MM HG
PO2 BLDA: 76 MM HG (ref 85–95)
PO2 BLDV: 53 MM HG
POTASSIUM BLDA-SCNC: 4.3 MMOL/L (ref 3.5–5.3)
POTASSIUM BLDA-SCNC: 4.6 MMOL/L (ref 3.5–5.3)
POTASSIUM BLDA-SCNC: 4.6 MMOL/L (ref 3.5–5.3)
POTASSIUM BLDA-SCNC: 5.1 MMOL/L (ref 3.5–5.3)
POTASSIUM BLDA-SCNC: 5.2 MMOL/L (ref 3.5–5.3)
POTASSIUM BLDV-SCNC: 5.5 MMOL/L (ref 3.5–5.3)
POTASSIUM SERPL-SCNC: 4.2 MMOL/L (ref 3.5–5.3)
POTASSIUM SERPL-SCNC: 5.1 MMOL/L (ref 3.5–5.3)
PRODUCT BLOOD TYPE: 6200
PRODUCT BLOOD TYPE: 7300
PRODUCT BLOOD TYPE: 8400
PRODUCT CODE: NORMAL
PROTHROMBIN TIME: 13.3 SECONDS (ref 9.8–12.8)
Q ONSET: 218 MS
QRS COUNT: 16 BEATS
QRS DURATION: 88 MS
QT INTERVAL: 328 MS
QTC CALCULATION(BAZETT): 414 MS
QTC FREDERICIA: 383 MS
R AXIS: 105 DEGREES
RBC # BLD AUTO: 2.42 X10*6/UL (ref 4.5–5.9)
RBC # BLD AUTO: 2.95 X10*6/UL (ref 4.5–5.9)
SAO2 % BLDA: 100 %
SAO2 % BLDA: 100 % (ref 94–100)
SAO2 % BLDA: 98 % (ref 94–100)
SAO2 % BLDA: 99 % (ref 94–100)
SAO2 % BLDA: 99 % (ref 94–100)
SAO2 % BLDV: 82 %
SODIUM BLDA-SCNC: 129 MMOL/L (ref 136–145)
SODIUM BLDA-SCNC: 129 MMOL/L (ref 136–145)
SODIUM BLDA-SCNC: 130 MMOL/L (ref 136–145)
SODIUM BLDV-SCNC: 130 MMOL/L (ref 136–145)
SODIUM SERPL-SCNC: 133 MMOL/L (ref 136–145)
SODIUM SERPL-SCNC: 133 MMOL/L (ref 136–145)
T AXIS: 14 DEGREES
T OFFSET: 382 MS
TEST COMMENT: NORMAL
TEST COMMENT: NORMAL
UNIT ABO: NORMAL
UNIT NUMBER: NORMAL
UNIT RH: NORMAL
UNIT VOLUME: 203
UNIT VOLUME: 207
UNIT VOLUME: 216
UNIT VOLUME: 232
UNIT VOLUME: 284
UNIT VOLUME: 294
UNIT VOLUME: 350
VANCOMYCIN SERPL-MCNC: 15.2 UG/ML (ref 5–20)
VENTRICULAR RATE: 96 BPM
WBC # BLD AUTO: 24.3 X10*3/UL (ref 4.4–11.3)
WBC # BLD AUTO: 24.4 X10*3/UL (ref 4.4–11.3)
XM INTEP: NORMAL

## 2024-11-19 PROCEDURE — 84132 ASSAY OF SERUM POTASSIUM: CPT

## 2024-11-19 PROCEDURE — 2500000004 HC RX 250 GENERAL PHARMACY W/ HCPCS (ALT 636 FOR OP/ED)

## 2024-11-19 PROCEDURE — 87102 FUNGUS ISOLATION CULTURE: CPT | Performed by: STUDENT IN AN ORGANIZED HEALTH CARE EDUCATION/TRAINING PROGRAM

## 2024-11-19 PROCEDURE — 33949 ECMO/ECLS DAILY MGMT ARTERY: CPT

## 2024-11-19 PROCEDURE — 2780000003 HC OR 278 NO HCPCS: Performed by: STUDENT IN AN ORGANIZED HEALTH CARE EDUCATION/TRAINING PROGRAM

## 2024-11-19 PROCEDURE — 85027 COMPLETE CBC AUTOMATED: CPT

## 2024-11-19 PROCEDURE — 85347 COAGULATION TIME ACTIVATED: CPT

## 2024-11-19 PROCEDURE — 99291 CRITICAL CARE FIRST HOUR: CPT

## 2024-11-19 PROCEDURE — 2500000001 HC RX 250 WO HCPCS SELF ADMINISTERED DRUGS (ALT 637 FOR MEDICARE OP)

## 2024-11-19 PROCEDURE — 3700000001 HC GENERAL ANESTHESIA TIME - INITIAL BASE CHARGE: Performed by: STUDENT IN AN ORGANIZED HEALTH CARE EDUCATION/TRAINING PROGRAM

## 2024-11-19 PROCEDURE — 2500000002 HC RX 250 W HCPCS SELF ADMINISTERED DRUGS (ALT 637 FOR MEDICARE OP, ALT 636 FOR OP/ED)

## 2024-11-19 PROCEDURE — P9037 PLATE PHERES LEUKOREDU IRRAD: HCPCS

## 2024-11-19 PROCEDURE — P9045 ALBUMIN (HUMAN), 5%, 250 ML: HCPCS | Mod: JZ

## 2024-11-19 PROCEDURE — 83735 ASSAY OF MAGNESIUM: CPT

## 2024-11-19 PROCEDURE — 3600000012 HC PERFUSION TIME - EACH INCREMENTAL 1 MINUTE: Performed by: STUDENT IN AN ORGANIZED HEALTH CARE EDUCATION/TRAINING PROGRAM

## 2024-11-19 PROCEDURE — 2020000001 HC ICU ROOM DAILY

## 2024-11-19 PROCEDURE — P9017 PLASMA 1 DONOR FRZ W/IN 8 HR: HCPCS

## 2024-11-19 PROCEDURE — 74018 RADEX ABDOMEN 1 VIEW: CPT

## 2024-11-19 PROCEDURE — 6360000002 HC RX 636 FACTOR: Mod: JZ

## 2024-11-19 PROCEDURE — 82947 ASSAY GLUCOSE BLOOD QUANT: CPT

## 2024-11-19 PROCEDURE — 82375 ASSAY CARBOXYHB QUANT: CPT

## 2024-11-19 PROCEDURE — 43761 REPOSITION GASTROSTOMY TUBE: CPT | Performed by: NURSE PRACTITIONER

## 2024-11-19 PROCEDURE — P9073 PLATELETS PHERESIS PATH REDU: HCPCS

## 2024-11-19 PROCEDURE — 37799 UNLISTED PX VASCULAR SURGERY: CPT

## 2024-11-19 PROCEDURE — 71045 X-RAY EXAM CHEST 1 VIEW: CPT

## 2024-11-19 PROCEDURE — 2500000005 HC RX 250 GENERAL PHARMACY W/O HCPCS

## 2024-11-19 PROCEDURE — 87075 CULTR BACTERIA EXCEPT BLOOD: CPT | Performed by: STUDENT IN AN ORGANIZED HEALTH CARE EDUCATION/TRAINING PROGRAM

## 2024-11-19 PROCEDURE — 82330 ASSAY OF CALCIUM: CPT

## 2024-11-19 PROCEDURE — 2720000007 HC OR 272 NO HCPCS: Performed by: STUDENT IN AN ORGANIZED HEALTH CARE EDUCATION/TRAINING PROGRAM

## 2024-11-19 PROCEDURE — 36430 TRANSFUSION BLD/BLD COMPNT: CPT

## 2024-11-19 PROCEDURE — 2500000005 HC RX 250 GENERAL PHARMACY W/O HCPCS: Performed by: ANESTHESIOLOGIST ASSISTANT

## 2024-11-19 PROCEDURE — 3600000011 HC PERFUSION TIME - INITIAL BASE CHARGE: Performed by: STUDENT IN AN ORGANIZED HEALTH CARE EDUCATION/TRAINING PROGRAM

## 2024-11-19 PROCEDURE — A4312 CATH W/O BAG 2-WAY SILICONE: HCPCS | Performed by: STUDENT IN AN ORGANIZED HEALTH CARE EDUCATION/TRAINING PROGRAM

## 2024-11-19 PROCEDURE — 85384 FIBRINOGEN ACTIVITY: CPT

## 2024-11-19 PROCEDURE — 0WCC0ZZ EXTIRPATION OF MATTER FROM MEDIASTINUM, OPEN APPROACH: ICD-10-PCS | Performed by: STUDENT IN AN ORGANIZED HEALTH CARE EDUCATION/TRAINING PROGRAM

## 2024-11-19 PROCEDURE — 85384 FIBRINOGEN ACTIVITY: CPT | Performed by: NURSE PRACTITIONER

## 2024-11-19 PROCEDURE — 3600000010 HC OR TIME - EACH INCREMENTAL 1 MINUTE - PROCEDURE LEVEL FIVE: Performed by: STUDENT IN AN ORGANIZED HEALTH CARE EDUCATION/TRAINING PROGRAM

## 2024-11-19 PROCEDURE — 87206 SMEAR FLUORESCENT/ACID STAI: CPT | Performed by: STUDENT IN AN ORGANIZED HEALTH CARE EDUCATION/TRAINING PROGRAM

## 2024-11-19 PROCEDURE — 2500000004 HC RX 250 GENERAL PHARMACY W/ HCPCS (ALT 636 FOR OP/ED): Performed by: NURSE PRACTITIONER

## 2024-11-19 PROCEDURE — 2500000004 HC RX 250 GENERAL PHARMACY W/ HCPCS (ALT 636 FOR OP/ED): Mod: JZ

## 2024-11-19 PROCEDURE — 71045 X-RAY EXAM CHEST 1 VIEW: CPT | Performed by: STUDENT IN AN ORGANIZED HEALTH CARE EDUCATION/TRAINING PROGRAM

## 2024-11-19 PROCEDURE — 2500000005 HC RX 250 GENERAL PHARMACY W/O HCPCS: Performed by: STUDENT IN AN ORGANIZED HEALTH CARE EDUCATION/TRAINING PROGRAM

## 2024-11-19 PROCEDURE — 3600000005 HC OR TIME - INITIAL BASE CHARGE - PROCEDURE LEVEL FIVE: Performed by: STUDENT IN AN ORGANIZED HEALTH CARE EDUCATION/TRAINING PROGRAM

## 2024-11-19 PROCEDURE — C1760 CLOSURE DEV, VASC: HCPCS | Performed by: STUDENT IN AN ORGANIZED HEALTH CARE EDUCATION/TRAINING PROGRAM

## 2024-11-19 PROCEDURE — 85610 PROTHROMBIN TIME: CPT | Performed by: NURSE PRACTITIONER

## 2024-11-19 PROCEDURE — 99223 1ST HOSP IP/OBS HIGH 75: CPT | Performed by: SURGERY

## 2024-11-19 PROCEDURE — 80202 ASSAY OF VANCOMYCIN: CPT

## 2024-11-19 PROCEDURE — P9016 RBC LEUKOCYTES REDUCED: HCPCS

## 2024-11-19 PROCEDURE — 2500000004 HC RX 250 GENERAL PHARMACY W/ HCPCS (ALT 636 FOR OP/ED): Performed by: ANESTHESIOLOGIST ASSISTANT

## 2024-11-19 PROCEDURE — 99100 ANES PT EXTEME AGE<1 YR&>70: CPT | Performed by: ANESTHESIOLOGY

## 2024-11-19 PROCEDURE — 3700000002 HC GENERAL ANESTHESIA TIME - EACH INCREMENTAL 1 MINUTE: Performed by: STUDENT IN AN ORGANIZED HEALTH CARE EDUCATION/TRAINING PROGRAM

## 2024-11-19 PROCEDURE — A33956: Performed by: ANESTHESIOLOGY

## 2024-11-19 PROCEDURE — A33956: Performed by: ANESTHESIOLOGIST ASSISTANT

## 2024-11-19 RX ORDER — ETOMIDATE 2 MG/ML
INJECTION INTRAVENOUS AS NEEDED
Status: DISCONTINUED | OUTPATIENT
Start: 2024-11-19 | End: 2024-11-19

## 2024-11-19 RX ORDER — ROCURONIUM BROMIDE 10 MG/ML
INJECTION, SOLUTION INTRAVENOUS AS NEEDED
Status: DISCONTINUED | OUTPATIENT
Start: 2024-11-19 | End: 2024-11-19

## 2024-11-19 RX ORDER — SUCCINYLCHOLINE CHLORIDE 20 MG/ML
INJECTION INTRAMUSCULAR; INTRAVENOUS AS NEEDED
Status: DISCONTINUED | OUTPATIENT
Start: 2024-11-19 | End: 2024-11-19

## 2024-11-19 RX ORDER — PROPOFOL 10 MG/ML
0-50 INJECTION, EMULSION INTRAVENOUS CONTINUOUS
Status: DISCONTINUED | OUTPATIENT
Start: 2024-11-19 | End: 2024-11-21

## 2024-11-19 RX ORDER — SODIUM CHLORIDE, SODIUM LACTATE, POTASSIUM CHLORIDE, CALCIUM CHLORIDE 600; 310; 30; 20 MG/100ML; MG/100ML; MG/100ML; MG/100ML
5 INJECTION, SOLUTION INTRAVENOUS CONTINUOUS
Status: ACTIVE | OUTPATIENT
Start: 2024-11-19 | End: 2024-11-20

## 2024-11-19 RX ORDER — SODIUM CHLORIDE, SODIUM GLUCONATE, SODIUM ACETATE, POTASSIUM CHLORIDE AND MAGNESIUM CHLORIDE 30; 37; 368; 526; 502 MG/100ML; MG/100ML; MG/100ML; MG/100ML; MG/100ML
INJECTION, SOLUTION INTRAVENOUS CONTINUOUS PRN
Status: DISCONTINUED | OUTPATIENT
Start: 2024-11-19 | End: 2024-11-19

## 2024-11-19 RX ORDER — SODIUM CHLORIDE, SODIUM LACTATE, POTASSIUM CHLORIDE, CALCIUM CHLORIDE 600; 310; 30; 20 MG/100ML; MG/100ML; MG/100ML; MG/100ML
10 INJECTION, SOLUTION INTRAVENOUS CONTINUOUS
Status: ACTIVE | OUTPATIENT
Start: 2024-11-19 | End: 2024-11-20

## 2024-11-19 RX ORDER — PROPOFOL 10 MG/ML
INJECTION, EMULSION INTRAVENOUS
Status: DISPENSED
Start: 2024-11-19 | End: 2024-11-20

## 2024-11-19 RX ORDER — LIDOCAINE HYDROCHLORIDE 20 MG/ML
INJECTION, SOLUTION INFILTRATION; PERINEURAL AS NEEDED
Status: DISCONTINUED | OUTPATIENT
Start: 2024-11-19 | End: 2024-11-19

## 2024-11-19 RX ORDER — ALBUMIN HUMAN 50 G/1000ML
SOLUTION INTRAVENOUS
Status: COMPLETED
Start: 2024-11-19 | End: 2024-11-19

## 2024-11-19 RX ORDER — SODIUM CHLORIDE 0.9 G/100ML
IRRIGANT IRRIGATION AS NEEDED
Status: DISCONTINUED | OUTPATIENT
Start: 2024-11-19 | End: 2024-11-19 | Stop reason: HOSPADM

## 2024-11-19 RX ORDER — CALCIUM CHLORIDE INJECTION 100 MG/ML
INJECTION, SOLUTION INTRAVENOUS AS NEEDED
Status: DISCONTINUED | OUTPATIENT
Start: 2024-11-19 | End: 2024-11-19

## 2024-11-19 RX ORDER — FENTANYL CITRATE 50 UG/ML
INJECTION, SOLUTION INTRAMUSCULAR; INTRAVENOUS AS NEEDED
Status: DISCONTINUED | OUTPATIENT
Start: 2024-11-19 | End: 2024-11-19

## 2024-11-19 RX ORDER — MIDAZOLAM HYDROCHLORIDE 1 MG/ML
INJECTION INTRAMUSCULAR; INTRAVENOUS AS NEEDED
Status: DISCONTINUED | OUTPATIENT
Start: 2024-11-19 | End: 2024-11-19

## 2024-11-19 RX ORDER — PROPOFOL 10 MG/ML
INJECTION, EMULSION INTRAVENOUS AS NEEDED
Status: DISCONTINUED | OUTPATIENT
Start: 2024-11-19 | End: 2024-11-19

## 2024-11-19 RX ORDER — PANTOPRAZOLE SODIUM 40 MG/10ML
40 INJECTION, POWDER, LYOPHILIZED, FOR SOLUTION INTRAVENOUS DAILY
Status: DISCONTINUED | OUTPATIENT
Start: 2024-11-19 | End: 2024-11-21

## 2024-11-19 RX ORDER — ALBUMIN HUMAN 50 G/1000ML
12.5 SOLUTION INTRAVENOUS ONCE
Status: COMPLETED | OUTPATIENT
Start: 2024-11-19 | End: 2024-11-19

## 2024-11-19 RX ADMIN — SODIUM CHLORIDE, POTASSIUM CHLORIDE, SODIUM LACTATE AND CALCIUM CHLORIDE 10 ML/HR: 600; 310; 30; 20 INJECTION, SOLUTION INTRAVENOUS at 10:00

## 2024-11-19 RX ADMIN — SENNOSIDES AND DOCUSATE SODIUM 2 TABLET: 50; 8.6 TABLET ORAL at 20:24

## 2024-11-19 RX ADMIN — ALBUMIN HUMAN 12.5 G: 50 SOLUTION INTRAVENOUS at 20:01

## 2024-11-19 RX ADMIN — AMIODARONE HYDROCHLORIDE 0.5 MG/MIN: 1.8 INJECTION, SOLUTION INTRAVENOUS at 11:23

## 2024-11-19 RX ADMIN — POLYETHYLENE GLYCOL 3350 17 G: 17 POWDER, FOR SOLUTION ORAL at 20:24

## 2024-11-19 RX ADMIN — EPINEPHRINE IN SODIUM CHLORIDE 0.04 MCG/KG/MIN: 16 INJECTION INTRAVENOUS at 11:23

## 2024-11-19 RX ADMIN — PIPERACILLIN SODIUM AND TAZOBACTAM SODIUM 4.5 G: 4; .5 INJECTION, SOLUTION INTRAVENOUS at 20:24

## 2024-11-19 RX ADMIN — ATORVASTATIN CALCIUM 80 MG: 80 TABLET, FILM COATED ORAL at 20:24

## 2024-11-19 RX ADMIN — PROPOFOL 40 MCG/KG/MIN: 10 INJECTION, EMULSION INTRAVENOUS at 21:06

## 2024-11-19 RX ADMIN — PIPERACILLIN SODIUM AND TAZOBACTAM SODIUM 4.5 G: 4; .5 INJECTION, SOLUTION INTRAVENOUS at 01:05

## 2024-11-19 RX ADMIN — TICAGRELOR 90 MG: 90 TABLET ORAL at 08:58

## 2024-11-19 RX ADMIN — ALBUMIN HUMAN 12.5 G: 0.05 INJECTION, SOLUTION INTRAVENOUS at 20:01

## 2024-11-19 RX ADMIN — NOREPINEPHRINE BITARTRATE 0.08 MCG/KG/MIN: 8 INJECTION, SOLUTION INTRAVENOUS at 04:13

## 2024-11-19 RX ADMIN — AMIODARONE HYDROCHLORIDE 0.5 MG/MIN: 1.8 INJECTION, SOLUTION INTRAVENOUS at 20:00

## 2024-11-19 RX ADMIN — PANTOPRAZOLE SODIUM 40 MG: 40 INJECTION, POWDER, FOR SOLUTION INTRAVENOUS at 08:57

## 2024-11-19 RX ADMIN — ASPIRIN 81 MG 81 MG: 81 TABLET ORAL at 08:58

## 2024-11-19 RX ADMIN — ACETAMINOPHEN 650 MG: 325 TABLET ORAL at 20:30

## 2024-11-19 RX ADMIN — PROPOFOL 40.33 MCG/KG/MIN: 10 INJECTION, EMULSION INTRAVENOUS at 18:17

## 2024-11-19 RX ADMIN — Medication 1250 MG: at 20:24

## 2024-11-19 RX ADMIN — PIPERACILLIN SODIUM AND TAZOBACTAM SODIUM 4.5 G: 4; .5 INJECTION, SOLUTION INTRAVENOUS at 07:14

## 2024-11-19 RX ADMIN — Medication 1250 MG: at 08:57

## 2024-11-19 RX ADMIN — OXYCODONE HYDROCHLORIDE 5 MG: 5 TABLET ORAL at 09:33

## 2024-11-19 RX ADMIN — SODIUM CHLORIDE, POTASSIUM CHLORIDE, SODIUM LACTATE AND CALCIUM CHLORIDE 5 ML/HR: 600; 310; 30; 20 INJECTION, SOLUTION INTRAVENOUS at 10:00

## 2024-11-19 RX ADMIN — TICAGRELOR 90 MG: 90 TABLET ORAL at 20:24

## 2024-11-19 ASSESSMENT — PAIN - FUNCTIONAL ASSESSMENT
PAIN_FUNCTIONAL_ASSESSMENT: CPOT (CRITICAL CARE PAIN OBSERVATION TOOL)
PAIN_FUNCTIONAL_ASSESSMENT: 0-10
PAIN_FUNCTIONAL_ASSESSMENT: 0-10
PAIN_FUNCTIONAL_ASSESSMENT: CPOT (CRITICAL CARE PAIN OBSERVATION TOOL)
PAIN_FUNCTIONAL_ASSESSMENT: 0-10
PAIN_FUNCTIONAL_ASSESSMENT: 0-10

## 2024-11-19 ASSESSMENT — PAIN SCALES - GENERAL
PAINLEVEL_OUTOF10: 0 - NO PAIN

## 2024-11-19 NOTE — CONSULTS
Vancomycin Dosing by Pharmacy- INITIAL    Jaime Dominguez is a 74 y.o. year old male who Pharmacy has been consulted for vancomycin dosing for pneumonia. Based on the patient's indication and renal status this patient will be dosed based on a goal AUC of 400-600.     Renal function is currently stable.    Visit Vitals  BP (!) 105/31   Pulse 104   Temp (!) 38.1 °C (100.6 °F) (Core)   Resp 19        Lab Results   Component Value Date    CREATININE 0.68 2024    CREATININE 0.66 2024    CREATININE 0.65 2024    CREATININE 0.64 2024        Patient weight is as follows:   Vitals:    24 0600   Weight: 94 kg (207 lb 3.7 oz)       Cultures:  No results found for the encounter in last 14 days.        I/O last 3 completed shifts:  In: 4017.8 (42.7 mL/kg) [P.O.:240; I.V.:1827.8 (19.4 mL/kg); Blood:1750; IV Piggyback:200]  Out: 3745 (39.8 mL/kg) [Urine:2385 (0.7 mL/kg/hr); Chest Tube:1360]  Weight: 94 kg   I/O during current shift:  I/O this shift:  In: -   Out: 35 [Urine:35]    Temp (24hrs), Av.2 °C (98.9 °F), Min:36.5 °C (97.7 °F), Max:38.1 °C (100.6 °F)         Assessment/Plan     Patient will be given a loading dose of 2000 mg.  Will initiate vancomycin maintenance,  1250 mg every 12 hours.    This dosing regimen is predicted by [employer] to result in the following pharmacokinetic parameters:    Loading dose: 2000 mg at 00:00 2024.  Regimen: 1250 mg IV every 12 hours.  Exposure target: AUC24 (range)400-600 mg/L.hr   QGS05-80: 479 mg/L.hr  AUC24,ss: 517 mg/L.hr  Probability of AUC24 > 400: 75 %  Ctrough,ss: 16.1 mg/L  Probability of Ctrough,ss > 20: 33 %    Follow-up level will be ordered on  at 14:00 unless clinically indicated sooner.  Will continue to monitor renal function daily while on vancomycin and order serum creatinine at least every 48 hours if not already ordered.  Follow for continued vancomycin needs, clinical response, and signs/symptoms of toxicity.       Tricia  Aurora, Arnaldo

## 2024-11-19 NOTE — OP NOTE
Mediastinal Exploration, Left Hemothorax Evacuation,   Left Femoral VA ECMO Decannulation, Chest Closure   Operative Note     Date: 2024  OR Location: [facility] OR    Name: Jaime Dominguez, : 1950, Age: 74 y.o., MRN: [id number], Sex: Male    Diagnosis  Pre-op Diagnosis      * STEMI     * Cardiogenic shock  Post-op Diagnosis     * STEMI     * Cardiogenic shock      Procedures  Mediastinal exploration   Left hemothorax evacuation   Left femoral VA ECMO decannulation   Chest closure     Surgeons      * Cierra Stanley MD - Primary    Resident/Fellow/Other Assistant:  Surgeons and Role:     * Rajan Alba MD - Assisting      * Izaiah Meyer MD PhD - Assisting     * AMBERLY Gonzalez - Assisting      * YANETH Siu - Assisting     Dr. Alba should be considered an assisting surgeon; His presence was requested, as there was no qualified resident available for this case.  His role in the case was to provide full assistance with mediastinal exploration and left femoral VA ECMO decannulation.    Staff:   Relief Circulator: Kathryn  Scrub Person: Rajat  Scrub Person: Marilynn  Circulator: Kaley  Circulator: Mary  Surgical Assistant: Frank  Surgical Assistant: Chau    Anesthesia Staff: Anesthesiologist: Jaleel Chavez MD  C-AA: EDELMIRA Jurado; EDELMIRA Ly  Perfusionist: Anthony Ferguson    Procedure Summary  Anesthesia: General  ASA: IV  Estimated Blood Loss: 250mL  Intra-op Medications:   Administrations occurring from 1345 to 1755 on 24:   Medication Name Total Dose   sodium chloride 0.9 % irrigation solution 5,000 mL   aspirin chewable tablet 81 mg Cannot be calculated   atorvastatin (Lipitor) tablet 80 mg Cannot be calculated   calcium gluconate 1 g in sodium chloride (iso) IV 50 mL Cannot be calculated   calcium gluconate 2 g in sodium chloride (iso)  mL Cannot be calculated   EPINEPHrine (Adrenalin) 4 mg in sodium chloride 0.9% 250 mL (16 mcg/mL) infusion  (premix) 0.95 mg   insulin lispro injection 0-5 Units Cannot be calculated   magnesium sulfate 2 g in sterile water for injection 50 mL Cannot be calculated   magnesium sulfate 4 g in sterile water for injection 100 mL Cannot be calculated   melatonin tablet 5 mg Cannot be calculated   naloxone (Narcan) injection 0.2 mg Cannot be calculated   naloxone (Narcan) injection 0.2 mg Cannot be calculated   norepinephrine (Levophed) 8 mg in dextrose 5% 250 mL (0.032 mg/mL) infusion (premix) 0.39 mg   oxyCODONE (Roxicodone) immediate release tablet 2.5 mg Cannot be calculated   oxyCODONE (Roxicodone) immediate release tablet 5 mg Cannot be calculated   pantoprazole (ProtoNix) injection 40 mg Cannot be calculated   piperacillin-tazobactam (Zosyn) 4.5 g in dextrose (iso)  mL Cannot be calculated   polyethylene glycol (Glycolax, Miralax) packet 17 g Cannot be calculated   potassium chloride 20 mEq in sterile water for injection 100 mL Cannot be calculated   potassium chloride 40 mEq in sterile water for injection 100 mL Cannot be calculated   sennosides-docusate sodium (Cheryle-Colace) 8.6-50 mg per tablet 2 tablet Cannot be calculated   ticagrelor (Brilinta) tablet 90 mg Cannot be calculated   vancomycin (Vancocin) pharmacy to dose - pharmacy monitoring Cannot be calculated   calcium chloride 10% 1 g   electrolyte-A (Plasmalyte-A) solution Cannot be calculated   fentaNYL (Sublimaze) injection 50 mcg/mL 200 mcg   fibrinogen concentrate (Riastap) injection 2,090 mg   norepinephrine (Levophed) 16 mcg/mL syringe for Anesthesia 8 mcg   propofol (Diprivan) injection 10 mg/mL 285.32 mg   rocuronium (ZeMuron) 50 mg/5 mL injection 60 mg   NaCl 0.9 % bolus Cannot be calculated   tranexamic acid (Cyklokapron) 6,250 mg in sodium chloride 0.9% 312.5 mL (20 mg/mL) infusion 1,000 mg              Anesthesia Record               Intraprocedure I/O Totals          Intake    PLASMA 491.00 mL    PLATELETS 526.00 mL    Transfuse RBC :30 Minutes  700.00 mL    NaCl 0.9 % bolus 250.00 mL    electrolyte-A 750.00 mL    Norepinephrine Drip 0.00 mL    The total shown is the total volume documented since Anesthesia Start was filed.    Tranexamic Acid 0.00 mL    The total shown is the total volume documented since Anesthesia Start was filed.    Epinephrine Drip 0.00 mL    The total shown is the total volume documented since Anesthesia Start was filed.    Amiodarone Drip 0.00 mL    The total shown is the total volume documented since Anesthesia Start was filed.    piperacillin-tazobactam (Zosyn) 4.5 g in dextrose (iso)  mL 300.00 mL    Cell Saver 675 mL    Total Intake 3692 mL       Output    Urine 280 mL    Est. Blood Loss 250 mL    Total Output 530 mL       Net    Net Volume 3162 mL          Specimen:   ID Type Source Tests Collected by Time   A : mediastinal clot Tissue CLOT/THROMBUS AFB CULTURE/SMEAR, FUNGAL CULTURE/SMEAR, TISSUE/WOUND CULTURE/SMEAR Cierra Stanley MD 11/19/2024 1414     Drains and/or Catheters:   Chest Tube 1 Left Pleural (Active)   Function -20 cm H2O 11/19/24 0900   Chest Tube Air Leak No 11/19/24 0900   Patency Intervention Tip/tilt;Stripped 11/19/24 0900   Drainage Description Dark red;Sanguineous;Other (Comment) 11/19/24 0900   Dressing Status Clean;Dry;Occlusive 11/19/24 0900   Site Assessment Clean;Dry;Intact 11/19/24 0400   Surrounding Skin Intact 11/18/24 2000   Output (mL) 20 mL 11/19/24 1200       Urethral Catheter 16 Fr. (Active)   Site Assessment Clean;Skin intact 11/19/24 1501   Collection Container Standard drainage bag 11/19/24 1235   Securement Method Leg strap 11/19/24 1501   Output (mL) 600 mL 11/19/24 1235       Y Chest Tube 1 and 2 Mediastinal Mediastinal (Active)   Function -20 cm H2O 11/19/24 0900   Chest Tube Air Leak No 11/19/24 0900   Patency Intervention Tip/tilt;Stripped 11/19/24 0900   Drainage Description 1 Dark red;Sanguineous 11/19/24 0900   Dressing Status 1 Clean;Dry;Intact 11/19/24 0900   Site Assessment 1  Clean;Dry;Intact 11/19/24 0400   Surrounding Skin 1 Dry;Intact 11/18/24 2000   Drainage Description 2 Dark red;Sanguineous 11/19/24 0900   Dressing Status 2 Clean;Dry;Intact 11/19/24 0900   Site Assessment 2 Clean;Dry;Intact 11/19/24 0400   Surrounding Skin Dry;Intact 11/18/24 2000   Output (mL) 10 mL 11/19/24 1200     Findings:   Improving LV function with LVEF 35% and normal RV function following ECMO decannulation   Doppler left PT and DP signals obtained following ECMO decannulation     Indications: Jaime Dominguez is a 74-year-old male who initially presented with a STEMI. On 11/12/24, he underwent minimally invasive robotic-assisted CABG x2 converted to sternotomy. Due to postoperative cardiogenic shock, he underwent IABP placement on 11/14/24. Subsequent cardiac cath demonstrated 100% stenosis at the LAD anastomosis. He required left femoral VA ECMO cannulation and salvage PCI of the mid LAD. CT Chest 11/18/24 demonstrated increased moderate left-sided hemothorax. He presents now for mediastinal exploration, left hemothorax evacuation, and left femoral VA ECMO decannulation.     The patient was seen in the CTICU preoperatively. The risks, benefits, complications, treatment options, non-operative alternatives, expected recovery and outcomes were discussed with the patient. The possibilities of reaction to medication, pulmonary aspiration, injury to surrounding structures, bleeding, recurrent infection, the need for additional procedures, failure to diagnose a condition, and creating a complication requiring transfusion or operation were discussed with the patient. The patient concurred with the proposed plan, giving informed consent.  The site of surgery was properly noted per policy. Scheduled antibiotics were continued preoperatively. Venous thrombosis prophylaxis was not indicated.     Procedure Details:   The patient was taken to the operating room and positioned supine. General anesthesia was induced and  the patient was intubated. The surgical site was prepped and draped in sterile fashion. The sternotomy was reopened and the mediastinum was explored. Moderate left hemothorax was evacuated. The right pleural space was opened. The mediastinum and bilateral pleural spaces were copiously irrigated with warm saline. Adequate hemostasis was achieved.     We then turned our attention to left femoral VA ECMO decannulation. ECMO flows were gradually weaned. EZEQUIEL demonstrated stable moderate LV dysfunction and normal RV function. Inotropic and pressor requirements remained relatively stable as well. The decision was made to proceed with VA ECMO decannulation. The patient was weaned off VA ECMO support. The left superficial femoral artery distal perfusion catheter was removed and an 8 Chadian Angioseal closure device was deployed. An 18 Chadian Manta Vascular closure device was deployed at the 17 Chadian left common femoral arterial cannulation site. The left common femoral venous cannula was removed as a 2-0 silk figure of 8 suture was tied down. Manual pressure was applied following decannulation. Vascular surgery evaluated the patient intraoperatively and appreciated excellent hemostasis with Doppler left PT and DP signals. Given the patient's stable LV function and support requirements, we determined that Impella 5.5 insertion was not necessary.    We then proceeded with chest closure. The patient's mediastinal and left pleural Malik drains were removed. 28 Chadian left pleural chest tubes x2 were placed. 28 Chadian mediastinal chest tubes x2 were placed. A 28 Chadian right pleural chest tube x1 was placed. Adequate hemostasis was achieved throughout the mediastinum and pleural spaces. The sternum was reapproximated with interrupted wires. The subcutaneous tissue was closed with 0 Vicryl suture in running fashion. The deep dermal layer was closed with 2-0 Vicryl suture in running fashion. The skin was closed with 3-0 Monocryl  suture in running fashion. Dermabond was then applied.     All instrument, needle, and sponge counts were correct x2. The patient tolerated the procedure well and was transferred to the CTICU in stable condition.     Complications:  None; patient tolerated the procedure well.    Disposition: ICU - intubated and hemodynamically stable.  Condition: Stable       Task Performed by RNFA or Surgical Assistant:  Assistance with left superficial femoral artery distal reperfusion catheter and left common femoral arterial cannula removal, manual pressure application, and chest closure.    Additional Details: None    Attending Attestation: I performed the procedure.    Cierra Stanley MD  Phone Number: [phone]

## 2024-11-19 NOTE — PROGRESS NOTES
Patient requires ECMO support.     ECMO:  Cannulation Date: Nov. 14, 2024  ECMO Indication: Cardiogenic Shock  Current Goals of Care: Full Code    ECMO Cannula Configuration: L fem-fem VA-ECMO    ECMO Interrogation: Drainage cannula site, cannula, pump, oxygenator, return cannula and return cannula site clinically inspected. RPM and sweep reviewed and adjusted appropriate to clinical context.    ECMO circuit run from drainage cannula to pump to oxygenator to return cannula: Yes  Pre/post PaO2 adequate: Yes  LV Unloading/Pulse Pressure: IABP at 1:1; Good pulsatility this morning  Distal Perfusion: L DPC in place, adequate pulses    ECMO Settings:     Most Recent Range Past 24hrs   Flow 3.85 Patient Flow (L/min)  Min: 2.52  Max: 4   Speed 2760 Circuit Flow (RPM)  Min: 2150  Max: 2760   Sweep 0.5 Sweep Gas Flow Rate (L/min)  Min: 0.5  Max: 0.5     IABP in situ 1:1.    Invasive Hemodynamics:    Most Recent Range Past 24hrs   BP (Art) 104/47 Arterial Line BP 1  Min: 89/44  Max: 151/51   MAP(Art) 69 mmHg Arterial Line MAP 1 (mmHg)   Min: 56 mmHg  Max: 95 mmHg   RA/CVP   No data recorded   PA 14/7 PAP  Min: 8/4  Max: 19/7   PA(mean) 9 mmHg PAP (Mean)  Min: 3 mmHg  Max: 13 mmHg   CO   No data recorded   CI   No data recorded   Mixed Venous   No data recorded   SVR    No data recorded     Hemodynamic Management:  amiodarone, 0.5 mg/min, Last Rate: 0.5 mg/min (11/19/24 0700)  EPINEPHrine, 0.04 mcg/kg/min, Last Rate: 0.04 mcg/kg/min (11/19/24 0700)  norepinephrine, 0-1 mcg/kg/min, Last Rate: 0.08 mcg/kg/min (11/19/24 0700)        Bleeding/Clot Issues:   Anticoagulation/Monitoring: Anticoagulation status and intensity discussed  Plan: Systemic heparin on hold in light of concerns with chest bleeding.  Presence of cannula bleeding: No significant external bleeding at present  Presence of oxygenator clot: None    Management Issues:  Road Trips planned for today? Yes to the OR  Mobility Planned today? N  Weaning Plan/date: Plan  for chest exploration and possible decannulation today    Communication:  Discussed with Cardiothoracic surgeon, Perfusion, Palliative care (consulted entered and physical/occupational therapy)    Family Updates/Concerns? Family updated. All questions appeared be answered adequately.

## 2024-11-19 NOTE — PROCEDURES
PROCEDURE NOTE: Eylea PFS OD. Diagnosis: Diabetic Macular Edema. Anesthesia: Subconjunctival. Prep: Betadine Drops. Prior to injection, risks/benefits/alternatives discussed including but not limited to infection, loss of vision or eye, hemorrhage, cataract, glaucoma, retinal tears or detachment. The patient wished to proceed with treatment. Betadine prep was performed. Topical anesthesia was induced with Alcaine. Additional anesthesia was achieved using drop(s) or injection checked above. A drop of Povidone-iodine 5% ophthalmic solution was instilled over the injection site and in the inferior fornix. A single use prefilled syringe of intravitreal Eylea 2mg/0.05ml was used and excess was disposed of as waste. The needle was passed 3.0 mm posterior to the limbus in pseudophakic patients, and 3.5 mm posterior to the limbus in phakic patients. Injection time: 11:45 AM.  Patient tolerated procedure well. There were no complications. The eye was irrigated with sterile irrigating solution. Post procedure instructions given. The patient was instructed to use Artificial Tears q.i.d. p.r.n for comfort. The patient was instructed to return for re-evaluation in approximately 4-12 weeks depending on his/her condition and was told to call immediately if vision decreases and/or if his/her eye becomes red, painful, and/or light sensitive. The patient was instructed to go to the emergency room or call 911 if unable to reach the doctor within an hour or two of trying or calling. Lisa Soria Small bore feeding tube placed using the Cortrak machine. L nare bleeding present prior to attempt. Passed on right easily and appears post pyloric on Cortrak.  No evidence of respiratory compromise.  Bridled in place.  KUB ordered to confirm placement.

## 2024-11-19 NOTE — ANESTHESIA PREPROCEDURE EVALUATION
Patient: Jaime Dominguez    Procedure Information       Anesthesia Start Date/Time: 11/19/24 1300    Procedures:       Removal Extracorporeal Membrane Oxygenation (Bilateral) - Left femoral VA ECMO decannulation, Possible right axillary Impella 5.5 insertion      IMPELLA  VENTRICULAR ASSIST DEVICE INSERTION (Bilateral)      Closure Surgical Wound Chest    Location: [facility] OR 21 / [facility] OR    Surgeons: Cierra Stanley MD            Relevant Problems   Cardiac   (+) Acute ST elevation myocardial infarction (STEMI) of inferior wall (Multi)   (+) ST elevation myocardial infarction (STEMI) involving other coronary artery of inferior wall   (+) STEMI (ST elevation myocardial infarction) (Multi)      Respiratory   (+) Hemothorax on left      Circulatory   (+) Cardiogenic shock (Multi)   (+) On intra-aortic balloon pump assist      Genitourinary   (+) TREE (acute kidney injury) (CMS-[facility])       Clinical information reviewed:    Allergies  Meds   Med Hx             NPO Detail:  No data recorded     Physical Exam    Airway  Mallampati: II  TM distance: >3 FB  Neck ROM: limited     Cardiovascular    Dental - normal exam     Pulmonary    Abdominal      Other findings: H/O cervical fusion        Anesthesia Plan    History of general anesthesia?: yes  History of complications of general anesthesia?: no    ASA 4     general   (Plan ga by oett, EZEQUIEL, reuscitate as required.)  Anesthetic plan and risks discussed with patient.  Use of blood products discussed with patient who consented to blood products.    Plan discussed with attending.

## 2024-11-19 NOTE — CARE PLAN
CARDIAC SURGERY PLAN OF CARE    Patient tolerated ECMO turndown this morning.   Plan for left femoral VA ECMO decannulation and possible right axillary Impella 5.5 insertion tomorrow.     CT imaging reviewed with Dr. Alba and Dr. Bonilla.   Concern for retained left hemothorax.     Plan for chest washout at the time of ECMO decannulation tomorrow.   Plan of care discussed with the patient and his family at bedside.   Informed consent obtained.     Continued oozing from left femoral arterial cannulation site.   Purse string suture placed. Hemostasis achieved.   Plan to proceed with decannulation tomorrow.     Please call with questions or concerns.     Cierra Stanley MD   Cardiac Surgeon

## 2024-11-19 NOTE — PROGRESS NOTES
CTICU Progress Note  Jaime Dominguez/[id number]    Admit Date: 11/10/2024  Hospital Length of Stay: 9   ICU Length of Stay: 6d 6h   CT SURGEON: Dr. Aldo Harman    SUBJECTIVE:   Hemodynamics acceptable on provided therapy. Remains on epinephrine 0.04mcg/kg/min, norepinephrine 0.08mcg/kg/min, ECMO 2760rpm/3.9L/100%/SW0.5, and IABP 1:1. Adequately oxygenating on 4L NC. Hgb with slow downtrend. Pan cultures pending and antibiotics initiated.    MEDICATIONS  Infusions:  amiodarone, Last Rate: 0.5 mg/min (11/19/24 0700)  EPINEPHrine, Last Rate: 0.04 mcg/kg/min (11/19/24 0700)  norepinephrine, Last Rate: 0.08 mcg/kg/min (11/19/24 0700)      Scheduled:  acetaminophen, 650 mg, q4h   Or  acetaminophen, 650 mg, q4h  amiodarone, 400 mg, BID  aspirin, 81 mg, Daily  atorvastatin, 80 mg, Nightly  insulin lispro, 0-5 Units, q4h  oxygen, , Continuous - Inhalation  pantoprazole, 40 mg, Daily before breakfast  piperacillin-tazobactam, 4.5 g, q6h  polyethylene glycol, 17 g, BID  sennosides-docusate sodium, 2 tablet, BID  vancomycin, 1,250 mg, q12h      PRN:  calcium gluconate, 1 g, q6h PRN  calcium gluconate, 2 g, q6h PRN  magnesium sulfate, 2 g, q6h PRN  magnesium sulfate, 4 g, q6h PRN  melatonin, 5 mg, Nightly PRN  naloxone, 0.2 mg, q5 min PRN  naloxone, 0.2 mg, q5 min PRN  ondansetron, 4 mg, q8h PRN   Or  ondansetron, 4 mg, q8h PRN  oxyCODONE, 2.5 mg, q4h PRN  oxyCODONE, 5 mg, q4h PRN  potassium chloride CR, 20 mEq, q6h PRN   Or  potassium chloride, 20 mEq, q6h PRN  potassium chloride CR, 40 mEq, q6h PRN   Or  potassium chloride, 40 mEq, q6h PRN  potassium chloride, 20 mEq, q6h PRN  potassium chloride, 40 mEq, q6h PRN  vancomycin, , Daily PRN        PHYSICAL EXAM:   Visit Vitals  BP (!) 120/43   Pulse 83   Temp 37.3 °C (99.1 °F) (Core)   Resp 18   Ht 1.829 m (6')   Wt 94 kg (207 lb 3.7 oz)   SpO2 100%   BMI 28.11 kg/m²   Smoking Status Never   BSA 2.19 m²     Wt Readings from Last 5 Encounters:   11/18/24 94 kg (207 lb 3.7 oz)    11/10/24 96.2 kg (212 lb)     INTAKE/OUTPUT:  I/O last 3 completed shifts:  In: 4824.3 (51.3 mL/kg) [P.O.:240; I.V.:2384.3 (25.4 mL/kg); Blood:1400; IV Piggyback:800]  Out: 3555 (37.8 mL/kg) [Urine:2465 (0.7 mL/kg/hr); Chest Tube:1090]  Weight: 94 kg          Vent settings:       Physical Exam:   Constitutional: Male patient lying in ICU bed in no acute distress  Neuro: A+Ox3, no obvious focal deficits, CAM negative  CV: RRR. S1S2. SR/SA with PACS on monitor. AV wires present  Pulm: CTAB. CT's with appropriate serosang output and no airleak.  : Clear yellow urine ia aden. Scrotal edema   GI: S/ND/NT. Hypoactive BS.  Extremities: NV exam intact x 4. No edema. Cannula site with small ooze.   Skin: WDI. Postop dressings intact. Chest tube dressings intact.    Psych: Calm and cooperative    Daily Risk Screen  Central line: Hemodynamic instability requiring parenteral medication  Aden: Accurate I/Os in critically ill    Images: Reviewed    Invasive Hemodynamics:    Most Recent Range Past 24hrs   BP (Art) 104/47 Arterial Line BP 1  Min: 89/44  Max: 151/51   MAP(Art) 69 mmHg Arterial Line MAP 1 (mmHg)   Min: 56 mmHg  Max: 95 mmHg   RA/CVP   No data recorded   PA 14/7 PAP  Min: 8/4  Max: 19/7   PA(mean) 9 mmHg PAP (Mean)  Min: 3 mmHg  Max: 13 mmHg     Assessment/Plan   Jaime Dominguez is a 74 year old male with a PMH significant for basal cell carcinoma and HPL s/p MidCAB x2 converted to full sternotomy w/ LIMA prolonged as a Y graft with a radial to LAD OM w/ Dr. Aldo Harman and Dr. Stanley. CPB time 347min. Course c/b increasing pressor requirement and echo revealing apical hypo-akinesis with subsequent IABP placement on 11/14 for further support. Overnight on 11/14, Troponin elevated and ST elevation in anteroseptal leads, c/f ACS and decision made to [facility]. Now s/p PCI of mid LAD with HEATHER. Pt cannulated on VA ECMO for cardiogenic shock state 2/2 multiple failed grafts.     Plan:  NEURO: No PMH. A+Ox3, CAM negative, no  obvious focal deficits. Physically deconditioned. -->  - Serial neuro and pain assessments   - Scheduled Tylenol   - Continue PRN oxycodone to 2.5mg for moderate and 5mg for severe  - Continue melatonin  - PT Consult  - CAM ICU score qshift  - Passive ROM  - Sleep/wake cycle hygiene     CV: Patient has a history of HLD. Is now status post MIDCABG x 2 with conversion to full sternotomy LIMA prolonged as a Y graft with a radial to LAD OM on 11/12. Pre/Post EF: normal BIV. Post op course complicated by coronary graft dysfunction on 11/14. Now s/p PCI of mid LAD with HEATHER. Pt cannulated on VA ECMO for cardiogenic shock state 2/2 multiple failed grafts 11/14. Most recent TTE on 11/18 during ECMO turn down trial with LVEF 34% and reduced RV. NSR, SA with PACs. Hypotension on norepinephrine 0.08mcg/kg/min infusion. Remains on epinephrine 0.04mcg/kg/min for inotropy. ECMO 2760rpm/3.8L/100%/SW0.5 with adequate pulsatility and pulse pressure. IABP at 1:1. -->  - Maintain IABP 1:1 setting for LV venting, EKG trigger   - Continue IV amiodarone 0.5mg/min and transition to PO later today  - Wean norepinephrine while maintaining MAP 70-90  - Continue epinephrine 0.04mcg/kg/min for inotropy  - Maintain epicardial wires set VVI @ 40  - Continue aspirin and Ticagrelor 90mg BID  - Continue with atorvastatin 80mg nightly  - Plan to return to OR today for ECMO decannulation and chest washout/clot evacuation    PULM: No history of pulmonary disease. Chest tubes 2x meds and 1 L pleural. Output minimal. Morning CXR with worsening left lung aeration and opacities. Currently oxygenating well on 4L NC with minimal sputum production. -->  - Daily CXR  - Wean FiO2 maintaining SpO2 >92%.   - IS q1h and OOB to chair  - Chest tubes to wall suction. Leave in place until pt further mobilizes      GI: No PMH. Passed bedside swallow. NPO for OR. No post op BM. Abdomen soft, non tender, and non distended. -->   - NPO for OR  - Colace/senna BID and  miralax BID  - PPI     : CSA-TREE Risk Score low. No history of renal disease, baseline creatinine 0.87. Creatinine stable post-op. Aden in place and making adequate UOP. Net positive 2.2L in last 24 hours from blood product resuscitation. -->  - Continue aden catheter for strict I/Os.  - Goal UOP 0.5ml/kg/hr  - RFP as clinically indicated  - Replete electrolytes per CTICU protocol     ENDO: No PMH. A1c: 5.1. Euglycemia. -->  - Maintain BG <180, insulin per CTICU protocol     HEME: Acute blood loss anemia. Oozing from left groin cannulation site yesterday now improved after systemic heparin held and stitch added on 11/18. CT scan showing left chest hemothorax with resulting compressive atelectasis. Hgb 7.3. -->    - Monitor drain output volume and characteristics  - Daily CBC and prn  - 2u PRBCs  - Continue aspirin and Ticagrelor BID  - No systemic heparin due to bleeding  - SCDs for DVT prophylaxis.  - Last type and screen: 11/18     ID: Afebrile, but previously warming ECMO circuit. Leukocytosis. MRSA negative. UA negative, blood cultures sent on 11/16 and NGTD x2. Recultured on 11/18 and results pending. -->  - Follow up blood culture results  - Continue vanc/zosyn  - Continue to monitor WBC  - Trend temp q4h     Skin: No active skin issues. -->  - Scrotal edema- will elevate   - L groin cannula site soft and dry (no oozing), no concern for compartment syndrome ( CK and myoglobin downtrend)  - preventative Mepilex dressings in place on sacrum and heels  - change preventative Mepilex weekly or more frequently as indicated (when moist/soiled)   - every shift skin assessment per nursing and weekly ICU skin rounds  - moisture barrier to be applied with sandro care  - active skin problems addressed with nursing on daily rounds     Proph:  SCDs  PPI     G: Line  Right IJ MAC w PAC placed 11/12  R radial a-line placed 11/16  Aden 11/12     F: Family: will update at bedside.     Restraints: Not indicated.    Code  status: Full Code    I personally spent 41 minutes of critical care time directly and personally managing the patient exclusive of separately billable procedures.     A,B,C,D,E,F,G: reviewed    Discussed with Dr. Bonilla.  Dispo: CTICU care for now.    CTICU TEAM PHONE [phone]

## 2024-11-19 NOTE — PROGRESS NOTES
Spiritual Care Visit    Clinical Encounter Type  Visited With: Patient  Routine Visit: Follow-up  Continue Visiting: Yes    Adventist Encounters  Adventist Needs: Prayer         Sacramental Encounters  Other Sacrament: P&B    Patient received a prayer and blessing (P&B) by Fr. Davion Bro,  Confucianist .  Patient had received the Sacrament of the Anointing of the Sick on 11/18/24.

## 2024-11-19 NOTE — BRIEF OP NOTE
Date: 11/10/2024 - 2024  OR Location: [facility] OR    Name: Jaime Dominguez, : 1950, Age: 74 y.o., MRN: [id number], Sex: male    Diagnosis  Pre-op Diagnosis      * Cardiogenic shock (Multi) [R57.0] Post-op Diagnosis     * Cardiogenic shock (Multi) [R57.0]     Post-Op Diagnosis:     Procedure: Complete Doppler Exam; Redo-Median Sternotomy; Evacuation of Bilateral and Pericardial Effusions; Removal of Reperfusion Cannula with 8Fr Angioseal and Manual Compression; Percutaneous Decannulation of Left Arterio-Venous ECMO with 18Fr. Manta Closure and Manual Compression of Arterial Site; Manual Compression Left Femoral Venous site; Placement of Chest Tubes; Sternal Washout; Standard Closure with Prevena Dressing    Anesthesia: General with EZEQUIEL    Chest Tubes/Drains/Catheters: Right Pleural CT; Mediastinal CT x2; Left Pleural CT x2       Pacing Wires: Same: V Wires    Antibiotics: SEE ANESTHESIA RECORD    XC: 0    CPB: 0    CIRC: 0    EBL: 250ml    BSA: 2.19    Specimen:   ID Type Source Tests Collected by Time   1 : mediastinal clot Tissue CLOT/THROMBUS SURGICAL PATHOLOGY EXAM Cierra Stanley MD 2024 1414       Candidate For Emergency Re-sternotomy? Yes   -If yes, POD #10 is -     Permanent pacer/ICD: No   -Preoperative settings:    -Intra-op/ Postoperative settings:    Surgical Assistants:  AMBERLY Waller    Thank you for allowing me to be a part of the care team!  Frank Mauro, BS, MPH, CST, CTP, CSFA  Pager: [phone]            Procedures  Removal Extracorporeal Membrane Oxygenation  32448 - CT ECMO/ECLS RMVL PRPH CANNULA OPEN 6 YRS & OLDER    Closure Surgical Wound Chest  10169 - CT SECONDARY CLOSURE SURG WOUND/DEHSN XTNSV/COMP      Surgeons      * Cierra Stanley - Primary    Resident/Fellow/Other Assistant:  Surgeons and Role:     * Izaiah Meyer MD PhD - Assisting    Staff:   Relief Circulator: Kathryn Damon Person: Rajat Damon Person: Marilynn  Circulator: Kaley  Circulator:  Mary  Surgical Assistant: Frank  Surgical Assistant: Chau    Anesthesia Staff: Anesthesiologist: Jaleel Chavez MD  C-AA: EDELMIRA Jurado; EDELMIRA Ly  Perfusionist: Anthony Ferguson    Procedure Summary  Anesthesia: General  ASA: IV  Estimated Blood Loss: 250mL  Intra-op Medications:   Administrations occurring from 1345 to 1755 on 11/19/24:   Medication Name Total Dose   EPINEPHrine (Adrenalin) 4 mg in sodium chloride 0.9% 250 mL (16 mcg/mL) infusion (premix) 0.95 mg   fentaNYL (Sublimaze) injection 50 mcg/mL 200 mcg   fibrinogen concentrate (Riastap) injection 2,090 mg   insulin lispro injection 0-5 Units Cannot be calculated   norepinephrine (Levophed) 8 mg in dextrose 5% 250 mL (0.032 mg/mL) infusion (premix) 0.65 mg   propofol (Diprivan) injection 10 mg/mL 80 mg   rocuronium (ZeMuron) 50 mg/5 mL injection 40 mg   NaCl 0.9 % bolus Cannot be calculated   tranexamic acid (Cyklokapron) 6,250 mg in sodium chloride 0.9% 312.5 mL (20 mg/mL) infusion 1,000 mg              Anesthesia Record               Intraprocedure I/O Totals          Intake    Transfuse RBC :30 Minutes 700.00 mL    Norepinephrine Drip 0.00 mL    The total shown is the total volume documented since Anesthesia Start was filed.    Tranexamic Acid 0.00 mL    The total shown is the total volume documented since Anesthesia Start was filed.    Epinephrine Drip 0.00 mL    The total shown is the total volume documented since Anesthesia Start was filed.    Amiodarone Drip 0.00 mL    The total shown is the total volume documented since Anesthesia Start was filed.    piperacillin-tazobactam (Zosyn) 4.5 g in dextrose (iso)  mL 300.00 mL    Cell Saver 450 mL    Total Intake 1450 mL       Output    Urine 150 mL    Total Output 150 mL       Net    Net Volume 1300 mL          Specimen:   ID Type Source Tests Collected by Time   1 : mediastinal clot Tissue CLOT/THROMBUS SURGICAL PATHOLOGY EXAM Cierra Stanley MD 11/19/2024 1751                   Findings:     Complications:  None; patient tolerated the procedure well.     Disposition: ICU - intubated and hemodynamically stable.  Condition: stable  Specimens Collected:   ID Type Source Tests Collected by Time   1 : mediastinal clot Tissue CLOT/THROMBUS SURGICAL PATHOLOGY EXAM Cierra Stanley MD 11/19/2024 1414     Attending Attestation: I was present and scrubbed for the entire procedure.    Cierra Stanley  Phone Number: [phone]

## 2024-11-19 NOTE — PROGRESS NOTES
Subjective Data:  Patient seen this AM, currently on epi and levo with PRBCs transfusing. Planning for OR today for decannulation and possible chest washout due to concern for L hemothorax. Remains on ECMO with IABP 1:1.     Objective Data:  Last Recorded Vitals:  Vitals:    11/19/24 0945 11/19/24 1000 11/19/24 1045 11/19/24 1100   BP:       Pulse: 85 87 81 80   Resp: 20 15 18 14   Temp: 36.8 °C (98.2 °F) 36.8 °C (98.2 °F) 36.9 °C (98.4 °F)    TempSrc: Core Core Core    SpO2: 100% 100% 100% 100%   Weight:       Height:           Last Labs:  CBC - 11/19/2024: 12:45 AM  24.4 7.3 116    20.8      CMP - 11/19/2024: 12:45 AM  7.3 5.9 180 --- 0.9   5.7 2.6 35 21      PTT - 11/15/2024:  9:16 PM  1.3   14.1 36     TROPHS   Date/Time Value Ref Range Status   11/15/2024 12:28 PM 44,705 0 - 53 ng/L Final     Comment:     Previous result verified on 11/15/2024 1020 on specimen/case [id number] called with component TRPHS for procedure Troponin I, High Sensitivity with value 67,971 ng/L.   11/15/2024 01:42 AM 67,971 0 - 53 ng/L Final   11/14/2024 03:54 AM 73,314 0 - 53 ng/L Final     Comment:     Previous result verified on 11/13/2024 2154 on specimen/case [id number] called with component TRPHS for procedure Troponin I, High Sensitivity with value 45,685 ng/L.   11/08/2024 04:12 PM 4 0 - 20 ng/L Final     BNP   Date/Time Value Ref Range Status   11/10/2024 04:34 PM 13 0 - 99 pg/mL Final     HGBA1C   Date/Time Value Ref Range Status   11/08/2024 04:12 PM 5.1 See comment % Final     LDLCALC   Date/Time Value Ref Range Status   11/08/2024 04:12  <=99 mg/dL Final     Comment:                                 Near   Borderline      AGE      Desirable  Optimal    High     High     Very High     0-19 Y     0 - 109     ---    110-129   >/= 130     ----    20-24 Y     0 - 119     ---    120-159   >/= 160     ----      >24 Y     0 -  99   100-129  130-159   160-189     >/=190       VLDL   Date/Time Value Ref Range Status    11/08/2024 04:12 PM 64 0 - 40 mg/dL Final      Last I/O:  I/O last 3 completed shifts:  In: 4824.3 (51.3 mL/kg) [P.O.:240; I.V.:2384.3 (25.4 mL/kg); Blood:1400; IV Piggyback:800]  Out: 3555 (37.8 mL/kg) [Urine:2465 (0.7 mL/kg/hr); Chest Tube:1090]  Weight: 94 kg     Inpatient Medications:  Scheduled medications   Medication Dose Route Frequency    acetaminophen  650 mg oral q4h    Or    acetaminophen  650 mg rectal q4h    [Held by provider] amiodarone  400 mg oral BID    aspirin  81 mg oral Daily    atorvastatin  80 mg oral Nightly    insulin lispro  0-5 Units subcutaneous q4h    oxygen   inhalation Continuous - Inhalation    pantoprazole  40 mg intravenous Daily    piperacillin-tazobactam  4.5 g intravenous q6h    polyethylene glycol  17 g oral BID    sennosides-docusate sodium  2 tablet oral BID    ticagrelor  90 mg oral BID    vancomycin  1,250 mg intravenous q12h     PRN medications   Medication    calcium gluconate    calcium gluconate    magnesium sulfate    magnesium sulfate    melatonin    naloxone    naloxone    ondansetron    Or    ondansetron    oxyCODONE    oxyCODONE    potassium chloride CR    Or    potassium chloride    potassium chloride CR    Or    potassium chloride    potassium chloride    potassium chloride    vancomycin     Continuous Medications   Medication Dose Last Rate    amiodarone  0.5 mg/min 0.5 mg/min (11/19/24 0700)    EPINEPHrine  0.04 mcg/kg/min 0.04 mcg/kg/min (11/19/24 0700)    lactated Ringer's  10 mL/hr 10 mL/hr (11/19/24 1000)    lactated Ringer's  5 mL/hr 5 mL/hr (11/19/24 1000)    norepinephrine  0-1 mcg/kg/min 0.08 mcg/kg/min (11/19/24 0700)       Physical Exam:  General: lying flat in bed, alert, appears comfortable  Skin: warm and dry   Cardiac: S1S2  Pulm: CTA anterior, CTs in place  GI: soft, nontender  Extremities: no LE edema, left groin ECMO site, rt fem IABP site  Neuro: no focal deficits      Assessment/Plan   Jaime Dominguez is a 74 year old male with a PMH  significant for basal cell carcinoma and HPL s/p MidCAB x2 converted to full sternotomy w/ LIMA prolonged as a Y graft with a radial to LAD OM w/ Dr. Aldo Harman and Dr. Stanley. CPB time 347min. Course c/b increasing pressor requirement and echo revealing apical hypo-akinesis with subsequent IABP placement on 11/14 for further support. Overnight on 11/14, Troponin elevated and ST elevation in anteroseptal leads, c/f ACS and decision made to [facility]. Now s/p PCI of mid LAD with HEATHER. Pt cannulated on VA ECMO for cardiogenic shock state 2/2 multiple failed grafts.     11/14  Right Radial 6 Fr -- TR Band  Left Femoral Artery ECMO Cannula  Left Femoral Vein ECMO Cannula     Patient is s/p CABG on 11/13/2024 arrived in fulminant cardiogenic shock despite IABP placement and 3 pressors.   A right femoral diagnostic cath was done with the following findings:     LM: distal 30-40%  LAD: mid 100% stenosis at anastomosis site   LCX: competitive flow in the OM without significant stenosis  RCA: patent  LIMA to LAD: Y graft with anastomosis to the LAD which is not patent and anastomosis to the OM which appears patent     Due to patient's profound shock, ECMO cannulation was done with the aforementioned access sites and the plan was made jointly with cardiac surgery to pursue salvage PCI of the mid LAD. He was given ASA and loaded with Ticagrelor. Patient is now s/p salvage PCI of the mid LAD with a [place] 3.0 x 38 HEATHER. Following PCI and ECMO cannulation, patient's pressors were able to be weaned substantially and he left the cath lab (without intubation) on ECMO and IABP support.     ASA/Ticagrelor loading in the lab    Overnight bleeding from ECMO site requiring multiple units pRBCs, repositioned and sutured by Cardiac Surgery    11/15 IABP 1:1, left fem ECMO, on epi and levo (vaso currently weaned off), amio started for afib, pending TTE     11/19  Plan for chest washout due to concern for left hemothorax at the time of ECMO  decannulation today. Remains on levo and epi, PRBCs transfusion.     Interventional Recs:   - cont telemetry care per PCI protocol  - cont DAPT with Brilinta (6 months) and aspirin (lifelong)  - provided education on compliance with DAPT  - at discharge, PLEASE send 90 day prescription of Brilinta to [facility] (for price check and Meds to Beds) and remaining refills to patient's home pharmacy   - continue high intensity statin  - no BB with pessors  - please ensure cardiology follow up appointment after discharge in 1-3 weeks  - patient referred for cardiac rehab eval  - 5lb weight restriction for 5 days for right radial access  - no lifting anything heavier than 10 lbs for one week for groin access   - appreciate excellent CTICU care   - IABP (placed 11/14 AM by CTICU) and ECMO management per CTICU     Interventional Cardiology will follow.     CICU Fellow Pager: [phone] anytime  Endovascular /Limb Salvage Team Pager: [phone] for day coverage (8am-5pm)  Interventional Cardiology Fellow Pager: [phone] (7AM-5PM) Night coverage: [facility] Cross Cover [id number]  Night coverage: [facility] [id number]     I spent 30 minutes in the professional and overall care of this patient.        Alem Diggs, APRN-CNP

## 2024-11-19 NOTE — CONSULTS
VASCULAR SURGERY CONSULT NOTE    Assessment/Plan   Jaime Dominguez is 74 y.o. male who presented with SOB and was found to have a STEMI, now s/p MIDCAB x2 converted to full sternotomy who on 11/14 went into cardiogenic shock and was put on VA ECMO. Vascular surgery consulted intraoperatively due to bleeding from L femoral decannulation site. By the time vascular team arrived, hemostasis had been obtained with manual pressure.    - Hemostasis achieved.  - Multiphasic L DP and PT signals.    Plan:  Examine patient's L groin and L pedal signals post-operatively once in the unit.  Night resident to examine patient and assess L groin and L pedal signals twice this evening (11/19).  Vascular surgery will continue to follow.    D/w attending, Dr. Meyer.    Huber Robles MD  Vascular Surgery, PGY4  Team Pager: [phone]    Subjective   HPI:  Jaime Dominguez is 74 y.o. male who presented with SOB and was found to have a STEMI, now s/p MIDCAB x2 converted to full sternotomy who on 11/14 went into cardiogenic shock and was put on VA ECMO. Today, he was brought back to OR for ECMO decannulation and closure of chest. Vascular surgery consulted intraoperatively due to bleeding from L femoral decannulation site. By the time vascular team arrived, hemostasis had been obtained with manual pressure.    L pedal signals were insonated intraoperatively - multiphasic L DP and PT.    Vascular surgery team held pressure for an additional 5 minutes and hemostasis was confirmed.    No other concerns.    Vascular History:  ECMO     PMH:  HLD  Basal cell carcinoma  CAD    PSH:   Cardiac catheterization  MIDCAB x2  ECMO cannulation  ECMO decannulation    Home Meds:  No current facility-administered medications on file prior to encounter.     Current Outpatient Medications on File Prior to Encounter   Medication Sig Dispense Refill    aspirin 81 mg EC tablet Take 1 tablet (81 mg) by mouth once daily.      atorvastatin (Lipitor) 40 mg  tablet Take 1 tablet (40 mg) by mouth once daily. (Patient not taking: Reported on 11/8/2024)          Allergies:  No Known Allergies    SH/FH:  Never smoker  Doesn't drink currently    ROS: 12 system negative except HPI    Objective   Vitals:  Heart Rate:  []   Temp:  [36.1 °C (97 °F)-38.5 °C (101.3 °F)]   Resp:  [12-30]   BP: (105-128)/(31-48)   SpO2:  [100 %]     Exam:  Constitutional: Intubated. On OR table under drapes.  Neuro:  Intubated and under general anesthesia   ENMT: Unable to assess  Head/neck: Unable to assess  CV: RR  Pulm: Intubated and mechanically ventilated  GI: Unable to assess  Skin: Warm and dry  Extremities: L DP and PT multiphasic    Labs:  Results from last 7 days   Lab Units 11/19/24  0045 11/18/24  2132 11/18/24  1752   WBC AUTO x10*3/uL 24.4* 22.9* 25.3*   HEMOGLOBIN g/dL 7.3* 7.6* 7.1*   PLATELETS AUTO x10*3/uL 116* 112* 118*      Results from last 7 days   Lab Units 11/19/24  0045 11/18/24  1337 11/18/24  0213   SODIUM mmol/L 133* 133* 133*   POTASSIUM mmol/L 5.1 4.6 4.2   CHLORIDE mmol/L 99 100 99   CO2 mmol/L 28 25 28   BUN mg/dL 24* 18 15   CREATININE mg/dL 0.80 0.68 0.66   GLUCOSE mg/dL 131* 129* 111*   MAGNESIUM mg/dL 2.35 2.34 2.03   PHOSPHORUS mg/dL 5.7* 4.8 3.8      Results from last 7 days   Lab Units 11/15/24  2116 11/14/24  1523 11/13/24  0059   INR  1.3* 2.0* 1.3*   PROTIME seconds 14.1* 22.2* 14.5*   APTT seconds 36 >200* 30     Results from last 7 days   Lab Units 11/18/24  0213 11/17/24  0832 11/16/24 2015   ANTI XA UNFRACTIONATED IU/mL 0.3 0.3 0.2

## 2024-11-19 NOTE — ANESTHESIA PROCEDURE NOTES
Airway  Date/Time: 11/19/2024 1:27 PM  Urgency: elective    Airway not difficult    Staffing  Performed: EDELMIRA   Authorized by: Jaleel Chavez MD    Performed by: EDELMIRA Jurado  Patient location during procedure: OR    Indications and Patient Condition  Indications for airway management: anesthesia and airway protection  Sedation level: deep  Preoxygenated: yes  Patient position: sniffing  MILS maintained throughout  Mask difficulty assessment: 0 - not attempted    Final Airway Details  Final airway type: endotracheal airway      Successful airway: ETT  Cuffed: yes   Successful intubation technique: video laryngoscopy  Facilitating devices/methods: cricoid pressure and intubating stylet  Endotracheal tube insertion site: oral  Blade: Domingo  Blade size: #4  ETT size (mm): 7.5  Cormack-Lehane Classification: grade I - full view of glottis  Placement verified by: chest auscultation and capnometry   Measured from: lips  ETT to lips (cm): 23  Number of attempts at approach: 1

## 2024-11-19 NOTE — PROGRESS NOTES
Jaime Dominguez is a 74 y.o. male on day 9 of admission presenting with STEMI (ST elevation myocardial infarction) (Multi).    Subjective   Patient discussed in morning handover, patient examined and chart reviewed. Meds, labs and radiology reviewed during rapid and full interdisciplinary rounds this morning. Co-rounded with HF Cardiology today    Surgeon: Justin  DOS: Nov. 12, 2024  Procedure: MIDCABG x 2 with conversion to full sternotomy LIMA prolonged as a Y graft with a radial to LAD OM     Airway: grade I - full view of glottis   EF: Normal post-op; Now ~30-35%    FULL Code  Emergency Sternotomy: Y/N; Exp POD#10 - Nov. 22, 2024    HPI:   11/8 - Patient initially presented with left sided chest pain, shortness of breath, nausea, and diaphoresis while doing yardwork. He came into the house in distress. His wife reported a brief loss of consciousness. EMS was called. He was diagnosed with STEMI in route and given Brilinta at that time. In the ED labwork and CXR were unremarkable. ECG with acute ST elevations. 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  He has a strong FH of heart disease. No personal history of prior chest pain. Cardiac surgery was consulted for CABG evaluation at [facility]. The patient was transferred to [facility] for robotic MIDCAB evaluation.     PMH:  CAD  Hyperlipidemia  Skin cancer     Course in Hospital:    11/12 - Index CABG x 2    11/13-14: Progressive soft BP with chest wall/incision discomfort. Towards the later afternoon, patient noted to have an upward trending CVP and vasopressor requirement.  Bedside POCUS demonstrating ?RWMA in cornelio-apical LV. ECG undertaken with ST seg. changes. Subsequent TnI added to afternoon bloodwork demonstrated markedly elevated level. Cardiac surgeon placed an IABP overnight and patient taken to the cath lab (see Dr. Quiles's note). After extensive discussion with the Heart Team and the family, decision reached to place the patient on  VA-ECMO and undertake high-risk PCI of the LAD, which was carried out successfully.     11/14 - Significant bleeding from cannulation overnight requiring exploration at the bedside. TTE: EF ~30-35% with RWMA in anterior, anteroseptal and anteroapical walls. Ongoing issues with volume seeking hemodynamics prompted a CT C/A/P - ~6cm collection noted in anterior mediastinum and L groin hematoma but nil else identified.    11/17 - Drop in hemoglobin again overnight and CXR has new opacity of L hemithorax. IABP in situ with stable inotropic support    Overnight: Drop further again overnight CXR has increased opacity of L hemithorax. IABP in situ with stable inotropic support     Objective     Last Recorded Vitals  Blood pressure (!) 114/42, pulse 85, temperature 37 °C (98.6 °F), temperature source Core, resp. rate 18, height 1.829 m (6'), weight 94 kg (207 lb 3.7 oz), SpO2 100%.    Invasive Hemodynamics:    Most Recent Range Past 24hrs   BP (Art) 103/47 Arterial Line BP 1  Min: 89/44  Max: 151/51   MAP(Art) 71 mmHg Arterial Line MAP 1 (mmHg)   Min: 56 mmHg  Max: 95 mmHg   RA/CVP   No data recorded   PA 13/4 PAP  Min: 8/4  Max: 19/7   PA(mean) 9 mmHg PAP (Mean)  Min: 3 mmHg  Max: 12 mmHg   CO   No data recorded   CI   No data recorded   Mixed Venous   No data recorded   SVR    No data recorded     Intake/Output last 3 Shifts:  I/O last 3 completed shifts:  In: 4824.3 (51.3 mL/kg) [P.O.:240; I.V.:2384.3 (25.4 mL/kg); Blood:1400; IV Piggyback:800]  Out: 3555 (37.8 mL/kg) [Urine:2465 (0.7 mL/kg/hr); Chest Tube:1090]  Weight: 94 kg     Physical Exam  Constitutional:       General: He is not in acute distress.     Appearance: He is not ill-appearing or diaphoretic.   Eyes:      Pupils: Pupils are equal, round, and reactive to light.   Cardiovascular:      Rate and Rhythm: Regular rhythm. Tachycardia present.      Pulses:           Dorsalis pedis pulses are detected w/ Doppler on the right side and detected w/ Doppler on the  left side.        Posterior tibial pulses are detected w/ Doppler on the right side and detected w/ Doppler on the left side.      Comments: See separate note for ECMO    Epi: 0.04  Norepi: 0.08    IABP 1:1 in adequate position on CXR    Lines:  RIJ CVL with PAC  R rad. art. line   Pulmonary:      Effort: Pulmonary effort is normal. No tachypnea, accessory muscle usage or respiratory distress.      Breath sounds: Examination of the left-upper field reveals decreased breath sounds. Examination of the left-middle field reveals decreased breath sounds. Examination of the right-lower field reveals decreased breath sounds. Examination of the left-lower field reveals decreased breath sounds. Decreased breath sounds present.      Comments: On 4L O2 via NP  Abdominal:      General: Abdomen is flat. Bowel sounds are normal.      Palpations: Abdomen is soft.      Tenderness: There is no abdominal tenderness. There is no guarding or rebound.   Genitourinary:     Comments: Hale in situ: Clear urine  Neurological:      General: No focal deficit present.      Mental Status: He is alert and easily aroused.      Comments: CAM-ICU negative  RASS 0 - -1  Slept well overnight     Assessment/Plan   Principal Problem:    STEMI (ST elevation myocardial infarction) (Multi)  Active Problems:    TREE (acute kidney injury) (CMS-HCC)    On intra-aortic balloon pump assist    Delirium    Cardiogenic shock (Multi)    Hemothorax on left    CXR: Increased opacity on L hemithorax this morning.     Bedside POCUS not able to appreciate a large pleural collection, however loss of obvious lung sliding with a visible pleura at the chest wall. Likely has a mucus plug    ICU Liberation Bundle:  A-Analgesia: Tylenol and opioids at lowest effective dose  B-SBT: not intubated  C-RASS Target: 0  D-CAM: negative  E-Mobilization Plan: Stage 1-2 today  F - Family Last Update: to be update by the team    Daily Checklist:  HOB > 30 degrees: Unable to achieve due  to IABP and ECMO  Feeding: p.o. intake   Thromboprophylaxis: Heparin systemically on hold at present and SCDs  Ulcer Prophylaxis: PPI  Glucose Control: Target 110-180; < 180 achieved; one episode of hypoglycemia overnight - received 1 amp D50W.  Line to removed: None to remove at present  Bowel Care: Bowel regime ordered and on hold for planning OR  De-escalation of Antibiotics: Completed routine anti-microbial prophylaxis. New fever yesterday - cultures sent. Started on pip/tazo and vanco yesterday afternoon.    Plan:  A.fib with RVR - resolved to NSR at present on amiodarone infusion  Bleeding - likely into L chest. Plan to transfuse to a Hb > 8.0 and optimize intra-vascular volume - plan for OR today  Will plan SBFT to ensure able to receive meds  Post Cardiotomy Shock - Maintain current inotropic support; going to OR for possible ECMO decannulation.  Fever and elevated WBC - follow-up with cultures    DISPO: CTICU    I spent 56 minutes in the professional and overall care of this patient.    This critically ill patient continues to be at-risk for clinically significant deterioration / failure due to the above mentioned dysfunctional, unstable organ systems.  I have personally identified and managed all complex critical care issues to prevent aforementioned clinical deterioration.  Critical care time is spent at bedside and/or the immediate area and has included, but is not limited to, the review of diagnostic tests, labs, radiographs, serial assessments of hemodynamics, respiratory status, ventilatory management, and family updates.  Time spent in procedures and teaching are reported separately.    Sharad Bonilla MD

## 2024-11-19 NOTE — INTERVAL H&P NOTE
H&P reviewed. The patient was examined and there are no changes to the H&P.    CT Chest yesterday demonstrated retained left hemothorax. Plan for chest washout and closure today. Tolerated ECMO turndown yesterday. Plan for left femoral VA ECMO decannulation and possible right axillary Impella 5.5 insertion today. Plan of care discussed with the patient and his family at bedside. Informed consent obtained.     Cierra Stanley MD   Cardiac Surgeon

## 2024-11-19 NOTE — SIGNIFICANT EVENT
Return from OR s/p decannulation with left groin repair. IABP remain in place at 1:1 on epi and levo. L foot pulses palpable and foot is warm. Left groin without overt evidence of bleeding.     OR Course  Crystalloid: 1L  Colloid: n/a  Products: 1 pRBC, 1 FFP, 10pk plts  Cellsaver: 625ml    EBL: 250ml  UOP: 280ml    Intubation: Gregory, Grade 1, MAC 4  Lines: No new lines  Drains: chest tubes. Now 2 L pleural, 2 meds, 1 R pleural  Intraop issues: coagulopathy. Left chest washout.  ECHO: EF 30-35% with apical hypokinesis, RV normal  Zosyn given 1315  Versed 2mg x1 and propofol.  Last jayden 1615.     Plan:  - KUB after dobhoff placement  - CXR to eval IABP  - plan per daily note

## 2024-11-19 NOTE — PROGRESS NOTES
Subjective Data:  Increased pressor demands overnight. Drop in Hgb and getting transfused. Remains on epi 0.04 and norepi 0.08. Getting additional blood product. Concern for hemothorax on imaging yesterday.   Remains on VA ECMO and IABP 1:1 support. With plans for OR today for washout, bronch, decannulation.   No acute complaints.      Objective Data:  Last Recorded Vitals:  Vitals:    11/19/24 0615 11/19/24 0630 11/19/24 0645 11/19/24 0700   BP:       Pulse: 79 86 85 83   Resp: 17 14 18 18   Temp:       TempSrc:       SpO2: 100% 100% 100% 100%   Weight:       Height:           Last Labs:  CBC - 11/19/2024: 12:45 AM  24.4 7.3 116    20.8      CMP - 11/19/2024: 12:45 AM  7.3 5.9 180 --- 0.9   5.7 2.6 35 21      PTT - 11/15/2024:  9:16 PM  1.3   14.1 36     TROPHS   Date/Time Value Ref Range Status   11/15/2024 12:28 PM 44,705 0 - 53 ng/L Final     Comment:     Previous result verified on 11/15/2024 1020 on specimen/case [id number] called with component TRPHS for procedure Troponin I, High Sensitivity with value 67,971 ng/L.   11/15/2024 01:42 AM 67,971 0 - 53 ng/L Final   11/14/2024 03:54 AM 73,314 0 - 53 ng/L Final     Comment:     Previous result verified on 11/13/2024 2154 on specimen/case [id number] called with component TRPHS for procedure Troponin I, High Sensitivity with value 45,685 ng/L.   11/08/2024 04:12 PM 4 0 - 20 ng/L Final     BNP   Date/Time Value Ref Range Status   11/10/2024 04:34 PM 13 0 - 99 pg/mL Final     HGBA1C   Date/Time Value Ref Range Status   11/08/2024 04:12 PM 5.1 See comment % Final     LDLCALC   Date/Time Value Ref Range Status   11/08/2024 04:12  <=99 mg/dL Final     Comment:                                 Near   Borderline      AGE      Desirable  Optimal    High     High     Very High     0-19 Y     0 - 109     ---    110-129   >/= 130     ----    20-24 Y     0 - 119     ---    120-159   >/= 160     ----      >24 Y     0 -  99   100-129  130-159   160-189      >/=190       VLDL   Date/Time Value Ref Range Status   11/08/2024 04:12 PM 64 0 - 40 mg/dL Final      Last I/O:  I/O last 3 completed shifts:  In: 4824.3 (51.3 mL/kg) [P.O.:240; I.V.:2384.3 (25.4 mL/kg); Blood:1400; IV Piggyback:800]  Out: 3555 (37.8 mL/kg) [Urine:2465 (0.7 mL/kg/hr); Chest Tube:1090]  Weight: 94 kg     Echo:  Transthoracic Echo (TTE) Limited 11/16/2024  CONCLUSIONS:  1. Multiple segmental abnormalities exist. See findings.  2. Left ventricular ejection fraction is moderately decreased, calculated by Lambert's biplane at 34%.  3. Abnormal left venticular wall motion.  4. No left ventricular thrombus visualized.  5. There is reduced right ventricular systolic function.  6. There apperas to be a trivial pericardial effusion, however thre is also a oderate layer of echodense material overlying the RV of unclear etiology. NO obivous RA or RV collapse though not well seen and MV and V inflows not assessed for respiratory variation ( pt in afib so unable to assess given variable RR intervals) and IVC not well seen. Overall unable to determine hemodyanic significance of the material overlyin the RV.  7. Right ventricular systolic pressure is within normal limits.  8. Moderately dilated aortic root.  9. There is LIV of the subvalvular apparatus and MV leaflets of unclear significance. LVOT gradinets not assessed nor was there color Doppler on the LVOT to see if there were acceleration of flow to suggest obstructiom. ( Does not appear to have LIV -septal contact on 2D images).  10. Compared with the prior exam from 11/15/2024, there are no significant changes. The LAD territory wall motion abnormality appears to be similar. LIV has been present on prior studies.     Ejection Fractions:  EF   Date/Time Value Ref Range Status   11/18/2024 08:45 AM 43 %    11/16/2024 11:05 AM 34 %    11/15/2024 09:11 AM 31 %      Inpatient Medications:  Scheduled medications   Medication Dose Route Frequency    acetaminophen   650 mg oral q4h    Or    acetaminophen  650 mg rectal q4h    amiodarone  400 mg oral BID    aspirin  81 mg oral Daily    atorvastatin  80 mg oral Nightly    bisacodyl  10 mg rectal Once    insulin lispro  0-5 Units subcutaneous q4h    oxygen   inhalation Continuous - Inhalation    pantoprazole  40 mg oral Daily before breakfast    piperacillin-tazobactam  4.5 g intravenous q6h    polyethylene glycol  17 g oral BID    sennosides-docusate sodium  2 tablet oral BID    ticagrelor  90 mg oral BID    vancomycin  1,250 mg intravenous q12h     PRN medications   Medication    calcium gluconate    calcium gluconate    magnesium sulfate    magnesium sulfate    melatonin    naloxone    naloxone    ondansetron    Or    ondansetron    oxyCODONE    oxyCODONE    potassium chloride CR    Or    potassium chloride    potassium chloride CR    Or    potassium chloride    potassium chloride    potassium chloride    vancomycin     Continuous Medications   Medication Dose Last Rate    amiodarone  0.5 mg/min 0.5 mg/min (11/19/24 0700)    EPINEPHrine  0.04 mcg/kg/min 0.04 mcg/kg/min (11/19/24 0700)    norepinephrine  0-1 mcg/kg/min 0.08 mcg/kg/min (11/19/24 0700)     Physical Exam:  GEN: frail appearing male, NAD.  CV: irregularly irregular, no m/r/g.   LUNGS: no respiratory distress on NC.  ABD: Soft, NT/ND.  SKIN: Warm, well perfused. LE edema: mild pitting edema  MSK: No deformities.  EXT: IABP and VA ECMO in place  NEURO: No focal deficits.     Assessment/Plan   Jaime Dominguez is a 74 year old male with a PMH significant for basal cell carcinoma and HLD who initially presented to [facility] ED on 11/8 complaining of left sided chest pain, diaphoresis, SOB nausea and syncope. ECG showed ST elevation in the inferior leads and ST depression in anterior septal leads. LHC showed 90% occlusion of the LAD. Transferred to [facility] on 11/10 for cardiac surgery workup.  He is now s/p MidCAB x2 converted to full sternotomy w/ LIMA prolonged as a Y graft  with a radial to LAD OM w/ Dr. Aldo Harman and Dr. Stanley. CPB time 347min. Course c/b increasing pressor requirement and echo revealing apical hypo-akinesis with subsequent IABP placement on 11/14 for further support. Overnight on 11/14, Troponin elevated and ST elevation in anteroseptal leads, c/f ACS and decision made to [facility]. Now s/p PCI of mid LAD with HEATHER. Pt cannulated on VA ECMO for cardiogenic shock state 2/2 multiple failed grafts. HF was engaged for multidisciplinary decision making regarding cardiogenic shock.      Acute HFrEF  ICM  CAD s/p CABG and PCI   Cardiogenic shock s/p VA ECMO and IABP   - TTE 11/16: LVEF 31%, abnormal wall motion; reduced RVSF.   - On epi and norepi. Wean norepi as able.  - Mechanical support: VA ECMO, IABP. Underwent ECMO turndown trail 11/18 and tolerating flows of 1.5L. Plan for decannulation in OR today with bronch and washout with concern of hemothorax.  May consider Impella for support after decannulation.   - Diuresis: IV diuresis per primary.  - GDMT on hold with current MCS  - Mechanical support: VA ECMO, IABP  - Strict I/Os, Daily Na < 2g daily, fluid restriction.  - Could be considered for advanced therapies. Age prohibits from OHT, however, could be considered for LVAD evaluation if necessary.     Hemorrhagic shock  Acute on chronic anemia  Concern for increase pressor requirement in setting of downtrending Hgb.  -transfusion per primary  -plan for bronchoscopy and washout today in OR with decannulation      For any questions or concerns, feel free to reach out via [employer] chat or page at [phone].This plan was discussed with Dr. Mccain, HF attending.     Kira Alvares, DO  HF Fellow

## 2024-11-20 ENCOUNTER — APPOINTMENT (OUTPATIENT)
Dept: RADIOLOGY | Facility: HOSPITAL | Age: 74
DRG: 003 | End: 2024-11-20
Payer: MEDICARE

## 2024-11-20 ENCOUNTER — APPOINTMENT (OUTPATIENT)
Dept: VASCULAR MEDICINE | Facility: HOSPITAL | Age: 74
DRG: 003 | End: 2024-11-20
Payer: MEDICARE

## 2024-11-20 LAB
ALBUMIN SERPL BCP-MCNC: 2.7 G/DL (ref 3.4–5)
ALBUMIN SERPL BCP-MCNC: 2.8 G/DL (ref 3.4–5)
ALBUMIN SERPL BCP-MCNC: 2.8 G/DL (ref 3.4–5)
ANION GAP BLDA CALCULATED.4IONS-SCNC: 7 MMO/L (ref 10–25)
ANION GAP BLDA CALCULATED.4IONS-SCNC: 9 MMO/L (ref 10–25)
ANION GAP BLDA CALCULATED.4IONS-SCNC: ABNORMAL MMOL/L
ANION GAP BLDMV CALCULATED.4IONS-SCNC: 6 MMO/L (ref 10–25)
ANION GAP BLDMV CALCULATED.4IONS-SCNC: 8 MMO/L (ref 10–25)
ANION GAP BLDMV CALCULATED.4IONS-SCNC: 9 MMO/L (ref 10–25)
ANION GAP SERPL CALC-SCNC: 11 MMOL/L (ref 10–20)
ANION GAP SERPL CALC-SCNC: 12 MMOL/L (ref 10–20)
ANION GAP SERPL CALC-SCNC: 14 MMOL/L (ref 10–20)
APTT PPP: 28 SECONDS (ref 27–38)
BACTERIA BLD CULT: NORMAL
BACTERIA BLD CULT: NORMAL
BASE EXCESS BLDA CALC-SCNC: 1.1 MMOL/L (ref -2–3)
BASE EXCESS BLDA CALC-SCNC: 3.1 MMOL/L (ref -2–3)
BASE EXCESS BLDA CALC-SCNC: 3.9 MMOL/L (ref -2–3)
BASE EXCESS BLDMV CALC-SCNC: -0.9 MMOL/L (ref -2–3)
BASE EXCESS BLDMV CALC-SCNC: 1 MMOL/L (ref -2–3)
BASE EXCESS BLDMV CALC-SCNC: 3.8 MMOL/L (ref -2–3)
BLOOD EXPIRATION DATE: NORMAL
BODY TEMPERATURE: 37 DEGREES CELSIUS
BUN SERPL-MCNC: 17 MG/DL (ref 6–23)
BUN SERPL-MCNC: 17 MG/DL (ref 6–23)
BUN SERPL-MCNC: 18 MG/DL (ref 6–23)
CA-I BLD-SCNC: 1.04 MMOL/L (ref 1.1–1.33)
CA-I BLD-SCNC: 1.04 MMOL/L (ref 1.1–1.33)
CA-I BLD-SCNC: 1.05 MMOL/L (ref 1.1–1.33)
CA-I BLDA-SCNC: 1.01 MMOL/L (ref 1.1–1.33)
CA-I BLDA-SCNC: 1.04 MMOL/L (ref 1.1–1.33)
CA-I BLDA-SCNC: 1.08 MMOL/L (ref 1.1–1.33)
CA-I BLDMV-SCNC: 0.94 MMOL/L (ref 1.1–1.33)
CA-I BLDMV-SCNC: 0.99 MMOL/L (ref 1.1–1.33)
CA-I BLDMV-SCNC: 1.03 MMOL/L (ref 1.1–1.33)
CALCIUM SERPL-MCNC: 7 MG/DL (ref 8.6–10.6)
CALCIUM SERPL-MCNC: 7 MG/DL (ref 8.6–10.6)
CALCIUM SERPL-MCNC: 7.1 MG/DL (ref 8.6–10.6)
CHLORIDE BLD-SCNC: 102 MMOL/L (ref 98–107)
CHLORIDE BLD-SCNC: 105 MMOL/L (ref 98–107)
CHLORIDE BLD-SCNC: 107 MMOL/L (ref 98–107)
CHLORIDE BLDA-SCNC: 101 MMOL/L (ref 98–107)
CHLORIDE BLDA-SCNC: 104 MMOL/L (ref 98–107)
CHLORIDE BLDA-SCNC: ABNORMAL MMOL/L
CHLORIDE SERPL-SCNC: 100 MMOL/L (ref 98–107)
CHLORIDE SERPL-SCNC: 100 MMOL/L (ref 98–107)
CHLORIDE SERPL-SCNC: 98 MMOL/L (ref 98–107)
CO2 SERPL-SCNC: 26 MMOL/L (ref 21–32)
CO2 SERPL-SCNC: 28 MMOL/L (ref 21–32)
CO2 SERPL-SCNC: 28 MMOL/L (ref 21–32)
CREAT SERPL-MCNC: 0.69 MG/DL (ref 0.5–1.3)
CREAT SERPL-MCNC: 0.71 MG/DL (ref 0.5–1.3)
CREAT SERPL-MCNC: 0.75 MG/DL (ref 0.5–1.3)
DISPENSE STATUS: NORMAL
EGFRCR SERPLBLD CKD-EPI 2021: >90 ML/MIN/1.73M*2
ERYTHROCYTE [DISTWIDTH] IN BLOOD BY AUTOMATED COUNT: 15.6 % (ref 11.5–14.5)
ERYTHROCYTE [DISTWIDTH] IN BLOOD BY AUTOMATED COUNT: 15.6 % (ref 11.5–14.5)
ERYTHROCYTE [DISTWIDTH] IN BLOOD BY AUTOMATED COUNT: 15.9 % (ref 11.5–14.5)
GLUCOSE BLD MANUAL STRIP-MCNC: 130 MG/DL (ref 74–99)
GLUCOSE BLD-MCNC: 107 MG/DL (ref 74–99)
GLUCOSE BLD-MCNC: 108 MG/DL (ref 74–99)
GLUCOSE BLD-MCNC: 110 MG/DL (ref 74–99)
GLUCOSE BLDA-MCNC: 113 MG/DL (ref 74–99)
GLUCOSE BLDA-MCNC: 120 MG/DL (ref 74–99)
GLUCOSE BLDA-MCNC: 122 MG/DL (ref 74–99)
GLUCOSE SERPL-MCNC: 105 MG/DL (ref 74–99)
GLUCOSE SERPL-MCNC: 110 MG/DL (ref 74–99)
GLUCOSE SERPL-MCNC: 83 MG/DL (ref 74–99)
HCO3 BLDA-SCNC: 23.9 MMOL/L (ref 22–26)
HCO3 BLDA-SCNC: 25.7 MMOL/L (ref 22–26)
HCO3 BLDA-SCNC: 27.1 MMOL/L (ref 22–26)
HCO3 BLDMV-SCNC: 22.9 MMOL/L (ref 22–26)
HCO3 BLDMV-SCNC: 24.9 MMOL/L (ref 22–26)
HCO3 BLDMV-SCNC: 27.6 MMOL/L (ref 22–26)
HCT VFR BLD AUTO: 20.9 % (ref 41–52)
HCT VFR BLD AUTO: 22.8 % (ref 41–52)
HCT VFR BLD AUTO: 24.5 % (ref 41–52)
HCT VFR BLD EST: 21 % (ref 41–52)
HCT VFR BLD EST: 21 % (ref 41–52)
HCT VFR BLD EST: 22 % (ref 41–52)
HCT VFR BLD EST: 23 % (ref 41–52)
HCT VFR BLD EST: 24 % (ref 41–52)
HCT VFR BLD EST: 25 % (ref 41–52)
HGB BLD-MCNC: 7.2 G/DL (ref 13.5–17.5)
HGB BLD-MCNC: 7.7 G/DL (ref 13.5–17.5)
HGB BLD-MCNC: 8.2 G/DL (ref 13.5–17.5)
HGB BLDA-MCNC: 7.1 G/DL (ref 13.5–17.5)
HGB BLDA-MCNC: 8.1 G/DL (ref 13.5–17.5)
HGB BLDA-MCNC: 8.3 G/DL (ref 13.5–17.5)
HGB BLDMV-MCNC: 6.9 G/DL (ref 13.5–17.5)
HGB BLDMV-MCNC: 7.3 G/DL (ref 13.5–17.5)
HGB BLDMV-MCNC: 7.7 G/DL (ref 13.5–17.5)
INHALED O2 CONCENTRATION: 40 %
INHALED O2 CONCENTRATION: 50 %
INR PPP: 1.3 (ref 0.9–1.1)
LACTATE BLDA-SCNC: 1.2 MMOL/L (ref 0.4–2)
LACTATE BLDA-SCNC: 1.2 MMOL/L (ref 0.4–2)
LACTATE BLDA-SCNC: 1.5 MMOL/L (ref 0.4–2)
LACTATE BLDMV-SCNC: 1.1 MMOL/L (ref 0.4–2)
MAGNESIUM SERPL-MCNC: 2.08 MG/DL (ref 1.6–2.4)
MAGNESIUM SERPL-MCNC: 2.09 MG/DL (ref 1.6–2.4)
MAGNESIUM SERPL-MCNC: 2.13 MG/DL (ref 1.6–2.4)
MCH RBC QN AUTO: 29.3 PG (ref 26–34)
MCH RBC QN AUTO: 29.4 PG (ref 26–34)
MCH RBC QN AUTO: 29.4 PG (ref 26–34)
MCHC RBC AUTO-ENTMCNC: 33.5 G/DL (ref 32–36)
MCHC RBC AUTO-ENTMCNC: 33.8 G/DL (ref 32–36)
MCHC RBC AUTO-ENTMCNC: 34.4 G/DL (ref 32–36)
MCV RBC AUTO: 85 FL (ref 80–100)
MCV RBC AUTO: 87 FL (ref 80–100)
MCV RBC AUTO: 88 FL (ref 80–100)
NRBC BLD-RTO: 0.7 /100 WBCS (ref 0–0)
NRBC BLD-RTO: 0.9 /100 WBCS (ref 0–0)
NRBC BLD-RTO: 0.9 /100 WBCS (ref 0–0)
OXYHGB MFR BLDA: 93.9 % (ref 94–98)
OXYHGB MFR BLDA: 96.9 % (ref 94–98)
OXYHGB MFR BLDA: 97.3 % (ref 94–98)
OXYHGB MFR BLDMV: 55.5 % (ref 45–75)
OXYHGB MFR BLDMV: 55.7 % (ref 45–75)
OXYHGB MFR BLDMV: 57.9 % (ref 45–75)
PCO2 BLDA: 30 MM HG (ref 38–42)
PCO2 BLDA: 30 MM HG (ref 38–42)
PCO2 BLDA: 34 MM HG (ref 38–42)
PCO2 BLDMV: 33 MM HG (ref 41–51)
PCO2 BLDMV: 35 MM HG (ref 41–51)
PCO2 BLDMV: 37 MM HG (ref 41–51)
PH BLDA: 7.51 PH (ref 7.38–7.42)
PH BLDA: 7.51 PH (ref 7.38–7.42)
PH BLDA: 7.54 PH (ref 7.38–7.42)
PH BLDMV: 7.45 PH (ref 7.33–7.43)
PH BLDMV: 7.46 PH (ref 7.33–7.43)
PH BLDMV: 7.48 PH (ref 7.33–7.43)
PHOSPHATE SERPL-MCNC: 2.4 MG/DL (ref 2.5–4.9)
PHOSPHATE SERPL-MCNC: 2.5 MG/DL (ref 2.5–4.9)
PHOSPHATE SERPL-MCNC: 4 MG/DL (ref 2.5–4.9)
PLATELET # BLD AUTO: 127 X10*3/UL (ref 150–450)
PLATELET # BLD AUTO: 138 X10*3/UL (ref 150–450)
PLATELET # BLD AUTO: 138 X10*3/UL (ref 150–450)
PO2 BLDA: 119 MM HG (ref 85–95)
PO2 BLDA: 120 MM HG (ref 85–95)
PO2 BLDA: 66 MM HG (ref 85–95)
PO2 BLDMV: 32 MM HG (ref 35–45)
PO2 BLDMV: 32 MM HG (ref 35–45)
PO2 BLDMV: 35 MM HG (ref 35–45)
POTASSIUM BLDA-SCNC: 4 MMOL/L (ref 3.5–5.3)
POTASSIUM BLDA-SCNC: 4.1 MMOL/L (ref 3.5–5.3)
POTASSIUM BLDA-SCNC: 4.2 MMOL/L (ref 3.5–5.3)
POTASSIUM BLDMV-SCNC: 3.6 MMOL/L (ref 3.5–5.3)
POTASSIUM BLDMV-SCNC: 3.7 MMOL/L (ref 3.5–5.3)
POTASSIUM BLDMV-SCNC: 4.1 MMOL/L (ref 3.5–5.3)
POTASSIUM SERPL-SCNC: 4 MMOL/L (ref 3.5–5.3)
POTASSIUM SERPL-SCNC: 4.1 MMOL/L (ref 3.5–5.3)
POTASSIUM SERPL-SCNC: 4.1 MMOL/L (ref 3.5–5.3)
PRODUCT BLOOD TYPE: 6200
PRODUCT CODE: NORMAL
PROTHROMBIN TIME: 14.3 SECONDS (ref 9.8–12.8)
RBC # BLD AUTO: 2.45 X10*6/UL (ref 4.5–5.9)
RBC # BLD AUTO: 2.63 X10*6/UL (ref 4.5–5.9)
RBC # BLD AUTO: 2.79 X10*6/UL (ref 4.5–5.9)
SAO2 % BLDA: 100 % (ref 94–100)
SAO2 % BLDA: 97 % (ref 94–100)
SAO2 % BLDA: 99 % (ref 94–100)
SAO2 % BLDMV: 57 % (ref 45–75)
SAO2 % BLDMV: 57 % (ref 45–75)
SAO2 % BLDMV: 60 % (ref 45–75)
SODIUM BLDA-SCNC: 131 MMOL/L (ref 136–145)
SODIUM BLDA-SCNC: 133 MMOL/L (ref 136–145)
SODIUM BLDA-SCNC: 133 MMOL/L (ref 136–145)
SODIUM BLDMV-SCNC: 131 MMOL/L (ref 136–145)
SODIUM BLDMV-SCNC: 134 MMOL/L (ref 136–145)
SODIUM BLDMV-SCNC: 135 MMOL/L (ref 136–145)
SODIUM SERPL-SCNC: 134 MMOL/L (ref 136–145)
SODIUM SERPL-SCNC: 135 MMOL/L (ref 136–145)
SODIUM SERPL-SCNC: 136 MMOL/L (ref 136–145)
UNIT ABO: NORMAL
UNIT NUMBER: NORMAL
UNIT RH: NORMAL
UNIT VOLUME: 350
WBC # BLD AUTO: 18.7 X10*3/UL (ref 4.4–11.3)
WBC # BLD AUTO: 19 X10*3/UL (ref 4.4–11.3)
WBC # BLD AUTO: 22.2 X10*3/UL (ref 4.4–11.3)
XM INTEP: NORMAL

## 2024-11-20 PROCEDURE — 2020000001 HC ICU ROOM DAILY

## 2024-11-20 PROCEDURE — 2500000005 HC RX 250 GENERAL PHARMACY W/O HCPCS

## 2024-11-20 PROCEDURE — 2500000001 HC RX 250 WO HCPCS SELF ADMINISTERED DRUGS (ALT 637 FOR MEDICARE OP)

## 2024-11-20 PROCEDURE — 71045 X-RAY EXAM CHEST 1 VIEW: CPT

## 2024-11-20 PROCEDURE — 94003 VENT MGMT INPAT SUBQ DAY: CPT

## 2024-11-20 PROCEDURE — 84132 ASSAY OF SERUM POTASSIUM: CPT

## 2024-11-20 PROCEDURE — 99233 SBSQ HOSP IP/OBS HIGH 50: CPT | Performed by: SURGERY

## 2024-11-20 PROCEDURE — 85027 COMPLETE CBC AUTOMATED: CPT

## 2024-11-20 PROCEDURE — 74018 RADEX ABDOMEN 1 VIEW: CPT

## 2024-11-20 PROCEDURE — 82947 ASSAY GLUCOSE BLOOD QUANT: CPT

## 2024-11-20 PROCEDURE — 99233 SBSQ HOSP IP/OBS HIGH 50: CPT

## 2024-11-20 PROCEDURE — 2500000004 HC RX 250 GENERAL PHARMACY W/ HCPCS (ALT 636 FOR OP/ED)

## 2024-11-20 PROCEDURE — 99497 ADVNCD CARE PLAN 30 MIN: CPT

## 2024-11-20 PROCEDURE — 83735 ASSAY OF MAGNESIUM: CPT

## 2024-11-20 PROCEDURE — 33999 UNLISTED PX CARDIAC SURGERY: CPT

## 2024-11-20 PROCEDURE — 99291 CRITICAL CARE FIRST HOUR: CPT

## 2024-11-20 PROCEDURE — 36430 TRANSFUSION BLD/BLD COMPNT: CPT

## 2024-11-20 PROCEDURE — P9016 RBC LEUKOCYTES REDUCED: HCPCS

## 2024-11-20 PROCEDURE — 71045 X-RAY EXAM CHEST 1 VIEW: CPT | Performed by: RADIOLOGY

## 2024-11-20 PROCEDURE — 37799 UNLISTED PX VASCULAR SURGERY: CPT

## 2024-11-20 PROCEDURE — 82330 ASSAY OF CALCIUM: CPT

## 2024-11-20 PROCEDURE — 99291 CRITICAL CARE FIRST HOUR: CPT | Performed by: SPECIALIST

## 2024-11-20 PROCEDURE — 93926 LOWER EXTREMITY STUDY: CPT

## 2024-11-20 PROCEDURE — 93926 LOWER EXTREMITY STUDY: CPT | Performed by: INTERNAL MEDICINE

## 2024-11-20 PROCEDURE — 2500000004 HC RX 250 GENERAL PHARMACY W/ HCPCS (ALT 636 FOR OP/ED): Performed by: NURSE PRACTITIONER

## 2024-11-20 PROCEDURE — P9045 ALBUMIN (HUMAN), 5%, 250 ML: HCPCS | Mod: JZ

## 2024-11-20 PROCEDURE — 85730 THROMBOPLASTIN TIME PARTIAL: CPT | Performed by: NURSE PRACTITIONER

## 2024-11-20 PROCEDURE — 2500000004 HC RX 250 GENERAL PHARMACY W/ HCPCS (ALT 636 FOR OP/ED): Mod: JZ

## 2024-11-20 PROCEDURE — 2500000002 HC RX 250 W HCPCS SELF ADMINISTERED DRUGS (ALT 637 FOR MEDICARE OP, ALT 636 FOR OP/ED)

## 2024-11-20 RX ORDER — CALCIUM GLUCONATE 98 MG/ML
1 INJECTION, SOLUTION INTRAVENOUS ONCE
Status: DISCONTINUED | OUTPATIENT
Start: 2024-11-20 | End: 2024-11-20

## 2024-11-20 RX ORDER — CALCIUM GLUCONATE 20 MG/ML
2 INJECTION, SOLUTION INTRAVENOUS ONCE
Status: COMPLETED | OUTPATIENT
Start: 2024-11-20 | End: 2024-11-20

## 2024-11-20 RX ORDER — NEOSTIGMINE METHYLSULFATE 1 MG/ML
5 INJECTION INTRAVENOUS ONCE
Status: COMPLETED | OUTPATIENT
Start: 2024-11-20 | End: 2024-11-20

## 2024-11-20 RX ORDER — ALBUMIN HUMAN 50 G/1000ML
12.5 SOLUTION INTRAVENOUS ONCE
Status: COMPLETED | OUTPATIENT
Start: 2024-11-20 | End: 2024-11-20

## 2024-11-20 RX ORDER — DEXMEDETOMIDINE HYDROCHLORIDE 4 UG/ML
0-1.5 INJECTION, SOLUTION INTRAVENOUS CONTINUOUS
Status: DISCONTINUED | OUTPATIENT
Start: 2024-11-20 | End: 2024-11-21

## 2024-11-20 RX ORDER — FUROSEMIDE 10 MG/ML
40 INJECTION INTRAMUSCULAR; INTRAVENOUS ONCE
Status: COMPLETED | OUTPATIENT
Start: 2024-11-20 | End: 2024-11-20

## 2024-11-20 RX ORDER — GLYCOPYRROLATE 0.2 MG/ML
0.8 INJECTION INTRAMUSCULAR; INTRAVENOUS ONCE
Status: COMPLETED | OUTPATIENT
Start: 2024-11-20 | End: 2024-11-20

## 2024-11-20 RX ORDER — HEPARIN SODIUM 5000 [USP'U]/ML
5000 INJECTION, SOLUTION INTRAVENOUS; SUBCUTANEOUS EVERY 8 HOURS
Status: DISCONTINUED | OUTPATIENT
Start: 2024-11-20 | End: 2024-12-04 | Stop reason: HOSPADM

## 2024-11-20 RX ORDER — ALBUMIN HUMAN 50 G/1000ML
25 SOLUTION INTRAVENOUS ONCE
Status: COMPLETED | OUTPATIENT
Start: 2024-11-20 | End: 2024-11-20

## 2024-11-20 RX ORDER — EPINEPHRINE IN 0.9 % SOD CHLOR 4MG/250ML
0.03 PLASTIC BAG, INJECTION (ML) INTRAVENOUS CONTINUOUS
Status: DISCONTINUED | OUTPATIENT
Start: 2024-11-20 | End: 2024-11-22

## 2024-11-20 RX ORDER — METHOCARBAMOL 100 MG/ML
1000 INJECTION, SOLUTION INTRAMUSCULAR; INTRAVENOUS ONCE
Status: COMPLETED | OUTPATIENT
Start: 2024-11-20 | End: 2024-11-20

## 2024-11-20 RX ORDER — CALCIUM GLUCONATE 20 MG/ML
1 INJECTION, SOLUTION INTRAVENOUS ONCE
Status: COMPLETED | OUTPATIENT
Start: 2024-11-20 | End: 2024-11-20

## 2024-11-20 RX ADMIN — HEPARIN SODIUM 5000 UNITS: 5000 INJECTION INTRAVENOUS; SUBCUTANEOUS at 10:45

## 2024-11-20 RX ADMIN — Medication 50 PERCENT: at 00:24

## 2024-11-20 RX ADMIN — HEPARIN SODIUM 5000 UNITS: 5000 INJECTION INTRAVENOUS; SUBCUTANEOUS at 17:29

## 2024-11-20 RX ADMIN — METHOCARBAMOL 1000 MG: 100 INJECTION INTRAMUSCULAR; INTRAVENOUS at 11:21

## 2024-11-20 RX ADMIN — ACETAMINOPHEN 650 MG: 325 TABLET ORAL at 17:29

## 2024-11-20 RX ADMIN — Medication 1250 MG: at 09:05

## 2024-11-20 RX ADMIN — PIPERACILLIN SODIUM AND TAZOBACTAM SODIUM 4.5 G: 4; .5 INJECTION, SOLUTION INTRAVENOUS at 07:05

## 2024-11-20 RX ADMIN — SENNOSIDES AND DOCUSATE SODIUM 2 TABLET: 50; 8.6 TABLET ORAL at 09:02

## 2024-11-20 RX ADMIN — ACETAMINOPHEN 650 MG: 325 TABLET ORAL at 09:03

## 2024-11-20 RX ADMIN — Medication 40 PERCENT: at 22:29

## 2024-11-20 RX ADMIN — PIPERACILLIN SODIUM AND TAZOBACTAM SODIUM 4.5 G: 4; .5 INJECTION, SOLUTION INTRAVENOUS at 00:43

## 2024-11-20 RX ADMIN — GLYCOPYRROLATE 0.8 MG: 0.2 INJECTION, SOLUTION INTRAMUSCULAR; INTRAVENOUS at 04:35

## 2024-11-20 RX ADMIN — Medication 50 PERCENT: at 03:20

## 2024-11-20 RX ADMIN — FUROSEMIDE 40 MG: 10 INJECTION, SOLUTION INTRAMUSCULAR; INTRAVENOUS at 17:29

## 2024-11-20 RX ADMIN — ACETAMINOPHEN 650 MG: 325 TABLET ORAL at 21:15

## 2024-11-20 RX ADMIN — EPINEPHRINE IN SODIUM CHLORIDE 0.05 MCG/KG/MIN: 16 INJECTION INTRAVENOUS at 04:38

## 2024-11-20 RX ADMIN — TICAGRELOR 90 MG: 90 TABLET ORAL at 21:15

## 2024-11-20 RX ADMIN — ALBUMIN HUMAN 25 G: 0.05 INJECTION, SOLUTION INTRAVENOUS at 05:10

## 2024-11-20 RX ADMIN — PIPERACILLIN SODIUM AND TAZOBACTAM SODIUM 4.5 G: 4; .5 INJECTION, SOLUTION INTRAVENOUS at 13:33

## 2024-11-20 RX ADMIN — AMIODARONE HYDROCHLORIDE 0.5 MG/MIN: 1.8 INJECTION, SOLUTION INTRAVENOUS at 13:49

## 2024-11-20 RX ADMIN — DEXMEDETOMIDINE HYDROCHLORIDE 0.2 MCG/KG/HR: 400 INJECTION INTRAVENOUS at 09:57

## 2024-11-20 RX ADMIN — ALBUMIN HUMAN 12.5 G: 0.05 INJECTION, SOLUTION INTRAVENOUS at 04:37

## 2024-11-20 RX ADMIN — POLYETHYLENE GLYCOL 3350 17 G: 17 POWDER, FOR SOLUTION ORAL at 09:03

## 2024-11-20 RX ADMIN — NOREPINEPHRINE BITARTRATE 0.08 MCG/KG/MIN: 8 INJECTION, SOLUTION INTRAVENOUS at 04:37

## 2024-11-20 RX ADMIN — TICAGRELOR 90 MG: 90 TABLET ORAL at 09:03

## 2024-11-20 RX ADMIN — SENNOSIDES AND DOCUSATE SODIUM 2 TABLET: 50; 8.6 TABLET ORAL at 21:14

## 2024-11-20 RX ADMIN — PROPOFOL 50 MCG/KG/MIN: 10 INJECTION, EMULSION INTRAVENOUS at 01:02

## 2024-11-20 RX ADMIN — CALCIUM GLUCONATE 1 G: 20 INJECTION, SOLUTION INTRAVENOUS at 05:54

## 2024-11-20 RX ADMIN — ACETAMINOPHEN 650 MG: 325 TABLET ORAL at 13:33

## 2024-11-20 RX ADMIN — ACETAMINOPHEN 650 MG: 325 TABLET ORAL at 02:08

## 2024-11-20 RX ADMIN — OXYCODONE HYDROCHLORIDE 5 MG: 5 TABLET ORAL at 09:02

## 2024-11-20 RX ADMIN — CALCIUM GLUCONATE 2 G: 20 INJECTION, SOLUTION INTRAVENOUS at 13:30

## 2024-11-20 RX ADMIN — NEOSTIGMINE METHYLSULFATE 5 MG: 1 INJECTION INTRAVENOUS at 04:35

## 2024-11-20 RX ADMIN — PANTOPRAZOLE SODIUM 40 MG: 40 INJECTION, POWDER, FOR SOLUTION INTRAVENOUS at 09:03

## 2024-11-20 RX ADMIN — POLYETHYLENE GLYCOL 3350 17 G: 17 POWDER, FOR SOLUTION ORAL at 21:15

## 2024-11-20 RX ADMIN — PROPOFOL 50 MCG/KG/MIN: 10 INJECTION, EMULSION INTRAVENOUS at 04:46

## 2024-11-20 RX ADMIN — EPINEPHRINE IN SODIUM CHLORIDE 0.05 MCG/KG/MIN: 16 INJECTION INTRAVENOUS at 23:38

## 2024-11-20 RX ADMIN — PIPERACILLIN SODIUM AND TAZOBACTAM SODIUM 4.5 G: 4; .5 INJECTION, SOLUTION INTRAVENOUS at 18:40

## 2024-11-20 RX ADMIN — ASPIRIN 81 MG 81 MG: 81 TABLET ORAL at 09:03

## 2024-11-20 RX ADMIN — ATORVASTATIN CALCIUM 80 MG: 80 TABLET, FILM COATED ORAL at 21:15

## 2024-11-20 ASSESSMENT — PAIN - FUNCTIONAL ASSESSMENT
PAIN_FUNCTIONAL_ASSESSMENT: CPOT (CRITICAL CARE PAIN OBSERVATION TOOL)
PAIN_FUNCTIONAL_ASSESSMENT: 0-10
PAIN_FUNCTIONAL_ASSESSMENT: 0-10
PAIN_FUNCTIONAL_ASSESSMENT: CPOT (CRITICAL CARE PAIN OBSERVATION TOOL)
PAIN_FUNCTIONAL_ASSESSMENT: 0-10
PAIN_FUNCTIONAL_ASSESSMENT: 0-10
PAIN_FUNCTIONAL_ASSESSMENT: CPOT (CRITICAL CARE PAIN OBSERVATION TOOL)
PAIN_FUNCTIONAL_ASSESSMENT: CPOT (CRITICAL CARE PAIN OBSERVATION TOOL)
PAIN_FUNCTIONAL_ASSESSMENT: 0-10

## 2024-11-20 ASSESSMENT — PAIN SCALES - GENERAL
PAINLEVEL_OUTOF10: 3
PAINLEVEL_OUTOF10: 2
PAINLEVEL_OUTOF10: 7
PAINLEVEL_OUTOF10: 2
PAINLEVEL_OUTOF10: 3

## 2024-11-20 ASSESSMENT — PAIN DESCRIPTION - LOCATION: LOCATION: CHEST

## 2024-11-20 NOTE — SIGNIFICANT EVENT
Patient seen for evaluation Left groin access site.  Remains intubated and on pressors.    Neuro exam limited by intubation. Moves bilateral lower extremities.    L groin access site is hemostatic without palpable swelling,, hematoma, or skin changes.    Dopplerable signals in the L DP and PT.    Jose Santos DO PGY-1  General Surgery

## 2024-11-20 NOTE — PROGRESS NOTES
Vancomycin Dosing by Pharmacy- FOLLOW UP    Jaime Dominguez is a 74 y.o. year old male who Pharmacy has been consulted for vancomycin dosing for PNA. Based on the patient's indication and renal status this patient is being dosed based on a goal AUC of 400-600.     Renal function is currently Stable.    Current vancomycin dose:  1250 mg every 12 hours    Estimated vancomycin AUC on current dose: 501 mg/L.hr     Estimated Creatinine Clearance: 120.5 mL/min (by C-G formula based on SCr of 0.66 mg/dL).     Results from last 7 days   Lab Units 11/19/24  1717 11/19/24  0045 11/18/24  2132 11/18/24  1752 11/18/24  1337   BUN mg/dL 20 24*  --   --  18   CREATININE mg/dL 0.66 0.80  --   --  0.68   WBC AUTO x10*3/uL 24.3* 24.4* 22.9*   < > 21.9*   VANCOMYCIN RM ug/mL  --  15.2  --   --   --     < > = values in this interval not displayed.        Staph/MRSA Screen Culture   Date/Time Value Ref Range Status   11/09/2024 03:17 PM No Staphylococcus aureus isolated  Final     Urine Culture   Date/Time Value Ref Range Status   11/16/2024 01:17 PM No growth  Final     Blood Culture   Date/Time Value Ref Range Status   11/18/2024 10:48 PM Loaded on Instrument - Culture in progress  Preliminary   11/18/2024 10:48 PM Loaded on Instrument - Culture in progress  Preliminary        No results found for the last 90 days.      Visit Vitals  /57   Pulse 84   Temp 37.4 °C (99.3 °F) (Core)   Resp (!) 0        Assessment/Plan    Within goal AUC range. Continue current vancomycin regimen. Vanco 1250mg Q12h    This dosing regimen is predicted by [employer] to result in the following pharmacokinetic parameters:  Regimen: 1250 mg IV every 12 hours.  Start time: 08:24 on 11/20/2024  Exposure target: AUC24 (range)400-600 mg/L.hr   [id number]: 482 mg/L.hr  AUC24,ss: 501 mg/L.hr  Probability of AUC24 > 400: 80 %  Ctrough,ss: 16.4 mg/L  Probability of Ctrough,ss > 20: 27 %      The next level will be obtained on 11/22 with AM labs . May be obtained  sooner if clinically indicated.   Will continue to monitor renal function daily while on vancomycin and order serum creatinine at least every 48 hours if not already ordered.  Follow for continued vancomycin needs, clinical response, and signs/symptoms of toxicity.       Macy Naranjo, PharmD

## 2024-11-20 NOTE — PROGRESS NOTES
Jaime Dominguez is a 74 y.o. male on day 10 of admission presenting with STEMI (ST elevation myocardial infarction) (Multi).    Subjective   Patient discussed in morning handover, patient examined and chart reviewed. Meds, labs and radiology reviewed during rapid and full interdisciplinary rounds this morning. Co-rounded with HF Cardiology today    Surgeon: Justin  DOS: Nov. 12, 2024  Procedure: MIDCABG x 2 with conversion to full sternotomy LIMA prolonged as a Y graft with a radial to LAD OM     Airway: grade I - full view of glottis   EF: Normal post-op; Now ~30-35%    FULL Code  Emergency Sternotomy: Y/N; Exp POD#10 - Nov. 22, 2024    HPI:   11/8 - Patient initially presented with left sided chest pain, shortness of breath, nausea, and diaphoresis while doing yardwork. He came into the house in distress. His wife reported a brief loss of consciousness. EMS was called. He was diagnosed with STEMI in route and given Brilinta at that time. In the ED labwork and CXR were unremarkable. ECG with acute ST elevations. 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  He has a strong FH of heart disease. No personal history of prior chest pain. Cardiac surgery was consulted for CABG evaluation at [facility]. The patient was transferred to [facility] for robotic MIDCAB evaluation.     PMH:  CAD  Hyperlipidemia  Skin cancer     Course in Hospital:    11/12 - Index CABG x 2    11/13-14: Progressive soft BP with chest wall/incision discomfort. Towards the later afternoon, patient noted to have an upward trending CVP and vasopressor requirement.  Bedside POCUS demonstrating ?RWMA in cornelio-apical LV. ECG undertaken with ST seg. changes. Subsequent TnI added to afternoon bloodwork demonstrated markedly elevated level. Cardiac surgeon placed an IABP overnight and patient taken to the cath lab (see Dr. Quiles's note). After extensive discussion with the Heart Team and the family, decision reached to place the patient on  VA-ECMO and undertake high-risk PCI of the LAD, which was carried out successfully.     11/14 - Significant bleeding from cannulation overnight requiring exploration at the bedside. TTE: EF ~30-35% with RWMA in anterior, anteroseptal and anteroapical walls. Ongoing issues with volume seeking hemodynamics prompted a CT C/A/P - ~6cm collection noted in anterior mediastinum and L groin hematoma but nil else identified.    11/17 - Drop in hemoglobin again overnight and CXR has new opacity of L hemithorax. IABP in situ with stable inotropic support    11/19 - Taken to the OR for mediastinal/L chest washout and ECMO decannulation. IABP left in situ.    Overnight: Required ongoing volume administration overnight; stable inotropic support with an IABP     Objective     Last Recorded Vitals  Blood pressure 115/57, pulse 87, temperature 36 °C (96.8 °F), temperature source Core, resp. rate 25, height 1.829 m (6'), weight 94 kg (207 lb 3.7 oz), SpO2 100%.    Invasive Hemodynamics:    Most Recent Range Past 24hrs   BP (Art) 97/44 Arterial Line BP 1  Min: 62/28  Max: 146/55   MAP(Art) 62 mmHg Arterial Line MAP 1 (mmHg)   Min: 43 mmHg  Max: 95 mmHg   RA/CVP   No data recorded   PA 29/18 PAP  Min: 13/4  Max: 49/28   PA(mean) 22 mmHg PAP (Mean)  Min: 9 mmHg  Max: 34 mmHg   CO 6.9 L/min CO (L/min)  Min: 5.1 L/min  Max: 6.9 L/min   CI 3.2 L/min/m2 CI (L/min/m2)  Min: 2.36 L/min/m2  Max: 3.2 L/min/m2   Mixed Venous   No data recorded   SVR  602 (dyne*sec)/cm5 SVR (dyne*sec)/cm5  Min: 602 (dyne*sec)/cm5  Max: 1066 (dyne*sec)/cm5     Intake/Output last 3 Shifts:  I/O last 3 completed shifts:  In: 9220.8 (98.1 mL/kg) [P.O.:50; I.V.:2878.8 (30.6 mL/kg); Blood:4042; IV Piggyback:2250]  Out: 4835 (51.4 mL/kg) [Urine:2995 (0.9 mL/kg/hr); Blood:250; Chest Tube:1590]  Weight: 94 kg     Physical Exam  Constitutional:       General: He is not in acute distress.     Appearance: He is not ill-appearing or diaphoretic.      Interventions: He is  sedated and intubated.   Eyes:      Pupils: Pupils are equal, round, and reactive to light.   Cardiovascular:      Rate and Rhythm: Regular rhythm. Tachycardia present.      Pulses:           Dorsalis pedis pulses are detected w/ Doppler on the right side and detected w/ Doppler on the left side.        Posterior tibial pulses are detected w/ Doppler on the right side and detected w/ Doppler on the left side.      Comments: Epi: 0.05  Norepi: 0.08    IABP 1:1 in adequate position on CXR    Lines:  RIJ CVL with PAC  R rad. art. line   Pulmonary:      Effort: Pulmonary effort is normal. No tachypnea, accessory muscle usage or respiratory distress. He is intubated.      Breath sounds: Examination of the right-lower field reveals decreased breath sounds. Examination of the left-lower field reveals decreased breath sounds. Decreased breath sounds present.      Comments: Vent Mode: Pressure support  FiO2 (%):  [40 %-90 %] 40 %  S RR:  [14-16] 14  S VT:  [620 mL] 620 mL  PEEP/CPAP (cm H2O):  [8 cm H20] 8 cm H20  NV SUP:  [5 cm H20] 5 cm H20  MAP (cm H2O):  [13-16] 13  Abdominal:      General: Abdomen is flat. Bowel sounds are normal.      Palpations: Abdomen is soft.      Tenderness: There is no abdominal tenderness. There is no guarding or rebound.   Genitourinary:     Comments: Hale in situ: Clear urine  Skin:     Comments: No issues at present   Neurological:      General: No focal deficit present.      Mental Status: He is easily aroused.      Comments: CAM-ICU negative  RASS -1 - -2     Assessment/Plan   Principal Problem:    STEMI (ST elevation myocardial infarction) (Multi)  Active Problems:    TREE (acute kidney injury) (CMS-HCC)    On intra-aortic balloon pump assist    Delirium    Cardiogenic shock (Multi)    Hemothorax on left    ICU Liberation Bundle:  A-Analgesia: Tylenol and opioids at lowest effective dose  B-SBT: SBT pending  C-RASS Target: 0  D-CAM: negative  E-Mobilization Plan: Stage 1-3  today  F -  Family Last Update: to be update by the team    Daily Checklist:  HOB > 30 degrees: Unable to achieve due to IABP  Feeding: NPO for possible extubation  Thromboprophylaxis: Heparin systemically on hold at present and SCDs  Ulcer Prophylaxis: PPI  Glucose Control: Target 110-180; < 180 achieved; one episode of hypoglycemia overnight - received 1 amp D50W.  Line to removed: None to remove at present  Bowel Care: Bowel regime ordered and on hold for planning OR  De-escalation of Antibiotics: Completed routine anti-microbial prophylaxis. New fever 11/18 - cultures sent. Started on pip/tazo and vanco.    Plan:  A.fib with RVR - NSR at present on amiodarone infusion  Bleeding - Hb drop overnight, however no evidence of bleeding and he has received > 1L of fluid overnight. Will transfuse 1 pRBCs and re-check CBC this afternoon  Post Cardiotomy Shock - Will plan to wean IABP and work toward extubation today. Maintain current inotropic support. Will need HF OMT direction prior to discharge from hospital.  AMI S/P CABG and PCI:  DAPT and statin started. Will eventually need a beta-blocker.  Fever and elevated WBC - follow-up with cultures and maintain pip/tazo (dc vancomycin) for today - plan to re-assess for tomorrow. Consider changing central line.     DISPO: CTICU    I spent 52 minutes in the professional and overall care of this patient.    I have reviewed plan with the attending surgeon this morning.     This critically ill patient continues to be at-risk for clinically significant deterioration / failure due to the above mentioned dysfunctional, unstable organ systems.  I have personally identified and managed all complex critical care issues to prevent aforementioned clinical deterioration.  Critical care time is spent at bedside and/or the immediate area and has included, but is not limited to, the review of diagnostic tests, labs, radiographs, serial assessments of hemodynamics, respiratory status, ventilatory  management, and family updates.  Time spent in procedures and teaching are reported separately.    Sharad Bonilla MD

## 2024-11-20 NOTE — PROGRESS NOTES
CTICU Progress Note  Jaime Dominguez/[id number]    Admit Date: 11/10/2024  Hospital Length of Stay: 10   ICU Length of Stay: 7d 7h   CT SURGEON: Dr. Aldo Harman    SUBJECTIVE:   Hemodynamics acceptable on provided therapy.   Remains on epinephrine 0.05mcg/kg/min, norepinephrine 0.08mcg/kg/min, and amio 0.5mg/min.  IABP 1:1   Decannulated from [facility] yesterday.  Intubated on CPAP 40% 5/5  Hgb with slow downtrend.   Pan cultures pending and antibiotics initiated.    MEDICATIONS  Infusions:  amiodarone, Last Rate: 0.5 mg/min (11/20/24 0700)  EPINEPHrine, Last Rate: 0.05 mcg/kg/min (11/20/24 0700)  lactated Ringer's, Last Rate: 10 mL/hr (11/20/24 0700)  lactated Ringer's, Last Rate: 5 mL/hr (11/20/24 0700)  norepinephrine, Last Rate: 0.08 mcg/kg/min (11/20/24 0700)  propofol, Last Rate: 30 mcg/kg/min (11/20/24 0700)      Scheduled:  acetaminophen, 650 mg, q4h   Or  acetaminophen, 650 mg, q4h  [Held by provider] amiodarone, 400 mg, BID  aspirin, 81 mg, Daily  atorvastatin, 80 mg, Nightly  insulin lispro, 0-5 Units, q4h  pantoprazole, 40 mg, Daily  piperacillin-tazobactam, 4.5 g, q6h  polyethylene glycol, 17 g, BID  sennosides-docusate sodium, 2 tablet, BID  ticagrelor, 90 mg, BID  vancomycin, 1,250 mg, q12h      PRN:  calcium gluconate, 1 g, q6h PRN  calcium gluconate, 2 g, q6h PRN  magnesium sulfate, 2 g, q6h PRN  magnesium sulfate, 4 g, q6h PRN  melatonin, 5 mg, Nightly PRN  naloxone, 0.2 mg, q5 min PRN  naloxone, 0.2 mg, q5 min PRN  ondansetron, 4 mg, q8h PRN   Or  ondansetron, 4 mg, q8h PRN  oxyCODONE, 2.5 mg, q4h PRN  oxyCODONE, 5 mg, q4h PRN  oxygen, , Continuous PRN - O2/gases  potassium chloride CR, 20 mEq, q6h PRN   Or  potassium chloride, 20 mEq, q6h PRN  potassium chloride CR, 40 mEq, q6h PRN   Or  potassium chloride, 40 mEq, q6h PRN  potassium chloride, 20 mEq, q6h PRN  potassium chloride, 40 mEq, q6h PRN  vancomycin, , Daily PRN        PHYSICAL EXAM:   Visit Vitals  /57   Pulse 88   Temp 36 °C (96.8 °F)  (Core)   Resp (!) 0   Ht 1.829 m (6')   Wt 94 kg (207 lb 3.7 oz)   SpO2 100%   BMI 28.11 kg/m²   Smoking Status Never   BSA 2.19 m²     Wt Readings from Last 5 Encounters:   11/18/24 94 kg (207 lb 3.7 oz)   11/10/24 96.2 kg (212 lb)     INTAKE/OUTPUT:  I/O last 3 completed shifts:  In: 9220.8 (98.1 mL/kg) [P.O.:50; I.V.:2878.8 (30.6 mL/kg); Blood:4042; IV Piggyback:2250]  Out: 4835 (51.4 mL/kg) [Urine:2995 (0.9 mL/kg/hr); Blood:250; Chest Tube:1590]  Weight: 94 kg          Vent settings:  Vent Mode: Pressure support  FiO2 (%):  [40 %-90 %] 40 %  S RR:  [14-16] 14  S VT:  [620 mL] 620 mL  PEEP/CPAP (cm H2O):  [5 cm H20-8 cm H20] 5 cm H20  AK SUP:  [5 cm H20] 5 cm H20  MAP (cm H2O):  [13-16] 13    Physical Exam:   Constitutional: Male patient lying in ICU bed in no acute distress  Neuro: AOx3, no obvious focal deficits. Moves all extremities. PERRL.   CV: RRR. S1S2. SR/SA with PACS on monitor. AV wires present  Pulm: Intubated on CPAP 40% 5/5 with appropriate oxygenation. CT's with appropriate serosang output and no airleak.  : Clear yellow urine ia aden. Scrotal edema and erythema.   GI: S/ND/NT.   Extremities: NV exam intact x 4. No edema.   Skin: WDI. Postop dressings intact. Chest tube dressings intact.  Chest tube sites oozing.   Psych: Calm but sedated.     Daily Risk Screen  Intubated: Yes, plan to extubate today.   Central line: Hemodynamic instability requiring parenteral medication  Aden: Accurate I/Os in critically ill    Images: Reviewed    Invasive Hemodynamics:    Most Recent Range Past 24hrs   BP (Art) 108/50 Arterial Line BP 1  Min: 62/28  Max: 146/55   MAP(Art) 68 mmHg Arterial Line MAP 1 (mmHg)   Min: 43 mmHg  Max: 95 mmHg   RA/CVP   No data recorded   PA 34/17 PAP  Min: 14/5  Max: 49/28   PA(mean) 22 mmHg PAP (Mean)  Min: 9 mmHg  Max: 34 mmHg     Assessment/Plan   Jaime Dominguez is a 74 year old male with a PMH significant for basal cell carcinoma and HPL s/p MidCAB x2 converted to full  sternotomy w/ LIMA prolonged as a Y graft with a radial to LAD OM w/ Dr. Aldo Harman and Dr. Stanley. CPB time 347min. Course c/b increasing pressor requirement and echo revealing apical hypo-akinesis with subsequent IABP placement on 11/14 for further support. Overnight on 11/14, Troponin elevated and ST elevation in anteroseptal leads, c/f ACS and decision made to [facility]. Now s/p PCI of mid LAD with HEATHER. Pt cannulated on VA ECMO for cardiogenic shock state 2/2 multiple failed grafts.     11/19 RTOR for VA ECMO decannulation and left hemothorax washout. Remained on IABP 1:1.     Plan:  NEURO: No PMHx. AOx3, follows commands, and moves all extremities. No obvious focal deficits. Physically deconditioned. Nodding head yes to pain. -->  - Serial neuro and pain assessments   - Scheduled Tylenol   - Robaxin 1000mg x 1   - PRN oxycodone to 2.5mg for moderate and 5mg for severe  - Continue melatonin  - PT Consult  - CAM ICU score qshift  - Passive ROM  - Sleep/wake cycle hygiene     CV: Patient has a history of HLD. Is now status post MIDCABG x 2 with conversion to full sternotomy LIMA prolonged as a Y graft with a radial to LAD OM on 11/12. Pre/Post EF: normal BIV. Post op course complicated by coronary graft dysfunction on 11/14. Now s/p PCI of mid LAD with HEATHER. Pt cannulated on [facility] ECMO for cardiogenic shock state 2/2 multiple failed grafts 11/14. Most recent TTE on 11/18 during ECMO turn down trial with LVEF 34% and reduced RV. NSR, SA with PACs. Previous Afib w/ RVR for which patient is on amio gtt, no episodes in multiple days. Hypotension on norepinephrine 0.08mcg/kg/min infusion. Remains on epinephrine 0.05mcg/kg/min for inotropy. Decannulated from [facility] ECMO 11/19. IABP at 1:1. When weaned to 1:2, CI - 2.99, CO - 6.46, SVR - 644, and SVO2 - 60%.-->  - Plan to remove IABP   - Continue IV amiodarone 0.5mg/min and transition to PO later today  - Wean norepinephrine while maintaining MAP 70-90  - Continue epinephrine  0.05mcg/kg/min for inotropy  - Maintain epicardial wires set VVI @ 40  - Continue aspirin and Ticagrelor 90mg BID  - Continue with atorvastatin 80mg nightly  - Remove IABP after extubation     PULM: No history of pulmonary disease. Currently intubated on CPAP 40% 5/5. 2MS and 1LPL chest tubes with minimal output and no air leaks noted. Morning CXR with stable acute pulmonary edema and atelectasis. -->  - Daily CXR  - Wean FiO2 maintaining SpO2 >92%.   - IS q1h and OOB to chair  - Extubate   - Chest tubes to wall suction. Leave in place until pt further mobilizes      GI: No PMH. Previously had passed bedside swallow. NPO for extubation. No post op BM. Abdomen soft, non tender, and non distended. -->   - Continue PPI   - Start trickle TF  - Colace/senna BID and miralax BID     : CSA-TREE Risk Score low. No history of renal disease, baseline creatinine 0.87. Creatinine stable post-op. Aden in place and making adequate UOP. Net +3.3L in last 24 hours from blood product resuscitation. Hypervolemic on exam with 2+ pitting edema in all extremities. -->  - Continue aden catheter for strict I/Os.  - Goal UOP 0.5ml/kg/hr  - Diurese after IABP removal and BP stabilizes   - RFP as clinically indicated  - Replete electrolytes per CTICU protocol     ENDO: No PMH. A1c: 5.1. Euglycemia, adequately controlled on current regimen. -->  - Maintain BG <180  - Insulin per CTICU protocol     HEME: Acute blood loss anemia and thrombocytopenia. Oozing from chest tube sites. Overall oozing improved after systemic heparin held and stitch added on 11/18. CT scan 11/18 showing left chest hemothorax with resulting compressive atelectasis, RTOR 11/19 for left hemothorax evacuation.  -->    - Monitor drain output volume and characteristics  - Daily CBC and prn  - 1unit PRBC for hypotension and downtrending Hgb  - Continue aspirin and Ticagrelor BID  - Start SQH q8h  - No systemic heparin due to bleeding  - SCDs for DVT prophylaxis.  - Last  type and screen: 11/18     ID: Afebrile, but previously warming ECMO circuit. Leukocytosis. MRSA negative. UA negative, blood cultures sent on 11/16 and NGTD x2. MRSA 11/18 negative. Recultured on 11/18 and results pending. -->  - Follow up blood culture results  - Continue zosyn  - Discontinue vanc  - Continue to monitor WBC  - Trend temp q4h     Skin: No active skin issues. Oozing from chest tube sites. -->  - Scrotal edema- will elevate   - L groin cannula site soft and dry (no oozing), no concern for compartment syndrome (CK and myoglobin downtrend)  - preventative Mepilex dressings in place on sacrum and heels  - change preventative Mepilex weekly or more frequently as indicated (when moist/soiled)   - every shift skin assessment per nursing and weekly ICU skin rounds  - moisture barrier to be applied with sandro care  - active skin problems addressed with nursing on daily rounds     Proph:  SCDs  PPI  SQH     G: Line  Right IJ MAC w PAC placed 11/12 - Remove PAC after IABP removal   R radial a-line placed 11/16  Hale 11/12     F: Family: will update at bedside.     The indications and risks/benefits of non-violent/non self-destructive restraints were discussed. Plan to remove restraints after extubation.     Code status: Full Code    I personally spent 60 minutes of critical care time directly and personally managing the patient exclusive of separately billable procedures.     A,B,C,D,E,F,G: reviewed    Discussed with Dr. Bonilla.  Dispo: CTICU care for now.    CTICU TEAM PHONE [phone]

## 2024-11-20 NOTE — CONSULTS
Nutrition Initial Assessment:   Nutrition Assessment    Reason for Assessment: Enteral assessment/recommendation (TF)    Patient is a 74 y.o. male who initially presented to [facility] ED on 11/08 for chest pain & syncope > found to be s/p STEMI. Transferred to [facility] for cardiac surgery eval now s/p MidCAB x2 converted to full sternotomy w/ LIMA prolonged as a Y graft with a radial to LAD OM w/ Dr. Aldo Harman and Dr. Stanley on 11/12. Course c/b increasing pressor requirement, IABP placement (11/14). Eventually cannulated for [facility] ECMO  for multiple failed grafts > admitted to CTICU 11/14.     11/19: Return from OR s/p ECMO decannulation with left groin repair. IABP remain in place.     PMH CAD, hyperlipidemia, basal cell carcinoma, R inguinal hernia repair (3/18/24), partial C5 corpectomy with C4-5, C5/6 ACDF (7/08/20)     Nutrition History:  Energy Intake: Poor < 50 %  Food and Nutrient History: Frequent NPO status since admit 11/10 thru 11/14. 11/16 decompressive NG removed as gastric bubble resolved. Diet prescribed 11/16-11/18 however poor appetite reported by nursing as of 11/18. Ensure High Protein prescribed & family encouragint PO intake. NPO since 11/18 @ ~1700 for decannulation. Pt did have small bore NG placed yesterday. Still intubated this morning but plans to work toward extubation.  Food Allergies/Intolerances:  None  GI Symptoms: None  Oral Problems: Swallowing difficulty -- with pills per nursing 11/18       Anthropometrics:  Height: 182.9 cm (6')   Weight: 94 kg (207 lb 3.7 oz)   BMI (Calculated): 28.1  IBW/kg (Dietitian Calculated): 80.9 kg  Percent of IBW: 116 %       Weight History:   Date/Time Weight Dosing Weight   11/19/24 1645 -- 93.4 kg (205 lb 14.6 oz)   11/18/24 0600 94 kg (207 lb 3.7 oz) --   11/14/24 0800 -- 93.4 kg (205 lb 14.6 oz)   11/12/24 0400 93.4 kg (205 lb 14.6 oz) --   11/12/24 0000 95.1 kg (209 lb 10.5 oz) --   11/11/24 1755 95.5 kg (210 lb 8.6 oz) --   11/11/24 0400 95.5 kg  "(210 lb 8.6 oz) --   11/10/24 1618 96.2 kg (212 lb) --       Weight Change %:   Weight History / % Weight Change: no weight records available at present    Nutrition Focused Physical Exam Findings:  Subcutaneous Fat Loss:   Orbital Fat Pads: Defer (provider @ bedside this morning)  Muscle Wasting:     Edema:  Edema Location: +1 generalized/BUE & +2 BLE  Physical Findings:  Skin:  (surgical sites > no breakdown)    Nutrition Significant Labs:  CBC Trend:   Results from last 7 days   Lab Units 11/20/24  0452 11/20/24  0031 11/19/24 1717 11/19/24  0045   WBC AUTO x10*3/uL 18.7* 19.0* 24.3* 24.4*   RBC AUTO x10*6/uL 2.45* 2.63* 2.95* 2.42*   HEMOGLOBIN g/dL 7.2* 7.7* 8.6* 7.3*   HEMATOCRIT % 20.9* 22.8* 25.2* 20.8*   MCV fL 85 87 85 86   PLATELETS AUTO x10*3/uL 138* 127* 119* 116*    , A1C:  Lab Results   Component Value Date    HGBA1C 5.1 11/08/2024   , BG POCT trend:   Results from last 7 days   Lab Units 11/19/24  2019 11/19/24  1722 11/19/24  0924 11/18/24  2337 11/18/24  2118   POCT GLUCOSE mg/dL 103* 137* 128* 115* 118*    , Liver Function Trend:   Results from last 7 days   Lab Units 11/13/24  1350   ALK PHOS U/L 21*   AST U/L 180*   ALT U/L 35   BILIRUBIN TOTAL mg/dL 0.9    , Renal Lab Trend:   Results from last 7 days   Lab Units 11/20/24  0452 11/20/24  0031 11/19/24  1717 11/19/24  0045   POTASSIUM mmol/L 4.1 4.1 4.2 5.1   PHOSPHORUS mg/dL 2.5 2.4* 3.5 5.7*   SODIUM mmol/L 135* 134* 133* 133*   MAGNESIUM mg/dL 2.08 2.09 2.13 2.35   EGFR mL/min/1.73m*2 >90 >90 >90 >90   BUN mg/dL 17 18 20 24*   CREATININE mg/dL 0.71 0.69 0.66 0.80    , Lipid Panel:   Lab Results   Component Value Date    CHOL 228 (H) 11/08/2024    HDL 37.6 11/08/2024    CHHDL 6.1 11/08/2024    VLDL 64 (H) 11/08/2024    TRIG 322 (H) 11/08/2024    , Vit D: No results found for: \"VITD25\"     Nutrition Specific Medications:  Scheduled medications  acetaminophen, 650 mg, oral, q4h   Or  acetaminophen, 650 mg, rectal, q4h  [Held by provider] " amiodarone, 400 mg, oral, BID  aspirin, 81 mg, oral, Daily  atorvastatin, 80 mg, oral, Nightly  heparin, 5,000 Units, subcutaneous, q8h  insulin lispro, 0-5 Units, subcutaneous, q4h  pantoprazole, 40 mg, intravenous, Daily  piperacillin-tazobactam, 4.5 g, intravenous, q6h  polyethylene glycol, 17 g, oral, BID  sennosides-docusate sodium, 2 tablet, oral, BID  ticagrelor, 90 mg, oral, BID  vancomycin, 1,250 mg, intravenous, q12h      Continuous medications  amiodarone, 0.5 mg/min, Last Rate: 0.5 mg/min (11/20/24 0900)  dexmedeTOMIDine, 0-1.5 mcg/kg/hr (Dosing Weight)  EPINEPHrine, 0.05 mcg/kg/min (Dosing Weight), Last Rate: 0.05 mcg/kg/min (11/20/24 0900)  lactated Ringer's, 10 mL/hr, Last Rate: 10 mL/hr (11/20/24 0900)  lactated Ringer's, 5 mL/hr, Last Rate: 5 mL/hr (11/20/24 0900)  norepinephrine, 0-1 mcg/kg/min, Last Rate: 0.08 mcg/kg/min (11/20/24 0900)  propofol, 0-50 mcg/kg/min (Dosing Weight), Last Rate: 30 mcg/kg/min (11/20/24 0900)      PRN medications  PRN medications: calcium gluconate, calcium gluconate, magnesium sulfate, magnesium sulfate, melatonin, naloxone, naloxone, ondansetron **OR** ondansetron, oxyCODONE, oxyCODONE, oxygen, potassium chloride CR **OR** potassium chloride, potassium chloride CR **OR** potassium chloride, potassium chloride, potassium chloride, vancomycin      I/O:    ; Stool Appearance: Unable to assess (11/17/24 0800)    Dietary Orders (From admission, onward)       Start     Ordered    11/18/24 1716  NPO Diet; Effective now  Diet effective now         11/18/24 1715 11/18/24 1113  Oral nutritional supplements  Until discontinued        Question Answer Comment   Deliver with All meals    Select supplement: Ensure High Protein        11/18/24 1112 11/13/24 0055  May Participate in Room Service  ( ROOM SERVICE MAY PARTICIPATE)  Once        Question:  .  Answer:  Yes    11/13/24 0054 11/13/24 0054  Oral nutritional supplements - Hank  Until discontinued        Question  Answer Comment   Deliver with All meals    Select supplement: Hank        11/13/24 0053                     Estimated Needs:   Total Energy Estimated Needs (kCal):  (2009-3052 on vent > ~2570-4552 extubated)  Method for Estimating Needs: RMR/Chucho state = 1792 kcals/d ; REE/MSJ = 1733 (using 94 kgs)  Total Protein Estimated Needs (g): 105 g  Method for Estimating Needs: ~1.3 gm/kg ideal BW  Total Fluid Estimated Needs (mL):  (per team)           Nutrition Diagnosis   Malnutrition Diagnosis  Patient has Malnutrition Diagnosis: Yes  Diagnosis Status: New  Malnutrition Diagnosis: Moderate malnutrition related to acute disease or injury  As Evidenced by: inadequate nutrient intake (<50% est needs met x >5-7 days - suspect longer), unable to capture weight loss at present given wt likely up from edema/IVF resusitation.  Additional Assessment Information: 11/20) feel pt warrants at least moderate malnutrion given severity of illness            Nutrition Interventions/Recommendations         Nutrition Prescription:  Individualized Nutrition Prescription Provided for : tube feeds        Nutrition Interventions:     1) Initiate trickle feeds of TwoCal HN @10-20mls/hr if unable to wean from vent today       2) Extubated but NPO, titrate TwoCal HN to goal rate @ 40 mls/hr contnious + add one pkt Prostat AWC    @ 40 + one pkt Prostat AWC = 2020 kcals, 97gm protein, 210 gm total CHO, 670 mls free water       Nutrition Monitoring and Evaluation   Food/Nutrient Related History Monitoring  Monitoring and Evaluation Plan: Enteral and parenteral nutrition intake  Enteral and Parenteral Nutrition Intake: Enteral nutrition intake  Criteria: initiate EN support via small bore NG tube placed yesterday                        Time Spent (min): 60 minutes

## 2024-11-20 NOTE — PROGRESS NOTES
Physical Therapy                 Therapy Communication Note    Patient Name: Jaime Dominguez  MRN: [id number]  Department: [facility] CTU  Room: 05/05-A  Today's Date: 11/20/2024     Discipline: Physical Therapy    Missed Visit Reason: Missed Visit Reason:  (Pt intubated and with fem IABP- will continue to reassess when pt is medically appropriate for PT)    Missed Time: Attempt    YESENIA DOWNS, PT

## 2024-11-20 NOTE — PROGRESS NOTES
Vancomycin Dosing by Pharmacy- Cessation of Therapy    Consult to pharmacy for vancomycin dosing has been discontinued by the prescriber, pharmacy will sign off at this time.    Please call pharmacy if there are further questions or re-enter a consult if vancomycin is resumed.     Rosalino Gan, OlenaD    Possible Acute Rehab

## 2024-11-20 NOTE — PROGRESS NOTES
Subjective Data:  Patient seen this AM, remains on epi and levo. Decannulated from ECMO yesterday; currently weaning IABP and sedation. Tried to elicit yes/no questions from patient but unsuccessful.       Objective Data:  Last Recorded Vitals:  Vitals:    11/20/24 0945 11/20/24 1000 11/20/24 1047 11/20/24 1100   BP:   (!) 90/40    Pulse: 87 83 78 81   Resp: (!) 30 (!) 30 25 14   Temp:   (!) 38 °C (100.4 °F)    TempSrc:   Core    SpO2: 100% 100% 100% 97%   Weight:       Height:           Last Labs:  CBC - 11/20/2024:  4:52 AM  18.7 7.2 138    20.9      CMP - 11/20/2024:  4:52 AM  7.0 5.9 180 --- 0.9   2.5 2.8 35 21      PTT - 11/20/2024:  4:52 AM  1.3   14.3 28     TROPHS   Date/Time Value Ref Range Status   11/15/2024 12:28 PM 44,705 0 - 53 ng/L Final     Comment:     Previous result verified on 11/15/2024 1020 on specimen/case [id number] called with component TRPHS for procedure Troponin I, High Sensitivity with value 67,971 ng/L.   11/15/2024 01:42 AM 67,971 0 - 53 ng/L Final   11/14/2024 03:54 AM 73,314 0 - 53 ng/L Final     Comment:     Previous result verified on 11/13/2024 2154 on specimen/case [id number] called with component TRPHS for procedure Troponin I, High Sensitivity with value 45,685 ng/L.   11/08/2024 04:12 PM 4 0 - 20 ng/L Final     BNP   Date/Time Value Ref Range Status   11/10/2024 04:34 PM 13 0 - 99 pg/mL Final     HGBA1C   Date/Time Value Ref Range Status   11/08/2024 04:12 PM 5.1 See comment % Final     LDLCALC   Date/Time Value Ref Range Status   11/08/2024 04:12  <=99 mg/dL Final     Comment:                                 Near   Borderline      AGE      Desirable  Optimal    High     High     Very High     0-19 Y     0 - 109     ---    110-129   >/= 130     ----    20-24 Y     0 - 119     ---    120-159   >/= 160     ----      >24 Y     0 -  99   100-129  130-159   160-189     >/=190       VLDL   Date/Time Value Ref Range Status   11/08/2024 04:12 PM 64 0 - 40 mg/dL Final       Last I/O:  I/O last 3 completed shifts:  In: 9220.8 (98.1 mL/kg) [P.O.:50; I.V.:2878.8 (30.6 mL/kg); Blood:4042; IV Piggyback:2250]  Out: 4835 (51.4 mL/kg) [Urine:2995 (0.9 mL/kg/hr); Blood:250; Chest Tube:1590]  Weight: 94 kg     Inpatient Medications:  Scheduled medications   Medication Dose Route Frequency    acetaminophen  650 mg oral q4h    Or    acetaminophen  650 mg rectal q4h    [Held by provider] amiodarone  400 mg oral BID    aspirin  81 mg oral Daily    atorvastatin  80 mg oral Nightly    heparin  5,000 Units subcutaneous q8h    insulin lispro  0-5 Units subcutaneous q4h    pantoprazole  40 mg intravenous Daily    piperacillin-tazobactam  4.5 g intravenous q6h    polyethylene glycol  17 g oral BID    sennosides-docusate sodium  2 tablet oral BID    ticagrelor  90 mg oral BID     PRN medications   Medication    calcium gluconate    calcium gluconate    magnesium sulfate    magnesium sulfate    melatonin    naloxone    naloxone    ondansetron    Or    ondansetron    oxyCODONE    oxyCODONE    oxygen    potassium chloride CR    Or    potassium chloride    potassium chloride CR    Or    potassium chloride    potassium chloride    potassium chloride     Continuous Medications   Medication Dose Last Rate    amiodarone  0.5 mg/min 0.5 mg/min (11/20/24 0900)    dexmedeTOMIDine  0-1.5 mcg/kg/hr (Dosing Weight) 0.2 mcg/kg/hr (11/20/24 0957)    EPINEPHrine  0.05 mcg/kg/min (Dosing Weight) 0.05 mcg/kg/min (11/20/24 0900)    norepinephrine  0-1 mcg/kg/min 0.08 mcg/kg/min (11/20/24 0900)    propofol  0-50 mcg/kg/min (Dosing Weight) 10 mcg/kg/min (11/20/24 1045)       Physical Exam:  General: lying flat in bed, intubated/ sedated   Skin: warm and dry   Cardiac: S1S2  Pulm: mechanical breath sounds, CTs in place  GI: soft, nontender  Extremities: no LE edema, left groin ECMO site, rt fem IABP site; bounding palpable DP pulses in BL feet   Neuro: sedated     Assessment/Plan   Jaime Dominguez is a 74 year old male with  a PMH significant for basal cell carcinoma and HPL s/p MidCAB x2 converted to full sternotomy w/ LIMA prolonged as a Y graft with a radial to LAD OM w/ Dr. Aldo Harman and Dr. Stanley. CPB time 347min. Course c/b increasing pressor requirement and echo revealing apical hypo-akinesis with subsequent IABP placement on 11/14 for further support. Overnight on 11/14, Troponin elevated and ST elevation in anteroseptal leads, c/f ACS and decision made to [facility]. Now s/p PCI of mid LAD with HEATHER. Pt cannulated on VA ECMO for cardiogenic shock state 2/2 multiple failed grafts.     11/14  Right Radial 6 Fr -- TR Band  Left Femoral Artery ECMO Cannula  Left Femoral Vein ECMO Cannula     Patient is s/p CABG on 11/13/2024 arrived in fulminant cardiogenic shock despite IABP placement and 3 pressors.   A right femoral diagnostic cath was done with the following findings:     LM: distal 30-40%  LAD: mid 100% stenosis at anastomosis site   LCX: competitive flow in the OM without significant stenosis  RCA: patent  LIMA to LAD: Y graft with anastomosis to the LAD which is not patent and anastomosis to the OM which appears patent     Due to patient's profound shock, ECMO cannulation was done with the aforementioned access sites and the plan was made jointly with cardiac surgery to pursue salvage PCI of the mid LAD. He was given ASA and loaded with Ticagrelor. Patient is now s/p salvage PCI of the mid LAD with a Guillaume Sophia 3.0 x 38 HEATHER. Following PCI and ECMO cannulation, patient's pressors were able to be weaned substantially and he left the cath lab (without intubation) on ECMO and IABP support.     ASA/Ticagrelor loading in the lab    Overnight bleeding from ECMO site requiring multiple units pRBCs, repositioned and sutured by Cardiac Surgery    11/15 IABP 1:1, left fem ECMO, on epi and levo (vaso currently weaned off), amio started for afib, pending TTE     11/19  Plan for chest washout due to concern for left hemothorax at the time of  ECMO decannulation today. Remains on levo and epi, PRBCs transfusion.     11/20  Weaning sedation and IABP, currently 1:2; remains on epi and levo    Interventional Recs:   - cont telemetry care per PCI protocol  - cont DAPT with Brilinta (6 months) and aspirin (lifelong)  - provided education on compliance with DAPT  - at discharge, PLEASE send 90 day prescription of Brilinta to [facility] (for price check and Meds to Beds) and remaining refills to patient's home pharmacy   - continue high intensity statin  - no BB with pessors  - please ensure cardiology follow up appointment after discharge in 1-3 weeks  - patient referred for cardiac rehab eval  - 5lb weight restriction for 5 days for right radial access  - no lifting anything heavier than 10 lbs for one week for groin access   - appreciate excellent CTICU care   - IABP (placed 11/14 AM by CTICU) and ECMO management per CTICU     Interventional Cardiology will follow.     CICU Fellow Pager: [phone] anytime  Endovascular /Limb Salvage Team Pager: [phone] for day coverage (8am-5pm)  Interventional Cardiology Fellow Pager: [phone] (7AM-5PM) Night coverage: [facility] Cross Cover [id number]  Night coverage: [facility] [id number]     I spent 30 minutes in the professional and overall care of this patient.        Gabrielle Lozada, APRN-CNP

## 2024-11-20 NOTE — ANESTHESIA POSTPROCEDURE EVALUATION
Patient: Jaime Dominguez    Procedure Summary       Date: 11/19/24 Room / Location: [facility] OR 21 / [facility] [place] OR    Anesthesia Start: 1300 Anesthesia Stop: 1654    Procedure: Mediastinal Exploration. Hematoma Evacuation. Left femoral VA ECMO decannulation. Chest Closure (Bilateral: Chest) Diagnosis:       Cardiogenic shock (Multi)      (Cardiogenic shock (Multi) [R57.0])    Surgeons: Cierra Stanley MD Responsible Provider: Jaleel Chavez MD    Anesthesia Type: general ASA Status: 4            Anesthesia Type: general    Vitals Value Taken Time   /57 11/19/24 1654   Temp 36 °C (96.8 °F) 11/20/24 0400   Pulse 86 11/20/24 0816   Resp 0 11/20/24 0816   SpO2 100 % 11/20/24 0816   Vitals shown include unfiled device data.    Anesthesia Post Evaluation    Patient location during evaluation: bedside  Patient participation: complete - patient cannot participate  Level of consciousness: sedated  Pain management: adequate  Airway patency: patent  Cardiovascular status: acceptable and hemodynamically stable  Respiratory status: acceptable and ventilator  Hydration status: acceptable  Postoperative Nausea and Vomiting: none    There were no known notable events for this encounter.

## 2024-11-20 NOTE — PROGRESS NOTES
Jaime Dominguez is a 74 y.o. male on day 10 of admission presenting with STEMI (ST elevation myocardial infarction) (Multi).      Palliative Medicine following for:  Complex medical decision making, symptom management, patient/family support    History obtained from chart review including ED note, H&P, patient's daily progress notes, review of lab/test results, and discussion with primary team and bedside RN.    Subjective    History of Present Illness  Jaime Dominguez is a 74 year old male with a PMH significant for basal cell carcinoma and HPL s/p MidCAB x2 converted to full sternotomy w/ LIMA prolonged as a Y graft with a radial to LAD OM w/ Dr. Aldo Harman and Dr. Stanley. CPB time 347min. Course c/b increasing pressor requirement and echo revealing apical hypo-akinesis with subsequent IABP placement on 11/14 for further support. Overnight on 11/14, Troponin elevated and ST elevation in anteroseptal leads, c/f ACS and decision made to [facility]. Now s/p PCI of mid LAD with HEATHER. Pt cannulated on VA ECMO for cardiogenic shock state 2/2 multiple failed grafts. 11/19 RTOR for VA ECMO decannulation and left hemothorax washout. Plan to remove IABP 11/20.      Symptoms  Pt unable to contribute at this time r/t prepping for IABP removal/pain medication.    Objective    Last Recorded Vitals  BP (!) 105/46   Pulse 75   Temp 37.9 °C (100.2 °F) (Core)   Resp (!) 0   Ht 1.829 m (6')   Wt 94 kg (207 lb 3.7 oz)   SpO2 97%   BMI 28.11 kg/m²      Physical Exam  Constitutional:       General: He is sleeping.      Appearance: He is overweight.      Interventions: He is sedated.   HENT:      Head: Normocephalic.   Cardiovascular:      Rate and Rhythm: Normal rate and regular rhythm.   Pulmonary:      Effort: Pulmonary effort is normal.   Abdominal:      General: There is distension.   Musculoskeletal:         General: Swelling present.   Skin:     General: Skin is warm and dry.   Neurological:      Comments: Sedated/drowsy from pain  medication.           Relevant Results   Results for orders placed or performed during the hospital encounter of 11/10/24 (from the past 24 hours)   Blood Gas Arterial Full Panel Unsolicited   Result Value Ref Range    POCT pH, Arterial 7.42 7.38 - 7.42 pH    POCT pCO2, Arterial 41 38 - 42 mm Hg    POCT pO2, Arterial 145 (H) 85 - 95 mm Hg    POCT SO2, Arterial 99 94 - 100 %    POCT Oxy Hemoglobin, Arterial 96.9 94.0 - 98.0 %    POCT Hematocrit Calculated, Arterial 31.0 (L) 41.0 - 52.0 %    POCT Sodium, Arterial 130 (L) 136 - 145 mmol/L    POCT Potassium, Arterial 4.6 3.5 - 5.3 mmol/L    POCT Chloride, Arterial 99 98 - 107 mmol/L    POCT Ionized Calcium, Arterial 0.93 (L) 1.10 - 1.33 mmol/L    POCT Glucose, Arterial 160 (H) 74 - 99 mg/dL    POCT Lactate, Arterial 1.3 0.4 - 2.0 mmol/L    POCT Base Excess, Arterial 1.9 -2.0 - 3.0 mmol/L    POCT HCO3 Calculated, Arterial 26.6 (H) 22.0 - 26.0 mmol/L    POCT Hemoglobin, Arterial 10.2 (L) 13.5 - 17.5 g/dL    POCT Anion Gap, Arterial 9 (L) 10 - 25 mmo/L    Patient Temperature 37.0 degrees Celsius    FiO2 100 %   Coox Panel, Arterial Unsolicited   Result Value Ref Range    POCT Hemoglobin, Arterial 10.2 (L) 13.5 - 17.5 g/dL    POCT Oxy Hemoglobin, Arterial 96.9 94.0 - 98.0 %    POCT Carboxyhemoglobin, Arterial 1.1 %    POCT Methemoglobin, Arterial 1.1 0.0 - 1.5 %    POCT Deoxy Hemoglobin, Arterial 0.8 0.0 - 5.0 %   Calcium, Ionized   Result Value Ref Range    POCT Calcium, Ionized 1.13 1.1 - 1.33 mmol/L   CBC   Result Value Ref Range    WBC 24.3 (H) 4.4 - 11.3 x10*3/uL    nRBC 0.5 (H) 0.0 - 0.0 /100 WBCs    RBC 2.95 (L) 4.50 - 5.90 x10*6/uL    Hemoglobin 8.6 (L) 13.5 - 17.5 g/dL    Hematocrit 25.2 (L) 41.0 - 52.0 %    MCV 85 80 - 100 fL    MCH 29.2 26.0 - 34.0 pg    MCHC 34.1 32.0 - 36.0 g/dL    RDW 15.2 (H) 11.5 - 14.5 %    Platelets 119 (L) 150 - 450 x10*3/uL   Magnesium   Result Value Ref Range    Magnesium 2.13 1.60 - 2.40 mg/dL   Renal Function Panel   Result Value Ref  Range    Glucose 136 (H) 74 - 99 mg/dL    Sodium 133 (L) 136 - 145 mmol/L    Potassium 4.2 3.5 - 5.3 mmol/L    Chloride 98 98 - 107 mmol/L    Bicarbonate 29 21 - 32 mmol/L    Anion Gap 10 10 - 20 mmol/L    Urea Nitrogen 20 6 - 23 mg/dL    Creatinine 0.66 0.50 - 1.30 mg/dL    eGFR >90 >60 mL/min/1.73m*2    Calcium 7.4 (L) 8.6 - 10.6 mg/dL    Phosphorus 3.5 2.5 - 4.9 mg/dL    Albumin 2.6 (L) 3.4 - 5.0 g/dL   Blood Gas Arterial Full Panel   Result Value Ref Range    POCT pH, Arterial 7.46 (H) 7.38 - 7.42 pH    POCT pCO2, Arterial 39 38 - 42 mm Hg    POCT pO2, Arterial 214 (H) 85 - 95 mm Hg    POCT SO2, Arterial 100 94 - 100 %    POCT Oxy Hemoglobin, Arterial 97.5 94.0 - 98.0 %    POCT Hematocrit Calculated, Arterial 39.0 (L) 41.0 - 52.0 %    POCT Sodium, Arterial 130 (L) 136 - 145 mmol/L    POCT Potassium, Arterial 4.3 3.5 - 5.3 mmol/L    POCT Chloride, Arterial 99 98 - 107 mmol/L    POCT Ionized Calcium, Arterial 1.09 (L) 1.10 - 1.33 mmol/L    POCT Glucose, Arterial 149 (H) 74 - 99 mg/dL    POCT Lactate, Arterial 1.8 0.4 - 2.0 mmol/L    POCT Base Excess, Arterial 3.7 (H) -2.0 - 3.0 mmol/L    POCT HCO3 Calculated, Arterial 27.7 (H) 22.0 - 26.0 mmol/L    POCT Hemoglobin, Arterial 13.1 (L) 13.5 - 17.5 g/dL    POCT Anion Gap, Arterial 8 (L) 10 - 25 mmo/L    Patient Temperature 37.0 degrees Celsius    FiO2 90 %   POCT GLUCOSE   Result Value Ref Range    POCT Glucose 137 (H) 74 - 99 mg/dL   Coagulation Screen   Result Value Ref Range    Protime 13.3 (H) 9.8 - 12.8 seconds    INR 1.2 (H) 0.9 - 1.1    aPTT 26 (L) 27 - 38 seconds   Fibrinogen   Result Value Ref Range    Fibrinogen 410 (H) 200 - 400 mg/dL   POCT GLUCOSE   Result Value Ref Range    POCT Glucose 103 (H) 74 - 99 mg/dL   BLOOD GAS MIXED VENOUS FULL PANEL   Result Value Ref Range    POCT pH, Mixed 7.48 (H) 7.33 - 7.43 pH    POCT pCO2, Mixed 37 (L) 41 - 51 mm Hg    POCT pO2, Mixed 32 (L) 35 - 45 mm Hg    POCT SO2, Mixed 57 45 - 75 %    POCT Oxy Hemoglobin, Mixed 55.5  45.0 - 75.0 %    POCT Hematocrit Calculated, Mixed 23.0 (L) 41.0 - 52.0 %    POCT Sodium, Mixed 131 (L) 136 - 145 mmol/L    POCT Potassium, Mixed 4.1 3.5 - 5.3 mmol/L    POCT Chloride, Mixed 102 98 - 107 mmol/L    POCT Ionized Calcium, Mixed 1.03 (L) 1.10 - 1.33 mmol/L    POCT Glucose, Mixed 110 (H) 74 - 99 mg/dL    POCT Lactate, Mixed 1.1 0.4 - 2.0 mmol/L    POCT Base Excess, Mixed 3.8 (H) -2.0 - 3.0 mmol/L    POCT HCO3 Calculated, Mixed 27.6 (H) 22.0 - 26.0 mmol/L    POCT Hemoglobin, Mixed 7.7 (L) 13.5 - 17.5 g/dL    POCT Anion Gap, Mixed 6 (L) 10 - 25 mmo/L    Patient Temperature 37.0 degrees Celsius    FiO2 50 %   Calcium, Ionized   Result Value Ref Range    POCT Calcium, Ionized 1.04 (L) 1.1 - 1.33 mmol/L   CBC   Result Value Ref Range    WBC 19.0 (H) 4.4 - 11.3 x10*3/uL    nRBC 0.7 (H) 0.0 - 0.0 /100 WBCs    RBC 2.63 (L) 4.50 - 5.90 x10*6/uL    Hemoglobin 7.7 (L) 13.5 - 17.5 g/dL    Hematocrit 22.8 (L) 41.0 - 52.0 %    MCV 87 80 - 100 fL    MCH 29.3 26.0 - 34.0 pg    MCHC 33.8 32.0 - 36.0 g/dL    RDW 15.9 (H) 11.5 - 14.5 %    Platelets 127 (L) 150 - 450 x10*3/uL   Magnesium   Result Value Ref Range    Magnesium 2.09 1.60 - 2.40 mg/dL   Renal Function Panel   Result Value Ref Range    Glucose 83 74 - 99 mg/dL    Sodium 134 (L) 136 - 145 mmol/L    Potassium 4.1 3.5 - 5.3 mmol/L    Chloride 98 98 - 107 mmol/L    Bicarbonate 28 21 - 32 mmol/L    Anion Gap 12 10 - 20 mmol/L    Urea Nitrogen 18 6 - 23 mg/dL    Creatinine 0.69 0.50 - 1.30 mg/dL    eGFR >90 >60 mL/min/1.73m*2    Calcium 7.0 (L) 8.6 - 10.6 mg/dL    Phosphorus 2.4 (L) 2.5 - 4.9 mg/dL    Albumin 2.7 (L) 3.4 - 5.0 g/dL   Blood Gas Arterial Full Panel   Result Value Ref Range    POCT pH, Arterial 7.51 (H) 7.38 - 7.42 pH    POCT pCO2, Arterial 34 (L) 38 - 42 mm Hg    POCT pO2, Arterial 120 (H) 85 - 95 mm Hg    POCT SO2, Arterial 99 94 - 100 %    POCT Oxy Hemoglobin, Arterial 97.3 94.0 - 98.0 %    POCT Hematocrit Calculated, Arterial 25.0 (L) 41.0 - 52.0 %     POCT Sodium, Arterial 131 (L) 136 - 145 mmol/L    POCT Potassium, Arterial 4.2 3.5 - 5.3 mmol/L    POCT Chloride, Arterial 101 98 - 107 mmol/L    POCT Ionized Calcium, Arterial 1.08 (L) 1.10 - 1.33 mmol/L    POCT Glucose, Arterial 113 (H) 74 - 99 mg/dL    POCT Lactate, Arterial 1.2 0.4 - 2.0 mmol/L    POCT Base Excess, Arterial 3.9 (H) -2.0 - 3.0 mmol/L    POCT HCO3 Calculated, Arterial 27.1 (H) 22.0 - 26.0 mmol/L    POCT Hemoglobin, Arterial 8.3 (L) 13.5 - 17.5 g/dL    POCT Anion Gap, Arterial 7 (L) 10 - 25 mmo/L    Patient Temperature 37.0 degrees Celsius    FiO2 50 %   Coagulation Screen   Result Value Ref Range    Protime 14.3 (H) 9.8 - 12.8 seconds    INR 1.3 (H) 0.9 - 1.1    aPTT 28 27 - 38 seconds   Calcium, Ionized   Result Value Ref Range    POCT Calcium, Ionized 1.04 (L) 1.1 - 1.33 mmol/L   CBC   Result Value Ref Range    WBC 18.7 (H) 4.4 - 11.3 x10*3/uL    nRBC 0.9 (H) 0.0 - 0.0 /100 WBCs    RBC 2.45 (L) 4.50 - 5.90 x10*6/uL    Hemoglobin 7.2 (L) 13.5 - 17.5 g/dL    Hematocrit 20.9 (L) 41.0 - 52.0 %    MCV 85 80 - 100 fL    MCH 29.4 26.0 - 34.0 pg    MCHC 34.4 32.0 - 36.0 g/dL    RDW 15.6 (H) 11.5 - 14.5 %    Platelets 138 (L) 150 - 450 x10*3/uL   Magnesium   Result Value Ref Range    Magnesium 2.08 1.60 - 2.40 mg/dL   Renal Function Panel   Result Value Ref Range    Glucose 110 (H) 74 - 99 mg/dL    Sodium 135 (L) 136 - 145 mmol/L    Potassium 4.1 3.5 - 5.3 mmol/L    Chloride 100 98 - 107 mmol/L    Bicarbonate 28 21 - 32 mmol/L    Anion Gap 11 10 - 20 mmol/L    Urea Nitrogen 17 6 - 23 mg/dL    Creatinine 0.71 0.50 - 1.30 mg/dL    eGFR >90 >60 mL/min/1.73m*2    Calcium 7.0 (L) 8.6 - 10.6 mg/dL    Phosphorus 2.5 2.5 - 4.9 mg/dL    Albumin 2.8 (L) 3.4 - 5.0 g/dL   BLOOD GAS MIXED VENOUS FULL PANEL   Result Value Ref Range    POCT pH, Mixed 7.46 (H) 7.33 - 7.43 pH    POCT pCO2, Mixed 35 (L) 41 - 51 mm Hg    POCT pO2, Mixed 35 35 - 45 mm Hg    POCT SO2, Mixed 60 45 - 75 %    POCT Oxy Hemoglobin, Mixed 57.9  45.0 - 75.0 %    POCT Hematocrit Calculated, Mixed 21.0 (L) 41.0 - 52.0 %    POCT Sodium, Mixed 135 (L) 136 - 145 mmol/L    POCT Potassium, Mixed 3.7 3.5 - 5.3 mmol/L    POCT Chloride, Mixed 105 98 - 107 mmol/L    POCT Ionized Calcium, Mixed 0.99 (L) 1.10 - 1.33 mmol/L    POCT Glucose, Mixed 108 (H) 74 - 99 mg/dL    POCT Lactate, Mixed 1.1 0.4 - 2.0 mmol/L    POCT Base Excess, Mixed 1.0 -2.0 - 3.0 mmol/L    POCT HCO3 Calculated, Mixed 24.9 22.0 - 26.0 mmol/L    POCT Hemoglobin, Mixed 6.9 (L) 13.5 - 17.5 g/dL    POCT Anion Gap, Mixed 9 (L) 10 - 25 mmo/L    Patient Temperature 37.0 degrees Celsius    FiO2 40 %   Blood Gas Arterial Full Panel   Result Value Ref Range    POCT pH, Arterial 7.54 (H) 7.38 - 7.42 pH    POCT pCO2, Arterial 30 (L) 38 - 42 mm Hg    POCT pO2, Arterial 119 (H) 85 - 95 mm Hg    POCT SO2, Arterial 100 94 - 100 %    POCT Oxy Hemoglobin, Arterial 96.9 94.0 - 98.0 %    POCT Hematocrit Calculated, Arterial 21.0 (L) 41.0 - 52.0 %    POCT Sodium, Arterial 133 (L) 136 - 145 mmol/L    POCT Potassium, Arterial 4.0 3.5 - 5.3 mmol/L    POCT Chloride, Arterial      POCT Ionized Calcium, Arterial 1.04 (L) 1.10 - 1.33 mmol/L    POCT Glucose, Arterial 120 (H) 74 - 99 mg/dL    POCT Lactate, Arterial 1.2 0.4 - 2.0 mmol/L    POCT Base Excess, Arterial 3.1 (H) -2.0 - 3.0 mmol/L    POCT HCO3 Calculated, Arterial 25.7 22.0 - 26.0 mmol/L    POCT Hemoglobin, Arterial 7.1 (L) 13.5 - 17.5 g/dL    POCT Anion Gap, Arterial      Patient Temperature 37.0 degrees Celsius    FiO2 40 %   Prepare RBC: 1 Units   Result Value Ref Range    PRODUCT CODE [id number]     Unit Number [id number]     Unit ABO A     Unit RH POS     XM INTEP COMP     Dispense Status TR     Blood Expiration Date 12/5/2024 11:59:00 PM EST     PRODUCT BLOOD TYPE 6200     UNIT VOLUME 350    Blood Gas Arterial Full Panel   Result Value Ref Range    POCT pH, Arterial 7.51 (H) 7.38 - 7.42 pH    POCT pCO2, Arterial 30 (L) 38 - 42 mm Hg    POCT pO2, Arterial 66  (L) 85 - 95 mm Hg    POCT SO2, Arterial 97 94 - 100 %    POCT Oxy Hemoglobin, Arterial 93.9 (L) 94.0 - 98.0 %    POCT Hematocrit Calculated, Arterial 24.0 (L) 41.0 - 52.0 %    POCT Sodium, Arterial 133 (L) 136 - 145 mmol/L    POCT Potassium, Arterial 4.1 3.5 - 5.3 mmol/L    POCT Chloride, Arterial 104 98 - 107 mmol/L    POCT Ionized Calcium, Arterial 1.01 (L) 1.10 - 1.33 mmol/L    POCT Glucose, Arterial 122 (H) 74 - 99 mg/dL    POCT Lactate, Arterial 1.5 0.4 - 2.0 mmol/L    POCT Base Excess, Arterial 1.1 -2.0 - 3.0 mmol/L    POCT HCO3 Calculated, Arterial 23.9 22.0 - 26.0 mmol/L    POCT Hemoglobin, Arterial 8.1 (L) 13.5 - 17.5 g/dL    POCT Anion Gap, Arterial 9 (L) 10 - 25 mmo/L    Patient Temperature 37.0 degrees Celsius    FiO2 40 %   Calcium, Ionized   Result Value Ref Range    POCT Calcium, Ionized 1.05 (L) 1.1 - 1.33 mmol/L   CBC   Result Value Ref Range    WBC 22.2 (H) 4.4 - 11.3 x10*3/uL    nRBC 0.9 (H) 0.0 - 0.0 /100 WBCs    RBC 2.79 (L) 4.50 - 5.90 x10*6/uL    Hemoglobin 8.2 (L) 13.5 - 17.5 g/dL    Hematocrit 24.5 (L) 41.0 - 52.0 %    MCV 88 80 - 100 fL    MCH 29.4 26.0 - 34.0 pg    MCHC 33.5 32.0 - 36.0 g/dL    RDW 15.6 (H) 11.5 - 14.5 %    Platelets 138 (L) 150 - 450 x10*3/uL   Magnesium   Result Value Ref Range    Magnesium 2.13 1.60 - 2.40 mg/dL   Renal Function Panel   Result Value Ref Range    Glucose 105 (H) 74 - 99 mg/dL    Sodium 136 136 - 145 mmol/L    Potassium 4.0 3.5 - 5.3 mmol/L    Chloride 100 98 - 107 mmol/L    Bicarbonate 26 21 - 32 mmol/L    Anion Gap 14 10 - 20 mmol/L    Urea Nitrogen 17 6 - 23 mg/dL    Creatinine 0.75 0.50 - 1.30 mg/dL    eGFR >90 >60 mL/min/1.73m*2    Calcium 7.1 (L) 8.6 - 10.6 mg/dL    Phosphorus 4.0 2.5 - 4.9 mg/dL    Albumin 2.8 (L) 3.4 - 5.0 g/dL   BLOOD GAS MIXED VENOUS FULL PANEL   Result Value Ref Range    POCT pH, Mixed 7.45 (H) 7.33 - 7.43 pH    POCT pCO2, Mixed 33 (L) 41 - 51 mm Hg    POCT pO2, Mixed 32 (L) 35 - 45 mm Hg    POCT SO2, Mixed 57 45 - 75 %    POCT  Oxy Hemoglobin, Mixed 55.7 45.0 - 75.0 %    POCT Hematocrit Calculated, Mixed 22.0 (L) 41.0 - 52.0 %    POCT Sodium, Mixed 134 (L) 136 - 145 mmol/L    POCT Potassium, Mixed 3.6 3.5 - 5.3 mmol/L    POCT Chloride, Mixed 107 98 - 107 mmol/L    POCT Ionized Calcium, Mixed 0.94 (L) 1.10 - 1.33 mmol/L    POCT Glucose, Mixed 107 (H) 74 - 99 mg/dL    POCT Lactate, Mixed 1.1 0.4 - 2.0 mmol/L    POCT Base Excess, Mixed -0.9 -2.0 - 3.0 mmol/L    POCT HCO3 Calculated, Mixed 22.9 22.0 - 26.0 mmol/L    POCT Hemoglobin, Mixed 7.3 (L) 13.5 - 17.5 g/dL    POCT Anion Gap, Mixed 8 (L) 10 - 25 mmo/L    Patient Temperature 37.0 degrees Celsius    FiO2 50 %        Allergies  Patient has no known allergies.  Medications  Scheduled medications  acetaminophen, 650 mg, oral, q4h   Or  acetaminophen, 650 mg, rectal, q4h  [Held by provider] amiodarone, 400 mg, oral, BID  aspirin, 81 mg, oral, Daily  atorvastatin, 80 mg, oral, Nightly  heparin, 5,000 Units, subcutaneous, q8h  insulin lispro, 0-5 Units, subcutaneous, q4h  pantoprazole, 40 mg, intravenous, Daily  piperacillin-tazobactam, 4.5 g, intravenous, q6h  polyethylene glycol, 17 g, oral, BID  sennosides-docusate sodium, 2 tablet, oral, BID  ticagrelor, 90 mg, oral, BID      Continuous medications  amiodarone, 0.5 mg/min, Last Rate: 0.5 mg/min (11/20/24 1349)  dexmedeTOMIDine, 0-1.5 mcg/kg/hr (Dosing Weight), Last Rate: 0.2 mcg/kg/hr (11/20/24 1300)  EPINEPHrine, 0.05 mcg/kg/min (Dosing Weight), Last Rate: 0.05 mcg/kg/min (11/20/24 1300)  norepinephrine, 0-1 mcg/kg/min, Last Rate: 0.06 mcg/kg/min (11/20/24 1345)  propofol, 0-50 mcg/kg/min (Dosing Weight), Last Rate: Stopped (11/20/24 1059)      PRN medications  PRN medications: calcium gluconate, calcium gluconate, magnesium sulfate, magnesium sulfate, melatonin, naloxone, naloxone, ondansetron **OR** ondansetron, oxyCODONE, oxyCODONE, oxygen, potassium chloride CR **OR** potassium chloride, potassium chloride CR **OR** potassium chloride,  potassium chloride, potassium chloride     Assessment/Plan    Jaime Dominguez is a 74 year old male with a PMH significant for basal cell carcinoma and HPL s/p MidCAB x2 converted to full sternotomy w/ LIMA prolonged as a Y graft with a radial to LAD OM w/ Dr. Aldo Harman and Dr. Stanley. CPB time 347min. Course c/b increasing pressor requirement and echo revealing apical hypo-akinesis with subsequent IABP placement on 11/14 for further support. Overnight on 11/14, Troponin elevated and ST elevation in anteroseptal leads, c/f ACS and decision made to [facility]. Now s/p PCI of mid LAD with HEATHER. Pt cannulated on VA ECMO for cardiogenic shock state 2/2 multiple failed grafts. 11/19 RTOR for VA ECMO decannulation and left hemothorax washout. Plan to remove IABP 11/20.    11/15: Palliative consulted for family support.     11/20: Supportive family visit. Pt prepping for IABP removal.       Palliative Performance Scale (PPS): 20    ----------------------------------------------------------------------------------------------------------------------------------------------------------------------------------------------------------------------------------------------------------------------------------------------------------------------------------------------------------------------      I spent 33 minutes in providing separately identifiable ACP services with the patient and/or surrogate decision maker in a voluntary conversation discussing the patient's wishes and goals as detailed in the above note.   ----------------------------------------------------------------------------------------------------------------------------------------------------------------------------------------------------------------------------------------------------------------------------------------------------------------------------------------------------------------------      #Complex Medical Decision Making  #Goals of Care  #Advanced Care  Planning  - Code status: FULL CODE  - Surrogate decision maker: Mireya Dominguez (Spouse)  [phone]   - Goals are mix of survival and time and improved quality of life  - 11/15: Met with pt, wife and dtr at bedside. Gleaned more information r/t pt's life, lifestyle, goals, wishes and health preferences.  Palliative spent time to answer questions and worries. Family aware of day by day monitoring of heart function and healing r/t anastomotic blockage PCI. Maintained a positive and non-anxious environment. Family and pt very happy for the consult and extra supports placed to assist pt and family. See below.     Palliative will continue to follow and navigate care based on hospital course and recovery.     - 11/20: Supportive rounds, 2 sons, dtr, and wife present. Pt recently given pain medications as primary team planning on removing IABP soon. Pt unable to participate in conversation. Discussed family coping mechanisms and self supports. Discussed their goals and interactions with pt. Discussed pt's mental strength and motivation. Family pleased with care of pt and of their family. Maintained a positive presence and environment and commended family for their presence, love, and advocacy.        #Psychosocial Support  - Music Therapy- ordered. 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  - Spiritual Care Support- [employer], ordered.  - Art/expressive Therapy- ordered      Plan of Care discussed with: Updated primary and bedside RN on goals of care decision, medication adjustments, and code status     Medical Decision Making was high level due to high complexity of problems, extensive data review, and high risk of management/treatment.     - STEMI requiring IABP and VA ECMO with LAD re-stenosis and pressor support, posing threat to life or function   - Reviewed external notes from   - Reviews results from  which were used in decision making for   - Recommended the following tests:   -  Assessment required independent historian: family, primary  - Independent interpretation of test: labs  - Discussion of management with: primary, family.  - Drug therapy requiring intensive monitoring for toxicity: insulin,   - Decision regarding elective major surgery with identified patient or procedure risk factors:   - Decision regarding emergency major surgery:   - Decision regarding hospitalization or escalation of hospital-level care:   - Decision not to resuscitate or to de-escalate care because of poor prognosis:   - Parenteral controlled substances: amio, levo, epi., precedex, atb.    Thank you for allowing us to care for this patient. Palliative Team will continue to follow as needed. Please contact team with any questions or concerns.   Team pager [phone] (weekdays)    EUGENIA Leggett-CNP

## 2024-11-20 NOTE — PROGRESS NOTES
VASCULAR SURGERY PROGRESS NOTE  Assessment/Plan   Jaime Dominguez is 74 y.o. male who presented with SOB and was found to have a STEMI, now s/p MIDCAB x2 converted to full sternotomy who on 11/14 went into cardiogenic shock and was put on VA ECMO. Vascular surgery consulted intraoperatively due to bleeding from L femoral decannulation site. By the time vascular team arrived, hemostasis had been obtained with manual pressure.     Patient seen and examined in ICU today, left lower extremity Doppler signals unchanged from prior exam, continued to be multiphasic DP and PT, without evidence of groin hematoma at the cannulation site.  Left lower extremity duplex ultrasound pending.    Plan:  Vascular surgery will continue to monitor pulses in distal extremities.  Please obtain left lower extremity arterial duplex    D/w attending, Dr. Betsy Bartholomew MD  PGY-1 General Surgery  Vascular Surgery [id number]        Subjective   Patient seen at bedside in ICU.  Patient remains intubated, sedated, attended by family.     Objective   Vitals:  Heart Rate:  [75-95]   Temp:  [36 °C (96.8 °F)-38 °C (100.4 °F)]   Resp:  [0-31]   BP: ()/(40-57)   SpO2:  [92 %-100 %]     Exam:  Constitutional: No acute distress, resting comfortably  Neuro:  AOx3, grossly intact  ENMT: moist mucous membranes  CV: no tachycardia  Pulm:  Intubated, sedated, normal pulmonary effort.  GI: soft, non-tender, non-distended  Skin: warm and dry  Musculoskeletal: Minimal extremity movement  Extremities: Left groin site without evidence of hematoma, soft.  Left lower extremity multiphasic DP, PT signal.      Labs:  Results from last 7 days   Lab Units 11/20/24  1218 11/20/24  0452 11/20/24  0031   WBC AUTO x10*3/uL 22.2* 18.7* 19.0*   HEMOGLOBIN g/dL 8.2* 7.2* 7.7*   PLATELETS AUTO x10*3/uL 138* 138* 127*      Results from last 7 days   Lab Units 11/20/24  1218 11/20/24  0452 11/20/24  0031   SODIUM mmol/L 136 135* 134*   POTASSIUM mmol/L 4.0 4.1  4.1   CHLORIDE mmol/L 100 100 98   CO2 mmol/L 26 28 28   BUN mg/dL 17 17 18   CREATININE mg/dL 0.75 0.71 0.69   GLUCOSE mg/dL 105* 110* 83   MAGNESIUM mg/dL 2.13 2.08 2.09   PHOSPHORUS mg/dL 4.0 2.5 2.4*      Results from last 7 days   Lab Units 11/20/24  0452 11/19/24  1723 11/15/24  2116   INR  1.3* 1.2* 1.3*   PROTIME seconds 14.3* 13.3* 14.1*   APTT seconds 28 26* 36      Results from last 7 days   Lab Units 11/18/24  0213 11/17/24  0832 11/16/24 2015   ANTI XA UNFRACTIONATED IU/mL 0.3 0.3 0.2

## 2024-11-20 NOTE — PROCEDURES
Site prepped with chloraprep and sutures removed.  IABP console then suspended and balloon pump removed at 1423.  Firm pressure applied for 30 min. No hematoma noted; palpable femoral, DP, and PT pulses. Bedrest x 6 hours. RN notified.

## 2024-11-20 NOTE — SIGNIFICANT EVENT
Patient seen for evaluation Left groin access site.  Remains intubated and on pressors.    Neuro exam limited by intubation. Moves bilateral lower extremities.    L groin access site without palpable swelling, hematoma, or skin changes.    Dopplerable signals in the L DP and PT.    Jose Santos DO PGY-1  General Surgery

## 2024-11-20 NOTE — PROGRESS NOTES
Subjective Data:  OR yesterday for mediastinal exploration, left hemothorax evacuation, VA ECMO decannulation. Per report, intra-op EZEQUIEL demonstrated stable moderate LV dysfunction and normal RV function.   Supported with 1:1 IABP, epi 0.05, norepi 0.08.  Intubate, sedated with plans for possible extubation today.      Objective Data:  Last Recorded Vitals:  Vitals:    11/20/24 0630 11/20/24 0632 11/20/24 0645 11/20/24 0700   BP:       Pulse: 81  82 87   Resp: (!) 0 21 (!) 0 (!) 0   Temp:       TempSrc:       SpO2: 100% 98% 100% 100%   Weight:       Height:           Last Labs:  CBC - 11/20/2024:  4:52 AM  18.7 7.2 138    20.9      CMP - 11/20/2024:  4:52 AM  7.0 5.9 180 --- 0.9   2.5 2.8 35 21      PTT - 11/20/2024:  4:52 AM  1.3   14.3 28     TROPHS   Date/Time Value Ref Range Status   11/15/2024 12:28 PM 44,705 0 - 53 ng/L Final     Comment:     Previous result verified on 11/15/2024 1020 on specimen/case [id number] called with component TRPHS for procedure Troponin I, High Sensitivity with value 67,971 ng/L.   11/15/2024 01:42 AM 67,971 0 - 53 ng/L Final   11/14/2024 03:54 AM 73,314 0 - 53 ng/L Final     Comment:     Previous result verified on 11/13/2024 2154 on specimen/case [id number] called with component [facility] for procedure Troponin I, High Sensitivity with value 45,685 ng/L.   11/08/2024 04:12 PM 4 0 - 20 ng/L Final     BNP   Date/Time Value Ref Range Status   11/10/2024 04:34 PM 13 0 - 99 pg/mL Final     HGBA1C   Date/Time Value Ref Range Status   11/08/2024 04:12 PM 5.1 See comment % Final     LDLCALC   Date/Time Value Ref Range Status   11/08/2024 04:12  <=99 mg/dL Final     Comment:                                 Near   Borderline      AGE      Desirable  Optimal    High     High     Very High     0-19 Y     0 - 109     ---    110-129   >/= 130     ----    20-24 Y     0 - 119     ---    120-159   >/= 160     ----      >24 Y     0 -  99   100-129  130-159   160-189     >/=190       VLDL    Date/Time Value Ref Range Status   11/08/2024 04:12 PM 64 0 - 40 mg/dL Final      Last I/O:  I/O last 3 completed shifts:  In: 9220.8 (98.1 mL/kg) [P.O.:50; I.V.:2878.8 (30.6 mL/kg); Blood:4042; IV Piggyback:2250]  Out: 4835 (51.4 mL/kg) [Urine:2995 (0.9 mL/kg/hr); Blood:250; Chest Tube:1590]  Weight: 94 kg     Echo:  Transthoracic Echo (TTE) Limited 11/16/2024  CONCLUSIONS:  1. Multiple segmental abnormalities exist. See findings.  2. Left ventricular ejection fraction is moderately decreased, calculated by Lambert's biplane at 34%.  3. Abnormal left venticular wall motion.  4. No left ventricular thrombus visualized.  5. There is reduced right ventricular systolic function.  6. There apperas to be a trivial pericardial effusion, however thre is also a oderate layer of echodense material overlying the RV of unclear etiology. NO obivous RA or RV collapse though not well seen and MV and V inflows not assessed for respiratory variation ( pt in afib so unable to assess given variable RR intervals) and IVC not well seen. Overall unable to determine hemodyanic significance of the material overlyin the RV.  7. Right ventricular systolic pressure is within normal limits.  8. Moderately dilated aortic root.  9. There is LIV of the subvalvular apparatus and MV leaflets of unclear significance. LVOT gradinets not assessed nor was there color Doppler on the LVOT to see if there were acceleration of flow to suggest obstructiom. ( Does not appear to have LIV -septal contact on 2D images).  10. Compared with the prior exam from 11/15/2024, there are no significant changes. The LAD territory wall motion abnormality appears to be similar. LIV has been present on prior studies.     Ejection Fractions:  EF   Date/Time Value Ref Range Status   11/19/2024 01:46 PM 33 %    11/18/2024 08:45 AM 43 %    11/16/2024 11:05 AM 34 %      Inpatient Medications:  Scheduled medications   Medication Dose Route Frequency    acetaminophen  650 mg  oral q4h    Or    acetaminophen  650 mg rectal q4h    [Held by provider] amiodarone  400 mg oral BID    aspirin  81 mg oral Daily    atorvastatin  80 mg oral Nightly    insulin lispro  0-5 Units subcutaneous q4h    pantoprazole  40 mg intravenous Daily    piperacillin-tazobactam  4.5 g intravenous q6h    polyethylene glycol  17 g oral BID    sennosides-docusate sodium  2 tablet oral BID    ticagrelor  90 mg oral BID    vancomycin  1,250 mg intravenous q12h     PRN medications   Medication    calcium gluconate    calcium gluconate    magnesium sulfate    magnesium sulfate    melatonin    naloxone    naloxone    ondansetron    Or    ondansetron    oxyCODONE    oxyCODONE    oxygen    potassium chloride CR    Or    potassium chloride    potassium chloride CR    Or    potassium chloride    potassium chloride    potassium chloride    vancomycin     Continuous Medications   Medication Dose Last Rate    amiodarone  0.5 mg/min 0.5 mg/min (11/20/24 0700)    EPINEPHrine  0.04 mcg/kg/min 0.05 mcg/kg/min (11/20/24 0700)    lactated Ringer's  10 mL/hr 10 mL/hr (11/20/24 0700)    lactated Ringer's  5 mL/hr 5 mL/hr (11/20/24 0700)    norepinephrine  0-1 mcg/kg/min 0.08 mcg/kg/min (11/20/24 0700)    propofol  0-50 mcg/kg/min (Dosing Weight) 30 mcg/kg/min (11/20/24 0700)     Physical Exam:  GEN: frail appearing male, NAD.  CV: irregularly irregular, no m/r/g.   LUNGS: no respiratory distress on NC.  ABD: Soft, NT/ND.  SKIN: Warm, well perfused. LE edema: mild pitting edema  MSK: No deformities.  EXT: IABP and VA ECMO in place  NEURO: No focal deficits.     Assessment/Plan   Jaime Dominguez is a 74 year old male with a PMH significant for basal cell carcinoma and HLD who initially presented to [facility] ED on 11/8 complaining of left sided chest pain, diaphoresis, SOB nausea and syncope. ECG showed ST elevation in the inferior leads and ST depression in anterior septal leads. LHC showed 90% occlusion of the LAD. Transferred to [facility] on  11/10 for cardiac surgery workup.  He is now s/p MidCAB x2 converted to full sternotomy w/ LIMA prolonged as a Y graft with a radial to LAD OM w/ Dr. Aldo Harman and Dr. Stanley. CPB time 347min. Course c/b increasing pressor requirement and echo revealing apical hypo-akinesis with subsequent IABP placement on 11/14 for further support. Overnight on 11/14, Troponin elevated and ST elevation in anteroseptal leads, c/f ACS and decision made to [facility]. Now s/p PCI of mid LAD with HEATHER. Pt cannulated on VA ECMO for cardiogenic shock state 2/2 multiple failed grafts. HF was engaged for multidisciplinary decision making regarding cardiogenic shock.      Acute HFrEF  ICM  CAD s/p CABG and PCI   Cardiogenic shock s/p VA ECMO and IABP   - TTE 11/16: LVEF 31%, abnormal wall motion; reduced RVSF.   - On epi and norepi. Wean norepi as able.  - Mechanical support: s/p VA ECMO - decannulated 11/19, IABP at 1:2.   - Diuresis: Overtly overloaded, please diuresis prior to removing IABP. IV diuresis per primary.  - GDMT on hold with current MCS. Once off pressor support, could consider additional afterload reduction.   - Strict I/Os, Daily Na < 2g daily, fluid restriction.  - Could be considered for advanced therapies. Age prohibits from OHT, however, could be considered for LVAD evaluation if necessary.     Hemorrhagic shock, stable  Acute on chronic anemia  Concern for increase pressor requirement in setting of downtrending Hgb 11/18.  -transfusion per primary  -s/p L hemothorax washout in OR 11/19.     For any questions or concerns, feel free to reach out via [employer] chat or page at [phone].This plan was discussed with Dr. Mccain, HF attending.     Kira Alvares DO  HF Fellow

## 2024-11-21 ENCOUNTER — APPOINTMENT (OUTPATIENT)
Dept: CARDIOLOGY | Facility: HOSPITAL | Age: 74
DRG: 003 | End: 2024-11-21
Payer: MEDICARE

## 2024-11-21 ENCOUNTER — APPOINTMENT (OUTPATIENT)
Dept: RADIOLOGY | Facility: HOSPITAL | Age: 74
DRG: 003 | End: 2024-11-21
Payer: MEDICARE

## 2024-11-21 LAB
ABO GROUP (TYPE) IN BLOOD: NORMAL
ACID FAST STN SPEC: NORMAL
ALBUMIN SERPL BCP-MCNC: 2.7 G/DL (ref 3.4–5)
ANION GAP BLDA CALCULATED.4IONS-SCNC: 9 MMO/L (ref 10–25)
ANION GAP BLDMV CALCULATED.4IONS-SCNC: 6 MMO/L (ref 10–25)
ANION GAP BLDMV CALCULATED.4IONS-SCNC: 7 MMO/L (ref 10–25)
ANION GAP SERPL CALC-SCNC: 11 MMOL/L (ref 10–20)
ANTIBODY SCREEN: NORMAL
BASE EXCESS BLDA CALC-SCNC: 4.6 MMOL/L (ref -2–3)
BASE EXCESS BLDMV CALC-SCNC: 4.4 MMOL/L (ref -2–3)
BASE EXCESS BLDMV CALC-SCNC: 5.3 MMOL/L (ref -2–3)
BODY TEMPERATURE: 37 DEGREES CELSIUS
BUN SERPL-MCNC: 18 MG/DL (ref 6–23)
CA-I BLD-SCNC: 1.04 MMOL/L (ref 1.1–1.33)
CA-I BLDA-SCNC: 1.01 MMOL/L (ref 1.1–1.33)
CA-I BLDMV-SCNC: 1.03 MMOL/L (ref 1.1–1.33)
CA-I BLDMV-SCNC: 1.06 MMOL/L (ref 1.1–1.33)
CALCIUM SERPL-MCNC: 7.1 MG/DL (ref 8.6–10.6)
CHLORIDE BLD-SCNC: 100 MMOL/L (ref 98–107)
CHLORIDE BLD-SCNC: 101 MMOL/L (ref 98–107)
CHLORIDE BLDA-SCNC: 100 MMOL/L (ref 98–107)
CHLORIDE SERPL-SCNC: 100 MMOL/L (ref 98–107)
CO2 SERPL-SCNC: 29 MMOL/L (ref 21–32)
CREAT SERPL-MCNC: 0.78 MG/DL (ref 0.5–1.3)
EGFRCR SERPLBLD CKD-EPI 2021: >90 ML/MIN/1.73M*2
EJECTION FRACTION: 33 %
ERYTHROCYTE [DISTWIDTH] IN BLOOD BY AUTOMATED COUNT: 15.8 % (ref 11.5–14.5)
GLUCOSE BLD MANUAL STRIP-MCNC: 128 MG/DL (ref 74–99)
GLUCOSE BLD MANUAL STRIP-MCNC: 132 MG/DL (ref 74–99)
GLUCOSE BLD MANUAL STRIP-MCNC: 135 MG/DL (ref 74–99)
GLUCOSE BLD MANUAL STRIP-MCNC: 146 MG/DL (ref 74–99)
GLUCOSE BLD MANUAL STRIP-MCNC: 154 MG/DL (ref 74–99)
GLUCOSE BLD MANUAL STRIP-MCNC: 164 MG/DL (ref 74–99)
GLUCOSE BLD-MCNC: 146 MG/DL (ref 74–99)
GLUCOSE BLD-MCNC: 154 MG/DL (ref 74–99)
GLUCOSE BLDA-MCNC: 153 MG/DL (ref 74–99)
GLUCOSE SERPL-MCNC: 141 MG/DL (ref 74–99)
HCO3 BLDA-SCNC: 26.3 MMOL/L (ref 22–26)
HCO3 BLDMV-SCNC: 28.4 MMOL/L (ref 22–26)
HCO3 BLDMV-SCNC: 29 MMOL/L (ref 22–26)
HCT VFR BLD AUTO: 24.5 % (ref 41–52)
HCT VFR BLD EST: 27 % (ref 41–52)
HCT VFR BLD EST: 27 % (ref 41–52)
HCT VFR BLD EST: 28 % (ref 41–52)
HGB BLD-MCNC: 8.2 G/DL (ref 13.5–17.5)
HGB BLDA-MCNC: 9.4 G/DL (ref 13.5–17.5)
HGB BLDMV-MCNC: 8.9 G/DL (ref 13.5–17.5)
HGB BLDMV-MCNC: 9.1 G/DL (ref 13.5–17.5)
INHALED O2 CONCENTRATION: 35 %
LACTATE BLDA-SCNC: 1.2 MMOL/L (ref 0.4–2)
LACTATE BLDMV-SCNC: 1.1 MMOL/L (ref 0.4–2)
LACTATE BLDMV-SCNC: 1.1 MMOL/L (ref 0.4–2)
MAGNESIUM SERPL-MCNC: 2.04 MG/DL (ref 1.6–2.4)
MCH RBC QN AUTO: 29.1 PG (ref 26–34)
MCHC RBC AUTO-ENTMCNC: 33.5 G/DL (ref 32–36)
MCV RBC AUTO: 87 FL (ref 80–100)
MYCOBACTERIUM SPEC CULT: NORMAL
NRBC BLD-RTO: 0.8 /100 WBCS (ref 0–0)
OXYHGB MFR BLDA: 95 % (ref 94–98)
OXYHGB MFR BLDMV: 46.3 % (ref 45–75)
OXYHGB MFR BLDMV: 50.7 % (ref 45–75)
PCO2 BLDA: 28 MM HG (ref 38–42)
PCO2 BLDMV: 38 MM HG (ref 41–51)
PCO2 BLDMV: 39 MM HG (ref 41–51)
PH BLDA: 7.58 PH (ref 7.38–7.42)
PH BLDMV: 7.47 PH (ref 7.33–7.43)
PH BLDMV: 7.49 PH (ref 7.33–7.43)
PHOSPHATE SERPL-MCNC: 3.5 MG/DL (ref 2.5–4.9)
PLATELET # BLD AUTO: 205 X10*3/UL (ref 150–450)
PO2 BLDA: 79 MM HG (ref 85–95)
PO2 BLDMV: 29 MM HG (ref 35–45)
PO2 BLDMV: 32 MM HG (ref 35–45)
POTASSIUM BLDA-SCNC: 3.6 MMOL/L (ref 3.5–5.3)
POTASSIUM BLDMV-SCNC: 3.5 MMOL/L (ref 3.5–5.3)
POTASSIUM BLDMV-SCNC: 3.7 MMOL/L (ref 3.5–5.3)
POTASSIUM SERPL-SCNC: 3.6 MMOL/L (ref 3.5–5.3)
RBC # BLD AUTO: 2.82 X10*6/UL (ref 4.5–5.9)
RH FACTOR (ANTIGEN D): NORMAL
SAO2 % BLDA: 98 % (ref 94–100)
SAO2 % BLDMV: 47 % (ref 45–75)
SAO2 % BLDMV: 52 % (ref 45–75)
SODIUM BLDA-SCNC: 132 MMOL/L (ref 136–145)
SODIUM BLDMV-SCNC: 132 MMOL/L (ref 136–145)
SODIUM BLDMV-SCNC: 132 MMOL/L (ref 136–145)
SODIUM SERPL-SCNC: 136 MMOL/L (ref 136–145)
UFH PPP CHRO-ACNC: <0.1 IU/ML
WBC # BLD AUTO: 24 X10*3/UL (ref 4.4–11.3)

## 2024-11-21 PROCEDURE — 71045 X-RAY EXAM CHEST 1 VIEW: CPT

## 2024-11-21 PROCEDURE — 86901 BLOOD TYPING SEROLOGIC RH(D): CPT

## 2024-11-21 PROCEDURE — 84132 ASSAY OF SERUM POTASSIUM: CPT | Performed by: STUDENT IN AN ORGANIZED HEALTH CARE EDUCATION/TRAINING PROGRAM

## 2024-11-21 PROCEDURE — 80069 RENAL FUNCTION PANEL: CPT

## 2024-11-21 PROCEDURE — 37799 UNLISTED PX VASCULAR SURGERY: CPT

## 2024-11-21 PROCEDURE — C8924 2D TTE W OR W/O FOL W/CON,FU: HCPCS

## 2024-11-21 PROCEDURE — 82947 ASSAY GLUCOSE BLOOD QUANT: CPT

## 2024-11-21 PROCEDURE — 2500000005 HC RX 250 GENERAL PHARMACY W/O HCPCS

## 2024-11-21 PROCEDURE — 99291 CRITICAL CARE FIRST HOUR: CPT

## 2024-11-21 PROCEDURE — 2020000001 HC ICU ROOM DAILY

## 2024-11-21 PROCEDURE — 99233 SBSQ HOSP IP/OBS HIGH 50: CPT | Performed by: SURGERY

## 2024-11-21 PROCEDURE — 2500000004 HC RX 250 GENERAL PHARMACY W/ HCPCS (ALT 636 FOR OP/ED)

## 2024-11-21 PROCEDURE — 93325 DOPPLER ECHO COLOR FLOW MAPG: CPT | Performed by: INTERNAL MEDICINE

## 2024-11-21 PROCEDURE — 84132 ASSAY OF SERUM POTASSIUM: CPT

## 2024-11-21 PROCEDURE — 2500000001 HC RX 250 WO HCPCS SELF ADMINISTERED DRUGS (ALT 637 FOR MEDICARE OP)

## 2024-11-21 PROCEDURE — 2500000004 HC RX 250 GENERAL PHARMACY W/ HCPCS (ALT 636 FOR OP/ED): Performed by: STUDENT IN AN ORGANIZED HEALTH CARE EDUCATION/TRAINING PROGRAM

## 2024-11-21 PROCEDURE — 93321 DOPPLER ECHO F-UP/LMTD STD: CPT | Performed by: INTERNAL MEDICINE

## 2024-11-21 PROCEDURE — 83735 ASSAY OF MAGNESIUM: CPT

## 2024-11-21 PROCEDURE — 2500000004 HC RX 250 GENERAL PHARMACY W/ HCPCS (ALT 636 FOR OP/ED): Mod: JZ

## 2024-11-21 PROCEDURE — 97161 PT EVAL LOW COMPLEX 20 MIN: CPT | Mod: GP

## 2024-11-21 PROCEDURE — 2500000002 HC RX 250 W HCPCS SELF ADMINISTERED DRUGS (ALT 637 FOR MEDICARE OP, ALT 636 FOR OP/ED)

## 2024-11-21 PROCEDURE — 82330 ASSAY OF CALCIUM: CPT

## 2024-11-21 PROCEDURE — 36556 INSERT NON-TUNNEL CV CATH: CPT | Performed by: EMERGENCY MEDICINE

## 2024-11-21 PROCEDURE — 05HY33Z INSERTION OF INFUSION DEVICE INTO UPPER VEIN, PERCUTANEOUS APPROACH: ICD-10-PCS | Performed by: STUDENT IN AN ORGANIZED HEALTH CARE EDUCATION/TRAINING PROGRAM

## 2024-11-21 PROCEDURE — 36556 INSERT NON-TUNNEL CV CATH: CPT | Performed by: STUDENT IN AN ORGANIZED HEALTH CARE EDUCATION/TRAINING PROGRAM

## 2024-11-21 PROCEDURE — P9045 ALBUMIN (HUMAN), 5%, 250 ML: HCPCS | Mod: JZ

## 2024-11-21 PROCEDURE — 99231 SBSQ HOSP IP/OBS SF/LOW 25: CPT | Performed by: NURSE PRACTITIONER

## 2024-11-21 PROCEDURE — 97530 THERAPEUTIC ACTIVITIES: CPT | Mod: GP

## 2024-11-21 PROCEDURE — 97530 THERAPEUTIC ACTIVITIES: CPT | Mod: GO

## 2024-11-21 PROCEDURE — 85027 COMPLETE CBC AUTOMATED: CPT

## 2024-11-21 PROCEDURE — 71045 X-RAY EXAM CHEST 1 VIEW: CPT | Performed by: RADIOLOGY

## 2024-11-21 PROCEDURE — 85520 HEPARIN ASSAY: CPT | Performed by: STUDENT IN AN ORGANIZED HEALTH CARE EDUCATION/TRAINING PROGRAM

## 2024-11-21 PROCEDURE — 99291 CRITICAL CARE FIRST HOUR: CPT | Performed by: EMERGENCY MEDICINE

## 2024-11-21 PROCEDURE — 36600 WITHDRAWAL OF ARTERIAL BLOOD: CPT

## 2024-11-21 PROCEDURE — 99291 CRITICAL CARE FIRST HOUR: CPT | Performed by: SPECIALIST

## 2024-11-21 PROCEDURE — 94660 CPAP INITIATION&MGMT: CPT

## 2024-11-21 PROCEDURE — 93308 TTE F-UP OR LMTD: CPT | Performed by: INTERNAL MEDICINE

## 2024-11-21 RX ORDER — ACETAMINOPHEN 500 MG
5 TABLET ORAL NIGHTLY
Status: DISCONTINUED | OUTPATIENT
Start: 2024-11-21 | End: 2024-11-24

## 2024-11-21 RX ORDER — FUROSEMIDE 10 MG/ML
40 INJECTION INTRAMUSCULAR; INTRAVENOUS ONCE
Status: COMPLETED | OUTPATIENT
Start: 2024-11-21 | End: 2024-11-21

## 2024-11-21 RX ORDER — ALBUMIN HUMAN 50 G/1000ML
12.5 SOLUTION INTRAVENOUS ONCE
Status: COMPLETED | OUTPATIENT
Start: 2024-11-21 | End: 2024-11-21

## 2024-11-21 RX ORDER — HYDROMORPHONE HYDROCHLORIDE 0.2 MG/ML
0.2 INJECTION INTRAMUSCULAR; INTRAVENOUS; SUBCUTANEOUS ONCE
Status: COMPLETED | OUTPATIENT
Start: 2024-11-21 | End: 2024-11-21

## 2024-11-21 RX ADMIN — SENNOSIDES AND DOCUSATE SODIUM 2 TABLET: 50; 8.6 TABLET ORAL at 20:50

## 2024-11-21 RX ADMIN — NOREPINEPHRINE BITARTRATE 0.06 MCG/KG/MIN: 8 INJECTION, SOLUTION INTRAVENOUS at 02:33

## 2024-11-21 RX ADMIN — FUROSEMIDE 40 MG: 10 INJECTION, SOLUTION INTRAMUSCULAR; INTRAVENOUS at 10:09

## 2024-11-21 RX ADMIN — PIPERACILLIN SODIUM AND TAZOBACTAM SODIUM 4.5 G: 4; .5 INJECTION, SOLUTION INTRAVENOUS at 16:30

## 2024-11-21 RX ADMIN — ATORVASTATIN CALCIUM 80 MG: 80 TABLET, FILM COATED ORAL at 20:50

## 2024-11-21 RX ADMIN — AMIODARONE HYDROCHLORIDE 400 MG: 200 TABLET ORAL at 20:50

## 2024-11-21 RX ADMIN — POTASSIUM CHLORIDE 40 MEQ: 1.5 POWDER, FOR SOLUTION ORAL at 04:12

## 2024-11-21 RX ADMIN — Medication 5 MG: at 20:50

## 2024-11-21 RX ADMIN — Medication 4 L/MIN: at 02:11

## 2024-11-21 RX ADMIN — ACETAMINOPHEN 650 MG: 325 TABLET ORAL at 20:50

## 2024-11-21 RX ADMIN — HYDROMORPHONE HYDROCHLORIDE 0.2 MG: 0.2 INJECTION, SOLUTION INTRAMUSCULAR; INTRAVENOUS; SUBCUTANEOUS at 14:19

## 2024-11-21 RX ADMIN — TICAGRELOR 90 MG: 90 TABLET ORAL at 20:50

## 2024-11-21 RX ADMIN — ACETAMINOPHEN 650 MG: 325 TABLET ORAL at 16:30

## 2024-11-21 RX ADMIN — PERFLUTREN 10 ML OF DILUTION: 6.52 INJECTION, SUSPENSION INTRAVENOUS at 15:42

## 2024-11-21 RX ADMIN — POLYETHYLENE GLYCOL 3350 17 G: 17 POWDER, FOR SOLUTION ORAL at 10:18

## 2024-11-21 RX ADMIN — AMIODARONE HYDROCHLORIDE 0.5 MG/MIN: 1.8 INJECTION, SOLUTION INTRAVENOUS at 01:29

## 2024-11-21 RX ADMIN — AMIODARONE HYDROCHLORIDE 400 MG: 200 TABLET ORAL at 10:19

## 2024-11-21 RX ADMIN — PIPERACILLIN SODIUM AND TAZOBACTAM SODIUM 4.5 G: 4; .5 INJECTION, SOLUTION INTRAVENOUS at 22:04

## 2024-11-21 RX ADMIN — INSULIN LISPRO 1 UNITS: 100 INJECTION, SOLUTION INTRAVENOUS; SUBCUTANEOUS at 00:56

## 2024-11-21 RX ADMIN — HEPARIN SODIUM 5000 UNITS: 5000 INJECTION INTRAVENOUS; SUBCUTANEOUS at 00:52

## 2024-11-21 RX ADMIN — ACETAMINOPHEN 650 MG: 325 TABLET ORAL at 05:53

## 2024-11-21 RX ADMIN — ALBUMIN HUMAN 12.5 G: 0.05 INJECTION, SOLUTION INTRAVENOUS at 12:07

## 2024-11-21 RX ADMIN — CALCIUM GLUCONATE 1 G: 20 INJECTION, SOLUTION INTRAVENOUS at 04:12

## 2024-11-21 RX ADMIN — HEPARIN SODIUM 5000 UNITS: 5000 INJECTION INTRAVENOUS; SUBCUTANEOUS at 18:26

## 2024-11-21 RX ADMIN — SENNOSIDES AND DOCUSATE SODIUM 2 TABLET: 50; 8.6 TABLET ORAL at 10:18

## 2024-11-21 RX ADMIN — EPINEPHRINE IN SODIUM CHLORIDE 0.05 MCG/KG/MIN: 16 INJECTION INTRAVENOUS at 17:40

## 2024-11-21 RX ADMIN — Medication 40 PERCENT: at 00:29

## 2024-11-21 RX ADMIN — TICAGRELOR 90 MG: 90 TABLET ORAL at 10:18

## 2024-11-21 RX ADMIN — ACETAMINOPHEN 650 MG: 325 TABLET ORAL at 00:40

## 2024-11-21 RX ADMIN — NOREPINEPHRINE BITARTRATE 0.03 MCG/KG/MIN: 8 INJECTION, SOLUTION INTRAVENOUS at 17:40

## 2024-11-21 RX ADMIN — EPINEPHRINE IN SODIUM CHLORIDE 0.05 MCG/KG/MIN: 16 INJECTION INTRAVENOUS at 18:08

## 2024-11-21 RX ADMIN — ACETAMINOPHEN 650 MG: 325 TABLET ORAL at 10:18

## 2024-11-21 RX ADMIN — PIPERACILLIN SODIUM AND TAZOBACTAM SODIUM 4.5 G: 4; .5 INJECTION, SOLUTION INTRAVENOUS at 06:43

## 2024-11-21 RX ADMIN — HEPARIN SODIUM 5000 UNITS: 5000 INJECTION INTRAVENOUS; SUBCUTANEOUS at 10:19

## 2024-11-21 RX ADMIN — ASPIRIN 81 MG 81 MG: 81 TABLET ORAL at 10:19

## 2024-11-21 RX ADMIN — PIPERACILLIN SODIUM AND TAZOBACTAM SODIUM 4.5 G: 4; .5 INJECTION, SOLUTION INTRAVENOUS at 00:40

## 2024-11-21 RX ADMIN — POLYETHYLENE GLYCOL 3350 17 G: 17 POWDER, FOR SOLUTION ORAL at 20:51

## 2024-11-21 ASSESSMENT — PAIN SCALES - GENERAL
PAINLEVEL_OUTOF10: 3
PAINLEVEL_OUTOF10: 3
PAINLEVEL_OUTOF10: 1
PAINLEVEL_OUTOF10: 0 - NO PAIN
PAINLEVEL_OUTOF10: 1

## 2024-11-21 ASSESSMENT — COGNITIVE AND FUNCTIONAL STATUS - GENERAL
PERSONAL GROOMING: A LITTLE
STANDING UP FROM CHAIR USING ARMS: A LOT
CLIMB 3 TO 5 STEPS WITH RAILING: TOTAL
HELP NEEDED FOR BATHING: A LOT
TOILETING: A LOT
EATING MEALS: A LITTLE
DRESSING REGULAR UPPER BODY CLOTHING: A LOT
MOVING FROM LYING ON BACK TO SITTING ON SIDE OF FLAT BED WITH BEDRAILS: A LOT
TURNING FROM BACK TO SIDE WHILE IN FLAT BAD: TOTAL
MOBILITY SCORE: 8
MOVING TO AND FROM BED TO CHAIR: TOTAL
WALKING IN HOSPITAL ROOM: TOTAL
DRESSING REGULAR LOWER BODY CLOTHING: A LOT
DAILY ACTIVITIY SCORE: 14

## 2024-11-21 ASSESSMENT — ACTIVITIES OF DAILY LIVING (ADL): ADL_ASSISTANCE: INDEPENDENT

## 2024-11-21 ASSESSMENT — PAIN - FUNCTIONAL ASSESSMENT
PAIN_FUNCTIONAL_ASSESSMENT: 0-10

## 2024-11-21 NOTE — PROGRESS NOTES
Subjective Data:  Extubated and IABP removed yesterday. Continues on epi 0.05, norepi 0.06 this AM.  No acute complaints this AM.      Objective Data:  Last Recorded Vitals:  Vitals:    11/21/24 0700 11/21/24 0800 11/21/24 0900 11/21/24 1000   BP:       Pulse: 79 82 82 83   Resp: (!) 0 24 22 21   Temp:       TempSrc:       SpO2: 98% 100% 98% 97%   Weight:       Height:           Last Labs:  CBC - 11/21/2024:  2:09 AM  24.0 8.2 205    24.5      CMP - 11/21/2024:  2:09 AM  7.1 5.9 180 --- 0.9   3.5 2.7 35 21      PTT - 11/20/2024:  4:52 AM  1.3   14.3 28     TROPHS   Date/Time Value Ref Range Status   11/15/2024 12:28 PM 44,705 0 - 53 ng/L Final     Comment:     Previous result verified on 11/15/2024 1020 on specimen/case [id number] called with component TRPHS for procedure Troponin I, High Sensitivity with value 67,971 ng/L.   11/15/2024 01:42 AM 67,971 0 - 53 ng/L Final   11/14/2024 03:54 AM 73,314 0 - 53 ng/L Final     Comment:     Previous result verified on 11/13/2024 2154 on specimen/case [id number] called with component TRPHS for procedure Troponin I, High Sensitivity with value 45,685 ng/L.   11/08/2024 04:12 PM 4 0 - 20 ng/L Final     BNP   Date/Time Value Ref Range Status   11/10/2024 04:34 PM 13 0 - 99 pg/mL Final     HGBA1C   Date/Time Value Ref Range Status   11/08/2024 04:12 PM 5.1 See comment % Final     LDLCALC   Date/Time Value Ref Range Status   11/08/2024 04:12  <=99 mg/dL Final     Comment:                                 Near   Borderline      AGE      Desirable  Optimal    High     High     Very High     0-19 Y     0 - 109     ---    110-129   >/= 130     ----    20-24 Y     0 - 119     ---    120-159   >/= 160     ----      >24 Y     0 -  99   100-129  130-159   160-189     >/=190       VLDL   Date/Time Value Ref Range Status   11/08/2024 04:12 PM 64 0 - 40 mg/dL Final      Last I/O:  I/O last 3 completed shifts:  In: 4087 (43.5 mL/kg) [I.V.:2337 (24.9 mL/kg); Blood:600;  NG/GT:300; IV Piggyback:850]  Out: 4830 (51.4 mL/kg) [Urine:3260 (1 mL/kg/hr); Chest Tube:1570]  Weight: 94 kg     Echo:  Transthoracic Echo (TTE) Limited 11/16/2024  CONCLUSIONS:  1. Multiple segmental abnormalities exist. See findings.  2. Left ventricular ejection fraction is moderately decreased, calculated by Lambert's biplane at 34%.  3. Abnormal left venticular wall motion.  4. No left ventricular thrombus visualized.  5. There is reduced right ventricular systolic function.  6. There apperas to be a trivial pericardial effusion, however thre is also a oderate layer of echodense material overlying the RV of unclear etiology. NO obivous RA or RV collapse though not well seen and MV and V inflows not assessed for respiratory variation ( pt in afib so unable to assess given variable RR intervals) and IVC not well seen. Overall unable to determine hemodyanic significance of the material overlyin the RV.  7. Right ventricular systolic pressure is within normal limits.  8. Moderately dilated aortic root.  9. There is LIV of the subvalvular apparatus and MV leaflets of unclear significance. LVOT gradinets not assessed nor was there color Doppler on the LVOT to see if there were acceleration of flow to suggest obstructiom. ( Does not appear to have LIV -septal contact on 2D images).  10. Compared with the prior exam from 11/15/2024, there are no significant changes. The LAD territory wall motion abnormality appears to be similar. LIV has been present on prior studies.     Ejection Fractions:  EF   Date/Time Value Ref Range Status   11/19/2024 01:46 PM 33 %    11/18/2024 08:45 AM 43 %    11/16/2024 11:05 AM 34 %      Inpatient Medications:  Scheduled medications   Medication Dose Route Frequency    acetaminophen  650 mg oral q4h    amiodarone  400 mg oral BID    aspirin  81 mg oral Daily    atorvastatin  80 mg oral Nightly    heparin  5,000 Units subcutaneous q8h    insulin lispro  0-5 Units subcutaneous q4h     melatonin  5 mg nasogastric tube Nightly    perflutren lipid microspheres  0.5-10 mL of dilution intravenous Once in imaging    piperacillin-tazobactam  4.5 g intravenous q6h    polyethylene glycol  17 g oral BID    sennosides-docusate sodium  2 tablet oral BID    ticagrelor  90 mg oral BID     PRN medications   Medication    calcium gluconate    calcium gluconate    magnesium sulfate    magnesium sulfate    naloxone    naloxone    ondansetron    Or    ondansetron    oxyCODONE    oxyCODONE    oxygen    oxygen    potassium chloride CR    Or    potassium chloride    potassium chloride CR    Or    potassium chloride     Continuous Medications   Medication Dose Last Rate    EPINEPHrine  0.05 mcg/kg/min (Dosing Weight) 0.05 mcg/kg/min (11/21/24 0700)    norepinephrine  0-1 mcg/kg/min 0.06 mcg/kg/min (11/21/24 0900)     Physical Exam:  GEN: frail appearing male, NAD.  CV: irregularly irregular, no m/r/g.   LUNGS: no respiratory distress on NC.  ABD: Soft, NT/ND.  SKIN: Warm, well perfused. LE edema: mild pitting edema  MSK: No deformities.  EXT: IABP and VA ECMO in place  NEURO: No focal deficits.     Assessment/Plan   Jaime Dominguez is a 74 year old male with a PMH significant for basal cell carcinoma and HLD who initially presented to [facility] ED on 11/8 complaining of left sided chest pain, diaphoresis, SOB nausea and syncope. ECG showed ST elevation in the inferior leads and ST depression in anterior septal leads. LHC showed 90% occlusion of the LAD. Transferred to [facility] on 11/10 for cardiac surgery workup.  He is now s/p MidCAB x2 converted to full sternotomy w/ LIMA prolonged as a Y graft with a radial to LAD OM w/ Dr. Aldo Harman and Dr. Stanley. CPB time 347min. Course c/b increasing pressor requirement and echo revealing apical hypo-akinesis with subsequent IABP placement on 11/14 for further support. Overnight on 11/14, Troponin elevated and ST elevation in anteroseptal leads, c/f ACS and decision made to [facility]. Now s/p  PCI of mid LAD with HEATHER. Pt cannulated on VA ECMO for cardiogenic shock state 2/2 multiple failed grafts. HF was engaged for multidisciplinary decision making regarding cardiogenic shock. Mechanical support: s/p VA ECMO - decannulated 11/19, IABP removed 11/20.      Acute HFrEF  ICM  CAD s/p CABG and PCI   Cardiogenic shock s/p VA ECMO and IABP   - TTE 11/16: LVEF 31%, abnormal wall motion; reduced RVSF.   - On epi and norepi. Wean norepi as able.  - Diuresis: Overtly overloaded, please diuresis. IV diuresis per primary.  - GDMT on hold with current MCS. Once off pressor support, could consider additional afterload reduction.   - Strict I/Os, Daily Na < 2g daily, fluid restriction.  - Could be considered for advanced therapies. Age prohibits from OHT, however, could be considered for LVAD evaluation if necessary though currently improving daily and off MCS.     Hemorrhagic shock, stable  Acute on chronic anemia, stable  Concern for increase pressor requirement in setting of downtrending Hgb 11/18. Now stable s/p washout 11/19.     For any questions or concerns, feel free to reach out via [employer] chat or page at [phone].This plan was discussed with Dr. Mccain, HF attending.     Kira Alvares DO  HF Fellow

## 2024-11-21 NOTE — PROGRESS NOTES
CTICU Progress Note  Jaime Dominguez/[id number]    Admit Date: 11/10/2024  Hospital Length of Stay: 11   ICU Length of Stay: 8d 7h   CT SURGEON: Dr. Aldo Harman    SUBJECTIVE:   Hemodynamics acceptable on provided therapy.   Remains on epinephrine 0.05mcg/kg/min, norepinephrine 0.06mcg/kg/min, and amio 0.5mg/min.  CPAP overnight.  Pan cultures pending.     MEDICATIONS  Infusions:  EPINEPHrine, Last Rate: 0.05 mcg/kg/min (11/21/24 0700)  norepinephrine, Last Rate: 0.06 mcg/kg/min (11/21/24 0700)      Scheduled:  acetaminophen, 650 mg, q4h   Or  acetaminophen, 650 mg, q4h  amiodarone, 400 mg, BID  aspirin, 81 mg, Daily  atorvastatin, 80 mg, Nightly  heparin, 5,000 Units, q8h  insulin lispro, 0-5 Units, q4h  pantoprazole, 40 mg, Daily  piperacillin-tazobactam, 4.5 g, q6h  polyethylene glycol, 17 g, BID  sennosides-docusate sodium, 2 tablet, BID  ticagrelor, 90 mg, BID      PRN:  calcium gluconate, 1 g, q6h PRN  calcium gluconate, 2 g, q6h PRN  magnesium sulfate, 2 g, q6h PRN  magnesium sulfate, 4 g, q6h PRN  melatonin, 5 mg, Nightly PRN  naloxone, 0.2 mg, q5 min PRN  naloxone, 0.2 mg, q5 min PRN  ondansetron, 4 mg, q8h PRN   Or  ondansetron, 4 mg, q8h PRN  oxyCODONE, 2.5 mg, q4h PRN  oxyCODONE, 5 mg, q4h PRN  oxygen, , Continuous PRN - O2/gases  oxygen, , Continuous PRN - O2/gases  potassium chloride CR, 20 mEq, q6h PRN   Or  potassium chloride, 20 mEq, q6h PRN  potassium chloride CR, 40 mEq, q6h PRN   Or  potassium chloride, 40 mEq, q6h PRN  potassium chloride, 20 mEq, q6h PRN  potassium chloride, 40 mEq, q6h PRN        PHYSICAL EXAM:   Visit Vitals  BP (!) 105/46   Pulse 82   Temp 37.5 °C (99.5 °F) (Temporal)   Resp (!) 0   Ht 1.829 m (6')   Wt 94 kg (207 lb 3.7 oz)   SpO2 100%   BMI 28.11 kg/m²   Smoking Status Never   BSA 2.19 m²     Wt Readings from Last 5 Encounters:   11/18/24 94 kg (207 lb 3.7 oz)   11/10/24 96.2 kg (212 lb)     INTAKE/OUTPUT:  I/O last 3 completed shifts:  In: 4087 (43.5 mL/kg) [I.V.:2337 (24.9  mL/kg); Blood:600; NG/GT:300; IV Piggyback:850]  Out: 4830 (51.4 mL/kg) [Urine:3260 (1 mL/kg/hr); Chest Tube:1570]  Weight: 94 kg          Vent settings:  Vent Mode: Pressure support  FiO2 (%):  [36 %-40 %] 36 %  PEEP/CPAP (cm H2O):  [0 cm H20-5 cm H20] 5 cm H20  SC SUP:  [0 cm H20-5 cm H20] 5 cm H20    Physical Exam:   Constitutional: Male patient lying in ICU bed in no acute distress  Neuro: AOx3, no obvious focal deficits. Moves all extremities. PERRL.   CV: NSR rate 70-80s. Normotensive MAP 70-80s on levo. AV wires present set VVI @ 50.  Pulm: On 4L NC with appropriate oxygenation. Equal rise/fall of chest. MS and BLP CT's with appropriate serosang output and no airleak.  : Clear yellow urine in aden. Scrotal edema and erythema.   GI: S/ND/NT. Corpak in place.  Extremities: NV exam intact x 4. No edema.   Skin: WDI. Postop dressings intact. Chest tube dressings intact. Chest tube sites oozing.   Psych: Calm but sedated.     Daily Risk Screen  Intubated: No  Central line: Yes, Hemodynamic instability requiring parenteral medication  Aden: Yes, accurate I/Os in critically ill    Images: Reviewed    Invasive Hemodynamics:    Most Recent Range Past 24hrs   BP (Art) 110/57 Arterial Line BP 1  Min: 85/78  Max: 136/55   MAP(Art) 74 mmHg Arterial Line MAP 1 (mmHg)   Min: 61 mmHg  Max: 176 mmHg   RA/CVP   No data recorded   PA 35/17 PAP  Min: 26/13  Max: 42/14   PA(mean) 23 mmHg PAP (Mean)  Min: 18 mmHg  Max: 27 mmHg     Assessment/Plan   Jaime Dominguez is a 74 year old male with a PMH significant for basal cell carcinoma and HPL s/p MidCAB x2 converted to full sternotomy w/ LIMA prolonged as a Y graft with a radial to LAD OM w/ Dr. Aldo Harman and Dr. Stanley. CPB time 347min. Course c/b increasing pressor requirement and echo revealing apical hypo-akinesis with subsequent IABP placement on 11/14 for further support. Overnight on 11/14, Troponin elevated and ST elevation in anteroseptal leads, c/f ACS and decision made  to [facility]. Now s/p PCI of mid LAD with HEATHER. Pt cannulated on VA ECMO for cardiogenic shock state 2/2 multiple failed grafts.     11/19 RTOR for VA ECMO decannulation and left hemothorax washout. Removed IABP 11/20.     Plan:  NEURO: No PMHx. AOx3, follows commands, and moves all extremities. No obvious focal deficits. Physically deconditioned. Acute post op pain adequately managed with current regime. -->  - Serial neuro and pain assessments   - Scheduled Tylenol   - PRN oxycodone to 2.5mg for moderate and 5mg for severe  - Continue melatonin  - PT Consult --> Sit up and stand with PT and try to walk.   - CAM ICU score qshift  - Passive ROM  - Sleep/wake cycle hygiene     CV: Patient has a history of HLD. Is now status post MIDCABG x 2 with conversion to full sternotomy LIMA prolonged as a Y graft with a radial to LAD OM on 11/12. Pre/Post EF: normal BIV. Post op course complicated by coronary graft dysfunction on 11/14. Now s/p PCI of mid LAD with HEATHER. Pt cannulated on VA ECMO for cardiogenic shock state 2/2 multiple failed grafts 11/14. Most recent TTE on 11/18 during ECMO turn down trial with LVEF 34% and reduced RV. NSR, SA with PACs. Previous Afib w/ RVR for which patient is on amio gtt, no episodes in multiple days. Hypotension on norepinephrine 0.06mcg/kg/min infusion. Remains on epinephrine 0.05mcg/kg/min for inotropy. Decannulated from VA ECMO 11/19 and IABP removed 11/20. Most recent CI - 2.7, CO - 5.8, SVR - 848, and SVO2 - 52%.-->  - Plan to remove IABP   - Discontinue IV amiodarone 0.5mg/min and transition to amio 400mg BID today  - Wean norepinephrine while maintaining MAP 70-90  - Continue epinephrine 0.05mcg/kg/min for inotropy  - Echo to evaluate for LV and RV function   - Maintain epicardial wires set VVI @ 40  - Continue aspirin and Ticagrelor 90mg BID  - Continue with atorvastatin 80mg nightly    PULM: No history of pulmonary disease. Currently intubated on CPAP 40% 5/5. 2MS and 1LPL chest tubes  with minimal output and no air leaks noted. Morning CXR with worse acute pulmonary edema and atelectasis but new found left-sided subcutaneous air. -->  - Daily CXR  - Wean FiO2 maintaining SpO2 >92%.   - IS q1h and OOB to chair  - Chest tubes to wall suction --> Plan to remove MS and RPL chest tubes. Leave LPL chest tube due to new left sided subcutaneous air on CXR.      GI: No PMH. Passed bedside swallow. Corpak in place. No post op BM. Abdomen soft, non tender, and non distended. -->   - Discontinue PPI   - Continue TF  - Start cardiac diet   - Colace/senna BID and miralax BID     : CSA-TREE Risk Score low. No history of renal disease, baseline creatinine 0.87. Creatinine stable post-op. Aden in place and making adequate UOP. Net -514mL in last 24 hours. Began diuresis yesterday with lasix and very responsive. Hypervolemic on exam with 2+ pitting edema in all extremities. Ordered 40mg lasix. -->  - Continue aden catheter for strict I/Os.  - Goal UOP 0.5ml/kg/hr  - Goal -1 to -2L  - RFP as clinically indicated  - Replete electrolytes per CTICU protocol     ENDO: No PMH. A1c: 5.1. Euglycemia, adequately controlled on current regimen. -->  - Maintain BG <180  - Insulin per CTICU protocol     HEME: Acute blood loss anemia and thrombocytopenia. Stable oozing from chest tubes sites. CT scan 11/18 showing left chest hemothorax with resulting compressive atelectasis, RTOR 11/19 for left hemothorax evacuation.  -->    - Monitor drain output volume and characteristics  - Daily CBC and prn  - Continue aspirin and Ticagrelor BID  - SQH q8h  - SCDs for DVT prophylaxis.  - Last type and screen: 11/18     ID: Afebrile, but previously warming. Worsening leukocytosis. MRSA negative. UA negative, blood cultures sent on 11/16 and NGTD x2. MRSA 11/18 negative. Recultured on 11/18 and results pending. -->  - Follow up blood culture results  - Continue zosyn  - Continue to monitor WBC  - Trend temp q4h     Skin: No active skin  issues. Oozing from chest tube sites. -->  - Scrotal edema- will elevate   - L groin cannula site soft and dry (no oozing), no concern for compartment syndrome (CK and myoglobin downtrend)  - preventative Mepilex dressings in place on sacrum and heels  - change preventative Mepilex weekly or more frequently as indicated (when moist/soiled)   - every shift skin assessment per nursing and weekly ICU skin rounds  - moisture barrier to be applied with sandro care  - active skin problems addressed with nursing on daily rounds     Proph:  SCDs  SQH     G: Line  Right IJ MAC w PAC placed 11/12 - Plan to replace without PAC   R radial a-line placed 11/16  Hale 11/19     F: Family: will update at bedside.     Code status: Full Code    I personally spent 45 minutes of critical care time directly and personally managing the patient exclusive of separately billable procedures.     A,B,C,D,E,F,G: reviewed    Discussed with Dr. Bonilla.  Dispo: CTICU care for now.    CTICU TEAM PHONE [phone]

## 2024-11-21 NOTE — PROGRESS NOTES
Physical Therapy    Physical Therapy Evaluation & Treatment    Patient Name: Jaime Dominguez  MRN: [id number]  Department: [facility]  Room: 05/05-A  Today's Date: 11/21/2024   Time Calculation  Start Time: 1146  Stop Time: 1215  Time Calculation (min): 29 min    Assessment/Plan   PT Assessment  PT Assessment Results: Decreased strength, Decreased endurance, Decreased mobility, Decreased coordination, Impaired balance, Pain  Rehab Prognosis: Excellent  Barriers to Discharge: Medical acuity  Evaluation/Treatment Tolerance: Patient tolerated treatment well  Medical Staff Made Aware: Yes  End of Session Communication: Bedside nurse  Assessment Comment: Pt performed bed mobility with Max a x 2, sat EOB with Max A progressed to CGA, and performed sit<>stand transfers with Max A x 2. Pt will continue to benefit from skilled PT to improve functional mobility.  End of Session Patient Position: Bed, 3 rail up, Alarm off, not on at start of session   IP OR SWING BED PT PLAN  Inpatient or Swing Bed: Inpatient  PT Plan  Treatment/Interventions: Bed mobility, Transfer training, Gait training, Stair training, Balance training, Strengthening, Endurance training, Therapeutic exercise, Therapeutic activity, Postural re-education, Positioning  PT Plan: Ongoing PT  PT Frequency: 5 times per week  PT Discharge Recommendations: High intensity level of continued care  Equipment Recommended upon Discharge: Wheeled walker  PT Recommended Transfer Status: Assist x2  PT - OK to Discharge: Yes      Subjective     General Visit Information:  General  Reason for Referral: s/p PCI of mid LAD with HEATHER. Pt cannulated on VA ECMO, decannulated 11/19, IABP removed 11/20  Past Medical History Relevant to Rehab: basal cell carcinoma and HLD  Co-Treatment: OT  Co-Treatment Reason: maximize pt mobility follow ECMO decannulation  Prior to Session Communication: Bedside nurse  Patient Position Received: Bed, 3 rail up, Alarm off, not on at start of  session  Preferred Learning Style: auditory, verbal, visual  General Comment: Pt awake and willing to participate in PT evaluation. (Lines: 3 chest tubes, tele, A line, 4L O2 NC, 0.05 Epi, Levo 0.04)  Home Living:  Home Living  Type of Home: House  Lives With: Spouse  Home Adaptive Equipment: None  Home Layout: Multi-level, Bed/bath upstairs  Home Access: Stairs to enter with rails  Entrance Stairs-Number of Steps: 2  Bathroom Shower/Tub: Tub/shower unit  Bathroom Equipment: Grab bars in shower  Prior Level of Function:  Prior Function Per Pt/Caregiver Report  Level of [place]: Independent with ADLs and functional transfers, Independent with homemaking with ambulation  Receives Help From: Family  ADL Assistance: Independent  Homemaking Assistance: Independent  Ambulatory Assistance: Independent  Vocational: Retired  Leisure: [employer]  Prior Function Comments: (+)drives, denies falls  Precautions:  Precautions  Medical Precautions: Cardiac precautions, Fall precautions, Chest tube  Post-Surgical Precautions: Move in the Tube  Precautions Comment: MAP 70-90, VVI@50, SpO2>92    Vital Signs      11/21/24 1146   Vital Signs   Vitals Session Pre PT   Heart Rate 85   Heart Rate Source Monitor   SpO2 100 %   /50   MAP (mmHg) 62   BP Method Arterial line   Patient Position Lying        11/21/24 1215   Vital Signs   Vitals Session Post PT   Heart Rate 83   Heart Rate Source Monitor   SpO2 95 %   /68   MAP (mmHg) 87   BP Method Arterial line   Patient Position Lying       Objective   Pain:  Pain Assessment  Pain Assessment: 0-10  0-10 (Numeric) Pain Score: 3  Pain Type: Surgical pain  Pain Location: Chest  Pain Interventions: Repositioned  Response to Interventions: Resting quietly  Cognition:  Cognition  Overall Cognitive Status: Within Functional Limits  Arousal/Alertness: Appropriate responses to stimuli  Orientation Level: Oriented X4  Following Commands: Follows all commands and directions without  difficulty  Cognition Comments: CAM (-)    General Assessments:    Activity Tolerance  Endurance: Tolerates 10 - 20 min exercise with multiple rests  Early Mobility/Exercise Safety Screen: Proceed with mobilization - No exclusion criteria met  Activity Tolerance Comments: Vitals stable throughout session    Sensation  Light Touch: No apparent deficits  Sensation Comment: Bilat groin sites stable throughout session; assessed prior to mobilization    Strength  Strength Comments: Refer to LE strength section  Strength  Strength Comments: Refer to LE strength section    Perception  Inattention/Neglect: Appears intact  Initiation: Appears intact  Motor Planning: Appears intact  Perseveration: Not present      Coordination  Coordination Comment: Limited coordination due to weakness         Static Sitting Balance  Static Sitting-Balance Support: Feet supported, Bilateral upper extremity supported  Static Sitting-Level of Assistance: Maximum assistance, Contact guard  Static Sitting-Comment/Number of Minutes: Max A initially progressed to CGA in seated at EOB    Static Standing Balance  Static Standing-Balance Support: Bilateral upper extremity supported  Static Standing-Level of Assistance: Maximum assistance (x2)  Functional Assessments:  Bed Mobility  Bed Mobility: Yes  Bed Mobility 1  Bed Mobility 1: Supine to sitting, Sitting to supine  Level of Assistance 1: Maximum assistance, +2  Bed Mobility Comments 1: Max A x 2 for LE management and trunk control; use of draw sheet    Transfers  Transfer: Yes  Transfer 1  Transfer From 1: Sit to, Stand to  Transfer to 1: Stand, Sit  Technique 1: Sit to stand, Stand to sit  Transfer Level of Assistance 1: Arm in arm assistance, Maximum assistance, +2  Trials/Comments 1: Max A x 2 for hip elevation to stand full hip extension achieved with cueing. Use of draw sheet and bilateral knee block    Ambulation/Gait Training  Ambulation/Gait Training Performed: No (Aborted due to  weakness)  Extremity/Trunk Assessments:    Strength RLE  R Knee Extension: 4-/5  Strength LLE  L Knee Extension: 4-/5    Treatments:  Therapeutic Activity  Therapeutic Activity Performed: Yes  Therapeutic Activity 1: Increased time for advanced ICU line management required for functional mobility  Therapeutic Activity 2: Pt sat EOB for 10 minutes with Max A initially progressed to CGA with positioning  Therapeutic Activity 3: Pt stood for ~25s with Max A x 2 and use of draw sheet; full hip extension observed with verbal cueing.    Outcome Measures:  [facility] Basic Mobility  Turning from your back to your side while in a flat bed without using bedrails: A lot  Moving from lying on your back to sitting on the side of a flat bed without using bedrails: Total  Moving to and from bed to chair (including a wheelchair): Total  Standing up from a chair using your arms (e.g. wheelchair or bedside chair): A lot  To walk in hospital room: Total  Climbing 3-5 steps with railing: Total  Basic Mobility - Total Score: 8    FSS-ICU  Ambulation: Unable to attempt due to weakness  Rolling: Maximal assistance (performs 25% - 49% of task)  Sitting: Minimal assistance (performs 75% or more of task)  Transfer Sit-to-Stand: Total assistance (performs 25% or requires another person)  Transfer Supine-to-Sit: Total assistance (performs 25% or requires another person)  Total Score: 8      Early Mobility/Exercise Safety Screen: Proceed with mobilization - No exclusion criteria met  ICU Mobility Scale: Standing [4]    Encounter Problems       Encounter Problems (Active)       Balance       Pt will demonstrate improved sitting/standing static/dynamic balance activities without LOB via score of 24/28 on the Tinetti for increase in safety prior to DC.  (Progressing)       Start:  11/21/24    Expected End:  12/05/24               Mobility       Pt will ambulate >15ft with appropriate form, Mod A or less, LRAD, and no LOB for safe DC.  (Progressing)        Start:  11/21/24    Expected End:  12/05/24            Pt will tolerate >15 minutes of upright standing activity without seated rest breaks with no changes in vital signs for improved functional mobility.  (Progressing)       Start:  11/21/24    Expected End:  12/05/24               PT Transfers       Pt will perform bed mobility with Mod A or less and use of LRAD to safely DC.  (Progressing)       Start:  11/21/24    Expected End:  12/05/24            Pt will perform sit<>stand transfers with Mod A or less and use of LRAD to safely DC.  (Progressing)       Start:  11/21/24    Expected End:  12/05/24                   Education Documentation  Precautions, taught by Yesenia Downs PT at 11/21/2024  3:31 PM.  Learner: Patient  Readiness: Acceptance  Method: Explanation  Response: Verbalizes Understanding    Body Mechanics, taught by Yesenia Downs PT at 11/21/2024  3:31 PM.  Learner: Patient  Readiness: Acceptance  Method: Explanation  Response: Verbalizes Understanding    Mobility Training, taught by Yesenia Downs PT at 11/21/2024  3:31 PM.  Learner: Patient  Readiness: Acceptance  Method: Explanation  Response: Verbalizes Understanding    Education Comments  No comments found.    YESENIA DOWNS PT

## 2024-11-21 NOTE — CARE PLAN
Problem: Skin  Goal: Participates in plan/prevention/treatment measures  Outcome: Progressing  Flowsheets (Taken 11/13/2024 0839 by Thai Tapia RN)  Participates in plan/prevention/treatment measures:   Discuss with provider PT/OT consult   Elevate heels  Goal: Prevent/manage excess moisture  Outcome: Progressing  Flowsheets (Taken 11/21/2024 0642)  Prevent/manage excess moisture:   Cleanse incontinence/protect with barrier cream   Moisturize dry skin   Use wicking fabric (obtain order)   Monitor for/manage infection if present   Follow provider orders for dressing changes  Goal: Prevent/minimize sheer/friction injuries  Outcome: Progressing  Flowsheets (Taken 11/13/2024 0839 by Thai Tapia RN)  Prevent/minimize sheer/friction injuries:   Turn/reposition every 2 hours/use positioning/transfer devices   Use pull sheet   HOB 30 degrees or less  Goal: Promote/optimize nutrition  Outcome: Progressing     Problem: Pain - Adult  Goal: Verbalizes/displays adequate comfort level or baseline comfort level  Outcome: Progressing     Problem: Safety - Adult  Goal: Free from fall injury  Outcome: Progressing     Problem: Discharge Planning  Goal: Discharge to home or other facility with appropriate resources  Outcome: Progressing     Problem: Chronic Conditions and Co-morbidities  Goal: Patient's chronic conditions and co-morbidity symptoms are monitored and maintained or improved  Outcome: Progressing     Problem: Safety - Medical Restraint  Goal: Remains free of injury from restraints (Restraint for Interference with Medical Device)  Outcome: Progressing  Goal: Free from restraint(s) (Restraint for Interference with Medical Device)  Outcome: Progressing     Problem: Knowledge Deficit  Goal: Patient/family/caregiver demonstrates understanding of disease process, treatment plan, medications, and discharge instructions  Outcome: Progressing     Problem: Mechanical Ventilation  Goal: Patient Will Maintain Patent  Airway  Outcome: Progressing  Goal: Oral health is maintained or improved  Outcome: Progressing  Goal: Tracheostomy will be managed safely  Outcome: Progressing  Goal: ET tube will be managed safely  Outcome: Progressing  Goal: Ability to express needs and understand communication  Outcome: Progressing  Goal: Mobility/activity is maintained at optimum level for patient  Outcome: Progressing     Problem: Fall/Injury  Goal: Not fall by end of shift  Outcome: Progressing  Goal: Be free from injury by end of the shift  Outcome: Progressing  Goal: Verbalize understanding of personal risk factors for fall in the hospital  Outcome: Progressing  Goal: Verbalize understanding of risk factor reduction measures to prevent injury from fall in the home  Outcome: Progressing  Goal: Use assistive devices by end of the shift  Outcome: Progressing  Goal: Pace activities to prevent fatigue by end of the shift  Outcome: Progressing     Problem: Pain  Goal: Takes deep breaths with improved pain control throughout the shift  Outcome: Progressing  Goal: Turns in bed with improved pain control throughout the shift  Outcome: Progressing  Goal: Walks with improved pain control throughout the shift  Outcome: Progressing  Goal: Performs ADL's with improved pain control throughout shift  Outcome: Progressing  Goal: Participates in PT with improved pain control throughout the shift  Outcome: Progressing  Goal: Free from opioid side effects throughout the shift  Outcome: Progressing  Goal: Free from acute confusion related to pain meds throughout the shift  Outcome: Progressing

## 2024-11-21 NOTE — PROGRESS NOTES
Jaime Dominguez is a 74 y.o. male on day 11 of admission presenting with STEMI (ST elevation myocardial infarction) (Multi).    I have seen the patient either independently or with an associated resident physician or advanced practice provider    Overnight Events: CPAP overnight. Afebrile overnight.     Neuro: AaOx3, QUEEN. Plan for PT/OT, get out of bed. PO pain meds, melatonin for sleep.   Cardiac: 11/12 MIDCAB converted to full sternotomy, complicated by anastomotic lesion requiring PCI to LAD. Required VA ECMO, now s/p decannulation. IABP removed 11/20. Now on inotropes. CI 2.7. Epi 0.05, NE 0.06. Wean NE today. Continue PO amidarone, remains in NSR. Continue DAPT, statin.    Pacer: VVI at 50  Pulmonary: 3 l NC. CPAP overnight. Remove mediastinal chest tubes, right pleural. XR with suggestion of L Sub q air. Will monitor  Gastrointestinal: Passed swallow, will transition from Tfs to oral diet. DC PPI  Renal: Pt volume overloaded. Will diurese with furosemide with - 1-2 L. Electrolytes reassuring  Endocrine: SSI with good control  Hematology: Stable hgb at 8.2. No bleeding. DVT us without pseuodoaneurysm. Continue SQH, ASA, Brillinta  Infectious Disease: Afebrile overnight, WBC 24. Will give line holiday. Cultures are no growth to date, continue pip tazo until cultures finalize  Musculoskeletal: Scrotal edema, peripheral edema stable    Lines/Tubes/Drains: Dc PAC/RIJ MAC, maintain Seble    Mechanical Circulatory Support: None    Prophylaxis: SQH, SCDs  Med to Discontinue: PPI    Disposition: CTICU    ABCDEF Checklist  Analgesia: Spontaneous awakening trial to be pursued if clinically appropriate. RASS goal reviewed  Breathing: Spontaneous breathing trial to be pursued if clinically appropriate. Mechanical power of assisted ventilation reviewed  Choice of analgesia/sedation: Analgesic and sedative agents adjusted per clinical context.   Delirium assessed by CAM, will avoid exacerbating factors  Early mobility and  exercise: Physical and occupational therapy engaged  Family: Plan of care, overall trajectory of patient shared with family. Questions elicited and answered as appropriate.       Due to the high probability of life threatening clinical decompensation, the patient required critical care time evaluating and managing this patient.  Critical care time included obtaining a history, examining the patient, ordering and reviewing studies, discussing, developing, and implementing a management plan, evaluating the patient's response to treatment, and discussion with other care team providers. I saw and evaluated the patient myself.  Critical care time was performed exclusive of billable procedures.    Critical care time: 43            Néstor Maldonado MD

## 2024-11-21 NOTE — PROGRESS NOTES
Subjective Data:  IABP removed yesterday afternoon. Remains on levo and epi gtts. Extubated, alert, and motivated to start his physical therapy. Reports chest tenderness at sternal wound site when he coughs.     Objective Data:  Last Recorded Vitals:  Vitals:    11/21/24 0700 11/21/24 0800 11/21/24 0900 11/21/24 1000   BP:       Pulse: 79 82 82 83   Resp: (!) 0 24 22 21   Temp:       TempSrc:       SpO2: 98% 100% 98% 97%   Weight:       Height:           Last Labs:  CBC - 11/21/2024:  2:09 AM  24.0 8.2 205    24.5      CMP - 11/21/2024:  2:09 AM  7.1 5.9 180 --- 0.9   3.5 2.7 35 21      PTT - 11/20/2024:  4:52 AM  1.3   14.3 28     TROPHS   Date/Time Value Ref Range Status   11/15/2024 12:28 PM 44,705 0 - 53 ng/L Final     Comment:     Previous result verified on 11/15/2024 1020 on specimen/case [id number] called with component TRPHS for procedure Troponin I, High Sensitivity with value 67,971 ng/L.   11/15/2024 01:42 AM 67,971 0 - 53 ng/L Final   11/14/2024 03:54 AM 73,314 0 - 53 ng/L Final     Comment:     Previous result verified on 11/13/2024 2154 on specimen/case [id number] called with component TRPHS for procedure Troponin I, High Sensitivity with value 45,685 ng/L.   11/08/2024 04:12 PM 4 0 - 20 ng/L Final     BNP   Date/Time Value Ref Range Status   11/10/2024 04:34 PM 13 0 - 99 pg/mL Final     HGBA1C   Date/Time Value Ref Range Status   11/08/2024 04:12 PM 5.1 See comment % Final     LDLCALC   Date/Time Value Ref Range Status   11/08/2024 04:12  <=99 mg/dL Final     Comment:                                 Near   Borderline      AGE      Desirable  Optimal    High     High     Very High     0-19 Y     0 - 109     ---    110-129   >/= 130     ----    20-24 Y     0 - 119     ---    120-159   >/= 160     ----      >24 Y     0 -  99   100-129  130-159   160-189     >/=190       VLDL   Date/Time Value Ref Range Status   11/08/2024 04:12 PM 64 0 - 40 mg/dL Final      Last I/O:  I/O last 3  completed shifts:  In: 4087 (43.5 mL/kg) [I.V.:2337 (24.9 mL/kg); Blood:600; NG/GT:300; IV Piggyback:850]  Out: 4830 (51.4 mL/kg) [Urine:3260 (1 mL/kg/hr); Chest Tube:1570]  Weight: 94 kg     Inpatient Medications:  Scheduled medications   Medication Dose Route Frequency    acetaminophen  650 mg oral q4h    amiodarone  400 mg oral BID    aspirin  81 mg oral Daily    atorvastatin  80 mg oral Nightly    heparin  5,000 Units subcutaneous q8h    insulin lispro  0-5 Units subcutaneous q4h    melatonin  5 mg nasogastric tube Nightly    perflutren lipid microspheres  0.5-10 mL of dilution intravenous Once in imaging    piperacillin-tazobactam  4.5 g intravenous q6h    polyethylene glycol  17 g oral BID    sennosides-docusate sodium  2 tablet oral BID    ticagrelor  90 mg oral BID     PRN medications   Medication    calcium gluconate    calcium gluconate    magnesium sulfate    magnesium sulfate    naloxone    naloxone    ondansetron    Or    ondansetron    oxyCODONE    oxyCODONE    oxygen    oxygen    potassium chloride CR    Or    potassium chloride    potassium chloride CR    Or    potassium chloride     Continuous Medications   Medication Dose Last Rate    EPINEPHrine  0.05 mcg/kg/min (Dosing Weight) 0.05 mcg/kg/min (11/21/24 0700)    norepinephrine  0-1 mcg/kg/min 0.06 mcg/kg/min (11/21/24 0900)       Physical Exam:  General: lying in bed, NAD  Skin: warm and dry   Cardiac: S1S2  Pulm: diminished anterior, CTs in place  GI: soft, nontender  Extremities: bilat groin sites with drsg intact, no oozing no hematoma, +DP pulses bilaterally   Neuro: alert, appropriate      Assessment/Plan   Jaime Dominguez is a 74 year old male with a PMH significant for basal cell carcinoma and HPL s/p MidCAB x2 converted to full sternotomy w/ LIMA prolonged as a Y graft with a radial to LAD OM w/ Dr. Aldo Harman and Dr. Stanley. CPB time 347min. Course c/b increasing pressor requirement and echo revealing apical hypo-akinesis with subsequent  IABP placement on 11/14 for further support. Overnight on 11/14, Troponin elevated and ST elevation in anteroseptal leads, c/f ACS and decision made to [facility]. Now s/p PCI of mid LAD with HEATHER. Pt cannulated on VA ECMO for cardiogenic shock state 2/2 multiple failed grafts.     11/14  Right Radial 6 Fr -- TR Band  Left Femoral Artery ECMO Cannula  Left Femoral Vein ECMO Cannula     Patient is s/p CABG on 11/13/2024 arrived in fulminant cardiogenic shock despite IABP placement and 3 pressors.   A right femoral diagnostic cath was done with the following findings:     LM: distal 30-40%  LAD: mid 100% stenosis at anastomosis site   LCX: competitive flow in the OM without significant stenosis  RCA: patent  LIMA to LAD: Y graft with anastomosis to the LAD which is not patent and anastomosis to the OM which appears patent     Due to patient's profound shock, ECMO cannulation was done with the aforementioned access sites and the plan was made jointly with cardiac surgery to pursue salvage PCI of the mid LAD. He was given ASA and loaded with Ticagrelor. Patient is now s/p salvage PCI of the mid LAD with a Guillaume [place] 3.0 x 38 HEATHER. Following PCI and ECMO cannulation, patient's pressors were able to be weaned substantially and he left the cath lab (without intubation) on ECMO and IABP support.     ASA/Ticagrelor loading in the lab    Overnight bleeding from ECMO site requiring multiple units pRBCs, repositioned and sutured by Cardiac Surgery    11/15 IABP 1:1, left fem ECMO, on epi and levo (vaso currently weaned off), amio started for afib, pending TTE     11/19  Plan for chest washout due to concern for left hemothorax at the time of ECMO decannulation today. Remains on levo and epi, PRBCs transfusion.     11/20  Weaning sedation and IABP, currently 1:2; remains on epi and levo    11/21  IABP removed, extubated. On levo and epi gtts.     Interventional Recs:   - cont telemetry care per PCI protocol  - cont DAPT with Brilinta  (6 months) and aspirin (lifelong)  - provided education on compliance with DAPT  - at discharge, PLEASE send 90 day prescription of Brilinta to [facility] (for price check and Meds to Beds) and remaining refills to patient's home pharmacy   - continue high intensity statin  - no BB with pressors  - please ensure cardiology follow up appointment after discharge in 1-3 weeks  - patient referred for cardiac rehab eval  - 5lb weight restriction for 5 days for right radial access  - no lifting anything heavier than 10 lbs for one week for groin access   - appreciate excellent CTICU care      Interventional Cardiology will follow.     CICU Fellow Pager: [phone] anytime  Endovascular /Limb Salvage Team Pager: [phone] for day coverage (8am-5pm)  Interventional Cardiology Fellow Pager: [phone] (7AM-5PM) Night coverage: [facility] Cross Cover [phone]  Night coverage: [facility] [phone]     I spent 30 minutes in the professional and overall care of this patient.        Silvana Zaragoza, APRN-CNP

## 2024-11-21 NOTE — PROGRESS NOTES
VASCULAR SURGERY PROGRESS NOTE  Assessment/Plan   Jaime Dominguez is 74 y.o. male who presented with SOB and was found to have a STEMI, now s/p MIDCAB x2 converted to full sternotomy who on 11/14 went into cardiogenic shock and was put on VA ECMO. Vascular surgery consulted intraoperatively due to bleeding from L femoral decannulation site. By the time vascular team arrived, hemostasis had been obtained with manual pressure.     Patient with vascular duplex obtained 11/20/2024 without evidence of pseudoaneurysm, small hypoechoic area measuring up to 4.5 cm.  Left lower extremity signals remained unchanged, multiphasic DP, PT.    Plan:     Patient with stable pulse exam, without evidence of pseudoaneurysm.    D/w attending, Dr. Betsy Bartholomew MD  PGY-1 General Surgery  Vascular surgery [id number]         Subjective   Patient seen at bedside, awake, alert, off CPAP on nasal cannula.  Denies significant pain, groin site without changes.    Objective   Vitals:  Heart Rate:  [74-88]   Temp:  [37.1 °C (98.8 °F)-38 °C (100.4 °F)]   Resp:  [0-31]   BP: ()/(40-46)   SpO2:  [96 %-100 %]     Exam:  Constitutional: No acute distress, resting comfortably  Neuro:  AOx3, grossly intact  ENMT: moist mucous membranes  CV: no tachycardia  Pulm: non-labored on 4 L NC  GI: soft, non-tender, non-distended  Skin: warm and dry  Musculoskeletal: moving all extremities  Extremities: Left lower extremity multiphasic DP, PT signal.  Left groin site soft, without hematoma, with small ecchymosis without significant tenderness to palpation.      Labs:  Results from last 7 days   Lab Units 11/21/24  0209 11/20/24  1218 11/20/24  0452   WBC AUTO x10*3/uL 24.0* 22.2* 18.7*   HEMOGLOBIN g/dL 8.2* 8.2* 7.2*   PLATELETS AUTO x10*3/uL 205 138* 138*      Results from last 7 days   Lab Units 11/21/24  0209 11/20/24  1218 11/20/24  0452   SODIUM mmol/L 136 136 135*   POTASSIUM mmol/L 3.6 4.0 4.1   CHLORIDE mmol/L 100 100 100   CO2 mmol/L  29 26 28   BUN mg/dL 18 17 17   CREATININE mg/dL 0.78 0.75 0.71   GLUCOSE mg/dL 141* 105* 110*   MAGNESIUM mg/dL 2.04 2.13 2.08   PHOSPHORUS mg/dL 3.5 4.0 2.5      Results from last 7 days   Lab Units 11/20/24  0452 11/19/24  1723 11/15/24  2116   INR  1.3* 1.2* 1.3*   PROTIME seconds 14.3* 13.3* 14.1*   APTT seconds 28 26* 36      Results from last 7 days   Lab Units 11/21/24  0209 11/18/24  0213 11/17/24  0832   ANTI XA UNFRACTIONATED IU/mL <0.1 0.3 0.3

## 2024-11-21 NOTE — PROCEDURES
Central Line    Date/Time: 11/21/2024 3:24 PM    Performed by: Bere Quintero MD  Authorized by: Bere Quintero MD    Consent:     Consent obtained:  Written    Consent given by:  Patient    Risks, benefits, and alternatives were discussed: yes      Risks discussed:  Arterial puncture, bleeding, incorrect placement, infection and pneumothorax    Alternatives discussed:  Delayed treatment  Universal protocol:     Procedure explained and questions answered to patient or proxy's satisfaction: yes      Relevant documents present and verified: yes      Test results available: yes      Imaging studies available: yes      Required blood products, implants, devices, and special equipment available: yes      Site/side marked: yes      Immediately prior to procedure, a time out was called: yes      Patient identity confirmed:  Verbally with patient and hospital-assigned identification number  Pre-procedure details:     Indication(s): central venous access and hemodynamic monitoring      Hand hygiene: Hand hygiene performed prior to insertion      Sterile barrier technique: All elements of maximal sterile technique followed      Skin preparation:  Chlorhexidine    Skin preparation agent: Skin preparation agent completely dried prior to procedure    Sedation:     Sedation type:  Anxiolysis  Anesthesia:     Anesthesia method:  Local infiltration    Local anesthetic:  Lidocaine 1% w/o epi  Procedure details:     Location:  L internal jugular    Patient position:  Supine    Procedural supplies:  Triple lumen    Catheter size:  7 Fr    Catheter length:  20    Landmarks identified: yes      Ultrasound guidance: yes      Ultrasound guidance timing: prior to insertion and real time      Sterile ultrasound techniques: Sterile gel and sterile probe covers were used      Number of attempts:  1    Successful placement: yes    Post-procedure details:     Post-procedure:  Dressing applied and line sutured    Assessment:  Blood  return through all ports and free fluid flow    Procedure completion:  Tolerated well, no immediate complications  Comments:      Left internal jugular central venous catheter placed under ultrasound guidance using Seldinger technique. Guidewire confirmed within vein using ultrasound. Tract dilated. Catheter thread over guidewire and guidewire removed. All sharps, sponge,and instruments accounted for.         Bere Quintero MD   Surgical Critical Care Fellow

## 2024-11-21 NOTE — PROGRESS NOTES
Occupational Therapy    Occupational Therapy Treatment    Name: Jaime Dominguez  MRN: [id number]  : 1950  Date: 24  Room:       Time Calculation  Start Time: 1145  Stop Time: 1214  Time Calculation (min): 29 min    Assessment:  OT Assessment: Pt demo's decreased strength and endurance impacting level of independence. Would benefit from continued skilled therapy to maximize level of independence. Plan of care updated this date following ECMO decannulation.  Prognosis: Good  Barriers to Discharge: Other (Comment) (medical acuity)  Evaluation/Treatment Tolerance: Patient tolerated treatment well  Medical Staff Made Aware: Yes  End of Session Communication: Bedside nurse  End of Session Patient Position: Bed, 3 rail up, Alarm off, not on at start of session, Alarm off, caregiver present  Plan:  Treatment Interventions: ADL retraining, Functional transfer training, UE strengthening/ROM, Endurance training, Neuromuscular reeducation, Fine motor coordination activities, Compensatory technique education  OT Frequency: 3 times per week  OT Discharge Recommendations: High intensity level of continued care  Equipment Recommended upon Discharge: Wheeled walker  OT Recommended Transfer Status: Maximum assist, Assist of 2  OT - OK to Discharge: Yes    Subjective   General:  OT Last Visit  OT Received On: 24  Reason for Referral: s/p PCI of mid LAD with HEATHER. Pt cannulated on VA ECMO, decannulated , IABP removed   Past Medical History Relevant to Rehab: basal cell carcinoma and HLD  Co-Treatment: PT  Co-Treatment Reason: maximize pt mobility follow ECMO decannulation  Prior to Session Communication: Bedside nurse  Patient Position Received: Bed, 3 rail up, Alarm off, not on at start of session  Family/Caregiver Present: Yes  Caregiver Feedback: Wife present during session  General Comment: Pt pleasant and willing to participate in therapy session. Engaged in bed mobility and sustained sitting EOB  to complete UE ROM and strengthening this date with max to CGA. ECMO decannulation sights checked throughout session. CDI (4L O2 via NC. Epi 0.05, Levo 0.04)   Precautions:  Medical Precautions: Cardiac precautions, Fall precautions, Oxygen therapy device and L/min, Chest tube (x3 CT)  Post-Surgical Precautions: Move in the Tube  Precautions Comment: MAP 70-90, VVI@50, SpO2>92  Vitals:     11/21/24 1145   Vital Signs   Vitals Session Pre OT   Heart Rate 81   Heart Rate Source  --    Resp 25   SpO2 100 %   BP (!) 97/46   MAP (mmHg) 60   BP Method Arterial line   Patient Position Lying   Vital Signs Comment Vitals stable with change in positioning      11/21/24 1214   Vital Signs   Vitals Session Post OT   Heart Rate 90   Heart Rate Source  --    Resp (!) 33   SpO2 95 %   /81   MAP (mmHg) 69   BP Method Arterial line   Patient Position Lying   Vital Signs Comment  --      Lines/Tubes/Drains:  CVC 11/12/24 Double lumen Right Internal jugular (Active)   Number of days: 9       Arterial Line 11/16/24 Right Radial (Active)   Number of days: 4       Pacer Wires (Active)   Number of days: 8       Pulmonary Artery Catheter Internal jugular (Active)   Number of days: 7       Urethral Catheter 16 Fr. (Active)   Number of days: 2       Chest Tube 1 Right (Active)   Number of days: 1       NG/OG/Feeding Tube Right nostril (Active)   Number of days: 1       Y Chest Tube 1 and 2 Mediastinal Mediastinal (Active)   Number of days: 1       Y Chest Tube 3 and 4 Left Left (Active)   Number of days: 1       Cognition:  Overall Cognitive Status: Within Functional Limits  Orientation Level: Oriented X4  Following Commands: Follows all commands and directions without difficulty  Cognition Comments: CAM(-)    Pain Assessment:  Pain Assessment  Pain Assessment: 0-10  0-10 (Numeric) Pain Score: 3  Pain Type: Surgical pain  Pain Location: Chest  Pain Orientation: Mid  Pain Interventions: Repositioned  Response to Interventions: No change  in pain     Objective     Functional Standing Tolerance:  Functional Mobility  Functional Mobility Performed: No  Bed Mobility/Transfers:   Bed Mobility  Bed Mobility: Yes  Bed Mobility 1  Bed Mobility 1: Supine to sitting, Sitting to supine  Level of Assistance 1: Maximum assistance, +2  Bed Mobility Comments 1: use of draw sheet      Therapy/Activity:      Therapeutic Activity  Therapeutic Activity Performed: Yes  Therapeutic Activity 1: Pt engaged in sustained sitting 10 min EOB with initial max A support graded to CGA with intermittent min A to return to midline from L and posterior lean  Therapeutic Activity 2: Pt engaged in AROM of BUE seated EOB. Demo'd slowed movements, however, able to bring bilateral hands to ears x1 with increased time.  Therapeutic Activity 3: Pt encouraged to engage in AROM throughout day in BUE to reduce swelling. Pt verbalized understanding.  Therapeutic Activity 4: Pt engaged in sit<>stand with max A x2, use of draw sheet and knee blocking. Pt unable to attain full stand this date.       Strength:  Strength  Strength Comments: BUE strength not formally assessed. Observed to be at least 2+/5 through AROM.      Outcome Measures:  [facility] Daily Activity  Putting on and taking off regular lower body clothing: A lot  Bathing (including washing, rinsing, drying): A lot  Putting on and taking off regular upper body clothing: A lot  Toileting, which includes using toilet, bedpan or urinal: A lot  Taking care of personal grooming such as brushing teeth: A little  Eating Meals: A little  Daily Activity - Total Score: 14     Education Documentation  Body Mechanics, taught by ALESSANDRA Hensley at 11/21/2024 12:31 PM.  Learner: Significant Other, Patient  Readiness: Acceptance  Method: Explanation  Response: Verbalizes Understanding, Demonstrated Understanding    Precautions, taught by ALESSANDRA Hensley at 11/21/2024 12:31 PM.  Learner: Significant Other, Patient  Readiness:  Acceptance  Method: Explanation  Response: Verbalizes Understanding, Demonstrated Understanding    Home Exercise Program, taught by ALESSANDRA Hensley at 11/21/2024 12:31 PM.  Learner: Significant Other, Patient  Readiness: Acceptance  Method: Explanation  Response: Verbalizes Understanding, Demonstrated Understanding    ADL Training, taught by ALESSANDRA Hensley at 11/21/2024 12:31 PM.  Learner: Significant Other, Patient  Readiness: Acceptance  Method: Explanation  Response: Verbalizes Understanding, Demonstrated Understanding    Education Comments  No comments found.        Goals:  Encounter Problems       Encounter Problems (Active)       ADLs       Patient will complete grooming tasks with Sup in order to maximize functional Indep with task completion.        Start:  11/18/24    Expected End:  12/05/24               ADLs       Pt will demonstrate increased independence by completing LB dressing at EOB with min A       Start:  11/21/24    Expected End:  12/05/24            Pt will demonstrate increased ADL independence by completing toileting routine with min A and use of least restrictive mobility device       Start:  11/21/24    Expected End:  12/05/24               BALANCE       Pt will demonstrate increased balance as evidenced by sustaining dynamic standing while completing self-care tasks with modified independence and use of least restrictive mobility device       Start:  11/21/24    Expected End:  12/05/24               COGNITION/SAFETY       Patient will use ICU/hospital diary with independent level of assistance to allow improved recall of hospital events.       Start:  11/18/24    Expected End:  12/05/24               COGNITION/SAFETY       Pt will demonstrate increased safety awareness by maintaining MITT precautions while completing bed mobility with modified independence        Start:  11/21/24    Expected End:  12/05/24               EXERCISE/STRENGTHENING       Pt will increase endurance to  tolerate 15-20min of activity with no more than 1 rest break in order to increase ability to engage in ADL completion.        Start:  11/18/24    Expected End:  12/05/24 11/21/24 at 3:10 PM   DAI CASTELAN S-OT   [phone]

## 2024-11-22 ENCOUNTER — APPOINTMENT (OUTPATIENT)
Dept: RADIOLOGY | Facility: HOSPITAL | Age: 74
DRG: 003 | End: 2024-11-22
Payer: MEDICARE

## 2024-11-22 LAB
ALBUMIN SERPL BCP-MCNC: 2.9 G/DL (ref 3.4–5)
ALBUMIN SERPL BCP-MCNC: 3.2 G/DL (ref 3.4–5)
ANION GAP BLDMV CALCULATED.4IONS-SCNC: 6 MMO/L (ref 10–25)
ANION GAP BLDMV CALCULATED.4IONS-SCNC: 7 MMO/L (ref 10–25)
ANION GAP BLDMV CALCULATED.4IONS-SCNC: 9 MMO/L (ref 10–25)
ANION GAP BLDV CALCULATED.4IONS-SCNC: 9 MMOL/L (ref 10–25)
ANION GAP SERPL CALC-SCNC: 13 MMOL/L (ref 10–20)
ANION GAP SERPL CALC-SCNC: 14 MMOL/L (ref 10–20)
ATRIAL RATE: 326 BPM
BACTERIA SPEC CULT: NORMAL
BASE EXCESS BLDMV CALC-SCNC: 3 MMOL/L (ref -2–3)
BASE EXCESS BLDMV CALC-SCNC: 4.1 MMOL/L (ref -2–3)
BASE EXCESS BLDMV CALC-SCNC: 4.6 MMOL/L (ref -2–3)
BASE EXCESS BLDV CALC-SCNC: 4.1 MMOL/L (ref -2–3)
BLOOD EXPIRATION DATE: NORMAL
BODY TEMPERATURE: 37 DEGREES CELSIUS
BUN SERPL-MCNC: 18 MG/DL (ref 6–23)
BUN SERPL-MCNC: 19 MG/DL (ref 6–23)
CA-I BLD-SCNC: 1 MMOL/L (ref 1.1–1.33)
CA-I BLD-SCNC: 1.01 MMOL/L (ref 1.1–1.33)
CA-I BLDMV-SCNC: 1.01 MMOL/L (ref 1.1–1.33)
CA-I BLDMV-SCNC: 1.03 MMOL/L (ref 1.1–1.33)
CA-I BLDMV-SCNC: 1.05 MMOL/L (ref 1.1–1.33)
CA-I BLDV-SCNC: 1.12 MMOL/L (ref 1.1–1.33)
CALCIUM SERPL-MCNC: 6.9 MG/DL (ref 8.6–10.6)
CALCIUM SERPL-MCNC: 7.4 MG/DL (ref 8.6–10.6)
CHLORIDE BLD-SCNC: 100 MMOL/L (ref 98–107)
CHLORIDE BLD-SCNC: 101 MMOL/L (ref 98–107)
CHLORIDE BLD-SCNC: 102 MMOL/L (ref 98–107)
CHLORIDE BLDV-SCNC: 100 MMOL/L (ref 98–107)
CHLORIDE SERPL-SCNC: 98 MMOL/L (ref 98–107)
CHLORIDE SERPL-SCNC: 98 MMOL/L (ref 98–107)
CO2 SERPL-SCNC: 27 MMOL/L (ref 21–32)
CO2 SERPL-SCNC: 28 MMOL/L (ref 21–32)
CREAT SERPL-MCNC: 0.71 MG/DL (ref 0.5–1.3)
CREAT SERPL-MCNC: 0.78 MG/DL (ref 0.5–1.3)
DISPENSE STATUS: NORMAL
EGFRCR SERPLBLD CKD-EPI 2021: >90 ML/MIN/1.73M*2
EGFRCR SERPLBLD CKD-EPI 2021: >90 ML/MIN/1.73M*2
ERYTHROCYTE [DISTWIDTH] IN BLOOD BY AUTOMATED COUNT: 16 % (ref 11.5–14.5)
ERYTHROCYTE [DISTWIDTH] IN BLOOD BY AUTOMATED COUNT: 16.1 % (ref 11.5–14.5)
FUNGUS SPEC CULT: NORMAL
FUNGUS SPEC FUNGUS STN: NORMAL
GLUCOSE BLD MANUAL STRIP-MCNC: 116 MG/DL (ref 74–99)
GLUCOSE BLD MANUAL STRIP-MCNC: 126 MG/DL (ref 74–99)
GLUCOSE BLD MANUAL STRIP-MCNC: 129 MG/DL (ref 74–99)
GLUCOSE BLD MANUAL STRIP-MCNC: 140 MG/DL (ref 74–99)
GLUCOSE BLD MANUAL STRIP-MCNC: 143 MG/DL (ref 74–99)
GLUCOSE BLD-MCNC: 125 MG/DL (ref 74–99)
GLUCOSE BLD-MCNC: 136 MG/DL (ref 74–99)
GLUCOSE BLD-MCNC: 145 MG/DL (ref 74–99)
GLUCOSE BLDV-MCNC: 141 MG/DL (ref 74–99)
GLUCOSE SERPL-MCNC: 125 MG/DL (ref 74–99)
GLUCOSE SERPL-MCNC: 142 MG/DL (ref 74–99)
GRAM STN SPEC: NORMAL
GRAM STN SPEC: NORMAL
HCO3 BLDMV-SCNC: 27 MMOL/L (ref 22–26)
HCO3 BLDMV-SCNC: 28.5 MMOL/L (ref 22–26)
HCO3 BLDMV-SCNC: 29.2 MMOL/L (ref 22–26)
HCO3 BLDV-SCNC: 28.5 MMOL/L (ref 22–26)
HCT VFR BLD AUTO: 25.2 % (ref 41–52)
HCT VFR BLD AUTO: 25.5 % (ref 41–52)
HCT VFR BLD EST: 23 % (ref 41–52)
HCT VFR BLD EST: 26 % (ref 41–52)
HCT VFR BLD EST: 26 % (ref 41–52)
HCT VFR BLD EST: 27 % (ref 41–52)
HGB BLD-MCNC: 8.2 G/DL (ref 13.5–17.5)
HGB BLD-MCNC: 8.3 G/DL (ref 13.5–17.5)
HGB BLDMV-MCNC: 7.6 G/DL (ref 13.5–17.5)
HGB BLDMV-MCNC: 8.5 G/DL (ref 13.5–17.5)
HGB BLDMV-MCNC: 8.8 G/DL (ref 13.5–17.5)
HGB BLDV-MCNC: 8.9 G/DL (ref 13.5–17.5)
INHALED O2 CONCENTRATION: 28 %
INHALED O2 CONCENTRATION: 30 %
LACTATE BLDMV-SCNC: 1.1 MMOL/L (ref 0.4–2)
LACTATE BLDMV-SCNC: 1.3 MMOL/L (ref 0.4–2)
LACTATE BLDMV-SCNC: 1.4 MMOL/L (ref 0.4–2)
LACTATE BLDV-SCNC: 1.2 MMOL/L (ref 0.4–2)
MAGNESIUM SERPL-MCNC: 1.94 MG/DL (ref 1.6–2.4)
MAGNESIUM SERPL-MCNC: 1.96 MG/DL (ref 1.6–2.4)
MCH RBC QN AUTO: 29.2 PG (ref 26–34)
MCH RBC QN AUTO: 29.4 PG (ref 26–34)
MCHC RBC AUTO-ENTMCNC: 32.5 G/DL (ref 32–36)
MCHC RBC AUTO-ENTMCNC: 32.5 G/DL (ref 32–36)
MCV RBC AUTO: 90 FL (ref 80–100)
MCV RBC AUTO: 90 FL (ref 80–100)
NRBC BLD-RTO: 0.6 /100 WBCS (ref 0–0)
NRBC BLD-RTO: 0.8 /100 WBCS (ref 0–0)
OXYHGB MFR BLDMV: 56.6 % (ref 45–75)
OXYHGB MFR BLDMV: 56.8 % (ref 45–75)
OXYHGB MFR BLDMV: 57 % (ref 45–75)
OXYHGB MFR BLDV: 52.1 % (ref 45–75)
PCO2 BLDMV: 38 MM HG (ref 41–51)
PCO2 BLDMV: 41 MM HG (ref 41–51)
PCO2 BLDMV: 43 MM HG (ref 41–51)
PCO2 BLDV: 41 MM HG (ref 41–51)
PH BLDMV: 7.44 PH (ref 7.33–7.43)
PH BLDMV: 7.45 PH (ref 7.33–7.43)
PH BLDMV: 7.46 PH (ref 7.33–7.43)
PH BLDV: 7.45 PH (ref 7.33–7.43)
PHOSPHATE SERPL-MCNC: 2.4 MG/DL (ref 2.5–4.9)
PHOSPHATE SERPL-MCNC: 3 MG/DL (ref 2.5–4.9)
PLATELET # BLD AUTO: 272 X10*3/UL (ref 150–450)
PLATELET # BLD AUTO: 356 X10*3/UL (ref 150–450)
PO2 BLDMV: 33 MM HG (ref 35–45)
PO2 BLDMV: 34 MM HG (ref 35–45)
PO2 BLDMV: 35 MM HG (ref 35–45)
PO2 BLDV: 32 MM HG (ref 35–45)
POTASSIUM BLDMV-SCNC: 3.4 MMOL/L (ref 3.5–5.3)
POTASSIUM BLDMV-SCNC: 3.7 MMOL/L (ref 3.5–5.3)
POTASSIUM BLDMV-SCNC: 3.7 MMOL/L (ref 3.5–5.3)
POTASSIUM BLDV-SCNC: 3.9 MMOL/L (ref 3.5–5.3)
POTASSIUM SERPL-SCNC: 3.7 MMOL/L (ref 3.5–5.3)
POTASSIUM SERPL-SCNC: 3.7 MMOL/L (ref 3.5–5.3)
PRODUCT BLOOD TYPE: 6200
PRODUCT CODE: NORMAL
Q ONSET: 225 MS
QRS COUNT: 23 BEATS
QRS DURATION: 78 MS
QT INTERVAL: 252 MS
QTC CALCULATION(BAZETT): 381 MS
QTC FREDERICIA: 332 MS
R AXIS: 101 DEGREES
RBC # BLD AUTO: 2.79 X10*6/UL (ref 4.5–5.9)
RBC # BLD AUTO: 2.84 X10*6/UL (ref 4.5–5.9)
SAO2 % BLDMV: 58 % (ref 45–75)
SAO2 % BLDMV: 58 % (ref 45–75)
SAO2 % BLDMV: 59 % (ref 45–75)
SAO2 % BLDV: 54 % (ref 45–75)
SODIUM BLDMV-SCNC: 132 MMOL/L (ref 136–145)
SODIUM BLDMV-SCNC: 133 MMOL/L (ref 136–145)
SODIUM BLDMV-SCNC: 134 MMOL/L (ref 136–145)
SODIUM BLDV-SCNC: 134 MMOL/L (ref 136–145)
SODIUM SERPL-SCNC: 135 MMOL/L (ref 136–145)
SODIUM SERPL-SCNC: 135 MMOL/L (ref 136–145)
T AXIS: 25 DEGREES
T OFFSET: 351 MS
UNIT ABO: NORMAL
UNIT NUMBER: NORMAL
UNIT RH: NORMAL
UNIT VOLUME: 350
VENTRICULAR RATE: 138 BPM
WBC # BLD AUTO: 22.7 X10*3/UL (ref 4.4–11.3)
WBC # BLD AUTO: 23 X10*3/UL (ref 4.4–11.3)
XM INTEP: NORMAL

## 2024-11-22 PROCEDURE — P9047 ALBUMIN (HUMAN), 25%, 50ML: HCPCS | Mod: JZ

## 2024-11-22 PROCEDURE — 2500000001 HC RX 250 WO HCPCS SELF ADMINISTERED DRUGS (ALT 637 FOR MEDICARE OP)

## 2024-11-22 PROCEDURE — 99233 SBSQ HOSP IP/OBS HIGH 50: CPT | Performed by: SPECIALIST

## 2024-11-22 PROCEDURE — 37799 UNLISTED PX VASCULAR SURGERY: CPT

## 2024-11-22 PROCEDURE — 2500000004 HC RX 250 GENERAL PHARMACY W/ HCPCS (ALT 636 FOR OP/ED)

## 2024-11-22 PROCEDURE — 2500000002 HC RX 250 W HCPCS SELF ADMINISTERED DRUGS (ALT 637 FOR MEDICARE OP, ALT 636 FOR OP/ED)

## 2024-11-22 PROCEDURE — 71045 X-RAY EXAM CHEST 1 VIEW: CPT

## 2024-11-22 PROCEDURE — 80069 RENAL FUNCTION PANEL: CPT

## 2024-11-22 PROCEDURE — 82947 ASSAY GLUCOSE BLOOD QUANT: CPT

## 2024-11-22 PROCEDURE — 83735 ASSAY OF MAGNESIUM: CPT

## 2024-11-22 PROCEDURE — 82330 ASSAY OF CALCIUM: CPT

## 2024-11-22 PROCEDURE — 84132 ASSAY OF SERUM POTASSIUM: CPT

## 2024-11-22 PROCEDURE — 2020000001 HC ICU ROOM DAILY

## 2024-11-22 PROCEDURE — 99231 SBSQ HOSP IP/OBS SF/LOW 25: CPT

## 2024-11-22 PROCEDURE — 2500000004 HC RX 250 GENERAL PHARMACY W/ HCPCS (ALT 636 FOR OP/ED): Mod: JZ

## 2024-11-22 PROCEDURE — 97530 THERAPEUTIC ACTIVITIES: CPT | Mod: GP

## 2024-11-22 PROCEDURE — 85027 COMPLETE CBC AUTOMATED: CPT

## 2024-11-22 PROCEDURE — 99291 CRITICAL CARE FIRST HOUR: CPT | Performed by: EMERGENCY MEDICINE

## 2024-11-22 PROCEDURE — 99291 CRITICAL CARE FIRST HOUR: CPT

## 2024-11-22 PROCEDURE — 97530 THERAPEUTIC ACTIVITIES: CPT | Mod: GO

## 2024-11-22 RX ORDER — BISACODYL 10 MG/1
10 SUPPOSITORY RECTAL ONCE
Status: DISCONTINUED | OUTPATIENT
Start: 2024-11-22 | End: 2024-11-24

## 2024-11-22 RX ORDER — ADHESIVE BANDAGE
30 BANDAGE TOPICAL ONCE
Status: COMPLETED | OUTPATIENT
Start: 2024-11-22 | End: 2024-11-22

## 2024-11-22 RX ORDER — FUROSEMIDE 10 MG/ML
40 INJECTION INTRAMUSCULAR; INTRAVENOUS ONCE
Status: COMPLETED | OUTPATIENT
Start: 2024-11-22 | End: 2024-11-22

## 2024-11-22 RX ORDER — ALBUMIN HUMAN 250 G/1000ML
25 SOLUTION INTRAVENOUS ONCE
Status: COMPLETED | OUTPATIENT
Start: 2024-11-22 | End: 2024-11-22

## 2024-11-22 RX ORDER — EPINEPHRINE IN 0.9 % SOD CHLOR 4MG/250ML
0.01 PLASTIC BAG, INJECTION (ML) INTRAVENOUS CONTINUOUS
Status: DISCONTINUED | OUTPATIENT
Start: 2024-11-22 | End: 2024-11-24

## 2024-11-22 RX ADMIN — PIPERACILLIN SODIUM AND TAZOBACTAM SODIUM 4.5 G: 4; .5 INJECTION, SOLUTION INTRAVENOUS at 21:32

## 2024-11-22 RX ADMIN — HEPARIN SODIUM 5000 UNITS: 5000 INJECTION INTRAVENOUS; SUBCUTANEOUS at 02:06

## 2024-11-22 RX ADMIN — AMIODARONE HYDROCHLORIDE 400 MG: 200 TABLET ORAL at 21:23

## 2024-11-22 RX ADMIN — ACETAMINOPHEN 650 MG: 325 TABLET ORAL at 06:32

## 2024-11-22 RX ADMIN — ACETAMINOPHEN 650 MG: 325 TABLET ORAL at 09:49

## 2024-11-22 RX ADMIN — ALBUMIN HUMAN 25 G: 0.25 SOLUTION INTRAVENOUS at 10:09

## 2024-11-22 RX ADMIN — SENNOSIDES AND DOCUSATE SODIUM 2 TABLET: 50; 8.6 TABLET ORAL at 21:23

## 2024-11-22 RX ADMIN — POTASSIUM CHLORIDE 40 MEQ: 1.5 POWDER, FOR SOLUTION ORAL at 14:59

## 2024-11-22 RX ADMIN — ACETAMINOPHEN 650 MG: 325 TABLET ORAL at 13:25

## 2024-11-22 RX ADMIN — PIPERACILLIN SODIUM AND TAZOBACTAM SODIUM 4.5 G: 4; .5 INJECTION, SOLUTION INTRAVENOUS at 09:49

## 2024-11-22 RX ADMIN — Medication 5 MG: at 21:23

## 2024-11-22 RX ADMIN — EPINEPHRINE IN SODIUM CHLORIDE 0.05 MCG/KG/MIN: 16 INJECTION INTRAVENOUS at 06:31

## 2024-11-22 RX ADMIN — ASPIRIN 81 MG 81 MG: 81 TABLET ORAL at 08:21

## 2024-11-22 RX ADMIN — POLYETHYLENE GLYCOL 3350 17 G: 17 POWDER, FOR SOLUTION ORAL at 21:23

## 2024-11-22 RX ADMIN — ATORVASTATIN CALCIUM 80 MG: 80 TABLET, FILM COATED ORAL at 21:23

## 2024-11-22 RX ADMIN — TICAGRELOR 90 MG: 90 TABLET ORAL at 21:23

## 2024-11-22 RX ADMIN — HEPARIN SODIUM 5000 UNITS: 5000 INJECTION INTRAVENOUS; SUBCUTANEOUS at 17:06

## 2024-11-22 RX ADMIN — ACETAMINOPHEN 650 MG: 325 TABLET ORAL at 17:06

## 2024-11-22 RX ADMIN — MAGNESIUM HYDROXIDE 30 ML: 400 SUSPENSION ORAL at 09:49

## 2024-11-22 RX ADMIN — HEPARIN SODIUM 5000 UNITS: 5000 INJECTION INTRAVENOUS; SUBCUTANEOUS at 08:50

## 2024-11-22 RX ADMIN — PIPERACILLIN SODIUM AND TAZOBACTAM SODIUM 4.5 G: 4; .5 INJECTION, SOLUTION INTRAVENOUS at 04:43

## 2024-11-22 RX ADMIN — PIPERACILLIN SODIUM AND TAZOBACTAM SODIUM 4.5 G: 4; .5 INJECTION, SOLUTION INTRAVENOUS at 15:48

## 2024-11-22 RX ADMIN — ACETAMINOPHEN 650 MG: 325 TABLET ORAL at 21:23

## 2024-11-22 RX ADMIN — SENNOSIDES AND DOCUSATE SODIUM 2 TABLET: 50; 8.6 TABLET ORAL at 08:21

## 2024-11-22 RX ADMIN — POTASSIUM CHLORIDE 40 MEQ: 1.5 POWDER, FOR SOLUTION ORAL at 02:31

## 2024-11-22 RX ADMIN — ACETAMINOPHEN 650 MG: 325 TABLET ORAL at 02:06

## 2024-11-22 RX ADMIN — OXYCODONE HYDROCHLORIDE 5 MG: 5 TABLET ORAL at 04:04

## 2024-11-22 RX ADMIN — AMIODARONE HYDROCHLORIDE 400 MG: 200 TABLET ORAL at 08:21

## 2024-11-22 RX ADMIN — EPINEPHRINE IN SODIUM CHLORIDE 0.03 MCG/KG/MIN: 16 INJECTION INTRAVENOUS at 14:42

## 2024-11-22 RX ADMIN — POLYETHYLENE GLYCOL 3350 17 G: 17 POWDER, FOR SOLUTION ORAL at 08:21

## 2024-11-22 RX ADMIN — TICAGRELOR 90 MG: 90 TABLET ORAL at 08:21

## 2024-11-22 RX ADMIN — BENZOCAINE AND MENTHOL 1 LOZENGE: 15; 3.6 LOZENGE ORAL at 09:49

## 2024-11-22 RX ADMIN — CALCIUM GLUCONATE 1 G: 20 INJECTION, SOLUTION INTRAVENOUS at 14:59

## 2024-11-22 RX ADMIN — FUROSEMIDE 40 MG: 10 INJECTION, SOLUTION INTRAMUSCULAR; INTRAVENOUS at 08:37

## 2024-11-22 RX ADMIN — CALCIUM GLUCONATE 1 G: 20 INJECTION, SOLUTION INTRAVENOUS at 02:31

## 2024-11-22 ASSESSMENT — PAIN - FUNCTIONAL ASSESSMENT
PAIN_FUNCTIONAL_ASSESSMENT: 0-10

## 2024-11-22 ASSESSMENT — COGNITIVE AND FUNCTIONAL STATUS - GENERAL
DAILY ACTIVITIY SCORE: 15
STANDING UP FROM CHAIR USING ARMS: A LOT
DRESSING REGULAR UPPER BODY CLOTHING: A LITTLE
WALKING IN HOSPITAL ROOM: TOTAL
EATING MEALS: A LITTLE
PERSONAL GROOMING: A LITTLE
CLIMB 3 TO 5 STEPS WITH RAILING: TOTAL
TOILETING: A LOT
MOVING FROM LYING ON BACK TO SITTING ON SIDE OF FLAT BED WITH BEDRAILS: A LOT
DRESSING REGULAR LOWER BODY CLOTHING: A LOT
TURNING FROM BACK TO SIDE WHILE IN FLAT BAD: TOTAL
HELP NEEDED FOR BATHING: A LOT
MOVING TO AND FROM BED TO CHAIR: A LOT
MOBILITY SCORE: 9

## 2024-11-22 ASSESSMENT — PAIN SCALES - GENERAL
PAINLEVEL_OUTOF10: 0 - NO PAIN
PAINLEVEL_OUTOF10: 9
PAINLEVEL_OUTOF10: 2
PAINLEVEL_OUTOF10: 0 - NO PAIN

## 2024-11-22 ASSESSMENT — PAIN SCALES - PAIN ASSESSMENT IN ADVANCED DEMENTIA (PAINAD): TOTALSCORE: MEDICATION (SEE MAR)

## 2024-11-22 NOTE — PROGRESS NOTES
CTICU Progress Note  Jaime Dominguez/[id number]    Admit Date: 11/10/2024  Hospital Length of Stay: 12   ICU Length of Stay: 9d 6h   CT SURGEON: Dr. Aldo Harman    SUBJECTIVE:   Hemodynamics acceptable on provided therapy.   Remains on epinephrine 0.05mcg/kg/min and norepinephrine 0.02mcg/kg/min.  CPAP overnight.  Pan cultures pending.     MEDICATIONS  Infusions:  EPINEPHrine, Last Rate: 0.05 mcg/kg/min (11/22/24 0631)  norepinephrine, Last Rate: 0.02 mcg/kg/min (11/22/24 0631)      Scheduled:  acetaminophen, 650 mg, q4h  amiodarone, 400 mg, BID  aspirin, 81 mg, Daily  atorvastatin, 80 mg, Nightly  heparin, 5,000 Units, q8h  insulin lispro, 0-5 Units, q4h  melatonin, 5 mg, Nightly  piperacillin-tazobactam, 4.5 g, q6h  polyethylene glycol, 17 g, BID  sennosides-docusate sodium, 2 tablet, BID  ticagrelor, 90 mg, BID      PRN:  calcium gluconate, 1 g, q6h PRN  calcium gluconate, 2 g, q6h PRN  magnesium sulfate, 2 g, q6h PRN  magnesium sulfate, 4 g, q6h PRN  naloxone, 0.2 mg, q5 min PRN  naloxone, 0.2 mg, q5 min PRN  ondansetron, 4 mg, q8h PRN   Or  ondansetron, 4 mg, q8h PRN  oxyCODONE, 2.5 mg, q4h PRN  oxyCODONE, 5 mg, q4h PRN  oxygen, , Continuous PRN - O2/gases  oxygen, , Continuous PRN - O2/gases  potassium chloride CR, 20 mEq, q6h PRN   Or  potassium chloride, 20 mEq, q6h PRN  potassium chloride CR, 40 mEq, q6h PRN   Or  potassium chloride, 40 mEq, q6h PRN        PHYSICAL EXAM:   Visit Vitals  /68   Pulse 83   Temp 35.9 °C (96.6 °F) (Temporal)   Resp 22   Ht 1.829 m (6')   Wt 94 kg (207 lb 3.7 oz)   SpO2 95%   BMI 28.11 kg/m²   Smoking Status Never   BSA 2.19 m²     Wt Readings from Last 5 Encounters:   11/18/24 94 kg (207 lb 3.7 oz)   11/10/24 96.2 kg (212 lb)     INTAKE/OUTPUT:  I/O last 3 completed shifts:  In: 2942.4 (31.3 mL/kg) [P.O.:775; I.V.:1177.4 (12.5 mL/kg); NG/GT:540; IV Piggyback:450]  Out: 4920 (52.3 mL/kg) [Urine:3890 (1.1 mL/kg/hr); Chest Tube:1030]  Weight: 94 kg      Physical Exam:    Constitutional: Male patient lying in ICU bed in no acute distress  Neuro: AOx3, no obvious focal deficits. Moves all extremities. PERRL.   CV: NSR rate 70-80s. Normotensive MAP 70s on levo. V wires present set VVI @ 50.  Pulm: On 4L NC with appropriate oxygenation. Equal rise/fall of chest. MS and BLP CT's with appropriate serosang output and no airleak.  : Clear yellow urine in aden. Scrotal edema and erythema.   GI: S/ND/NT. Corpak in place.  Extremities: NV exam intact x 4. No edema.   Skin: WDI. Postop dressings intact. Chest tube dressings intact. Chest tube sites oozing.   Psych: Calm and cooperative.     Daily Risk Screen  Intubated: No  Central line: Yes, Hemodynamic instability requiring parenteral medication  Aden: Yes, accurate I/Os in critically ill    Images: Reviewed    Invasive Hemodynamics:    Most Recent Range Past 24hrs   BP (Art) 112/67 Arterial Line BP 1  Min: 74/62  Max: 197/168   MAP(Art) 81 mmHg Arterial Line MAP 1 (mmHg)   Min: 62 mmHg  Max: 218 mmHg   RA/CVP   No data recorded   PA 37/18 PAP  Min: 30/12  Max: 41/19   PA(mean) 25 mmHg PAP (Mean)  Min: 18 mmHg  Max: 32 mmHg     Assessment/Plan   Jaime Dominguez is a 74 year old male with a PMH significant for basal cell carcinoma and HPL s/p MidCAB x2 converted to full sternotomy w/ LIMA prolonged as a Y graft with a radial to LAD OM w/ Dr. Aldo Harman and Dr. Stanley. CPB time 347min. Course c/b increasing pressor requirement and echo revealing apical hypo-akinesis with subsequent IABP placement on 11/14 for further support. Overnight on 11/14, Troponin elevated and ST elevation in anteroseptal leads, c/f ACS and decision made to [facility]. Now s/p PCI of mid LAD with HEATHER. Pt cannulated on VA ECMO for cardiogenic shock state 2/2 multiple failed grafts.     11/19 RTOR for VA ECMO decannulation and left hemothorax washout. Removed IABP 11/20.     Plan:  NEURO: No PMHx. AOx3, follows commands, and moves all extremities. No obvious focal deficits.  Physically deconditioned. Yesterday, sat on side of bed and stood with PT/OT. This morning was able to get OOB to chair with PT/OT. Acute post op pain adequately managed with current regimen. -->  - Serial neuro and pain assessments   - Scheduled Tylenol   - PRN oxycodone to 2.5mg for moderate and 5mg for severe  - Continue melatonin  - PT Consult   - CAM ICU score qshift  - Passive ROM  - Sleep/wake cycle hygiene     CV: Patient has a history of HLD. Is now status post MIDCABG x 2 with conversion to full sternotomy LIMA prolonged as a Y graft with a radial to LAD OM on 11/12. Pre/Post EF: normal BIV. Post op course complicated by coronary graft dysfunction on 11/14. Now s/p PCI of mid LAD with HEATHER. Pt cannulated on VA ECMO for cardiogenic shock state 2/2 multiple failed grafts 11/14. ECMO turn down TTE 11/18 with LVEF 34% and reduced RV. Previous Afib w/ RVR for which patient is on amio gtt, no episode in multiple days. Hypotension on norepinephrine 0.02mcg/kg/min infusion. Remains on epinephrine 0.05mcg/kg/min for inotropy. Decannulated from VA ECMO 11/19 and IABP removed 11/20. Most recent CI - 3.24, CO - 7, SVR - 583, and SVO2 - 54%. -->  - Wean norepinephrine while maintaining MAP 70-90  - Slow wean epinephrine and trend venous gases   - Maintain epicardial wires set VVI @ 40  - Amio 400mg PO BID   - Continue aspirin and Ticagrelor 90mg BID  - Continue with atorvastatin 80mg nightly    PULM: No history of pulmonary disease. CPAP overnight. Currently 4L NC. 2MS and BPL chest tubes with minimal output and no air leaks noted. Morning CXR with acute cardiogenic pulmonary edema and atelectasis and stable left-sided subcutaneous air. -->  - Daily CXR  - Wean FiO2 maintaining SpO2 >92%.   - IS q1h and OOB to chair  - Chest tubes to wall suction --> Plan to remove MS. Leave RPL and LPL chest tube due to left sided subcutaneous air on CXR and increased RPL chest tube output.      GI: No PMH. Passed bedside swallow.  Corpak in place. No post op BM. Abdomen soft, non tender, and non distended. No post-op BM yet. -->   - Discontinue PPI   - Continue TF in conjunction with PO diet   - Cardiac diet --> Encourage more PO intake   - Milk of magnesia   - Suppository   - Colace/senna BID and miralax BID     : CSA-TREE Risk Score low. No history of renal disease, baseline creatinine 0.87. Creatinine stable post-op. Aden in place and making adequate UOP. Net -514mL in last 24 hours. Very responsive to lasix. Hypervolemic on exam with 1+ pitting edema in all extremities. Ordered 40mg lasix. -->  - Continue aden catheter for strict I/Os.  - Goal UOP 0.5ml/kg/hr  - Goal -1L for shift   - RFP as clinically indicated  - Replete electrolytes per CTICU protocol     ENDO: No PMH. A1c: 5.1. Euglycemia, adequately controlled on current regimen. -->  - Maintain BG <180  - SSI #1     HEME: Acute blood loss anemia. Oozing has resolved. CT scan 11/18 showing left chest hemothorax with resulting compressive atelectasis, RTOR 11/19 for left hemothorax evacuation.  -->    - Monitor drain output volume and characteristics  - Daily CBC and prn  - Continue aspirin and Ticagrelor BID  - SQH q8h  - SCDs for DVT prophylaxis.  - Last type and screen: 11/21     ID: Afebrile, but previously warming. Improving leukocytosis. MRSA negative. UA negative, blood cultures sent on 11/16 and NGTD x2. MRSA 11/18 negative. Recultured on 11/18 and results pending. -->  - Follow up blood culture results  - Continue zosyn --> Plan to discontinue when cultures come back negative   - Continue to monitor WBC  - Trend temp q4h     Skin: No active skin issues. Oozing from chest tube sites, improved. -->  - Scrotal edema- will elevate   - L groin cannula site soft and dry (no oozing), no concern for compartment syndrome (CK and myoglobin downtrend)  - preventative Mepilex dressings in place on sacrum and heels  - change preventative Mepilex weekly or more frequently as indicated  (when moist/soiled)   - every shift skin assessment per nursing and weekly ICU skin rounds  - moisture barrier to be applied with sandro care  - active skin problems addressed with nursing on daily rounds     Proph:  SCDs  SQH     G: Line  Left internal jugular triple lumen placed 11/21  R radial a-line placed 11/16  Hale 11/19     F: Family: will update at bedside.     Code status: Full Code    I personally spent 45 minutes of critical care time directly and personally managing the patient exclusive of separately billable procedures.     A,B,C,D,E,F,G: reviewed    Discussed with Dr. Maldonado.  Dispo: CTICU care for now.    CTICU TEAM PHONE [id number]

## 2024-11-22 NOTE — PROGRESS NOTES
Occupational Therapy    Occupational Therapy Treatment    Name: Jaime Dominguez  MRN: [id number]  : 1950  Date: 24  Room:       Time Calculation  Start Time: 1352  Stop Time: 1439  Time Calculation (min): 47 min    Assessment:  OT Assessment: Pt demo'd decreased strength and endurance, impacting occupational performance, however, making progress in therapy. Would benefit from continued skilled therapy to maximize level of independence.  Prognosis: Excellent  Barriers to Discharge: None  Evaluation/Treatment Tolerance: Patient tolerated treatment well  Medical Staff Made Aware: Yes  End of Session Communication: Bedside nurse  End of Session Patient Position: Bed, 3 rail up, Alarm off, not on at start of session  Plan:  Treatment Interventions: ADL retraining, Functional transfer training, UE strengthening/ROM, Endurance training, Neuromuscular reeducation, Fine motor coordination activities, Compensatory technique education  OT Frequency: 3 times per week  OT Discharge Recommendations: High intensity level of continued care  Equipment Recommended upon Discharge: Wheeled walker  OT Recommended Transfer Status: Maximum assist, Assist of 2  OT - OK to Discharge: Yes    Subjective   General:  OT Last Visit  OT Received On: 24  Reason for Referral: s/p PCI of mid LAD with HEATHER. Pt cannulated on VA ECMO, decannulated , IABP removed   Past Medical History Relevant to Rehab: basal cell carcinoma and HLD  Prior to Session Communication: Bedside nurse  Patient Position Received: Up in chair, Alarm off, not on at start of session  Family/Caregiver Present: No  General Comment: Pt pleasant and looking forward to participating in therapy session. Engaged in stand pivot transfer from chair with arms to bed with max A x2, engaged in seated BUE AROM and strengthening. (2L O2 via NC. Epi 0.04)   Precautions:  Medical Precautions: Cardiac precautions, Fall precautions, Chest tube, Oxygen therapy  device and L/min (x2 CT)  Post-Surgical Precautions: Move in the Tube  Precautions Comment: MAP 70-90, VVI@50, SpO2>92  Vitals:  Vital Signs (Past 2hrs)        Date/Time Vitals Session Patient Position Pulse Resp SpO2 BP MAP (mmHg)    11/22/24 1300 --  --  81  22  98 %  122/74  88     11/22/24 1330 --  --  83  24  97 %  129/72  87     11/22/24 1352 Pre OT  Sitting  82  24  98 %  110/50  65     11/22/24 1400 --  --  85  18  100 %  124/69  79     11/22/24 1430 --  --  81  27  95 %  --  --     11/22/24 1439 Post OT  Lying  82  18  96 %  122/56  72     11/22/24 1455 --  --  79  18  100 %  109/58  71                   Lines/Tubes/Drains:  CVC 11/21/24 Triple lumen Internal jugular (Active)   Number of days: 0       Arterial Line 11/16/24 Right Radial (Active)   Number of days: 5       Pacer Wires (Active)   Number of days: 9       Urethral Catheter 16 Fr. (Active)   Number of days: 3       Chest Tube 1 Right (Active)   Number of days: 2       NG/OG/Feeding Tube Right nostril (Active)   Number of days: 2       Y Chest Tube 3 and 4 Left Left (Active)   Number of days: 2       Cognition:  Overall Cognitive Status: Within Functional Limits  Orientation Level: Oriented X4    Pain Assessment:  Pain Assessment  Pain Assessment: 0-10  0-10 (Numeric) Pain Score: 0 - No pain     Objective     Therapy/Activity:      Therapeutic Activity  Therapeutic Activity Performed: Yes  Therapeutic Activity 1: Pt engaged in sit to stand from recliner with max A x2 and knee blocking. Use of draw sheet for initiation, pt able to attain full stand with cues for muscle engagement  Therapeutic Activity 2: Pt engaged in stand pivot from chair to bed with arm in arm max A x2 and initial use of knee blocking  Therapeutic Activity 3: Pt engaged in bilateral AROM of shoulders seated EOB in multiple planes to decrease edema  Therapeutic Activity 4: Pt recieved bilateral scapular mobilization and retraction stretching seated EOB  Therapeutic Activity 5: Pt  engaged in self-care activities of tooth brushing with suction toothbrush with SBA. Engaged in brushing of hair with mod A required for LUE ROM to reach hair, max A to complete task at back of head.  Therapeutic Activity 6: Pt educated on AROM exercises to engage in throughout day for strengthening and to reduce edema in UE. Verbalized and demonstrated understanding  Therapeutic Activity 7: Pt engaged in bed mobility sitting to supine with max A x2       Strength:  Strength  Strength Comments: Pt demo's increased AROM of BUE, continues to be restricted from edema, with LUE AROM<RUE      Outcome Measures:  [facility] Daily Activity  Putting on and taking off regular lower body clothing: A lot  Bathing (including washing, rinsing, drying): A lot  Putting on and taking off regular upper body clothing: A little  Toileting, which includes using toilet, bedpan or urinal: A lot  Taking care of personal grooming such as brushing teeth: A little  Eating Meals: A little  Daily Activity - Total Score: 15     Education Documentation  Body Mechanics, taught by ALESSANDRA Hensley at 11/22/2024  2:48 PM.  Learner: Patient  Readiness: Acceptance  Method: Explanation  Response: Verbalizes Understanding    Precautions, taught by ALESSANDRA Hensley at 11/22/2024  2:48 PM.  Learner: Patient  Readiness: Acceptance  Method: Explanation  Response: Verbalizes Understanding    Home Exercise Program, taught by ALESSANDRA Hensley at 11/22/2024  2:48 PM.  Learner: Patient  Readiness: Acceptance  Method: Explanation  Response: Verbalizes Understanding    ADL Training, taught by ALESSANDRA Hensley at 11/22/2024  2:48 PM.  Learner: Patient  Readiness: Acceptance  Method: Explanation  Response: Verbalizes Understanding    Education Comments  No comments found.        Goals:  Encounter Problems       Encounter Problems (Active)       ADLs       Patient will complete grooming tasks with Sup in order to maximize functional Indep with task  completion.        Start:  11/18/24    Expected End:  12/05/24               ADLs       Pt will demonstrate increased independence by completing LB dressing at EOB with min A       Start:  11/21/24    Expected End:  12/05/24            Pt will demonstrate increased ADL independence by completing toileting routine with min A and use of least restrictive mobility device       Start:  11/21/24    Expected End:  12/05/24               BALANCE       Pt will demonstrate increased balance as evidenced by sustaining dynamic standing while completing self-care tasks with modified independence and use of least restrictive mobility device       Start:  11/21/24    Expected End:  12/05/24               COGNITION/SAFETY       Patient will use ICU/hospital diary with independent level of assistance to allow improved recall of hospital events.       Start:  11/18/24    Expected End:  12/05/24               COGNITION/SAFETY       Pt will demonstrate increased safety awareness by maintaining MITT precautions while completing bed mobility with modified independence        Start:  11/21/24    Expected End:  12/05/24               EXERCISE/STRENGTHENING       Pt will increase endurance to tolerate 15-20min of activity with no more than 1 rest break in order to increase ability to engage in ADL completion.        Start:  11/18/24    Expected End:  12/05/24 11/22/24 at 2:57 PM   DAI CASTELAN, S-OT   [phone]

## 2024-11-22 NOTE — PROGRESS NOTES
2/2 CTICU    Patient at los 12d with review of [facility] clinicals citing that need for ICU loc today is that patient remains on norepi (levo) and epi gtts. Important to dc planning is that per Cardiology's NP note 11/22 @ 0908, Karin[facility] will need to be meds to beds at dc and price checked. At time of this note, PT/OT have worked with patient since being ECMO decannulated 11/19 with chest wahsout and IABP removed with on 11/21. At presents recs are for high intensity indicative of Acute Rehab. Patient from [place] and would have options of following AR - [facility] in [place] ([facility]) or /[facility] acute rehabs in [place], or [facility] in [place]. At present, patient may not have clinical presentation to be accurately assessed for eligibility to AR but expectation to continue to follow for trends in recs and ultimately present dispo to patient and family. At this junction do not foresee that for any dc loc there would be barriers unless processing of precert with [employer] causes delays when patient medically clear.     Dana Christianson (LSW, MSW)

## 2024-11-22 NOTE — SIGNIFICANT EVENT
Palliative care consulted for family support for pt on ECMO. Pt now decannulated and in the process of weaning pressor support. Dispo plan also in process. Per notes, pt is eager for ongoing recovery and pt/family have appropriate supports in place. At this time, Palliative team will sign off, but if pt's course changes or Pall team requested, please feel free to re-consult.  - Ruchi Yao CNP.

## 2024-11-22 NOTE — CARE PLAN
Problem: Skin  Goal: Participates in plan/prevention/treatment measures  Outcome: Progressing  Flowsheets (Taken 11/13/2024 0839 by Thai Tapia RN)  Participates in plan/prevention/treatment measures:   Discuss with provider PT/OT consult   Elevate heels  Goal: Prevent/manage excess moisture  Outcome: Progressing  Flowsheets (Taken 11/21/2024 0642)  Prevent/manage excess moisture:   Cleanse incontinence/protect with barrier cream   Moisturize dry skin   Use wicking fabric (obtain order)   Monitor for/manage infection if present   Follow provider orders for dressing changes  Goal: Prevent/minimize sheer/friction injuries  Outcome: Progressing  Flowsheets (Taken 11/13/2024 0839 by Thai Tapia RN)  Prevent/minimize sheer/friction injuries:   Turn/reposition every 2 hours/use positioning/transfer devices   Use pull sheet   HOB 30 degrees or less  Goal: Promote/optimize nutrition  Outcome: Progressing  Flowsheets (Taken 11/22/2024 0638)  Promote/optimize nutrition:   Assist with feeding   Discuss with provider if NPO > 2 days   Offer water/supplements/favorite foods   Reassess MST if dietician not consulted   Monitor/record intake including meals   Consume > 50% meals/supplements     Problem: Safety - Adult  Goal: Free from fall injury  Outcome: Progressing     Problem: Discharge Planning  Goal: Discharge to home or other facility with appropriate resources  Outcome: Progressing     Problem: Chronic Conditions and Co-morbidities  Goal: Patient's chronic conditions and co-morbidity symptoms are monitored and maintained or improved  Outcome: Progressing     Problem: Safety - Medical Restraint  Goal: Remains free of injury from restraints (Restraint for Interference with Medical Device)  Outcome: Progressing  Goal: Free from restraint(s) (Restraint for Interference with Medical Device)  Outcome: Progressing     Problem: Knowledge Deficit  Goal: Patient/family/caregiver demonstrates understanding of disease process,  treatment plan, medications, and discharge instructions  Outcome: Progressing     Problem: Mechanical Ventilation  Goal: Patient Will Maintain Patent Airway  Outcome: Progressing  Goal: Oral health is maintained or improved  Outcome: Progressing  Goal: Tracheostomy will be managed safely  Outcome: Progressing  Goal: ET tube will be managed safely  Outcome: Progressing  Goal: Ability to express needs and understand communication  Outcome: Progressing  Goal: Mobility/activity is maintained at optimum level for patient  Outcome: Progressing     Problem: Fall/Injury  Goal: Not fall by end of shift  Outcome: Progressing  Goal: Be free from injury by end of the shift  Outcome: Progressing  Goal: Verbalize understanding of personal risk factors for fall in the hospital  Outcome: Progressing  Goal: Verbalize understanding of risk factor reduction measures to prevent injury from fall in the home  Outcome: Progressing  Goal: Use assistive devices by end of the shift  Outcome: Progressing  Goal: Pace activities to prevent fatigue by end of the shift  Outcome: Progressing     Problem: Pain  Goal: Takes deep breaths with improved pain control throughout the shift  Outcome: Progressing  Goal: Turns in bed with improved pain control throughout the shift  Outcome: Progressing  Goal: Walks with improved pain control throughout the shift  Outcome: Progressing  Goal: Performs ADL's with improved pain control throughout shift  Outcome: Progressing  Goal: Participates in PT with improved pain control throughout the shift  Outcome: Progressing  Goal: Free from opioid side effects throughout the shift  Outcome: Progressing  Goal: Free from acute confusion related to pain meds throughout the shift  Outcome: Progressing

## 2024-11-22 NOTE — PROGRESS NOTES
Jaime Dominguez is a 74 y.o. male on day 12 of admission presenting with STEMI (ST elevation myocardial infarction) (Multi).    I have seen the patient either independently or with an associated resident physician or advanced practice provider    Overnight Events: No acute overnight. NE is off, EPI 05    Neuro: AaOx3, QUEEN. Stood at side of bed yesterday, in chair this AM. Pain is managed. Oxy/APAP/Melatonin for sleep  Cardiac: 11/12 MIDCAB c/b anastomotic lesion requiring PCI to LAD, ECMO/IABP, now decannualted and IABP removed. Pt now with EF 30-35% and we are in weaning phase for inotoropes. NE off this AM. Epi 05. Will wean down Epi to 0.03. Trend SVO2s NSR, PO amio. ASA/Brillinta/Statin. Pt goal euvolemia   Pacer: VVI 50  Pulmonary: 4 L NC. CPAP overnight. CXR with loculated effusion in ROBIN. Remove mediastinal chest tubes  Gastrointestinal: Tolerating diet. No BM yet, add milk of mag/suppository  Renal: Pt echo hyperdynamic yesterday. Goal euvolemia.   Endocrine: SSI with good control  Hematology: Stable hgb. No bleeding  Infectious Disease: Pt without any culture growth in context of elevated WBC in 20s. Will dc piptazo when cultures finalize  Musculoskeletal: Edema improving with mobility    Lines/Tubes/Drains: kaitlynn MAHAJAN foley    Mechanical Circulatory Support: None    Prophylaxis: SCDs, SQH  Med to Discontinue: None    Disposition: CTICU while we wean inotropes    ABCDEF Checklist  Analgesia: Spontaneous awakening trial to be pursued if clinically appropriate. RASS goal reviewed  Breathing: Spontaneous breathing trial to be pursued if clinically appropriate. Mechanical power of assisted ventilation reviewed  Choice of analgesia/sedation: Analgesic and sedative agents adjusted per clinical context.   Delirium assessed by CAM, will avoid exacerbating factors  Early mobility and exercise: Physical and occupational therapy engaged  Family: Plan of care, overall trajectory of patient shared with family.  Questions elicited and answered as appropriate.       Due to the high probability of life threatening clinical decompensation, the patient required critical care time evaluating and managing this patient.  Critical care time included obtaining a history, examining the patient, ordering and reviewing studies, discussing, developing, and implementing a management plan, evaluating the patient's response to treatment, and discussion with other care team providers. I saw and evaluated the patient myself.  Critical care time was performed exclusive of billable procedures.    Critical care time: 39            Néstor Maldonado MD

## 2024-11-22 NOTE — PROGRESS NOTES
Subjective Data:  No acute events overnight. He reports no new symptoms, but still has a productive cough. BP has been normotensive to slightly hypertensive, so levophed weaned off this AM.     Objective Data:  Last Recorded Vitals:  Vitals:    11/22/24 1030 11/22/24 1045 11/22/24 1100 11/22/24 1200   BP:    118/64   Pulse: 95 86 85 84   Resp: 19 (!) 29 21 17   Temp:       TempSrc:       SpO2: 98% 92% 96% 98%   Weight:       Height:           Last Labs:  CBC - 11/22/2024: 12:13 AM  22.7 8.2 272    25.2      CMP - 11/22/2024: 12:13 AM  6.9 5.9 180 --- 0.9   3.0 2.9 35 21      PTT - 11/20/2024:  4:52 AM  1.3   14.3 28     TROPHS   Date/Time Value Ref Range Status   11/15/2024 12:28 PM 44,705 0 - 53 ng/L Final     Comment:     Previous result verified on 11/15/2024 1020 on specimen/case [id number] called with component TRPHS for procedure Troponin I, High Sensitivity with value 67,971 ng/L.   11/15/2024 01:42 AM 67,971 0 - 53 ng/L Final   11/14/2024 03:54 AM 73,314 0 - 53 ng/L Final     Comment:     Previous result verified on 11/13/2024 2154 on specimen/case [id number] called with component TRPHS for procedure Troponin I, High Sensitivity with value 45,685 ng/L.   11/08/2024 04:12 PM 4 0 - 20 ng/L Final     BNP   Date/Time Value Ref Range Status   11/10/2024 04:34 PM 13 0 - 99 pg/mL Final     HGBA1C   Date/Time Value Ref Range Status   11/08/2024 04:12 PM 5.1 See comment % Final     LDLCALC   Date/Time Value Ref Range Status   11/08/2024 04:12  <=99 mg/dL Final     Comment:                                 Near   Borderline      AGE      Desirable  Optimal    High     High     Very High     0-19 Y     0 - 109     ---    110-129   >/= 130     ----    20-24 Y     0 - 119     ---    120-159   >/= 160     ----      >24 Y     0 -  99   100-129  130-159   160-189     >/=190       VLDL   Date/Time Value Ref Range Status   11/08/2024 04:12 PM 64 0 - 40 mg/dL Final      Last I/O:  I/O last 3 completed  shifts:  In: 2942.4 (31.3 mL/kg) [P.O.:775; I.V.:1177.4 (12.5 mL/kg); NG/GT:540; IV Piggyback:450]  Out: 4920 (52.3 mL/kg) [Urine:3890 (1.1 mL/kg/hr); Chest Tube:1030]  Weight: 94 kg     Echo:  Transthoracic Echo (TTE) Limited 11/16/2024  CONCLUSIONS:  1. Multiple segmental abnormalities exist. See findings.  2. Left ventricular ejection fraction is moderately decreased, calculated by Lambert's biplane at 34%.  3. Abnormal left venticular wall motion.  4. No left ventricular thrombus visualized.  5. There is reduced right ventricular systolic function.  6. There apperas to be a trivial pericardial effusion, however thre is also a oderate layer of echodense material overlying the RV of unclear etiology. NO obivous RA or RV collapse though not well seen and MV and V inflows not assessed for respiratory variation ( pt in afib so unable to assess given variable RR intervals) and IVC not well seen. Overall unable to determine hemodyanic significance of the material overlyin the RV.  7. Right ventricular systolic pressure is within normal limits.  8. Moderately dilated aortic root.  9. There is LIV of the subvalvular apparatus and MV leaflets of unclear significance. LVOT gradinets not assessed nor was there color Doppler on the LVOT to see if there were acceleration of flow to suggest obstructiom. ( Does not appear to have LIV -septal contact on 2D images).  10. Compared with the prior exam from 11/15/2024, there are no significant changes. The LAD territory wall motion abnormality appears to be similar. LIV has been present on prior studies.     Ejection Fractions:  EF   Date/Time Value Ref Range Status   11/21/2024 03:49 PM 33 %    11/19/2024 01:46 PM 33 %    11/18/2024 08:45 AM 43 %      Inpatient Medications:  Scheduled medications   Medication Dose Route Frequency    acetaminophen  650 mg oral q4h    amiodarone  400 mg oral BID    aspirin  81 mg oral Daily    atorvastatin  80 mg oral Nightly    bisacodyl  10 mg  rectal Once    heparin  5,000 Units subcutaneous q8h    insulin lispro  0-5 Units subcutaneous q4h    melatonin  5 mg nasogastric tube Nightly    piperacillin-tazobactam  4.5 g intravenous q6h    polyethylene glycol  17 g oral BID    sennosides-docusate sodium  2 tablet oral BID    ticagrelor  90 mg oral BID     PRN medications   Medication    benzocaine-menthol    calcium gluconate    calcium gluconate    magnesium sulfate    magnesium sulfate    naloxone    naloxone    ondansetron    Or    ondansetron    oxyCODONE    oxyCODONE    oxygen    oxygen    potassium chloride CR    Or    potassium chloride    potassium chloride CR    Or    potassium chloride     Continuous Medications   Medication Dose Last Rate    EPINEPHrine  0.05 mcg/kg/min (Dosing Weight) 0.05 mcg/kg/min (11/22/24 1000)    norepinephrine  0-1 mcg/kg/min Stopped (11/22/24 1008)     Physical Exam:  GEN: frail appearing male, NAD.  CV: irregularly irregular, no m/r/g.   LUNGS: no respiratory distress on NC.  ABD: Soft, NT/ND.  SKIN: Warm, well perfused. LE edema: mild pitting edema  MSK: No deformities.  EXT: warm and well perfused. S/p ECMO and IABP removal.  NEURO: No focal deficits.     Assessment/Plan   Jaime Dominguez is a 74 year old male with a PMH significant for basal cell carcinoma and HLD who initially presented to [facility] ED on 11/8 complaining of left sided chest pain, diaphoresis, SOB nausea and syncope. ECG showed ST elevation in the inferior leads and ST depression in anterior septal leads. LHC showed 90% occlusion of the LAD. [place] [facility] [place] for cardiac surgery workup.  He is now s/p MidCAB x2 converted to full sternotomy w/ LIMA prolonged as a Y graft with a radial to LAD OM w/ Dr. Aldo Harman and Dr. Stanley. CPB time 347min. Course c/b increasing pressor requirement and echo revealing apical hypo-akinesis with subsequent IABP placement on 11/14 for further support. Overnight on 11/14, Troponin elevated and ST elevation in  anteroseptal leads, c/f ACS and decision made to [facility]. Now s/p PCI of mid LAD with HEATHER. Pt cannulated on VA ECMO for cardiogenic shock state 2/2 multiple failed grafts. HF was engaged for multidisciplinary decision making regarding cardiogenic shock. Mechanical support: s/p VA ECMO - decannulated 11/19, IABP removed 11/20.      Acute HFrEF  ICM  CAD s/p CABG and PCI   Cardiogenic shock s/p VA ECMO and IABP   - TTE 11/16: LVEF 31%, abnormal wall motion; reduced RVSF.   - On epinephrine. Continue to wean as able.  - Diuresis: Continue diuresis per primary team.  - GDMT on hold on pressors.  Once off pressor support, could consider additional afterload reduction.   - Strict I/Os, Daily Na < 2g daily, fluid restriction.  - Could be considered for advanced therapies. Age prohibits from OHT, however, could be considered for LVAD evaluation if necessary though currently improving daily and off MCS.     Hemorrhagic shock, stable  Acute on chronic anemia, stable  Concern for increase pressor requirement in setting of downtrending Hgb 11/18. Now stable s/p washout 11/19.     For any questions or concerns, feel free to reach out via [employer] chat or page at [phone].This plan was discussed with Dr. Mccain, HF attending.     Prasanth Short MD  HF Fellow

## 2024-11-22 NOTE — PROGRESS NOTES
Nutrition Note:   Nutrition Assessment         Nutrition History:  Food and Nutrient History: Pt is now decannulated from ECMO and extubated. Small bore NG with TwoCal HN infusing @ 30mls/hr. PT reports family assisting him with meals, had a few bites of soup & applesauce today. Stated he was uninterested in protein shakes family encouraging him to drink thus far. C/o'ing sore throat at visit - can feel NG tube at back of mouth when swallowing pills. Able to clear throat with sips of water. Encouraged contining tube feeds for time being -- agreeable for at least another day.       I/O:    ; Stool Appearance: Unable to assess (11/22/24 0800)    Dietary Orders (From admission, onward)       Start     Ordered    11/22/24 0817  Enteral feeding WITH diet order Diet type: Cardiac; Fat restriction: 70 gm fat; Sodium restriction: 2 - 3 grams Sodium; Tube feeding formula: TwoCal HN; 30  Continuous        Question Answer Comment   Diet type Cardiac    Fat restriction: 70 gm fat    Sodium restriction: 2 - 3 grams Sodium    Tube feeding formula: TwoCal HN    Tube feeding cyclic rate (mL/hr): 30        11/22/24 0818    11/18/24 1113  Oral nutritional supplements  Until discontinued        Question Answer Comment   Deliver with All meals    Select supplement: Ensure High Protein        11/18/24 1112    11/13/24 0055  May Participate in Room Service  ( ROOM SERVICE MAY PARTICIPATE)  Once        Question:  .  Answer:  Yes    11/13/24 0054 11/13/24 0054  Oral nutritional supplements - Hank  Until discontinued        Question Answer Comment   Deliver with All meals    Select supplement: Hank        11/13/24 0053                     Estimated Needs:   Total Energy Estimated Needs (kCal):  ~6140-3350  Method for Estimating Needs:  REE/MSJ = 1733 (using 94 kgs)  Total Protein Estimated Needs (g): 105 g  Method for Estimating Needs: ~1.3 gm/kg ideal BW  Total Fluid Estimated Needs (mL):  (per team)                 Nutrition  Interventions/Recommendations         Nutrition Prescription:  Individualized Nutrition Prescription Provided for : transition to diet        Nutrition Interventions:     1) monitor pt's diet tolerence, need for formal swallow evaluation.  Ideal for pt to be consuming at least ~50% est needs met via PO diet before stopping TF's    2) trial of Boost VHC two times per day - only comes in vanilla (530 kcals & 22gm pro each) or Ensure Plus TID   2 cartons Bosot VHC or 3 cartons Ensure Plus will equal ~50% est needs/day             Nutrition Monitoring and Evaluation   Food/Nutrient Related History Monitoring  Monitoring and Evaluation Plan: Energy intake  Energy Intake: Estimated energy intake  Criteria: >50% est needs met via PO + ONS before weaning from tube feeds                        Time Spent (min): 20 minutes

## 2024-11-22 NOTE — PROGRESS NOTES
Subjective Data:  Patient seen and assessed this morning, sitting up in bed, NAD. Remains on norepinephrine and epinephrine gtts. Remains alert and oriented, motivated to continue recovery. Continues to endorse chest tenderness at sternal wound site, exacerbated with coughing.     Objective Data:  Last Recorded Vitals:  Vitals:    11/22/24 0600 11/22/24 0700 11/22/24 0800 11/22/24 0900   BP:       Pulse: 81 83 81 84   Resp: 20 22 (!) 9 18   Temp:   36.5 °C (97.7 °F)    TempSrc:   Temporal    SpO2: 100% 95% 99% 100%   Weight:       Height:           Last Labs:  Results for orders placed or performed during the hospital encounter of 11/10/24 (from the past 24 hours)   BLOOD GAS MIXED VENOUS FULL PANEL   Result Value Ref Range    POCT pH, Mixed 7.49 (H) 7.33 - 7.43 pH    POCT pCO2, Mixed 38 (L) 41 - 51 mm Hg    POCT pO2, Mixed 29 (L) 35 - 45 mm Hg    POCT SO2, Mixed 47 45 - 75 %    POCT Oxy Hemoglobin, Mixed 46.3 45.0 - 75.0 %    POCT Hematocrit Calculated, Mixed 27.0 (L) 41.0 - 52.0 %    POCT Sodium, Mixed 132 (L) 136 - 145 mmol/L    POCT Potassium, Mixed 3.7 3.5 - 5.3 mmol/L    POCT Chloride, Mixed 101 98 - 107 mmol/L    POCT Ionized Calcium, Mixed 1.06 (L) 1.10 - 1.33 mmol/L    POCT Glucose, Mixed 154 (H) 74 - 99 mg/dL    POCT Lactate, Mixed 1.1 0.4 - 2.0 mmol/L    POCT Base Excess, Mixed 5.3 (H) -2.0 - 3.0 mmol/L    POCT HCO3 Calculated, Mixed 29.0 (H) 22.0 - 26.0 mmol/L    POCT Hemoglobin, Mixed 9.1 (L) 13.5 - 17.5 g/dL    POCT Anion Gap, Mixed 6 (L) 10 - 25 mmo/L    Patient Temperature 37.0 degrees Celsius    FiO2 35 %   Blood Gas Arterial Full Panel   Result Value Ref Range    POCT pH, Arterial 7.58 (H) 7.38 - 7.42 pH    POCT pCO2, Arterial 28 (L) 38 - 42 mm Hg    POCT pO2, Arterial 79 (L) 85 - 95 mm Hg    POCT SO2, Arterial 98 94 - 100 %    POCT Oxy Hemoglobin, Arterial 95.0 94.0 - 98.0 %    POCT Hematocrit Calculated, Arterial 28.0 (L) 41.0 - 52.0 %    POCT Sodium, Arterial 132 (L) 136 - 145 mmol/L    POCT  Potassium, Arterial 3.6 3.5 - 5.3 mmol/L    POCT Chloride, Arterial 100 98 - 107 mmol/L    POCT Ionized Calcium, Arterial 1.01 (L) 1.10 - 1.33 mmol/L    POCT Glucose, Arterial 153 (H) 74 - 99 mg/dL    POCT Lactate, Arterial 1.2 0.4 - 2.0 mmol/L    POCT Base Excess, Arterial 4.6 (H) -2.0 - 3.0 mmol/L    POCT HCO3 Calculated, Arterial 26.3 (H) 22.0 - 26.0 mmol/L    POCT Hemoglobin, Arterial 9.4 (L) 13.5 - 17.5 g/dL    POCT Anion Gap, Arterial 9 (L) 10 - 25 mmo/L    Patient Temperature 37.0 degrees Celsius    FiO2 35 %   POCT GLUCOSE   Result Value Ref Range    POCT Glucose 154 (H) 74 - 99 mg/dL   BLOOD GAS MIXED VENOUS FULL PANEL   Result Value Ref Range    POCT pH, Mixed 7.47 (H) 7.33 - 7.43 pH    POCT pCO2, Mixed 39 (L) 41 - 51 mm Hg    POCT pO2, Mixed 32 (L) 35 - 45 mm Hg    POCT SO2, Mixed 52 45 - 75 %    POCT Oxy Hemoglobin, Mixed 50.7 45.0 - 75.0 %    POCT Hematocrit Calculated, Mixed 27.0 (L) 41.0 - 52.0 %    POCT Sodium, Mixed 132 (L) 136 - 145 mmol/L    POCT Potassium, Mixed 3.5 3.5 - 5.3 mmol/L    POCT Chloride, Mixed 100 98 - 107 mmol/L    POCT Ionized Calcium, Mixed 1.03 (L) 1.10 - 1.33 mmol/L    POCT Glucose, Mixed 146 (H) 74 - 99 mg/dL    POCT Lactate, Mixed 1.1 0.4 - 2.0 mmol/L    POCT Base Excess, Mixed 4.4 (H) -2.0 - 3.0 mmol/L    POCT HCO3 Calculated, Mixed 28.4 (H) 22.0 - 26.0 mmol/L    POCT Hemoglobin, Mixed 8.9 (L) 13.5 - 17.5 g/dL    POCT Anion Gap, Mixed 7 (L) 10 - 25 mmo/L    Patient Temperature 37.0 degrees Celsius    FiO2 35 %   Transthoracic Echo (TTE) Limited   Result Value Ref Range    LV EF 33 %   BLOOD GAS MIXED VENOUS FULL PANEL   Result Value Ref Range    POCT pH, Mixed 7.46 (H) 7.33 - 7.43 pH    POCT pCO2, Mixed 38 (L) 41 - 51 mm Hg    POCT pO2, Mixed 33 (L) 35 - 45 mm Hg    POCT SO2, Mixed 58 45 - 75 %    POCT Oxy Hemoglobin, Mixed 56.8 45.0 - 75.0 %    POCT Hematocrit Calculated, Mixed 23.0 (L) 41.0 - 52.0 %    POCT Sodium, Mixed 133 (L) 136 - 145 mmol/L    POCT Potassium, Mixed 3.4  (L) 3.5 - 5.3 mmol/L    POCT Chloride, Mixed 102 98 - 107 mmol/L    POCT Ionized Calcium, Mixed 1.01 (L) 1.10 - 1.33 mmol/L    POCT Glucose, Mixed 136 (H) 74 - 99 mg/dL    POCT Lactate, Mixed 1.1 0.4 - 2.0 mmol/L    POCT Base Excess, Mixed 3.0 -2.0 - 3.0 mmol/L    POCT HCO3 Calculated, Mixed 27.0 (H) 22.0 - 26.0 mmol/L    POCT Hemoglobin, Mixed 7.6 (L) 13.5 - 17.5 g/dL    POCT Anion Gap, Mixed 7 (L) 10 - 25 mmo/L    Patient Temperature 37.0 degrees Celsius    FiO2 30 %   POCT GLUCOSE   Result Value Ref Range    POCT Glucose 135 (H) 74 - 99 mg/dL   POCT GLUCOSE   Result Value Ref Range    POCT Glucose 132 (H) 74 - 99 mg/dL   Calcium, Ionized   Result Value Ref Range    POCT Calcium, Ionized 1.00 (L) 1.1 - 1.33 mmol/L   CBC   Result Value Ref Range    WBC 22.7 (H) 4.4 - 11.3 x10*3/uL    nRBC 0.8 (H) 0.0 - 0.0 /100 WBCs    RBC 2.79 (L) 4.50 - 5.90 x10*6/uL    Hemoglobin 8.2 (L) 13.5 - 17.5 g/dL    Hematocrit 25.2 (L) 41.0 - 52.0 %    MCV 90 80 - 100 fL    MCH 29.4 26.0 - 34.0 pg    MCHC 32.5 32.0 - 36.0 g/dL    RDW 16.0 (H) 11.5 - 14.5 %    Platelets 272 150 - 450 x10*3/uL   Magnesium   Result Value Ref Range    Magnesium 1.96 1.60 - 2.40 mg/dL   Renal Function Panel   Result Value Ref Range    Glucose 125 (H) 74 - 99 mg/dL    Sodium 135 (L) 136 - 145 mmol/L    Potassium 3.7 3.5 - 5.3 mmol/L    Chloride 98 98 - 107 mmol/L    Bicarbonate 28 21 - 32 mmol/L    Anion Gap 13 10 - 20 mmol/L    Urea Nitrogen 18 6 - 23 mg/dL    Creatinine 0.71 0.50 - 1.30 mg/dL    eGFR >90 >60 mL/min/1.73m*2    Calcium 6.9 (L) 8.6 - 10.6 mg/dL    Phosphorus 3.0 2.5 - 4.9 mg/dL    Albumin 2.9 (L) 3.4 - 5.0 g/dL   POCT GLUCOSE   Result Value Ref Range    POCT Glucose 140 (H) 74 - 99 mg/dL   POCT GLUCOSE   Result Value Ref Range    POCT Glucose 126 (H) 74 - 99 mg/dL   BLOOD GAS VENOUS FULL PANEL   Result Value Ref Range    POCT pH, Venous 7.45 (H) 7.33 - 7.43 pH    POCT pCO2, Venous 41 41 - 51 mm Hg    POCT pO2, Venous 32 (L) 35 - 45 mm Hg     POCT SO2, Venous 54 45 - 75 %    POCT Oxy Hemoglobin, Venous 52.1 45.0 - 75.0 %    POCT Hematocrit Calculated, Venous 27.0 (L) 41.0 - 52.0 %    POCT Sodium, Venous 134 (L) 136 - 145 mmol/L    POCT Potassium, Venous 3.9 3.5 - 5.3 mmol/L    POCT Chloride, Venous 100 98 - 107 mmol/L    POCT Ionized Calicum, Venous 1.12 1.10 - 1.33 mmol/L    POCT Glucose, Venous 141 (H) 74 - 99 mg/dL    POCT Lactate, Venous 1.2 0.4 - 2.0 mmol/L    POCT Base Excess, Venous 4.1 (H) -2.0 - 3.0 mmol/L    POCT HCO3 Calculated, Venous 28.5 (H) 22.0 - 26.0 mmol/L    POCT Hemoglobin, Venous 8.9 (L) 13.5 - 17.5 g/dL    POCT Anion Gap, Venous 9.0 (L) 10.0 - 25.0 mmol/L    Patient Temperature 37.0 degrees Celsius    FiO2 28 %   POCT GLUCOSE   Result Value Ref Range    POCT Glucose 129 (H) 74 - 99 mg/dL        TROPHS   Date/Time Value Ref Range Status   11/15/2024 12:28 PM 44,705 0 - 53 ng/L Final     Comment:     Previous result verified on 11/15/2024 1020 on specimen/case [id number] called with component TRPHS for procedure Troponin I, High Sensitivity with value 67,971 ng/L.   11/15/2024 01:42 AM 67,971 0 - 53 ng/L Final   11/14/2024 03:54 AM 73,314 0 - 53 ng/L Final     Comment:     Previous result verified on 11/13/2024 2154 on specimen/case [id number] called with component TRPHS for procedure Troponin I, High Sensitivity with value 45,685 ng/L.   11/08/2024 04:12 PM 4 0 - 20 ng/L Final     BNP   Date/Time Value Ref Range Status   11/10/2024 04:34 PM 13 0 - 99 pg/mL Final     HGBA1C   Date/Time Value Ref Range Status   11/08/2024 04:12 PM 5.1 See comment % Final     LDLCALC   Date/Time Value Ref Range Status   11/08/2024 04:12  <=99 mg/dL Final     Comment:                                 Near   Borderline      AGE      Desirable  Optimal    High     High     Very High     0-19 Y     0 - 109     ---    110-129   >/= 130     ----    20-24 Y     0 - 119     ---    120-159   >/= 160     ----      >24 Y     0 -  99   100-129   130-159   160-189     >/=190       VLDL   Date/Time Value Ref Range Status   11/08/2024 04:12 PM 64 0 - 40 mg/dL Final      Last I/O:  I/O last 3 completed shifts:  In: 2942.4 (31.3 mL/kg) [P.O.:775; I.V.:1177.4 (12.5 mL/kg); NG/GT:540; IV Piggyback:450]  Out: 4920 (52.3 mL/kg) [Urine:3890 (1.1 mL/kg/hr); Chest Tube:1030]  Weight: 94 kg     Inpatient Medications:  Scheduled medications   Medication Dose Route Frequency    acetaminophen  650 mg oral q4h    amiodarone  400 mg oral BID    aspirin  81 mg oral Daily    atorvastatin  80 mg oral Nightly    heparin  5,000 Units subcutaneous q8h    insulin lispro  0-5 Units subcutaneous q4h    magnesium hydroxide  30 mL oral Once    melatonin  5 mg nasogastric tube Nightly    piperacillin-tazobactam  4.5 g intravenous q6h    polyethylene glycol  17 g oral BID    sennosides-docusate sodium  2 tablet oral BID    ticagrelor  90 mg oral BID     PRN medications   Medication    benzocaine-menthol    calcium gluconate    calcium gluconate    magnesium sulfate    magnesium sulfate    naloxone    naloxone    ondansetron    Or    ondansetron    oxyCODONE    oxyCODONE    oxygen    oxygen    potassium chloride CR    Or    potassium chloride    potassium chloride CR    Or    potassium chloride     Continuous Medications   Medication Dose Last Rate    EPINEPHrine  0.05 mcg/kg/min (Dosing Weight) 0.05 mcg/kg/min (11/22/24 0631)    norepinephrine  0-1 mcg/kg/min Stopped (11/22/24 0900)       Physical Exam:  General: sitting up in bed, NAD  Skin: warm and dry   Cardiac: S1S2  Pulm: diminished anterior, CTs in place  GI: soft, nontender  Extremities: bilat groin sites with drsg intact, no oozing no hematoma, +DP pulses bilaterally   Neuro: alert, appropriate      Assessment/Plan   Jaime Dominguez is a 74 year old male with a PMH significant for basal cell carcinoma and HPL s/p MidCAB x2 converted to full sternotomy w/ LIMA prolonged as a Y graft with a radial to LAD OM w/ Dr. Aldo Harman and   Lizeth. CPB time 347min. Course c/b increasing pressor requirement and echo revealing apical hypo-akinesis with subsequent IABP placement on 11/14 for further support. Overnight on 11/14, Troponin elevated and ST elevation in anteroseptal leads, c/f ACS and decision made to [facility]. Now s/p PCI of mid LAD with HEATHER. Pt cannulated on VA ECMO for cardiogenic shock state 2/2 multiple failed grafts.     11/14  Right Radial 6 Fr -- TR Band  Left Femoral Artery ECMO Cannula  Left Femoral Vein ECMO Cannula     Patient is s/p CABG on 11/13/2024 arrived in fulminant cardiogenic shock despite IABP placement and 3 pressors.   A right femoral diagnostic cath was done with the following findings:     LM: distal 30-40%  LAD: mid 100% stenosis at anastomosis site   LCX: competitive flow in the OM without significant stenosis  RCA: patent  LIMA to LAD: Y graft with anastomosis to the LAD which is not patent and anastomosis to the OM which appears patent     Due to patient's profound shock, ECMO cannulation was done with the aforementioned access sites and the plan was made jointly with cardiac surgery to pursue salvage PCI of the mid LAD. He was given ASA and loaded with Ticagrelor. Patient is now s/p salvage PCI of the mid LAD with a [place] 3.0 x 38 HEATHER. Following PCI and ECMO cannulation, patient's pressors were able to be weaned substantially and he left the cath lab (without intubation) on ECMO and IABP support.     ASA/Ticagrelor loading in the lab    Overnight bleeding from ECMO site requiring multiple units pRBCs, repositioned and sutured by Cardiac Surgery    11/15 IABP 1:1, left fem ECMO, on epi and levo (vaso currently weaned off), amio started for afib, pending TTE     11/19  Plan for chest washout due to concern for left hemothorax at the time of ECMO decannulation today. Remains on levo and epi, PRBCs transfusion.     11/20  Weaning sedation and IABP, currently 1:2; remains on epi and levo    11/21  IABP removed,  extubated. On levo and epi gtts.     11/20  Remains on levo and epi gtts. Plan to remove MS chest tube. Passed BS swallow, encourage PO intake    Interventional Recs:   - cont telemetry care per PCI protocol  - cont DAPT with Brilinta (6 months) and aspirin (lifelong)  - provided education on compliance with DAPT  - at discharge, PLEASE send 90 day prescription of Brilinta to [facility] (for price check and Meds to Beds) and remaining refills to patient's home pharmacy   - continue high intensity statin  - no BB with pressors  - please ensure cardiology follow up appointment after discharge in 1-3 weeks  - patient referred for cardiac rehab eval  - 5lb weight restriction for 5 days for right radial access  - no lifting anything heavier than 10 lbs for one week for groin access   - appreciate excellent CTICU care      Interventional Cardiology will continue to follow.     CICU Fellow Pager: [phone] anytime  Endovascular /Limb Salvage Team Pager: [phone] for day coverage (8am-5pm)  Interventional Cardiology Fellow Pager: [phone] (7AM-5PM) Night coverage: [facility] Cross Cover [phone]  Night coverage: [facility] [phone]     I spent 30 minutes in the professional and overall care of this patient.        Lesvia Kowalski, APRN-CNP

## 2024-11-22 NOTE — PROGRESS NOTES
Physical Therapy    Physical Therapy Treatment    Patient Name: Jaime Dominguez  MRN: [id number]  Department: [facility] CTU  Room: 05/05-A  Today's Date: 11/22/2024  Time Calculation  Start Time: 1006  Stop Time: 1044  Time Calculation (min): 38 min         Assessment/Plan   PT Assessment  PT Assessment Results: Decreased strength, Decreased endurance, Impaired balance, Decreased mobility, Decreased coordination  Rehab Prognosis: Excellent  Barriers to Discharge: Medical acuity  Evaluation/Treatment Tolerance: Patient tolerated treatment well  Medical Staff Made Aware: Yes  End of Session Communication: Bedside nurse  Assessment Comment: Pt performed bed mobility with Max A x 2, sit<>stand transfer with Max A x 2, and lateral scoot to recliner chair with Max A x 2 + use of TAPS pad. Pt will continue to benefit from skilled PT to improve functional mobility.  End of Session Patient Position: Up in chair, Alarm off, not on at start of session  PT Plan  Inpatient/Swing Bed or Outpatient: Inpatient  PT Plan  Treatment/Interventions: Bed mobility, Transfer training, Gait training, Stair training, Balance training, Strengthening, Endurance training, Therapeutic exercise, Therapeutic activity, Positioning  PT Plan: Ongoing PT  PT Frequency: 5 times per week  PT Discharge Recommendations: High intensity level of continued care  Equipment Recommended upon Discharge: Wheeled walker  PT Recommended Transfer Status: Assist x2  PT - OK to Discharge: Yes      General Visit Information:   PT  Visit  PT Received On: 11/22/24  Response to Previous Treatment: Patient with no complaints from previous session.  General  Prior to Session Communication: Bedside nurse  Patient Position Received: Bed, 3 rail up, Alarm off, not on at start of session  Preferred Learning Style: auditory, verbal, visual  General Comment: Pt awake and willing to participate in PT treatment session; assistance received from therapy aide. (Lines: A line, tele, swan,  chest tubes x 2, aden, IV, 2L O2 NC)    Subjective   Precautions:  Precautions  Hearing/Visual Limitations: WFL  Medical Precautions: Cardiac precautions, Fall precautions, Chest tube  Post-Surgical Precautions: Move in the Tube  Precautions Comment: MAP 70-90, VVI@50, SpO2>92    Vital Signs       11/22/24 1006   Vital Signs   Vitals Session Pre PT   Heart Rate 84   Heart Rate Source Monitor   SpO2 100 %   /56   MAP (mmHg) 74   BP Method Arterial line   Patient Position Lying      11/22/24 1044   Vital Signs   Vitals Session Post PT   Heart Rate 88   Heart Rate Source Monitor   SpO2 93 %   BP (!) 164/157  (poor read at end of session)   BP Method Arterial line   Patient Position Sitting         Objective   Pain:  Pain Assessment  Pain Assessment: 0-10  0-10 (Numeric) Pain Score: 0 - No pain  Cognition:  Cognition  Overall Cognitive Status: Within Functional Limits  Arousal/Alertness: Appropriate responses to stimuli  Orientation Level: Oriented X4  Following Commands: Follows all commands and directions without difficulty  Coordination:  Movements are Fluid and Coordinated: Yes  Postural Control:  Static Sitting Balance  Static Sitting-Balance Support: Feet supported, Bilateral upper extremity supported  Static Sitting-Level of Assistance: Maximum assistance, Minimum assistance  Static Sitting-Comment/Number of Minutes: Max A initially progressed to Min A in seated  Static Standing Balance  Static Standing-Balance Support: Bilateral upper extremity supported  Static Standing-Level of Assistance: Maximum assistance (x2)    Activity Tolerance:  Activity Tolerance  Endurance: Tolerates 30 min exercise with multiple rests  Early Mobility/Exercise Safety Screen: Proceed with mobilization - No exclusion criteria met  Activity Tolerance Comments: Vitals stable throughout session  Treatments:  Therapeutic Exercise  Therapeutic Exercise Performed: Yes  Therapeutic Exercise Activity 1: x10 LAQ bilat LE    Therapeutic  Activity  Therapeutic Activity Performed: Yes  Therapeutic Activity 1: Increased time for advanced ICU line management required for functional mobility  Therapeutic Activity 2: Pt sat EOB for 10 minutes with Max A initially progressed to Min A for balance  Therapeutic Activity 3: Pt stood for 60s with Max A x 2 + bilat knee block; bilat knee buckling noted. Pt attempted to take side step to the L; achieved slight movement of L foot however limited by LE weakness  Therapeutic Activity 4: Increased time for positioning in recliner chair at end of session    Bed Mobility  Bed Mobility: Yes  Bed Mobility 1  Bed Mobility 1: Supine to sitting  Level of Assistance 1: Maximum assistance, +2  Bed Mobility Comments 1: Max A x 2 for LE management and trunk elevation to seated    Transfers  Transfer: Yes  Transfer 1  Transfer From 1: Sit to, Stand to  Transfer to 1: Stand, Sit  Technique 1: Sit to stand, Stand to sit  Transfer Level of Assistance 1: Arm in arm assistance, Maximum assistance, +2  Trials/Comments 1: Max A x 2 for hip elevation to standing and bilat knee block  Transfers 2  Transfer From 2: Bed to  Transfer to 2: Chair with drop arm  Technique 2: Lateral, To left  Transfer Level of Assistance 2: Arm in arm assistance, Maximum assistance  Trials/Comments 2: Max A x 2 for lateral scoot to the L; use of TAPS pad    Outcome Measures:  [facility] Basic Mobility  Turning from your back to your side while in a flat bed without using bedrails: A lot  Moving from lying on your back to sitting on the side of a flat bed without using bedrails: Total  Moving to and from bed to chair (including a wheelchair): A lot  Standing up from a chair using your arms (e.g. wheelchair or bedside chair): A lot  To walk in hospital room: Total  Climbing 3-5 steps with railing: Total  Basic Mobility - Total Score: 9    FSS-ICU  Ambulation: Unable to attempt due to weakness  Rolling: Maximal assistance (performs 25% - 49% of task)  Sitting:  Minimal assistance (performs 75% or more of task)  Transfer Sit-to-Stand: Maximal assistance (performs 25% - 49% of task)  Transfer Supine-to-Sit: Maximal assistance (performs 25% - 49% of task)  Total Score: 10      Early Mobility/Exercise Safety Screen: Proceed with mobilization - No exclusion criteria met  ICU Mobility Scale: Transferring bed to chair [5]    Education Documentation  Precautions, taught by Jennifer Knight PT at 11/22/2024 12:41 PM.  Learner: Patient  Readiness: Acceptance  Method: Explanation  Response: Verbalizes Understanding    Body Mechanics, taught by Jennifer Knight PT at 11/22/2024 12:41 PM.  Learner: Patient  Readiness: Acceptance  Method: Explanation  Response: Verbalizes Understanding    Mobility Training, taught by Jennifer Knight PT at 11/22/2024 12:41 PM.  Learner: Patient  Readiness: Acceptance  Method: Explanation  Response: Verbalizes Understanding    Education Comments  No comments found.           Encounter Problems       Encounter Problems (Active)       Balance       Pt will demonstrate improved sitting/standing static/dynamic balance activities without LOB via score of 24/28 on the [place] for increase in safety prior to DC.  (Progressing)       Start:  11/21/24    Expected End:  12/05/24               Mobility       Pt will ambulate >15ft with appropriate form, Mod A or less, LRAD, and no LOB for safe DC.  (Progressing)       Start:  11/21/24    Expected End:  12/05/24            Pt will tolerate >15 minutes of upright standing activity without seated rest breaks with no changes in vital signs for improved functional mobility.  (Progressing)       Start:  11/21/24    Expected End:  12/05/24               PT Transfers       Pt will perform bed mobility with Mod A or less and use of LRAD to safely DC.  (Progressing)       Start:  11/21/24    Expected End:  12/05/24            Pt will perform sit<>stand transfers with Mod A or less and use of LRAD to safely DC.  (Progressing)        Start:  11/21/24    Expected End:  12/05/24                 YESENIA DOWNS, PT

## 2024-11-23 ENCOUNTER — APPOINTMENT (OUTPATIENT)
Dept: RADIOLOGY | Facility: HOSPITAL | Age: 74
DRG: 003 | End: 2024-11-23
Payer: MEDICARE

## 2024-11-23 LAB
ALBUMIN SERPL BCP-MCNC: 2.9 G/DL (ref 3.4–5)
ALBUMIN SERPL BCP-MCNC: 3 G/DL (ref 3.4–5)
ANION GAP BLDA CALCULATED.4IONS-SCNC: 8 MMO/L (ref 10–25)
ANION GAP BLDMV CALCULATED.4IONS-SCNC: 7 MMO/L (ref 10–25)
ANION GAP BLDMV CALCULATED.4IONS-SCNC: 9 MMO/L (ref 10–25)
ANION GAP SERPL CALC-SCNC: 12 MMOL/L (ref 10–20)
ANION GAP SERPL CALC-SCNC: 12 MMOL/L (ref 10–20)
BACTERIA BLD CULT: NORMAL
BACTERIA BLD CULT: NORMAL
BASE EXCESS BLDA CALC-SCNC: 3.9 MMOL/L (ref -2–3)
BASE EXCESS BLDMV CALC-SCNC: 3.3 MMOL/L (ref -2–3)
BASE EXCESS BLDMV CALC-SCNC: 4.2 MMOL/L (ref -2–3)
BODY TEMPERATURE: 37 DEGREES CELSIUS
BUN SERPL-MCNC: 18 MG/DL (ref 6–23)
BUN SERPL-MCNC: 23 MG/DL (ref 6–23)
CA-I BLD-SCNC: 1.01 MMOL/L (ref 1.1–1.33)
CA-I BLD-SCNC: 1.02 MMOL/L (ref 1.1–1.33)
CA-I BLDA-SCNC: 1.02 MMOL/L (ref 1.1–1.33)
CA-I BLDMV-SCNC: 1.12 MMOL/L (ref 1.1–1.33)
CA-I BLDMV-SCNC: 1.12 MMOL/L (ref 1.1–1.33)
CALCIUM SERPL-MCNC: 7.4 MG/DL (ref 8.6–10.6)
CALCIUM SERPL-MCNC: 7.6 MG/DL (ref 8.6–10.6)
CHLORIDE BLD-SCNC: 101 MMOL/L (ref 98–107)
CHLORIDE BLD-SCNC: 102 MMOL/L (ref 98–107)
CHLORIDE BLDA-SCNC: 100 MMOL/L (ref 98–107)
CHLORIDE SERPL-SCNC: 101 MMOL/L (ref 98–107)
CHLORIDE SERPL-SCNC: 98 MMOL/L (ref 98–107)
CO2 SERPL-SCNC: 26 MMOL/L (ref 21–32)
CO2 SERPL-SCNC: 27 MMOL/L (ref 21–32)
CREAT SERPL-MCNC: 0.72 MG/DL (ref 0.5–1.3)
CREAT SERPL-MCNC: 0.79 MG/DL (ref 0.5–1.3)
EGFRCR SERPLBLD CKD-EPI 2021: >90 ML/MIN/1.73M*2
EGFRCR SERPLBLD CKD-EPI 2021: >90 ML/MIN/1.73M*2
ERYTHROCYTE [DISTWIDTH] IN BLOOD BY AUTOMATED COUNT: 16.1 % (ref 11.5–14.5)
ERYTHROCYTE [DISTWIDTH] IN BLOOD BY AUTOMATED COUNT: 16.1 % (ref 11.5–14.5)
GLUCOSE BLD MANUAL STRIP-MCNC: 103 MG/DL (ref 74–99)
GLUCOSE BLD MANUAL STRIP-MCNC: 106 MG/DL (ref 74–99)
GLUCOSE BLD MANUAL STRIP-MCNC: 117 MG/DL (ref 74–99)
GLUCOSE BLD MANUAL STRIP-MCNC: 92 MG/DL (ref 74–99)
GLUCOSE BLD MANUAL STRIP-MCNC: 98 MG/DL (ref 74–99)
GLUCOSE BLD MANUAL STRIP-MCNC: 99 MG/DL (ref 74–99)
GLUCOSE BLD-MCNC: 134 MG/DL (ref 74–99)
GLUCOSE BLD-MCNC: 98 MG/DL (ref 74–99)
GLUCOSE BLDA-MCNC: 129 MG/DL (ref 74–99)
GLUCOSE SERPL-MCNC: 100 MG/DL (ref 74–99)
GLUCOSE SERPL-MCNC: 114 MG/DL (ref 74–99)
HCO3 BLDA-SCNC: 25.9 MMOL/L (ref 22–26)
HCO3 BLDMV-SCNC: 27.9 MMOL/L (ref 22–26)
HCO3 BLDMV-SCNC: 28.4 MMOL/L (ref 22–26)
HCT VFR BLD AUTO: 25.2 % (ref 41–52)
HCT VFR BLD AUTO: 27.2 % (ref 41–52)
HCT VFR BLD EST: 26 % (ref 41–52)
HCT VFR BLD EST: 26 % (ref 41–52)
HCT VFR BLD EST: 46 % (ref 41–52)
HGB BLD-MCNC: 8.2 G/DL (ref 13.5–17.5)
HGB BLD-MCNC: 8.7 G/DL (ref 13.5–17.5)
HGB BLDA-MCNC: 15.4 G/DL (ref 13.5–17.5)
HGB BLDMV-MCNC: 8.5 G/DL (ref 13.5–17.5)
HGB BLDMV-MCNC: 8.6 G/DL (ref 13.5–17.5)
INHALED O2 CONCENTRATION: 30 %
LACTATE BLDA-SCNC: 1.1 MMOL/L (ref 0.4–2)
LACTATE BLDMV-SCNC: 0.8 MMOL/L (ref 0.4–2)
LACTATE BLDMV-SCNC: 1.3 MMOL/L (ref 0.4–2)
MAGNESIUM SERPL-MCNC: 2 MG/DL (ref 1.6–2.4)
MAGNESIUM SERPL-MCNC: 2 MG/DL (ref 1.6–2.4)
MCH RBC QN AUTO: 29.3 PG (ref 26–34)
MCH RBC QN AUTO: 29.7 PG (ref 26–34)
MCHC RBC AUTO-ENTMCNC: 32 G/DL (ref 32–36)
MCHC RBC AUTO-ENTMCNC: 32.5 G/DL (ref 32–36)
MCV RBC AUTO: 91 FL (ref 80–100)
MCV RBC AUTO: 92 FL (ref 80–100)
NRBC BLD-RTO: 0.2 /100 WBCS (ref 0–0)
NRBC BLD-RTO: 0.4 /100 WBCS (ref 0–0)
OXYHGB MFR BLDA: 94.5 % (ref 94–98)
OXYHGB MFR BLDMV: 55.1 % (ref 45–75)
OXYHGB MFR BLDMV: 62.1 % (ref 45–75)
PCO2 BLDA: 31 MM HG (ref 38–42)
PCO2 BLDMV: 40 MM HG (ref 41–51)
PCO2 BLDMV: 42 MM HG (ref 41–51)
PH BLDA: 7.53 PH (ref 7.38–7.42)
PH BLDMV: 7.43 PH (ref 7.33–7.43)
PH BLDMV: 7.46 PH (ref 7.33–7.43)
PHOSPHATE SERPL-MCNC: 2.3 MG/DL (ref 2.5–4.9)
PHOSPHATE SERPL-MCNC: 2.8 MG/DL (ref 2.5–4.9)
PLATELET # BLD AUTO: 411 X10*3/UL (ref 150–450)
PLATELET # BLD AUTO: 453 X10*3/UL (ref 150–450)
PO2 BLDA: 80 MM HG (ref 85–95)
PO2 BLDMV: 31 MM HG (ref 35–45)
PO2 BLDMV: 38 MM HG (ref 35–45)
POTASSIUM BLDA-SCNC: 3.8 MMOL/L (ref 3.5–5.3)
POTASSIUM BLDMV-SCNC: 4 MMOL/L (ref 3.5–5.3)
POTASSIUM BLDMV-SCNC: 4.1 MMOL/L (ref 3.5–5.3)
POTASSIUM SERPL-SCNC: 3.7 MMOL/L (ref 3.5–5.3)
POTASSIUM SERPL-SCNC: 4 MMOL/L (ref 3.5–5.3)
RBC # BLD AUTO: 2.76 X10*6/UL (ref 4.5–5.9)
RBC # BLD AUTO: 2.97 X10*6/UL (ref 4.5–5.9)
SAO2 % BLDA: 97 % (ref 94–100)
SAO2 % BLDMV: 57 % (ref 45–75)
SAO2 % BLDMV: 64 % (ref 45–75)
SODIUM BLDA-SCNC: 130 MMOL/L (ref 136–145)
SODIUM BLDMV-SCNC: 133 MMOL/L (ref 136–145)
SODIUM BLDMV-SCNC: 134 MMOL/L (ref 136–145)
SODIUM SERPL-SCNC: 132 MMOL/L (ref 136–145)
SODIUM SERPL-SCNC: 136 MMOL/L (ref 136–145)
WBC # BLD AUTO: 22.3 X10*3/UL (ref 4.4–11.3)
WBC # BLD AUTO: 24.1 X10*3/UL (ref 4.4–11.3)

## 2024-11-23 PROCEDURE — 2020000001 HC ICU ROOM DAILY

## 2024-11-23 PROCEDURE — 82947 ASSAY GLUCOSE BLOOD QUANT: CPT

## 2024-11-23 PROCEDURE — 2500000004 HC RX 250 GENERAL PHARMACY W/ HCPCS (ALT 636 FOR OP/ED)

## 2024-11-23 PROCEDURE — 71045 X-RAY EXAM CHEST 1 VIEW: CPT

## 2024-11-23 PROCEDURE — 97530 THERAPEUTIC ACTIVITIES: CPT | Mod: GP

## 2024-11-23 PROCEDURE — 99292 CRITICAL CARE ADDL 30 MIN: CPT | Performed by: NURSE PRACTITIONER

## 2024-11-23 PROCEDURE — 2500000005 HC RX 250 GENERAL PHARMACY W/O HCPCS

## 2024-11-23 PROCEDURE — 85018 HEMOGLOBIN: CPT

## 2024-11-23 PROCEDURE — 84132 ASSAY OF SERUM POTASSIUM: CPT

## 2024-11-23 PROCEDURE — 2500000001 HC RX 250 WO HCPCS SELF ADMINISTERED DRUGS (ALT 637 FOR MEDICARE OP): Performed by: NURSE PRACTITIONER

## 2024-11-23 PROCEDURE — 99291 CRITICAL CARE FIRST HOUR: CPT | Performed by: ANESTHESIOLOGY

## 2024-11-23 PROCEDURE — 2500000001 HC RX 250 WO HCPCS SELF ADMINISTERED DRUGS (ALT 637 FOR MEDICARE OP)

## 2024-11-23 PROCEDURE — 85027 COMPLETE CBC AUTOMATED: CPT

## 2024-11-23 PROCEDURE — 71045 X-RAY EXAM CHEST 1 VIEW: CPT | Performed by: RADIOLOGY

## 2024-11-23 PROCEDURE — 37799 UNLISTED PX VASCULAR SURGERY: CPT

## 2024-11-23 PROCEDURE — 82330 ASSAY OF CALCIUM: CPT

## 2024-11-23 PROCEDURE — 83735 ASSAY OF MAGNESIUM: CPT

## 2024-11-23 PROCEDURE — 2500000002 HC RX 250 W HCPCS SELF ADMINISTERED DRUGS (ALT 637 FOR MEDICARE OP, ALT 636 FOR OP/ED)

## 2024-11-23 PROCEDURE — 2500000004 HC RX 250 GENERAL PHARMACY W/ HCPCS (ALT 636 FOR OP/ED): Performed by: NURSE PRACTITIONER

## 2024-11-23 RX ORDER — FUROSEMIDE 10 MG/ML
40 INJECTION INTRAMUSCULAR; INTRAVENOUS ONCE
Status: COMPLETED | OUTPATIENT
Start: 2024-11-23 | End: 2024-11-23

## 2024-11-23 RX ORDER — BISACODYL 10 MG/1
10 SUPPOSITORY RECTAL DAILY
Status: DISCONTINUED | OUTPATIENT
Start: 2024-11-23 | End: 2024-11-24

## 2024-11-23 RX ADMIN — ATORVASTATIN CALCIUM 80 MG: 80 TABLET, FILM COATED ORAL at 21:20

## 2024-11-23 RX ADMIN — POTASSIUM PHOSPHATE, MONOBASIC 500 MG: 500 TABLET, SOLUBLE ORAL at 16:57

## 2024-11-23 RX ADMIN — FUROSEMIDE 40 MG: 10 INJECTION, SOLUTION INTRAMUSCULAR; INTRAVENOUS at 22:02

## 2024-11-23 RX ADMIN — ACETAMINOPHEN 650 MG: 325 TABLET ORAL at 09:10

## 2024-11-23 RX ADMIN — POTASSIUM PHOSPHATE, MONOBASIC 500 MG: 500 TABLET, SOLUBLE ORAL at 13:45

## 2024-11-23 RX ADMIN — HEPARIN SODIUM 5000 UNITS: 5000 INJECTION INTRAVENOUS; SUBCUTANEOUS at 09:10

## 2024-11-23 RX ADMIN — PIPERACILLIN SODIUM AND TAZOBACTAM SODIUM 4.5 G: 4; .5 INJECTION, SOLUTION INTRAVENOUS at 05:02

## 2024-11-23 RX ADMIN — EPINEPHRINE IN SODIUM CHLORIDE 0.02 MCG/KG/MIN: 16 INJECTION INTRAVENOUS at 02:31

## 2024-11-23 RX ADMIN — PIPERACILLIN SODIUM AND TAZOBACTAM SODIUM 4.5 G: 4; .5 INJECTION, SOLUTION INTRAVENOUS at 10:28

## 2024-11-23 RX ADMIN — PIPERACILLIN SODIUM AND TAZOBACTAM SODIUM 4.5 G: 4; .5 INJECTION, SOLUTION INTRAVENOUS at 16:25

## 2024-11-23 RX ADMIN — Medication 5 MG: at 21:19

## 2024-11-23 RX ADMIN — TICAGRELOR 90 MG: 90 TABLET ORAL at 09:11

## 2024-11-23 RX ADMIN — TICAGRELOR 90 MG: 90 TABLET ORAL at 21:19

## 2024-11-23 RX ADMIN — Medication 2 L/MIN: at 21:17

## 2024-11-23 RX ADMIN — POLYETHYLENE GLYCOL 3350 17 G: 17 POWDER, FOR SOLUTION ORAL at 09:10

## 2024-11-23 RX ADMIN — CALCIUM GLUCONATE 1 G: 20 INJECTION, SOLUTION INTRAVENOUS at 02:32

## 2024-11-23 RX ADMIN — OXYCODONE HYDROCHLORIDE 5 MG: 5 TABLET ORAL at 09:10

## 2024-11-23 RX ADMIN — FUROSEMIDE 40 MG: 10 INJECTION, SOLUTION INTRAMUSCULAR; INTRAVENOUS at 10:28

## 2024-11-23 RX ADMIN — ACETAMINOPHEN 650 MG: 325 TABLET ORAL at 05:02

## 2024-11-23 RX ADMIN — POTASSIUM CHLORIDE 40 MEQ: 1.5 POWDER, FOR SOLUTION ORAL at 05:02

## 2024-11-23 RX ADMIN — OXYCODONE HYDROCHLORIDE 5 MG: 5 TABLET ORAL at 01:03

## 2024-11-23 RX ADMIN — HEPARIN SODIUM 5000 UNITS: 5000 INJECTION INTRAVENOUS; SUBCUTANEOUS at 01:04

## 2024-11-23 RX ADMIN — ACETAMINOPHEN 650 MG: 325 TABLET ORAL at 21:19

## 2024-11-23 RX ADMIN — AMIODARONE HYDROCHLORIDE 400 MG: 200 TABLET ORAL at 21:20

## 2024-11-23 RX ADMIN — ACETAMINOPHEN 650 MG: 325 TABLET ORAL at 13:45

## 2024-11-23 RX ADMIN — HEPARIN SODIUM 5000 UNITS: 5000 INJECTION INTRAVENOUS; SUBCUTANEOUS at 16:57

## 2024-11-23 RX ADMIN — ACETAMINOPHEN 650 MG: 325 TABLET ORAL at 16:57

## 2024-11-23 RX ADMIN — OXYCODONE HYDROCHLORIDE 5 MG: 5 TABLET ORAL at 05:03

## 2024-11-23 RX ADMIN — POTASSIUM PHOSPHATE, MONOBASIC 500 MG: 500 TABLET, SOLUBLE ORAL at 21:20

## 2024-11-23 RX ADMIN — SENNOSIDES AND DOCUSATE SODIUM 2 TABLET: 50; 8.6 TABLET ORAL at 09:10

## 2024-11-23 RX ADMIN — CALCIUM GLUCONATE 1 G: 20 INJECTION, SOLUTION INTRAVENOUS at 16:32

## 2024-11-23 RX ADMIN — AMIODARONE HYDROCHLORIDE 400 MG: 200 TABLET ORAL at 09:10

## 2024-11-23 RX ADMIN — PIPERACILLIN SODIUM AND TAZOBACTAM SODIUM 4.5 G: 4; .5 INJECTION, SOLUTION INTRAVENOUS at 21:58

## 2024-11-23 RX ADMIN — ASPIRIN 81 MG 81 MG: 81 TABLET ORAL at 09:10

## 2024-11-23 ASSESSMENT — COGNITIVE AND FUNCTIONAL STATUS - GENERAL
MOVING FROM LYING ON BACK TO SITTING ON SIDE OF FLAT BED WITH BEDRAILS: A LOT
WALKING IN HOSPITAL ROOM: TOTAL
MOVING TO AND FROM BED TO CHAIR: A LOT
CLIMB 3 TO 5 STEPS WITH RAILING: TOTAL
MOBILITY SCORE: 10
STANDING UP FROM CHAIR USING ARMS: A LOT
TURNING FROM BACK TO SIDE WHILE IN FLAT BAD: A LOT

## 2024-11-23 ASSESSMENT — PAIN SCALES - GENERAL
PAINLEVEL_OUTOF10: 0 - NO PAIN
PAINLEVEL_OUTOF10: 7
PAINLEVEL_OUTOF10: 7
PAINLEVEL_OUTOF10: 0 - NO PAIN
PAINLEVEL_OUTOF10: 7
PAINLEVEL_OUTOF10: 0 - NO PAIN
PAINLEVEL_OUTOF10: 0 - NO PAIN

## 2024-11-23 ASSESSMENT — PAIN - FUNCTIONAL ASSESSMENT
PAIN_FUNCTIONAL_ASSESSMENT: 0-10

## 2024-11-23 NOTE — PROGRESS NOTES
Subjective Data:  -on low doses of epi and norepi      Objective Data:  Last Recorded Vitals:  Vitals:    11/23/24 1500 11/23/24 1600 11/23/24 1700 11/23/24 1800   BP: 108/58 111/67 111/65 121/75   BP Location:  Left arm     Patient Position:  Lying     Pulse: 87 79 82 81   Resp: 23 (!) 27 (!) 27 21   Temp:  36.6 °C (97.9 °F)     TempSrc:  Temporal     SpO2: 99% 99% 99% 99%   Weight:       Height:           Last Labs:  Results for orders placed or performed during the hospital encounter of 11/10/24 (from the past 24 hours)   POCT GLUCOSE   Result Value Ref Range    POCT Glucose 116 (H) 74 - 99 mg/dL   POCT GLUCOSE   Result Value Ref Range    POCT Glucose 117 (H) 74 - 99 mg/dL   Renal Function Panel   Result Value Ref Range    Glucose 114 (H) 74 - 99 mg/dL    Sodium 136 136 - 145 mmol/L    Potassium 3.7 3.5 - 5.3 mmol/L    Chloride 101 98 - 107 mmol/L    Bicarbonate 27 21 - 32 mmol/L    Anion Gap 12 10 - 20 mmol/L    Urea Nitrogen 18 6 - 23 mg/dL    Creatinine 0.72 0.50 - 1.30 mg/dL    eGFR >90 >60 mL/min/1.73m*2    Calcium 7.4 (L) 8.6 - 10.6 mg/dL    Phosphorus 2.3 (L) 2.5 - 4.9 mg/dL    Albumin 3.0 (L) 3.4 - 5.0 g/dL   Magnesium   Result Value Ref Range    Magnesium 2.00 1.60 - 2.40 mg/dL   CBC   Result Value Ref Range    WBC 22.3 (H) 4.4 - 11.3 x10*3/uL    nRBC 0.4 (H) 0.0 - 0.0 /100 WBCs    RBC 2.76 (L) 4.50 - 5.90 x10*6/uL    Hemoglobin 8.2 (L) 13.5 - 17.5 g/dL    Hematocrit 25.2 (L) 41.0 - 52.0 %    MCV 91 80 - 100 fL    MCH 29.7 26.0 - 34.0 pg    MCHC 32.5 32.0 - 36.0 g/dL    RDW 16.1 (H) 11.5 - 14.5 %    Platelets 411 150 - 450 x10*3/uL   Calcium, Ionized   Result Value Ref Range    POCT Calcium, Ionized 1.01 (L) 1.1 - 1.33 mmol/L   BLOOD GAS MIXED VENOUS FULL PANEL   Result Value Ref Range    POCT pH, Mixed 7.46 (H) 7.33 - 7.43 pH    POCT pCO2, Mixed 40 (L) 41 - 51 mm Hg    POCT pO2, Mixed 31 (L) 35 - 45 mm Hg    POCT SO2, Mixed 57 45 - 75 %    POCT Oxy Hemoglobin, Mixed 55.1 45.0 - 75.0 %    POCT Hematocrit  Calculated, Mixed 26.0 (L) 41.0 - 52.0 %    POCT Sodium, Mixed 134 (L) 136 - 145 mmol/L    POCT Potassium, Mixed 4.0 3.5 - 5.3 mmol/L    POCT Chloride, Mixed 101 98 - 107 mmol/L    POCT Ionized Calcium, Mixed 1.12 1.10 - 1.33 mmol/L    POCT Glucose, Mixed 134 (H) 74 - 99 mg/dL    POCT Lactate, Mixed 1.3 0.4 - 2.0 mmol/L    POCT Base Excess, Mixed 4.2 (H) -2.0 - 3.0 mmol/L    POCT HCO3 Calculated, Mixed 28.4 (H) 22.0 - 26.0 mmol/L    POCT Hemoglobin, Mixed 8.6 (L) 13.5 - 17.5 g/dL    POCT Anion Gap, Mixed 9 (L) 10 - 25 mmo/L    Patient Temperature 37.0 degrees Celsius    FiO2 30 %   Blood Gas Arterial Full Panel   Result Value Ref Range    POCT pH, Arterial 7.53 (H) 7.38 - 7.42 pH    POCT pCO2, Arterial 31 (L) 38 - 42 mm Hg    POCT pO2, Arterial 80 (L) 85 - 95 mm Hg    POCT SO2, Arterial 97 94 - 100 %    POCT Oxy Hemoglobin, Arterial 94.5 94.0 - 98.0 %    POCT Hematocrit Calculated, Arterial 46.0 41.0 - 52.0 %    POCT Sodium, Arterial 130 (L) 136 - 145 mmol/L    POCT Potassium, Arterial 3.8 3.5 - 5.3 mmol/L    POCT Chloride, Arterial 100 98 - 107 mmol/L    POCT Ionized Calcium, Arterial 1.02 (L) 1.10 - 1.33 mmol/L    POCT Glucose, Arterial 129 (H) 74 - 99 mg/dL    POCT Lactate, Arterial 1.1 0.4 - 2.0 mmol/L    POCT Base Excess, Arterial 3.9 (H) -2.0 - 3.0 mmol/L    POCT HCO3 Calculated, Arterial 25.9 22.0 - 26.0 mmol/L    POCT Hemoglobin, Arterial 15.4 13.5 - 17.5 g/dL    POCT Anion Gap, Arterial 8 (L) 10 - 25 mmo/L    Patient Temperature 37.0 degrees Celsius    FiO2 30 %   POCT GLUCOSE   Result Value Ref Range    POCT Glucose 106 (H) 74 - 99 mg/dL   BLOOD GAS MIXED VENOUS FULL PANEL   Result Value Ref Range    POCT pH, Mixed 7.43 7.33 - 7.43 pH    POCT pCO2, Mixed 42 41 - 51 mm Hg    POCT pO2, Mixed 38 35 - 45 mm Hg    POCT SO2, Mixed 64 45 - 75 %    POCT Oxy Hemoglobin, Mixed 62.1 45.0 - 75.0 %    POCT Hematocrit Calculated, Mixed 26.0 (L) 41.0 - 52.0 %    POCT Sodium, Mixed 133 (L) 136 - 145 mmol/L    POCT  Potassium, Mixed 4.1 3.5 - 5.3 mmol/L    POCT Chloride, Mixed 102 98 - 107 mmol/L    POCT Ionized Calcium, Mixed 1.12 1.10 - 1.33 mmol/L    POCT Glucose, Mixed 98 74 - 99 mg/dL    POCT Lactate, Mixed 0.8 0.4 - 2.0 mmol/L    POCT Base Excess, Mixed 3.3 (H) -2.0 - 3.0 mmol/L    POCT HCO3 Calculated, Mixed 27.9 (H) 22.0 - 26.0 mmol/L    POCT Hemoglobin, Mixed 8.5 (L) 13.5 - 17.5 g/dL    POCT Anion Gap, Mixed 7 (L) 10 - 25 mmo/L    Patient Temperature 37.0 degrees Celsius    FiO2 30 %   POCT GLUCOSE   Result Value Ref Range    POCT Glucose 103 (H) 74 - 99 mg/dL   POCT GLUCOSE   Result Value Ref Range    POCT Glucose 98 74 - 99 mg/dL   Calcium, Ionized   Result Value Ref Range    POCT Calcium, Ionized 1.02 (L) 1.1 - 1.33 mmol/L   CBC   Result Value Ref Range    WBC 24.1 (H) 4.4 - 11.3 x10*3/uL    nRBC 0.2 (H) 0.0 - 0.0 /100 WBCs    RBC 2.97 (L) 4.50 - 5.90 x10*6/uL    Hemoglobin 8.7 (L) 13.5 - 17.5 g/dL    Hematocrit 27.2 (L) 41.0 - 52.0 %    MCV 92 80 - 100 fL    MCH 29.3 26.0 - 34.0 pg    MCHC 32.0 32.0 - 36.0 g/dL    RDW 16.1 (H) 11.5 - 14.5 %    Platelets 453 (H) 150 - 450 x10*3/uL   POCT GLUCOSE   Result Value Ref Range    POCT Glucose 99 74 - 99 mg/dL   POCT GLUCOSE   Result Value Ref Range    POCT Glucose 92 74 - 99 mg/dL        TROPHS   Date/Time Value Ref Range Status   11/15/2024 12:28 PM 44,705 0 - 53 ng/L Final     Comment:     Previous result verified on 11/15/2024 1020 on specimen/case [id number] called with component [facility] for procedure Troponin I, High Sensitivity with value 67,971 ng/L.   11/15/2024 01:42 AM 67,971 0 - 53 ng/L Final   11/14/2024 03:54 AM 73,314 0 - 53 ng/L Final     Comment:     Previous result verified on 11/13/2024 2154 on specimen/case [id number] called with component [facility] for procedure Troponin I, High Sensitivity with value 45,685 ng/L.   11/08/2024 04:12 PM 4 0 - 20 ng/L Final     BNP   Date/Time Value Ref Range Status   11/10/2024 04:34 PM 13 0 - 99 pg/mL Final      HGBA1C   Date/Time Value Ref Range Status   11/08/2024 04:12 PM 5.1 See comment % Final     LDLCALC   Date/Time Value Ref Range Status   11/08/2024 04:12  <=99 mg/dL Final     Comment:                                 Near   Borderline      AGE      Desirable  Optimal    High     High     Very High     0-19 Y     0 - 109     ---    110-129   >/= 130     ----    20-24 Y     0 - 119     ---    120-159   >/= 160     ----      >24 Y     0 -  99   100-129  130-159   160-189     >/=190       VLDL   Date/Time Value Ref Range Status   11/08/2024 04:12 PM 64 0 - 40 mg/dL Final      Last I/O:  I/O last 3 completed shifts:  In: 2697.1 (28.7 mL/kg) [P.O.:600; I.V.:527.1 (5.6 mL/kg); NG/GT:970; IV Piggyback:600]  Out: 3830 (40.7 mL/kg) [Urine:3210 (0.9 mL/kg/hr); Chest Tube:620]  Weight: 94 kg     Inpatient Medications:  Scheduled medications   Medication Dose Route Frequency    acetaminophen  650 mg oral q4h    amiodarone  400 mg oral BID    aspirin  81 mg oral Daily    atorvastatin  80 mg oral Nightly    bisacodyl  10 mg rectal Once    bisacodyl  10 mg rectal Daily    heparin  5,000 Units subcutaneous q8h    insulin lispro  0-5 Units subcutaneous q4h    melatonin  5 mg nasogastric tube Nightly    piperacillin-tazobactam  4.5 g intravenous q6h    polyethylene glycol  17 g oral BID    potassium phosphate (monobasic)  500 mg oral 4x daily    sennosides-docusate sodium  2 tablet oral BID    ticagrelor  90 mg oral BID     PRN medications   Medication    benzocaine-menthol    calcium gluconate    calcium gluconate    magnesium sulfate    magnesium sulfate    naloxone    naloxone    ondansetron    Or    ondansetron    oxyCODONE    oxyCODONE    oxygen    oxygen    potassium chloride CR    Or    potassium chloride    potassium chloride CR    Or    potassium chloride     Continuous Medications   Medication Dose Last Rate    EPINEPHrine  0.01 mcg/kg/min (Dosing Weight) 0.01 mcg/kg/min (11/23/24 1600)       Physical  Exam:  General: sitting up in bed, NAD  Skin: warm and dry   Cardiac: S1S2  Pulm: diminished anterior, CTs in place  GI: soft, nontender  Extremities: bilat groin sites with drsg intact, no oozing no hematoma, +DP pulses bilaterally   Neuro: alert, appropriate      Assessment/Plan   Jaime Dominguez is a 74 year old male with a PMH significant for basal cell carcinoma and HPL s/p MidCAB x2 converted to full sternotomy w/ LIMA prolonged as a Y graft with a radial to LAD OM w/ Dr. Aldo Harman and Dr. Stanley. CPB time 347min. Course c/b increasing pressor requirement and echo revealing apical hypo-akinesis with subsequent IABP placement on 11/14 for further support. Overnight on 11/14, Troponin elevated and ST elevation in anteroseptal leads, c/f ACS and decision made to [facility]. Now s/p PCI of mid LAD with HEATHER. Pt cannulated on VA ECMO for cardiogenic shock state 2/2 multiple failed grafts.     11/14  Right Radial 6 Fr -- TR Band  Left Femoral Artery ECMO Cannula  Left Femoral Vein ECMO Cannula     Patient is s/p CABG on 11/13/2024 arrived in fulminant cardiogenic shock despite IABP placement and 3 pressors.   A right femoral diagnostic cath was done with the following findings:     LM: distal 30-40%  LAD: mid 100% stenosis at anastomosis site   LCX: competitive flow in the OM without significant stenosis  RCA: patent  LIMA to LAD: Y graft with anastomosis to the LAD which is not patent and anastomosis to the OM which appears patent     Due to patient's profound shock, ECMO cannulation was done with the aforementioned access sites and the plan was made jointly with cardiac surgery to pursue salvage PCI of the mid LAD. He was given ASA and loaded with Ticagrelor. Patient is now s/p salvage PCI of the mid LAD with a [place] 3.0 x 38 HEATHER. Following PCI and ECMO cannulation, patient's pressors were able to be weaned substantially and he left the cath lab (without intubation) on ECMO and IABP support.     ASA/Ticagrelor  loading in the lab    Overnight bleeding from ECMO site requiring multiple units pRBCs, repositioned and sutured by Cardiac Surgery    11/15 IABP 1:1, left fem ECMO, on epi and levo (vaso currently weaned off), amio started for afib, pending TTE     11/19  Plan for chest washout due to concern for left hemothorax at the time of ECMO decannulation today. Remains on levo and epi, PRBCs transfusion.     11/20  Weaning sedation and IABP, currently 1:2; remains on epi and levo    11/21  IABP removed, extubated. On levo and epi gtts.     11/22  Remains on levo and epi gtts. Plan to remove MS chest tube. Passed BS swallow, encourage PO intake    Interventional Recs:   - cont telemetry care per PCI protocol  - cont DAPT with Brilinta (6 months) and aspirin (lifelong)  - provided education on compliance with DAPT  - at discharge, PLEASE send 90 day prescription of Brilinta to [facility] (for price check and Meds to Beds) and remaining refills to patient's home pharmacy   - continue high intensity statin  - no BB with pressors  - please ensure cardiology follow up appointment after discharge in 1-3 weeks  - patient referred for cardiac rehab eval  - 5lb weight restriction for 5 days for right radial access  - no lifting anything heavier than 10 lbs for one week for groin access   - appreciate excellent CTICU care      Interventional Cardiology will continue to follow.     CICU Fellow Pager: [phone]   Endovascular /Limb Salvage Team Pager: [phone] for day coverage (8am-5pm)  Interventional Cardiology Fellow Pager: [phone] (7AM-5PM) Night coverage: [facility] Cross Cover [id number]  Night coverage: [id number]     I spent 30 minutes in the professional and overall care of this patient.        Regina Vega MD

## 2024-11-23 NOTE — PROGRESS NOTES
ICU ATTENDING NOTE    Jaime Dominguez was seen and examined in the ICU. This includes review of the history, physical, ICU flow sheets, laboratory and radiographic data, medication administration record and other current clinical data.  I have seen the patient independently and reviewed the plan with the house staff, advanced practice provider, nursing team, respiratory therapy, primary service, consultants, and patient and/or family members.  This note serves to document my findings and time spent. My key findings, diagnoses and plans for ICU management today include the following:    Jaime Dominguez is a 74 y.o. male with PMH relevant for  basal cell carcinoma and HPL s/p MidCAB x2 converted to full sternotomy w/ LIMA prolonged as a Y graft with a radial to LAD OM w/ Dr. Aldo Harman and Dr. Stanley. CPB time 347min. Course c/b increasing pressor requirement and echo revealing apical hypo-akinesis with subsequent IABP placement on 11/14 for further support. Overnight on 11/14, Troponin elevated and ST elevation in anteroseptal leads, c/f ACS and decision made to [facility]. Now s/p PCI of mid LAD with HEATHER. Pt cannulated on VA ECMO for cardiogenic shock state 2/2 multiple failed grafts.      11/19 RTOR for VA ECMO decannulation and left hemothorax washout. Removed IABP 11/20.     Problem List:  Patient Active Problem List   Diagnosis    Acute ST elevation myocardial infarction (STEMI) of inferior wall (Multi)    ST elevation myocardial infarction (STEMI) involving other coronary artery of inferior wall    STEMI (ST elevation myocardial infarction) (Multi)    Cardiogenic shock (Multi)    TREE (acute kidney injury) (CMS-HCC)    On intra-aortic balloon pump assist    Delirium    Hemothorax on left        ICU Diagnoses requiring active management:  - Acute pulmonary insufficiency  - Hypotension requiring vasopressor support  - Cardiogenic shock  - Ischemic cardiomyopathy  - Anemia of blood loss  - Perioperative hyperglycemia  -  Acute postoperative pain syndrome    PLAN:    Neuro: Continue MMPC. Delirium precautions, currently A&Ox3. Continue PT/OT/SLP for debility    CV: Continue Epi wean based on hemodynamics and systemic signs of perfusion. Decreased to 0.01 this AM. Continue PO Amio. bASA and Brilinta. Statin    Pulm: Supplemental O2 w/ NC @ 4L. Encourage pulm hygiene.    FEN/GI: Corpak in place w/ TF at goal. Encouraging more PO intake today. TF off this AM, plan for nocturnal feeds if <50% of his meals    Renal: Forced diuresis w/ IV Lasix for goal neg 1-2L for hypervolemia. Hale in place. Scr WNL. Electrolyte repletion per CTICU protocol    Heme: L CT removal today per surgeon; keep R, possible DC tomorrow. Continue ASA and Ticagrelor. SQH for DVT PPX.    ID: Empiric ABX for prior fever and leukocytosis. Has mild respiratory symptoms. Infectious workup thus far unrevealing. Plan to complete 7d course, plan to discontinue 11/24    Endo: SSI for BG < 180    MSK/Skin: Continue evaluation and daily care for SSI/PI prevention    PPX: SCD, Bowel reg. No PPI    T/L/D: A line, CT    Dispo: CTICU      Anirudh Kim MD, MS  Division of Critical Care Medicine  Department of Anesthesiology        This critically ill patient continues to be at risk for clinically significant deterioration / failure due to the above mentioned dysfunctional, unstable organ systems.  I have personally identified and managed all complex critical care issues to prevent aforementioned clinical deterioration.  Critical care time is spent at bedside and/or the immediate area and has included, but is not limited to, the review of diagnostic tests, labs, radiographs, serial assessments of hemodynamics, respiratory status, ventilatory management, review of consult team recommendations, and family updates.  Time spent in procedures and teaching are reported separately. Critical Care Time: 45 minutes.

## 2024-11-23 NOTE — PROGRESS NOTES
CTICU Progress Note  Jaime Dominguez/[id number]    Admit Date: 11/10/2024  Hospital Length of Stay: 13   ICU Length of Stay: 10d 8h   CT SURGEON: Dr. Aldo Harman    SUBJECTIVE:   Remains on epinephrine 0.02mcg/kg/min and norepinephrine 0.02mcg/kg/min.    MEDICATIONS  Infusions:  EPINEPHrine, Last Rate: 0.02 mcg/kg/min (11/23/24 0231)      Scheduled:  acetaminophen, 650 mg, q4h  amiodarone, 400 mg, BID  aspirin, 81 mg, Daily  atorvastatin, 80 mg, Nightly  bisacodyl, 10 mg, Once  heparin, 5,000 Units, q8h  insulin lispro, 0-5 Units, q4h  melatonin, 5 mg, Nightly  piperacillin-tazobactam, 4.5 g, q6h  polyethylene glycol, 17 g, BID  sennosides-docusate sodium, 2 tablet, BID  ticagrelor, 90 mg, BID      PRN:  benzocaine-menthol, 1 lozenge, q2h PRN  calcium gluconate, 1 g, q6h PRN  calcium gluconate, 2 g, q6h PRN  magnesium sulfate, 2 g, q6h PRN  magnesium sulfate, 4 g, q6h PRN  naloxone, 0.2 mg, q5 min PRN  naloxone, 0.2 mg, q5 min PRN  ondansetron, 4 mg, q8h PRN   Or  ondansetron, 4 mg, q8h PRN  oxyCODONE, 2.5 mg, q4h PRN  oxyCODONE, 5 mg, q4h PRN  oxygen, , Continuous PRN - O2/gases  oxygen, , Continuous PRN - O2/gases  potassium chloride CR, 20 mEq, q6h PRN   Or  potassium chloride, 20 mEq, q6h PRN  potassium chloride CR, 40 mEq, q6h PRN   Or  potassium chloride, 40 mEq, q6h PRN        PHYSICAL EXAM:   Visit Vitals  /62 (BP Location: Left arm, Patient Position: Lying)   Pulse 81   Temp 36.3 °C (97.3 °F) (Temporal)   Resp (!) 27   Ht 1.829 m (6')   Wt 94 kg (207 lb 3.7 oz)   SpO2 100%   BMI 28.11 kg/m²   Smoking Status Never   BSA 2.19 m²     Wt Readings from Last 5 Encounters:   11/18/24 94 kg (207 lb 3.7 oz)   11/10/24 96.2 kg (212 lb)     INTAKE/OUTPUT:  I/O last 3 completed shifts:  In: 2697.1 (28.7 mL/kg) [P.O.:600; I.V.:527.1 (5.6 mL/kg); NG/GT:970; IV Piggyback:600]  Out: 3830 (40.7 mL/kg) [Urine:3210 (0.9 mL/kg/hr); Chest Tube:620]  Weight: 94 kg      Physical Exam:   Constitutional: Male patient lying in  ICU bed in no acute distress  Neuro: AOx3, no obvious focal deficits. Moves all extremities. PERRL.   CV: NSR rate 70-80s. Normotensive MAP 70s on levo. V wires present set VVI @ 50.  Pulm: On 4L NC with appropriate oxygenation. Equal rise/fall of chest. MS and BLP CT's with appropriate serosang output and no airleak.  : Clear yellow urine in aden. Scrotal edema and erythema.   GI: S/ND/NT. Corpak in place.  Extremities: NV exam intact x 4. No edema.   Skin: WDI. Postop dressings intact. Chest tube dressings intact. Chest tube sites oozing.   Psych: Calm and cooperative.     Daily Risk Screen  Intubated: No  Central line: Yes, Hemodynamic instability requiring parenteral medication  Aden: Yes, accurate I/Os in critically ill    Images: Reviewed    Invasive Hemodynamics:    Most Recent Range Past 24hrs   BP (Art) 104/57 Arterial Line BP 1  Min: 68/61  Max: 149/63   MAP(Art) 72 mmHg Arterial Line MAP 1 (mmHg)   Min: 60 mmHg  Max: 122 mmHg   RA/CVP   No data recorded   PA 37/18 No data recorded   PA(mean) 25 mmHg No data recorded     Assessment/Plan   Jaime Dominguez is a 74 year old male with a PMH significant for basal cell carcinoma and HPL s/p MidCAB x2 converted to full sternotomy w/ LIMA prolonged as a Y graft with a radial to LAD OM w/ Dr. Aldo Harman and Dr. Stanley. CPB time 347min. Course c/b increasing pressor requirement and echo revealing apical hypo-akinesis with subsequent IABP placement on 11/14 for further support. Overnight on 11/14, Troponin elevated and ST elevation in anteroseptal leads, c/f ACS and decision made to [facility]. Now s/p PCI of mid LAD with HEATHER. Pt cannulated on VA ECMO for cardiogenic shock state 2/2 multiple failed grafts.     11/19 RTOR for VA ECMO decannulation and left hemothorax washout. Removed IABP 11/20.     Plan:  NEURO: No PMHx. AOx3, follows commands, and moves all extremities. No obvious focal deficits. Physically deconditioned. OOB to chair with PT/OT yesterday. Acute post op  pain adequately managed with current regimen. -->  - Serial neuro and pain assessments   - Scheduled Tylenol   - PRN oxycodone to 2.5mg for moderate and 5mg for severe  - Continue melatonin  - PT Consult -> continue to progress level of activity  - CAM ICU score qshift  - Passive ROM  - Sleep/wake cycle hygiene     CV: Patient has a history of HLD. Is now status post MIDCABG x 2 with conversion to full sternotomy LIMA prolonged as a Y graft with a radial to LAD OM on 11/12. Pre/Post EF: normal BIV. Post op course complicated by coronary graft dysfunction on 11/14. Now s/p PCI of mid LAD with HEATHER. Pt cannulated on VA ECMO for cardiogenic shock state 2/2 multiple failed grafts 11/14. ECMO turn down TTE 11/18 with LVEF 34% and reduced RV. Previous Afib w/ RVR for which patient is on amio gtt, no episode in multiple days. Hypotension on norepinephrine 0.02mcg/kg/min infusion. Remains on epinephrine 0.05mcg/kg/min for inotropy. Decannulated from VA ECMO 11/19 and IABP removed 11/20. Most recent CI - 3.24, CO - 7, SVR - 583, and SVO2 - 54%. -->  - goal MAP 70-90  - Slow wean epinephrine and trend venous gases -> decreased to 0.01mcg/kg/min today  - Maintain epicardial wires set VVI @ 40  - Amio 400mg PO BID   - Continue aspirin and Ticagrelor 90mg BID  - Continue with atorvastatin 80mg nightly  - 40mg iv lasix -> aim for 1-2L NFB    PULM: No history of pulmonary disease. CPAP overnight. Currently 4L NC. 2MS and BPL chest tubes with minimal output and no air leaks noted. Morning CXR with acute cardiogenic pulmonary edema and atelectasis and stable left-sided subcutaneous air. -->  - Daily CXR  - Wean FiO2 maintaining SpO2 >92%.   - IS q1h and OOB to chair  - Chest tubes to wall suction --> remove Left chest tubes today per Dr. Seo, remove right one tomorrow if stable      GI: No PMH. Passed bedside swallow. Corpak in place, he states it is extremely uncomfortable and causing nasal congestion and wants it removed as soon  as possible. Had 1 BM in last 24h.  -->   - Continue TF in conjunction with PO diet today. If he eats >50% of all meals today, consider dobhoff removal tomorrow  - Cardiac diet --> switched to regular diet so he has more options and is more inclined to eat. Can revisit special diet if he eats more  - Milk of magnesia   - Suppository   - Colace/senna BID and miralax BID     : CSA-TREE Risk Score low. No history of renal disease, baseline creatinine 0.87. Creatinine stable post-op. Aden in place and making adequate UOP. Net -514mL in last 24 hours. Very responsive to lasix. Hypervolemic on exam with 1+ pitting edema in all extremities. Ordered 40mg lasix. -->  - Continue aden catheter for strict I/Os.  - Goal UOP 0.5ml/kg/hr  - 40mg iv lasix, Goal -1 to 2L for shift   - RFP bid and prn  - Replete electrolytes per CTICU protocol     ENDO: No PMH. A1c: 5.1. Euglycemia, adequately controlled on current regimen. -->  - Maintain BG <180  - SSI #1     HEME: Acute blood loss anemia. Oozing has resolved. CT scan 11/18 showing left chest hemothorax with resulting compressive atelectasis, RTOR 11/19 for left hemothorax evacuation.  -->    - Monitor drain output volume and characteristics  - Daily CBC and prn  - Continue aspirin and Ticagrelor BID  - SQH and SCDs for DVT prophylaxis  - Last type and screen: 11/21 -> ordered for am draw 11/24     ID: Afebrile, but previously warming. Improving leukocytosis. MRSA negative. UA negative, blood cultures sent on 11/16 and NGTD x2. MRSA, and blood cultures 11/18 negative. OR cultures from 11/19 bacterial NG final, AFB and fungal pending -->  - Continue zosyn for 7 day course (stop date 11/24)  - Continue to monitor WBC  - Trend temp q4h     Skin: No active skin issues. Oozing from chest tube sites, improved. -->  - Scrotal edema -> continue to elevate   - L groin cannula site soft and dry (no oozing), no concern for compartment syndrome (CK and myoglobin downtrend)  - preventative  Mepilex dressings in place on sacrum and heels  - change preventative Mepilex weekly or more frequently as indicated (when moist/soiled)   - every shift skin assessment per nursing and weekly ICU skin rounds  - moisture barrier to be applied with sandro care  - active skin problems addressed with nursing on daily rounds     Proph:  SCDs  SQH     G: Line  Left internal jugular triple lumen placed 11/21  R radial a-line placed 11/16  Hale 11/19     F: Family: will update at bedside.     Code status: Full Code    I personally spent 45 minutes of critical care time directly and personally managing the patient exclusive of separately billable procedures.     A,B,C,D,E,F,G: reviewed    Discussed with Dr. Kim.  Dispo: CTICU care for now.    CTICU TEAM PHONE [phone]

## 2024-11-23 NOTE — CARE PLAN
Problem: Skin  Goal: Participates in plan/prevention/treatment measures  Outcome: Progressing  Flowsheets (Taken 11/23/2024 0832)  Participates in plan/prevention/treatment measures:   Discuss with provider PT/OT consult   Elevate heels  Goal: Prevent/manage excess moisture  Outcome: Progressing  Goal: Prevent/minimize sheer/friction injuries  Outcome: Progressing  Flowsheets (Taken 11/23/2024 0832)  Prevent/minimize sheer/friction injuries:   Use pull sheet   HOB 30 degrees or less   Turn/reposition every 2 hours/use positioning/transfer devices  Goal: Promote/optimize nutrition  Outcome: Progressing     Problem: Safety - Adult  Goal: Free from fall injury  Outcome: Progressing     Problem: Discharge Planning  Goal: Discharge to home or other facility with appropriate resources  Outcome: Progressing     Problem: Chronic Conditions and Co-morbidities  Goal: Patient's chronic conditions and co-morbidity symptoms are monitored and maintained or improved  Outcome: Progressing     Problem: Safety - Medical Restraint  Goal: Remains free of injury from restraints (Restraint for Interference with Medical Device)  Outcome: Progressing  Goal: Free from restraint(s) (Restraint for Interference with Medical Device)  Outcome: Progressing     Problem: Knowledge Deficit  Goal: Patient/family/caregiver demonstrates understanding of disease process, treatment plan, medications, and discharge instructions  Outcome: Progressing     Problem: Mechanical Ventilation  Goal: Patient Will Maintain Patent Airway  Outcome: Progressing  Goal: Oral health is maintained or improved  Outcome: Progressing  Goal: Tracheostomy will be managed safely  Outcome: Progressing  Goal: ET tube will be managed safely  Outcome: Progressing  Goal: Ability to express needs and understand communication  Outcome: Progressing  Goal: Mobility/activity is maintained at optimum level for patient  Outcome: Progressing     Problem: Fall/Injury  Goal: Not fall by end  of shift  Outcome: Progressing  Goal: Be free from injury by end of the shift  Outcome: Progressing  Goal: Verbalize understanding of personal risk factors for fall in the hospital  Outcome: Progressing  Goal: Verbalize understanding of risk factor reduction measures to prevent injury from fall in the home  Outcome: Progressing  Goal: Use assistive devices by end of the shift  Outcome: Progressing  Goal: Pace activities to prevent fatigue by end of the shift  Outcome: Progressing     Problem: Pain  Goal: Takes deep breaths with improved pain control throughout the shift  Outcome: Progressing  Goal: Turns in bed with improved pain control throughout the shift  Outcome: Progressing  Goal: Walks with improved pain control throughout the shift  Outcome: Progressing  Goal: Performs ADL's with improved pain control throughout shift  Outcome: Progressing  Goal: Participates in PT with improved pain control throughout the shift  Outcome: Progressing  Goal: Free from opioid side effects throughout the shift  Outcome: Progressing  Goal: Free from acute confusion related to pain meds throughout the shift  Outcome: Progressing

## 2024-11-23 NOTE — PROGRESS NOTES
Physical Therapy    Physical Therapy Treatment    Patient Name: Jaime Dominguez  MRN: [id number]  Department: [facility]  Room: 05/05-A  Today's Date: 11/23/2024  Time Calculation  Start Time: 0933  Stop Time: 1026  Time Calculation (min): 53 min         Assessment/Plan   PT Assessment  PT Assessment Results: Decreased strength, Decreased endurance, Impaired balance, Decreased mobility  Rehab Prognosis: Excellent  Barriers to Discharge: Medical acuity  Evaluation/Treatment Tolerance: Patient tolerated treatment well  Medical Staff Made Aware: Yes  End of Session Communication: Bedside nurse  Assessment Comment: Pt performed bed mobility with Max A x 1, and functional transfers with Mod A x 2 with bilat knee block; pt with improved LE control this date/decreased knee buckling. Pt will continue to benefit from skilled PT to improve functional mobility.  End of Session Patient Position: Up in chair, Alarm off, not on at start of session  PT Plan  Inpatient/Swing Bed or Outpatient: Inpatient  PT Plan  Treatment/Interventions: Bed mobility, Transfer training, Gait training, Stair training, Balance training, Endurance training, Strengthening, Therapeutic exercise, Therapeutic activity  PT Plan: Ongoing PT  PT Frequency: 5 times per week  PT Discharge Recommendations: High intensity level of continued care  Equipment Recommended upon Discharge: Wheeled walker  PT Recommended Transfer Status: Assist x2  PT - OK to Discharge: Yes      General Visit Information:   PT  Visit  PT Received On: 11/23/24  Response to Previous Treatment: Patient with no complaints from previous session.  General  Prior to Session Communication: Bedside nurse  Patient Position Received: Bed, 3 rail up, Alarm off, not on at start of session, Alarm off, caregiver present  Preferred Learning Style: auditory, verbal, visual  General Comment: Pt awake and willing to participate in PT treatment session; son present and supportive throughout session. (Lines:  A Line, tele, 2 chest tubes, aden, 3L O2 NC, Epi 0.02)    Subjective   Precautions:  Precautions  Hearing/Visual Limitations: WFL  Medical Precautions: Cardiac precautions, Fall precautions, Chest tube  Post-Surgical Precautions: Move in the Tube  Precautions Comment: MAP 70-90, VVI@50, SpO2>92    Vital Signs         11/23/24 0933   Vital Signs   Vitals Session Pre PT   Heart Rate 83   Heart Rate Source Monitor   SpO2 99 %   /60   MAP (mmHg) 80   BP Method Arterial line   Patient Position Lying      11/23/24 1026   Vital Signs   Vitals Session Post PT   Heart Rate 102   Heart Rate Source Monitor   SpO2 95 %   /62   MAP (mmHg) 78   BP Method Arterial line   Patient Position Sitting             Objective   Pain:  Pain Assessment  Pain Assessment: 0-10  0-10 (Numeric) Pain Score: 0 - No pain  Cognition:  Cognition  Overall Cognitive Status: Within Functional Limits  Arousal/Alertness: Appropriate responses to stimuli  Orientation Level: Oriented X4  Coordination:  Movements are Fluid and Coordinated: Yes  Postural Control:  Postural Control  Postural Control: Within Functional Limits  Extremity/Trunk Assessments:          RLE   RLE :  (at least 3/5 based on LAQ)  LLE   LLE :  (at least 3/5 based on LAQ)  Activity Tolerance:  Activity Tolerance  Endurance: Tolerates 10 - 20 min exercise with multiple rests  Early Mobility/Exercise Safety Screen: Proceed with mobilization - No exclusion criteria met  Activity Tolerance Comments: Vitals stable throughout session  Treatments:  Therapeutic Exercise  Therapeutic Exercise Performed: Yes  Therapeutic Exercise Activity 1: Seated LAQ x 10 bilat LE    Therapeutic Activity  Therapeutic Activity Performed: Yes  Therapeutic Activity 1: Increased time for advanced ICU line management required for functional mobility  Therapeutic Activity 2: Pt sat EOB for 10 min with CGA-SBA for safety  Therapeutic Activity 3: Pt stood for 25s prior to bed>chair transfer; Mod A x 2 for  balance and bilat knee block  Therapeutic Activity 4: Increased time for positioning throughout session; adjustment of TAPs pad in recliner chair at end of session  Therapeutic Activity 5: Pt stood for 45s during second sit<>stand with Mod A x 2 for balance    Bed Mobility  Bed Mobility: Yes  Bed Mobility 1  Bed Mobility 1: Supine to sitting  Level of Assistance 1: Maximum assistance  Bed Mobility Comments 1: Max A x 1 for LE management and trunk elevation to seated    Ambulation/Gait Training  Ambulation/Gait Training Performed: No  Transfers  Transfer: Yes  Transfer 1  Transfer From 1: Bed to  Transfer to 1: Chair with drop arm  Technique 1: Stand pivot  Transfer Level of Assistance 1: Arm in arm assistance, Moderate assistance, +2  Trials/Comments 1: Mod A x 2 for hip elevation to stand + bilat knee block during 4 side steps to chair; cues for sequencing  Transfers 2  Transfer From 2: Sit to, Stand to  Transfer to 2: Stand, Sit  Technique 2: Sit to stand, Stand to sit  Transfer Level of Assistance 2: Moderate assistance, +2  Trials/Comments 2: Mod A x 2 for hip elevation to stand; arm in arm assist    Outcome Measures:  [facility] Basic Mobility  Turning from your back to your side while in a flat bed without using bedrails: A lot  Moving from lying on your back to sitting on the side of a flat bed without using bedrails: A lot  Moving to and from bed to chair (including a wheelchair): A lot  Standing up from a chair using your arms (e.g. wheelchair or bedside chair): A lot  To walk in hospital room: Total  Climbing 3-5 steps with railing: Total  Basic Mobility - Total Score: 10    FSS-ICU  Ambulation: Unable to attempt due to weakness  Rolling: Moderate assistance (performs 50 - 74% of task)  Sitting: Minimal assistance (performs 75% or more of task)  Transfer Sit-to-Stand: Moderate assistance (performs 50 - 74% of task)  Transfer Supine-to-Sit: Maximal assistance (performs 25% - 49% of task)  Total Score:  12      Early Mobility/Exercise Safety Screen: Proceed with mobilization - No exclusion criteria met  ICU Mobility Scale: Transferring bed to chair [5]    Education Documentation  Precautions, taught by Yesenia Downs PT at 11/23/2024 12:08 PM.  Learner: Patient  Readiness: Acceptance  Method: Explanation  Response: Verbalizes Understanding    Body Mechanics, taught by Yesenia Downs PT at 11/23/2024 12:08 PM.  Learner: Patient  Readiness: Acceptance  Method: Explanation  Response: Verbalizes Understanding    Mobility Training, taught by Yesenia Downs PT at 11/23/2024 12:08 PM.  Learner: Patient  Readiness: Acceptance  Method: Explanation  Response: Verbalizes Understanding    Education Comments  No comments found.           Encounter Problems       Encounter Problems (Active)       Balance       Pt will demonstrate improved sitting/standing static/dynamic balance activities without LOB via score of 24/28 on the Tinetti for increase in safety prior to DC.  (Progressing)       Start:  11/21/24    Expected End:  12/05/24               Mobility       Pt will ambulate >15ft with appropriate form, Mod A or less, LRAD, and no LOB for safe DC.  (Progressing)       Start:  11/21/24    Expected End:  12/05/24            Pt will tolerate >15 minutes of upright standing activity without seated rest breaks with no changes in vital signs for improved functional mobility.  (Progressing)       Start:  11/21/24    Expected End:  12/05/24               PT Transfers       Pt will perform bed mobility with Mod A or less and use of LRAD to safely DC.  (Progressing)       Start:  11/21/24    Expected End:  12/05/24            Pt will perform sit<>stand transfers with Mod A or less and use of LRAD to safely DC.  (Progressing)       Start:  11/21/24    Expected End:  12/05/24                 YESENIA DOWNS PT

## 2024-11-23 NOTE — PROGRESS NOTES
Speech-Language Pathology  Therapy Communication Note  Patient Name: Jaime Dominguez  MRN: [id number]  Today's Date: 11/23/2024   Time: 950    Discipline: Speech-Language Pathology    Reason: Attempt    Comment:  Clinical swallow evaluation attempted. Pt working w/ PT at this time; will re-attempt as pt appropriate/schedule permits.

## 2024-11-24 ENCOUNTER — APPOINTMENT (OUTPATIENT)
Dept: RADIOLOGY | Facility: HOSPITAL | Age: 74
DRG: 003 | End: 2024-11-24
Payer: MEDICARE

## 2024-11-24 LAB
ABO GROUP (TYPE) IN BLOOD: NORMAL
ALBUMIN SERPL BCP-MCNC: 2.9 G/DL (ref 3.4–5)
ALBUMIN SERPL BCP-MCNC: 3 G/DL (ref 3.4–5)
ANION GAP SERPL CALC-SCNC: 12 MMOL/L (ref 10–20)
ANION GAP SERPL CALC-SCNC: 14 MMOL/L (ref 10–20)
ANTIBODY SCREEN: NORMAL
BUN SERPL-MCNC: 20 MG/DL (ref 6–23)
BUN SERPL-MCNC: 22 MG/DL (ref 6–23)
CA-I BLD-SCNC: 1.04 MMOL/L (ref 1.1–1.33)
CA-I BLD-SCNC: 1.05 MMOL/L (ref 1.1–1.33)
CALCIUM SERPL-MCNC: 7.2 MG/DL (ref 8.6–10.6)
CALCIUM SERPL-MCNC: 7.5 MG/DL (ref 8.6–10.6)
CHLORIDE SERPL-SCNC: 99 MMOL/L (ref 98–107)
CHLORIDE SERPL-SCNC: 99 MMOL/L (ref 98–107)
CO2 SERPL-SCNC: 24 MMOL/L (ref 21–32)
CO2 SERPL-SCNC: 26 MMOL/L (ref 21–32)
CREAT SERPL-MCNC: 0.74 MG/DL (ref 0.5–1.3)
CREAT SERPL-MCNC: 0.8 MG/DL (ref 0.5–1.3)
EGFRCR SERPLBLD CKD-EPI 2021: >90 ML/MIN/1.73M*2
EGFRCR SERPLBLD CKD-EPI 2021: >90 ML/MIN/1.73M*2
ERYTHROCYTE [DISTWIDTH] IN BLOOD BY AUTOMATED COUNT: 16 % (ref 11.5–14.5)
ERYTHROCYTE [DISTWIDTH] IN BLOOD BY AUTOMATED COUNT: 16.1 % (ref 11.5–14.5)
GLUCOSE BLD MANUAL STRIP-MCNC: 100 MG/DL (ref 74–99)
GLUCOSE BLD MANUAL STRIP-MCNC: 103 MG/DL (ref 74–99)
GLUCOSE BLD MANUAL STRIP-MCNC: 111 MG/DL (ref 74–99)
GLUCOSE BLD MANUAL STRIP-MCNC: 131 MG/DL (ref 74–99)
GLUCOSE BLD MANUAL STRIP-MCNC: 85 MG/DL (ref 74–99)
GLUCOSE BLD MANUAL STRIP-MCNC: 97 MG/DL (ref 74–99)
GLUCOSE SERPL-MCNC: 82 MG/DL (ref 74–99)
GLUCOSE SERPL-MCNC: 93 MG/DL (ref 74–99)
HCT VFR BLD AUTO: 26.1 % (ref 41–52)
HCT VFR BLD AUTO: 27.5 % (ref 41–52)
HGB BLD-MCNC: 8.4 G/DL (ref 13.5–17.5)
HGB BLD-MCNC: 8.9 G/DL (ref 13.5–17.5)
MAGNESIUM SERPL-MCNC: 1.97 MG/DL (ref 1.6–2.4)
MAGNESIUM SERPL-MCNC: 2.06 MG/DL (ref 1.6–2.4)
MCH RBC QN AUTO: 29.4 PG (ref 26–34)
MCH RBC QN AUTO: 29.5 PG (ref 26–34)
MCHC RBC AUTO-ENTMCNC: 32.2 G/DL (ref 32–36)
MCHC RBC AUTO-ENTMCNC: 32.4 G/DL (ref 32–36)
MCV RBC AUTO: 91 FL (ref 80–100)
MCV RBC AUTO: 91 FL (ref 80–100)
NRBC BLD-RTO: 0.2 /100 WBCS (ref 0–0)
NRBC BLD-RTO: 0.2 /100 WBCS (ref 0–0)
PHOSPHATE SERPL-MCNC: 2.3 MG/DL (ref 2.5–4.9)
PHOSPHATE SERPL-MCNC: 3.4 MG/DL (ref 2.5–4.9)
PLATELET # BLD AUTO: 456 X10*3/UL (ref 150–450)
PLATELET # BLD AUTO: 577 X10*3/UL (ref 150–450)
POTASSIUM SERPL-SCNC: 3.7 MMOL/L (ref 3.5–5.3)
POTASSIUM SERPL-SCNC: 3.8 MMOL/L (ref 3.5–5.3)
RBC # BLD AUTO: 2.86 X10*6/UL (ref 4.5–5.9)
RBC # BLD AUTO: 3.02 X10*6/UL (ref 4.5–5.9)
RH FACTOR (ANTIGEN D): NORMAL
SODIUM SERPL-SCNC: 133 MMOL/L (ref 136–145)
SODIUM SERPL-SCNC: 133 MMOL/L (ref 136–145)
WBC # BLD AUTO: 21.1 X10*3/UL (ref 4.4–11.3)
WBC # BLD AUTO: 21.3 X10*3/UL (ref 4.4–11.3)

## 2024-11-24 PROCEDURE — 85027 COMPLETE CBC AUTOMATED: CPT

## 2024-11-24 PROCEDURE — 82947 ASSAY GLUCOSE BLOOD QUANT: CPT

## 2024-11-24 PROCEDURE — 2500000001 HC RX 250 WO HCPCS SELF ADMINISTERED DRUGS (ALT 637 FOR MEDICARE OP)

## 2024-11-24 PROCEDURE — 99233 SBSQ HOSP IP/OBS HIGH 50: CPT | Performed by: SPECIALIST

## 2024-11-24 PROCEDURE — 83735 ASSAY OF MAGNESIUM: CPT

## 2024-11-24 PROCEDURE — 86901 BLOOD TYPING SEROLOGIC RH(D): CPT

## 2024-11-24 PROCEDURE — 2500000002 HC RX 250 W HCPCS SELF ADMINISTERED DRUGS (ALT 637 FOR MEDICARE OP, ALT 636 FOR OP/ED)

## 2024-11-24 PROCEDURE — 37799 UNLISTED PX VASCULAR SURGERY: CPT

## 2024-11-24 PROCEDURE — 2500000004 HC RX 250 GENERAL PHARMACY W/ HCPCS (ALT 636 FOR OP/ED): Mod: JZ

## 2024-11-24 PROCEDURE — 2500000004 HC RX 250 GENERAL PHARMACY W/ HCPCS (ALT 636 FOR OP/ED)

## 2024-11-24 PROCEDURE — 2500000001 HC RX 250 WO HCPCS SELF ADMINISTERED DRUGS (ALT 637 FOR MEDICARE OP): Performed by: NURSE PRACTITIONER

## 2024-11-24 PROCEDURE — 71045 X-RAY EXAM CHEST 1 VIEW: CPT | Performed by: STUDENT IN AN ORGANIZED HEALTH CARE EDUCATION/TRAINING PROGRAM

## 2024-11-24 PROCEDURE — 82330 ASSAY OF CALCIUM: CPT

## 2024-11-24 PROCEDURE — 80069 RENAL FUNCTION PANEL: CPT

## 2024-11-24 PROCEDURE — 2020000001 HC ICU ROOM DAILY

## 2024-11-24 PROCEDURE — 99291 CRITICAL CARE FIRST HOUR: CPT | Performed by: EMERGENCY MEDICINE

## 2024-11-24 PROCEDURE — 2500000001 HC RX 250 WO HCPCS SELF ADMINISTERED DRUGS (ALT 637 FOR MEDICARE OP): Performed by: STUDENT IN AN ORGANIZED HEALTH CARE EDUCATION/TRAINING PROGRAM

## 2024-11-24 PROCEDURE — 2500000004 HC RX 250 GENERAL PHARMACY W/ HCPCS (ALT 636 FOR OP/ED): Performed by: STUDENT IN AN ORGANIZED HEALTH CARE EDUCATION/TRAINING PROGRAM

## 2024-11-24 PROCEDURE — 71045 X-RAY EXAM CHEST 1 VIEW: CPT

## 2024-11-24 RX ORDER — ACETAMINOPHEN 325 MG/1
975 TABLET ORAL EVERY 8 HOURS PRN
Status: DISCONTINUED | OUTPATIENT
Start: 2024-11-24 | End: 2024-11-25

## 2024-11-24 RX ORDER — FUROSEMIDE 10 MG/ML
40 INJECTION INTRAMUSCULAR; INTRAVENOUS ONCE
Status: COMPLETED | OUTPATIENT
Start: 2024-11-24 | End: 2024-11-24

## 2024-11-24 RX ORDER — ACETAMINOPHEN 500 MG
10 TABLET ORAL NIGHTLY
Status: DISCONTINUED | OUTPATIENT
Start: 2024-11-24 | End: 2024-12-04 | Stop reason: HOSPADM

## 2024-11-24 RX ORDER — EPINEPHRINE IN 0.9 % SOD CHLOR 4MG/250ML
0-.01 PLASTIC BAG, INJECTION (ML) INTRAVENOUS CONTINUOUS
Status: DISCONTINUED | OUTPATIENT
Start: 2024-11-24 | End: 2024-11-24

## 2024-11-24 RX ADMIN — AMIODARONE HYDROCHLORIDE 400 MG: 200 TABLET ORAL at 08:52

## 2024-11-24 RX ADMIN — POTASSIUM CHLORIDE 20 MEQ: 1.5 POWDER, FOR SOLUTION ORAL at 04:14

## 2024-11-24 RX ADMIN — MAGNESIUM SULFATE HEPTAHYDRATE 2 G: 40 INJECTION, SOLUTION INTRAVENOUS at 19:52

## 2024-11-24 RX ADMIN — HEPARIN SODIUM 5000 UNITS: 5000 INJECTION INTRAVENOUS; SUBCUTANEOUS at 17:14

## 2024-11-24 RX ADMIN — HEPARIN SODIUM 5000 UNITS: 5000 INJECTION INTRAVENOUS; SUBCUTANEOUS at 08:50

## 2024-11-24 RX ADMIN — FUROSEMIDE 40 MG: 10 INJECTION, SOLUTION INTRAMUSCULAR; INTRAVENOUS at 14:24

## 2024-11-24 RX ADMIN — CALCIUM GLUCONATE 1 G: 20 INJECTION, SOLUTION INTRAVENOUS at 19:52

## 2024-11-24 RX ADMIN — Medication 10 MG: at 20:11

## 2024-11-24 RX ADMIN — PIPERACILLIN SODIUM AND TAZOBACTAM SODIUM 4.5 G: 4; .5 INJECTION, SOLUTION INTRAVENOUS at 21:30

## 2024-11-24 RX ADMIN — ACETAMINOPHEN 650 MG: 325 TABLET ORAL at 08:51

## 2024-11-24 RX ADMIN — PIPERACILLIN SODIUM AND TAZOBACTAM SODIUM 4.5 G: 4; .5 INJECTION, SOLUTION INTRAVENOUS at 10:40

## 2024-11-24 RX ADMIN — POTASSIUM PHOSPHATE, MONOBASIC 500 MG: 500 TABLET, SOLUBLE ORAL at 08:53

## 2024-11-24 RX ADMIN — TICAGRELOR 90 MG: 90 TABLET ORAL at 20:11

## 2024-11-24 RX ADMIN — TICAGRELOR 90 MG: 90 TABLET ORAL at 08:53

## 2024-11-24 RX ADMIN — POTASSIUM CHLORIDE 40 MEQ: 1.5 POWDER, FOR SOLUTION ORAL at 19:53

## 2024-11-24 RX ADMIN — HEPARIN SODIUM 5000 UNITS: 5000 INJECTION INTRAVENOUS; SUBCUTANEOUS at 01:24

## 2024-11-24 RX ADMIN — CALCIUM GLUCONATE 1 G: 20 INJECTION, SOLUTION INTRAVENOUS at 04:14

## 2024-11-24 RX ADMIN — PIPERACILLIN SODIUM AND TAZOBACTAM SODIUM 4.5 G: 4; .5 INJECTION, SOLUTION INTRAVENOUS at 17:14

## 2024-11-24 RX ADMIN — PIPERACILLIN SODIUM AND TAZOBACTAM SODIUM 4.5 G: 4; .5 INJECTION, SOLUTION INTRAVENOUS at 04:15

## 2024-11-24 RX ADMIN — ASPIRIN 81 MG 81 MG: 81 TABLET ORAL at 08:52

## 2024-11-24 RX ADMIN — AMIODARONE HYDROCHLORIDE 400 MG: 200 TABLET ORAL at 20:11

## 2024-11-24 ASSESSMENT — PAIN - FUNCTIONAL ASSESSMENT
PAIN_FUNCTIONAL_ASSESSMENT: 0-10

## 2024-11-24 ASSESSMENT — PAIN SCALES - GENERAL
PAINLEVEL_OUTOF10: 0 - NO PAIN

## 2024-11-24 NOTE — PROGRESS NOTES
Subjective Data:  -primary team continuing diuresis  -epi of this AM, MAPS stable    Objective Data:  Last Recorded Vitals:  Vitals:    11/24/24 1100 11/24/24 1200 11/24/24 1214 11/24/24 1300   BP: 123/72 106/68  106/59   BP Location:  Left arm     Patient Position:  Lying     Pulse: 81 80  80   Resp: 22 22  23   Temp:   36.3 °C (97.3 °F)    TempSrc:  Temporal Temporal    SpO2: 99% 99%  100%   Weight:       Height:           Last Labs:  Results for orders placed or performed during the hospital encounter of 11/10/24 (from the past 24 hours)   POCT GLUCOSE   Result Value Ref Range    POCT Glucose 99 74 - 99 mg/dL   Renal Function Panel   Result Value Ref Range    Glucose 100 (H) 74 - 99 mg/dL    Sodium 132 (L) 136 - 145 mmol/L    Potassium 4.0 3.5 - 5.3 mmol/L    Chloride 98 98 - 107 mmol/L    Bicarbonate 26 21 - 32 mmol/L    Anion Gap 12 10 - 20 mmol/L    Urea Nitrogen 23 6 - 23 mg/dL    Creatinine 0.79 0.50 - 1.30 mg/dL    eGFR >90 >60 mL/min/1.73m*2    Calcium 7.6 (L) 8.6 - 10.6 mg/dL    Phosphorus 2.8 2.5 - 4.9 mg/dL    Albumin 2.9 (L) 3.4 - 5.0 g/dL   Magnesium   Result Value Ref Range    Magnesium 2.00 1.60 - 2.40 mg/dL   POCT GLUCOSE   Result Value Ref Range    POCT Glucose 92 74 - 99 mg/dL   Type And Screen   Result Value Ref Range    ABO TYPE A     Rh TYPE POS     ANTIBODY SCREEN NEG    Calcium, Ionized   Result Value Ref Range    POCT Calcium, Ionized 1.05 (L) 1.1 - 1.33 mmol/L   Magnesium   Result Value Ref Range    Magnesium 2.06 1.60 - 2.40 mg/dL   Renal Function Panel   Result Value Ref Range    Glucose 93 74 - 99 mg/dL    Sodium 133 (L) 136 - 145 mmol/L    Potassium 3.8 3.5 - 5.3 mmol/L    Chloride 99 98 - 107 mmol/L    Bicarbonate 26 21 - 32 mmol/L    Anion Gap 12 10 - 20 mmol/L    Urea Nitrogen 20 6 - 23 mg/dL    Creatinine 0.80 0.50 - 1.30 mg/dL    eGFR >90 >60 mL/min/1.73m*2    Calcium 7.2 (L) 8.6 - 10.6 mg/dL    Phosphorus 3.4 2.5 - 4.9 mg/dL    Albumin 2.9 (L) 3.4 - 5.0 g/dL   CBC   Result Value  Ref Range    WBC 21.3 (H) 4.4 - 11.3 x10*3/uL    nRBC 0.2 (H) 0.0 - 0.0 /100 WBCs    RBC 2.86 (L) 4.50 - 5.90 x10*6/uL    Hemoglobin 8.4 (L) 13.5 - 17.5 g/dL    Hematocrit 26.1 (L) 41.0 - 52.0 %    MCV 91 80 - 100 fL    MCH 29.4 26.0 - 34.0 pg    MCHC 32.2 32.0 - 36.0 g/dL    RDW 16.1 (H) 11.5 - 14.5 %    Platelets 456 (H) 150 - 450 x10*3/uL   POCT GLUCOSE   Result Value Ref Range    POCT Glucose 100 (H) 74 - 99 mg/dL   POCT GLUCOSE   Result Value Ref Range    POCT Glucose 97 74 - 99 mg/dL   POCT GLUCOSE   Result Value Ref Range    POCT Glucose 85 74 - 99 mg/dL   POCT GLUCOSE   Result Value Ref Range    POCT Glucose 111 (H) 74 - 99 mg/dL        TROPHS   Date/Time Value Ref Range Status   11/15/2024 12:28 PM 44,705 0 - 53 ng/L Final     Comment:     Previous result verified on 11/15/2024 1020 on specimen/case [id number] called with component [facility] for procedure Troponin I, High Sensitivity with value 67,971 ng/L.   11/15/2024 01:42 AM 67,971 0 - 53 ng/L Final   11/14/2024 03:54 AM 73,314 0 - 53 ng/L Final     Comment:     Previous result verified on 11/13/2024 2154 on specimen/case [id number] called with component [facility] for procedure Troponin I, High Sensitivity with value 45,685 ng/L.   11/08/2024 04:12 PM 4 0 - 20 ng/L Final     BNP   Date/Time Value Ref Range Status   11/10/2024 04:34 PM 13 0 - 99 pg/mL Final     HGBA1C   Date/Time Value Ref Range Status   11/08/2024 04:12 PM 5.1 See comment % Final     LDLCALC   Date/Time Value Ref Range Status   11/08/2024 04:12  <=99 mg/dL Final     Comment:                                 Near   Borderline      AGE      Desirable  Optimal    High     High     Very High     0-19 Y     0 - 109     ---    110-129   >/= 130     ----    20-24 Y     0 - 119     ---    120-159   >/= 160     ----      >24 Y     0 -  99   100-129  130-159   160-189     >/=190       VLDL   Date/Time Value Ref Range Status   11/08/2024 04:12 PM 64 0 - 40 mg/dL Final      Last I/O:  I/O  last 3 completed shifts:  In: 1870.5 (19.9 mL/kg) [P.O.:250; I.V.:210.5 (2.2 mL/kg); NG/GT:660; IV Piggyback:750]  Out: 4035 (42.9 mL/kg) [Urine:3465 (1 mL/kg/hr); Chest Tube:570]  Weight: 94 kg     Inpatient Medications:  Scheduled medications   Medication Dose Route Frequency    amiodarone  400 mg oral BID    aspirin  81 mg oral Daily    atorvastatin  80 mg oral Nightly    furosemide  40 mg intravenous Once    heparin  5,000 Units subcutaneous q8h    insulin lispro  0-5 Units subcutaneous q4h    melatonin  10 mg oral Nightly    piperacillin-tazobactam  4.5 g intravenous q6h    polyethylene glycol  17 g oral BID    sennosides-docusate sodium  2 tablet oral BID    ticagrelor  90 mg oral BID     PRN medications   Medication    acetaminophen    benzocaine-menthol    calcium gluconate    calcium gluconate    magnesium sulfate    magnesium sulfate    naloxone    naloxone    ondansetron    Or    ondansetron    oxyCODONE    oxyCODONE    oxygen    oxygen    potassium chloride CR    Or    potassium chloride    potassium chloride CR    Or    potassium chloride     Continuous Medications   Medication Dose Last Rate       Physical Exam:  General: sitting up in bed, NAD  Skin: warm and dry   Cardiac: S1S2  Pulm: diminished anterior, CTs in place  GI: soft, nontender  Extremities: bilat groin sites with drsg intact, no oozing no hematoma, +DP pulses bilaterally   Neuro: alert, appropriate      Assessment/Plan   Jaime Dominguez is a 74 year old male with a PMH significant for basal cell carcinoma and HPL s/p MidCAB x2 converted to full sternotomy w/ LIMA prolonged as a Y graft with a radial to LAD OM w/ Dr. Aldo Harman and Dr. Stanley. CPB time 347min. Course c/b increasing pressor requirement and echo revealing apical hypo-akinesis with subsequent IABP placement on 11/14 for further support. Overnight on 11/14, Troponin elevated and ST elevation in anteroseptal leads, c/f ACS and decision made to [facility]. Now s/p PCI of mid LAD with HEATHER.  Pt cannulated on VA ECMO for cardiogenic shock state 2/2 multiple failed grafts.     11/14  Right Radial 6 Fr -- TR Band  Left Femoral Artery ECMO Cannula  Left Femoral Vein ECMO Cannula     Patient is s/p CABG on 11/13/2024 arrived in fulminant cardiogenic shock despite IABP placement and 3 pressors.   A right femoral diagnostic cath was done with the following findings:     LM: distal 30-40%  LAD: mid 100% stenosis at anastomosis site   LCX: competitive flow in the OM without significant stenosis  RCA: patent  LIMA to LAD: Y graft with anastomosis to the LAD which is not patent and anastomosis to the OM which appears patent     Due to patient's profound shock, ECMO cannulation was done with the aforementioned access sites and the plan was made jointly with cardiac surgery to pursue salvage PCI of the mid LAD. He was given ASA and loaded with Ticagrelor. Patient is now s/p salvage PCI of the mid LAD with a Sebree Croydon 3.0 x 38 HEATHER. Following PCI and ECMO cannulation, patient's pressors were able to be weaned substantially and he left the cath lab (without intubation) on ECMO and IABP support.     ASA/Ticagrelor loading in the lab    Overnight bleeding from ECMO site requiring multiple units pRBCs, repositioned and sutured by Cardiac Surgery    11/15 IABP 1:1, left fem ECMO, on epi and levo (vaso currently weaned off), amio started for afib, pending TTE     11/19  Plan for chest washout due to concern for left hemothorax at the time of ECMO decannulation today. Remains on levo and epi, PRBCs transfusion.     11/20  Weaning sedation and IABP, currently 1:2; remains on epi and levo    11/21  IABP removed, extubated. On levo and epi gtts.     11/22  Remains on levo and epi gtts. Plan to remove MS chest tube. Passed BS swallow, encourage PO intake    11/24  -primary team continuing diuresis  -epi of this AM, MAPS stable    Interventional Recs:   - cont telemetry care per PCI protocol  - cont DAPT with Brilinta (6  months) and aspirin (lifelong)  - provided education on compliance with DAPT  - at discharge, PLEASE send 90 day prescription of Brilinta to [facility] (for price check and Meds to Beds) and remaining refills to patient's home pharmacy   - continue high intensity statin  - no BB with pressors  - please ensure cardiology follow up appointment after discharge in 1-3 weeks  - patient referred for cardiac rehab eval  - 5lb weight restriction for 5 days for right radial access  - no lifting anything heavier than 10 lbs for one week for groin access   - appreciate excellent CTICU care      Interventional Cardiology will continue to follow.     CICU Fellow Pager: [phone]   Endovascular /Limb Salvage Team Pager: [phone] for day coverage (8am-5pm)  Interventional Cardiology Fellow Pager: [phone] (7AM-5PM) Night coverage: [facility] Cross Cover [phone]  Night coverage: [facility] [id number]     I spent 30 minutes in the professional and overall care of this patient.        Regina Vega MD

## 2024-11-24 NOTE — PROGRESS NOTES
Subjective Data:  No acute events overnight. Epi weaned off this AM.    Objective Data:  Last Recorded Vitals:  Vitals:    11/24/24 1500 11/24/24 1558 11/24/24 1600 11/24/24 1700   BP: 110/81  111/73 122/79   BP Location:       Patient Position:       Pulse: 86  86 87   Resp: 24 22 24   Temp:  36.3 °C (97.3 °F)     TempSrc:  Temporal     SpO2: 98%  100% 94%   Weight:       Height:           Last Labs:  CBC - 11/24/2024:  1:14 AM  21.3 8.4 456    26.1      CMP - 11/24/2024:  1:14 AM  7.2 5.9 180 --- 0.9   3.4 2.9 35 21      PTT - 11/20/2024:  4:52 AM  1.3   14.3 28     TROPHS   Date/Time Value Ref Range Status   11/15/2024 12:28 PM 44,705 0 - 53 ng/L Final     Comment:     Previous result verified on 11/15/2024 1020 on specimen/case [id number] called with component [facility] for procedure Troponin I, High Sensitivity with value 67,971 ng/L.   11/15/2024 01:42 AM 67,971 0 - 53 ng/L Final   11/14/2024 03:54 AM 73,314 0 - 53 ng/L Final     Comment:     Previous result verified on 11/13/2024 2154 on specimen/case [id number] called with component [facility] for procedure Troponin I, High Sensitivity with value 45,685 ng/L.   11/08/2024 04:12 PM 4 0 - 20 ng/L Final     BNP   Date/Time Value Ref Range Status   11/10/2024 04:34 PM 13 0 - 99 pg/mL Final     HGBA1C   Date/Time Value Ref Range Status   11/08/2024 04:12 PM 5.1 See comment % Final     LDLCALC   Date/Time Value Ref Range Status   11/08/2024 04:12  <=99 mg/dL Final     Comment:                                 Near   Borderline      AGE      Desirable  Optimal    High     High     Very High     0-19 Y     0 - 109     ---    110-129   >/= 130     ----    20-24 Y     0 - 119     ---    120-159   >/= 160     ----      >24 Y     0 -  99   100-129  130-159   160-189     >/=190       VLDL   Date/Time Value Ref Range Status   11/08/2024 04:12 PM 64 0 - 40 mg/dL Final      Last I/O:  I/O last 3 completed shifts:  In: 1870.5 (19.9 mL/kg) [P.O.:250; I.V.:210.5 (2.2  mL/kg); NG/GT:660; IV Piggyback:750]  Out: 4035 (42.9 mL/kg) [Urine:3465 (1 mL/kg/hr); Chest Tube:570]  Weight: 94 kg     Echo:  Transthoracic Echo (TTE) Limited 11/16/2024  CONCLUSIONS:  1. Multiple segmental abnormalities exist. See findings.  2. Left ventricular ejection fraction is moderately decreased, calculated by Lambert's biplane at 34%.  3. Abnormal left venticular wall motion.  4. No left ventricular thrombus visualized.  5. There is reduced right ventricular systolic function.  6. There apperas to be a trivial pericardial effusion, however thre is also a oderate layer of echodense material overlying the RV of unclear etiology. NO obivous RA or RV collapse though not well seen and MV and V inflows not assessed for respiratory variation ( pt in afib so unable to assess given variable RR intervals) and IVC not well seen. Overall unable to determine hemodyanic significance of the material overlyin the RV.  7. Right ventricular systolic pressure is within normal limits.  8. Moderately dilated aortic root.  9. There is LIV of the subvalvular apparatus and MV leaflets of unclear significance. LVOT gradinets not assessed nor was there color Doppler on the LVOT to see if there were acceleration of flow to suggest obstructiom. ( Does not appear to have LIV -septal contact on 2D images).  10. Compared with the prior exam from 11/15/2024, there are no significant changes. The LAD territory wall motion abnormality appears to be similar. LIV has been present on prior studies.     Ejection Fractions:  EF   Date/Time Value Ref Range Status   11/21/2024 03:49 PM 33 %    11/19/2024 01:46 PM 33 %    11/18/2024 08:45 AM 43 %      Inpatient Medications:  Scheduled medications   Medication Dose Route Frequency    amiodarone  400 mg oral BID    aspirin  81 mg oral Daily    atorvastatin  80 mg oral Nightly    heparin  5,000 Units subcutaneous q8h    insulin lispro  0-5 Units subcutaneous q4h    melatonin  10 mg oral Nightly     piperacillin-tazobactam  4.5 g intravenous q6h    polyethylene glycol  17 g oral BID    sennosides-docusate sodium  2 tablet oral BID    ticagrelor  90 mg oral BID     PRN medications   Medication    acetaminophen    benzocaine-menthol    calcium gluconate    calcium gluconate    magnesium sulfate    magnesium sulfate    naloxone    naloxone    ondansetron    Or    ondansetron    oxyCODONE    oxyCODONE    oxygen    oxygen    potassium chloride CR    Or    potassium chloride    potassium chloride CR    Or    potassium chloride     Continuous Medications   Medication Dose Last Rate     Physical Exam:  GEN: frail appearing male, NAD.  CV: irregularly irregular, no m/r/g.   LUNGS: no respiratory distress on NC.  ABD: Soft, NT/ND.  SKIN: Warm, well perfused. LE edema: mild pitting edema  MSK: No deformities.  EXT: warm and well perfused. S/p ECMO and IABP removal.  NEURO: No focal deficits.     Assessment/Plan   Jaime Dominguez is a 74 year old male with a PMH significant for basal cell carcinoma and HLD who initially presented to [facility] ED on 11/8 complaining of left sided chest pain, diaphoresis, SOB nausea and syncope. ECG showed ST elevation in the inferior leads and ST depression in anterior septal leads. LHC showed 90% occlusion of the LAD. [place] [facility] [place] workup.  He is now s/p MidCAB x2 converted to full sternotomy w/ LIMA prolonged as a Y graft with a radial to LAD OM w/ Dr. Aldo Harman and Dr. Stanley. CPB time 347min. Course c/b increasing pressor requirement and echo revealing apical hypo-akinesis with subsequent IABP placement on 11/14 for further support. Overnight on 11/14, Troponin elevated and ST elevation in anteroseptal leads, c/f ACS and decision made to [facility]. Now s/p PCI of mid LAD with HEATHER. Pt cannulated on VA ECMO for cardiogenic shock state 2/2 multiple failed grafts. HF was engaged for multidisciplinary decision making regarding cardiogenic shock. Mechanical support:  s/p VA ECMO - decannulated 11/19, IABP removed 11/20.      Acute HFrEF  ICM  CAD s/p CABG and PCI   Cardiogenic shock s/p VA ECMO and IABP   - TTE 11/16: LVEF 31%, abnormal wall motion; reduced RVSF.   - s/p epinephrine on 11/24  - Diuresis: Continue diuresis per primary team.  - GDMT on hold on pressors for 24-48 hrs post epinephrine.   - Strict I/Os, Daily Na < 2g daily, fluid restriction.  - No need for advanced therapies evaluation at this time given improvement.    Hemorrhagic shock, stable  Acute on chronic anemia, stable  Concern for increase pressor requirement in setting of downtrending Hgb 11/18. Now stable s/p washout 11/19.     For any questions or concerns, feel free to reach out via [employer] chat or page at [phone].This plan was discussed with Dr. Mccain, HF attending.     Prasanth Short MD  HF Fellow

## 2024-11-24 NOTE — PROGRESS NOTES
CTICU Progress Note  Jaime Dominguez/[id number]    Admit Date: 11/10/2024  Hospital Length of Stay: 14   ICU Length of Stay: 11d 8h   CT SURGEON: Dr. Aldo Harman    SUBJECTIVE:   Pt was on Epi 0.01 overnight, weaned off this AM. Pt doing well. Tolerating PO diet, robust UOP, min CT output. Pt was OOBTC and remains HDS, NSR. NAEON.    MEDICATIONS  Infusions:  EPINEPHrine      Scheduled:  acetaminophen, 650 mg, q4h  amiodarone, 400 mg, BID  aspirin, 81 mg, Daily  atorvastatin, 80 mg, Nightly  bisacodyl, 10 mg, Once  bisacodyl, 10 mg, Daily  heparin, 5,000 Units, q8h  insulin lispro, 0-5 Units, q4h  melatonin, 5 mg, Nightly  piperacillin-tazobactam, 4.5 g, q6h  polyethylene glycol, 17 g, BID  sennosides-docusate sodium, 2 tablet, BID  ticagrelor, 90 mg, BID      PRN:  benzocaine-menthol, 1 lozenge, q2h PRN  calcium gluconate, 1 g, q6h PRN  calcium gluconate, 2 g, q6h PRN  magnesium sulfate, 2 g, q6h PRN  magnesium sulfate, 4 g, q6h PRN  naloxone, 0.2 mg, q5 min PRN  naloxone, 0.2 mg, q5 min PRN  ondansetron, 4 mg, q8h PRN   Or  ondansetron, 4 mg, q8h PRN  oxyCODONE, 2.5 mg, q4h PRN  oxyCODONE, 5 mg, q4h PRN  oxygen, , Continuous PRN - O2/gases  oxygen, , Continuous PRN - O2/gases  potassium chloride CR, 20 mEq, q6h PRN   Or  potassium chloride, 20 mEq, q6h PRN  potassium chloride CR, 40 mEq, q6h PRN   Or  potassium chloride, 40 mEq, q6h PRN        PHYSICAL EXAM:   Visit Vitals  BP (!) 110/49   Pulse 82   Temp 36.2 °C (97.2 °F) (Temporal)   Resp 21   Ht 1.829 m (6')   Wt 94 kg (207 lb 3.7 oz)   SpO2 100%   BMI 28.11 kg/m²   Smoking Status Never   BSA 2.19 m²     Wt Readings from Last 5 Encounters:   11/18/24 94 kg (207 lb 3.7 oz)   11/10/24 96.2 kg (212 lb)     INTAKE/OUTPUT:  I/O last 3 completed shifts:  In: 1870.5 (19.9 mL/kg) [P.O.:250; I.V.:210.5 (2.2 mL/kg); NG/GT:660; IV Piggyback:750]  Out: 4035 (42.9 mL/kg) [Urine:3465 (1 mL/kg/hr); Chest Tube:570]  Weight: 94 kg      Physical Exam:   Constitutional: Male patient  lying in ICU bed in no acute distress  Neuro: AOx3, no obvious focal deficits. Moves all extremities. PERRL.   CV: NSR rate 70-80s. Normotensive MAP 70s on levo. V wires present set VVI @ 50.  Pulm: On 4L NC with appropriate oxygenation. Equal rise/fall of chest. MS and BLP CT's with appropriate serosang output and no airleak.  : Clear yellow urine in aden. Scrotal edema and erythema.   GI: S/ND/NT. Corpak in place.  Extremities: NV exam intact x 4. No edema.   Skin: WDI. Postop dressings intact. Chest tube dressings intact. Chest tube sites oozing.   Psych: Calm and cooperative.     Daily Risk Screen  Intubated: No  Central line: Yes, Hemodynamic instability requiring parenteral medication  Aden: Yes, accurate I/Os in critically ill    Images: Reviewed    Invasive Hemodynamics:    Most Recent Range Past 24hrs   BP (Art) 100/51 Arterial Line BP 1  Min: 88/48  Max: 147/68   MAP(Art) 64 mmHg Arterial Line MAP 1 (mmHg)   Min: 61 mmHg  Max: 231 mmHg   RA/CVP   No data recorded   PA 37/18 No data recorded   PA(mean) 25 mmHg No data recorded     Assessment/Plan   Jaime Dominguez is a 74 year old male with a PMH significant for basal cell carcinoma and HPL s/p MidCAB x2 converted to full sternotomy w/ LIMA prolonged as a Y graft with a radial to LAD OM w/ Dr. Aldo Harman and Dr. Stanley. CPB time 347min. Course c/b increasing pressor requirement and echo revealing apical hypo-akinesis with subsequent IABP placement on 11/14 for further support. Overnight on 11/14, Troponin elevated and ST elevation in anteroseptal leads, c/f ACS and decision made to [facility]. Now s/p PCI of mid LAD with HEATHER. Pt cannulated on VA ECMO for cardiogenic shock state 2/2 multiple failed grafts.     11/19 RTOR for VA ECMO decannulation and left hemothorax washout. Removed IABP 11/20.     Plan:  NEURO: No PMHx. AOx3, follows commands, and moves all extremities. No obvious focal deficits. Physically deconditioned. OOB to chair with PT/OT yesterday, will  do again today. Pain well-managed. Acute post op pain adequately managed with current regimen.   - Serial neuro and pain assessments   - Scheduled Tylenol   - PRN oxycodone to 2.5mg for moderate and 5mg for severe  - Continue melatonin  - PT Consult -> continue to progress level of activity  - CAM ICU score qshift  - Passive ROM  - Sleep/wake cycle hygiene     CV: Patient has a history of HLD. Is now status post MIDCABG x 2 with conversion to full sternotomy LIMA prolonged as a Y graft with a radial to LAD OM on 11/12. Pre/Post EF: normal BIV. Post op course complicated by coronary graft dysfunction on 11/14. Now s/p PCI of mid LAD with HEATHER. Pt cannulated on VA ECMO for cardiogenic shock state 2/2 multiple failed grafts 11/14. ECMO turn down TTE 11/18 with LVEF 34% and reduced RV. Previous Afib w/ RVR for which patient is on amio gtt, no episode in multiple days. Hypotension on norepinephrine 0.02mcg/kg/min infusion. Remains on epinephrine 0.05mcg/kg/min for inotropy. Decannulated from VA ECMO 11/19 and IABP removed 11/20. Most recent CI - 3.24, CO - 7, SVR - 583, and SVO2 - 54%.   [] Weaned off Epi at 8am, remains HDS  - goal MAP 70-90  - Slow wean epinephrine and trend venous gases   - Maintain epicardial wires set VVI @ 40  - Amio 400mg PO BID   - Continue aspirin and Ticagrelor 90mg BID  - Continue with atorvastatin 80mg nightly  - 40mg iv lasix -> aim for 1-2L NFB    PULM: No history of pulmonary disease. CPAP overnight. Currently 4L NC. 2MS and BPL chest tubes with minimal output and no air leaks noted. Morning CXR with acute cardiogenic pulmonary edema and atelectasis and stable left-sided subcutaneous air. Will remove Rt Ct today as min output. Will have OOBTC prior to removal.   - Daily CXR  - Wean FiO2 maintaining SpO2 >92%.   - IS q1h and OOB to chair  - Chest tube to wall suction --> remove Rt chest tubes today per Dr. Seo     GI: No PMH. Passed bedside swallow. Corpak in place, he states it is  extremely uncomfortable and causing nasal congestion and wants it removed as soon as possible. Had 1 BM in last 24h.  -->   - Tolerating PO diet and drinking ensure, will remove dobhoff  - Cardiac diet --> switched to regular diet so he has more options and is more inclined to eat. Can revisit special diet if he eats more  - Milk of magnesia   - Suppository   - Colace/senna BID and miralax BID     : CSA-TREE Risk Score low. No history of renal disease, baseline creatinine 0.87. Creatinine stable post-op. Aden in place and making adequate UOP. Net -2.2L in last 24 hours. Very responsive to lasix. Hypervolemic on exam with 1+ pitting edema in all extremities.   - Continue aden catheter for strict I/Os.  - Goal UOP 0.5ml/kg/hr  - Goal -1 to 2L for shift, will Lasix if needed  - RFP bid and prn  - Replete electrolytes per CTICU protocol     ENDO: No PMH. A1c: 5.1. Euglycemia, adequately controlled on current regimen. -  - Maintain BG <180  - SSI #1     HEME: Acute blood loss anemia. Oozing has resolved. CT scan 11/18 showing left chest hemothorax with resulting compressive atelectasis, RTOR 11/19 for left hemothorax evacuation.    - Monitor drain output volume and characteristics  - Daily CBC and prn  - Continue aspirin and Ticagrelor BID  - SQH and SCDs for DVT prophylaxis  - Last type and screen: 11/24     ID: Afebrile, but previously warming. Improving leukocytosis. MRSA negative. UA negative, blood cultures sent on 11/16 and NGTD x2. MRSA, and blood cultures 11/18 negative. OR cultures from 11/19 bacterial NG final, AFB and fungal pending -->  - Continue zosyn for 7 day course (stop date 11/24)  - Continue to monitor WBC  - Trend temp q4h     Skin: No active skin issues. Oozing from chest tube sites, improved. -->  - Scrotal edema -> continue to elevate   - L groin cannula site soft and dry (no oozing), no concern for compartment syndrome (CK and myoglobin downtrend)  - preventative Mepilex dressings in place on  sacrum and heels  - change preventative Mepilex weekly or more frequently as indicated (when moist/soiled)   - every shift skin assessment per nursing and weekly ICU skin rounds  - moisture barrier to be applied with sandro care  - active skin problems addressed with nursing on daily rounds     Proph:  SCDs  SQH     G: Line  Left internal jugular triple lumen placed 11/21 -> will remove today  R radial a-line placed 11/16  Hale 11/19 -> will remove today  Dobhoff -> will remove today     F: Family: will update at bedside.     Code status: Full Code    I personally spent 45 minutes of critical care time directly and personally managing the patient exclusive of separately billable procedures.     A,B,C,D,E,F,G: reviewed    Discussed with Dr. Maldonado  Dispo: CTICU care for now. Consider floor transfer tomorrow.   CTICU TEAM PHONE [phone]

## 2024-11-24 NOTE — CARE PLAN
Problem: Skin  Goal: Participates in plan/prevention/treatment measures  Outcome: Progressing  Goal: Prevent/manage excess moisture  Outcome: Progressing  Goal: Prevent/minimize sheer/friction injuries  Outcome: Progressing

## 2024-11-24 NOTE — PROGRESS NOTES
Jaime Dominguez is a 74 y.o. male on day 14 of admission presenting with STEMI (ST elevation myocardial infarction) (Multi).    I have seen the patient either independently or with an associated resident physician or advanced practice provider    Overnight Events: - 2 L with furosemide. Epi now off this AM    Neuro: Neuro intact, out of bed. Better PO intake today  Cardiac: s/p MIDCABx2 converted to open for CABG c/b anastomotic lesion needing PCI. EF 30-35%. Now off inotropic medications.   Pacer: VVI 50  Pulmonary: Pt with improved aeration, but increasing loculated L loculated effusion. Can remove R pleural per Dr Stanley  Gastrointestinal: Improved oral intake. Will stop Tfs, remove DHT  Renal: - 2 L yesterday, goal negative 1 L today  Endocrine: Euglycemic without insulin  Hematology: H/H stable. SQH. Brillinta/ASA.   Infectious Disease: PipTazo to stop today. Afebrile, downtrending white count.   Musculoskeletal: No issues    Lines/Tubes/Drains: Can dc central line, aden today    Mechanical Circulatory Support: None    Prophylaxis: SCDs, SQH, PPI  Med to Discontinue: Pip tazo    Disposition: CTICU, floor tomorrow    ABCDEF Checklist  Analgesia: Spontaneous awakening trial to be pursued if clinically appropriate. RASS goal reviewed  Breathing: Spontaneous breathing trial to be pursued if clinically appropriate. Mechanical power of assisted ventilation reviewed  Choice of analgesia/sedation: Analgesic and sedative agents adjusted per clinical context.   Delirium assessed by CAM, will avoid exacerbating factors  Early mobility and exercise: Physical and occupational therapy engaged  Family: Plan of care, overall trajectory of patient shared with family. Questions elicited and answered as appropriate.       Due to the high probability of life threatening clinical decompensation, the patient required critical care time evaluating and managing this patient.  Critical care time included obtaining a history, examining  the patient, ordering and reviewing studies, discussing, developing, and implementing a management plan, evaluating the patient's response to treatment, and discussion with other care team providers. I saw and evaluated the patient myself.  Critical care time was performed exclusive of billable procedures.    Critical care time: 39            Néstor Maldonado MD

## 2024-11-25 ENCOUNTER — APPOINTMENT (OUTPATIENT)
Dept: CARDIOLOGY | Facility: HOSPITAL | Age: 74
DRG: 003 | End: 2024-11-25
Payer: MEDICARE

## 2024-11-25 ENCOUNTER — APPOINTMENT (OUTPATIENT)
Dept: RADIOLOGY | Facility: HOSPITAL | Age: 74
DRG: 003 | End: 2024-11-25
Payer: MEDICARE

## 2024-11-25 VITALS
HEART RATE: 83 BPM | SYSTOLIC BLOOD PRESSURE: 92 MMHG | BODY MASS INDEX: 28.07 KG/M2 | WEIGHT: 207.23 LBS | TEMPERATURE: 97.5 F | DIASTOLIC BLOOD PRESSURE: 62 MMHG | OXYGEN SATURATION: 90 % | HEIGHT: 72 IN | RESPIRATION RATE: 26 BRPM

## 2024-11-25 LAB
ALBUMIN SERPL BCP-MCNC: 2.9 G/DL (ref 3.4–5)
ANION GAP SERPL CALC-SCNC: 12 MMOL/L (ref 10–20)
BUN SERPL-MCNC: 23 MG/DL (ref 6–23)
CA-I BLD-SCNC: 1.05 MMOL/L (ref 1.1–1.33)
CALCIUM SERPL-MCNC: 7.3 MG/DL (ref 8.6–10.6)
CHLORIDE SERPL-SCNC: 100 MMOL/L (ref 98–107)
CO2 SERPL-SCNC: 26 MMOL/L (ref 21–32)
CREAT SERPL-MCNC: 0.83 MG/DL (ref 0.5–1.3)
EGFRCR SERPLBLD CKD-EPI 2021: >90 ML/MIN/1.73M*2
ERYTHROCYTE [DISTWIDTH] IN BLOOD BY AUTOMATED COUNT: 16.3 % (ref 11.5–14.5)
FUNGUS SPEC CULT: NORMAL
FUNGUS SPEC FUNGUS STN: NORMAL
GLUCOSE BLD MANUAL STRIP-MCNC: 101 MG/DL (ref 74–99)
GLUCOSE BLD MANUAL STRIP-MCNC: 95 MG/DL (ref 74–99)
GLUCOSE SERPL-MCNC: 98 MG/DL (ref 74–99)
HCT VFR BLD AUTO: 25.5 % (ref 41–52)
HGB BLD-MCNC: 8.3 G/DL (ref 13.5–17.5)
MAGNESIUM SERPL-MCNC: 2.33 MG/DL (ref 1.6–2.4)
MCH RBC QN AUTO: 29.6 PG (ref 26–34)
MCHC RBC AUTO-ENTMCNC: 32.5 G/DL (ref 32–36)
MCV RBC AUTO: 91 FL (ref 80–100)
NRBC BLD-RTO: 0.1 /100 WBCS (ref 0–0)
PHOSPHATE SERPL-MCNC: 3 MG/DL (ref 2.5–4.9)
PLATELET # BLD AUTO: 531 X10*3/UL (ref 150–450)
POTASSIUM SERPL-SCNC: 3.7 MMOL/L (ref 3.5–5.3)
RBC # BLD AUTO: 2.8 X10*6/UL (ref 4.5–5.9)
SODIUM SERPL-SCNC: 134 MMOL/L (ref 136–145)
WBC # BLD AUTO: 18.6 X10*3/UL (ref 4.4–11.3)

## 2024-11-25 PROCEDURE — 99232 SBSQ HOSP IP/OBS MODERATE 35: CPT | Performed by: STUDENT IN AN ORGANIZED HEALTH CARE EDUCATION/TRAINING PROGRAM

## 2024-11-25 PROCEDURE — 82330 ASSAY OF CALCIUM: CPT

## 2024-11-25 PROCEDURE — 2500000002 HC RX 250 W HCPCS SELF ADMINISTERED DRUGS (ALT 637 FOR MEDICARE OP, ALT 636 FOR OP/ED): Performed by: NURSE PRACTITIONER

## 2024-11-25 PROCEDURE — 2500000001 HC RX 250 WO HCPCS SELF ADMINISTERED DRUGS (ALT 637 FOR MEDICARE OP): Performed by: NURSE PRACTITIONER

## 2024-11-25 PROCEDURE — 2500000002 HC RX 250 W HCPCS SELF ADMINISTERED DRUGS (ALT 637 FOR MEDICARE OP, ALT 636 FOR OP/ED)

## 2024-11-25 PROCEDURE — 97530 THERAPEUTIC ACTIVITIES: CPT | Mod: GO

## 2024-11-25 PROCEDURE — 2500000004 HC RX 250 GENERAL PHARMACY W/ HCPCS (ALT 636 FOR OP/ED)

## 2024-11-25 PROCEDURE — 99232 SBSQ HOSP IP/OBS MODERATE 35: CPT

## 2024-11-25 PROCEDURE — 85027 COMPLETE CBC AUTOMATED: CPT

## 2024-11-25 PROCEDURE — 99291 CRITICAL CARE FIRST HOUR: CPT | Performed by: NURSE PRACTITIONER

## 2024-11-25 PROCEDURE — 71045 X-RAY EXAM CHEST 1 VIEW: CPT | Performed by: RADIOLOGY

## 2024-11-25 PROCEDURE — 2500000004 HC RX 250 GENERAL PHARMACY W/ HCPCS (ALT 636 FOR OP/ED): Performed by: NURSE PRACTITIONER

## 2024-11-25 PROCEDURE — 71045 X-RAY EXAM CHEST 1 VIEW: CPT

## 2024-11-25 PROCEDURE — 83735 ASSAY OF MAGNESIUM: CPT

## 2024-11-25 PROCEDURE — 37799 UNLISTED PX VASCULAR SURGERY: CPT

## 2024-11-25 PROCEDURE — 2500000004 HC RX 250 GENERAL PHARMACY W/ HCPCS (ALT 636 FOR OP/ED): Mod: JZ

## 2024-11-25 PROCEDURE — 2500000001 HC RX 250 WO HCPCS SELF ADMINISTERED DRUGS (ALT 637 FOR MEDICARE OP)

## 2024-11-25 PROCEDURE — 82947 ASSAY GLUCOSE BLOOD QUANT: CPT

## 2024-11-25 PROCEDURE — 97116 GAIT TRAINING THERAPY: CPT | Mod: GP

## 2024-11-25 PROCEDURE — 84132 ASSAY OF SERUM POTASSIUM: CPT

## 2024-11-25 PROCEDURE — 97530 THERAPEUTIC ACTIVITIES: CPT | Mod: GP

## 2024-11-25 PROCEDURE — 1200000002 HC GENERAL ROOM WITH TELEMETRY DAILY

## 2024-11-25 RX ORDER — BISACODYL 10 MG/1
10 SUPPOSITORY RECTAL DAILY PRN
Status: DISCONTINUED | OUTPATIENT
Start: 2024-11-25 | End: 2024-12-04 | Stop reason: HOSPADM

## 2024-11-25 RX ORDER — ACETAMINOPHEN 325 MG/1
975 TABLET ORAL EVERY 8 HOURS SCHEDULED
Status: DISCONTINUED | OUTPATIENT
Start: 2024-11-25 | End: 2024-12-04 | Stop reason: HOSPADM

## 2024-11-25 RX ORDER — MULTIVIT-MIN/IRON FUM/FOLIC AC 7.5 MG-4
1 TABLET ORAL DAILY
Status: DISCONTINUED | OUTPATIENT
Start: 2024-11-26 | End: 2024-12-04 | Stop reason: HOSPADM

## 2024-11-25 RX ORDER — IRON POLYSACCHARIDE COMPLEX 150 MG
150 CAPSULE ORAL DAILY
Status: DISCONTINUED | OUTPATIENT
Start: 2024-11-26 | End: 2024-12-04 | Stop reason: HOSPADM

## 2024-11-25 RX ORDER — ASPIRIN 81 MG/1
81 TABLET ORAL DAILY
Status: DISCONTINUED | OUTPATIENT
Start: 2024-11-26 | End: 2024-12-04 | Stop reason: HOSPADM

## 2024-11-25 RX ADMIN — SENNOSIDES AND DOCUSATE SODIUM 2 TABLET: 50; 8.6 TABLET ORAL at 08:34

## 2024-11-25 RX ADMIN — TICAGRELOR 90 MG: 90 TABLET ORAL at 08:31

## 2024-11-25 RX ADMIN — AMIODARONE HYDROCHLORIDE 400 MG: 200 TABLET ORAL at 21:55

## 2024-11-25 RX ADMIN — ASPIRIN 81 MG 81 MG: 81 TABLET ORAL at 08:31

## 2024-11-25 RX ADMIN — PIPERACILLIN SODIUM AND TAZOBACTAM SODIUM 4.5 G: 4; .5 INJECTION, SOLUTION INTRAVENOUS at 04:26

## 2024-11-25 RX ADMIN — ACETAMINOPHEN 975 MG: 325 TABLET, FILM COATED ORAL at 15:13

## 2024-11-25 RX ADMIN — CALCIUM GLUCONATE 1 G: 20 INJECTION, SOLUTION INTRAVENOUS at 04:26

## 2024-11-25 RX ADMIN — ATORVASTATIN CALCIUM 80 MG: 80 TABLET, FILM COATED ORAL at 21:56

## 2024-11-25 RX ADMIN — AMIODARONE HYDROCHLORIDE 400 MG: 200 TABLET ORAL at 08:32

## 2024-11-25 RX ADMIN — POTASSIUM CHLORIDE 40 MEQ: 1500 TABLET, EXTENDED RELEASE ORAL at 08:40

## 2024-11-25 RX ADMIN — HEPARIN SODIUM 5000 UNITS: 5000 INJECTION INTRAVENOUS; SUBCUTANEOUS at 08:45

## 2024-11-25 RX ADMIN — Medication 10 MG: at 21:55

## 2024-11-25 RX ADMIN — HEPARIN SODIUM 5000 UNITS: 5000 INJECTION INTRAVENOUS; SUBCUTANEOUS at 01:12

## 2024-11-25 RX ADMIN — TICAGRELOR 90 MG: 90 TABLET ORAL at 21:56

## 2024-11-25 RX ADMIN — HEPARIN SODIUM 5000 UNITS: 5000 INJECTION INTRAVENOUS; SUBCUTANEOUS at 18:08

## 2024-11-25 RX ADMIN — ACETAMINOPHEN 975 MG: 325 TABLET, FILM COATED ORAL at 22:01

## 2024-11-25 ASSESSMENT — PAIN - FUNCTIONAL ASSESSMENT
PAIN_FUNCTIONAL_ASSESSMENT: 0-10

## 2024-11-25 ASSESSMENT — COGNITIVE AND FUNCTIONAL STATUS - GENERAL
TURNING FROM BACK TO SIDE WHILE IN FLAT BAD: A LITTLE
WALKING IN HOSPITAL ROOM: A LITTLE
MOVING TO AND FROM BED TO CHAIR: A LITTLE
STANDING UP FROM CHAIR USING ARMS: A LITTLE
DAILY ACTIVITIY SCORE: 18
DRESSING REGULAR LOWER BODY CLOTHING: A LOT
EATING MEALS: A LITTLE
DRESSING REGULAR LOWER BODY CLOTHING: A LITTLE
MOBILITY SCORE: 15
DAILY ACTIVITIY SCORE: 17
TURNING FROM BACK TO SIDE WHILE IN FLAT BAD: A LOT
WALKING IN HOSPITAL ROOM: A LITTLE
DRESSING REGULAR LOWER BODY CLOTHING: A LITTLE
CLIMB 3 TO 5 STEPS WITH RAILING: A LOT
PERSONAL GROOMING: A LITTLE
TOILETING: A LOT
TURNING FROM BACK TO SIDE WHILE IN FLAT BAD: A LITTLE
STANDING UP FROM CHAIR USING ARMS: A LITTLE
MOVING FROM LYING ON BACK TO SITTING ON SIDE OF FLAT BED WITH BEDRAILS: A LITTLE
MOVING FROM LYING ON BACK TO SITTING ON SIDE OF FLAT BED WITH BEDRAILS: A LITTLE
MOBILITY SCORE: 17
DRESSING REGULAR UPPER BODY CLOTHING: A LITTLE
MOVING FROM LYING ON BACK TO SITTING ON SIDE OF FLAT BED WITH BEDRAILS: A LITTLE
MOVING TO AND FROM BED TO CHAIR: A LITTLE
HELP NEEDED FOR BATHING: A LITTLE
MOBILITY SCORE: 17
CLIMB 3 TO 5 STEPS WITH RAILING: A LOT
HELP NEEDED FOR BATHING: A LOT
MOVING TO AND FROM BED TO CHAIR: A LITTLE
WALKING IN HOSPITAL ROOM: A LITTLE
PERSONAL GROOMING: A LITTLE
STANDING UP FROM CHAIR USING ARMS: A LITTLE
DRESSING REGULAR UPPER BODY CLOTHING: A LITTLE
DRESSING REGULAR UPPER BODY CLOTHING: A LITTLE
DAILY ACTIVITIY SCORE: 19
CLIMB 3 TO 5 STEPS WITH RAILING: TOTAL
HELP NEEDED FOR BATHING: A LITTLE
TOILETING: A LITTLE
TOILETING: A LITTLE

## 2024-11-25 ASSESSMENT — PAIN DESCRIPTION - ORIENTATION: ORIENTATION: MID

## 2024-11-25 ASSESSMENT — PAIN SCALES - GENERAL
PAINLEVEL_OUTOF10: 0 - NO PAIN
PAINLEVEL_OUTOF10: 0 - NO PAIN
PAINLEVEL_OUTOF10: 3
PAINLEVEL_OUTOF10: 0 - NO PAIN

## 2024-11-25 ASSESSMENT — PAIN DESCRIPTION - LOCATION
LOCATION: CHEST
LOCATION: INCISION

## 2024-11-25 NOTE — PROGRESS NOTES
Occupational Therapy    Occupational Therapy Treatment    Name: Jaime Dominguez  MRN: [id number]  : 1950  Date: 24  Room:       Time Calculation  Start Time: 0948  Stop Time: 1030  Time Calculation (min): 42 min    Assessment:  OT Assessment: Pt puts forth good effort into therapy sessions. Noted increased in independence and mobility this date. Would benefit from continued skilled therapy to maximize safety and level of independence  Prognosis: Excellent  Barriers to Discharge: None  Evaluation/Treatment Tolerance: Patient tolerated treatment well  Medical Staff Made Aware: Yes  End of Session Communication: Bedside nurse  End of Session Patient Position: Up in chair, Alarm off, not on at start of session  Plan:  Treatment Interventions: ADL retraining, Functional transfer training, UE strengthening/ROM, Neuromuscular reeducation, Compensatory technique education, Fine motor coordination activities  OT Frequency: 3 times per week  OT Discharge Recommendations: High intensity level of continued care  Equipment Recommended upon Discharge: Wheeled walker  OT Recommended Transfer Status: Minimal assist  OT - OK to Discharge: Yes    Subjective   General:  OT Last Visit  OT Received On: 24  Reason for Referral: s/p PCI of mid LAD with HEATHER. Pt cannulated on VA ECMO, decannulated , IABP removed   Past Medical History Relevant to Rehab: basal cell carcinoma and HLD  Prior to Session Communication: Bedside nurse  Patient Position Received: Bed, 3 rail up, Alarm off, not on at start of session, Alarm off, caregiver present  Family/Caregiver Present: Yes  Caregiver Feedback: Son present for most of session  General Comment: Pt pleasant and willing to participate in OT session. Engaged in bed mobility, pericare, transfer to chair with arms, and steps with min A throughout.   Precautions:  Medical Precautions: Cardiac precautions, Fall precautions  Post-Surgical Precautions: Move in the  Tube  Precautions Comment: MAP 70-90, VVI@50, SpO2>92  Vitals:     11/25/24 0948 11/25/24 1000 11/25/24 1030   Vital Signs   Vitals Session Pre OT  --  Post OT   Heart Rate 83 83 83   Resp (!) 28 (!) 32 23   SpO2 95 % 95 % 97 %   /73  --  123/76   MAP (mmHg) 67  --  90   BP Location Left arm  --  Left arm   BP Method Automatic  --  Automatic   Patient Position Sitting  --  Sitting   Vital Signs Comment SpO2 decreased to 80s with steps, increased to 90s with termination of activity and rest. RN aware  --   --      Lines/Tubes/Drains:  Pacer Wires (Active)   Number of days: 12       Cognition:  Overall Cognitive Status: Within Functional Limits  Orientation Level: Oriented X4    Pain Assessment:  Pain Assessment  Pain Assessment: 0-10  0-10 (Numeric) Pain Score: 0 - No pain     Objective     Therapy/Activity:      Therapeutic Activity  Therapeutic Activity Performed: Yes  Therapeutic Activity 1: Pt engaged in bed mobility with mod A and increased time for trunk elevation, positioning at EOB.  Therapeutic Activity 2: Pt sustained EOB sit for ~5min with SBA and no LOB  Therapeutic Activity 3: Pt engaged in stand pivot from bed to chair with arms with min A arm in arm  Therapeutic Activity 4: Pt sustained standing with FWW for ~2 minutes for max A pericare  Therapeutic Activity 5: Pt engaged in ~5 steps forward and back to chair with arms with CGA and use of FWW. Pt noted feeling SOB, SpO2 to 80s, increased to 90s upon sitting.       Outcome Measures:  [facility] Daily Activity  Putting on and taking off regular lower body clothing: A lot  Bathing (including washing, rinsing, drying): A lot  Putting on and taking off regular upper body clothing: A little  Toileting, which includes using toilet, bedpan or urinal: A lot  Taking care of personal grooming such as brushing teeth: None  Eating Meals: None  Daily Activity - Total Score: 17     Education Documentation  Body Mechanics, taught by WADE HensleyOT at  11/25/2024 11:30 AM.  Learner: Patient  Readiness: Acceptance  Method: Explanation  Response: Verbalizes Understanding, Demonstrated Understanding    Precautions, taught by ALESSANDRA Hensley at 11/25/2024 11:30 AM.  Learner: Patient  Readiness: Acceptance  Method: Explanation  Response: Verbalizes Understanding, Demonstrated Understanding    Home Exercise Program, taught by ALESSANDRA Hensley at 11/25/2024 11:30 AM.  Learner: Patient  Readiness: Acceptance  Method: Explanation  Response: Verbalizes Understanding, Demonstrated Understanding    ADL Training, taught by ALESSANDRA Hensley at 11/25/2024 11:30 AM.  Learner: Patient  Readiness: Acceptance  Method: Explanation  Response: Verbalizes Understanding, Demonstrated Understanding    Education Comments  No comments found.        Goals:  Encounter Problems       Encounter Problems (Active)       ADLs       Patient will complete grooming tasks with Sup in order to maximize functional Indep with task completion.        Start:  11/18/24    Expected End:  12/05/24               ADLs       Pt will demonstrate increased independence by completing LB dressing at EOB with min A       Start:  11/21/24    Expected End:  12/05/24            Pt will demonstrate increased ADL independence by completing toileting routine with min A and use of least restrictive mobility device       Start:  11/21/24    Expected End:  12/05/24               BALANCE       Pt will demonstrate increased balance as evidenced by sustaining dynamic standing while completing self-care tasks with modified independence and use of least restrictive mobility device       Start:  11/21/24    Expected End:  12/05/24               COGNITION/SAFETY       Patient will use ICU/hospital diary with independent level of assistance to allow improved recall of hospital events.       Start:  11/18/24    Expected End:  12/05/24               COGNITION/SAFETY       Pt will demonstrate increased safety awareness  by maintaining MITT precautions while completing bed mobility with modified independence        Start:  11/21/24    Expected End:  12/05/24               EXERCISE/STRENGTHENING       Pt will increase endurance to tolerate 15-20min of activity with no more than 1 rest break in order to increase ability to engage in ADL completion.        Start:  11/18/24    Expected End:  12/05/24 11/25/24 at 12:18 PM   DAI CASTELAN, S-OT   [phone]

## 2024-11-25 NOTE — PROGRESS NOTES
Subjective Data:  No acute events overnight.   Epi weaned yesterday.   [facility]     Objective Data:  Last Recorded Vitals:  Vitals:    11/25/24 0831 11/25/24 0900 11/25/24 1000 11/25/24 1100   BP: 104/73 116/75  108/65   BP Location:       Patient Position:       Pulse: 77 77 83 86   Resp:  19 (!) 32 18   Temp:       TempSrc:       SpO2:  96% 95% 98%   Weight:       Height:           Last Labs:  CBC - 11/25/2024:  1:11 AM  18.6 8.3 531    25.5      CMP - 11/25/2024:  1:11 AM  7.3 5.9 180 --- 0.9   3.0 2.9 35 21      PTT - 11/20/2024:  4:52 AM  1.3   14.3 28     TROPHS   Date/Time Value Ref Range Status   11/15/2024 12:28 PM 44,705 0 - 53 ng/L Final     Comment:     Previous result verified on 11/15/2024 1020 on specimen/case [id number] called with component [facility] for procedure Troponin I, High Sensitivity with value 67,971 ng/L.   11/15/2024 01:42 AM 67,971 0 - 53 ng/L Final   11/14/2024 03:54 AM 73,314 0 - 53 ng/L Final     Comment:     Previous result verified on 11/13/2024 2154 on specimen/case [id number] called with component [facility] for procedure Troponin I, High Sensitivity with value 45,685 ng/L.   11/08/2024 04:12 PM 4 0 - 20 ng/L Final     BNP   Date/Time Value Ref Range Status   11/10/2024 04:34 PM 13 0 - 99 pg/mL Final     HGBA1C   Date/Time Value Ref Range Status   11/08/2024 04:12 PM 5.1 See comment % Final     LDLCALC   Date/Time Value Ref Range Status   11/08/2024 04:12  <=99 mg/dL Final     Comment:                                 Near   Borderline      AGE      Desirable  Optimal    High     High     Very High     0-19 Y     0 - 109     ---    110-129   >/= 130     ----    20-24 Y     0 - 119     ---    120-159   >/= 160     ----      >24 Y     0 -  99   100-129  130-159   160-189     >/=190       VLDL   Date/Time Value Ref Range Status   11/08/2024 04:12 PM 64 0 - 40 mg/dL Final      Last I/O:  I/O last 3 completed shifts:  In: 1680.1 (17.9 mL/kg)  [P.O.:940; I.V.:60.1 (0.6 mL/kg); NG/GT:30; IV Piggyback:650]  Out: 3895 (41.4 mL/kg) [Urine:3775 (1.1 mL/kg/hr); Chest Tube:120]  Weight: 94 kg     Echo:  Transthoracic Echo (TTE) Limited 11/16/2024  CONCLUSIONS:  1. Multiple segmental abnormalities exist. See findings.  2. Left ventricular ejection fraction is moderately decreased, calculated by Lambert's biplane at 34%.  3. Abnormal left venticular wall motion.  4. No left ventricular thrombus visualized.  5. There is reduced right ventricular systolic function.  6. There apperas to be a trivial pericardial effusion, however thre is also a oderate layer of echodense material overlying the RV of unclear etiology. NO obivous RA or RV collapse though not well seen and MV and V inflows not assessed for respiratory variation ( pt in afib so unable to assess given variable RR intervals) and IVC not well seen. Overall unable to determine hemodyanic significance of the material overlyin the RV.  7. Right ventricular systolic pressure is within normal limits.  8. Moderately dilated aortic root.  9. There is LIV of the subvalvular apparatus and MV leaflets of unclear significance. LVOT gradinets not assessed nor was there color Doppler on the LVOT to see if there were acceleration of flow to suggest obstructiom. ( Does not appear to have LIV -septal contact on 2D images).  10. Compared with the prior exam from 11/15/2024, there are no significant changes. The LAD territory wall motion abnormality appears to be similar. LIV has been present on prior studies.     Ejection Fractions:  EF   Date/Time Value Ref Range Status   11/21/2024 03:49 PM 33 %    11/19/2024 01:46 PM 33 %    11/18/2024 08:45 AM 43 %      Inpatient Medications:  Scheduled medications   Medication Dose Route Frequency    amiodarone  400 mg oral BID    aspirin  81 mg oral Daily    atorvastatin  80 mg oral Nightly    heparin  5,000 Units subcutaneous q8h    melatonin  10 mg oral Nightly    polyethylene glycol   17 g oral BID    sennosides-docusate sodium  2 tablet oral BID    ticagrelor  90 mg oral BID     PRN medications   Medication    acetaminophen    benzocaine-menthol    calcium gluconate    calcium gluconate    magnesium sulfate    magnesium sulfate    naloxone    naloxone    ondansetron    Or    ondansetron    oxyCODONE    oxyCODONE    oxygen    oxygen    potassium chloride CR    Or    potassium chloride    potassium chloride CR    Or    potassium chloride     Continuous Medications   Medication Dose Last Rate     Physical Exam:  GEN: NAD.  CV: RRR, no m/r/g.   LUNGS: no respiratory distress on NC.  ABD: Soft, NT/ND.  SKIN: Warm, well perfused. LE edema: mild pitting edema  MSK: No deformities.  EXT: warm and well perfused.   NEURO: No focal deficits.     Assessment/Plan   Jaime Dominguez is a 74 year old male with a PMH significant for basal cell carcinoma and HLD who initially presented to [facility] ED on 11/8 complaining of left sided chest pain, diaphoresis, SOB nausea and syncope. ECG showed ST elevation in the inferior leads and ST depression in anterior septal leads. LHC showed 90% occlusion of the LAD. Transferred to [facility] on 11/10 for cardiac surgery workup.  He is now s/p MidCAB x2 converted to full sternotomy w/ LIMA prolonged as a Y graft with a radial to LAD OM w/ Dr. Aldo Harman and Dr. Stanley. CPB time 347min. Course c/b increasing pressor requirement and echo revealing apical hypo-akinesis with subsequent IABP placement on 11/14 for further support. Overnight on 11/14, Troponin elevated and ST elevation in anteroseptal leads, c/f ACS and decision made to [facility]. Now s/p PCI of mid LAD with HEATHER. Pt cannulated on VA ECMO for cardiogenic shock state 2/2 multiple failed grafts. HF was engaged for multidisciplinary decision making regarding cardiogenic shock. Mechanical support: s/p VA ECMO - decannulated 11/19, IABP removed 11/20. Epi weaned 11/24.     Acute HFrEF  ICM  CAD s/p CABG and PCI   Cardiogenic shock  s/p VA ECMO and IABP   - TTE 11/16: LVEF 31%, abnormal wall motion; reduced RVSF.   - please repeat TTE today  - Diuresis: Continue diuresis per primary team.  - GDMT on hold on pressors for 24-48 hrs post epinephrine. Epinephrine off on 11/24.  - Strict I/Os, Daily Na < 2g daily, fluid restriction.  - No need for advanced therapies evaluation at this time given improvement.  - Rest of management per CT surgery.     Hemorrhagic shock, stable  Acute on chronic anemia, stable  Concern for increase pressor requirement in setting of downtrending Hgb 11/18. Now stable s/p washout 11/19.     For any questions or concerns, feel free to reach out via [place] chat or page at [phone].This plan was discussed with Dr. Gann, HF attending.     Kira Alvares DO  HF Fellow

## 2024-11-25 NOTE — PROGRESS NOTES
Subjective Data:  - doing well being transferred to floor today   - transferring to floor today     Objective Data:  Last Recorded Vitals:  Vitals:    11/25/24 0800 11/25/24 0831 11/25/24 0900 11/25/24 1000   BP: 121/65 104/73 116/75    BP Location: Left arm      Patient Position: Lying      Pulse: 79 77 77 83   Resp: 20  19 (!) 32   Temp: 36.2 °C (97.2 °F)      TempSrc: Temporal      SpO2: 99%  96% 95%   Weight:       Height:           Last Labs:  Results for orders placed or performed during the hospital encounter of 11/10/24 (from the past 24 hours)   POCT GLUCOSE   Result Value Ref Range    POCT Glucose 111 (H) 74 - 99 mg/dL   POCT GLUCOSE   Result Value Ref Range    POCT Glucose 131 (H) 74 - 99 mg/dL   Renal Function Panel   Result Value Ref Range    Glucose 82 74 - 99 mg/dL    Sodium 133 (L) 136 - 145 mmol/L    Potassium 3.7 3.5 - 5.3 mmol/L    Chloride 99 98 - 107 mmol/L    Bicarbonate 24 21 - 32 mmol/L    Anion Gap 14 10 - 20 mmol/L    Urea Nitrogen 22 6 - 23 mg/dL    Creatinine 0.74 0.50 - 1.30 mg/dL    eGFR >90 >60 mL/min/1.73m*2    Calcium 7.5 (L) 8.6 - 10.6 mg/dL    Phosphorus 2.3 (L) 2.5 - 4.9 mg/dL    Albumin 3.0 (L) 3.4 - 5.0 g/dL   Magnesium   Result Value Ref Range    Magnesium 1.97 1.60 - 2.40 mg/dL   Calcium, Ionized   Result Value Ref Range    POCT Calcium, Ionized 1.04 (L) 1.1 - 1.33 mmol/L   CBC   Result Value Ref Range    WBC 21.1 (H) 4.4 - 11.3 x10*3/uL    nRBC 0.2 (H) 0.0 - 0.0 /100 WBCs    RBC 3.02 (L) 4.50 - 5.90 x10*6/uL    Hemoglobin 8.9 (L) 13.5 - 17.5 g/dL    Hematocrit 27.5 (L) 41.0 - 52.0 %    MCV 91 80 - 100 fL    MCH 29.5 26.0 - 34.0 pg    MCHC 32.4 32.0 - 36.0 g/dL    RDW 16.0 (H) 11.5 - 14.5 %    Platelets 577 (H) 150 - 450 x10*3/uL   POCT GLUCOSE   Result Value Ref Range    POCT Glucose 103 (H) 74 - 99 mg/dL   POCT GLUCOSE   Result Value Ref Range    POCT Glucose 101 (H) 74 - 99 mg/dL   Calcium, Ionized   Result Value Ref Range    POCT Calcium, Ionized 1.05 (L) 1.1 - 1.33 mmol/L    Magnesium   Result Value Ref Range    Magnesium 2.33 1.60 - 2.40 mg/dL   Renal Function Panel   Result Value Ref Range    Glucose 98 74 - 99 mg/dL    Sodium 134 (L) 136 - 145 mmol/L    Potassium 3.7 3.5 - 5.3 mmol/L    Chloride 100 98 - 107 mmol/L    Bicarbonate 26 21 - 32 mmol/L    Anion Gap 12 10 - 20 mmol/L    Urea Nitrogen 23 6 - 23 mg/dL    Creatinine 0.83 0.50 - 1.30 mg/dL    eGFR >90 >60 mL/min/1.73m*2    Calcium 7.3 (L) 8.6 - 10.6 mg/dL    Phosphorus 3.0 2.5 - 4.9 mg/dL    Albumin 2.9 (L) 3.4 - 5.0 g/dL   CBC   Result Value Ref Range    WBC 18.6 (H) 4.4 - 11.3 x10*3/uL    nRBC 0.1 (H) 0.0 - 0.0 /100 WBCs    RBC 2.80 (L) 4.50 - 5.90 x10*6/uL    Hemoglobin 8.3 (L) 13.5 - 17.5 g/dL    Hematocrit 25.5 (L) 41.0 - 52.0 %    MCV 91 80 - 100 fL    MCH 29.6 26.0 - 34.0 pg    MCHC 32.5 32.0 - 36.0 g/dL    RDW 16.3 (H) 11.5 - 14.5 %    Platelets 531 (H) 150 - 450 x10*3/uL   POCT GLUCOSE   Result Value Ref Range    POCT Glucose 95 74 - 99 mg/dL        TROPHS   Date/Time Value Ref Range Status   11/15/2024 12:28 PM 44,705 0 - 53 ng/L Final     Comment:     Previous result verified on 11/15/2024 1020 on specimen/case [id number] called with component [facility] for procedure Troponin I, High Sensitivity with value 67,971 ng/L.   11/15/2024 01:42 AM 67,971 0 - 53 ng/L Final   11/14/2024 03:54 AM 73,314 0 - 53 ng/L Final     Comment:     Previous result verified on 11/13/2024 2154 on specimen/case [id number] called with component [facility] for procedure Troponin I, High Sensitivity with value 45,685 ng/L.   11/08/2024 04:12 PM 4 0 - 20 ng/L Final     BNP   Date/Time Value Ref Range Status   11/10/2024 04:34 PM 13 0 - 99 pg/mL Final     HGBA1C   Date/Time Value Ref Range Status   11/08/2024 04:12 PM 5.1 See comment % Final     LDLCALC   Date/Time Value Ref Range Status   11/08/2024 04:12  <=99 mg/dL Final     Comment:                                 Near   Borderline      AGE      Desirable  Optimal    High     High      Very High     0-19 Y     0 - 109     ---    110-129   >/= 130     ----    20-24 Y     0 - 119     ---    120-159   >/= 160     ----      >24 Y     0 -  99   100-129  130-159   160-189     >/=190       VLDL   Date/Time Value Ref Range Status   11/08/2024 04:12 PM 64 0 - 40 mg/dL Final      Last I/O:  I/O last 3 completed shifts:  In: 1680.1 (17.9 mL/kg) [P.O.:940; I.V.:60.1 (0.6 mL/kg); NG/GT:30; IV Piggyback:650]  Out: 3895 (41.4 mL/kg) [Urine:3775 (1.1 mL/kg/hr); Chest Tube:120]  Weight: 94 kg     Inpatient Medications:  Scheduled medications   Medication Dose Route Frequency    amiodarone  400 mg oral BID    aspirin  81 mg oral Daily    atorvastatin  80 mg oral Nightly    heparin  5,000 Units subcutaneous q8h    melatonin  10 mg oral Nightly    polyethylene glycol  17 g oral BID    sennosides-docusate sodium  2 tablet oral BID    ticagrelor  90 mg oral BID     PRN medications   Medication    acetaminophen    benzocaine-menthol    calcium gluconate    calcium gluconate    magnesium sulfate    magnesium sulfate    naloxone    naloxone    ondansetron    Or    ondansetron    oxyCODONE    oxyCODONE    oxygen    oxygen    potassium chloride CR    Or    potassium chloride    potassium chloride CR    Or    potassium chloride     Continuous Medications   Medication Dose Last Rate       Physical Exam:  General: sitting up in bed, NAD  Skin: warm and dry   Cardiac: S1S2  Pulm: diminished anterior, CTs in place  GI: soft, nontender  Extremities: bilat groin sites with drsg intact, no oozing no hematoma, +DP pulses bilaterally   Neuro: alert, appropriate      Assessment/Plan   Jaime Dominguez is a 74 year old male with a PMH significant for basal cell carcinoma and HPL s/p MidCAB x2 converted to full sternotomy w/ LIMA prolonged as a Y graft with a radial to LAD OM w/ Dr. Aldo Harman and Dr. Stanley. CPB time 347min. Course c/b increasing pressor requirement and echo revealing apical hypo-akinesis with subsequent IABP placement  on 11/14 for further support. Overnight on 11/14, Troponin elevated and ST elevation in anteroseptal leads, c/f ACS and decision made to [facility]. Now s/p PCI of mid LAD with HEATHER. Pt cannulated on VA ECMO for cardiogenic shock state 2/2 multiple failed grafts.     11/14  Right Radial 6 Fr -- TR Band  Left Femoral Artery ECMO Cannula  Left Femoral Vein ECMO Cannula     Patient is s/p CABG on 11/13/2024 arrived in fulminant cardiogenic shock despite IABP placement and 3 pressors.   A right femoral diagnostic cath was done with the following findings:     LM: distal 30-40%  LAD: mid 100% stenosis at anastomosis site   LCX: competitive flow in the OM without significant stenosis  RCA: patent  LIMA to LAD: Y graft with anastomosis to the LAD which is not patent and anastomosis to the OM which appears patent     Due to patient's profound shock, ECMO cannulation was done with the aforementioned access sites and the plan was made jointly with cardiac surgery to pursue salvage PCI of the mid LAD. He was given ASA and loaded with Ticagrelor. Patient is now s/p salvage PCI of the mid LAD with a Guillaume Holmes 3.0 x 38 HEATHER. Following PCI and ECMO cannulation, patient's pressors were able to be weaned substantially and he left the cath lab (without intubation) on ECMO and IABP support.     ASA/Ticagrelor loading in the lab    Overnight bleeding from ECMO site requiring multiple units pRBCs, repositioned and sutured by Cardiac Surgery    11/15 IABP 1:1, left fem ECMO, on epi and levo (vaso currently weaned off), amio started for afib, pending TTE     11/19  Plan for chest washout due to concern for left hemothorax at the time of ECMO decannulation today. Remains on levo and epi, PRBCs transfusion.     11/20  Weaning sedation and IABP, currently 1:2; remains on epi and levo    11/21  IABP removed, extubated. On levo and epi gtts.     11/22  Remains on levo and epi gtts. Plan to remove MS chest tube. Passed BS swallow, encourage PO  intake    11/24  -primary team continuing diuresis  -epi of this AM, MAPS stable    11/25  - off drips  - transferring to floor today     Interventional Recs:   - cont telemetry care per PCI protocol  - cont DAPT with Brilinta (6 months) and aspirin (lifelong)  - provided education on compliance with DAPT  - at discharge, PLEASE send 90 day prescription of Brilinta to [facility] (for price check and Meds to Beds) and remaining refills to patient's home pharmacy   - continue high intensity statin  - no BB with pressors  - please ensure cardiology follow up appointment after discharge in 1-3 weeks  - patient referred for cardiac rehab eval  - 5lb weight restriction for 5 days for right radial access  - no lifting anything heavier than 10 lbs for one week for groin access   - appreciate excellent CTICU care      Interventional Cardiology will continue to follow.     CICU Fellow Pager: [phone]   Endovascular /Limb Salvage Team Pager: [phone] for day coverage (8am-5pm)  Interventional Cardiology Fellow Pager: [phone] (7AM-5PM) Night coverage: [facility] Cross Cover [phone]  Night coverage: [facility] [phone]     I spent 30 minutes in the professional and overall care of this patient.        Frank Wilhelm, APRN-CNP, DNP

## 2024-11-25 NOTE — PROGRESS NOTES
Patient at los 15d review via chart and rounds. Last Care Transitions' note 11/22. Admit date was 11/10 presenting with STEMI. 11/12 MIDCAB c/b anastomotic lesion requiring PCI to LAD, ECMO/IABP, now decannualted 11/19 and IABP removed 11/20. PT/OT documented 11/22 and PT again on 11/23 each time working on bed mobility with recs for high indicative of AR. Noted in notes is medical acuity presently barrier to working with patient. Consult of AHF following for Acute HFrEF. As of yesterday, off pressors. Patient documented as A&Ox3 and moving all extremities. Oxygenation with CPAP o'nt and 4l nc day. Corpack in place with plans to remove. In review of orders, 11/25 to transfer to LT3 (tele) if bed available. DC Planning deferred to when of ICU as to whether might qualify for acute rehab per 11/22 & 11/23 PT/OT recs.      Dana Christianson (LSW, MSW)

## 2024-11-25 NOTE — CARE PLAN
Problem: Skin  Goal: Promote/optimize nutrition  Outcome: Progressing  Flowsheets (Taken 11/25/2024 5768)  Promote/optimize nutrition: Consume > 50% meals/supplements     Problem: Safety - Adult  Goal: Free from fall injury  Outcome: Progressing     Problem: Chronic Conditions and Co-morbidities  Goal: Patient's chronic conditions and co-morbidity symptoms are monitored and maintained or improved  Outcome: Progressing

## 2024-11-25 NOTE — PROGRESS NOTES
11/25/24 1337   Discharge Planning   Expected Discharge Disposition Rehab     Met with patient and introduced myself as Care Coordinator and member of the discharge planning team.  Pt is s/p CABG x2  and Evacuation of L hemothorax. We discussed PT and OT's recommendation for high intensity therapy. He was given a list of area Acute [facility]. Patient would like a referral sent to [facility]. Care Coordinator will continue to follow for discharge needs.

## 2024-11-25 NOTE — PROGRESS NOTES
Music Therapy Note    Jaime Dominguez     Therapy Session  Referral Type: New referral this admission  Visit Type: New visit  Session Start Time: 1521  Session End Time: 1524  Intervention Delivery: In-person  Number of family members present: 1  Family Present for Session: Child  Family Participation: Supportive               Treatment/Interventions       Post-assessment  Total Session Time (min): 3 minutes    Narrative  Assessment Detail: MT spoke with pt's son outside of pt's room. Pt's son asked more about music therapy and shared about his father being a  for 40 years. He wasn't sure about services or music preference but agreed to MT checking in again soon about services. MT will f/u accordingly    Education Documentation  No documentation found.

## 2024-11-25 NOTE — PROGRESS NOTES
Physical Therapy    Physical Therapy Treatment    Patient Name: Jaime Dominguez  MRN: [id number]  Department: [place] [address]  Room: [address] [place] [address]  Today's Date: 11/25/2024  Time Calculation  Start Time: 1202  Stop Time: 1225  Time Calculation (min): 23 min    Assessment/Plan   PT Assessment  PT Assessment Results: Decreased strength, Decreased endurance, Impaired balance, Decreased mobility  Rehab Prognosis: Excellent  Barriers to Discharge: Medical acuity  Evaluation/Treatment Tolerance: Patient tolerated treatment well  Medical Staff Made Aware: Yes  End of Session Communication: Bedside nurse  End of Session Patient Position: Up in chair, Alarm off, not on at start of session  PT Plan  Inpatient/Swing Bed or Outpatient: Inpatient  PT Plan  Treatment/Interventions: Bed mobility, Transfer training, Gait training, Stair training, Balance training, Strengthening, Endurance training, Therapeutic exercise, Therapeutic activity  PT Plan: Ongoing PT  PT Frequency: 5 times per week  PT Discharge Recommendations: High intensity level of continued care  Equipment Recommended upon Discharge: Wheeled walker  PT Recommended Transfer Status: Assist x2  PT - OK to Discharge: Yes    General Visit Information:   PT  Visit  PT Received On: 11/25/24  Response to Previous Treatment: Patient with no complaints from previous session.  General  Missed Visit: No  Family/Caregiver Present: Yes  Caregiver Feedback: Son present throughout session  Prior to Session Communication: Bedside nurse  Patient Position Received: Up in chair, Alarm off, not on at start of session  Preferred Learning Style: auditory, verbal  General Comment: Pt awake, alert and willing to participate in PT session.  Pt completed sit<>stand transfers and ambulation with thoracic walker.  CGA-minAx1 provided for all mobility.  Pt eager to ambulate and tearful at end of session.  Vitals stable. (Tele, external pacer)    Subjective   Precautions:  Precautions  Medical Precautions:  Cardiac precautions, Fall precautions  Post-Surgical Precautions: Move in the Tube  Precautions Comment: MAP 70-90, VVI@40, SpO2>92    Vital Signs (Past 2hrs)        Date/Time Vitals Session Patient Position Pulse Resp SpO2 BP MAP (mmHg)    11/25/24 1202 Pre PT  Sitting  85  18  98 %  121/82  95     11/25/24 1225 Post PT  Sitting  86  --  98 %  126/84  98            Vital Signs Comment: Vitals stable with mobility     Objective   Pain:  Pain Assessment  Pain Assessment: 0-10  0-10 (Numeric) Pain Score: 0 - No pain  Cognition:  Cognition  Overall Cognitive Status: Within Functional Limits  Arousal/Alertness: Appropriate responses to stimuli  Orientation Level: Oriented X4  Following Commands: Follows all commands and directions without difficulty  Activity Tolerance:  Activity Tolerance  Endurance: Tolerates 10 - 20 min exercise with multiple rests  Early Mobility/Exercise Safety Screen: Proceed with mobilization - No exclusion criteria met  Activity Tolerance Comments: SOB with activity however vitals stable  Rate of Perceived Dyspnea (RPD): 3/10  Treatments:  Therapeutic Activity  Therapeutic Activity Performed: Yes  Therapeutic Activity 1: Assisted RN with taking to tower 3 and transferring to chair.  Therapeutic Activity 2: Pt in static standing ~1 min for BM clean up - B arm in arm, minAx1 provided  Therapeutic Activity 3: Assisted pt with donning under garments - pt requiring B arm in arm with CGA-minAx1 to complete    Bed Mobility  Bed Mobility: No    Ambulation/Gait Training  Ambulation/Gait Training Performed: Yes  Ambulation/Gait Training 1  Surface 1: Level tile  Device 1: Thoracic walker  Assistance 1: Contact guard  Comments/Distance (ft) 1: ~40ft x1 (Decreased rosa, intermittent scissoring gait.  Intermittent lateral path deviation.  Increased weight through UEs to complete.)  Transfers  Transfer: Yes  Transfer 1  Transfer From 1: Sit to, Stand to  Transfer to 1: Sit, Stand  Technique 1: Sit to  stand, Stand to sit  Transfer Device 1: Thoracic walker  Transfer Level of Assistance 1: Minimum assistance (x1)  Trials/Comments 1: x2 transfers from chair with B arm in arm, x1 transfer from chair with thoracic walker  Transfers 2  Transfer From 2: Wheelchair to  Transfer to 2: Chair with arms  Transfer Device 2:  (B arm in arm)  Transfer Level of Assistance 2: Minimum assistance (x1)  Trials/Comments 2: ~5ft to complete transfer    Stairs  Stairs: No    Outcome Measures:  [facility] Basic Mobility  Turning from your back to your side while in a flat bed without using bedrails: A little  Moving from lying on your back to sitting on the side of a flat bed without using bedrails: A lot  Moving to and from bed to chair (including a wheelchair): A little  Standing up from a chair using your arms (e.g. wheelchair or bedside chair): A little  To walk in hospital room: A little  Climbing 3-5 steps with railing: Total  Basic Mobility - Total Score: 15    FSS-ICU  Ambulation: Walks >/ or equal to 50 feet with any assistance x1  Rolling: Moderate assistance (performs 50 - 74% of task)  Sitting: Supervision or set-up only  Transfer Sit-to-Stand: Minimal assistance (performs 75% or more of task)  Transfer Supine-to-Sit: Moderate assistance (performs 50 - 74% of task)  Total Score: 17    Early Mobility/Exercise Safety Screen: Proceed with mobilization - No exclusion criteria met  ICU Mobility Scale: Walking with assistance of 1 person [8]    Education Documentation  Precautions, taught by Jocelyne Lynch PT at 11/25/2024 12:51 PM.  Learner: Patient  Readiness: Acceptance  Method: Explanation, Demonstration  Response: Demonstrated Understanding, Verbalizes Understanding    Body Mechanics, taught by Jocelyne Lynch PT at 11/25/2024 12:51 PM.  Learner: Patient  Readiness: Acceptance  Method: Explanation, Demonstration  Response: Demonstrated Understanding, Verbalizes Understanding    Mobility Training, taught by Jocelyne Lynch PT at  11/25/2024 12:51 PM.  Learner: Patient  Readiness: Acceptance  Method: Explanation, Demonstration  Response: Demonstrated Understanding, Verbalizes Understanding    Education Comments  No comments found.    EDUCATION:       Encounter Problems       Encounter Problems (Active)       Balance       Pt will demonstrate improved sitting/standing static/dynamic balance activities without LOB via score of 24/28 on the Tinetti for increase in safety prior to DC.  (Progressing)       Start:  11/21/24    Expected End:  12/05/24               Mobility       Pt will ambulate >15ft with appropriate form, Mod A or less, LRAD, and no LOB for safe DC.  (Met)       Start:  11/21/24    Expected End:  12/05/24    Resolved:  11/25/24    Updated to: Pt will ambulate >150ft with appropriate form, CGA, LRAD, and no LOB for safe DC.    Update reason: Met         Pt will tolerate >15 minutes of upright standing activity without seated rest breaks with no changes in vital signs for improved functional mobility.  (Progressing)       Start:  11/21/24    Expected End:  12/05/24            Pt will ambulate >150ft with appropriate form, CGA, LRAD, and no LOB for safe DC. (Progressing)       Start:  11/25/24    Expected End:  12/05/24                   PT Transfers       Pt will perform bed mobility with Mod A or less and use of LRAD to safely DC.  (Met)       Start:  11/21/24    Expected End:  12/05/24    Resolved:  11/25/24    Updated to: Pt will perform bed mobility with CGA and use of LRAD to safely DC.    Update reason: Met         Pt will perform sit<>stand transfers with Mod A or less and use of LRAD to safely DC.  (Met)       Start:  11/21/24    Expected End:  12/05/24    Resolved:  11/25/24    Updated to: Pt will perform sit<>stand transfers with CGA and use of LRAD to safely DC.    Update reason: Met         Pt will perform bed mobility with CGA and use of LRAD to safely DC. (Progressing)       Start:  11/25/24    Expected End:  12/05/24                 Pt will perform sit<>stand transfers with CGA and use of LRAD to safely DC. (Progressing)       Start:  11/25/24    Expected End:  12/05/24

## 2024-11-25 NOTE — PROGRESS NOTES
CTICU Progress Note  Jaime Dominguez/[id number]    Admit Date: 11/10/2024  Hospital Length of Stay: 15   ICU Length of Stay: 12d 7h   CT SURGEON: Dr. Aldo Harman    SUBJECTIVE:   Overnight, he had a successful trial of void following Hale removal. No acute events.    MEDICATIONS     Scheduled:  amiodarone, 400 mg, BID  aspirin, 81 mg, Daily  atorvastatin, 80 mg, Nightly  heparin, 5,000 Units, q8h  insulin lispro, 0-5 Units, q4h  melatonin, 10 mg, Nightly  polyethylene glycol, 17 g, BID  sennosides-docusate sodium, 2 tablet, BID  ticagrelor, 90 mg, BID      PRN:  acetaminophen, 975 mg, q8h PRN  benzocaine-menthol, 1 lozenge, q2h PRN  calcium gluconate, 1 g, q6h PRN  calcium gluconate, 2 g, q6h PRN  magnesium sulfate, 2 g, q6h PRN  magnesium sulfate, 4 g, q6h PRN  naloxone, 0.2 mg, q5 min PRN  naloxone, 0.2 mg, q5 min PRN  ondansetron, 4 mg, q8h PRN   Or  ondansetron, 4 mg, q8h PRN  oxyCODONE, 2.5 mg, q4h PRN  oxyCODONE, 5 mg, q4h PRN  oxygen, , Continuous PRN - O2/gases  oxygen, , Continuous PRN - O2/gases  potassium chloride CR, 20 mEq, q6h PRN   Or  potassium chloride, 20 mEq, q6h PRN  potassium chloride CR, 40 mEq, q6h PRN   Or  potassium chloride, 40 mEq, q6h PRN      PHYSICAL EXAM:   Visit Vitals  /65   Pulse 79   Temp 36.2 °C (97.2 °F) (Temporal)   Resp 20   Ht 1.829 m (6')   Wt 94 kg (207 lb 3.7 oz)   SpO2 99%   BMI 28.11 kg/m²   Smoking Status Never   BSA 2.19 m²     Wt Readings from Last 5 Encounters:   11/18/24 94 kg (207 lb 3.7 oz)   11/10/24 96.2 kg (212 lb)     INTAKE/OUTPUT:  I/O last 3 completed shifts:  In: 1680.1 (17.9 mL/kg) [P.O.:940; I.V.:60.1 (0.6 mL/kg); NG/GT:30; IV Piggyback:650]  Out: 3895 (41.4 mL/kg) [Urine:3775 (1.1 mL/kg/hr); Chest Tube:120]  Weight: 94 kg      Physical Exam:   Constitutional: Male patient lying in ICU bed in no acute distress  Neuro: AOx3, no obvious focal deficits. Moves all extremities. PERRL.   CV: NSR rate 70-90s NSR. V wires present set backup VVI @ 50.  Pulm:  On 2L NC with appropriate oxygenation. Equal rise/fall of chest.  : Scrotal edema, erythema.   GI: S/ND/NT.   Extremities: NV exam intact x 4. No edema.   Skin: WDI. Postop dressings intact  Psych: Calm and cooperative.     Daily Risk Screen  Intubated: No  Central line: Remove today    Images: Reviewed    Invasive Hemodynamics:    Most Recent Range Past 24hrs   BP (Art) 91/51 Arterial Line BP 1  Min: 78/40  Max: 152/142   MAP(Art) 66 mmHg Arterial Line MAP 1 (mmHg)   Min: 53 mmHg  Max: 147 mmHg   RA/CVP   No data recorded   PA 37/18 No data recorded   PA(mean) 25 mmHg No data recorded     Assessment/Plan   Jaime Dominguez is a 74 year old male with PMH significant for basal cell carcinoma and HLD s/p MidCAB x2 converted to full sternotomy w/ LIMA prolonged as a Y graft with a radial to LAD OM w/ Dr. Aldo Harman and Dr. Stanley. CPB time 347min. Course c/b increasing pressor requirement and echo revealing apical hypo-akinesis with subsequent IABP placement on 11/14 for further support. Overnight on 11/14, Troponin rise and ST elevation in anteroseptal leads, c/f ACS- went for LHC, underwent PCI of mid LAD with HEATHER. Also 11/14- he was cannulated for VA ECMO for cardiogenic shock 2/2 multiple failed grafts. On 11/19, returned to OR for VA ECMO decannulation and left hemothorax washout. IABP removed 11/20. Progressive improvement, plan to transfer to floor today.     Plan:  NEURO: No PMHx. AOx3, follows commands, and moves all extremities. Physically deconditioned. OOB to chair with PT/OT yesterday, excited to work with PT again today. Pain well-managed with current regimen.   - Serial neuro and pain assessments   - PRN Tylenol, oxycodone 2.5/5mg  - Melatonin before bedtime  - PT consulted -> continue to progress level of activity  - CAM ICU score qshift  - Sleep/wake cycle hygiene     CV: Patient has a history of HLD. Is now status post MIDCABG x 2 with conversion to full sternotomy LIMA prolonged as a Y graft with a  radial to LAD OM on 11/12. Pre/Post EF: normal BIV. Post op course complicated by IABP, coronary graft dysfunction on 11/14, requiring PCI of mid LAD with HEATHER, and VA ECMO for cardiogenic shock state 2/2 multiple failed grafts- cannulated 11/14. ECMO turn down TTE 11/18 with LVEF 34% and reduced RV. Now s/p ECMO decannulation 11/19, IABP removal 11/20. Previous Afib w/ RVR for which patient was started on amiodarone, now PO. Weaned off all pressors/inotropy 11/24, remains HDS.  - Additional TTE ordered today per cardiology request  - goal MAP 70-90  - Epicardial wires set backup VVI @ 40  - Amio 400mg PO BID   - Continue aspirin and Ticagrelor 90mg BID  - Continue with atorvastatin 80mg nightly    PULM: No history of pulmonary disease. Currently 2L NC. Chest tubes removed. CXR today continues to show stable opacity of L upper/middle lobe.   - Daily CXR  - Wean FiO2 maintaining SpO2 >92%.   - IS q1h and OOB to chair     GI: No PMH. Had 2 BMs in last 24h.  -->   - Tolerating PO diet and drinking ensure,  - Regular diet for more meal options  - Colace/senna BID and miralax BID     : CSA-TREE Risk Score low. No history of renal disease, baseline creatinine 0.87. Creatinine stable post-op. Voiding appropirately s/p removal of Aden, ~0.66 mL/kg/hr.   - Continue aden catheter for strict I/Os.  - Goal UOP 0.5ml/kg/hr  - RFP bid and prn  - Replete electrolytes per CTICU protocol     ENDO: No PMH. A1c: 5.1. Euglycemia, adequately controlled on current regimen. -  - Maintain BG <180  - Discontinued SSI given no requirements     HEME: Acute blood loss anemia. Oozing has resolved. CT scan 11/18 showing left chest hemothorax with resulting compressive atelectasis, s/p RTOR 11/19 for left hemothorax evacuation.    - Daily CBC and prn  - Continue aspirin and Ticagrelor BID  - SQH and SCDs for DVT prophylaxis  - Last type and screen: 11/24     ID: Afebrile. Improving leukocytosis. MRSA negative. UA negative, blood cultures sent  on 11/16 with no growth- final. MRSA, and blood cultures 11/18 negative. OR cultures from 11/19 bacterial NG final, AFB stain- final pending and fungal negative so far, final pending -->  - Completed 7day course of Zoysyn  - Continue to monitor WBC  - Trend temp q4h     Skin: No active skin issues. -->  - Scrotal edema -> continue to elevate   - L groin cannula site soft and dry (no oozing), no concern for compartment syndrome (CK and myoglobin downtrend)  - preventative Mepilex dressings in place on sacrum and heels  - change preventative Mepilex weekly or more frequently as indicated (when moist/soiled)   - every shift skin assessment per nursing and weekly ICU skin rounds  - moisture barrier to be applied with sandro care  - active skin problems addressed with nursing on daily rounds     Proph:  SCDs  SQH     G: Line  Left internal jugular triple lumen placed 11/21 -> remove today  R radial a-line placed 11/16 -> remove today     F: Family: will update at bedside.     Code status: Full Code    I personally spent 40 minutes of critical care time directly and personally managing the patient exclusive of separately billable procedures.     A,B,C,D,E,F,G: reviewed    Reviewed and approved by JENI GRAHAM on 11/25/24 at 10:47 AM.    Dispo: Transfer to floor today  CTICU TEAM PHONE [phone]

## 2024-11-25 NOTE — CARE PLAN
Problem: Skin  Goal: Participates in plan/prevention/treatment measures  Outcome: Progressing  Goal: Prevent/manage excess moisture  11/25/2024 1011 by Emely Murphy RN  Outcome: Progressing  11/25/2024 1009 by Emely Murphy RN  Outcome: Progressing  Goal: Prevent/minimize sheer/friction injuries  Outcome: Progressing  Goal: Promote/optimize nutrition  Outcome: Progressing     Problem: Fall/Injury  Goal: Verbalize understanding of personal risk factors for fall in the hospital  Outcome: Progressing

## 2024-11-26 ENCOUNTER — APPOINTMENT (OUTPATIENT)
Dept: CARDIOLOGY | Facility: HOSPITAL | Age: 74
DRG: 003 | End: 2024-11-26
Payer: MEDICARE

## 2024-11-26 ENCOUNTER — APPOINTMENT (OUTPATIENT)
Dept: RADIOLOGY | Facility: HOSPITAL | Age: 74
DRG: 003 | End: 2024-11-26
Payer: MEDICARE

## 2024-11-26 LAB
ACID FAST STN SPEC: NORMAL
ALBUMIN SERPL BCP-MCNC: 3 G/DL (ref 3.4–5)
ANION GAP SERPL CALC-SCNC: 12 MMOL/L (ref 10–20)
BUN SERPL-MCNC: 18 MG/DL (ref 6–23)
CALCIUM SERPL-MCNC: 7.8 MG/DL (ref 8.6–10.6)
CHLORIDE SERPL-SCNC: 103 MMOL/L (ref 98–107)
CO2 SERPL-SCNC: 24 MMOL/L (ref 21–32)
CREAT SERPL-MCNC: 0.75 MG/DL (ref 0.5–1.3)
EGFRCR SERPLBLD CKD-EPI 2021: >90 ML/MIN/1.73M*2
ERYTHROCYTE [DISTWIDTH] IN BLOOD BY AUTOMATED COUNT: 16.5 % (ref 11.5–14.5)
GLUCOSE SERPL-MCNC: 83 MG/DL (ref 74–99)
HCT VFR BLD AUTO: 26 % (ref 41–52)
HGB BLD-MCNC: 8.3 G/DL (ref 13.5–17.5)
MAGNESIUM SERPL-MCNC: 2.41 MG/DL (ref 1.6–2.4)
MCH RBC QN AUTO: 29.7 PG (ref 26–34)
MCHC RBC AUTO-ENTMCNC: 31.9 G/DL (ref 32–36)
MCV RBC AUTO: 93 FL (ref 80–100)
MYCOBACTERIUM SPEC CULT: NORMAL
NRBC BLD-RTO: 0 /100 WBCS (ref 0–0)
PHOSPHATE SERPL-MCNC: 2.8 MG/DL (ref 2.5–4.9)
PLATELET # BLD AUTO: 545 X10*3/UL (ref 150–450)
POTASSIUM SERPL-SCNC: 3.3 MMOL/L (ref 3.5–5.3)
RBC # BLD AUTO: 2.79 X10*6/UL (ref 4.5–5.9)
SODIUM SERPL-SCNC: 136 MMOL/L (ref 136–145)
WBC # BLD AUTO: 14.6 X10*3/UL (ref 4.4–11.3)

## 2024-11-26 PROCEDURE — 2500000001 HC RX 250 WO HCPCS SELF ADMINISTERED DRUGS (ALT 637 FOR MEDICARE OP): Performed by: NURSE PRACTITIONER

## 2024-11-26 PROCEDURE — 97116 GAIT TRAINING THERAPY: CPT | Mod: GP,CQ

## 2024-11-26 PROCEDURE — 36416 COLLJ CAPILLARY BLOOD SPEC: CPT | Performed by: NURSE PRACTITIONER

## 2024-11-26 PROCEDURE — 71046 X-RAY EXAM CHEST 2 VIEWS: CPT

## 2024-11-26 PROCEDURE — 99232 SBSQ HOSP IP/OBS MODERATE 35: CPT | Performed by: NURSE PRACTITIONER

## 2024-11-26 PROCEDURE — 83735 ASSAY OF MAGNESIUM: CPT | Performed by: NURSE PRACTITIONER

## 2024-11-26 PROCEDURE — 1200000002 HC GENERAL ROOM WITH TELEMETRY DAILY

## 2024-11-26 PROCEDURE — 85027 COMPLETE CBC AUTOMATED: CPT | Performed by: NURSE PRACTITIONER

## 2024-11-26 PROCEDURE — 2500000004 HC RX 250 GENERAL PHARMACY W/ HCPCS (ALT 636 FOR OP/ED): Performed by: NURSE PRACTITIONER

## 2024-11-26 PROCEDURE — 71045 X-RAY EXAM CHEST 1 VIEW: CPT

## 2024-11-26 PROCEDURE — 71045 X-RAY EXAM CHEST 1 VIEW: CPT | Performed by: RADIOLOGY

## 2024-11-26 PROCEDURE — 80069 RENAL FUNCTION PANEL: CPT | Performed by: NURSE PRACTITIONER

## 2024-11-26 PROCEDURE — 2500000002 HC RX 250 W HCPCS SELF ADMINISTERED DRUGS (ALT 637 FOR MEDICARE OP, ALT 636 FOR OP/ED): Performed by: NURSE PRACTITIONER

## 2024-11-26 PROCEDURE — 97530 THERAPEUTIC ACTIVITIES: CPT | Mod: GP,CQ

## 2024-11-26 PROCEDURE — 99233 SBSQ HOSP IP/OBS HIGH 50: CPT | Performed by: STUDENT IN AN ORGANIZED HEALTH CARE EDUCATION/TRAINING PROGRAM

## 2024-11-26 RX ORDER — FUROSEMIDE 10 MG/ML
40 INJECTION INTRAMUSCULAR; INTRAVENOUS ONCE
Status: COMPLETED | OUTPATIENT
Start: 2024-11-26 | End: 2024-11-26

## 2024-11-26 RX ORDER — AMIODARONE HYDROCHLORIDE 200 MG/1
200 TABLET ORAL DAILY
Status: DISCONTINUED | OUTPATIENT
Start: 2024-11-27 | End: 2024-12-04 | Stop reason: HOSPADM

## 2024-11-26 RX ORDER — SPIRONOLACTONE 25 MG/1
25 TABLET ORAL DAILY
Status: DISCONTINUED | OUTPATIENT
Start: 2024-11-26 | End: 2024-12-04 | Stop reason: HOSPADM

## 2024-11-26 RX ORDER — POTASSIUM CHLORIDE 20 MEQ/1
40 TABLET, EXTENDED RELEASE ORAL 2 TIMES DAILY
Status: COMPLETED | OUTPATIENT
Start: 2024-11-26 | End: 2024-11-26

## 2024-11-26 RX ORDER — POTASSIUM CHLORIDE 20 MEQ/1
40 TABLET, EXTENDED RELEASE ORAL ONCE
Status: COMPLETED | OUTPATIENT
Start: 2024-11-26 | End: 2024-11-26

## 2024-11-26 RX ORDER — TRAMADOL HYDROCHLORIDE 50 MG/1
25 TABLET ORAL ONCE
Status: COMPLETED | OUTPATIENT
Start: 2024-11-26 | End: 2024-11-26

## 2024-11-26 RX ORDER — METHOCARBAMOL 500 MG/1
500 TABLET, FILM COATED ORAL EVERY 8 HOURS SCHEDULED
Status: DISCONTINUED | OUTPATIENT
Start: 2024-11-26 | End: 2024-11-26

## 2024-11-26 RX ADMIN — TRAMADOL HYDROCHLORIDE 25 MG: 50 TABLET, COATED ORAL at 16:54

## 2024-11-26 RX ADMIN — ACETAMINOPHEN 975 MG: 325 TABLET, FILM COATED ORAL at 09:57

## 2024-11-26 RX ADMIN — POTASSIUM CHLORIDE 40 MEQ: 1500 TABLET, EXTENDED RELEASE ORAL at 18:20

## 2024-11-26 RX ADMIN — TICAGRELOR 90 MG: 90 TABLET ORAL at 09:49

## 2024-11-26 RX ADMIN — FUROSEMIDE 40 MG: 10 INJECTION, SOLUTION INTRAMUSCULAR; INTRAVENOUS at 18:20

## 2024-11-26 RX ADMIN — SPIRONOLACTONE 25 MG: 25 TABLET, FILM COATED ORAL at 16:54

## 2024-11-26 RX ADMIN — TICAGRELOR 90 MG: 90 TABLET ORAL at 20:19

## 2024-11-26 RX ADMIN — ACETAMINOPHEN 975 MG: 325 TABLET, FILM COATED ORAL at 20:24

## 2024-11-26 RX ADMIN — ASPIRIN 81 MG: 81 TABLET, COATED ORAL at 09:49

## 2024-11-26 RX ADMIN — ATORVASTATIN CALCIUM 80 MG: 80 TABLET, FILM COATED ORAL at 20:19

## 2024-11-26 RX ADMIN — Medication 1 TABLET: at 09:49

## 2024-11-26 RX ADMIN — FUROSEMIDE 40 MG: 10 INJECTION, SOLUTION INTRAMUSCULAR; INTRAVENOUS at 11:19

## 2024-11-26 RX ADMIN — POTASSIUM CHLORIDE 40 MEQ: 1500 TABLET, EXTENDED RELEASE ORAL at 20:19

## 2024-11-26 RX ADMIN — HEPARIN SODIUM 5000 UNITS: 5000 INJECTION INTRAVENOUS; SUBCUTANEOUS at 01:41

## 2024-11-26 RX ADMIN — HEPARIN SODIUM 5000 UNITS: 5000 INJECTION INTRAVENOUS; SUBCUTANEOUS at 09:49

## 2024-11-26 RX ADMIN — POLYSACCHARIDE-IRON COMPLEX 150 MG: 150 CAPSULE ORAL at 09:49

## 2024-11-26 RX ADMIN — HEPARIN SODIUM 5000 UNITS: 5000 INJECTION INTRAVENOUS; SUBCUTANEOUS at 16:54

## 2024-11-26 RX ADMIN — POTASSIUM CHLORIDE 40 MEQ: 1500 TABLET, EXTENDED RELEASE ORAL at 11:19

## 2024-11-26 RX ADMIN — Medication 10 MG: at 20:18

## 2024-11-26 ASSESSMENT — PAIN - FUNCTIONAL ASSESSMENT
PAIN_FUNCTIONAL_ASSESSMENT: 0-10

## 2024-11-26 ASSESSMENT — COGNITIVE AND FUNCTIONAL STATUS - GENERAL
MOVING TO AND FROM BED TO CHAIR: A LITTLE
DRESSING REGULAR UPPER BODY CLOTHING: A LITTLE
MOVING TO AND FROM BED TO CHAIR: A LITTLE
TURNING FROM BACK TO SIDE WHILE IN FLAT BAD: A LITTLE
DRESSING REGULAR LOWER BODY CLOTHING: A LITTLE
DAILY ACTIVITIY SCORE: 18
TURNING FROM BACK TO SIDE WHILE IN FLAT BAD: A LOT
MOVING FROM LYING ON BACK TO SITTING ON SIDE OF FLAT BED WITH BEDRAILS: A LITTLE
MOVING FROM LYING ON BACK TO SITTING ON SIDE OF FLAT BED WITH BEDRAILS: A LITTLE
MOVING TO AND FROM BED TO CHAIR: A LITTLE
PERSONAL GROOMING: A LITTLE
MOBILITY SCORE: 17
HELP NEEDED FOR BATHING: A LITTLE
MOBILITY SCORE: 17
WALKING IN HOSPITAL ROOM: A LITTLE
CLIMB 3 TO 5 STEPS WITH RAILING: A LOT
DRESSING REGULAR UPPER BODY CLOTHING: A LITTLE
WALKING IN HOSPITAL ROOM: A LITTLE
STANDING UP FROM CHAIR USING ARMS: A LITTLE
TURNING FROM BACK TO SIDE WHILE IN FLAT BAD: A LITTLE
WALKING IN HOSPITAL ROOM: A LITTLE
CLIMB 3 TO 5 STEPS WITH RAILING: TOTAL
TOILETING: A LITTLE
MOBILITY SCORE: 15
STANDING UP FROM CHAIR USING ARMS: A LITTLE
DRESSING REGULAR LOWER BODY CLOTHING: A LITTLE
HELP NEEDED FOR BATHING: A LITTLE
MOVING FROM LYING ON BACK TO SITTING ON SIDE OF FLAT BED WITH BEDRAILS: A LITTLE
TOILETING: A LITTLE
CLIMB 3 TO 5 STEPS WITH RAILING: A LOT
PERSONAL GROOMING: A LITTLE
STANDING UP FROM CHAIR USING ARMS: A LITTLE
DAILY ACTIVITIY SCORE: 19
EATING MEALS: A LITTLE

## 2024-11-26 ASSESSMENT — PAIN SCALES - GENERAL
PAINLEVEL_OUTOF10: 0 - NO PAIN
PAINLEVEL_OUTOF10: 0 - NO PAIN
PAINLEVEL_OUTOF10: 3
PAINLEVEL_OUTOF10: 3
PAINLEVEL_OUTOF10: 2
PAINLEVEL_OUTOF10: 0 - NO PAIN
PAINLEVEL_OUTOF10: 0 - NO PAIN
PAINLEVEL_OUTOF10: 9

## 2024-11-26 ASSESSMENT — PAIN DESCRIPTION - DESCRIPTORS
DESCRIPTORS: ACHING;SORE
DESCRIPTORS: ACHING;PRESSURE

## 2024-11-26 ASSESSMENT — PAIN DESCRIPTION - ORIENTATION: ORIENTATION: MID

## 2024-11-26 ASSESSMENT — PAIN DESCRIPTION - LOCATION
LOCATION: INCISION
LOCATION: INCISION

## 2024-11-26 NOTE — PROGRESS NOTES
Subjective Data:  [facility]  Doing well this AM. No acute complaints.     Objective Data:  Last Recorded Vitals:  Vitals:    11/26/24 0449 11/26/24 0938 11/26/24 1052 11/26/24 1409   BP: 97/62  101/66 99/65   BP Location: Left arm  Left arm Left arm   Patient Position: Sitting  Sitting Sitting   Pulse: 78 81 94 77   Resp: 18  18 18   Temp: 36.5 °C (97.7 °F)  35.9 °C (96.6 °F) 36 °C (96.8 °F)   TempSrc: Temporal  Temporal Temporal   SpO2: 97%  94% 96%   Weight: 99.6 kg (219 lb 9.3 oz)      Height:           Last Labs:  CBC - 11/26/2024:  7:05 AM  14.6 8.3 545    26.0      CMP - 11/26/2024:  7:05 AM  7.8 5.9 180 --- 0.9   2.8 3.0 35 21      PTT - 11/20/2024:  4:52 AM  1.3   14.3 28     TROPHS   Date/Time Value Ref Range Status   11/15/2024 12:28 PM 44,705 0 - 53 ng/L Final     Comment:     Previous result verified on 11/15/2024 1020 on specimen/case [id number] called with component TRPHS for procedure Troponin I, High Sensitivity with value 67,971 ng/L.   11/15/2024 01:42 AM 67,971 0 - 53 ng/L Final   11/14/2024 03:54 AM 73,314 0 - 53 ng/L Final     Comment:     Previous result verified on 11/13/2024 2154 on specimen/case [id number] called with component TRPHS for procedure Troponin I, High Sensitivity with value 45,685 ng/L.   11/08/2024 04:12 PM 4 0 - 20 ng/L Final     BNP   Date/Time Value Ref Range Status   11/10/2024 04:34 PM 13 0 - 99 pg/mL Final     HGBA1C   Date/Time Value Ref Range Status   11/08/2024 04:12 PM 5.1 See comment % Final     LDLCALC   Date/Time Value Ref Range Status   11/08/2024 04:12  <=99 mg/dL Final     Comment:                                 Near   Borderline      AGE      Desirable  Optimal    High     High     Very High     0-19 Y     0 - 109     ---    110-129   >/= 130     ----    20-24 Y     0 - 119     ---    120-159   >/= 160     ----      >24 Y     0 -  99   100-129  130-159   160-189     >/=190       VLDL   Date/Time Value  Ref Range Status   11/08/2024 04:12 PM 64 0 - 40 mg/dL Final      Last I/O:  I/O last 3 completed shifts:  In: 1000 (10.7 mL/kg) [P.O.:700; IV Piggyback:300]  Out: 1925 (20.6 mL/kg) [Urine:1925 (0.6 mL/kg/hr)]  Dosing Weight: 93.4 kg     Echo:  Transthoracic Echo (TTE) Limited 11/16/2024  CONCLUSIONS:  1. Multiple segmental abnormalities exist. See findings.  2. Left ventricular ejection fraction is moderately decreased, calculated by Lambert's biplane at 34%.  3. Abnormal left venticular wall motion.  4. No left ventricular thrombus visualized.  5. There is reduced right ventricular systolic function.  6. There apperas to be a trivial pericardial effusion, however thre is also a oderate layer of echodense material overlying the RV of unclear etiology. NO obivous RA or RV collapse though not well seen and MV and V inflows not assessed for respiratory variation ( pt in afib so unable to assess given variable RR intervals) and IVC not well seen. Overall unable to determine hemodyanic significance of the material overlyin the RV.  7. Right ventricular systolic pressure is within normal limits.  8. Moderately dilated aortic root.  9. There is LIV of the subvalvular apparatus and MV leaflets of unclear significance. LVOT gradinets not assessed nor was there color Doppler on the LVOT to see if there were acceleration of flow to suggest obstructiom. ( Does not appear to have LIV -septal contact on 2D images).  10. Compared with the prior exam from 11/15/2024, there are no significant changes. The LAD territory wall motion abnormality appears to be similar. LIV has been present on prior studies.     Ejection Fractions:  EF   Date/Time Value Ref Range Status   11/21/2024 03:49 PM 33 %    11/19/2024 01:46 PM 33 %    11/18/2024 08:45 AM 43 %      Inpatient Medications:  Scheduled medications   Medication Dose Route Frequency    acetaminophen  975 mg oral q8h [facility]    [START ON 11/27/2024] amiodarone  200 mg oral Daily    aspirin   81 mg oral Daily    atorvastatin  80 mg oral Nightly    [START ON 11/27/2024] empagliflozin  10 mg oral Daily    heparin  5,000 Units subcutaneous q8h    iron polysaccharides  150 mg oral Daily    melatonin  10 mg oral Nightly    multivitamin with minerals  1 tablet oral Daily    polyethylene glycol  17 g oral BID    potassium chloride CR  40 mEq oral BID    sennosides-docusate sodium  2 tablet oral BID    spironolactone  25 mg oral Daily    ticagrelor  90 mg oral BID     PRN medications   Medication    benzocaine-menthol    bisacodyl    naloxone    naloxone    ondansetron    Or    ondansetron    oxygen    sodium chloride     Continuous Medications   Medication Dose Last Rate     Physical Exam:  GEN: NAD.  CV: RRR, no m/r/g.   LUNGS: no respiratory distress on NC.  ABD: Soft, NT/ND.  SKIN: Warm, well perfused. LE edema: +2 pitting edema  MSK: No deformities.  EXT: warm and well perfused.   NEURO: No focal deficits.     Assessment/Plan   Jaime Dominguez is a 74 year old male with a PMH significant for basal cell carcinoma and HLD who initially presented to [facility] [place], operated by [facility] ED on 11/8 complaining of left sided chest pain, diaphoresis, SOB nausea and syncope. ECG showed ST elevation in the inferior leads and ST depression in anterior septal leads. LHC showed 90% occlusion of the LAD. Transferred to [facility] on 11/10 for cardiac surgery workup.  He is now s/p MidCAB x2 converted to full sternotomy w/ LIMA prolonged as a Y graft with a radial to LAD OM w/ Dr. Aldo Harman and Dr. Stanley. CPB time 347min. Course c/b increasing pressor requirement and echo revealing apical hypo-akinesis with subsequent IABP placement on 11/14 for further support. Overnight on 11/14, Troponin elevated and ST elevation in anteroseptal leads, c/f ACS and decision made to [facility]. Now s/p PCI of mid LAD with HEATHER. Pt cannulated on VA ECMO for cardiogenic shock state 2/2 multiple failed grafts. HF was engaged for multidisciplinary decision making regarding  cardiogenic shock. Mechanical support: s/p VA ECMO - decannulated 11/19, IABP removed 11/20. Epi weaned 11/24.     HFrEF  ICM  CAD s/p CABG and PCI   Cardiogenic shock s/p VA ECMO and IABP   - TTE 11/16: LVEF 31%, abnormal wall motion; reduced RVSF.   - Please repeat TTE today  - Diuresis: Continue diuresis per primary team.  - GDMT:   - SGLT-2i: start jardiance 10mg daily              - Beta-blocker: On hold              - ACE-I/ARB/ARNI: On hold              - MRA: start aldactone 25mg daily   - AICD/CRT-D/Lifevest: Follow up TTE in outpatient setting prior to considering.  - Strict I/Os, Daily Na < 2g daily, fluid restriction.     For any questions or concerns, feel free to reach out via [employer] chat or page at [phone].This plan was discussed with Dr. Gann, HF attending.     Kira Alvares, DO  HF Fellow

## 2024-11-26 NOTE — PROGRESS NOTES
CARDIAC SURGERY DAILY PROGRESS NOTE    Jaime Dominguez is a 74 y.o. male, with a PMH of basal cell carcinoma and HLD who presented with STEMI to an OSH and transferred to [facility] on 11/10/2024 for CABG workup. S/p MidCAB x2 converted to full sternotomy w/ LIMA prolonged as a Y graft with a radial to LAD OM w/ Dr. Aldo Harman and Dr. Stanley 11/12. Postop course c/b increasing pressor requirement and echo revealing apical hypo-akinesis with subsequent IABP placement on 11/14. Overnight on 11/14, Troponin rise and ST elevation in anteroseptal leads, c/f ACS- went for LHC, underwent PCI of mid LAD with HEATHER. Cannulated for VA ECMO 11/14 for cardiogenic shock 2/2 multiple failed grafts. On 11/19, returned to OR for VA ECMO decannulation and left hemothorax washout. IABP removed 11/20. Transferred to floor 11/25.     OPERATION/PROCEDURE: 11/12 with Rodrigo Harman MD  1.  Mini invasive robotic assisted CABG x 2 converted to full sternotomy.  2.  CABG x 2 with LIMA  prolonged as a Y graft with a radial to LAD OM.  3.  Endoscopic radial harvest.  4.  Left femoral artery and vein cannulation for peripheral bypass.  5.  Partial LAD endarterectomy    CTICU Course: see above  Transferred to T3 on 11/25.    Interval History:   Stable overnight    SUBJECTIVE:  Slept well    Objective   BP 97/62 (BP Location: Left arm, Patient Position: Sitting)   Pulse 78   Temp 36.5 °C (97.7 °F) (Temporal)   Resp 18   Ht 1.829 m (6')   Wt 99.6 kg (219 lb 9.3 oz)   SpO2 97%   BMI 29.78 kg/m²   0-10 (Numeric) Pain Score: 0 - No pain   3 Day Weight Change: Unable to Calculate    Intake and Output    Intake/Output Summary (Last 24 hours) at 11/26/2024 0831  Last data filed at 11/25/2024 2148  Gross per 24 hour   Intake 480 ml   Output 1050 ml   Net -570 ml       Physical Exam  Physical Exam  Vitals reviewed.   Constitutional:       Appearance: Normal appearance.   HENT:      Mouth/Throat:      Mouth: Mucous membranes are  moist.   Eyes:      General: Lids are normal.      Extraocular Movements: Extraocular movements intact.      Conjunctiva/sclera: Conjunctivae normal.      Pupils: Pupils are equal, round, and reactive to light.   Cardiovascular:      Rate and Rhythm: Regular rhythm.      Pulses: Normal pulses.      Heart sounds: Normal heart sounds.      Comments:     Pulmonary:      Effort: Pulmonary effort is normal.      Breath sounds: Normal breath sounds.      Comments: Pleural chest tubes to suction, no airleak, serosang output  Abdominal:      General: Bowel sounds are normal.      Palpations: Abdomen is soft.   Musculoskeletal:      Right lower leg: Edema present.      Left lower leg: Edema present.      Comments: Edema of LUE, near graft site   Skin:     General: Skin is warm and dry.      Capillary Refill: Capillary refill takes less than 2 seconds.   Neurological:      General: No focal deficit present.      Mental Status: He is alert and oriented to person, place, and time.   Psychiatric:         Behavior: Behavior normal. Behavior is cooperative.       Medications  Scheduled medications  acetaminophen, 975 mg, oral, q8h POWER  amiodarone, 400 mg, oral, BID  aspirin, 81 mg, oral, Daily  atorvastatin, 80 mg, oral, Nightly  heparin, 5,000 Units, subcutaneous, q8h  iron polysaccharides, 150 mg, oral, Daily  melatonin, 10 mg, oral, Nightly  multivitamin with minerals, 1 tablet, oral, Daily  polyethylene glycol, 17 g, oral, BID  sennosides-docusate sodium, 2 tablet, oral, BID  ticagrelor, 90 mg, oral, BID    Continuous medications   PRN medications  PRN medications: benzocaine-menthol, bisacodyl, naloxone, naloxone, ondansetron **OR** ondansetron, oxyCODONE, oxyCODONE, oxygen, sodium chloride    Labs  No results found for this or any previous visit (from the past 24 hours).            IMAGING/ DIAGNOSTIC TESTING:  I have personally reviewed the following test result(s):      11/21 TTE:  CONCLUSIONS:   1. The left ventricular  systolic function is moderately decreased, with a visually estimated ejection fraction of 30-35%.   2. Abnormal left venticular wall motion.   3. There is septal-apical a- to dyskinesia. There is dynamic function at the basal segments and increased color flow signal in the mid LV.   4. There is mid cavity left ventricular obstruction.   5. There is normal right ventricular global systolic function.   6. There is a 25 x 14 mm echodensity in the anterior mediastinum; clinical correlation warranted.    11/26 1v CXR:  IMPRESSION:  1.  Slight interval improvement in left sided loculated pleural  fluid/hematoma. No pneumothorax.    IMPRESSION & PLAN:  POD # 14 s/p midCAB x2 converted to full sternotomy s/p PCI of mid LAD with HEATHER. VA ECMO 11/14-19. IABP 11/14-20  - Increase activity/ ambulation; PT/OT  - Encourage IS, C/DB; respiratory therapy; wean O2 as josué   - Cardiac rehab referral   - Continue cardiac meds: ASA, statin   - continue brilinta for HEATHER stent  - Pain and anticonstipation meds  - 2v CXR ordered  - Postop echo ordered  - Cut epicardial wires prior to discharge   - Tele until discharge  - Optimize nutrition and electrolytes    Rhythm, Previous afib  - Tele: NSR  - decrease amio to 200mg daily  - holding BB with reduced EF  - Adjust medications as tolerated    Acute Blood Loss Anemia   Recent Labs     11/25/24  0111 11/24/24  1715 11/24/24  0114 11/23/24  1347 11/23/24  0114 11/22/24  1205 11/22/24  0013   HGB 8.3* 8.9* 8.4* 8.7* 8.2* 8.3* 8.2*   HCT 25.5* 27.5* 26.1* 27.2* 25.2* 25.5* 25.2*   - MV, PO Iron x1mo  - Daily labs, transfuse as indicated    Volume/Electrolyte Status: Preop wt Weight: 96.2 kg (212 lb)   Vitals:    11/26/24 0449   Weight: 99.6 kg (219 lb 9.3 oz)     - Adjust diuresis as needed for postop cardiac surgery hypervolemia  - Replete electrolytes for hypokalemia/hypomagnesemia/hypophosphatemia as needed, aggressive K repletion  - Daily weights and strict I&Os  - Daily RFP while  admitted    Leukocytosis, downtrending  Recent Labs     24  0111 24  1715 24  0114 24  1347 24  0114 24  1205 24  0013   WBC 18.6* 21.1* 21.3* 24.1* 22.3* 23.0* 22.7*     Temp (36hrs), Av.4 °C (97.6 °F), Min:36.1 °C (97 °F), Max:36.8 °C (98.2 °F)     - Cx data negative. completed 7 day empiric course zosyn  - aggressive pulmonary hygiene  - monitor for s/s infection  - daily CBC to follow    Acute systolic heart failure  - appreciate heart failure consult  - starting jardiance 10mg daily  - starting spironolactone 25mg daily  - repeat ECHO today  - continue diuresis    Left hemothorax s/p washout ; persisting loculated left pleural effusion, stable  - monitor on serial CXR    VTE Prophylaxis: SCDs/TEDs, ambulation, SQ heparin  Code Status: Full Code    Dispo  - PT/OT recs High intensity level of continued care   - Would benefit from homecare RN for cardiac surgery carepath  - Anticipate discharge by next week pending diuresis, heart failure optimization, acute rehab precerpt  - Will continue to assess discharge needs      EUGENIA Robert-CNP  Cardiac Surgery [facility]  [facility]  Team Phone [phone]    2024  8:31 AM

## 2024-11-26 NOTE — HOSPITAL COURSE
Jaime Dominguez is a 74 year old male with a PMH significant for basal cell carcinoma and HPL who presented to [facility] ED on 11/8 complaining of left sided chest pain, diaphoresis, SOB nausea and syncope. ECG showed ST elevation in the inferior leads and ST depression in anterior septal leads. LHC showed 90% occlusion of the LAD. [place] [facility] [place] workup.     11/12/2024 OPERATION: by Rodrigo Harman and Cierra Stanley  1.  Mini invasive robotic assisted CABG x 2 converted to full sternotomy.  2.  CABG x 2 with LIMA  prolonged as a Y graft with a radial to LAD OM.  3.  Endoscopic radial harvest.  4.  Left femoral artery and vein cannulation for peripheral bypass.  5.  Partial LAD endarterectomy    Echo Pre/Post: Normal BV  Chest Tubes/Drains: 2 meds, L pleural   Temporary wires location/setting: [id number]     11/14/2024 Procedure:  Right femoral IABP placement    11/14/2024 Procedure:   - Ultrasound-guided access to femoral artery and vein  - Initiation of VA ECMO   - Antegrade femoral artery cannulation    11/19 2024 OPERATION: by Cierra Stanley  1. Mediastinal exploration   2. Left hemothorax evacuation   3. Left femoral VA ECMO decannulation   4. Chest closure    CTICU course: c/b increasing pressor requirement and echo revealing apical hypo-akinesis with subsequent IABP placement on 11/14 for further support. Overnight on 11/14, Troponin rise and ST elevation in anteroseptal leads, c/f ACS - went for C, underwent PCI of mid LAD with HEATHER. Also 11/14- he was cannulated for VA ECMO for cardiogenic shock 2/2 multiple failed grafts. On 11/19, returned to OR for VA ECMO decannulation and left hemothorax washout. IABP removed 11/20. Interventional Cardiology folowing.    Transferred to the floor 11/25  ====================    Floor Course:  - Patient was diuresed for fluid volume overload post cardiac surgery; Preop weight: Weight: 96.2 kg (212 lb)kg.    Vital signs and weight at discharge: BP  99/65 (BP Location: Left arm, Patient Position: Sitting)   Pulse 77   Temp 36 °C (96.8 °F) (Temporal)   Resp 18   Ht 1.829 m (6')   Wt 99.6 kg (219 lb 9.3 oz)   SpO2 96%   BMI 29.78 kg/m²     - Cardiac rehab referral   - Continue cardiac meds: ASA, BB  - Epicardial wires CUT on 12/4  - telemetry at discharge SR  Previous Afib w/ RVR for which patient was started on amiodarone, now PO. Weaned off all pressors/inotropy 11/24, remains HDS. Continue aspirin and Ticagrelor 90mg BID   Interventional Recs:   - cont telemetry care per PCI protocol  - cont DAPT with Brilinta (6 months) and aspirin (lifelong)  - provided education on compliance with DAPT  - at discharge, PLEASE send 90 day prescription of Brilinta to [facility] (for price check and Meds to Beds) and remaining refills to patient's home pharmacy   - continue high intensity statin  - no BB with pressors  - please ensure cardiology follow up appointment after discharge in 1-3 weeks  - patient referred for cardiac rehab eval  - 5lb weight restriction for 5 days for right radial access  - no lifting anything heavier than 10 lbs for one week for groin access   Cardiogenic shock s/p VA ECMO and IABP   - TTE 11/16: LVEF 31%, abnormal wall motion; reduced RVSF.   - Please repeat TTE today  - Diuresis: Continue diuresis per primary team.  - GDMT:   - SGLT-2i: start jardiance 10mg daily              - Beta-blocker: On hold              - ACE-I/ARB/ARNI: On hold              - MRA: start aldactone 25mg daily   - AICD/CRT-D/Lifevest: Follow up TTE in outpatient setting prior to considering.  - Strict I/Os, Daily Na < 2g daily, fluid restriction.  For any questions or concerns, feel free to reach out via [employer] chat or page at [phone].This plan was discussed with Dr. Gann, HF attending.   Acute blood loss anemia.  H&H stable. Will required iron supplement at discharge   Lab Results   Component Value Date    HCT 26.0 (L) 11/26/2024    HGB 8.3 (L) 11/26/2024    Hyperlipidemia: continue statin; follow up lipid panel with PCP/ cardio as appropriate  Leukocytosis -> MRSA negative. UA negative, blood cultures sent on 11/16 with no growth- final. MRSA, and blood cultures 11/18 negative. OR cultures from 11/19 bacterial NG final, AFB stain- final pending and fungal negative so far, final pending -->Completed 7day course of Zosyn  Lab Results   Component Value Date    WBC 14.6 (H) 11/26/2024   [place] reviewed on 11/26/2024  Rx summary: 0 narcotics, 0 buprenorphine, 0 sedatives, 0 stimulants  Recent scripts:   Date: 03/18/2024 , Medication: Hydrocodone-Acetamin 5-325 Mg , Qty: 6, Days supply: 5, prescribed by Odalis Murphy Md     - 2v CXR done 11/26  - Postop echo done 11/21  - Cardiac rehab referral was placed  - PT recs acute rehab  - Anticipate discharge to acute rehab facility referral sent to [facility]     Discharged on 11/26/2024; POD 22 s/p  MIDCABG x 2 with conversion to full sternotomy LIMA prolonged as a Y graft with a radial to LAD OM on 11/12. With Dr MG LINTON     On day of discharge, vital signs were stable and no acute distress was noted. All questions were answered. After VS and labs were reviewed it was determined the patient was stable for discharge.   =================  Hospital day of discharge management- spent >30 minutes coordinating the discharge and counseling/educating patient and family regarding discharge instructions.  =================    Past Medical History:  Diagnosis Date  · Basal cell carcinoma    · CAD (coronary artery disease)    · Hyperlipidemia    · STEMI (ST elevation myocardial infarction) (Multi)         Surgical History  Past Surgical History:  Procedure Laterality Date  · CARDIAC CATHETERIZATION N/A 11/08/2024    Procedure: Left Heart Cath, No LV;  Surgeon: Ale Brady MD;  Location: [facility] Cardiac Cath Lab;  Service: Cardiovascular;  Laterality: N/A;  · CARDIAC CATHETERIZATION N/A 11/08/2024    Procedure: PCI;  Surgeon: Ale RIOS  MD Caitlyn;  Location: [facility] Cardiac Cath Lab;  Service: Cardiovascular;  Laterality: N/A;  · CERVICAL FUSION        C5-7  · SKIN CANCER EXCISION N/A         Prescriptions Prior to Admission  Medications Prior to Admission  Medication Sig Dispense Refill Last Dose/Taking  · aspirin 81 mg EC tablet Take 1 tablet (81 mg) by mouth once daily.     Past Week  · atorvastatin (Lipitor) 40 mg tablet Take 1 tablet (40 mg) by mouth once daily. (Patient not taking: Reported on 11/8/2024)     More than a month       Patient has no known allergies.  Social History  Social History       Tobacco Use  · Smoking status: Never  · Smokeless tobacco: Never  Substance Use Topics  · Alcohol use: Not Currently  · Drug use: Never  ==================

## 2024-11-26 NOTE — PROGRESS NOTES
Physical Therapy    Physical Therapy Treatment    Patient Name: Jaime Dominguez  MRN: [id number]  Department: [place] [address]  Room: [address] [place] [address]  Today's Date: 11/26/2024  Time Calculation  Start Time: 1013  Stop Time: 1035  Time Calculation (min): 22 min         Assessment/Plan   PT Assessment  PT Assessment Results: Decreased strength, Decreased endurance, Impaired balance  Rehab Prognosis: Excellent  Barriers to Discharge: Medical acuity  Evaluation/Treatment Tolerance: Patient tolerated treatment well  Medical Staff Made Aware: Yes  Strengths: Ability to acquire knowledge, Attitude of self, Support of Caregivers  End of Session Communication: Bedside nurse  Assessment Comment: Patient continues to demo increased SOB with all activites. Continues to remain appropriate for HIGH intensity PT upon hospital d/c.  End of Session Patient Position: Up in chair, Alarm off, not on at start of session  PT Plan  Inpatient/Swing Bed or Outpatient: Inpatient  PT Plan  Treatment/Interventions: Bed mobility, Transfer training, Gait training, Stair training, Balance training, Strengthening, Endurance training, Therapeutic exercise, Therapeutic activity  PT Plan: Ongoing PT  PT Frequency: 5 times per week  PT Discharge Recommendations: High intensity level of continued care  Equipment Recommended upon Discharge: Wheeled walker  PT Recommended Transfer Status: Assist x2  PT - OK to Discharge: Yes      General Visit Information:   PT  Visit  PT Received On: 11/26/24  Response to Previous Treatment: Patient with no complaints from previous session.  General  Family/Caregiver Present: No  Prior to Session Communication: Bedside nurse  Patient Position Received:  (seated up on toilet in bathroom with CTA in room)  Preferred Learning Style: visual, verbal  General Comment: Pleasant and willing to participate in therapy session (split session due to medical team arriving and RN entering to give meds)    Subjective    Precautions:  Precautions  Medical Precautions: Cardiac precautions, Fall precautions  Post-Surgical Precautions: Move in the Tube  Precautions Comment: VVI@50, SpO2>92    Vital Signs (Past 2hrs)        Date/Time Vitals Session Patient Position Pulse Resp SpO2 BP MAP (mmHg)    11/26/24 1052 --  --  94  18  94 %  101/66  78                   Vital Signs Comment: Vitals stable with mobility     Objective   Pain:  Pain Assessment  Pain Assessment: 0-10  0-10 (Numeric) Pain Score: 0 - No pain  Cognition:  Cognition  Orientation Level: Oriented X4  Coordination:  Movements are Fluid and Coordinated: Yes  Postural Control:  Postural Control  Postural Control: Within Functional Limits  Static Standing Balance  Static Standing-Balance Support: Bilateral upper extremity supported  Static Standing-Level of Assistance: Contact guard  Dynamic Standing Balance  Dynamic Standing-Balance Support: Bilateral upper extremity supported  Dynamic Standing-Level of Assistance: Minimum assistance  Dynamic Standing-Balance: Turning    Activity Tolerance:  Activity Tolerance  Endurance: Tolerates 10 - 20 min exercise with multiple rests  Activity Tolerance Comments: SOB with activity  Rate of Perceived Dyspnea (RPD): 5/10  Treatments:       Ambulation/Gait Training  Ambulation/Gait Training Performed: Yes  Ambulation/Gait Training 1  Surface 1: Level tile  Device 1: Rolling walker  Assistance 1: Minimum assistance, Minimal verbal cues (VCs to keep duane LEs with walker)  Quality of Gait 1: Inconsistent stride length, Decreased step length, Forward flexed posture  Comments/Distance (ft) 1: 10 ft  Ambulation/Gait Training 2  Surface 2: Level tile  Device 2: Rolling walker  Assistance 2: Minimum assistance, Minimal verbal cues  Quality of Gait 2: Inconsistent stride length, Decreased step length, Forward flexed posture  Comments/Distance (ft) 2: 8 ft, 15 ft  Transfers  Transfer: Yes  Transfer 1  Transfer From 1: Sit to, Stand to  Transfer to  1: Stand, Sit  Technique 1: Sit to stand, Stand to sit  Transfer Device 1: Walker  Transfer Level of Assistance 1: Minimum assistance, Minimal verbal cues (VCs for hand placement)  Trials/Comments 1: x3 trials from various surface level heights    Outcome Measures:  [facility] Basic Mobility  Turning from your back to your side while in a flat bed without using bedrails: A little  Moving from lying on your back to sitting on the side of a flat bed without using bedrails: A lot  Moving to and from bed to chair (including a wheelchair): A little  Standing up from a chair using your arms (e.g. wheelchair or bedside chair): A little  To walk in hospital room: A little  Climbing 3-5 steps with railing: Total  Basic Mobility - Total Score: 15    Education Documentation  Precautions, taught by Rosa May PTA at 11/26/2024 11:43 AM.  Learner: Patient  Readiness: Acceptance  Method: Explanation  Response: Verbalizes Understanding  Comment: Reviewed MITT precautions    Body Mechanics, taught by Rosa May PTA at 11/26/2024 11:43 AM.  Learner: Patient  Readiness: Acceptance  Method: Explanation  Response: Verbalizes Understanding  Comment: Reviewed MITT precautions    Mobility Training, taught by Rosa May PTA at 11/26/2024 11:43 AM.  Learner: Patient  Readiness: Acceptance  Method: Explanation  Response: Verbalizes Understanding  Comment: Reviewed MITT precautions    Education Comments  No comments found.        OP EDUCATION:       Encounter Problems       Encounter Problems (Active)       Balance       Pt will demonstrate improved sitting/standing static/dynamic balance activities without LOB via score of 24/28 on the Tinetti for increase in safety prior to DC.  (Progressing)       Start:  11/21/24    Expected End:  12/05/24               Mobility       Pt will ambulate >15ft with appropriate form, Mod A or less, LRAD, and no LOB for safe DC.  (Met)       Start:  11/21/24    Expected End:  12/05/24    Resolved:   11/25/24    Updated to: Pt will ambulate >150ft with appropriate form, CGA, LRAD, and no LOB for safe DC.    Update reason: Met         Pt will tolerate >15 minutes of upright standing activity without seated rest breaks with no changes in vital signs for improved functional mobility.  (Progressing)       Start:  11/21/24    Expected End:  12/05/24            Pt will ambulate >150ft with appropriate form, CGA, LRAD, and no LOB for safe DC. (Progressing)       Start:  11/25/24    Expected End:  12/05/24                   PT Transfers       Pt will perform bed mobility with Mod A or less and use of LRAD to safely DC.  (Met)       Start:  11/21/24    Expected End:  12/05/24    Resolved:  11/25/24    Updated to: Pt will perform bed mobility with CGA and use of LRAD to safely DC.    Update reason: Met         Pt will perform sit<>stand transfers with Mod A or less and use of LRAD to safely DC.  (Met)       Start:  11/21/24    Expected End:  12/05/24    Resolved:  11/25/24    Updated to: Pt will perform sit<>stand transfers with CGA and use of LRAD to safely DC.    Update reason: Met         Pt will perform bed mobility with CGA and use of LRAD to safely DC. (Progressing)       Start:  11/25/24    Expected End:  12/05/24                Pt will perform sit<>stand transfers with CGA and use of LRAD to safely DC. (Progressing)       Start:  11/25/24    Expected End:  12/05/24

## 2024-11-26 NOTE — PROGRESS NOTES
Subjective Data:  - now on LT3, feeling well this AM       Objective Data:  Last Recorded Vitals:  Vitals:    11/25/24 1705 11/25/24 2148 11/26/24 0118 11/26/24 0449   BP: 116/64 108/69 115/65 97/62   BP Location: Left arm Left arm Left arm Left arm   Patient Position: Sitting Sitting Lying Sitting   Pulse: 77 85 78 78   Resp: 17 18 17 18   Temp: 36.3 °C (97.3 °F) 36.5 °C (97.7 °F) 36.1 °C (97 °F) 36.5 °C (97.7 °F)   TempSrc: Temporal Temporal Temporal Temporal   SpO2: 97% 95% 92% 97%   Weight:    99.6 kg (219 lb 9.3 oz)   Height:           Last Labs:  Results for orders placed or performed during the hospital encounter of 11/10/24 (from the past 24 hours)   POCT GLUCOSE   Result Value Ref Range    POCT Glucose 95 74 - 99 mg/dL        TROPHS   Date/Time Value Ref Range Status   11/15/2024 12:28 PM 44,705 0 - 53 ng/L Final     Comment:     Previous result verified on 11/15/2024 1020 on specimen/case [id number] called with component TRPHS for procedure Troponin I, High Sensitivity with value 67,971 ng/L.   11/15/2024 01:42 AM 67,971 0 - 53 ng/L Final   11/14/2024 03:54 AM 73,314 0 - 53 ng/L Final     Comment:     Previous result verified on 11/13/2024 2154 on specimen/case [id number] called with component TRPHS for procedure Troponin I, High Sensitivity with value 45,685 ng/L.   11/08/2024 04:12 PM 4 0 - 20 ng/L Final     BNP   Date/Time Value Ref Range Status   11/10/2024 04:34 PM 13 0 - 99 pg/mL Final     HGBA1C   Date/Time Value Ref Range Status   11/08/2024 04:12 PM 5.1 See comment % Final     LDLCALC   Date/Time Value Ref Range Status   11/08/2024 04:12  <=99 mg/dL Final     Comment:                                 Near   Borderline      AGE      Desirable  Optimal    High     High     Very High     0-19 Y     0 - 109     ---    110-129   >/= 130     ----    20-24 Y     0 - 119     ---    120-159   >/= 160     ----      >24 Y     0 -  99   100-129  130-159   160-189     >/=190       VLDL    Date/Time Value Ref Range Status   11/08/2024 04:12 PM 64 0 - 40 mg/dL Final      Last I/O:  I/O last 3 completed shifts:  In: 1000 (10.7 mL/kg) [P.O.:700; IV Piggyback:300]  Out: 1925 (20.6 mL/kg) [Urine:1925 (0.6 mL/kg/hr)]  Dosing Weight: 93.4 kg     Inpatient Medications:  Scheduled medications   Medication Dose Route Frequency    acetaminophen  975 mg oral q8h POWER    amiodarone  400 mg oral BID    aspirin  81 mg oral Daily    atorvastatin  80 mg oral Nightly    heparin  5,000 Units subcutaneous q8h    iron polysaccharides  150 mg oral Daily    melatonin  10 mg oral Nightly    multivitamin with minerals  1 tablet oral Daily    polyethylene glycol  17 g oral BID    sennosides-docusate sodium  2 tablet oral BID    ticagrelor  90 mg oral BID     PRN medications   Medication    benzocaine-menthol    bisacodyl    naloxone    naloxone    ondansetron    Or    ondansetron    oxyCODONE    oxyCODONE    oxygen    sodium chloride     Continuous Medications   Medication Dose Last Rate       Physical Exam:  General: sitting up in bed, NAD  Skin: warm and dry   Cardiac: S1S2  Pulm: diminished anterior, CTs in place  GI: soft, nontender  Extremities: bilat groin sites with drsg intact, no oozing no hematoma, +DP pulses bilaterally   Neuro: alert, appropriate      Assessment/Plan   Jaime Dominguez is a 74 year old male with a PMH significant for basal cell carcinoma and HPL s/p MidCAB x2 converted to full sternotomy w/ LIMA prolonged as a Y graft with a radial to LAD OM w/ Dr. Aldo Harman and Dr. Stanley. CPB time 347min. Course c/b increasing pressor requirement and echo revealing apical hypo-akinesis with subsequent IABP placement on 11/14 for further support. Overnight on 11/14, Troponin elevated and ST elevation in anteroseptal leads, c/f ACS and decision made to [facility]. Now s/p PCI of mid LAD with HEATHER. Pt cannulated on [facility] ECMO for cardiogenic shock state 2/2 multiple failed grafts.     11/14  Right Radial 6 Fr -- TR Band  Left  Femoral Artery ECMO Cannula  Left Femoral Vein ECMO Cannula     Patient is s/p CABG on 11/13/2024 arrived in fulminant cardiogenic shock despite IABP placement and 3 pressors.   A right femoral diagnostic cath was done with the following findings:     LM: distal 30-40%  LAD: mid 100% stenosis at anastomosis site   LCX: competitive flow in the OM without significant stenosis  RCA: patent  LIMA to LAD: Y graft with anastomosis to the LAD which is not patent and anastomosis to the OM which appears patent     Due to patient's profound shock, ECMO cannulation was done with the aforementioned access sites and the plan was made jointly with cardiac surgery to pursue salvage PCI of the mid LAD. He was given ASA and loaded with Ticagrelor. Patient is now s/p salvage PCI of the mid LAD with a Guillaume Palmer 3.0 x 38 HEATHER. Following PCI and ECMO cannulation, patient's pressors were able to be weaned substantially and he left the cath lab (without intubation) on ECMO and IABP support.     ASA/Ticagrelor loading in the lab    Overnight bleeding from ECMO site requiring multiple units pRBCs, repositioned and sutured by Cardiac Surgery    11/15 IABP 1:1, left fem ECMO, on epi and levo (vaso currently weaned off), amio started for afib, pending TTE     11/19  Plan for chest washout due to concern for left hemothorax at the time of ECMO decannulation today. Remains on levo and epi, PRBCs transfusion.     11/20  Weaning sedation and IABP, currently 1:2; remains on epi and levo    11/21  IABP removed, extubated. On levo and epi gtts.     11/22  Remains on levo and epi gtts. Plan to remove MS chest tube. Passed BS swallow, encourage PO intake    11/24  -primary team continuing diuresis  -epi of this AM, MAPS stable    11/25  - off drips  - transferring to floor today     11/26  - now on LT3, feeling well this AM     Interventional Recs:   - cont telemetry care per PCI protocol  - cont DAPT with Brilinta (6 months) and aspirin  (lifelong)  - provided education on compliance with DAPT  - at discharge, PLEASE send 90 day prescription of Brilinta to [facility] (for price check and Meds to Beds) and remaining refills to patient's home pharmacy   - continue high intensity statin  - no BB with pressors  - please ensure cardiology follow up appointment after discharge in 1-3 weeks  - patient referred for cardiac rehab eval  - 5lb weight restriction for 5 days for right radial access  - no lifting anything heavier than 10 lbs for one week for groin access        Interventional Cardiology will continue to follow.     CICU Fellow Pager: [phone]   Endovascular /Limb Salvage Team Pager: [phone] for day coverage (8am-5pm)  Interventional Cardiology Fellow Pager: [phone] (7AM-5PM) Night coverage: [facility] Cross Cover [phone]  Night coverage: [facility] [phone]            Frank Wilhelm, APRN-CNP, DNP

## 2024-11-27 ENCOUNTER — APPOINTMENT (OUTPATIENT)
Dept: CARDIOLOGY | Facility: HOSPITAL | Age: 74
DRG: 003 | End: 2024-11-27
Payer: MEDICARE

## 2024-11-27 LAB
ALBUMIN SERPL BCP-MCNC: 3.5 G/DL (ref 3.4–5)
ANION GAP SERPL CALC-SCNC: 11 MMOL/L (ref 10–20)
AORTIC VALVE PEAK VELOCITY: 1.6 M/S
AV PEAK GRADIENT: 10 MMHG
AVA (PEAK VEL): 2.79 CM2
BUN SERPL-MCNC: 18 MG/DL (ref 6–23)
CALCIUM SERPL-MCNC: 8 MG/DL (ref 8.6–10.6)
CHLORIDE SERPL-SCNC: 105 MMOL/L (ref 98–107)
CO2 SERPL-SCNC: 24 MMOL/L (ref 21–32)
CREAT SERPL-MCNC: 0.86 MG/DL (ref 0.5–1.3)
EGFRCR SERPLBLD CKD-EPI 2021: >90 ML/MIN/1.73M*2
EJECTION FRACTION: 45 %
ERYTHROCYTE [DISTWIDTH] IN BLOOD BY AUTOMATED COUNT: 16.8 % (ref 11.5–14.5)
GLUCOSE SERPL-MCNC: 86 MG/DL (ref 74–99)
HCT VFR BLD AUTO: 26.9 % (ref 41–52)
HGB BLD-MCNC: 8.4 G/DL (ref 13.5–17.5)
LEFT VENTRICLE INTERNAL DIMENSION DIASTOLE: 4.8 CM (ref 3.5–6)
LEFT VENTRICULAR OUTFLOW TRACT DIAMETER: 2 CM
MAGNESIUM SERPL-MCNC: 2.49 MG/DL (ref 1.6–2.4)
MCH RBC QN AUTO: 29.7 PG (ref 26–34)
MCHC RBC AUTO-ENTMCNC: 31.2 G/DL (ref 32–36)
MCV RBC AUTO: 95 FL (ref 80–100)
MITRAL VALVE E/A RATIO: 0.88
NRBC BLD-RTO: 0 /100 WBCS (ref 0–0)
PHOSPHATE SERPL-MCNC: 3.1 MG/DL (ref 2.5–4.9)
PLATELET # BLD AUTO: 566 X10*3/UL (ref 150–450)
POTASSIUM SERPL-SCNC: 4.3 MMOL/L (ref 3.5–5.3)
RBC # BLD AUTO: 2.83 X10*6/UL (ref 4.5–5.9)
RIGHT VENTRICLE FREE WALL PEAK S': 12.4 CM/S
RIGHT VENTRICLE PEAK SYSTOLIC PRESSURE: 25.1 MMHG
SODIUM SERPL-SCNC: 136 MMOL/L (ref 136–145)
TRICUSPID ANNULAR PLANE SYSTOLIC EXCURSION: 1.9 CM
WBC # BLD AUTO: 12.7 X10*3/UL (ref 4.4–11.3)

## 2024-11-27 PROCEDURE — 2500000001 HC RX 250 WO HCPCS SELF ADMINISTERED DRUGS (ALT 637 FOR MEDICARE OP): Performed by: NURSE PRACTITIONER

## 2024-11-27 PROCEDURE — 99232 SBSQ HOSP IP/OBS MODERATE 35: CPT | Performed by: NURSE PRACTITIONER

## 2024-11-27 PROCEDURE — 2500000002 HC RX 250 W HCPCS SELF ADMINISTERED DRUGS (ALT 637 FOR MEDICARE OP, ALT 636 FOR OP/ED): Performed by: NURSE PRACTITIONER

## 2024-11-27 PROCEDURE — 97530 THERAPEUTIC ACTIVITIES: CPT | Mod: GP,CQ

## 2024-11-27 PROCEDURE — 2500000004 HC RX 250 GENERAL PHARMACY W/ HCPCS (ALT 636 FOR OP/ED): Performed by: NURSE PRACTITIONER

## 2024-11-27 PROCEDURE — 36416 COLLJ CAPILLARY BLOOD SPEC: CPT | Performed by: NURSE PRACTITIONER

## 2024-11-27 PROCEDURE — 93306 TTE W/DOPPLER COMPLETE: CPT | Performed by: INTERNAL MEDICINE

## 2024-11-27 PROCEDURE — 1200000002 HC GENERAL ROOM WITH TELEMETRY DAILY

## 2024-11-27 PROCEDURE — 85027 COMPLETE CBC AUTOMATED: CPT | Performed by: NURSE PRACTITIONER

## 2024-11-27 PROCEDURE — 99231 SBSQ HOSP IP/OBS SF/LOW 25: CPT | Performed by: NURSE PRACTITIONER

## 2024-11-27 PROCEDURE — 2500000001 HC RX 250 WO HCPCS SELF ADMINISTERED DRUGS (ALT 637 FOR MEDICARE OP): Performed by: INTERNAL MEDICINE

## 2024-11-27 PROCEDURE — 97116 GAIT TRAINING THERAPY: CPT | Mod: GP,CQ

## 2024-11-27 PROCEDURE — 83735 ASSAY OF MAGNESIUM: CPT | Performed by: NURSE PRACTITIONER

## 2024-11-27 PROCEDURE — 80069 RENAL FUNCTION PANEL: CPT | Performed by: NURSE PRACTITIONER

## 2024-11-27 PROCEDURE — C8929 TTE W OR WO FOL WCON,DOPPLER: HCPCS

## 2024-11-27 PROCEDURE — 99233 SBSQ HOSP IP/OBS HIGH 50: CPT | Performed by: STUDENT IN AN ORGANIZED HEALTH CARE EDUCATION/TRAINING PROGRAM

## 2024-11-27 RX ORDER — TRAMADOL HYDROCHLORIDE 50 MG/1
25 TABLET ORAL ONCE
Status: COMPLETED | OUTPATIENT
Start: 2024-11-27 | End: 2024-11-27

## 2024-11-27 RX ORDER — PETROLATUM 420 MG/G
OINTMENT TOPICAL
Status: DISCONTINUED | OUTPATIENT
Start: 2024-11-27 | End: 2024-12-04 | Stop reason: HOSPADM

## 2024-11-27 RX ORDER — FUROSEMIDE 10 MG/ML
40 INJECTION INTRAMUSCULAR; INTRAVENOUS EVERY 8 HOURS
Status: COMPLETED | OUTPATIENT
Start: 2024-11-27 | End: 2024-11-27

## 2024-11-27 RX ORDER — METOPROLOL SUCCINATE 25 MG/1
25 TABLET, EXTENDED RELEASE ORAL DAILY
Status: DISCONTINUED | OUTPATIENT
Start: 2024-11-27 | End: 2024-12-03

## 2024-11-27 RX ORDER — POTASSIUM CHLORIDE 20 MEQ/1
20 TABLET, EXTENDED RELEASE ORAL 2 TIMES DAILY
Status: COMPLETED | OUTPATIENT
Start: 2024-11-27 | End: 2024-11-27

## 2024-11-27 RX ADMIN — FUROSEMIDE 40 MG: 10 INJECTION, SOLUTION INTRAVENOUS at 18:02

## 2024-11-27 RX ADMIN — TICAGRELOR 90 MG: 90 TABLET ORAL at 21:16

## 2024-11-27 RX ADMIN — FUROSEMIDE 40 MG: 10 INJECTION, SOLUTION INTRAVENOUS at 10:13

## 2024-11-27 RX ADMIN — TICAGRELOR 90 MG: 90 TABLET ORAL at 09:32

## 2024-11-27 RX ADMIN — ATORVASTATIN CALCIUM 80 MG: 80 TABLET, FILM COATED ORAL at 21:15

## 2024-11-27 RX ADMIN — SALINE NASAL SPRAY 1 SPRAY: 1.5 SOLUTION NASAL at 09:35

## 2024-11-27 RX ADMIN — POTASSIUM CHLORIDE 20 MEQ: 1500 TABLET, EXTENDED RELEASE ORAL at 10:13

## 2024-11-27 RX ADMIN — EMPAGLIFLOZIN 10 MG: 10 TABLET, FILM COATED ORAL at 09:39

## 2024-11-27 RX ADMIN — SPIRONOLACTONE 25 MG: 25 TABLET, FILM COATED ORAL at 09:38

## 2024-11-27 RX ADMIN — ASPIRIN 81 MG: 81 TABLET, COATED ORAL at 09:32

## 2024-11-27 RX ADMIN — HEPARIN SODIUM 5000 UNITS: 5000 INJECTION INTRAVENOUS; SUBCUTANEOUS at 01:04

## 2024-11-27 RX ADMIN — Medication 1 TABLET: at 09:32

## 2024-11-27 RX ADMIN — SENNOSIDES AND DOCUSATE SODIUM 2 TABLET: 50; 8.6 TABLET ORAL at 09:35

## 2024-11-27 RX ADMIN — Medication 10 MG: at 21:15

## 2024-11-27 RX ADMIN — HEPARIN SODIUM 5000 UNITS: 5000 INJECTION INTRAVENOUS; SUBCUTANEOUS at 09:32

## 2024-11-27 RX ADMIN — POTASSIUM CHLORIDE 20 MEQ: 1500 TABLET, EXTENDED RELEASE ORAL at 21:16

## 2024-11-27 RX ADMIN — POLYSACCHARIDE-IRON COMPLEX 150 MG: 150 CAPSULE ORAL at 09:32

## 2024-11-27 RX ADMIN — BENZOCAINE AND MENTHOL 1 LOZENGE: 15; 3.6 LOZENGE ORAL at 01:04

## 2024-11-27 RX ADMIN — METOPROLOL SUCCINATE 25 MG: 25 TABLET, EXTENDED RELEASE ORAL at 14:19

## 2024-11-27 RX ADMIN — HEPARIN SODIUM 5000 UNITS: 5000 INJECTION INTRAVENOUS; SUBCUTANEOUS at 18:02

## 2024-11-27 RX ADMIN — AMIODARONE HYDROCHLORIDE 200 MG: 200 TABLET ORAL at 09:32

## 2024-11-27 RX ADMIN — ACETAMINOPHEN 975 MG: 325 TABLET, FILM COATED ORAL at 21:16

## 2024-11-27 RX ADMIN — PERFLUTREN 10 ML OF DILUTION: 6.52 INJECTION, SUSPENSION INTRAVENOUS at 10:13

## 2024-11-27 RX ADMIN — PETROLATUM: 420 OINTMENT TOPICAL at 18:02

## 2024-11-27 RX ADMIN — ACETAMINOPHEN 975 MG: 325 TABLET, FILM COATED ORAL at 05:58

## 2024-11-27 RX ADMIN — ACETAMINOPHEN 975 MG: 325 TABLET, FILM COATED ORAL at 14:19

## 2024-11-27 RX ADMIN — TRAMADOL HYDROCHLORIDE 25 MG: 50 TABLET, COATED ORAL at 05:58

## 2024-11-27 ASSESSMENT — PAIN SCALES - GENERAL
PAINLEVEL_OUTOF10: 3
PAINLEVEL_OUTOF10: 2
PAINLEVEL_OUTOF10: 0 - NO PAIN

## 2024-11-27 ASSESSMENT — PAIN DESCRIPTION - ORIENTATION: ORIENTATION: MID

## 2024-11-27 ASSESSMENT — COGNITIVE AND FUNCTIONAL STATUS - GENERAL
HELP NEEDED FOR BATHING: A LITTLE
HELP NEEDED FOR BATHING: A LITTLE
TURNING FROM BACK TO SIDE WHILE IN FLAT BAD: A LITTLE
CLIMB 3 TO 5 STEPS WITH RAILING: A LOT
WALKING IN HOSPITAL ROOM: A LITTLE
STANDING UP FROM CHAIR USING ARMS: A LITTLE
CLIMB 3 TO 5 STEPS WITH RAILING: A LOT
DRESSING REGULAR UPPER BODY CLOTHING: A LITTLE
MOVING FROM LYING ON BACK TO SITTING ON SIDE OF FLAT BED WITH BEDRAILS: A LITTLE
WALKING IN HOSPITAL ROOM: A LITTLE
DRESSING REGULAR LOWER BODY CLOTHING: A LITTLE
WALKING IN HOSPITAL ROOM: A LITTLE
MOBILITY SCORE: 17
TURNING FROM BACK TO SIDE WHILE IN FLAT BAD: A LOT
TOILETING: A LITTLE
CLIMB 3 TO 5 STEPS WITH RAILING: A LOT
DRESSING REGULAR LOWER BODY CLOTHING: A LITTLE
DRESSING REGULAR UPPER BODY CLOTHING: A LITTLE
MOBILITY SCORE: 16
MOBILITY SCORE: 17
MOVING TO AND FROM BED TO CHAIR: A LITTLE
PERSONAL GROOMING: A LITTLE
TOILETING: A LITTLE
MOVING FROM LYING ON BACK TO SITTING ON SIDE OF FLAT BED WITH BEDRAILS: A LITTLE
DAILY ACTIVITIY SCORE: 19
MOVING FROM LYING ON BACK TO SITTING ON SIDE OF FLAT BED WITH BEDRAILS: A LITTLE
MOVING TO AND FROM BED TO CHAIR: A LITTLE
PERSONAL GROOMING: A LITTLE
STANDING UP FROM CHAIR USING ARMS: A LITTLE
TURNING FROM BACK TO SIDE WHILE IN FLAT BAD: A LITTLE
MOVING TO AND FROM BED TO CHAIR: A LITTLE
EATING MEALS: A LITTLE
DAILY ACTIVITIY SCORE: 18
STANDING UP FROM CHAIR USING ARMS: A LITTLE

## 2024-11-27 ASSESSMENT — PAIN - FUNCTIONAL ASSESSMENT
PAIN_FUNCTIONAL_ASSESSMENT: 0-10

## 2024-11-27 ASSESSMENT — PAIN SCALES - PAIN ASSESSMENT IN ADVANCED DEMENTIA (PAINAD): TOTALSCORE: MEDICATION (SEE MAR)

## 2024-11-27 ASSESSMENT — PAIN DESCRIPTION - LOCATION: LOCATION: INCISION

## 2024-11-27 NOTE — CARE PLAN
The patient's goals for the shift include safety    The clinical goals for the shift include safety    Problem: Skin  Goal: Participates in plan/prevention/treatment measures  Outcome: Progressing  Goal: Prevent/manage excess moisture  Outcome: Progressing  Goal: Prevent/minimize sheer/friction injuries  Outcome: Progressing  Goal: Promote/optimize nutrition  Outcome: Progressing     Problem: Discharge Planning  Goal: Discharge to home or other facility with appropriate resources  Outcome: Progressing     Problem: Chronic Conditions and Co-morbidities  Goal: Patient's chronic conditions and co-morbidity symptoms are monitored and maintained or improved  Outcome: Progressing     Problem: Knowledge Deficit  Goal: Patient/family/caregiver demonstrates understanding of disease process, treatment plan, medications, and discharge instructions  Outcome: Progressing     Problem: Fall/Injury  Goal: Not fall by end of shift  Outcome: Progressing  Goal: Be free from injury by end of the shift  Outcome: Progressing  Goal: Verbalize understanding of personal risk factors for fall in the hospital  Outcome: Progressing  Goal: Verbalize understanding of risk factor reduction measures to prevent injury from fall in the home  Outcome: Progressing  Goal: Use assistive devices by end of the shift  Outcome: Progressing  Goal: Pace activities to prevent fatigue by end of the shift  Outcome: Progressing     Problem: Pain  Goal: Takes deep breaths with improved pain control throughout the shift  Outcome: Progressing  Goal: Turns in bed with improved pain control throughout the shift  Outcome: Progressing  Goal: Walks with improved pain control throughout the shift  Outcome: Progressing  Goal: Performs ADL's with improved pain control throughout shift  Outcome: Progressing  Goal: Participates in PT with improved pain control throughout the shift  Outcome: Progressing  Goal: Free from opioid side effects throughout the shift  Outcome:  Progressing  Goal: Free from acute confusion related to pain meds throughout the shift  Outcome: Progressing

## 2024-11-27 NOTE — PROGRESS NOTES
Physical Therapy    Physical Therapy Treatment    Patient Name: Jaime Dominguez  MRN: [id number]  Department: [place] [id number]  Room: [address] [place] [address]  Today's Date: 11/27/2024  Time Calculation  Start Time: 1202  Stop Time: 1248  Time Calculation (min): 46 min         Assessment/Plan   PT Assessment  PT Assessment Results: Decreased strength, Decreased endurance, Impaired balance  Rehab Prognosis: Excellent  Barriers to Discharge: Medical acuity  Evaluation/Treatment Tolerance: Patient tolerated treatment well  Medical Staff Made Aware: Yes  Strengths: Ability to acquire knowledge, Attitude of self, Support of Caregivers  End of Session Communication: Bedside nurse  Assessment Comment: Patient making great progress towards stated therapy goals. Tolerated increased amb distance with x1 seated rest break. Remains appropatie for HIGH intensity PT but  pending LOS may possibly progress to LOW intensity.  End of Session Patient Position: Up in chair, Alarm off, not on at start of session  PT Plan  Inpatient/Swing Bed or Outpatient: Inpatient  PT Plan  Treatment/Interventions: Bed mobility, Transfer training, Gait training, Stair training, Balance training, Strengthening, Endurance training, Therapeutic exercise, Therapeutic activity  PT Plan: Ongoing PT  PT Frequency: 5 times per week  PT Discharge Recommendations: High intensity level of continued care  Equipment Recommended upon Discharge: Wheeled walker  PT Recommended Transfer Status: Assist x2  PT - OK to Discharge: Yes      General Visit Information:   PT  Visit  PT Received On: 11/27/24  Response to Previous Treatment: Patient with no complaints from previous session.  General  Family/Caregiver Present: Yes  Caregiver Feedback: sons present and supportive  Prior to Session Communication: Bedside nurse  Patient Position Received: Up in chair, Alarm off, not on at start of session  Preferred Learning Style: visual, verbal  General Comment: Motivated to participate in therapy  session. RN in room removing wound vac upon arrival.    Subjective   Precautions:  Precautions  Hearing/Visual Limitations: WFL  Medical Precautions: Cardiac precautions, Fall precautions  Post-Surgical Precautions: Move in the Tube  Precautions Comment: VVI@50, SpO2>92    Vital Signs (Past 2hrs)        Date/Time Vitals Session Patient Position Pulse Resp SpO2 BP MAP (mmHg)    11/27/24 1413 --  --  84  17  96 %  114/72  86                         Objective   Pain:  Pain Assessment  Pain Assessment: 0-10  0-10 (Numeric) Pain Score: 0 - No pain  Cognition:  Cognition  Overall Cognitive Status: Within Functional Limits  Arousal/Alertness: Appropriate responses to stimuli  Orientation Level: Oriented X4  Following Commands: Follows all commands and directions without difficulty  Coordination:  Movements are Fluid and Coordinated: Yes  Postural Control:  Postural Control  Postural Control: Within Functional Limits  Static Standing Balance  Static Standing-Balance Support: Bilateral upper extremity supported  Static Standing-Level of Assistance: Contact guard  Static Standing-Comment/Number of Minutes: with fww  Dynamic Standing Balance  Dynamic Standing-Balance Support: Bilateral upper extremity supported  Dynamic Standing-Level of Assistance: Contact guard  Dynamic Standing-Balance: Turning  Dynamic Standing-Comments: with fww    Activity Tolerance:  Activity Tolerance  Endurance: Tolerates 30 min exercise with multiple rests  Rate of Perceived Dyspnea (RPD): 2/10  Treatments:  Therapeutic Exercise  Therapeutic Exercise Performed: Yes  Therapeutic Exercise Activity 1: Standing duane LE: heel raises x12, marches in place x 12 total         Ambulation/Gait Training  Ambulation/Gait Training Performed: Yes  Ambulation/Gait Training 1  Surface 1: Level tile  Device 1: Rolling walker  Assistance 1: Contact guard  Quality of Gait 1: Narrow base of support, Decreased step length  Comments/Distance (ft) 1: 50 ft, 75 ft (had one  episode of slight unsteadiness requiring Min A x 1 to recover safely)  Transfers  Transfer: Yes  Transfer 1  Transfer From 1: Sit to, Stand to  Transfer to 1: Stand, Sit  Technique 1: Sit to stand, Stand to sit  Transfer Device 1: Walker  Transfer Level of Assistance 1: Contact guard  Trials/Comments 1: x3 trials    Outcome Measures:  [facility] Basic Mobility  Turning from your back to your side while in a flat bed without using bedrails: A little  Moving from lying on your back to sitting on the side of a flat bed without using bedrails: A lot  Moving to and from bed to chair (including a wheelchair): A little  Standing up from a chair using your arms (e.g. wheelchair or bedside chair): A little  To walk in hospital room: A little  Climbing 3-5 steps with railing: A lot  Basic Mobility - Total Score: 16    Education Documentation  Precautions, taught by Rosa May PTA at 11/27/2024  3:22 PM.  Learner: Family, Patient  Readiness: Acceptance  Method: Explanation  Response: Verbalizes Understanding  Comment: Reviewed MITT precautions with good understanding    Body Mechanics, taught by Rosa May PTA at 11/27/2024  3:22 PM.  Learner: Family, Patient  Readiness: Acceptance  Method: Explanation  Response: Verbalizes Understanding  Comment: Reviewed MITT precautions with good understanding    Mobility Training, taught by Rosa May PTA at 11/27/2024  3:22 PM.  Learner: Family, Patient  Readiness: Acceptance  Method: Explanation  Response: Verbalizes Understanding  Comment: Reviewed MITT precautions with good understanding    Education Comments  No comments found.        OP EDUCATION:       Encounter Problems       Encounter Problems (Active)       Balance       Pt will demonstrate improved sitting/standing static/dynamic balance activities without LOB via score of 24/28 on the Tinetti for increase in safety prior to DC.  (Progressing)       Start:  11/21/24    Expected End:  12/05/24               Mobility        Pt will ambulate >15ft with appropriate form, Mod A or less, LRAD, and no LOB for safe DC.  (Met)       Start:  11/21/24    Expected End:  12/05/24    Resolved:  11/25/24    Updated to: Pt will ambulate >150ft with appropriate form, CGA, LRAD, and no LOB for safe DC.    Update reason: Met         Pt will tolerate >15 minutes of upright standing activity without seated rest breaks with no changes in vital signs for improved functional mobility.  (Progressing)       Start:  11/21/24    Expected End:  12/05/24            Pt will ambulate >150ft with appropriate form, CGA, LRAD, and no LOB for safe DC. (Progressing)       Start:  11/25/24    Expected End:  12/05/24                   PT Transfers       Pt will perform bed mobility with Mod A or less and use of LRAD to safely DC.  (Met)       Start:  11/21/24    Expected End:  12/05/24    Resolved:  11/25/24    Updated to: Pt will perform bed mobility with CGA and use of LRAD to safely DC.    Update reason: Met         Pt will perform sit<>stand transfers with Mod A or less and use of LRAD to safely DC.  (Met)       Start:  11/21/24    Expected End:  12/05/24    Resolved:  11/25/24    Updated to: Pt will perform sit<>stand transfers with CGA and use of LRAD to safely DC.    Update reason: Met         Pt will perform bed mobility with CGA and use of LRAD to safely DC. (Progressing)       Start:  11/25/24    Expected End:  12/05/24                Pt will perform sit<>stand transfers with CGA and use of LRAD to safely DC. (Progressing)       Start:  11/25/24    Expected End:  12/05/24

## 2024-11-27 NOTE — PROGRESS NOTES
Subjective Data:  Reports dry, stuffy nose making it difficult to take in deep breaths. Denies changes in chest discomfort. Does report significant SOB with exertion. No changes in swelling. Some lightheadedness/dizziness noted, though accounts this is related to him having a hard time catching his breath when he's walking around.     Objective Data:  Last Recorded Vitals:  Vitals:    11/26/24 1742 11/26/24 2158 11/27/24 0100 11/27/24 0500   BP: 103/65 114/66 106/65 106/65   BP Location: Left arm Left arm Left arm Left arm   Patient Position: Sitting Lying Lying Lying   Pulse: 78 80 76 78   Resp: 18 17 20 20   Temp: 36.3 °C (97.3 °F) 36.1 °C (97 °F) 36.3 °C (97.3 °F) 36.4 °C (97.5 °F)   TempSrc: Temporal Temporal Temporal Temporal   SpO2: 96% 93% 96% 93%   Weight:    97 kg (213 lb 12.8 oz)   Height:           Last Labs:  CBC - 11/27/2024:  6:43 AM  12.7 8.4 566    26.9      CMP - 11/27/2024:  6:43 AM  8.0 5.9 180 --- 0.9   3.1 3.5 35 21      PTT - 11/20/2024:  4:52 AM  1.3   14.3 28     TROPHS   Date/Time Value Ref Range Status   11/15/2024 12:28 PM 44,705 0 - 53 ng/L Final     Comment:     Previous result verified on 11/15/2024 1020 on specimen/case [id number] called with component TRPHS for procedure Troponin I, High Sensitivity with value 67,971 ng/L.   11/15/2024 01:42 AM 67,971 0 - 53 ng/L Final   11/14/2024 03:54 AM 73,314 0 - 53 ng/L Final     Comment:     Previous result verified on 11/13/2024 2154 on specimen/case [id number] called with component TRPHS for procedure Troponin I, High Sensitivity with value 45,685 ng/L.   11/08/2024 04:12 PM 4 0 - 20 ng/L Final     BNP   Date/Time Value Ref Range Status   11/10/2024 04:34 PM 13 0 - 99 pg/mL Final     HGBA1C   Date/Time Value Ref Range Status   11/08/2024 04:12 PM 5.1 See comment % Final     LDLCALC   Date/Time Value Ref Range Status   11/08/2024 04:12  <=99 mg/dL Final     Comment:                                 Near   Borderline      AGE       Desirable  Optimal    High     High     Very High     0-19 Y     0 - 109     ---    110-129   >/= 130     ----    20-24 Y     0 - 119     ---    120-159   >/= 160     ----      >24 Y     0 -  99   100-129  130-159   160-189     >/=190       VLDL   Date/Time Value Ref Range Status   11/08/2024 04:12 PM 64 0 - 40 mg/dL Final      Last I/O:  I/O last 3 completed shifts:  In: 1410 (15.1 mL/kg) [P.O.:1410]  Out: 2050 (21.9 mL/kg) [Urine:2050 (0.6 mL/kg/hr)]  Dosing Weight: 93.4 kg     Echo:  Transthoracic Echo (TTE) Limited 11/16/2024  CONCLUSIONS:  1. Multiple segmental abnormalities exist. See findings.  2. Left ventricular ejection fraction is moderately decreased, calculated by Lambert's biplane at 34%.  3. Abnormal left venticular wall motion.  4. No left ventricular thrombus visualized.  5. There is reduced right ventricular systolic function.  6. There apperas to be a trivial pericardial effusion, however thre is also a oderate layer of echodense material overlying the RV of unclear etiology. NO obivous RA or RV collapse though not well seen and MV and V inflows not assessed for respiratory variation ( pt in afib so unable to assess given variable RR intervals) and IVC not well seen. Overall unable to determine hemodyanic significance of the material overlyin the RV.  7. Right ventricular systolic pressure is within normal limits.  8. Moderately dilated aortic root.  9. There is LIV of the subvalvular apparatus and MV leaflets of unclear significance. LVOT gradinets not assessed nor was there color Doppler on the LVOT to see if there were acceleration of flow to suggest obstructiom. ( Does not appear to have LIV -septal contact on 2D images).  10. Compared with the prior exam from 11/15/2024, there are no significant changes. The LAD territory wall motion abnormality appears to be similar. LIV has been present on prior studies.     Ejection Fractions:  EF   Date/Time Value Ref Range Status   11/21/2024 03:49 PM  33 %    11/19/2024 01:46 PM 33 %    11/18/2024 08:45 AM 43 %      Inpatient Medications:  Scheduled medications   Medication Dose Route Frequency    acetaminophen  975 mg oral q8h POWER    amiodarone  200 mg oral Daily    aspirin  81 mg oral Daily    atorvastatin  80 mg oral Nightly    empagliflozin  10 mg oral Daily    furosemide  40 mg intravenous q8h    heparin  5,000 Units subcutaneous q8h    iron polysaccharides  150 mg oral Daily    melatonin  10 mg oral Nightly    multivitamin with minerals  1 tablet oral Daily    perflutren lipid microspheres  0.5-10 mL of dilution intravenous Once in imaging    polyethylene glycol  17 g oral BID    potassium chloride CR  20 mEq oral BID    sennosides-docusate sodium  2 tablet oral BID    spironolactone  25 mg oral Daily    ticagrelor  90 mg oral BID     PRN medications   Medication    benzocaine-menthol    bisacodyl    naloxone    naloxone    ondansetron    Or    ondansetron    oxygen    sodium chloride    white petrolatum     Continuous Medications   Medication Dose Last Rate     Physical Exam:  GEN: NAD.  CV: RRR, no m/r/g.   LUNGS: no respiratory distress on NC.  ABD: Soft, NT/ND.  SKIN: Warm, well perfused. LE edema: +2 pitting edema  MSK: No deformities.  EXT: warm and well perfused.   NEURO: No focal deficits.     Assessment/Plan   Jaime Dominguez is a 74 year old male with a PMH significant for basal cell carcinoma and HLD who initially presented to [facility] ED on 11/8 complaining of left sided chest pain, diaphoresis, SOB nausea and syncope. ECG showed ST elevation in the inferior leads and ST depression in anterior septal leads. LHC showed 90% occlusion of the LAD. Transferred to [facility] on 11/10 for cardiac surgery workup.  He is now s/p MidCAB x2 converted to full sternotomy w/ LIMA prolonged as a Y graft with a radial to LAD OM w/ Dr. Aldo Harman and Dr. Stanley. CPB time 347min. Course c/b increasing pressor requirement and echo revealing apical hypo-akinesis with  subsequent IABP placement on 11/14 for further support. Overnight on 11/14, Troponin elevated and ST elevation in anteroseptal leads, c/f ACS and decision made to [facility]. Now s/p PCI of mid LAD with HEATHER. Pt cannulated on VA ECMO for cardiogenic shock state 2/2 multiple failed grafts. HF was engaged for multidisciplinary decision making regarding cardiogenic shock. Mechanical support: s/p VA ECMO - decannulated 11/19, IABP removed 11/20. Epi weaned 11/24.     HFrEF  ICM  CAD s/p CABG and PCI   Cardiogenic shock s/p VA ECMO and IABP   - TTE 11/16: LVEF 31%, abnormal wall motion; reduced RVSF.   - TTE 11/27 pending.   - Diuresis: Continue diuresis per primary team.  - GDMT:   - SGLT-2i: c/w jardiance 10mg daily              - Beta-blocker: start metoprolol XL 25mg daily.               - ACE-I/ARB/ARNI: Will consider low dose entresto tomorrow vs outpatient pending BP.               - MRA: c/w aldactone 25mg daily   - AICD/CRT-D/Lifevest: Follow up TTE in outpatient setting prior to considering.  - Strict I/Os, Daily Na < 2g daily, fluid restriction.     For any questions or concerns, feel free to reach out via [place] chat or page at [phone].This plan was discussed with Dr. Gann, HF attending.     iKra Alvares DO  HF Fellow

## 2024-11-27 NOTE — PROGRESS NOTES
CARDIAC SURGERY DAILY PROGRESS NOTE    Jaime Dominguez is a 74 y.o. male, with a PMH of basal cell carcinoma and HLD who presented with STEMI to an OSH and transferred to [facility] on 11/10/2024 for CABG workup. S/p MidCAB x2 converted to full sternotomy w/ LIMA prolonged as a Y graft with a radial to LAD OM w/ Dr. Aldo Harman and Dr. Stanley 11/12. Postop course c/b increasing pressor requirement and echo revealing apical hypo-akinesis with subsequent IABP placement on 11/14. Overnight on 11/14, Troponin rise and ST elevation in anteroseptal leads, c/f ACS- went for LHC, underwent PCI of mid LAD with HEATHER. Cannulated for VA ECMO 11/14 for cardiogenic shock 2/2 multiple failed grafts. On 11/19, returned to OR for VA ECMO decannulation and left hemothorax washout. IABP removed 11/20. Transferred to floor 11/25.     OPERATION/PROCEDURE: 11/12 with Rodrigo Harman MD  1.  Mini invasive robotic assisted CABG x 2 converted to full sternotomy.  2.  CABG x 2 with LIMA  prolonged as a Y graft with a radial to LAD OM.  3.  Endoscopic radial harvest.  4.  Left femoral artery and vein cannulation for peripheral bypass.  5.  Partial LAD endarterectomy    CTICU Course: see above  Transferred to T3 on 11/25.    Interval History:   Stable overnight    SUBJECTIVE:  C/o significant nasal congestion    Objective   /65 (BP Location: Left arm, Patient Position: Lying)   Pulse 78   Temp 36.4 °C (97.5 °F) (Temporal)   Resp 20   Ht 1.829 m (6')   Wt 97 kg (213 lb 12.8 oz)   SpO2 93%   BMI 29.00 kg/m²   0-10 (Numeric) Pain Score: 2   3 Day Weight Change: Unable to Calculate    Intake and Output    Intake/Output Summary (Last 24 hours) at 11/27/2024 0803  Last data filed at 11/27/2024 0500  Gross per 24 hour   Intake 1410 ml   Output 1300 ml   Net 110 ml       Physical Exam  Physical Exam  Vitals reviewed.   Constitutional:       Appearance: Normal appearance.   HENT:      Mouth/Throat:      Mouth: Mucous  membranes are moist.   Eyes:      General: Lids are normal.      Extraocular Movements: Extraocular movements intact.      Conjunctiva/sclera: Conjunctivae normal.      Pupils: Pupils are equal, round, and reactive to light.   Cardiovascular:      Rate and Rhythm: Regular rhythm.      Pulses: Normal pulses.      Heart sounds: Normal heart sounds.      Comments:     Pulmonary:      Effort: Pulmonary effort is normal.      Breath sounds: Normal breath sounds.      Comments: Short of breath with minimal movement  Abdominal:      General: Bowel sounds are normal.      Palpations: Abdomen is soft.   Musculoskeletal:      Right lower leg: Edema present.      Left lower leg: Edema present.      Comments: Edema of LUE, near graft site. Improved with diuresis   Skin:     General: Skin is warm and dry.      Capillary Refill: Capillary refill takes less than 2 seconds.   Neurological:      General: No focal deficit present.      Mental Status: He is alert and oriented to person, place, and time.   Psychiatric:         Behavior: Behavior normal. Behavior is cooperative.       Medications  Scheduled medications  acetaminophen, 975 mg, oral, q8h POWER  amiodarone, 200 mg, oral, Daily  aspirin, 81 mg, oral, Daily  atorvastatin, 80 mg, oral, Nightly  empagliflozin, 10 mg, oral, Daily  heparin, 5,000 Units, subcutaneous, q8h  iron polysaccharides, 150 mg, oral, Daily  melatonin, 10 mg, oral, Nightly  multivitamin with minerals, 1 tablet, oral, Daily  perflutren lipid microspheres, 0.5-10 mL of dilution, intravenous, Once in imaging  polyethylene glycol, 17 g, oral, BID  sennosides-docusate sodium, 2 tablet, oral, BID  spironolactone, 25 mg, oral, Daily  ticagrelor, 90 mg, oral, BID    Continuous medications   PRN medications  PRN medications: benzocaine-menthol, bisacodyl, naloxone, naloxone, ondansetron **OR** ondansetron, oxygen, sodium chloride    Labs  No results found for this or any previous visit (from the past 24 hours).             IMAGING/ DIAGNOSTIC TESTING:  I have personally reviewed the following test result(s):      11/21 TTE:  CONCLUSIONS:   1. The left ventricular systolic function is moderately decreased, with a visually estimated ejection fraction of 30-35%.   2. Abnormal left venticular wall motion.   3. There is septal-apical a- to dyskinesia. There is dynamic function at the basal segments and increased color flow signal in the mid LV.   4. There is mid cavity left ventricular obstruction.   5. There is normal right ventricular global systolic function.   6. There is a 25 x 14 mm echodensity in the anterior mediastinum; clinical correlation warranted.    11/26 1v CXR:  IMPRESSION:  1.  Slight interval improvement in left sided loculated pleural  fluid/hematoma. No pneumothorax.    11/26 2vCXR: read pending    IMPRESSION & PLAN:  POD # 15 s/p midCAB x2 converted to full sternotomy s/p PCI of mid LAD with HEATHER. VA ECMO 11/14-19. IABP 11/14-20  - Increase activity/ ambulation; PT/OT  - Encourage IS, C/DB; respiratory therapy; wean O2 as josué   - Cardiac rehab referral   - Continue cardiac meds: ASA, statin   - continue brilinta for HEATHER stent  - Pain and anticonstipation meds  - 2v CXR completed, read pending  - Postop echo ordered  - Cut epicardial wires prior to discharge   - Tele until discharge  - Optimize nutrition and electrolytes    Rhythm, Previous afib  - Tele: NSR  - continue amio to 200mg daily  - holding BB with reduced EF. Will start per HF recs  - Adjust medications as tolerated    Acute Blood Loss Anemia   Recent Labs     11/26/24  0705 11/25/24  0111 11/24/24  1715 11/24/24  0114 11/23/24  1347 11/23/24  0114 11/22/24  1205   HGB 8.3* 8.3* 8.9* 8.4* 8.7* 8.2* 8.3*   HCT 26.0* 25.5* 27.5* 26.1* 27.2* 25.2* 25.5*   - MV, PO Iron x1mo  - Daily labs, transfuse as indicated    Volume/Electrolyte Status: Preop wt Weight: 96.2 kg (212 lb)   Vitals:    11/27/24 0500   Weight: 97 kg (213 lb 12.8 oz)     - Adjust diuresis  as needed for postop cardiac surgery hypervolemia. 40mg lasix x2 today  - Replete electrolytes for hypokalemia/hypomagnesemia/hypophosphatemia as needed, aggressive K repletion with scheduled K BID with lasix today  - Daily weights and strict I&Os  - Daily RFP while admitted    Leukocytosis, downtrending  Recent Labs     24  0705 24  0111 24  1715 24  0114 24  1347 24  0114 24  1205   WBC 14.6* 18.6* 21.1* 21.3* 24.1* 22.3* 23.0*     Temp (36hrs), Av.2 °C (97.2 °F), Min:35.9 °C (96.6 °F), Max:36.5 °C (97.7 °F)     - Cx data negative. Completed 7 day empiric course zosyn  - aggressive pulmonary hygiene  - monitor for s/s infection  - daily CBC to follow    Acute systolic heart failure  - appreciate heart failure consult  - continue jardiance 10mg daily  - continue spironolactone 25mg daily  - repeat ECHO today  - continue diuresis    Left hemothorax s/p washout ; persisting loculated left pleural effusion, stable  - monitor on serial CXR. Ordered for tomorrow    VTE Prophylaxis: SCDs/TEDs, ambulation, SQ heparin  Code Status: Full Code    Dispo  - PT/OT recs High intensity level of continued care   - Would benefit from homecare RN for cardiac surgery carepath  - Anticipate discharge by next week pending diuresis, heart failure optimization, acute rehab precerpt  - Will continue to assess discharge needs    EUGENIA Robert-CNP  Cardiac Surgery [facility]  [facility]  Team Phone [phone]    2024  8:03 AM

## 2024-11-27 NOTE — PROGRESS NOTES
Subjective Data:  Patient seen this AM, out of bed and sitting in chair. Reports dyspnea, even conversationally but improved by the end of our conversation; has LE edema present. Denies chest pain; using cardiac pillow to splint while coughing.       Objective Data:  Last Recorded Vitals:  Vitals:    11/26/24 2158 11/27/24 0100 11/27/24 0500 11/27/24 1019   BP: 114/66 106/65 106/65 131/79   BP Location: Left arm Left arm Left arm Left arm   Patient Position: Lying Lying Lying Sitting   Pulse: 80 76 78 78   Resp: 17 20 20 19   Temp: 36.1 °C (97 °F) 36.3 °C (97.3 °F) 36.4 °C (97.5 °F) 36.2 °C (97.2 °F)   TempSrc: Temporal Temporal Temporal Temporal   SpO2: 93% 96% 93% 97%   Weight:   97 kg (213 lb 12.8 oz)    Height:           Last Labs:  Results for orders placed or performed during the hospital encounter of 11/10/24 (from the past 24 hours)   Magnesium   Result Value Ref Range    Magnesium 2.49 (H) 1.60 - 2.40 mg/dL   CBC   Result Value Ref Range    WBC 12.7 (H) 4.4 - 11.3 x10*3/uL    nRBC 0.0 0.0 - 0.0 /100 WBCs    RBC 2.83 (L) 4.50 - 5.90 x10*6/uL    Hemoglobin 8.4 (L) 13.5 - 17.5 g/dL    Hematocrit 26.9 (L) 41.0 - 52.0 %    MCV 95 80 - 100 fL    MCH 29.7 26.0 - 34.0 pg    MCHC 31.2 (L) 32.0 - 36.0 g/dL    RDW 16.8 (H) 11.5 - 14.5 %    Platelets 566 (H) 150 - 450 x10*3/uL   Renal Function Panel   Result Value Ref Range    Glucose 86 74 - 99 mg/dL    Sodium 136 136 - 145 mmol/L    Potassium 4.3 3.5 - 5.3 mmol/L    Chloride 105 98 - 107 mmol/L    Bicarbonate 24 21 - 32 mmol/L    Anion Gap 11 10 - 20 mmol/L    Urea Nitrogen 18 6 - 23 mg/dL    Creatinine 0.86 0.50 - 1.30 mg/dL    eGFR >90 >60 mL/min/1.73m*2    Calcium 8.0 (L) 8.6 - 10.6 mg/dL    Phosphorus 3.1 2.5 - 4.9 mg/dL    Albumin 3.5 3.4 - 5.0 g/dL   Transthoracic Echo (TTE) Complete   Result Value Ref Range    BSA 2.22 m2        TROPHS   Date/Time Value Ref Range Status   11/15/2024 12:28 PM 44,705 0 - 53 ng/L Final     Comment:     Previous result verified on  11/15/2024 1020 on specimen/case [id number] called with component TRP for procedure Troponin I, High Sensitivity with value 67,971 ng/L.   11/15/2024 01:42 AM 67,971 0 - 53 ng/L Final   11/14/2024 03:54 AM 73,314 0 - 53 ng/L Final     Comment:     Previous result verified on 11/13/2024 2154 on specimen/case [id number] called with component TRPHS for procedure Troponin I, High Sensitivity with value 45,685 ng/L.   11/08/2024 04:12 PM 4 0 - 20 ng/L Final     BNP   Date/Time Value Ref Range Status   11/10/2024 04:34 PM 13 0 - 99 pg/mL Final     HGBA1C   Date/Time Value Ref Range Status   11/08/2024 04:12 PM 5.1 See comment % Final     LDLCALC   Date/Time Value Ref Range Status   11/08/2024 04:12  <=99 mg/dL Final     Comment:                                 Near   Borderline      AGE      Desirable  Optimal    High     High     Very High     0-19 Y     0 - 109     ---    110-129   >/= 130     ----    20-24 Y     0 - 119     ---    120-159   >/= 160     ----      >24 Y     0 -  99   100-129  130-159   160-189     >/=190       VLDL   Date/Time Value Ref Range Status   11/08/2024 04:12 PM 64 0 - 40 mg/dL Final      Last I/O:  I/O last 3 completed shifts:  In: 1410 (15.1 mL/kg) [P.O.:1410]  Out: 2050 (21.9 mL/kg) [Urine:2050 (0.6 mL/kg/hr)]  Dosing Weight: 93.4 kg     Inpatient Medications:  Scheduled medications   Medication Dose Route Frequency    acetaminophen  975 mg oral q8h POWER    amiodarone  200 mg oral Daily    aspirin  81 mg oral Daily    atorvastatin  80 mg oral Nightly    empagliflozin  10 mg oral Daily    furosemide  40 mg intravenous q8h    heparin  5,000 Units subcutaneous q8h    iron polysaccharides  150 mg oral Daily    melatonin  10 mg oral Nightly    multivitamin with minerals  1 tablet oral Daily    polyethylene glycol  17 g oral BID    potassium chloride CR  20 mEq oral BID    sennosides-docusate sodium  2 tablet oral BID    spironolactone  25 mg oral Daily    ticagrelor  90 mg oral  BID     PRN medications   Medication    benzocaine-menthol    bisacodyl    naloxone    naloxone    ondansetron    Or    ondansetron    oxygen    sodium chloride    white petrolatum     Continuous Medications   Medication Dose Last Rate       Physical Exam:  General: sitting up in chair, NAD  Skin: warm and dry, mid-sternal dressing with drain in place, external PPM present   Cardiac: S1S2  Pulm: diminished anterior  GI: soft, nontender  Extremities: right groin GHADA no hematoma or tenderness; left groin with old dressing, replaced with island dressing, hematoma present some tenderness to site, suture present, no oozing; +DP pulses bilaterally; 1-2+ BLE edema   Neuro: alert, appropriate      Assessment/Plan   Jaime Dominguez is a 74 year old male with a PMH significant for basal cell carcinoma and HPL s/p MidCAB x2 converted to full sternotomy w/ LIMA prolonged as a Y graft with a radial to LAD OM w/ Dr. Aldo Harman and Dr. Stanley. CPB time 347min. Course c/b increasing pressor requirement and echo revealing apical hypo-akinesis with subsequent IABP placement on 11/14 for further support. Overnight on 11/14, Troponin elevated and ST elevation in anteroseptal leads, c/f ACS and decision made to [facility]. Now s/p PCI of mid LAD with HEATHER. Pt cannulated on VA ECMO for cardiogenic shock state 2/2 multiple failed grafts.     11/14  Right Radial 6 Fr -- TR Band  Left Femoral Artery ECMO Cannula  Left Femoral Vein ECMO Cannula     Patient is s/p CABG on 11/13/2024 arrived in fulminant cardiogenic shock despite IABP placement and 3 pressors.   A right femoral diagnostic cath was done with the following findings:     LM: distal 30-40%  LAD: mid 100% stenosis at anastomosis site   LCX: competitive flow in the OM without significant stenosis  RCA: patent  LIMA to LAD: Y graft with anastomosis to the LAD which is not patent and anastomosis to the OM which appears patent     Due to patient's profound shock, ECMO cannulation was done with  the aforementioned access sites and the plan was made jointly with cardiac surgery to pursue salvage PCI of the mid LAD. He was given ASA and loaded with Ticagrelor. Patient is now s/p salvage PCI of the mid LAD with a [place] 3.0 x 38 HEATHER. Following PCI and ECMO cannulation, patient's pressors were able to be weaned substantially and he left the cath lab (without intubation) on ECMO and IABP support.     ASA/Ticagrelor loading in the lab    Overnight bleeding from ECMO site requiring multiple units pRBCs, repositioned and sutured by Cardiac Surgery    11/15 IABP 1:1, left fem ECMO, on epi and levo (vaso currently weaned off), amio started for afib, pending TTE     11/19  Plan for chest washout due to concern for left hemothorax at the time of ECMO decannulation today. Remains on levo and epi, PRBCs transfusion.     11/20  Weaning sedation and IABP, currently 1:2; remains on epi and levo    11/21  IABP removed, extubated. On levo and epi gtts.     11/22  Remains on levo and epi gtts. Plan to remove MS chest tube. Passed BS swallow, encourage PO intake    11/24  -primary team continuing diuresis  -epi of this AM, MAPS stable    11/25  - transferring to floor today     Interventional Recs:   - cont telemetry care per PCI protocol  - cont DAPT with Brilinta (6 months) and aspirin (lifelong)  - provided education on compliance with DAPT  - at discharge, PLEASE send 90 day prescription of Brilinta to [facility] (for price check and Meds to Beds) and remaining refills to patient's home pharmacy   - continue high intensity statin  - no BB currently   - please ensure cardiology follow up appointment after discharge in 1-3 weeks  - patient referred for cardiac rehab eval  - 5lb weight restriction for 5 days for right radial access  - no lifting anything heavier than 10 lbs for one week for groin access      Interventional Cardiology will sign off. Please do not hesitate to call with questions or concerns.      CICU Fellow  Pager: [phone]   Endovascular /Limb Salvage Team Pager: [phone] for day coverage (8am-5pm)  Interventional Cardiology Fellow Pager: [phone] (7AM-5PM) Night coverage: [facility] Cross Cover [id number]  Night coverage: [id number]     Gabrielle Lozada, APRN-CNP

## 2024-11-27 NOTE — PROGRESS NOTES
Transitional Care Coordination Progress Note:  Patient was discussed during interdisciplinary rounds.  Team members present: Cardiac surgery NP's, DANNI and TCC  Plan per surgical team: Continue diuresis, HF optimization  Payer: [employer]  Status: Inpatient  [facility]  Potential barriers: none  ADOD: 2-3days  Karyn Vargas RN

## 2024-11-28 ENCOUNTER — APPOINTMENT (OUTPATIENT)
Dept: RADIOLOGY | Facility: HOSPITAL | Age: 74
DRG: 003 | End: 2024-11-28
Payer: MEDICARE

## 2024-11-28 LAB
ALBUMIN SERPL BCP-MCNC: 3.1 G/DL (ref 3.4–5)
ANION GAP SERPL CALC-SCNC: 12 MMOL/L (ref 10–20)
BUN SERPL-MCNC: 17 MG/DL (ref 6–23)
CALCIUM SERPL-MCNC: 8.1 MG/DL (ref 8.6–10.6)
CHLORIDE SERPL-SCNC: 105 MMOL/L (ref 98–107)
CO2 SERPL-SCNC: 24 MMOL/L (ref 21–32)
CREAT SERPL-MCNC: 0.91 MG/DL (ref 0.5–1.3)
EGFRCR SERPLBLD CKD-EPI 2021: 90 ML/MIN/1.73M*2
ERYTHROCYTE [DISTWIDTH] IN BLOOD BY AUTOMATED COUNT: 17 % (ref 11.5–14.5)
GLUCOSE SERPL-MCNC: 89 MG/DL (ref 74–99)
HCT VFR BLD AUTO: 27.5 % (ref 41–52)
HGB BLD-MCNC: 8.7 G/DL (ref 13.5–17.5)
MAGNESIUM SERPL-MCNC: 2.39 MG/DL (ref 1.6–2.4)
MCH RBC QN AUTO: 29.9 PG (ref 26–34)
MCHC RBC AUTO-ENTMCNC: 31.6 G/DL (ref 32–36)
MCV RBC AUTO: 95 FL (ref 80–100)
NRBC BLD-RTO: 0 /100 WBCS (ref 0–0)
PHOSPHATE SERPL-MCNC: 3.6 MG/DL (ref 2.5–4.9)
PLATELET # BLD AUTO: 643 X10*3/UL (ref 150–450)
POTASSIUM SERPL-SCNC: 4.1 MMOL/L (ref 3.5–5.3)
RBC # BLD AUTO: 2.91 X10*6/UL (ref 4.5–5.9)
SODIUM SERPL-SCNC: 137 MMOL/L (ref 136–145)
WBC # BLD AUTO: 11.6 X10*3/UL (ref 4.4–11.3)

## 2024-11-28 PROCEDURE — 71045 X-RAY EXAM CHEST 1 VIEW: CPT | Performed by: STUDENT IN AN ORGANIZED HEALTH CARE EDUCATION/TRAINING PROGRAM

## 2024-11-28 PROCEDURE — 36416 COLLJ CAPILLARY BLOOD SPEC: CPT | Performed by: NURSE PRACTITIONER

## 2024-11-28 PROCEDURE — 2500000001 HC RX 250 WO HCPCS SELF ADMINISTERED DRUGS (ALT 637 FOR MEDICARE OP): Performed by: NURSE PRACTITIONER

## 2024-11-28 PROCEDURE — 71045 X-RAY EXAM CHEST 1 VIEW: CPT

## 2024-11-28 PROCEDURE — 99232 SBSQ HOSP IP/OBS MODERATE 35: CPT

## 2024-11-28 PROCEDURE — 2500000002 HC RX 250 W HCPCS SELF ADMINISTERED DRUGS (ALT 637 FOR MEDICARE OP, ALT 636 FOR OP/ED): Performed by: NURSE PRACTITIONER

## 2024-11-28 PROCEDURE — 83735 ASSAY OF MAGNESIUM: CPT | Performed by: NURSE PRACTITIONER

## 2024-11-28 PROCEDURE — 2500000004 HC RX 250 GENERAL PHARMACY W/ HCPCS (ALT 636 FOR OP/ED): Performed by: NURSE PRACTITIONER

## 2024-11-28 PROCEDURE — 2500000004 HC RX 250 GENERAL PHARMACY W/ HCPCS (ALT 636 FOR OP/ED)

## 2024-11-28 PROCEDURE — 1200000002 HC GENERAL ROOM WITH TELEMETRY DAILY

## 2024-11-28 PROCEDURE — 80069 RENAL FUNCTION PANEL: CPT | Performed by: NURSE PRACTITIONER

## 2024-11-28 PROCEDURE — 85027 COMPLETE CBC AUTOMATED: CPT | Performed by: NURSE PRACTITIONER

## 2024-11-28 PROCEDURE — 99233 SBSQ HOSP IP/OBS HIGH 50: CPT | Performed by: STUDENT IN AN ORGANIZED HEALTH CARE EDUCATION/TRAINING PROGRAM

## 2024-11-28 RX ORDER — FUROSEMIDE 10 MG/ML
40 INJECTION INTRAMUSCULAR; INTRAVENOUS ONCE
Status: COMPLETED | OUTPATIENT
Start: 2024-11-28 | End: 2024-11-28

## 2024-11-28 RX ADMIN — Medication 10 MG: at 21:37

## 2024-11-28 RX ADMIN — HEPARIN SODIUM 5000 UNITS: 5000 INJECTION INTRAVENOUS; SUBCUTANEOUS at 01:23

## 2024-11-28 RX ADMIN — AMIODARONE HYDROCHLORIDE 200 MG: 200 TABLET ORAL at 08:56

## 2024-11-28 RX ADMIN — HEPARIN SODIUM 5000 UNITS: 5000 INJECTION INTRAVENOUS; SUBCUTANEOUS at 08:56

## 2024-11-28 RX ADMIN — ACETAMINOPHEN 975 MG: 325 TABLET, FILM COATED ORAL at 05:32

## 2024-11-28 RX ADMIN — ATORVASTATIN CALCIUM 80 MG: 80 TABLET, FILM COATED ORAL at 21:37

## 2024-11-28 RX ADMIN — ACETAMINOPHEN 975 MG: 325 TABLET, FILM COATED ORAL at 14:06

## 2024-11-28 RX ADMIN — TICAGRELOR 90 MG: 90 TABLET ORAL at 08:58

## 2024-11-28 RX ADMIN — SENNOSIDES AND DOCUSATE SODIUM 2 TABLET: 50; 8.6 TABLET ORAL at 08:56

## 2024-11-28 RX ADMIN — ACETAMINOPHEN 975 MG: 325 TABLET, FILM COATED ORAL at 21:38

## 2024-11-28 RX ADMIN — TICAGRELOR 90 MG: 90 TABLET ORAL at 21:37

## 2024-11-28 RX ADMIN — POLYSACCHARIDE-IRON COMPLEX 150 MG: 150 CAPSULE ORAL at 08:56

## 2024-11-28 RX ADMIN — Medication 1 TABLET: at 08:56

## 2024-11-28 RX ADMIN — HEPARIN SODIUM 5000 UNITS: 5000 INJECTION INTRAVENOUS; SUBCUTANEOUS at 17:10

## 2024-11-28 RX ADMIN — EMPAGLIFLOZIN 10 MG: 10 TABLET, FILM COATED ORAL at 08:56

## 2024-11-28 RX ADMIN — METOPROLOL SUCCINATE 25 MG: 25 TABLET, EXTENDED RELEASE ORAL at 08:56

## 2024-11-28 RX ADMIN — ASPIRIN 81 MG: 81 TABLET, COATED ORAL at 08:56

## 2024-11-28 RX ADMIN — FUROSEMIDE 40 MG: 10 INJECTION, SOLUTION INTRAVENOUS at 10:47

## 2024-11-28 RX ADMIN — SPIRONOLACTONE 25 MG: 25 TABLET, FILM COATED ORAL at 08:56

## 2024-11-28 ASSESSMENT — COGNITIVE AND FUNCTIONAL STATUS - GENERAL
HELP NEEDED FOR BATHING: A LITTLE
CLIMB 3 TO 5 STEPS WITH RAILING: A LOT
DRESSING REGULAR LOWER BODY CLOTHING: A LITTLE
TURNING FROM BACK TO SIDE WHILE IN FLAT BAD: A LITTLE
STANDING UP FROM CHAIR USING ARMS: A LITTLE
DRESSING REGULAR UPPER BODY CLOTHING: A LITTLE
MOBILITY SCORE: 17
WALKING IN HOSPITAL ROOM: A LITTLE
TOILETING: A LITTLE
DAILY ACTIVITIY SCORE: 20
MOVING FROM LYING ON BACK TO SITTING ON SIDE OF FLAT BED WITH BEDRAILS: A LITTLE
MOVING TO AND FROM BED TO CHAIR: A LITTLE

## 2024-11-28 ASSESSMENT — PAIN - FUNCTIONAL ASSESSMENT
PAIN_FUNCTIONAL_ASSESSMENT: 0-10
PAIN_FUNCTIONAL_ASSESSMENT: 0-10

## 2024-11-28 ASSESSMENT — PAIN SCALES - GENERAL
PAINLEVEL_OUTOF10: 0 - NO PAIN
PAINLEVEL_OUTOF10: 0 - NO PAIN

## 2024-11-28 NOTE — PROGRESS NOTES
CARDIAC SURGERY DAILY PROGRESS NOTE    Jaime Dominguez is a 74 y.o. male, with a PMH of basal cell carcinoma and HLD who presented with STEMI to an OSH and transferred to [facility] on 11/10/2024 for CABG workup. S/p MidCAB x2 converted to full sternotomy w/ LIMA prolonged as a Y graft with a radial to LAD OM w/ Dr. Aldo Harman and Dr. Stanley 11/12. Postop course c/b increasing pressor requirement and echo revealing apical hypo-akinesis with subsequent IABP placement on 11/14. Overnight on 11/14, Troponin rise and ST elevation in anteroseptal leads, c/f ACS- went for LHC, underwent PCI of mid LAD with HEATHER. Cannulated for VA ECMO 11/14 for cardiogenic shock 2/2 multiple failed grafts. On 11/19, returned to OR for VA ECMO decannulation and left hemothorax washout. IABP removed 11/20. Transferred to floor 11/25.     OPERATION/PROCEDURE: 11/12 with Rodrigo Harman MD  1.  Mini invasive robotic assisted CABG x 2 converted to full sternotomy.  2.  CABG x 2 with LIMA  prolonged as a Y graft with a radial to LAD OM.  3.  Endoscopic radial harvest.  4.  Left femoral artery and vein cannulation for peripheral bypass.  5.  Partial LAD endarterectomy    CTICU Course: see above  Transferred to T3 on 11/25.    =======================================  Interval History:   Stable overnight    SUBJECTIVE:  C/o being cold because someone adjusted the temperature of the room.     Objective   /62 (BP Location: Left arm, Patient Position: Lying)   Pulse 76   Temp 36.3 °C (97.3 °F) (Temporal)   Resp 18   Ht 1.829 m (6')   Wt 95.5 kg (210 lb 8 oz)   SpO2 95%   BMI 28.55 kg/m²   0-10 (Numeric) Pain Score: 2   3 Day Weight Change: Unable to Calculate    Intake and Output    Intake/Output Summary (Last 24 hours) at 11/28/2024 0932  Last data filed at 11/28/2024 0649  Gross per 24 hour   Intake 360 ml   Output 1550 ml   Net -1190 ml       Physical Exam  Physical Exam  Vitals reviewed.   Constitutional:        General: He is not in acute distress.     Appearance: Normal appearance. He is not ill-appearing.      Comments: Laying in bed    HENT:      Head: Normocephalic and atraumatic.   Eyes:      General: Lids are normal.      Extraocular Movements: Extraocular movements intact.      Conjunctiva/sclera: Conjunctivae normal.   Cardiovascular:      Rate and Rhythm: Normal rate and regular rhythm.      Pulses: Normal pulses.      Comments: Tele - NSR rate 80s   V wires set VVI @ 50    Pulmonary:      Effort: Pulmonary effort is normal. No respiratory distress.      Breath sounds: Normal breath sounds.      Comments: On room air with appropriate oxygenation   Short of breath with minimal movement  Abdominal:      General: Abdomen is flat. There is no distension.      Palpations: Abdomen is soft.      Tenderness: There is no abdominal tenderness.      Comments: Last BM 11/28  Passing gas    Musculoskeletal:         General: Normal range of motion.      Cervical back: Normal range of motion.      Right lower leg: Edema present.      Left lower leg: Edema present.      Comments: Moving all extremities appropriately with equal strength.   Pitting edema of lower extremities, near graft site. Improved with diuresis.   Skin:     General: Skin is warm and dry.      Capillary Refill: Capillary refill takes less than 2 seconds.   Neurological:      General: No focal deficit present.      Mental Status: He is alert and oriented to person, place, and time.   Psychiatric:         Mood and Affect: Mood normal.         Behavior: Behavior normal. Behavior is cooperative.       Medications  Scheduled medications  acetaminophen, 975 mg, oral, q8h POWER  amiodarone, 200 mg, oral, Daily  aspirin, 81 mg, oral, Daily  atorvastatin, 80 mg, oral, Nightly  empagliflozin, 10 mg, oral, Daily  heparin, 5,000 Units, subcutaneous, q8h  iron polysaccharides, 150 mg, oral, Daily  melatonin, 10 mg, oral, Nightly  metoprolol succinate XL, 25 mg, oral,  Daily  multivitamin with minerals, 1 tablet, oral, Daily  polyethylene glycol, 17 g, oral, BID  sennosides-docusate sodium, 2 tablet, oral, BID  spironolactone, 25 mg, oral, Daily  ticagrelor, 90 mg, oral, BID    Continuous medications   PRN medications  PRN medications: benzocaine-menthol, bisacodyl, naloxone, naloxone, ondansetron **OR** ondansetron, oxygen, sodium chloride, white petrolatum    Labs  Results for orders placed or performed during the hospital encounter of 11/10/24 (from the past 24 hours)   Transthoracic Echo (TTE) Complete   Result Value Ref Range    AV pk sachi 1.60 m/s    LVOT diam 2.00 cm    MV E/A ratio 0.88     Tricuspid annular plane systolic excursion 1.9 cm    LV EF 45 %    RV free wall pk S' 12.40 cm/s    LVIDd 4.80 cm    RVSP 25.1 mmHg    Aortic Valve Area by Continuity of Peak Velocity 2.79 cm2    AV pk grad 10 mmHg   Magnesium   Result Value Ref Range    Magnesium 2.39 1.60 - 2.40 mg/dL   CBC   Result Value Ref Range    WBC 11.6 (H) 4.4 - 11.3 x10*3/uL    nRBC 0.0 0.0 - 0.0 /100 WBCs    RBC 2.91 (L) 4.50 - 5.90 x10*6/uL    Hemoglobin 8.7 (L) 13.5 - 17.5 g/dL    Hematocrit 27.5 (L) 41.0 - 52.0 %    MCV 95 80 - 100 fL    MCH 29.9 26.0 - 34.0 pg    MCHC 31.6 (L) 32.0 - 36.0 g/dL    RDW 17.0 (H) 11.5 - 14.5 %    Platelets 643 (H) 150 - 450 x10*3/uL   Renal Function Panel   Result Value Ref Range    Glucose 89 74 - 99 mg/dL    Sodium 137 136 - 145 mmol/L    Potassium 4.1 3.5 - 5.3 mmol/L    Chloride 105 98 - 107 mmol/L    Bicarbonate 24 21 - 32 mmol/L    Anion Gap 12 10 - 20 mmol/L    Urea Nitrogen 17 6 - 23 mg/dL    Creatinine 0.91 0.50 - 1.30 mg/dL    eGFR 90 >60 mL/min/1.73m*2    Calcium 8.1 (L) 8.6 - 10.6 mg/dL    Phosphorus 3.6 2.5 - 4.9 mg/dL    Albumin 3.1 (L) 3.4 - 5.0 g/dL     =========================  IMAGING/ DIAGNOSTIC TESTING:  I have personally reviewed the following test result(s):    ========================  TRANSTHORACIC ECHOCARDIOGRAM REPORT  11/27/2024  PHYSICIAN  INTERPRETATION:  Left Ventricle: Left ventricular ejection fraction is suspected mildly decreased, by visual estimate at 45%. The left ventricle was not well visualized. The left ventricular cavity size is normal. There is normal septal and normal posterior left ventricular wall thickness. Abnormal (paradoxical) septal motion consistent with post-operative status. Spectral Doppler shows a Grade I (impaired relaxation pattern) of left ventricular diastolic filling with normal left atrial filling pressure.  Left Atrium: The left atrium is normal in size.  Right Ventricle: The right ventricle is normal in size. There is normal right ventricular global systolic function.  Right Atrium: The right atrium is normal in size.  Aortic Valve: The aortic valve is probably trileaflet. There is aortic valve annular calcification. There is trace aortic valve regurgitation. The peak instantaneous gradient of the aortic valve is 10 mmHg.  Mitral Valve: The mitral valve is normal in structure. There is trace mitral valve regurgitation.  Tricuspid Valve: The tricuspid valve is structurally normal. There is trace tricuspid regurgitation.  Pulmonic Valve: The pulmonic valve is not well visualized. There is trace pulmonic valve regurgitation.  Pericardium: Trivial pericardial effusion.  Aorta: The aortic root is normal. The aortic root is at the upper limits of normal size.  CONCLUSIONS:   1. Poorly visualized anatomical structures due to suboptimal image quality.   2. The left ventricle was not well visualized.   3. Left ventricular ejection fraction is suspected mildly decreased, by visual estimate at 45%.   4. Spectral Doppler shows a Grade I (impaired relaxation pattern) of left ventricular diastolic filling with normal left atrial filling pressure.   5. Abnormal septal motion consistent with post-operative status.   6. There is normal right ventricular global systolic function.    XR CHEST 2 VIEWS  11/26/2024 1:07 pm   FINDINGS:  AP  and lateral radiographs of the chest were provided, additionally  dual energy images were obtained.  Patient was status post median sternotomy.  CARDIOMEDIASTINAL SILHOUETTE:  Cardiomediastinal silhouette is enlarged but similar to prior exam..  LUNGS:  Similar appearance in size of lobulated left-sided pleural  fluid/hematoma measuring 5.7 x 10.1 cm. . Similar bibasilar presumed  atelectasis with small bilateral pleural effusions, left greater than  right.  ABDOMEN:  No remarkable upper abdominal findings.  BONES:  No acute osseous changes.  IMPRESSION:  1.  Stable loculated left-sided pleural effusions-hematoma.  2. Similar bibasilar presumed atelectasis with small bilateral  pleural effusions, left greater than right.  3. No pneumothorax.    ================  IMPRESSION & PLAN:  ===============  POD #16 s/p midCAB x2 converted to full sternotomy s/p PCI of mid LAD with HEATHER. VA ECMO 11/14-19. IABP 11/14-20.  - Increase activity/ ambulation; PT/OT  - Encourage IS, C/DB; respiratory therapy; wean O2 as josué   - Cardiac rehab referral   - Continue cardiac meds: ASA, statin   - continue brilinta for HEATHER stent  - Pain and anticonstipation meds  - 2v CXR completed 11/26  - Postop echo completed 11/27  - Cut epicardial wires prior to discharge   - Tele until discharge  - Optimize nutrition and electrolytes    Rhythm, Previous afib  - Tele: NSR rate 80s   - Continue amio to 200mg daily  - Continue metoprolol 25mg BID (started 11/27)   - Continue brilinta 90mg BID for HEATHER   - Adjust medications as tolerated    Acute systolic heart failure  - appreciate heart failure consult  - Continue jardiance 10mg daily  - Continue spironolactone 25mg daily  - Continue metoprolol 25mg BID   - continue diuresis    HLD: Home meds: atorvastatin 40mg daily   - Continue atorvastatin 80mg daily     Acute Blood Loss Anemia   Recent Labs     11/28/24  0615 11/27/24  0643 11/26/24  0705 11/25/24  0111 11/24/24  1715 11/24/24  0114 11/23/24  1347    HGB 8.7* 8.4* 8.3* 8.3* 8.9* 8.4* 8.7*   HCT 27.5* 26.9* 26.0* 25.5* 27.5* 26.1* 27.2*   - MV, PO Iron x1mo  - Daily labs, transfuse as indicated    Volume/Electrolyte Status: Preop wt Weight: 96.2 kg (212 lb)   Vitals:    24 0649   Weight: 95.5 kg (210 lb 8 oz)   - Weight  95.5kg  - Adjust diuresis as needed for postop cardiac surgery hypervolemia. 40mg lasix x2 today  - Replete electrolytes for hypokalemia/hypomagnesemia/hypophosphatemia as needed, aggressive K repletion with scheduled K BID with lasix today  - Daily weights and strict I&Os  - Daily RFP while admitted  - Diuresis with 40mg lasix x 1, plan to redose     Leukocytosis, downtrending  Recent Labs     24  0615 24  0643 24  0705 24  0111 24  1715 24  0114 24  1347   WBC 11.6* 12.7* 14.6* 18.6* 21.1* 21.3* 24.1*     Temp (36hrs), Av.3 °C (97.4 °F), Min:36 °C (96.8 °F), Max:36.9 °C (98.4 °F)  - Cx data negative. Completed 7 day empiric course zosyn  - aggressive pulmonary hygiene  - monitor for s/s infection  - daily CBC to follow    Left hemothorax s/p washout ; persisting loculated left pleural effusion, stable - Patient currently on RA and in no acute respiratory distress   - monitor on serial CXR. Ordered for tomorrow.    VTE Prophylaxis: SCDs/TEDs, ambulation, SQ heparin  Code Status: Full Code    Dispo  - PT/OT recs High intensity level of continued care   - Would benefit from homecare RN for cardiac surgery carepath  - Anticipate discharge by weekend/Monday pending diuresis, heart failure optimization, acute rehab precerpt  - Will continue to assess discharge needs    Ayala Eddir, PA-C  Cardiac Surgery [facility]  [facility]  Team Phone [phone]    2024  9:32 AM

## 2024-11-28 NOTE — PROGRESS NOTES
Subjective Data:  Reports dry, stuffy nose making it difficult to take in deep breaths. Overall feels better, reports losing 15 lbs in last few days with diuresis, legs getting skinnier. Walking in the unit. Feels orthopneic with difficulty sleeping and thinks he needs CPAP. Has CPAP at home for NARCISA but non-compliant. Otherwise in good spirits and looking at the prospects of discharge in next few days.     Objective Data:  Last Recorded Vitals:  Vitals:    11/28/24 0256 11/28/24 0649 11/28/24 0849 11/28/24 1313   BP: 105/63 101/63 101/62 100/62   BP Location: Left arm Left arm Left arm Left arm   Patient Position: Lying Lying Lying Lying   Pulse: 81 74 76 75   Resp: 17 16 18 18   Temp: 36 °C (96.8 °F) 36.9 °C (98.4 °F) 36.3 °C (97.3 °F) 36.5 °C (97.7 °F)   TempSrc: Temporal Temporal Temporal Temporal   SpO2: 97% 92% 95% 96%   Weight:  95.5 kg (210 lb 8 oz)     Height:           Last Labs:  CBC - 11/28/2024:  6:15 AM  11.6 8.7 643    27.5      CMP - 11/28/2024:  6:15 AM  8.1 5.9 180 --- 0.9   3.6 3.1 35 21      PTT - 11/20/2024:  4:52 AM  1.3   14.3 28     TROPHS   Date/Time Value Ref Range Status   11/15/2024 12:28 PM 44,705 0 - 53 ng/L Final     Comment:     Previous result verified on 11/15/2024 1020 on specimen/case [id number] called with component TRPHS for procedure Troponin I, High Sensitivity with value 67,971 ng/L.   11/15/2024 01:42 AM 67,971 0 - 53 ng/L Final   11/14/2024 03:54 AM 73,314 0 - 53 ng/L Final     Comment:     Previous result verified on 11/13/2024 2154 on specimen/case [id number] called with component TRPHS for procedure Troponin I, High Sensitivity with value 45,685 ng/L.   11/08/2024 04:12 PM 4 0 - 20 ng/L Final     BNP   Date/Time Value Ref Range Status   11/10/2024 04:34 PM 13 0 - 99 pg/mL Final     HGBA1C   Date/Time Value Ref Range Status   11/08/2024 04:12 PM 5.1 See comment % Final     LDLCALC   Date/Time Value Ref Range Status   11/08/2024 04:12  <=99 mg/dL Final      Comment:                                 Near   Borderline      AGE      Desirable  Optimal    High     High     Very High     0-19 Y     0 - 109     ---    110-129   >/= 130     ----    20-24 Y     0 - 119     ---    120-159   >/= 160     ----      >24 Y     0 -  99   100-129  130-159   160-189     >/=190       VLDL   Date/Time Value Ref Range Status   11/08/2024 04:12 PM 64 0 - 40 mg/dL Final      Last I/O:  I/O last 3 completed shifts:  In: 810 (8.7 mL/kg) [P.O.:810]  Out: 1975 (21.1 mL/kg) [Urine:1975 (0.6 mL/kg/hr)]  Dosing Weight: 93.4 kg     Echo:  Transthoracic Echo (TTE) Limited 11/16/2024  CONCLUSIONS:  1. Multiple segmental abnormalities exist. See findings.  2. Left ventricular ejection fraction is moderately decreased, calculated by Lambert's biplane at 34%.  3. Abnormal left venticular wall motion.  4. No left ventricular thrombus visualized.  5. There is reduced right ventricular systolic function.  6. There apperas to be a trivial pericardial effusion, however thre is also a oderate layer of echodense material overlying the RV of unclear etiology. NO obivous RA or RV collapse though not well seen and MV and V inflows not assessed for respiratory variation ( pt in afib so unable to assess given variable RR intervals) and IVC not well seen. Overall unable to determine hemodyanic significance of the material overlyin the RV.  7. Right ventricular systolic pressure is within normal limits.  8. Moderately dilated aortic root.  9. There is LIV of the subvalvular apparatus and MV leaflets of unclear significance. LVOT gradinets not assessed nor was there color Doppler on the LVOT to see if there were acceleration of flow to suggest obstructiom. ( Does not appear to have LIV -septal contact on 2D images).  10. Compared with the prior exam from 11/15/2024, there are no significant changes. The LAD territory wall motion abnormality appears to be similar. LIV has been present on prior studies.     Ejection  Fractions:  EF   Date/Time Value Ref Range Status   11/27/2024 10:10 AM 45 %    11/21/2024 03:49 PM 33 %    11/19/2024 01:46 PM 33 %      Inpatient Medications:  Scheduled medications   Medication Dose Route Frequency    acetaminophen  975 mg oral q8h POWER    amiodarone  200 mg oral Daily    aspirin  81 mg oral Daily    atorvastatin  80 mg oral Nightly    empagliflozin  10 mg oral Daily    heparin  5,000 Units subcutaneous q8h    iron polysaccharides  150 mg oral Daily    melatonin  10 mg oral Nightly    metoprolol succinate XL  25 mg oral Daily    multivitamin with minerals  1 tablet oral Daily    polyethylene glycol  17 g oral BID    sennosides-docusate sodium  2 tablet oral BID    spironolactone  25 mg oral Daily    ticagrelor  90 mg oral BID     PRN medications   Medication    benzocaine-menthol    bisacodyl    naloxone    naloxone    ondansetron    Or    ondansetron    oxygen    sodium chloride    white petrolatum     Continuous Medications   Medication Dose Last Rate     Physical Exam:  GEN: NAD.  CV: RRR, no m/r/g.   LUNGS: no respiratory distress on NC.  ABD: Soft, NT/ND.  SKIN: Warm, well perfused. LE edema: +2 pitting edema  MSK: No deformities.  EXT: warm and well perfused.   NEURO: No focal deficits.     Assessment/Plan   Jaime Dominguez is a 74 year old male with a PMH significant for basal cell carcinoma and HLD who initially presented to [facility] ED on 11/8 complaining of left sided chest pain, diaphoresis, SOB nausea and syncope. ECG showed ST elevation in the inferior leads and ST depression in anterior septal leads. LHC showed 90% occlusion of the LAD. [place] [facility] [place] cardiac surgery workup.  He is now s/p MidCAB x2 converted to full sternotomy w/ LIMA prolonged as a Y graft with a radial to LAD OM w/ Dr. Aldo Harman and Dr. Stanley. CPB time 347min. Course c/b increasing pressor requirement and echo revealing apical hypo-akinesis with subsequent IABP placement on 11/14 for further  support. Overnight on 11/14, Troponin elevated and ST elevation in anteroseptal leads, c/f ACS and decision made to [facility]. Now s/p PCI of mid LAD with HEATHER. Pt cannulated on VA ECMO for cardiogenic shock state 2/2 multiple failed grafts. HF was engaged for multidisciplinary decision making regarding cardiogenic shock. Mechanical support: s/p VA ECMO - decannulated 11/19, IABP removed 11/20. Epi weaned 11/24.     HFrEF  ICM  CAD s/p CABG and PCI   Cardiogenic shock s/p VA ECMO and IABP   - TTE 11/16: LVEF 31%, abnormal wall motion; reduced RVSF.   - TTE 11/27 pending.   - Diuresis: Continue diuresis per primary team.  - GDMT:   - SGLT-2i: c/w jardiance 10mg daily              - Beta-blocker: c/w metoprolol XL 25mg daily.               - ACE-I/ARB/ARNI: Will consider low dose entresto once SBP >120 mmHg. If that happens when he is inpatient, can start 12/13 mg BID entresto before discharge otherwise defer to OP initiation.               - MRA: c/w aldactone 25mg daily   - Should follow-up with heart failure specialist Dr. Gann after discharge, depart updated.   - AICD/CRT-D/Lifevest: As EF 45%, not indicated.   - Strict I/Os, Daily Na < 2g daily, fluid restriction.     For any questions or concerns, feel free to reach out via [employer] chat or page at [phone].This plan was discussed with Dr. Gann, HF attending.     HF service will sign-off at this time.     Nara Rodriguez MD, MS, FACP   Heart Failure Fellow,  [facility]

## 2024-11-28 NOTE — PROGRESS NOTES
[facility] Attending Shared Visit Note    This is a shared visit. Please see Advanced Practice Provider's encounter note for additional details.      Briefly, 73yo M from [place] with history of BCC and HLD who presented inferior STEMI, and coronary angiography revealed a critical LAD occlusion. He underwent a MidCABx2, converted to full sternotomy, with LIMA extended as a Y graft to LAD and OM. Post-operative course was complicated by cardiogenic shock requiring IABP and subsequent VA-ECMO support after failed grafts. He then required PCI of the mid-LAD with HEATHER. ECMO was successfully weaned by 11/19, and IABP removed 11/20.     Exam: Regular, sternotomy c/d/i, 2+ LE edema   Echo 11/27/2024 with improved EF to 45%.     Continue notable therapies below for HFmrEF    Notable Therapies   Class  Agent SAFETY    *ARNI* / ACE / ARB  Defer in setting of relative hypotension Last BP: 101/62, Last BUN/Cr (GFR): 11/28/2024: 17/0.91 (90)    *Beta-Blocker*  Metoprolol XL 25mg daily Last HR: 76    *MRA*  Spironolactone 25mg daily Last K: 11/28/2024: 4.1     *SGLT2*  Jardiance 10mg daily Last A1c 11/8/2024: 5.1     Diuretic   Last BNP: 11/10/2024: 13    Hydralazine/ISDN       Digoxin  Last Digoxin level: No results found for requested labs within last 3650 days.    Anticoagulation   Last Hgb: 11/28/2024: 8.7    Anti-arrhythmic   Last QTc: 11/18/2024: 381    Antiplatelet  Aspirin 81mg dialy, ticagrelor 90mg BID Last Platelet: 11/28/2024: 643    Lipid-Lowering  Atorvastatin 80mg daily Last Tchol 11/8/2024: 228 / LDL No results found for requested labs within last 3650 days. or 11/8/2024: 126 / HDL 11/8/2024: 37.6 / TRIG 11/8/2024: 322    Other        Device(s)  No indication EF: 11/27/2024: 45%, QRS: 11/18/2024: 78ms    Cardiac Rehab,   if EF <35/MI/OHS  Should be referred as outpatient          Problems:   Principal Problem:    STEMI (ST elevation myocardial infarction) (Multi)  Active Problems:    TREE (acute kidney injury)  (CMS-HCC)    On intra-aortic balloon pump assist    Delirium    Cardiogenic shock (Multi)    Hemothorax on left      Jayson Gann MD    Objective   Admit Date: 11/10/2024  Hospital Length of Stay: 18   Home: [place] [id number]    MEDICATIONS  Infusions:     Scheduled:  acetaminophen, 975 mg, q8h POWER  amiodarone, 200 mg, Daily  aspirin, 81 mg, Daily  atorvastatin, 80 mg, Nightly  empagliflozin, 10 mg, Daily  heparin, 5,000 Units, q8h  iron polysaccharides, 150 mg, Daily  melatonin, 10 mg, Nightly  metoprolol succinate XL, 25 mg, Daily  multivitamin with minerals, 1 tablet, Daily  polyethylene glycol, 17 g, BID  sennosides-docusate sodium, 2 tablet, BID  spironolactone, 25 mg, Daily  ticagrelor, 90 mg, BID      PRN:  benzocaine-menthol, 1 lozenge, q2h PRN  bisacodyl, 10 mg, Daily PRN  naloxone, 0.2 mg, q5 min PRN  naloxone, 0.2 mg, q5 min PRN  ondansetron, 4 mg, q8h PRN   Or  ondansetron, 4 mg, q8h PRN  oxygen, , Continuous PRN - O2/gases  sodium chloride, 1 spray, 4x daily PRN  white petrolatum, , q1h PRN      Prior to Admission Meds:  Medications Prior to Admission   Medication Sig Dispense Refill Last Dose/Taking    aspirin 81 mg EC tablet Take 1 tablet (81 mg) by mouth once daily.   Past Week    atorvastatin (Lipitor) 40 mg tablet Take 1 tablet (40 mg) by mouth once daily. (Patient not taking: Reported on 11/8/2024)   More than a month       Vitals:      11/28/2024     8:49 AM 11/28/2024     6:49 AM 11/28/2024     2:56 AM 11/27/2024     8:45 PM 11/27/2024     5:35 PM 11/27/2024     2:13 PM 11/27/2024    12:02 PM   Vitals   Systolic 101 101 105 110 108 114    Diastolic 62 63 63 71 73 72    BP Location Left arm Left arm Left arm Left arm Left arm Left arm    Heart Rate 76 74 81 89 78 84 83   Temp 36.3 °C (97.3 °F) 36.9 °C (98.4 °F) 36 °C (96.8 °F) 36.5 °C (97.7 °F) 36.2 °C (97.2 °F) 36.4 °C (97.5 °F)    Resp 18 16 17 18 18 17    Weight (lb)  210.5        BMI  28.55 kg/m2        BSA (m2)  2.2 m2          Wt  Readings from Last 5 Encounters:   11/28/24 95.5 kg (210 lb 8 oz)   11/10/24 96.2 kg (212 lb)     Weight         11/12/2024  0400 11/18/2024  0600 11/26/2024  0449 11/27/2024  0500 11/28/2024  0649    Weight: 93.4 kg (205 lb 14.6 oz) 94 kg (207 lb 3.7 oz) 99.6 kg (219 lb 9.3 oz) 97 kg (213 lb 12.8 oz) 95.5 kg (210 lb 8 oz)              Intake/Output Summary (Last 24 hours) at 11/28/2024 1234  Last data filed at 11/28/2024 0649  Gross per 24 hour   Intake 240 ml   Output 750 ml   Net -510 ml       CHEST: Unlabored, Diminished  CV:  Normal sinus rhythm  NEURO:   RASS: Alert and calm  CAM: Negative  LOC: Alert  Cognition: Appropriate judgement, Appropriate safety awareness, Appropriate attention/concentration, Appropriate for developmental age, Follows commands  GCS: 15    DATA:  CMP:  Recent Labs     11/28/24  0615 11/27/24  0643 11/26/24  0705 11/25/24  0111 11/24/24  1715 11/24/24  0114 11/23/24  1708 11/23/24  0114    136 136 134* 133* 133* 132* 136   K 4.1 4.3 3.3* 3.7 3.7 3.8 4.0 3.7    105 103 100 99 99 98 101   CO2 24 24 24 26 24 26 26 27   ANIONGAP 12 11 12 12 14 12 12 12   BUN 17 18 18 23 22 20 23 18   CREATININE 0.91 0.86 0.75 0.83 0.74 0.80 0.79 0.72   EGFR 90 >90 >90 >90 >90 >90 >90 >90   MG 2.39 2.49* 2.41* 2.33 1.97 2.06 2.00 2.00     Recent Labs     11/28/24  0615 11/27/24  0643 11/26/24  0705 11/25/24  0111 11/24/24  1715 11/24/24  0114 11/23/24  1708 11/23/24  0114 11/14/24  0020 11/13/24  1350 11/12/24  0416 11/11/24  0401 11/10/24  1634 11/08/24  1612   ALBUMIN 3.1* 3.5 3.0* 2.9* 3.0* 2.9* 2.9* 3.0*   < > 4.9  4.9   < > 4.4  4.1   < > 4.5   ALT  --   --   --   --   --   --   --   --   --  35  --  26  --  29   AST  --   --   --   --   --   --   --   --   --  180*  --  30  --  27   BILITOT  --   --   --   --   --   --   --   --   --  0.9  --  0.8  --  0.5    < > = values in this interval not displayed.     CBC:  Recent Labs     11/28/24  0615 11/27/24  0643 11/26/24  0705 11/25/24  0111  "11/24/24  1715 11/24/24  0114 11/23/24  1347 11/23/24  0114   WBC 11.6* 12.7* 14.6* 18.6* 21.1* 21.3* 24.1* 22.3*   HGB 8.7* 8.4* 8.3* 8.3* 8.9* 8.4* 8.7* 8.2*   HCT 27.5* 26.9* 26.0* 25.5* 27.5* 26.1* 27.2* 25.2*   * 566* 545* 531* 577* 456* 453* 411   MCV 95 95 93 91 91 91 92 91     COAG:   Recent Labs     11/21/24  0209 11/20/24  0452 11/19/24  1723 11/18/24  0213 11/17/24  0832 11/16/24 2015 11/16/24  1357 11/16/24  0143 11/15/24  2116 11/14/24 2006 11/14/24  1523 11/13/24  0059 11/12/24  0639 11/12/24  0416 11/11/24  0402 11/10/24  2040   INR  --  1.3* 1.2*  --   --   --   --   --  1.3*  --  2.0* 1.3*  --  1.1 1.0 1.1   HAUF <0.1  --   --  0.3 0.3 0.2 0.2   < >  --    < >  --   --    < > 1.0 0.4 0.3   FIBRINOGEN  --   --  410*  --   --   --   --   --   --   --  441* 164*  --   --   --   --     < > = values in this interval not displayed.     ABO:   Recent Labs     11/24/24  0114   ABO A     HEME/ENDO:   Recent Labs     11/10/24  2040 11/08/24  1612   TSH 3.12 3.07   HGBA1C  --  5.1     CARDIAC:   Recent Labs     11/18/24  0213 11/17/24  0317 11/15/24  1228 11/15/24  0142 11/14/24  0354 11/14/24  0020 11/13/24 2020 11/13/24  1350 11/10/24  2040 11/10/24  1634   * 450*  --   --   --   --   --   --   --   --    TROPHS  --   --  44,705* 67,971* 73,314* 65,732* 63,067* 45,685* 2,246* 2,317*   BNP  --   --   --   --   --   --   --   --   --  13     Recent Labs     11/08/24  1612   CHOL 228*   LDLCALC 126*   HDL 37.6   TRIG 322*     TOX:No results for input(s): \"AMPHETAMINE\", \"BENZO\", \"CANNABINOID\", \"COCAI\", \"FENTANYL\", \"OPIATE\", \"OXYCODONE\", \"PCP\" in the last 66961 hours.    No lab exists for component: \"BARBSCRUR\"  MICRO: No results for input(s): \"ESR\", \"CRP\", \"PROCAL\" in the last 66538 hours.  No results found for the last 90 days.      EKG:   Recent Labs     11/18/24  1550 11/16/24  1655 11/14/24  0345 11/13/24  0630 11/10/24  1635   ATRRATE 326 98 94 87 74   VENTRATE 138 96 94 87 74   PRINT  --   " --  168 160 182   QRSDUR 78 88 86 78 96   QTCFRED 332 383 406 425 410   QTCCALCB 381 414 437 452 424     Encounter Date: 11/10/24   Electrocardiogram 12-lead PRN for arrhythmia   Result Value    Ventricular Rate 138    Atrial Rate 326    QRS Duration 78    QT Interval 252    QTC Calculation(Bazett) 381    R Axis 101    T Axis 25    QRS Count 23    Q Onset 225    T Offset 351    QTC Fredericia 332    Narrative    Atrial fibrillation with rapid ventricular response with premature ventricular or aberrantly conducted complexes  Rightward axis  Anteroseptal infarct (cited on or before 13-NOV-2024)  Lateral injury pattern  ** ** ACUTE MI / STEMI ** **  Abnormal ECG    Confirmed by Oscar Arzate ([id number]) on 11/22/2024 7:28:04 PM     Echocardiogram:   Recent Labs     11/27/24  1010 11/21/24  1549 11/19/24  1346 11/18/24  0845 11/16/24  1105 11/15/24  0911 11/12/24  1024 11/09/24  0918   EF 45 33 33 43 34 31   < > 63   LVIDD 4.80  --   --   --  3.90  --   --  5.18   RV 25.1  --   --   --  21.0  --   --   --    RVFRWALLPKSP 12.40  --   --   --  6.31 6.31  --  11.50   TAPSE 1.9  --   --   --  1.0  --   --  2.5    < > = values in this interval not displayed.   Transthoracic Echo (TTE) Complete With Contrast 11/27/2024    [facility], [address] [place] [address]  Tel [phone] and Fax [phone]    TRANSTHORACIC ECHOCARDIOGRAM REPORT      Patient Name:       BARRY Vincent Physician:    [id number] Darinel Mckeon MD  Study Date:         11/27/2024          Ordering Provider:    [id number] OMER ONEILL  MRN/PID:            [id number]            Fellow:  Accession#:         [id number]        Nurse:  Date of Birth/Age:  1950 / 74      Sonographer:          Stacie Barrjj RDCS  years  Gender assigned at  M                   Additional Staff:  Birth:  Height:             182.88 cm           Admit Date:           11/10/2024  Weight:             96.62 kg            Admission  Status:     Inpatient -  Routine  BSA / BMI:          2.19 m2 / 28.89     Encounter#:           [id number]  kg/m2  Blood Pressure:     106/65 mmHg         Department Location:  [place] [address]    Study Type:    TRANSTHORACIC ECHO (TTE) COMPLETE  Diagnosis/ICD: ST elevation (STEMI) myocardial infarction of unspecified  site-I21.3  Indication:    STEMI  CPT Code:      Echo Complete w Full Doppler-19242    Patient History:  Pertinent History: Hyperlipidemia. HFrEF 30-35%, STEMI, CAD s/p CABG,  cardiogenic shock, s/p VA ECMO decannulation, basal cell  carcinoma, TREE.    Study Detail: The following Echo studies were performed: 2D, M-Mode, Doppler and  color flow. Technically challenging study due to poor acoustic  windows, prominent lung artifact, postoperative dressings and  patient lying in supine position. Definity used as a contrast  agent for endocardial border definition. Total contrast used for  this procedure was 4.0 mL via IV push. Unable to obtain subcostal  view.      PHYSICIAN INTERPRETATION:  Left Ventricle: Left ventricular ejection fraction is suspected mildly decreased, by visual estimate at 45%. The left ventricle was not well visualized. The left ventricular cavity size is normal. There is normal septal and normal posterior left ventricular wall thickness. Abnormal (paradoxical) septal motion consistent with post-operative status. Spectral Doppler shows a Grade I (impaired relaxation pattern) of left ventricular diastolic filling with normal left atrial filling pressure.  Left Atrium: The left atrium is normal in size.  Right Ventricle: The right ventricle is normal in size. There is normal right ventricular global systolic function.  Right Atrium: The right atrium is normal in size.  Aortic Valve: The aortic valve is probably trileaflet. There is aortic valve annular calcification. There is trace aortic valve regurgitation. The peak instantaneous gradient of the aortic valve is 10 mmHg.  Mitral  Valve: The mitral valve is normal in structure. There is trace mitral valve regurgitation.  Tricuspid Valve: The tricuspid valve is structurally normal. There is trace tricuspid regurgitation.  Pulmonic Valve: The pulmonic valve is not well visualized. There is trace pulmonic valve regurgitation.  Pericardium: Trivial pericardial effusion.  Aorta: The aortic root is normal. The aortic root is at the upper limits of normal size.      CONCLUSIONS:  1. Poorly visualized anatomical structures due to suboptimal image quality.  2. The left ventricle was not well visualized.  3. Left ventricular ejection fraction is suspected mildly decreased, by visual estimate at 45%.  4. Spectral Doppler shows a Grade I (impaired relaxation pattern) of left ventricular diastolic filling with normal left atrial filling pressure.  5. Abnormal septal motion consistent with post-operative status.  6. There is normal right ventricular global systolic function.    QUANTITATIVE DATA SUMMARY:    2D MEASUREMENTS:         Normal Ranges:  Ao Root d:       3.80 cm (2.0-3.7cm)  IVSd:            0.90 cm (0.6-1.1cm)  LVPWd:           0.80 cm (0.6-1.1cm)  LVIDd:           4.80 cm (3.9-5.9cm)  LVIDs:           3.30 cm  LV Mass Index:   63 g/m2  LV % FS          31.2 %      M-MODE MEASUREMENTS:         Normal Ranges:  AoV Exc:             2.10 cm (1.5-2.5cm)      AORTA MEASUREMENTS:         Normal Ranges:  AoV Exc:            2.10 cm (1.5-2.5cm)  Ao Sinus, d:        3.60 cm (2.1-3.5cm)  Ao Arch:            3.30 cm (2.0-3.6cm)      LV SYSTOLIC FUNCTION BY 2D PLANIMETRY (MOD):  Normal Ranges:  EF-Visual:      45 %  LV EF Reported: 45 %      LV DIASTOLIC FUNCTION:             Normal Ranges:  MV Peak E:             0.78 m/s    (0.7-1.2 m/s)  MV Peak A:             0.90 m/s    (0.42-0.7 m/s)  E/A Ratio:             0.88        (1.0-2.2)  MV e'                  0.067 m/s   (>8.0)  MV lateral e'          0.06 m/s  MV medial e'           0.07 m/s  MV A Dur:               137.00 msec  E/e' Ratio:            11.72       (<8.0)  MV DT:                 195 msec    (150-240 msec)      MITRAL VALVE:          Normal Ranges:  MV DT:        195 msec (150-240msec)      AORTIC VALVE:            Normal Ranges:  AoV Vmax:      1.60 m/s  (<=1.7m/s)  AoV Peak PG:   10.2 mmHg (<20mmHg)  LVOT Max Edmond:  1.42 m/s  (<=1.1m/s)  LVOT VTI:      23.20 cm  LVOT Diameter: 2.00 cm   (1.8-2.4cm)  AoV Area,Vmax: 2.79 cm2  (2.5-4.5cm2)      RIGHT VENTRICLE:  TAPSE: 18.6 mm  RV s'  0.12 m/s      TRICUSPID VALVE/RVSP:          Normal Ranges:  Peak TR Velocity:     2.35 m/s  RV Syst Pressure:     25 mmHg  (< 30mmHg)      PULMONIC VALVE:          Normal Ranges:  PV Max Edmond:     1.3 m/s  (0.6-0.9m/s)  PV Max P.8 mmHg      [id number] Darinel Mckeon MD  Electronically signed on 2024 at 1:26:53 PM        ** Final **    Coronary Angiography:   Left Heart Cath, With LV, Angriography And Grafts, Left Heart Cath, With LV, Angriography And Grafts, Left Heart Cath, With LV, Angriography And Grafts 2024    [facility], Cath Lab, [address] [place] [address]    Cardiovascular Catheterization Report    Patient Name:      BARRY WRIGHT     Performing Physician:  [id number] [facility]   Study Date:        2024           Verifying Physician:   [id number] [facility]  MRN/PID:           [id number]             Cardiologist/Co-Scrub: [id number] Romeo Boston MD  Accession#:        [id number]         Ordering Provider:     [id number] RAVI GARCIA  Date of Birth/Age: 1950 / 74 years Cardiologist:  Gender:            M                    Fellow:                [id number] Regina Vega MD  Admit Date:                             Fellow:                [id number] Ronda Montez MD  Encounter#:        [id number]           Surgeon:      Study: Left Heart Cath with PCI      Indications:  BARRY WRIGHT is a 74 year old male who presents with dyslipidemia, hypertension,  prior coronary artery bypass graft surgery and an anginal equivalent chest pain assessment (i.e. dyspnea on exertion believed to be from ischemia). Cardiomyopathy.  Medical History:  Stress test performed: No. CTA performed: No. Agatston accessed: No. LVEF  Assessed: Yes. LVEF = 30%.  Cardiac arrest: No responsive following cardiac arrest.  Cardiac surgical consult: Completed - cardiac surgery not recommended.  Cardiovascular instability: Yes. Cardiogenic shock.  Frailty status of patient entering lab: 9 = Terminally ill.      Procedure Description:  After infiltration with 2% Lidocaine, the right radial artery was cannulated with a modified Seldinger technique. Subsequently a 6 [place] sheath was placed in the right radial artery. After infiltration with 2% Lidocaine, a second arterial access was obtained via the left femoral artery with a modified Seldinger technique and a 17 sheath was placed. This second arterial access was obtained to allow for ECMO cannulation. After infiltration with 2% Lidocaine, a third arterial access was obtained via the left superficial femoral artery with a modified Seldinger technique and a 8 [place] sheath was placed. This third arterial access was obtained to allow for distal perfusion cannula. After infiltration of local anesthetic, the left femoral vein was identified with two-dimensional ultrasound. Under direct ultrasound visualization, the left femoral vein was cannulated with a micropuncture technique. A 25 [place] sheath was placed in the vein. After completion of the procedure, the arterial  sheath was left intact and sutured in place. Femoral artery angiography was performed at the additional arterial access site. This demonstrated a common femoral artery puncture appropriate for closure. The additional arterial sheath was left intact and sutured in place. The additional arterial sheath was left intact and sutured in place.    Procedure Description Comments:    ECMO  Cannulation:  Initially a 6 [place]ian left common femoral artery access had been obtained using ultrasound and micropuncture technique for the coronary/bypass angiogram. Due to patient's degree of cardiogenic shock, the decision was made to place VA ECMO. Following serial dilations, a 17 Gambian arterial ECMO cannula was placed in the left CFA. The left femoral vein was accessed using ultrasound and micropuncture technique and, following serial dilations, a 25 Gambian venous cannula was inserted. A 8 [place]ian distal perfusion cannula was placed in the left SFA (using ultrasound and micropuncture technique) and connected to the left CFA ECMO cannula using a male to male connector.    Coronary Angiography:  The coronary circulation is right dominant.    Left Main Coronary Artery:  The left main coronary artery is a normal caliber vessel. The left main arises normally from the left coronary sinus of Valsalva and bifurcates into the LAD and circumflex coronary arteries. The distal left main coronary artery showed 40% stenosis.    Left Anterior Descending Coronary Artery Distribution:  The left anterior descending coronary artery is a normal caliber vessel. The LAD arises normally from the left main coronary artery. The mid left anterior descending coronary artery showed 100% stenosis, distal to the distal anastomosis of the bypass graft and stenosis observed immediately distal to the graft anastamosis. The 1st diagonal branch is a small caliber vessel. The 1st diagonal branch showed no significant disease or stenosis greater than 30%.    Circumflex Coronary Artery Distribution:  The circumflex coronary artery is a normal caliber vessel. The circumflex arises normally from the left main coronary artery and terminates in the AV groove. The mid circumflex coronary artery showed 70% stenosis. The 1st obtuse marginal branch is a medium-sized caliber vessel. The 1st obtuse marginal branch showed no significant disease or stenosis  greater than 30%. The 2nd obtuse marginal branch is a large caliber vessel. The mid to distal 2nd obtuse marginal branch demonstrated 80% stenosis. Competitive flow is seen distal to the stenosis.    Right Coronary Artery Distribution:    The right coronary artery is a normal caliber vessel. The RCA arises normally from the right sinus of Valsalva. The RCA showed no significant disease or stenosis greater than 30%. The acute marginal branch showed no significant disease or stenosis greater than 30%.  The right posterolateral branch showed no significant disease or stenosis greater than 30%. The right posterior descending artery showed no significant disease or stenosis greater than 30%.    Coronary Interventions:  Angiography reveals a 100% stenosis of the mid left anterior descending coronary artery. Pre-intervention LILY flow was 0. Percutaneous coronary intervention was performed within the mid left anterior descending. The vessel was pre-dilated using a compliant balloon 2.0 mm x 15 mm at 5 TAINA. RUSLAN [place] 3.0 mm x 38 mm was advanced to the lesion and implanted at 10 TAINA. The stent was post dilated using a non-compliant balloon 3.0 mm x 12 mm at 10 TAINA. The stenosis was successfully reduced from 100% to 0%. Post-intervention LILY flow was 3.    LAD PCI Description:  Following ECMO cannulation, PCI of the LAD was pursued. Heparin was given and therapeutic ACTs were maintained for the entirety of the procedure. Right radial artery access was obtained using a 7 Fr glide sheath and a 7 Fr EBU 3.5 guide catheter was advanced to the aortic root. However, engagement of the LM proved very challenging due to severe subclavian tortuosity. Attempts were made to airmail a Prowater coronary wire but were unsuccessful.    A 6 Fr EBU 3.0 guide catheter was selected and with significant manipulation, the Prowater was placed into the LM and advanced into the mid LAD. A Finecross microcatheter was inserted to allow for finer  wire tip control. However, there was repeated guide disengagement. As such, the guide and coronary wire were removed and the LM was engaged with a 6 Fr JL 3.5 guide catheter. The Prowater was again inserted into the LAD followed by the Elli.    The procedure was complex and required multiple attempts at wiring the mid LAD which was eventually successful. Initially the wire was placed in a diag and the mid LAD was pre-dilated using a 2.0 x 15 Euphora SC at 5 emily. Subsequent angiograms allowed visualization of the mid-distal LAD. The Prowater was re-positioned into the true LAD. The part of the mid LAD that was 100% occluded obtained distal flow after this initial dilation, with an ectatic segment in the mid LAD immediately adjacent to the graft anastomosis site. A 3.0 x 38 RUSLAN [place] HEATHER was advanced but unable to cross into the mid LAD. A 2.5 x 20 NC Euphora was then used for further predilation at 6 emily.    The 3.0 x 38 RUSLAN [place] HEATHER was then inserted again and deployed at 10 emily. A 3.0 x 12 NC Emerge was used for post-dilation at 10 emily. Final angiograms revealed LILY 3 flow in the LAD without any guide dissection, distal wire perforation, or angiographically evident proximal or distal edge dissections. The wire was removed and the guide was disengaged. The radial arteriotomy was closed using a TR band.    Coronary Lesion Summary:  Vessel                            Stenosis                        Vessel Segment  Left Main                       40% stenosis                          distal  LAD        100% stenosis, distal to the distal anastomosis of the      mid  bypass graft and stenosis observed immediately distal  to the graft anastamosis.  Circumflex                      70% stenosis                           mid  OM 2                            80% stenosis                      mid to distal      Graft Stenosis Summary:  Graft           Destination of Graft                      % Stenosis  LIMA   mid left anterior descending and 2nd     LIMA is tortuous and transitions  obtuse marginal                          into a Y-graft with a patent limb  to the OM2 and a 100% stenosis at  the distal anastamosis site at  the mid LAD.      Hemo Personnel:  +--------------------+---------+  Name                Duty       +--------------------+---------+  Giancarlo Quiles MD 1  +--------------------+---------+      Hemodynamic Pressures:    +----+----------------------+----------+-------------+--------------+---------+  Site      Date Time       Phase NameSystolic mmHgDiastolic mmHgMean mmHg  +----+----------------------+----------+-------------+--------------+---------+   ART 11/14/2024 9:50:12 AM      Rest          136            55       90  +----+----------------------+----------+-------------+--------------+---------+   ART 11/14/2024 9:59:57 AM      Rest          119            45       76  +----+----------------------+----------+-------------+--------------+---------+   ART11/14/2024 10:06:03 AM      Rest          124            47       79  +----+----------------------+----------+-------------+--------------+---------+      Cardiac Cath Post Procedure Notes:  Post Procedure Diagnosis: Successful ECMO placement and PCI fo the LAD.  Blood Loss:               Estimated blood loss during the procedure was 10 mls.  Specimens Removed:        Number of specimen(s) removed: none.      Recommendations:  Maximize medical therapy.  Agressive risk factor modification efforts.  Anti-platelet therapy with Aspirin and Ticagrelor 90mgs BID.  Medical management of coronary artery disease.    ____________________________________________________________________________________  CONCLUSIONS:  1. Coronary Angiogram: Mild to moderate LM stenosis with 100% occlusion of the mid LAD at the graft anastamosis site. Mod-sev focal disease in the native LCX/OM with competitive flow in OM2. Patent and  dominant RCA.  2. Graft Angiogram: In-situ LIMA which transitions into a Y-graft with a patent limb to OM2 and 100% stenosis at the mid LAD anastamosis site.  3. Successful VA ECMO cannulation (left common femoral artery/vein) with concomitant distal perfusion catheter placement (left SFA).  4. Successful complex salvage PCI of the mid LAD with deployment of a 3.0 x 28 RUSLAN [place] HEATHER, post-dilated with a 3.0 NC balloon.  5. DAPT: ASA/Ticagrelor for 6 months without interruption followed by lifelong ASA.  6. Error in plug-in [id number] -- Failed to instantiate Phrase Plugin  7. Error in plug-in [id number] -- Failed to instantiate Phrase Plugin    ICD 10 Codes:  Cardiogenic shock-R57.0    CPT Codes:  Moderate Sedation Services initial 15 minutes patient >5 years-51909; Moderate Sedation Services 1st additional 15 minutes patient >5 years-80001; Moderate Sedation Services 2nd additional 15 minutes patient >5 years-41585; Left Heart Cath (visualization of coronaries) and LV-30706; Ultrasound guidance for vascular access-56133; ECMO/ECLS by physician, initiation, veno arterial-75346; Stent w angioplasty Left Anterior Descending single major Artery branch (PCI)-26237.LD; Complicated/Unusual Procedure-MOD22    [id number] [facility]  Performing Physician  Electronically signed by [id number] [facility] on 11/17/2024 at 4:46:26 PM          ** Final **    Right Heart Cath: No results found for this or any previous visit from the past 1800 days.    Cardiac Scoring: No results found for this or any previous visit from the past 1800 days.    Cardiac MRI: No results found for this or any previous visit from the past 1800 days.    Nuclear:No results found for this or any previous visit from the past 1800 days.    Metabolic Stress: No results found for this or any previous visit from the past 1800 days.      XR chest 1 view    Result Date: 11/28/2024  Interpreted By:   Elpidio Redmond, STUDY: XR CHEST 1 VIEW;  11/28/2024 4:33 am   INDICATION: Signs/Symptoms:eval loculated effusion.   COMPARISON: Chest radiograph from 11/26/2024; chest, abdomen and pelvis CT scan 11/18/2024.   ACCESSION NUMBER(S): [id number]   ORDERING CLINICIAN: OMER ONEILL   FINDINGS: AP radiograph of the chest. Status post prior median sternotomy with intact sternal cerclage wires.   CARDIOMEDIASTINAL SILHOUETTE: The cardiomediastinal silhouette is stable in size and configuration.   LUNGS: There is again demonstration of oblong opacity along the periphery of left upper to midlung as well as another lobulated opacity projecting over left heart border, correlating with multiloculated left-sided pleural effusion/hemothorax. There is also blunting of left costophrenic angle, suggesting small free left basilar pleural effusion and associated atelectasis. Similar mild right basilar atelectasis. Suggestion of trace right pleural effusion. There is no pneumothorax.   ABDOMEN: No remarkable upper abdominal findings.   BONES: No acute osseous abnormality. Partially visualized cervical spine fusion hardware.       1. No significant change in multiloculated left-sided pleural effusion/hemothorax as well as small free left basilar pleural effusion and associated atelectasis. 2. Unchanged mild right basilar atelectasis with or without trace right pleural effusion.   Signed by: Elpidio Redmond 11/28/2024 10:02 AM Dictation workstation:   [id number]    XR chest 2 views    Result Date: 11/27/2024  Interpreted By:  Shin Childress, STUDY: XR CHEST 2 VIEWS;  11/26/2024 1:07 pm   INDICATION: Signs/Symptoms:post CABG.     COMPARISON: Chest radiograph dated 11/26/2024   ACCESSION NUMBER(S): [id number]   ORDERING CLINICIAN: OMER ONEILL   FINDINGS: AP and lateral radiographs of the chest were provided, additionally dual energy images were obtained.   Patient was status post median sternotomy.   CARDIOMEDIASTINAL SILHOUETTE:  Cardiomediastinal silhouette is enlarged but similar to prior exam..   LUNGS: Similar appearance in size of lobulated left-sided pleural fluid/hematoma measuring 5.7 x 10.1 cm. . Similar bibasilar presumed atelectasis with small bilateral pleural effusions, left greater than right.   ABDOMEN: No remarkable upper abdominal findings.   BONES: No acute osseous changes.       1.  Stable loculated left-sided pleural effusions-hematoma. 2. Similar bibasilar presumed atelectasis with small bilateral pleural effusions, left greater than right. 3. No pneumothorax.       MACRO: None   Signed by: Shin Chlidress 11/27/2024 3:16 PM Dictation workstation:   [id number]    Transthoracic Echo (TTE) Complete    Result Date: 11/27/2024   [facility], [address] [place] [address]                Tel [phone] and Fax [phone] TRANSTHORACIC ECHOCARDIOGRAM REPORT  Patient Name:       BARRY WRIGHT    Reading Physician:    [id number] Darinel Mckeon MD Study Date:         11/27/2024          Ordering Provider:    [id number] OMER ONEILL MRN/PID:            [id number]            Fellow: Accession#:         [id number]        Nurse: Date of Birth/Age:  1950 / 74      Sonographer:          Stacie Flood RDCS                     years Gender assigned at  M                   Additional Staff: Birth: Height:             182.88 cm           Admit Date:           11/10/2024 Weight:             96.62 kg            Admission Status:     Inpatient -                                                               Routine BSA / BMI:          2.19 m2 / 28.89     Encounter#:           [id number]                     kg/m2 Blood Pressure:     106/65 mmHg         Department Location:  [place] [address] Study Type:    TRANSTHORACIC ECHO (TTE)  COMPLETE Diagnosis/ICD: ST elevation (STEMI) myocardial infarction of unspecified                site-I21.3 Indication:    STEMI CPT Code:      Echo Complete w Full Doppler-35058 Patient History: Pertinent History: Hyperlipidemia. HFrEF 30-35%, STEMI, CAD s/p CABG,                    cardiogenic shock, s/p VA ECMO decannulation, basal cell                    carcinoma, TREE. Study Detail: The following Echo studies were performed: 2D, M-Mode, Doppler and               color flow. Technically challenging study due to poor acoustic               windows, prominent lung artifact, postoperative dressings and               patient lying in supine position. Definity used as a contrast               agent for endocardial border definition. Total contrast used for               this procedure was 4.0 mL via IV push. Unable to obtain subcostal               view.  PHYSICIAN INTERPRETATION: Left Ventricle: Left ventricular ejection fraction is suspected mildly decreased, by visual estimate at 45%. The left ventricle was not well visualized. The left ventricular cavity size is normal. There is normal septal and normal posterior left ventricular wall thickness. Abnormal (paradoxical) septal motion consistent with post-operative status. Spectral Doppler shows a Grade I (impaired relaxation pattern) of left ventricular diastolic filling with normal left atrial filling pressure. Left Atrium: The left atrium is normal in size. Right Ventricle: The right ventricle is normal in size. There is normal right ventricular global systolic function. Right Atrium: The right atrium is normal in size. Aortic Valve: The aortic valve is probably trileaflet. There is aortic valve annular calcification. There is trace aortic valve regurgitation. The peak instantaneous gradient of the aortic valve is 10 mmHg. Mitral Valve: The mitral valve is normal in structure. There is trace mitral valve regurgitation. Tricuspid Valve: The tricuspid valve is  structurally normal. There is trace tricuspid regurgitation. Pulmonic Valve: The pulmonic valve is not well visualized. There is trace pulmonic valve regurgitation. Pericardium: Trivial pericardial effusion. Aorta: The aortic root is normal. The aortic root is at the upper limits of normal size.  CONCLUSIONS:  1. Poorly visualized anatomical structures due to suboptimal image quality.  2. The left ventricle was not well visualized.  3. Left ventricular ejection fraction is suspected mildly decreased, by visual estimate at 45%.  4. Spectral Doppler shows a Grade I (impaired relaxation pattern) of left ventricular diastolic filling with normal left atrial filling pressure.  5. Abnormal septal motion consistent with post-operative status.  6. There is normal right ventricular global systolic function. QUANTITATIVE DATA SUMMARY:  2D MEASUREMENTS:         Normal Ranges: Ao Root d:       3.80 cm (2.0-3.7cm) IVSd:            0.90 cm (0.6-1.1cm) LVPWd:           0.80 cm (0.6-1.1cm) LVIDd:           4.80 cm (3.9-5.9cm) LVIDs:           3.30 cm LV Mass Index:   63 g/m2 LV % FS          31.2 %  M-MODE MEASUREMENTS:         Normal Ranges: AoV Exc:             2.10 cm (1.5-2.5cm)  AORTA MEASUREMENTS:         Normal Ranges: AoV Exc:            2.10 cm (1.5-2.5cm) Ao Sinus, d:        3.60 cm (2.1-3.5cm) Ao Arch:            3.30 cm (2.0-3.6cm)  LV SYSTOLIC FUNCTION BY 2D PLANIMETRY (MOD):                      Normal Ranges: EF-Visual:      45 % LV EF Reported: 45 %  LV DIASTOLIC FUNCTION:             Normal Ranges: MV Peak E:             0.78 m/s    (0.7-1.2 m/s) MV Peak A:             0.90 m/s    (0.42-0.7 m/s) E/A Ratio:             0.88        (1.0-2.2) MV e'                  0.067 m/s   (>8.0) MV lateral e'          0.06 m/s MV medial e'           0.07 m/s MV A Dur:              137.00 msec E/e' Ratio:            11.72       (<8.0) MV DT:                 195 msec    (150-240 msec)  MITRAL VALVE:          Normal Ranges: MV DT:         195 msec (150-240msec)  AORTIC VALVE:            Normal Ranges: AoV Vmax:      1.60 m/s  (<=1.7m/s) AoV Peak PG:   10.2 mmHg (<20mmHg) LVOT Max Edmond:  1.42 m/s  (<=1.1m/s) LVOT VTI:      23.20 cm LVOT Diameter: 2.00 cm   (1.8-2.4cm) AoV Area,Vmax: 2.79 cm2  (2.5-4.5cm2)  RIGHT VENTRICLE: TAPSE: 18.6 mm RV s'  0.12 m/s  TRICUSPID VALVE/RVSP:          Normal Ranges: Peak TR Velocity:     2.35 m/s RV Syst Pressure:     25 mmHg  (< 30mmHg)  PULMONIC VALVE:          Normal Ranges: PV Max Edmond:     1.3 m/s  (0.6-0.9m/s) PV Max P.8 mmHg  [id number] Darinel Mckeon MD Electronically signed on 2024 at 1:26:53 PM  ** Final **     XR chest 1 view    Result Date: 2024  Interpreted By:  Jayson Mccarthy, STUDY: XR CHEST 1 VIEW; 2024 4:51 am   INDICATION: Signs/Symptoms:s/p cardiac surgery.   COMPARISON: 2024.   ACCESSION NUMBER(S): [id number]   ORDERING CLINICIAN: ABILIO SHETH   FINDINGS:     CARDIOMEDIASTINAL SILHOUETTE: Patient is status post median sternotomy. Cardiomegaly versus pericardial effusion. Loculated paramediastinal/pericardial fluid collection.   LUNGS: Slight interval decrease in size of loculated left-sided pleural fluid/hematoma.   ABDOMEN: No remarkable upper abdominal findings.   BONES: No acute osseous changes.       1.  Slight interval improvement in left sided loculated pleural fluid/hematoma. No pneumothorax.     Signed by: Jayson Mccarthy 2024 7:54 AM Dictation workstation:   [id number]    XR chest 1 view    Result Date: 2024  Interpreted By:  Jayson Mccarthy, STUDY: XR CHEST 1 VIEW; 2024 5:04 am   INDICATION: Signs/Symptoms:post op cardiac surgery.   COMPARISON: 2024.   ACCESSION NUMBER(S): [id number]   ORDERING CLINICIAN: HEMANTH GIORDANO   FINDINGS: Left IJ line overlies the innominate SVC junction.   CARDIOMEDIASTINAL SILHOUETTE: Patient is status post median sternotomy.   LUNGS: Loculated left-sided pleural effusion/hematoma stable. No  pneumothorax.   ABDOMEN: Interval removal of Dobbhoff tube.   BONES: No acute osseous changes.       1.  Stable loculated left-sided pleural effusion/hematoma. No pneumothorax.     Signed by: Jayson Mccarthy 11/26/2024 7:51 AM Dictation workstation:   [id number]    XR chest 1 view    Result Date: 11/24/2024  Interpreted By:  Elpidio Redmond, STUDY: XR CHEST 1 VIEW;  11/24/2024 12:10 pm   INDICATION: Signs/Symptoms:ICU Rounds, eval for removal of CT.   COMPARISON: Chest radiograph 11/23/2024 and chest, abdomen and pelvis CT scan 11/18/2024   ACCESSION NUMBER(S): [id number]   ORDERING CLINICIAN: BRITNI LINTON   FINDINGS: AP radiograph of the chest. Patient is again rotated towards left. Enteric tube is again seen coursing below diaphragm and tip not included in field of view. Left IJ approach central venous catheter tip again overlies brachiocephalic vein confluence, oriented horizontally, unchanged from prior study. Status post prior median sternotomy with intact sternal cerclage wires. Interval removal of right basilar chest tube.   CARDIOMEDIASTINAL SILHOUETTE: The cardiomediastinal silhouette is stable in size and configuration.   LUNGS: There is no significant change in oblong left upper to midlung opacity, correlating with loculated pleural fluid/hemothorax on comparative CT scan chest. There is similar bibasilar presumed atelectasis with suggestion of small left pleural effusion, left more than right. There is no pneumothorax.   ABDOMEN: No remarkable upper abdominal findings.   BONES: No acute osseous abnormality. Partially visualized spinal fusion hardware within included cervical spine.       1. No pneumothorax, status post removal of right basilar chest tube. Rest of medical devices as above. 2. No significant change in oblong left upper mid to midlung opacity correlating with loculated pleural effusion/hemothorax on comparative CT scan 11/18/2024. 3. Similar bibasilar presumed atelectasis, left more than  right, with suggestion of small left pleural effusion.   Signed by: Elpidio Redmond 11/24/2024 1:37 PM Dictation workstation:   [id number]    XR chest 1 view    Result Date: 11/23/2024  Interpreted By:  Jayson Mccarthy and Ohs Zachary STUDY: XR CHEST 1 VIEW;  11/23/2024 3:31 pm   INDICATION: Signs/Symptoms:post chest tube removal.   COMPARISON: Chest radiograph 11/23/2024, CT chest/abdomen/pelvis 11/18/2024.   ACCESSION NUMBER(S): [id number]   ORDERING CLINICIAN: CAROL SINGH   FINDINGS: AP radiograph of the chest was provided.   MEDICAL DEVICES: Status post median sternotomy with intact appearing wires in similar configuration to prior exam. Enteric tube overlies the esophagus and stomach with distal tip not imaged. Partially visualized cervical fixation hardware. Right thoracostomy tube distal tip overlying the right hemidiaphragm. Left IJ CVC distal tip overlying the expected location of the right brachiocephalic vein.   CARDIOMEDIASTINAL SILHOUETTE: Cardiomediastinal silhouette is stable in size and configuration.   LUNGS: Unchanged appearance of large ovoid consolidation overlying the left mid lung. Unchanged ovoid consolidation posterior to the left heart. No pneumothorax or pleural effusion.   ABDOMEN: No remarkable upper abdominal findings.   BONES: No acute osseous changes.       1.  Unchanged appearance of left thoracic loculated fluid/hematoma. 2. Interval removal of left thoracostomy tube. No pneumothorax. 3. Left IJ PICC with distal tip overlying the right brachiocephalic vein. Repositioning recommended.   I personally reviewed the images/study and I agree with the findings as stated by Dr. Zach Alan. This study was interpreted at [facility], [place], [place].   MACRO: None   Signed by: Jayson Mccarthy 11/23/2024 6:27 PM Dictation workstation:   [id number]    XR chest 1 view    Result Date: 11/23/2024  Interpreted By:  Jayson Mccarthy, STUDY: XR CHEST 1 VIEW;  11/23/2024 10:42 am   INDICATION: Signs/Symptoms:am cxr.   COMPARISON: 11/22/2024.   ACCESSION NUMBER(S): [id number]   ORDERING CLINICIAN: MACIEJ LEAHY   FINDINGS:     CARDIOMEDIASTINAL SILHOUETTE: Patient is status post median sternotomy. Cardiomegaly versus pericardial effusion.   LUNGS: Continued loculated pleural based soft tissue/fluid. Residual basilar atelectasis. No pneumothorax.   ABDOMEN: No remarkable upper abdominal findings.   BONES: No acute osseous changes.       1.  No significant change in loculated fluid/hemorrhage.. Mild perihilar interstitial edema without pneumothorax.     Signed by: Jayson Mccarthy 11/23/2024 11:37 AM Dictation workstation:   [id number]    Electrocardiogram 12-lead PRN for arrhythmia    Result Date: 11/22/2024  Atrial fibrillation with rapid ventricular response with premature ventricular or aberrantly conducted complexes Rightward axis Anteroseptal infarct (cited on or before 13-NOV-2024) Lateral injury pattern ** ** ACUTE MI / STEMI ** ** Abnormal ECG Confirmed by Oscar Arzate ([id number]) on 11/22/2024 7:28:04 PM    XR chest 1 view    Result Date: 11/22/2024  Interpreted By:  Shin Childress, STUDY: XR CHEST 1 VIEW;  11/22/2024 8:34 am   INDICATION: Signs/Symptoms:Post cardiac surgery.     COMPARISON: XR CHEST 1 VIEW;  11/21/2024 4:54 pm   ACCESSION NUMBER(S): [id number]   ORDERING CLINICIAN: BERNICE EDDIDAVION   FINDINGS: AP radiograph of the chest was provided.   Interval removal of right IJ approach-Grayson catheter.   Status post median sternotomy. Stable positioning of a left IJ central venous catheter with the tip projecting over the confluence of the left brachiocephalic vein and SVC. Stable positioning of 2 left-sided chest tubes and a right-sided chest tube. Partially visualized cervical spine fusion hardware   resolution of subcutaneous emphysema overlying the left chest wall.   CARDIOMEDIASTINAL SILHOUETTE: Cardiomediastinal silhouette is normal in size and  configuration.   LUNGS: Stable convex opacity projecting over the left upper lung periphery. Multifocal opacities throughout the left lung are otherwise unchanged in the setting of known loculated left pleural effusion. The right lung is grossly clear. There is no evidence of pneumothorax..   ABDOMEN: No remarkable upper abdominal findings.   BONES: No acute osseous changes.       1.  Stable appearance to loculated left pleural effusion. 2. Resolution of subcutaneous emphysema overlying the left chest wall. 3. Medical devices as detailed above.       MACRO: None   Signed by: Shin Childress 11/22/2024 9:47 AM Dictation workstation:   [id number]    XR chest 1 view    Result Date: 11/21/2024  Interpreted By:  Sumeet Mcgovern, STUDY: XR CHEST 1 VIEW;  11/21/2024 4:54 pm   INDICATION: Signs/Symptoms:Central line placement.     COMPARISON: Exam from earlier same day   ACCESSION NUMBER(S): [id number]   ORDERING CLINICIAN: BERNICE LEHMAN   FINDINGS: AP radiograph of the chest was provided.   Right IJ approach Cammal-Grayson catheter with tip projecting over the main pulmonary artery. Status post median sternotomy. Interval placement of a left IJ central venous catheter with the tip projecting over the confluence of the left brachiocephalic vein and SVC. Stable positioning of 2 left-sided chest tubes as well as a mediastinal drain and a right-sided chest tube. Stable appearance to subcutaneous emphysema overlying the left chest wall. Partially visualized cervical spine fusion hardware.   CARDIOMEDIASTINAL SILHOUETTE: Cardiomediastinal silhouette is stable in size and configuration.   LUNGS: Stable convex opacity projecting over the left upper lung periphery. Multifocal opacities throughout the left lung are otherwise unchanged in the setting of known loculated left pleural effusion. The right lung is grossly clear. There is no evidence of pneumothorax.   ABDOMEN: No remarkable upper abdominal findings.   BONES: No acute osseous changes.        1.  Interval placement of left IJ central venous catheter with tip projecting over the confluence of the left brachiocephalic vein and SVC. Additional medical devices as above. 2. Stable appearance to loculated left pleural effusion and subcutaneous emphysema overlying the left chest wall.       MACRO: None   Signed by: Sumeet Mcgovern 11/21/2024 5:01 PM Dictation workstation:   [id number]    Transthoracic Echo (TTE) Limited    Result Date: 11/21/2024   [facility], [address] [place] [address]                Tel [phone] and Fax [phone] TRANSTHORACIC ECHOCARDIOGRAM REPORT  Patient Name:       BARRY PAGE WRIGHT    Reading Physician:    [id number] Myron Bowles MD Study Date:         11/21/2024          Ordering Provider:    [id number] ANDREY SAMANIEGO MRN/PID:            [id number]            Fellow: Accession#:         [id number]        Nurse: Date of Birth/Age:  1950 / 74      Sonographer:          Esdras whyte                                     RDJENNIE Gender assigned at  M                   Additional Staff: Birth: Height:             182.88 cm           Admit Date: Weight:             93.90 kg            Admission Status:     Inpatient -                                                               Routine BSA / BMI:          2.16 m2 / 28.07     Encounter#:           [id number]                     kg/m2 Blood Pressure:     105/51 mmHg         Department Location:  [facility] Study Type:    TRANSTHORACIC ECHO (TTE) LIMITED Diagnosis/ICD: Cardiogenic shock-R57.0 Indication:    s/p ECMO decannulation and IABP removal to assess biv fx CPT Code:      Echo Limited-09737; Doppler Limited-79334; Color Doppler-29433 Patient History: Pertinent History: Basal cell carcinoma and HPL s/p MidCAB x2 converted to full                     sternotomy w/ LIMA prolonged as a Y graft with a radial to                    LAD OM, s/p ECMO decannulation and IABP removal. Study Detail: Technically challenging study due to body habitus, patient lying               in supine position, postoperative dressings, pacer wires, chest               tubes, prominent lung artifact and poor acoustic windows. Definity               used as a contrast agent for endocardial border definition. Total               contrast used for this procedure was 2.0 mL via IV push.  PHYSICIAN INTERPRETATION: Left Ventricle: The left ventricular systolic function is moderately decreased, with a visually estimated ejection fraction of 30-35%. Left venticular wall motion is abnormal. The left ventricular cavity size is normal. Left ventricular diastolic filling was not assessed. There is mid cavity left ventricular obstruction. There is septal-apical a- to dyskinesia. There is dynamic function at the basal segments and increased color flow signal in the mid LV. Left Atrium: The left atrium was not well visualized. Right Ventricle: The right ventricle is normal in size. There is normal right ventricular global systolic function. A device is visualized in the right ventricle. Radial function danny preserved; difficult to estimate longitudinal RV shortening. Right Atrium: The right atrium was not well visualized. Aortic Valve: The aortic valve was not well visualized. There is no evidence of aortic valve regurgitation. Mitral Valve: The mitral valve is normal in structure. There is trace to mild mitral valve regurgitation. Tricuspid Valve: The tricuspid valve was not well visualized. Tricuspid regurgitation was not assessed. The right ventricular systolic pressure is unable to be estimated. Pulmonic Valve: The pulmonic valve was not assessed. Pulmonic valve regurgitation was not assessed. Pericardium: Trivial to small pericardial effusion. There is an anterior clear space. Aorta: The aortic root  is normal. In comparison to the previous echocardiogram(s): Although previous studies are available for review, a comparison with the current echocardiogram is not feasible due to its limited scope or image quality.  CONCLUSIONS:  1. The left ventricular systolic function is moderately decreased, with a visually estimated ejection fraction of 30-35%.  2. Abnormal left venticular wall motion.  3. There is septal-apical a- to dyskinesia. There is dynamic function at the basal segments and increased color flow signal in the mid LV.  4. There is mid cavity left ventricular obstruction.  5. There is normal right ventricular global systolic function.  6. There is a 25 x 14 mm echodensity in the anterior mediastinum; clinical correlation warranted. QUANTITATIVE DATA SUMMARY:  LV SYSTOLIC FUNCTION BY 2D PLANIMETRY (MOD):                      Normal Ranges: EF-Visual:      33 % LV EF Reported: 33 %  [id number] Myron Bowles MD Electronically signed on 11/21/2024 at 4:57:20 PM  ** Final **     XR chest 1 view    Result Date: 11/21/2024  Interpreted By:  Shin Childress, STUDY: XR CHEST 1 VIEW;  11/21/2024 8:10 am   INDICATION: Signs/Symptoms:Post cardiac surgery.     COMPARISON: XR CHEST 1 VIEW;  11/20/2024 9:10 am.   ACCESSION NUMBER(S): [id number]   ORDERING CLINICIAN: BERNICE LEHMAN   FINDINGS: AP radiograph of the chest was provided.   Interval removal of endotracheal tube. The enteric tube is in place with tip projecting outside the field of view of current exam. Stable positioning of right internal jugular approach swans Grayson catheter with tip projecting over main pulmonary artery. Bilateral chest tubes and mediastinal drain is in place. ECMO cannulas projecting over lower SVC.   Patient is status post median sternotomy with intact sternal cerclage wires.       CARDIOMEDIASTINAL SILHOUETTE: Cardiomediastinal silhouette is enlarged but stable in size and configuration.   LUNGS: Interval increase in diffuse/patchy hazy opacities of the  left lung with interval development of pleural-based dominant left upper lung opacities. Perihilar congestion bilaterally. Similar appearance of obscuration of left costophrenic angle and mild blunting.   ABDOMEN: No remarkable upper abdominal findings.   BONES: No acute osseous changes.       1.  Interval increase in diffuse/patchy hazy opacities of the left lung with interval development of pleural-based dominant left upper lung opacities. Correlate with concern for fluid collections or pleural-based hematoma. 2.  Small left pleural effusion, unchanged. 3. Medical devices as detailed above.     MACRO: None   Signed by: Shin Childress 11/21/2024 9:51 AM Dictation workstation:   [id number]    Vascular US lower extremity arterial duplex left    Result Date: 11/21/2024            [place] [address]   Tel [phone] and Fax [phone]  [facility]UP[facility] LEFT  Patient Name:     BARRY Vincent Physician: [id number] Monica Marie MD Study Date:       11/20/2024          Ordering           [id number] BRITNI HOLLIDAY                                       Physician:         SHAILA MRN/PID:          [id number]            Technologist:      Jeremias HUMPHREY Accession#:       [id number]        Technologist 2: Date of           1950 / 74      Encounter#:        [id number] Birth/Age:        years Gender:           M Admission Status: Inpatient           Location           [facility]                                       Performed:  Diagnosis/ICD: Other disorders of arteries, arterioles and capillaries in                diseases classified elsewhere-I79.8 Indication:    S/P ECMO decannulation W/ L femoral bleeding: R/O Pseudoanerrysm. CPT Codes:     38806 Peripheral artery Lower arterial Duplex limited  Patient History CAD. Hemothorax.  CONCLUSIONS: Left Lower  Venous: Hypoechoic area in left groin area measuring 4.5 cm x 4.0 cm x 2.3 cm. Pseudo Aneurysm: No clear evidence of pseudo aneurysm noted in the left groin.  Additional Findings: Venous Pulsatile waveforms are suggestive of possible underlying volume overload. Profunda artery waveform appears abnormal, maybe capturing a venous waveform on top. May wish other means of evaluation.  Imaging & Doppler Findings:  Left                    Flow Distal External Iliac Pulsatile CFV                   Pulsatile PFV                   Pulsatile FV Proximal           Pulsatile Right                     Left  PSV                      PSV              EIA        68 cm/s              CFA        103 cm/s       Profunda Proximal 46 cm/s         SFA Proximal    72 cm/s  [id number] Monica Marie MD Electronically signed by [id number] Monica Marie MD on 11/21/2024 at 8:56:07 AM  ** Final **     CT chest abdomen pelvis wo IV contrast    Result Date: 11/20/2024  Interpreted By:  Rei Caraballo and Maltbie Grace STUDY: CT CHEST ABDOMEN PELVIS WO CONTRAST;  11/18/2024 4:48 pm   INDICATION: Signs/Symptoms:c/f retroperitoneal bleeding. on ecmo, heparin, and dapt. requesting Directa Plus ct scanner please.     COMPARISON: CT chest abdomen pelvis 11/15/2024   ACCESSION NUMBER(S): [id number]   ORDERING CLINICIAN: ANDREY SAMANIEGO   TECHNIQUE: CT of the chest, abdomen and pelvis was performed. Contiguous axial images were obtained at 3 mm slice thickness through the chest, abdomen and pelvis. Coronal and sagittal reconstructions at 3 mm slice thickness were performed.  No intravenous contrast was administered; positive oral contrast was given.   FINDINGS: Please note that the study is limited without intravenous contrast.   CHEST:   LUNG/PLEURA/LARGE AIRWAYS: Increasing moderate left-sided hemothorax with resulting compressive atelectasis. Right lower lobe atelectasis. Otherwise, no new airspace consolidation. Trachea and right and left main bronchi are  patent.   VESSELS: [place]-Grayson catheter device is noted, with tip terminating in the pulmonary artery bifurcation. Left femoral approach ECMO cannula noted. Aorta and pulmonary arteries are normal caliber. No atherosclerotic changes of the aorta are identified. Severe coronary artery calcifications versus stent are present.   HEART: The heart is normal in size. Similar appearance of a small to moderate-sized pericardial effusion, which likely also contains hemorrhagic blood products. Cardiac pacer leads are noted.   MEDIASTINUM AND ANNA: A 2.4 x 1.9 cm right supraclavicular lymph node is seen on series 2, image 15, similar to prior exam. Additional prominent mediastinal lymph nodes are noted, similar to prior exam, likely reactive.   Decreased but persistent hyperattenuating collection fluid collections compatible with hematomas are seen in the mediastinum, most predominant overlying the left atrium, measuring 5.8 x 4.1 cm on series 2, image 47, previously 6.8 x 4.2 cm. The esophagus is normal.   CHEST WALL AND LOWER NECK: Status post median sternotomy. No soft tissue masses in the chest wall. The visualized thyroid gland appears within normal limits.   ABDOMEN:   LIVER: The liver is normal in size without evidence of focal liver lesions.   BILE DUCTS: No biliary dilatation.   GALLBLADDER: No calcified stones. No wall thickening.   PANCREAS: The pancreas appears unremarkable without evidence of ductal dilatation or masses.   SPLEEN: The spleen is normal in size. Splenules are noted.   ADRENAL GLANDS: No adrenal nodule or thickening.   KIDNEYS AND URETERS: The kidneys are normal in size and enhance symmetrically. No hydroureteronephrosis or nephroureterolithiasis is identified.   PELVIS:   BLADDER: Within normal limits.   REPRODUCTIVE ORGANS: No pelvic masses.   BOWEL: The stomach is unremarkable. The small and large bowel are normal in caliber and demonstrate no wall thickening. The appendix is not definitely  visualized. There is however no pericecal stranding or fluid.     VESSELS: Moderate atherosclerotic calcifications of the aortoiliac arteries. The IVC appears normal. Portal vein, splenic vein, and SMV are patent.   PERITONEUM/RETROPERITONEUM/LYMPH NODES: Trace ascites is noted in the deep pelvis. No retroperitoneal hemorrhage. No abdominopelvic lymphadenopathy is present.   BONE AND SOFT TISSUE: Unchanged left posterior 2nd rib subacute rib fracture. Stable 5.7 x 4.4 cm left inguinal hematoma is noted.       Increasing moderate left-sided hemothorax. Mediastinal hematoma has slightly decreased. No new mediastinal or retroperitoneal hemorrhage is evident.   I personally reviewed the images/study and I agree with the findings as stated by Alem Garduno MD. This study was interpreted at [place], [place].   MACRO: None   Signed by: Rei Caraballo 11/20/2024 6:40 PM Dictation workstation:   [id number]    XR abdomen 1 view    Result Date: 11/20/2024  Interpreted By:  Shin Childress, STUDY: XR ABDOMEN 1 VIEW;  11/20/2024 9:13 am   INDICATION: Signs/Symptoms:Dobhoff placmenet.     COMPARISON: XR ABDOMEN 1 VIEW;  11/19/2024 5:55 pm   ACCESSION NUMBER(S): [id number]   ORDERING CLINICIAN: BERNICE LEHMAN   FINDINGS: AP supine radiograph of the abdomen were performed.   Interval in placement of Dobbhoff tube with tip over the junction between the gastric atrium and duodenum. Partially visualized chest tubes, postsurgical changes median sternotomy and right IJ approach [place] Grayson catheter.   Multiple gas-filled bowel loops are visualized throughout the abdomen measuring up to 6.1 In the transverse colon. There are moderate stool burden.   Nonobstructive bowel gas pattern. Limited evaluation of pneumoperitoneum on supine imaging, however no gross evidence of free air is noted.   Visualized lungs are clear.   Osseous structures demonstrate no acute bony changes. There are degenerative  changes of the lumbar spine.       1.  Interval in placement of Dobbhoff tube with tip over the junction between the gastric atrium and duodenum. 2. Gas-filled slightly dilated bowel loops throughout the abdomen. 3. Postsurgical changes and medical devices as above.   MACRO: None   Signed by: Shin Childress 11/20/2024 1:24 PM Dictation workstation:   [id number]    XR chest 1 view    Result Date: 11/20/2024  Interpreted By:  Jayson Mccarthy and Awan Komal STUDY: XR CHEST 1 VIEW;  11/20/2024 9:10 am   INDICATION: Signs/Symptoms:Post ECMO decannulation.     COMPARISON: Chest radiograph 11/19/2024 and CT chest abdomen pelvis 11/18/2024   ACCESSION NUMBER(S): [id number]   ORDERING CLINICIAN: BERNICE LEHMAN   FINDINGS: AP radiograph of the chest was provided.   Endotracheal tube tip is now terminating approximately 5.1 cm superior to the horace. The enteric tube is in place with tip projecting outside the field of view of current exam. There is right internal jugular approach swans Grayson catheter with tip projecting over main pulmonary artery. Bilateral chest tubes and mediastinal drain is in place. There is a radiopaque density overlying the aortic knob. It is difficult to determine whether this represents an intra-aortic balloon pump or overlying catheter. Correlate clinically.   Patient is status post median sternotomy with intact sternal cerclage wires.   CARDIOMEDIASTINAL SILHOUETTE: Cardiomediastinal silhouette is stable in size and configuration.   LUNGS: There are persistent diffuse and patchy hazy opacities in the left lung with very minimal improvement. There is perihilar congestion on the right. There is obscuration of left costophrenic angle and mild blunting.   ABDOMEN: No remarkable upper abdominal findings.   BONES: No acute osseous changes.       1. Persistent diffuse and patchy hazy opacities in the left lung with minimal improvement when compared to the prior radiograph. 2. Obscuration and mild blunting of  the left costophrenic angle, likely suggesting small pleural effusion. 3. Medical devices as explained above.   I personally reviewed the images/study and I agree with the findings as stated by Resident Patt Fan. This study was interpreted at [facility], [place], [place].   MACRO: None   Signed by: Jayson Mccarthy 11/20/2024 11:40 AM Dictation workstation:   [id number]    XR abdomen 1 view    Result Date: 11/20/2024  Interpreted By:  Shin Childress and Afshari Mirak Sohrab STUDY: XR ABDOMEN 1 VIEW; XR CHEST 1 VIEW;  11/19/2024 5:55 pm   INDICATION: Signs/Symptoms:dobhoff; Signs/Symptoms:IABP positioning. Patient is status post repeat thoracotomy with left hemothorax evacuation.     COMPARISON: Chest x-ray 11/19/2024   ACCESSION NUMBER(S): [id number]; [id number]   ORDERING CLINICIAN: OMER ONEILL   FINDINGS: AP radiographs of the chest and abdomen were performed.   Endotracheal tube tip is approximately 4 cm above the horace. There are mediastinal and bilateral chest tubes in place. Chest tubes in the left projects over the apex and chest tube on the right projects over the right lung base. Radiopaque portion of the IABP projects over proximal descending thoracic aorta. Postsurgical changes of median sternotomy again noted. Right IJ approach [place]-Grayson catheter tip projects over main pulmonary artery, slightly retracted when compared to the prior exam. Enteric tube tip projects over stomach.   CARDIOMEDIASTINAL SILHOUETTE: Cardiomediastinal silhouette is partially obscured on the left in size and configuration.   LUNGS: Interval improved aeration of the left lung. There remains hazy opacification of the left hemithorax with obscuration of the left costophrenic angle. Right lung is essentially clear.   ABDOMEN: Multiple gas-filled bowel loops are visualized throughout the abdomen measuring up to 6.6 cm in the transverse colon.   BONES: No acute osseous changes. There are  degenerative changes of the lumbar spine.       1. Interval improved aeration of the left lung with residual hazy opacification throughout the entire left hemithorax and obscuration of the left costophrenic angle. Right lung is essentially clear. 2. Gas-filled slightly dilated bowel loops throughout the abdomen. 3. Postsurgical changes and medical devices as above.   I personally reviewed the images/study and I agree with the findings as stated by resident physician Dr. Holger Nixon . This study was interpreted at [place], [place].   MACRO: None   Signed by: Shin Childress 11/20/2024 7:27 AM Dictation workstation:   [id number]    XR chest 1 view    Result Date: 11/20/2024  Interpreted By:  Shin Childress,  and Lisa Wren STUDY: XR ABDOMEN 1 VIEW; XR CHEST 1 VIEW;  11/19/2024 5:55 pm   INDICATION: Signs/Symptoms:dobhoff; Signs/Symptoms:IABP positioning. Patient is status post repeat thoracotomy with left hemothorax evacuation.     COMPARISON: Chest x-ray 11/19/2024   ACCESSION NUMBER(S): [id number]; [id number]   ORDERING CLINICIAN: OMER ONEILL   FINDINGS: AP radiographs of the chest and abdomen were performed.   Endotracheal tube tip is approximately 4 cm above the horace. There are mediastinal and bilateral chest tubes in place. Chest tubes in the left projects over the apex and chest tube on the right projects over the right lung base. Radiopaque portion of the IABP projects over proximal descending thoracic aorta. Postsurgical changes of median sternotomy again noted. Right IJ approach [place]-Grayson catheter tip projects over main pulmonary artery, slightly retracted when compared to the prior exam. Enteric tube tip projects over stomach.   CARDIOMEDIASTINAL SILHOUETTE: Cardiomediastinal silhouette is partially obscured on the left in size and configuration.   LUNGS: Interval improved aeration of the left lung. There remains hazy opacification of the  left hemithorax with obscuration of the left costophrenic angle. Right lung is essentially clear.   ABDOMEN: Multiple gas-filled bowel loops are visualized throughout the abdomen measuring up to 6.6 cm in the transverse colon.   BONES: No acute osseous changes. There are degenerative changes of the lumbar spine.       1. Interval improved aeration of the left lung with residual hazy opacification throughout the entire left hemithorax and obscuration of the left costophrenic angle. Right lung is essentially clear. 2. Gas-filled slightly dilated bowel loops throughout the abdomen. 3. Postsurgical changes and medical devices as above.   I personally reviewed the images/study and I agree with the findings as stated by resident physician Dr. Holger Nixon . This study was interpreted at [place], [place].   MACRO: None   Signed by: Shin Childress 11/20/2024 7:27 AM Dictation workstation:   [id number]    Anesthesia Intraoperative Transesophageal Echocardiogram    Result Date: 11/19/2024  [facility] - Dept of Anesthesiology    [address] [place] [address]       Tel [phone] and Fax [phone] TRANSESOPHAGEAL ECHOCARDIOGRAM REPORT  Patient Name:      BARRY PAGE WRIGHT Reading Physician:  [id number] Jaleel Chavez MD Study Date:        11/19/2024       Ordering Provider:  [id number] ALEXIS MRN/PID:           [id number]         Fellow: Accession#:        [id number]     Nurse: Date of Birth/Age: 1950        Sonographer: Gender:            M                Additional Staff: Admit Date:                         Encounter#:         [id number] Admission Status:                   Departent Location: [facility] Anesthesia Height:                             Study Type:         ANESTHESIA                                                         INTRAOPERATIVE EZEQUIEL                                                         MONITORING ONLY Diagnosis/ICD:  Cardiogenic shock-R57.0 Indication:    ECMO decannulation CPT Codes:     Echocardiography, EZEQUIEL for monitoring-98717; EZEQUIEL Complete-71361;                Doppler Limited-16023; Color Doppler-31891 PHYSICIAN INTERPRETATION: Left Ventricle: Apical akinesia. Overall LV EF 35% by Lambert's rule. Left ventricular diastolic filling was not assessed. Right Ventricle: There is normal right ventricular global systolic function. The right ventricle is normal in size. Mitral Valve: There is no evidence of mitral valve stenosis. Chordal LIV present. However no MR or outflow track obstruction. The mitral valve is normal in structure. There is no evidence of mitral valve regurgitation. Additional Comments: With turn down of ECMO flows, no change in LV and RV size or function. Significant left pleural effusion much improved after drainage. In comparison to the previous echocardiogram(s): Not performed on intraoperative study.  QUANTITATIVE DATA SUMMARY: LV SYSTOLIC FUNCTION BY 2D PLANIMETRY (MOD):                     Normal Ranges: EF-A4C View: 34.0 % (>55%) EF-A2C View: 36.8 % EF-Biplane:  31.7 %  [id number] Jaleel Chavez MD Electronically signed on 11/19/2024 at 4:11:46 PM ** Final **     XR chest 1 view    Result Date: 11/19/2024  Interpreted By:  Elpidio Redmond and Awan Komal STUDY: XR CHEST 1 VIEW;  11/19/2024 3:44 am   INDICATION: Signs/Symptoms:Post op cardiac surgery.   COMPARISON: Chest radiograph 11/18/2024 and chest, abdomen and pelvis CT scan 11/18/2024   ACCESSION NUMBER(S): [id number]   ORDERING CLINICIAN: ANDREY SAMANIEGO   FINDINGS: AP radiograph of the chest was provided. Patient is again rotated towards left.   Status post prior median sternotomy with intact sternal cerclage wires. Intra-aortic balloon pump radiopaque marker with tip projecting over the lower aspect of aortic knob, unchanged from prior. Femoral approach ECMO cannula again overlies superior cavoatrial junction. Right IJ approach [place]-Grayson catheter tip  overlies right main pulmonary artery, stable to slightly advanced from prior. Stable positioning of left apical chest tube.   CARDIOMEDIASTINAL SILHOUETTE: Cardiomediastinal silhouette is enlarged, stable, with similar partial obscuration of left heart border.   LUNGS: There is similar significant opacification of left hemithorax, corresponding to left-sided hemothorax with compressive atelectasis seen on CT dated 11/18/2024. No pneumothorax.   ABDOMEN: No remarkable upper abdominal findings.   BONES: No acute osseous changes. Partially visualized surgical fusion hardware overlying cervical spine.       1. Stable significant opacification of left hemithorax, corresponding to left-sided hemothorax with compressive atelectasis on CT dated 11/18/2024. 2. Postsurgical changes and medical devices as explained above. No pneumothorax.   I personally reviewed the images/study and I agree with the findings as stated by Resident Patt Fan. This study was interpreted at [facility], [place], [place].   MACRO: None   Signed by: Elpidio Redmond 11/19/2024 12:57 PM Dictation workstation:   [id number]    Electrocardiogram 12-lead PRN for arrhythmia    Result Date: 11/19/2024  Atrial fibrillation Cannot rule out Inferior infarct , new ** ** ACUTE MI / STEMI ** ** Abnormal ECG When compared with ECG of 14-NOV-2024 03:43, Atrial fibrillation has replaced Sinus rhythm QRS axis Shifted right Acute Inferior infarct is now Present Confirmed by Jaxon Camp ([id number]) on 11/19/2024 8:55:28 AM    Transthoracic Echo (TTE) Limited    Result Date: 11/18/2024   [facility], [address] [place] [address]                Tel [phone] and Fax [phone] TRANSTHORACIC ECHOCARDIOGRAM REPORT  Patient Name:        BARRY PAGE WRIGHT     Reading Physician: [id number] Amalia Lemon MD Study Date:          11/13/2024           Ordering  Provider: [id number] BRITNI LINTON MRN/PID:             [id number]             Fellow: Accession#:          [id number]         Nurse: Date of Birth/Age:   1950 / 74 years Sonographer: Gender assigned at   M                    Additional Staff: Birth: Height:                                   Admit Date:        11/10/2024 Weight:                                   Admission Status:  Inpatient - STAT BSA / BMI:           m2 / kg/m2           Encounter#:        [id number] Study Type:    TRANSTHORACIC ECHO (TTE) LIMITED Diagnosis/ICD: ST elevation (STEMI) myocardial infarction involving left                anterior descending coronary artery-I21.02 CPT Code:      Echo Limited-99335  Study Detail: The following Echo studies were performed: 2D.  PHYSICIAN INTERPRETATION: Left Ventricle: Left ventricular ejection fraction is mildly decreased, by visual estimate at 40-45%. Left venticular wall motion is abnormal. The left ventricular cavity size is normal. Left ventricular diastolic filling was not assessed. Aneurysm of the LV apex is not suggestive of distal LAD infarct. Left Atrium: The left atrium is normal in size. Right Ventricle: The right ventricle was not assessed. Right ventricular systolic function not assessed. Right Atrium: The right atrium was not assessed. Aortic Valve: The aortic valve was not well visualized. Aortic valve regurgitation was not assessed. Mitral Valve: The mitral valve is normal in structure. There is mild mitral valve regurgitation. Tricuspid Valve: The tricuspid valve was not well visualized. Tricuspid regurgitation was not assessed. Pulmonic Valve: The pulmonic valve was not assessed. Pulmonic valve regurgitation was not assessed. Pericardium: Pericardial effusion was not assessed. Aorta: The aortic root was not assessed. In comparison to the previous echocardiogram(s): Although previous studies are available for review, a  comparison with the current echocardiogram is not feasible due to its limited scope or image quality.  CONCLUSIONS:  1. Left ventricular ejection fraction is mildly decreased, by visual estimate at 40-45%.  2. Aneurysm of the LV apex is not suggestive of distal LAD infarct.  3. Abnormal left venticular wall motion.  4. Limited fellow study. QUANTITATIVE DATA SUMMARY:  LV SYSTOLIC FUNCTION BY 2D PLANIMETRY (MOD):                      Normal Ranges: EF-Visual:      43 % LV EF Reported: 43 %  [id number] Amalia Lemon MD Electronically signed on 11/18/2024 at 3:46:41 PM  ** Final **     Transthoracic Echo (TTE) Limited    Result Date: 11/18/2024   [facility], [address] [place] [address]                Tel [phone] and Fax [phone] TRANSTHORACIC ECHOCARDIOGRAM REPORT  Patient Name:       BARRY ORTEGATIE    Reading Physician:    [id number] Amalia Lemon MD Study Date:         11/18/2024          Ordering Provider:    [id number] BRITNI LINTON MRN/PID:            [id number]            Fellow: Accession#:         [id number]        Nurse: Date of Birth/Age:  1950 / 74      Sonographer:          Liliana Verduzco RDCS                     years Gender assigned at  M                   Additional Staff: Birth: Height:             182.88 cm           Admit Date:           11/10/2024 Weight:             93.90 kg            Admission Status:     Inpatient - STAT BSA / BMI:          2.16 m2 / 28.07     Encounter#:           [id number]                     kg/m2 Blood Pressure:     110/44 mmHg         Department Location:  [facility] Study Type:    TRANSTHORACIC ECHO (TTE) LIMITED Diagnosis/ICD: Cardiogenic shock-R57.0 Indication:    CABG, ECMO and balloon pump, Turn down CPT Code:      Echo Limited-82169 Patient History: Pertinent History: Acute ST elevation, MI of inferior  wall, STEMI, Cardiogenic                    shock on balloon pump and ECMO, HLD, CABGx2 LIMA prolonged as                    a Y graft with a radial to LAD OM. Study Detail: The following Echo studies were performed: 2D and M-Mode.               Technically challenging study due to patient lying in supine               position and postoperative dressings. The patient is on a balloon               pump. Definity used as a contrast agent for endocardial border               definition. Total contrast used for this procedure was 4 mL via IV               push. Unable to obtain suprasternal notch and subcostal view.  PHYSICIAN INTERPRETATION: Left Ventricle: Left ventricular ejection fraction is mildly decreased, by visual estimate at 40-45%. Left venticular wall motion is abnormal. The left ventricular cavity size is normal. Left ventricular diastolic filling was not assessed. There is no definite left ventricular thrombus visualized. LV Wall Scoring: The mid and apical anterior septum, mid and apical inferior septum, and apex are akinetic. All remaining scored segments are normal. Left Atrium: The left atrium was not assessed. Right Ventricle: The right ventricle was not well visualized. Right ventricular systolic function not assessed. A device is visualized in the right ventricle. Right Atrium: The right atrium was not well visualized. Aortic Valve: The aortic valve is structurally normal. There is minimal aortic valve cusp calcification. Aortic valve regurgitation was not assessed. Mitral Valve: The mitral valve is normal in structure. There is mild mitral annular calcification. Mitral valve regurgitation was not assessed. Tricuspid Valve: The tricuspid valve was not well visualized. Tricuspid regurgitation was not assessed. Pulmonic Valve: The pulmonic valve is not well visualized. Pulmonic valve regurgitation was not assessed. Pericardium: Trivial pericardial effusion. Aorta: The aortic root is abnormal. There  is mild dilatation of the aortic root. In comparison to the previous echocardiogram(s): Compared with study dated 11/16/2024, LVEF seems to have improved a little with persistent regional wall motion abnormality due to hyperkinesis of some of the base and mid segments. Overall, poor images for broader comparison purposes.  CONCLUSIONS:  1. Mid and apical anterior septum, mid and apical inferior septum, and apex are abnormal.  2. Poorly visualized anatomical structures due to suboptimal image quality.  3. Left ventricular ejection fraction is mildly decreased, by visual estimate at 40-45%.  4. Abnormal left venticular wall motion.  5. No left ventricular thrombus visualized.  6. Compared with study dated 11/16/2024, LVEF seems to have improved a little with persistent regional wall motion abnormality due to hyperkinesis of some of the base and mid segments. Overall, poor images for broader comparison purposes. QUANTITATIVE DATA SUMMARY:  LV SYSTOLIC FUNCTION BY 2D PLANIMETRY (MOD):                      Normal Ranges: EF-Visual:      43 % LV EF Reported: 43 %  [id number] Amalia Lemon MD Electronically signed on 11/18/2024 at 9:04:01 AM  Wall Scoring  ** Final **     XR chest 1 view    Result Date: 11/18/2024  Interpreted By:  Elio Brady, STUDY: XR CHEST 1 VIEW; 11/18/2024 3:40 am   INDICATION: Signs/Symptoms:Post op cardiac surgery.   COMPARISON: Radiograph dated 11/17/2024   ACCESSION NUMBER(S): [id number]   ORDERING CLINICIAN: RAVI GARCIA   FINDINGS: Right IJ [place] catheter is in place with the tip projecting over main pulmonary artery. Intra-aortic balloon pump radiopaque marker is projecting over the aortic knob. ECMO cannulas projecting over lower SVC. Left chest tube is in unchanged in position.   Patient is status post median sternotomy. Cardiomediastinal silhouette is persistently enlarged.   Interval significant worsening of the aeration of the left lung with interval development of  multiple pleural based predominantly left upper lung opacities. Bibasilar atelectasis. No sizable pneumothorax.   No acute osseous abnormality.       1.  Interval significant worsening of the aeration of the left lung with interval development of multiple pleural based predominantly left upper lung opacities. Correlate with concern for fluid collection or pleural based hematoma. 2. Bibasilar atelectasis. 3. Postsurgical changes and medical devices as described above.     Critical Finding:  See findings. Notification was initiated on 11/18/2024 at 7:53 am by  Elio Matthews.  (**-YCF-**) Instructions:   Signed by: Elio Matthews 11/18/2024 7:53 AM Dictation workstation:   [id number]    ECG 12 Lead    Result Date: 11/17/2024  Sinus rhythm with occasional ventricular-paced complexes Low voltage QRS Anterolateral infarct probably acute ** ** ACUTE MI / STEMI ** ** Abnormal ECG When compared with ECG of 10-NOV-2024 16:30, Significant changes have occurred Confirmed by Lucas Mckeon ([id number]) on 11/17/2024 5:18:55 PM    Cardiac Catheterization Procedure    Result Date: 11/17/2024   [facility], Cath Lab, [address] [place] [address] Cardiovascular Catheterization Report Patient Name:      BARRY PAGE WRIGHT     Performing Physician:  [id number] Giancarlo Lambertnedraeric DORMAN Study Date:        11/14/2024           Verifying Physician:   [id number] Giancarlo                                                                Vaheeric DORMAN MRN/PID:           [id number]             Cardiologist/Co-Scrub: [id number] Romeo Boston MD Accession#:        [id number]         Ordering Provider:     [id number] RAVI GARCIA Date of Birth/Age: 1950 / 74 years Cardiologist: Gender:            M                    Fellow:                [id number]  Regina Vega MD Admit Date:                             Fellow:                [id number] Ronda Montez MD Encounter#:        [id number]           Surgeon:  Study: Left Heart Cath with PCI  Indications: BARRY WRIGHT is a 74 year old male who presents with dyslipidemia, hypertension, prior coronary artery bypass graft surgery and an anginal equivalent chest pain assessment (i.e. dyspnea on exertion believed to be from ischemia). Cardiomyopathy. Medical History: Stress test performed: No. CTA performed: No. [place] accessed: No. LVEF Assessed: Yes. LVEF = 30%. Cardiac arrest: No responsive following cardiac arrest. Cardiac surgical consult: Completed - cardiac surgery not recommended. Cardiovascular instability: Yes. Cardiogenic shock. Frailty status of patient entering lab: 9 = Terminally ill.  Procedure Description: After infiltration with 2% Lidocaine, the right radial artery was cannulated with a modified Seldinger technique. Subsequently a 6 [place] sheath was placed in the right radial artery. After infiltration with 2% Lidocaine, a second arterial access was obtained via the left femoral artery with a modified Seldinger technique and a 17 sheath was placed. This second arterial access was obtained to allow for ECMO cannulation. After infiltration with 2% Lidocaine, a third arterial access was obtained via the left superficial femoral artery with a modified Seldinger technique and a 8 [place] sheath was placed. This third arterial access was obtained to allow for distal perfusion cannula. After infiltration of local anesthetic, the left femoral vein was identified with two-dimensional ultrasound. Under direct ultrasound visualization, the left femoral vein was cannulated with a micropuncture technique. A 25 [place] sheath was placed in the vein. After completion of the procedure, the  arterial sheath was left intact and sutured in place. Femoral artery angiography was performed at the additional arterial access site. This demonstrated a common femoral artery puncture appropriate for closure. The additional arterial sheath was left intact and sutured in place. The additional arterial sheath was left intact and sutured in place.  Procedure Description Comments:  ECMO Cannulation: Initially a 6 Lao left common femoral artery access had been obtained using ultrasound and micropuncture technique for the coronary/bypass angiogram. Due to patient's degree of cardiogenic shock, the decision was made to place VA ECMO. Following serial dilations, a 17 Lao arterial ECMO cannula was placed in the left CFA. The left femoral vein was accessed using ultrasound and micropuncture technique and, following serial dilations, a 25 Lao venous cannula was inserted. A 8 Lao distal perfusion cannula was placed in the left SFA (using ultrasound and micropuncture technique) and connected to the left CFA ECMO cannula using a male to male connector.  Coronary Angiography: The coronary circulation is right dominant.  Left Main Coronary Artery: The left main coronary artery is a normal caliber vessel. The left main arises normally from the left coronary sinus of Valsalva and bifurcates into the LAD and circumflex coronary arteries. The distal left main coronary artery showed 40% stenosis.  Left Anterior Descending Coronary Artery Distribution: The left anterior descending coronary artery is a normal caliber vessel. The LAD arises normally from the left main coronary artery. The mid left anterior descending coronary artery showed 100% stenosis, distal to the distal anastomosis of the bypass graft and stenosis observed immediately distal to the graft anastamosis. The 1st diagonal branch is a small caliber vessel. The 1st diagonal branch showed no significant disease or stenosis greater than 30%.  Circumflex Coronary  Artery Distribution: The circumflex coronary artery is a normal caliber vessel. The circumflex arises normally from the left main coronary artery and terminates in the AV groove. The mid circumflex coronary artery showed 70% stenosis. The 1st obtuse marginal branch is a medium-sized caliber vessel. The 1st obtuse marginal branch showed no significant disease or stenosis greater than 30%. The 2nd obtuse marginal branch is a large caliber vessel. The mid to distal 2nd obtuse marginal branch demonstrated 80% stenosis. Competitive flow is seen distal to the stenosis.  Right Coronary Artery Distribution: The right coronary artery is a normal caliber vessel. The RCA arises normally from the right sinus of Valsalva. The RCA showed no significant disease or stenosis greater than 30%. The acute marginal branch showed no significant disease or stenosis greater than 30%. The right posterolateral branch showed no significant disease or stenosis greater than 30%. The right posterior descending artery showed no significant disease or stenosis greater than 30%.  Coronary Interventions: Angiography reveals a 100% stenosis of the mid left anterior descending coronary artery. Pre-intervention LILY flow was 0. Percutaneous coronary intervention was performed within the mid left anterior descending. The vessel was pre-dilated using a compliant balloon 2.0 mm x 15 mm at 5 TAINA. RUSLAN [place] 3.0 mm x 38 mm was advanced to the lesion and implanted at 10 TAINA. The stent was post dilated using a non-compliant balloon 3.0 mm x 12 mm at 10 TAINA. The stenosis was successfully reduced from 100% to 0%. Post-intervention LILY flow was 3. LAD PCI Description: Following ECMO cannulation, PCI of the LAD was pursued. Heparin was given and therapeutic ACTs were maintained for the entirety of the procedure. Right radial artery access was obtained using a 7 Fr glide sheath and a 7 Fr EBU 3.5 guide catheter was advanced to the aortic root. However,  engagement of the LM proved very challenging due to severe subclavian tortuosity. Attempts were made to airmail a Prowater coronary wire but were unsuccessful. A 6 Fr EBU 3.0 guide catheter was selected and with significant manipulation, the Prowater was placed into the LM and advanced into the mid LAD. A Finecross microcatheter was inserted to allow for finer wire tip control. However, there was repeated guide disengagement. As such, the guide and coronary wire were removed and the LM was engaged with a 6 Fr JL 3.5 guide catheter. The Prowater was again inserted into the LAD followed by the Finecross. The procedure was complex and required multiple attempts at wiring the mid LAD which was eventually successful. Initially the wire was placed in a diag and the mid LAD was pre-dilated using a 2.0 x 15 Euphora SC at 5 emily. Subsequent angiograms allowed visualization of the mid-distal LAD. The Prowater was re-positioned into the true LAD. The part of the mid LAD that was 100% occluded obtained distal flow after this initial dilation, with an ectatic segment in the mid LAD immediately adjacent to the graft anastomosis site. A 3.0 x 38 RUSLAN [place] HEATHER was advanced but unable to cross into the mid LAD. A 2.5 x 20 NC Euphora was then used for further predilation at 6 emily. The 3.0 x 38 RUSLAN [place] HEATHER was then inserted again and deployed at 10 emily. A 3.0 x 12 NC Emerge was used for post-dilation at 10 emily. Final angiograms revealed LILY 3 flow in the LAD without any guide dissection, distal wire perforation, or angiographically evident proximal or distal edge dissections. The wire was removed and the guide was disengaged. The radial arteriotomy was closed using a TR band.  Coronary Lesion Summary: Vessel                            Stenosis                        Vessel Segment Left Main                       40% stenosis                          distal LAD        100% stenosis, distal to the distal anastomosis of the       mid            bypass graft and stenosis observed immediately distal                          to the graft anastamosis. Circumflex                      70% stenosis                           mid OM 2                            80% stenosis                      mid to distal  Graft Stenosis Summary: Graft           Destination of Graft                      % Stenosis LIMA  mid left anterior descending and 2nd     LIMA is tortuous and transitions       obtuse marginal                          into a Y-graft with a patent limb                                                to the OM2 and a 100% stenosis at                                                the distal anastamosis site at                                                the mid LAD.  Hemo Personnel: +--------------------+---------+ Name                Duty      +--------------------+---------+ Giancarlo Quiles MD 1 +--------------------+---------+  Hemodynamic Pressures:  +----+----------------------+----------+-------------+--------------+---------+ Site      Date Time       Phase NameSystolic mmHgDiastolic mmHgMean mmHg +----+----------------------+----------+-------------+--------------+---------+  ART 11/14/2024 9:50:12 AM      Rest          136            55       90 +----+----------------------+----------+-------------+--------------+---------+  ART 11/14/2024 9:59:57 AM      Rest          119            45       76 +----+----------------------+----------+-------------+--------------+---------+  ART11/14/2024 10:06:03 AM      Rest          124            47       79 +----+----------------------+----------+-------------+--------------+---------+  Cardiac Cath Post Procedure Notes: Post Procedure Diagnosis: Successful ECMO placement and PCI fo the LAD. Blood Loss:               Estimated blood loss during the procedure was 10 mls. Specimens Removed:        Number of specimen(s) removed: none.  Recommendations:  Maximize medical therapy. Agressive risk factor modification efforts. Anti-platelet therapy with Aspirin and Ticagrelor 90mgs BID. Medical management of coronary artery disease. ____________________________________________________________________________________ CONCLUSIONS:  1. Coronary Angiogram: Mild to moderate LM stenosis with 100% occlusion of the mid LAD at the graft anastamosis site. Mod-sev focal disease in the native LCX/OM with competitive flow in OM2. Patent and dominant RCA.  2. Graft Angiogram: In-situ LIMA which transitions into a Y-graft with a patent limb to OM2 and 100% stenosis at the mid LAD anastamosis site.  3. Successful VA ECMO cannulation (left common femoral artery/vein) with concomitant distal perfusion catheter placement (left SFA).  4. Successful complex salvage PCI of the mid LAD with deployment of a 3.0 x 28 RUSLAN [place] HEATHER, post-dilated with a 3.0 NC balloon.  5. DAPT: ASA/Ticagrelor for 6 months without interruption followed by lifelong ASA.  6. Error in plug-in [id number] -- Failed to instantiate Phrase Plugin  7. Error in plug-in [id number] -- Failed to instantiate Phrase Plugin ICD 10 Codes: Cardiogenic shock-R57.0  CPT Codes: Moderate Sedation Services initial 15 minutes patient >5 years-73767; Moderate Sedation Services 1st additional 15 minutes patient >5 years-17518; Moderate Sedation Services 2nd additional 15 minutes patient >5 years-14978; Left Heart Cath (visualization of coronaries) and LV-73221; Ultrasound guidance for vascular access-89050; ECMO/ECLS by physician, initiation, veno arterial-37979; Stent w angioplasty Left Anterior Descending single major Artery branch (PCI)-76681.LD; Complicated/Unusual Procedure-MOD22  [id number] [facility] Performing Physician Electronically signed by [id number] Giancarlo Quiles DO on 11/17/2024 at 4:46:26 PM  ** Final **     XR chest 1 view    Result Date: 11/17/2024  Interpreted  By:  Elio Brady, STUDY: XR CHEST 1 VIEW; 11/17/2024 4:03 am   INDICATION: Signs/Symptoms:Post op cardiac surgery.   COMPARISON: Radiograph dated 11/16/2024   ACCESSION NUMBER(S): [id number]   ORDERING CLINICIAN: RAVI GARCIA   FINDINGS: Right IJ [place] catheter is in place with the tip projecting over the main pulmonary artery. ECMO cannula is projecting over the right atrium. Intra-aortic balloon pump radiopaque markers projecting over the proximal descending thoracic aorta near the aortic arch. Left-sided chest tubes are in unchanged position.   The cardiac silhouette size is persistently enlarged, unchanged.   There is increased interstitial markings and patchy airspace opacity. Bibasilar small pleural effusion. No sizable pneumothorax.   No acute osseous abnormality.       1. Increased interstitial markings and ground-glass opacity in the lungs which is likely due to component of edema and atelectasis. No significant change. 2. Suggestion of small bibasilar pleural effusion. 3. Medical devices and postsurgical changes as described above.       Signed by: Elio Matthews 11/17/2024 9:42 AM Dictation workstation:   [id number]    Transthoracic Echo (TTE) Limited    Result Date: 11/16/2024   [facility], [address] [place] [address]                Tel [phone] and Fax [phone] TRANSTHORACIC ECHOCARDIOGRAM REPORT  Patient Name:       BARRY Vincent Physician:    [id number] Mily Singletary MD Study Date:         11/16/2024          Ordering Provider:    [id number] MARINO BURGOS MRN/PID:            [id number]            Fellow: Accession#:         [id number]        Nurse: Date of Birth/Age:  1950 / 74      Sonographer:          Mannie whyte                                     JENNIE Gender assigned at   M                   Additional Staff: Birth: Height:             182.88 cm           Admit Date:           11/10/2024 Weight:             92.99 kg            Admission Status:     Inpatient - STAT BSA / BMI:          2.15 m2 / 27.80     Encounter#:           [id number]                     kg/m2 Blood Pressure:     98/52 mmHg          Department Location:  [facility] Study Type:    TRANSTHORACIC ECHO (TTE) LIMITED Diagnosis/ICD: Cardiogenic shock-R57.0 Indication:    pericardial effusion assess for tamponade CPT Code:      Echo Limited-38377; Doppler Limited-29343; Color Doppler-41191 Patient History: Pertinent History: STEMI, VA ECMO, s/p mid CAB x2 converted to sternotomy, s/p                    PCI of mid LAD. Study Detail: The following Echo studies were performed: 2D, M-Mode, Doppler and               color flow. Technically challenging study due to body habitus,               poor acoustic windows and patient lying in supine position.               Definity used as a contrast agent for endocardial border               definition. Total contrast used for this procedure was 3.0 mL via               IV push.  PHYSICIAN INTERPRETATION: Left Ventricle: Left ventricular ejection fraction is moderately decreased, calculated by Lambert's biplane at 34%. Left venticular wall motion is abnormal. The left ventricular cavity size is normal. There is normal septal and normal posterior left ventricular wall thickness. There is left ventricular concentric remodeling. Left ventricular diastolic filling was not assessed. There is no definite left ventricular thrombus visualized. LV Wall Scoring: The mid and apical anterior septum, apical anterior segment, apical inferior segment, and apex are akinetic. The apical lateral segment and mid anterior segment are hypokinetic. Left Atrium: The left atrium was not assessed. Right Ventricle: The right ventricle is normal in size. There is reduced right ventricular systolic  function. A device is visualized in the right ventricle. Right Atrium: The right atrium was not well visualized. There is a device visualized in the right atrium. Aortic Valve: The aortic valve was not well visualized. Aortic valve regurgitation was not assessed. Mitral Valve: The mitral valve is normal in structure. Mitral valve regurgitation was not assessed. Tricuspid Valve: The tricuspid valve is structurally normal. There is trace to mild tricuspid regurgitation. The Doppler estimated RVSP is within normal limits at 21.0 mmHg. Pulmonic Valve: The pulmonic valve is not well visualized. Pulmonic valve regurgitation was not assessed. Pericardium: Trivial pericardial effusion. There apperas to be a trivial pericardial effusion, however thre is also a oderate layer of echodense material overlying the RV of unclear etiology. NO obivous RA or RV collapse though not well seen and MV and V inflows not assessed for respiratory variation ( pt in afib so unable to assess given variable RR intervals) and IVC not well seen. Overall unable to determine hemodyanic significance of the material overlyin the RV. Aorta: The aortic root is abnormal. The aortic root appears moderately dilated and measures 4.40 cm. In comparison to the previous echocardiogram(s): Compared with the prior exam from 11/15/2024, there are no significant changes. The LAD territory wall motion abnormality appears to be similar. LIV has been present on prior studies.  CONCLUSIONS:  1. Multiple segmental abnormalities exist. See findings.  2. Left ventricular ejection fraction is moderately decreased, calculated by Lambert's biplane at 34%.  3. Abnormal left venticular wall motion.  4. No left ventricular thrombus visualized.  5. There is reduced right ventricular systolic function.  6. There apperas to be a trivial pericardial effusion, however thre is also a oderate layer of echodense material overlying the RV of unclear etiology. NO obivous RA or RV  collapse though not well seen and MV and V inflows not assessed for respiratory variation ( pt in afib so unable to assess given variable RR intervals) and IVC not well seen. Overall unable to determine hemodyanic significance of the material overlyin the RV.  7. Right ventricular systolic pressure is within normal limits.  8. Moderately dilated aortic root.  9. There is LIV of the subvalvular apparatus and MV leaflets of unclear significance. LVOT gradinets not assessed nor was there color Doppler on the LVOT to see if there were acceleration of flow to suggest obstructiom. ( Does not appear to have LIV -septal contact on 2D images). 10. Compared with the prior exam from 11/15/2024, there are no significant changes. The LAD territory wall motion abnormality appears to be similar. LIV has been present on prior studies. QUANTITATIVE DATA SUMMARY:  2D MEASUREMENTS:          Normal Ranges: Ao Root d:       4.40 cm  (2.0-3.7cm) LAs:             2.70 cm  (2.7-4.0cm) IVSd:            1.00 cm  (0.6-1.1cm) LVPWd:           1.00 cm  (0.6-1.1cm) LVIDd:           3.90 cm  (3.9-5.9cm) LVIDs:           3.00 cm LV Mass Index:   57 g/m2 LVEDV Index:     52 ml/m2 LV % FS          23.1 %  LV SYSTOLIC FUNCTION BY 2D PLANIMETRY (MOD):                      Normal Ranges: EF-A4C View:    35 % (>=55%) EF-A2C View:    36 % EF-Biplane:     34 % LV EF Reported: 34 %  RIGHT VENTRICLE: TAPSE: 10.1 mm RV s'  0.06 m/s  TRICUSPID VALVE/RVSP:          Normal Ranges: Peak TR Velocity:     2.12 m/s RV Syst Pressure:     21 mmHg  (< 30mmHg)  [id number] Mily Singletary MD Electronically signed on 11/16/2024 at 12:08:03 PM  Wall Scoring  ** Final **     XR chest 1 view    Result Date: 11/16/2024  Interpreted By:  Elio Brayd, STUDY: XR CHEST 1 VIEW; 11/16/2024 3:46 am   INDICATION: Signs/Symptoms:Post op cardiac surgery.   COMPARISON: Radiograph dated 11/15/2024   ACCESSION NUMBER(S): [id number]   ORDERING CLINICIAN: RAVI GARCIA   FINDINGS:  Enteric tube is in place with the tip outside the field of view. Right IJ [place]-Grayson catheter is in place with the tip projecting over the main pulmonary artery. Left chest tube in place. Intra-aortic balloon pump radiopaque marker is projected over aortic knob. ECMO cannulas projecting over the right atrium.   The cardiomediastinal silhouette size is enlarged, unchanged. Status post median sternotomy.   Low lung volumes and bronchovascular crowding. Increased interstitial markings of the lungs. Small bibasilar pleural effusion. No sizable pneumothorax.   No acute osseous abnormality.       1. Persistent bibasilar atelectasis, small pleural effusion and perihilar edema, unchanged. 2. Medical devices and postsurgical changes as described above.       Signed by: Elio Matthews 11/16/2024 10:24 AM Dictation workstation:   [id number]    XR abdomen 1 view    Result Date: 11/15/2024  Interpreted By:  Elio Brady, STUDY: XR ABDOMEN 1 VIEW; 11/15/2024 9:36 am   INDICATION: Signs/Symptoms:Gastric bubble.   COMPARISON: Radiograph dated 11/14/2024   ACCESSION NUMBER(S): [id number]   ORDERING CLINICIAN: BRITNI LINTON   FINDINGS: Three views of the abdomen and pelvis.   Nonobstructive bowel gas pattern. Moderate stool burden in the ascending colon. Limited evaluation of pneumoperitoneum on supine imaging, however no gross evidence of free air is noted.   Partially imaged [place]-Grayson catheter. Partially imaged median sternotomy. Mediastinal drain and left chest tube are also partially included in the provided images. ECMO cannula is projecting over the right atrium. Additional ECMO cannulas projecting over the L5 vertebral body.   There is bibasilar atelectasis, left more than right with question small bilateral pleural effusion.   Osseous structures demonstrate no acute bony changes.       1.  As described above.   Signed by: Elio Matthews 11/15/2024 7:46 PM Dictation workstation:   [id number]    CT  chest abdomen pelvis wo IV contrast    Result Date: 11/15/2024  Interpreted By:  Rei Caraballo  and Kezia Pelaez STUDY: CT CHEST ABDOMEN PELVIS WO CONTRAST;  11/15/2024 4:19 pm   INDICATION: Signs/Symptoms:check for hematoma.       Per EMR: 74-year-old male status post sternotomy with complicated postoperative course including pressor requirement, ACS, IABP, and ECMO cannulas.   COMPARISON: CT abdomen pelvis 11/11/2024 and CT chest 11/09/2024   ACCESSION NUMBER(S): [id number]   ORDERING CLINICIAN: BRITNI LINTON   TECHNIQUE: Contiguous axial images of the chest, abdomen, and pelvis were obtained in the absence of intravenous contrast. Coronal and sagittal reformatted images were reconstructed from the axial data.   FINDINGS: CT CHEST:   MEDIASTINUM AND LYMPH NODES: The visualized thyroid is within normal limits. There is an enlarged right infraclavicular lymph node measuring up to 1.9 cm in short axis. Several additional scattered prominent but nonenlarged lymph nodes throughout the mediastinum.   Trace pneumomediastinum, primarily along the right heart border. There are mediastinal drain is in place with tips projecting over the prevascular region and the left upper anterior mediastinum.   Several collections of hyperdense fluid, likely blood products, throughout the mediastinum, the largest superior to the left ventricle measuring 6.8 x 4.2 x 3.3 cm.   The esophagus is unremarkable. Enteric tube in place.   HEART: The heart is nonenlarged. Small pericardial effusion. Mild coronary arterial calcifications with coronary stents in place. Epicardial pacing wire underlying the base of the right ventricle.   VESSELS: Right internal jugular approach [place]-Grayson catheter in place with tip terminating at the main pulmonary artery bifurcation. Left femoral approach venous ECMO cannula tip terminates at the lower superior vena cava. IABP is in place and inflated with superior metallic marker at the distal aortic  arch. No significant aortic calcifications. The main pulmonary artery is nonenlarged. There is a rounded soft tissue density focus in the left retroclavicular region measuring up to 2.0 cm in diameter, which may represent an additional enlarged lymph node or focally dilated left supraclavicular vasculature.   LUNG, AIRWAYS, PLEURA: Small bilateral pleural effusions with loculated components along the left anterior pleura. Few small foci of gas contained within the left pleural effusion. There is a left pleural chest tube in place terminating at the posterior left upper lobe. Scattered multifocal ground-glass opacities throughout the right lung are new from the prior exam. Streaky atelectasis throughout the left lung.   CHEST WALL SOFT TISSUES: Small volume of soft tissue gas in the anterior left chest wall. The soft tissues of the chest are otherwise unremarkable.   OSSEOUS STRUCTURES: Status post median sternotomy. There is an acute nondisplaced posterior left 2nd rib fracture.     CT ABDOMEN/PELVIS:   LIVER: No significant parenchymal abnormality.   BILE DUCTS: No significant intrahepatic or extrahepatic dilatation.   GALLBLADDER: The gallbladder is mildly distended without apparent wall thickening or radiopaque cholelithiasis.   PANCREAS: No significant abnormality.   SPLEEN: No significant abnormality.   ADRENALS: No significant abnormality.   KIDNEYS, URETERS, BLADDER: The bilateral kidneys are unremarkable. Hale catheter in place with decompression of the surrounding bladder.   REPRODUCTIVE ORGANS: No significant abnormality.   GI: Enteric tube in place terminating in the gastric body. No bowel wall thickening or dilatation to suggest obstruction or inflammatory process. The appendix is visualized and is normal in appearance.   VESSELS: Inferior metallic IABP marker at the level of the left renal vein and associated with right femoral approach vascular catheter. There is a left femoral approach arterial ECMO  catheter terminating of the proximal left common iliac artery.   In the left inguinal region, likely associated with left femoral vascular catheter access, there is a heterogeneous hyperdense collection measuring roughly 5.9 x 4.3 x 4.4 cm with extensive surrounding fat stranding suspicious for a hematoma.   PERITONEUM/RETROPERITONEUM: Trace simple free fluid in the pelvis. No intra-abdominal fluid collection or pneumoperitoneum.   LYMPH NODES: No enlarged lymph nodes.   ABDOMINAL WALL: Mild nonspecific body wall edema, primarily along the left-greater-than-right flanks. Few foci of gas in the left inguinal region, otherwise no subcutaneous emphysema.   OSSEOUS STRUCTURES: No acute abdominopelvic osseous abnormality.       1. Extensive postsurgical changes of the chest with multiple mediastinal hematomas, the largest measuring up to 6.8 cm as seen just superolateral to the left ventricle. Trace pneumomediastinum. Mediastinal drains in place, however not appearing to communicate within the hematomas. 2. Small bilateral pleural effusions with loculated components and gas throughout the left pleural effusion. 3. Left femoral approach ECMO cannulas in place. Heterogeneous hyperdense collection in the left inguinal region suspicious for hematoma, with few scattered foci of surrounding gas. 4. Scattered ground-glass opacities throughout the right lung, possibly related to edema or atelectasis, however infectious or inflammatory process is not entirely excluded. 5. Enlarged right infraclavicular lymph node and additional nodular soft tissue density in the left retroclavicular region, possibly another enlarged lymph node or focally dilated left supraclavicular vessels. 6. Acute nondisplaced left posterior 2nd rib fracture. 7. Trace ascites. No intra-abdominal collection or pneumoperitoneum. 8. [place]-Grayson catheter tip terminating of the pulmonary artery bifurcation. Inflated IABP in place with superior marker at the distal  aortic arch.   I personally reviewed the image(s)/study and resident interpretation as stated by Dr. Latanya Mast MD. I agree with the findings as stated. This study was interpreted at [facility], [place], [place].   Signed by: Rei Caraballo 11/15/2024 5:31 PM Dictation workstation:   [id number]    XR chest 1 view    Result Date: 11/15/2024  Interpreted By:  Unruly Macias, STUDY: XR CHEST 1 VIEW;  11/15/2024 3:28 am   INDICATION: Signs/Symptoms:Post op cardiac surgery.   COMPARISON: Chest radiograph dated 11/14/2024   ACCESSION NUMBER(S): [id number]   ORDERING CLINICIAN: RAVI GARCIA   FINDINGS: AP radiograph of the chest     The sw[place] Grayson catheter is within the right pulmonary artery. Sternal sutures are noted. Pleural drains are noted. IABP fiduciary marker is noted at the level of the aortic arch. An ECMO device is in satisfactory position. The enteric tube traverses the esophagus.   The heart is enlarged. Left parahilar interstitial pulmonary opacities are noted. Subsegmental discoid atelectasis within the left lower lobe and left  upper lobe is noted. Left upper pleural opacity is similar previous study. Linear atelectasis is noted within right lower lobe and right hilum.       1. Moderate to moderately severe cardiomegaly and enlargement of the upper mediastinum stable in comparison to previous study 2. Left parahilar and lower lobe linear and subsegmental atelectasis 3. Linear atelectasis within the right hilum and right lower lobe 4. Small left pleural effusion and trace right pleural effusion       Signed by: Unruly Macias 11/15/2024 1:57 PM Dictation workstation:   [id number]    XR chest 1 view    Result Date: 11/15/2024  Interpreted By:  Unruly Macias, STUDY: XR CHEST 1 VIEW;  11/14/2024 6:18 pm   INDICATION: Signs/Symptoms:post VA ECMO, IABP repositioning.   COMPARISON: Chest radiograph dated 11/14/2024   ACCESSION NUMBER(S): [id number]   ORDERING  CLINICIAN: BRITNI LINTON   FINDINGS: AP radiograph of the chest   The swans Grayson catheter is within the main pulmonary artery. Median sternotomy is noted. The IABP fiduciary marker is noted overlying the aortic arch. An ECMO cannula is in adequate position.   There is widening of the upper mediastinum. The heart is moderately enlarged.   There is improved pulmonary aeration within the right lung. Interstitial opacities within the left lung and subsegmental atelectasis within the left lower lobe is noted. There is small left pleural effusion. Fluid opacifies the left lung apex.       1. Interstitial infiltrates within the left lung 2. Subsegmental atelectasis within the left lower lobe and left pleural effusion similar previous study. 3. Pleural fluid extends into the left lung apex not demonstrated on prior study.       Signed by: Unruly Macias 11/15/2024 12:25 PM Dictation workstation:   [id number]    XR abdomen 1 view    Result Date: 11/15/2024  Interpreted By:  Unruly Macias,  and Adriana Ayala STUDY: XR ABDOMEN 1 VIEW;  11/14/2024 5:55 pm; 11/14/2024 5:56 pm   INDICATION: Signs/Symptoms:post VA ECMO, IABP repositioning; Signs/Symptoms:Post NGT.     COMPARISON: Same day abdominal radiographs and CT abdomen pelvis 11/11/2024   ACCESSION NUMBER(S): [id number]; [id number]   ORDERING CLINICIAN: BRITNI LINTON   FINDINGS: The stomach is markedly distended. There is a nonspecific small bowel and colon pattern. Multiple monitoring electrodes and tubes overlie anatomy. 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 Contrast is demonstrated within the renal collecting systems.       Marked distention of the stomach         I personally reviewed the images/study and I agree with Lucy Wright DO's (radiology resident) findings as stated. This study was interpreted at [place], [place].   MACRO: None   Signed by: Unruly Macias 11/15/2024  11:58 AM Dictation workstation:   [id number]    XR abdomen 1 view    Result Date: 11/15/2024  Interpreted By:  Unruly Macias and Stephens Katherine STUDY: XR ABDOMEN 1 VIEW;  11/14/2024 5:55 pm; 11/14/2024 5:56 pm   INDICATION: Signs/Symptoms:post VA ECMO, IABP repositioning; Signs/Symptoms:Post NGT.     COMPARISON: Same day abdominal radiographs and CT abdomen pelvis 11/11/2024   ACCESSION NUMBER(S): [id number]; [id number]   ORDERING CLINICIAN: BRITNI LINTON   FINDINGS: The stomach is markedly distended. There is a nonspecific small bowel and colon pattern. Multiple monitoring electrodes and tubes overlie anatomy. 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 Contrast is demonstrated within the renal collecting systems.       Marked distention of the stomach         I personally reviewed the images/study and I agree with Lucy Wright DO's (radiology resident) findings as stated. This study was interpreted at [place], [place].   MACRO: None   Signed by: Unruly Macias 11/15/2024 11:58 AM Dictation workstation:   [id number]    Transthoracic Echo (TTE) Limited    Result Date: 11/15/2024   [facility], [address] [place] [address]                Tel [phone] and Fax [phone] TRANSTHORACIC ECHOCARDIOGRAM REPORT  Patient Name:       BARRY PAGE WRIGHT    Reading Physician:    [id number] Lemon MD Study Date:         11/15/2024          Ordering Provider:    [id number] BRITNI LINTON MRN/PID:            [id number]            Fellow: Accession#:         [id number]        Nurse: Date of Birth/Age:  1950 / 74      Sonographer:          Esdras whyte                                     JENNIE Gender assigned at  M                   Additional Staff: Birth:  Height:             182.88 cm           Admit Date: Weight:             92.99 kg            Admission Status:     Inpatient -                                                               Routine BSA / BMI:          2.15 m2 / 27.80     Encounter#:           [id number]                     kg/m2 Blood Pressure:     106/44 mmHg         Department Location:  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 Study Detail: The following Echo studies were performed: 2D, M-Mode, Doppler and               color flow. Technically challenging study due to body habitus,               patient lying in supine position, poor acoustic windows, prominent               lung artifact and postoperative dressings. Definity used as a               contrast agent for endocardial border definition. Total contrast               used for this procedure was 3.0 mL via IV push.  PHYSICIAN INTERPRETATION: Left Ventricle: Left ventricular ejection fraction is moderately decreased, calculated by Lambert's biplane at 31%. Left venticular wall motion is abnormal. The left ventricular cavity size is normal. Abnormal (paradoxical) septal motion consistent with post-operative status. Left ventricular diastolic filling was not assessed. There is no definite left ventricular thrombus visualized. LV Wall Scoring: The entire apex, mid and apical anterior septum, and mid and apical inferior septum are akinetic. Left Atrium: The left atrium was not well visualized. Right Ventricle: The right ventricle is normal in size. There is reduced right ventricular systolic function. A device is visualized in the right  ventricle. Right Atrium: The right atrium was not well visualized. Aortic Valve: The aortic valve is structurally normal. There is mild aortic valve regurgitation. Mitral Valve: The mitral valve is normal in structure. There is mild mitral annular calcification. There is systolic anterior motion involving both leaflets. There is trace mitral valve regurgitation. Tricuspid Valve: The tricuspid valve is structurally normal. There is trace tricuspid regurgitation. Pulmonic Valve: The pulmonic valve is not well visualized. Pulmonic valve regurgitation was not assessed. Pericardium: Trivial pericardial effusion. Aorta: The aortic root was not assessed. In comparison to the previous echocardiogram(s): Compared with study dated 11/13/2024, no significant change Wall motion abnormality is unchanged. LIV is also known. No significant change.  CONCLUSIONS:  1. Poorly visualized anatomical structures due to suboptimal image quality.  2. Left ventricular ejection fraction is moderately decreased, calculated by Lambert's biplane at 31%.  3. Abnormal left venticular wall motion.  4. Entire apex, mid and apical anterior septum, and mid and apical inferior septum are abnormal.  5. There is reduced right ventricular systolic function.  6. No left ventricular thrombus visualized.  7. Mild aortic valve regurgitation.  8. There is systolic anterior motion involving both mitral leaflets, LVOT obstruction not assessed.  9. Compared with study dated 11/13/2024, no significant change Wall motion abnormality is unchanged. LIV is also known. No significant change. 10. Abnormal septal motion consistent with post-operative status. QUANTITATIVE DATA SUMMARY:  2D MEASUREMENTS:          Normal Ranges: LVEDV Index:     44 ml/m2  LV SYSTOLIC FUNCTION BY 2D PLANIMETRY (MOD):                      Normal Ranges: EF-A4C View:    45 % (>=55%) EF-A2C View:    10 % EF-Biplane:     31 % LV EF Reported: 31 %  RIGHT VENTRICLE: RV s' 0.06 m/s  TRICUSPID  VALVE/RVSP:          Normal Ranges: Peak TR Velocity:     2.22 m/s  [id number] Amalia Lemon MD Electronically signed on 11/15/2024 at 9:46:37 AM  Wall Scoring  ** Final **     XR chest 1 view    Result Date: 11/14/2024  Interpreted By:  Unruly Macias and Omar Mahmoud STUDY: XR CHEST 1 VIEW; XR ABDOMEN 1 VIEW;  11/14/2024 6:20 am   INDICATION: Signs/Symptoms:Post IABP placement.     COMPARISON: Chest x-ray 11/14/2024 2:47 a.m..   ACCESSION NUMBER(S): [id number]; [id number]   ORDERING CLINICIAN: BRITNI LINTON   FINDINGS: AP radiograph of the chest was provided.   Patient is mildly rotated to the left   Postoperative findings of median sternotomy. Right IJ approach pulmonary arterial catheter with tip projecting over the proximal right main pulmonary artery. Proximal radiopaque marker of the intra-aortic balloon pump projects superior to the aortic knob with a distal radiopaque marker of an intra-aortic balloon pump projecting over the level of the T11-T12 intervertebral disc space. Left sided pleural catheter/chest tube. Lower cervical spinal fusion   CARDIOMEDIASTINAL SILHOUETTE: Cardiomediastinal silhouette is stable in size and configuration.   LUNGS: Bilateral perihilar and bibasilar interstitial hazy opacities, grossly stable as compared to prior. Blunting of the left costophrenic angle with left lower lung field opacities. There is no evidence of pneumothorax.   ABDOMEN: Gaseous gastric prominence. Nonobstructive bowel gas pattern. Limited evaluation of pneumoperitoneum on supine imaging, however no gross evidence of free air is noted.   BONES: No acute osseous changes.       1. Interval placement of intra-aortic balloon pump. Recommend advancement of the intra-aortic balloon pump given that the proximal and distal radiopaque markers are more superior than ideal. Other medical devices as described above. 2. Stable pulmonary interstitial and alveolar edema. 3. Stable small left pleural effusion with  associated left basilar subsegmental atelectasis.   I personally reviewed the images/study and I agree with the findings as stated by Xi Watkins MD (PGY-2). This study was interpreted at [place], [place].   MACRO: None   Signed by: Unruly Macias 11/14/2024 2:21 PM Dictation workstation:   [id number]    XR abdomen 1 view    Result Date: 11/14/2024  Interpreted By:  Unruly Macias  and Roland Layne STUDY: XR CHEST 1 VIEW; XR ABDOMEN 1 VIEW;  11/14/2024 6:20 am   INDICATION: Signs/Symptoms:Post IABP placement.     COMPARISON: Chest x-ray 11/14/2024 2:47 a.m..   ACCESSION NUMBER(S): [id number]; [id number]   ORDERING CLINICIAN: BRITNI LINTON   FINDINGS: AP radiograph of the chest was provided.   Patient is mildly rotated to the left   Postoperative findings of median sternotomy. Right IJ approach pulmonary arterial catheter with tip projecting over the proximal right main pulmonary artery. Proximal radiopaque marker of the intra-aortic balloon pump projects superior to the aortic knob with a distal radiopaque marker of an intra-aortic balloon pump projecting over the level of the T11-T12 intervertebral disc space. Left sided pleural catheter/chest tube. Lower cervical spinal fusion   CARDIOMEDIASTINAL SILHOUETTE: Cardiomediastinal silhouette is stable in size and configuration.   LUNGS: Bilateral perihilar and bibasilar interstitial hazy opacities, grossly stable as compared to prior. Blunting of the left costophrenic angle with left lower lung field opacities. There is no evidence of pneumothorax.   ABDOMEN: Gaseous gastric prominence. Nonobstructive bowel gas pattern. Limited evaluation of pneumoperitoneum on supine imaging, however no gross evidence of free air is noted.   BONES: No acute osseous changes.       1. Interval placement of intra-aortic balloon pump. Recommend advancement of the intra-aortic balloon pump given that the proximal and distal radiopaque  markers are more superior than ideal. Other medical devices as described above. 2. Stable pulmonary interstitial and alveolar edema. 3. Stable small left pleural effusion with associated left basilar subsegmental atelectasis.   I personally reviewed the images/study and I agree with the findings as stated by Xi Watkins MD (PGY-2). This study was interpreted at [facility], [place], [place].   MACRO: None   Signed by: Unruly Macias 11/14/2024 2:21 PM Dictation workstation:   [id number]    XR chest 1 view    Result Date: 11/14/2024  Interpreted By:  Unruly Macias,  and Mahamed Palacios STUDY: XR CHEST 1 VIEW;  11/14/2024 2:47 am   INDICATION: Signs/Symptoms:S/P PAC placement.   COMPARISON: Chest radiograph 11/13/2024   ACCESSION NUMBER(S): [id number]   ORDERING CLINICIAN: ELKE MCRAE   FINDINGS: Semi-erect AP radiograph of the chest was provided.   Interval placement of right internal jugular pulmonary artery catheter with distal tip projecting over the distal main pulmonary artery. Status post median sternotomy with intact sternal cerclage wires.   CARDIOMEDIASTINAL SILHOUETTE: Cardiomediastinal silhouette is stable in size and configuration.   LUNGS: Low lung volumes contributing to bronchovascular crowding. There is increased interstitial prominence and central pulmonary vascular congestion when compared to prior with interval development of small bilateral pleural effusions and bibasilar atelectasis.   ABDOMEN: No remarkable upper abdominal findings.   BONES: No acute osseous changes.       1.  Findings suggestive of pulmonary edema with small bilateral pleural effusions. 2. Interval placement of pulmonary artery catheter with distal tip projecting over the expected location of the distal main pulmonary artery.   I personally reviewed the images/study and I agree with the findings as stated by Philip Reid MD (Radiology Resident).   MACRO:  None   Signed by: Unruly Macias 11/14/2024 1:40 PM Dictation workstation:   [id number]    Electrocardiogram 12-lead PRN for arrhythmia    Result Date: 11/14/2024  Normal sinus rhythm Anteroseptal infarct (cited on or before 13-NOV-2024) Abnormal ECG When compared with ECG of 14-NOV-2024 03:42, No significant change was found Confirmed by Lucas Mckeon ([id number]) on 11/14/2024 10:24:16 AM    Electrocardiogram, 12-lead PRN ACS symptoms    Result Date: 11/13/2024  Normal sinus rhythm Incomplete right bundle branch block Borderline ECG When compared with ECG of 09-NOV-2024 15:18, ST elevation now present in Inferior leads T wave inversion no longer evident in Inferior leads Confirmed by Lucas Mckeon ([id number]) on 11/13/2024 4:45:54 PM    XR chest 1 view    Result Date: 11/13/2024  Interpreted By:  Unruly Macias,  and Mahamed Palacios STUDY: XR CHEST 1 VIEW;  11/13/2024 2:18 am   INDICATION: Signs/Symptoms:Post op cardiac surgery.   COMPARISON: Chest radiograph 11/12/2024   ACCESSION NUMBER(S): [id number]   ORDERING CLINICIAN: RAVI GARCIA   FINDINGS: AP radiograph of the chest was provided.   Endotracheal tube positioned 5.3 cm above the horace. Status post median sternotomy with intact sternal cerclage wires. Right internal jugular central venous catheter with distal tip projecting over the mid SVC. Enteric tube coursing below the level of the diaphragm appearing to coil within the stomach with distal tip overlying the expected location of the gastric body. Right-sided chest tube.   CARDIOMEDIASTINAL SILHOUETTE: Cardiomediastinal silhouette is normal in size and configuration.   LUNGS: Subsegmental atelectasis is noted within the left lower lobe and to a lesser degree the right lower lobe. There is improved pulmonary aeration within the right hilum and decreased linear atelectasis within right upper lobe in comparison to the prior study.   ABDOMEN: No remarkable upper abdominal findings.   BONES: No acute  osseous changes.       1. Subsegmental linear linear atelectasis within the left lower lobe and to a lesser degree the right hilum and right lower lobe 2. Slightly improved pulmonary ventilation compared to the prior study.   This is a preliminary resident report intended to identify and communicate acutely critical findings as it relates to the indication for the study. A full attending report will follow.   MACRO: None   Signed by: Unruly Macias 11/13/2024 1:39 PM Dictation workstation:   [id number]    Anesthesia Intraoperative Transesophageal Echocardiogram    Result Date: 11/13/2024  [facility] - Dept of Anesthesiology    [address] [place] [address]       Tel [phone] and Fax [phone] TRANSESOPHAGEAL ECHOCARDIOGRAM REPORT  Patient Name:       BARRY Vincent Physician:    [id number] Jaleel Chavez MD Study Date:         11/12/2024          Ordering Provider:    [id number] GISELL LINK MRN/PID:            [id number]            Fellow: Accession#:         [id number]        Nurse: Date of Birth/Age:  1950 / 74      Sonographer:                     years Gender assigned at  M                   Additional Staff: Birth: BSA / BMI:          m2 / kg/m2          Encounter#:           [id number] Blood Pressure:     /                   Department Location:  [place]                                                               Anesthesia Study Type:    ANESTHESIA INTRAOPERATIVE EZEQUIEL Diagnosis/ICD: 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 The left  ventricular cavity size is normal. The left ventricular septal wall thickness is mildly increased. Left ventricular diastolic filling was not assessed. Left Atrium: The left atrium is normal in size. There is no evidence of a patent foramen ovale. There is no thrombus visualized in the left atrial appendage and the left atrial appendage Doppler velocities are mildly reduced. Right Ventricle: The right ventricle is normal in size. There is normal right ventricular global systolic function. Right Atrium: The right atrium is normal in size. Aortic Valve: The aortic valve is structurally normal. There is no evidence of aortic valve stenosis. There is no evidence of aortic valve regurgitation. Mitral Valve: The mitral valve is mildly thickened. There is no evidence of mitral valve stenosis. There is trace mitral valve regurgitation. Tricuspid Valve: The tricuspid valve is structurally normal. No evidence of tricuspid regurgitation. Pulmonic Valve: The pulmonic valve is structurally normal. There is no indication of pulmonic valve regurgitation. Pericardium: There is no pericardial effusion noted. Aorta: The aortic root is normal. The ascending Ao diameter is 2.98 cm. The descending aorta is classified as a Grade 2 [mild (focal or diffuse) intimal thickening of 2-3 mm] atherosclerosis. In comparison to the previous echocardiogram(s): Not performed on intraoperative study.  CONCLUSIONS:  1. The left ventricular systolic function is normal, with a visually estimated ejection fraction of 60-65%.  2. There is normal right ventricular global systolic function.  3. Aortic valve stenosis is not present. POST PROCEDURE REPORT: Patient is on an epinephrine drip. Global LV function is normal. Global RV function is normal. There is no mitral regurgitation. There is no tricuspid regurgitation. No evidence of post aortic cannulation dissection. All transesophageal echocardiogram findings discussed with surgeon. Post-pump echo by   Erika.  QUANTITATIVE DATA SUMMARY:  LV SYSTOLIC FUNCTION BY 2D PLANIMETRY (MOD):                      Normal Ranges: EF-Visual:      63 % LV EF Reported: 63 %  [id number] Jaleel Chavez MD Electronically signed on 11/13/2024 at 11:39:05 AM  ** Final **     XR chest 1 view    Result Date: 11/12/2024  Interpreted By:  Esha Robledo,  and Mahamed Palacios STUDY: XR CHEST 1 VIEW;  11/12/2024 11:40 pm   INDICATION: Signs/Symptoms:RSI.   COMPARISON: Chest radiograph 11/08/2024   ACCESSION NUMBER(S): [id number]   ORDERING CLINICIAN: BRITNI LINTON   FINDINGS: AP radiograph of the chest was provided.   A radiopaque foreign object is noted overlying the left heart, as annotated on PACS for reference. Confirmation of the identification was obtained. Right internal jugular central venous catheter with distal tip projecting over the lower SVC. Endotracheal tube positioned 1.1 cm above the horace. Mediastinal and left pleural-based chest tubes are noted.   CARDIOMEDIASTINAL SILHOUETTE: Cardiomediastinal silhouette is normal in size and configuration.   LUNGS: Platelike atelectasis within the right mid and lower lung zones. Increased interstitial markings. No focal consolidation, pleural effusion or pneumothorax.   ABDOMEN: No remarkable upper abdominal findings.   BONES: No acute osseous changes.       1. Retained surgical instrument imaging was performed due to an incorrect count. A radiopaque foreign object, described as a bulldog clamp is noted overlying the left heart. Identification was confirmed with the surgical team via telephone. 2. Nonspecific hazy bilateral interstitial opacities.   I personally reviewed the images/study and I agree with the findings as stated by Philip Reid MD (Radiology Resident).   MACRO: Dr. Irby discussed the significance and urgency of this critical finding by telephone with OR/surgical team on 11/12/2024 at 11:40 pm.  (**-RCF-**) Findings:  See findings.        Signed by: Esha Robledo 11/12/2024 11:52 PM Dictation workstation:   [id number]    Vascular US Carotid Artery Duplex Bilateral    Result Date: 11/12/2024            [place] [address]   Tel [phone] and Fax [phone]  Vascular Lab Report VASC US CAROTID ARTERY DUPLEX BILATERAL  Patient Name:      BARRY WRIGHT     Reading Physician:  [id number] Maria Dolores Christianson MD Study Date:        11/11/2024           Ordering Physician: [id number] NIKKIE JACOME MRN/PID:           [id number]             Technologist:       Jeremias Bowman S Accession#:        [id number]         Technologist 2: Date of Birth/Age: 1950 / 74 years Encounter#:         [id number] Gender:            M Admission Status:  Inpatient            Location Performed: [facility]  Diagnosis/ICD: Encounter for preprocedural cardiovascular examination-Z01.810 Indication:    Pre-Op [facility] CPT Codes:     59458 Cerebrovascular Carotid Duplex scan complete  Patient History CAD. Smoker:         Former.  CONCLUSIONS: Right Carotid: Findings are consistent with less than 50% stenosis of the right proximal internal carotid artery. Right external carotid artery appears patent with no evidence of stenosis. The right vertebral artery is patent with antegrade flow. No evidence of hemodynamically significant stenosis in the right subclavian artery. Left Carotid: Findings are consistent with less than 50% stenosis of the left proximal internal carotid artery. Left external carotid artery appears patent with no evidence of stenosis. The left vertebral artery is patent with antegrade flow. No evidence of hemodynamically significant stenosis in the left subclavian artery.  Imaging & Doppler Findings: Right Plaque Morph: The proximal right internal carotid artery demonstrates  calcified plaque. The distal right common carotid artery demonstrates intimal thickening plaque. Left Plaque Morph: The proximal left internal carotid artery demonstrates calcified and heterogenous plaque. The mid left common carotid artery demonstrates intimal thickening plaque. The distal left common carotid artery demonstrates intimal thickening plaque.   Right                        Left   PSV      EDV                PSV      EDV 70 cm/s            CCA P    89 cm/s 67 cm/s            CCA D    67 cm/s 70 cm/s  14 cm/s   ICA P    85 cm/s  25 cm/s 41 cm/s  19 cm/s   ICA M    60 cm/s  23 cm/s 40 cm/s  16 cm/s   ICA D    60 cm/s  24 cm/s 81 cm/s             ECA     81 cm/s 43 cm/s  17 cm/s Vertebral  40 cm/s  7 cm/s 137 cm/s         Subclavian 135 cm/s               Right Left ICA/CCA Ratio  1.0  1.3   [id number] Maria Dolores Christianson MD Electronically signed by [id number] Maria Dolores Christianson MD on 11/12/2024 at 6:42:34 PM  ** Final **     Electrocardiogram, 12-lead    Result Date: 11/12/2024  Normal sinus rhythm Inferior infarct , age undetermined Abnormal ECG No previous ECGs available Confirmed by Ale Brady ([id number]) on 11/12/2024 9:24:11 AM    Pulmonary function testing    Result Date: 11/12/2024  Normal spirometry.    CT abdomen pelvis wo IV contrast    Result Date: 11/11/2024  Interpreted By:  Shweta Boyce,  and Jonathan Olmos STUDY: CT ABDOMEN PELVIS WO IV CONTRAST;  11/11/2024 2:05 pm   INDICATION: Signs/Symptoms:for pre-op for CABG/midcab tomorrow.     COMPARISON: None   ACCESSION NUMBER(S): [id number]   ORDERING CLINICIAN: NIKKIE JACOME   TECHNIQUE: CT of the abdomen and pelvis was performed. Contiguous axial images were obtained at 3 mm slice thickness through the abdomen and pelvis. Coronal and sagittal reconstructions at 3 mm slice thickness were performed.  No intravenous contrast was administered; positive oral contrast was given.   FINDINGS: Please note that the evaluation of vessels, lymph  nodes and organs is limited without intravenous contrast.   LOWER CHEST: Evaluation of the lung parenchyma is somewhat limited by motion artifact. Within this limitation, the bilateral lung bases appear unremarkable. No focal consolidation, effusion, pneumothorax, or discrete lung nodules are appreciated. Coronary artery calcifications are noted along the left anterior descending and left circumflex arteries. There is suggestion of calcifications along the origin of the right coronary artery.   ABDOMEN:   LIVER: The liver demonstrates homogeneous attenuation without evidence of focal liver lesions. The liver is slightly hypoattenuating relative to the spleen, suggesting a component of steatosis.   BILE DUCTS: The intrahepatic and extrahepatic ducts are not dilated.   GALLBLADDER: The gallbladder is nondistended and without evidence of radiopaque stones.   PANCREAS: The pancreas appears unremarkable without evidence of ductal dilatation or masses.   SPLEEN: Spleen is slightly enlarged and measures 14.2 cm in maximum dimension (Series 201, Image 49). Incidental note is made of accessory splenules which measure 1.5 x 1.5 cm (Series 201, Image 58) and 2.2 x 2.0 cm (Series 201, Image 70).   ADRENAL GLANDS: Bilateral adrenal glands appear normal.   KIDNEYS AND URETERS: The kidneys are normal in size and unremarkable in appearance.  No hydroureteronephrosis or nephroureterolithiasis is identified.   PELVIS:   BLADDER: The urinary bladder is decompressed, limited for evaluation.   REPRODUCTIVE ORGANS: Prostate is surgically absent.   BOWEL: Incidentally noted small hiatal hernia versus distal esophageal thickening. Otherwise the stomach, small and large bowel are normal in appearance without wall thickening or obstruction. Incidental note is made of a duodenal diverticulum (Series 201, Image 54). The appendix is normal.   VESSELS: There is a fusiform ectatic appearance of the infrarenal abdominal aorta which measures up to  2.5 cm in maximum dimension. 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 There are moderate atherosclerotic calcifications of the aorta.   PERITONEUM/RETROPERITONEUM/LYMPH NODES: No ascites or free air, no fluid collection.  No abdominopelvic lymphadenopathy is present.   ABDOMINAL WALL: The abdominal wall soft tissues appear normal.   BONES: No suspicious osseous lesions are identified. Degenerative discogenic disease is noted in the lower thoracic and lumbar spine.       1.  No acute abnormality seen within the abdomen and pelvis. 2. Slight hypoattenuation of the liver relative to the spleen suggestive of mild steatosis. 3. Coronary artery calcifications are noted. 4. Mild fusiform ectasia of the infrarenal abdominal aorta measuring up to 2.5 cm with findings suspicious of chronic dissection. However assessment is limited given lack of IV contrast. 5. Additional chronic findings as described above.   I personally reviewed the images/study and I agree with the findings as stated by Amarilis Castillo MD (resident). This study was interpreted at [place], [place].   MACRO: None   Signed by: Shweta Boyce 11/11/2024 10:34 PM Dictation workstation:   [id number]    Cardiac Catheterization Procedure    Result Date: 11/11/2024           [place] [address]            Phone [phone] Cardiovascular Catheterization Report Patient Name:     BARRY WRIGHT    Performing Physician:  Nikos Brady MD Study Date:       11/8/2024           Verifying Physician:   Nikos Brady MD MRN/PID:          [id number]            Cardiologist/Co-Scrub: Accession#:       [id number]        Ordering Provider:     Nikos RIOS                                                               NASIF Date of           1950 / 74      Cardiologist: Birth/Age:        years Gender:           M                   Fellow: Encounter#:       [id number]          Surgeon:  Study: Left Heart Cath  Indications: BARRY WRIGHT is a 74 year old male who presents with aortic stenosis, hypertension and a chest pain assessment of typical angina. Acute coronary syndrome - STEMI. Patient presented with chest pain happened after working in his backyard. He was cleaning leaves. Retrosternal no radiation. Subsequently he passed out in front of his wife. Then he woke up. EMS was called patient was brought into the emergency room. IN route patient EKG was showing inferior 1 mm ST elevation with ST depression lateral leads. Was taken emergently for cardiac catheterization.  Informed Consent: This patient's clinical condition presents an immediate threat to the life or health of the patient. This was an emergency procedure that, if not done immediately, could lead to serious physical or mental disability or death. Written consent was not able to be obtained from the patient or legal representative prior to the procedure.  Procedure Description: After infiltration with 2% Lidocaine, the right radial artery was cannulated with a modified Seldinger technique. Subsequently a 6 Bruneian sheath was placed in the right radial artery. Selective coronary catheterization was performed using a 5 Fr catheter(s) exchanged over a guide wire to cannulate the coronary arteries. Additional catheter(s) used to visualize the coronary arteries were: TIG 4.  Coronary Angiography: The coronary circulation is right dominant.  Left Main Coronary Artery: The left main has distal hazy lesion of 90%. LILY-3 flow in LAD circumflex and ramus.  Left Anterior Descending Coronary Artery Distribution: The left anterior descending artery has severe lesion in the proximal segment of 90%. LILY-3 flow. Diagonal 1 has moderate focal  lesion of 60% proximally. Diagonal 2 and mid distal LAD patent.  Circumflex Coronary Artery Distribution: Ostial left circumflex may be involved in the distal left main lesion. Difficult to rule out significant ostial disease. Provides high obtuse marginal 1 without significant coronary disease. Obtuse marginal 2 larger vessel without significant artery disease. LILY-3 flow. The proper left circumflex become very small and stop at the mid of the LV groove.  Right Coronary Artery Distribution: The right coronary artery is a right dominant vessel. Patent in the proximal and mid segment. Very tortuous vessel. Actually the RCA distally goes up toward the left AV groove and provides anomalous obtuse marginal 3. And then continues with PDA. At the turn of the distal RCA toward the left AV groove giving obtuse marginal 1. there is sharp bend and area of possible disease although unclear if it is all related to sharp band or there is significant 90% lesion. LILY-3 flow.  Left Ventriculography: The left ventricular end-diastolic pressure was mildly elevated at 17 mmHg. LV gram showed normal ejection fraction of 50% with mild apical wall hypokinesis. No gradient across the aortic valve.  Hemo Personnel: +----------------+---------+ Name            Duty      +----------------+---------+ Ale Brady MD 1 +----------------+---------+  Hemodynamic Pressures:  +----+-------------------+---------+------------+-------------+------+---------+ Site     Date Time       Phase    Systolic    Diastolic    ED  Mean mmHg                          Name       mmHg        mmHg      mmHg           +----+-------------------+---------+------------+-------------+------+---------+   AO  11/8/2024 4:37:42 AIR REST         117           81             98                      PM                                                  +----+-------------------+---------+------------+-------------+------+---------+    AO  11/8/2024 4:40:38 AIR REST         109           78             93                      PM                                                  +----+-------------------+---------+------------+-------------+------+---------+   LV  11/8/2024 4:44:50 AIR REST          34           28    30                               PM                                                  +----+-------------------+---------+------------+-------------+------+---------+   LV  11/8/2024 4:44:53 AIR REST          32           27    27                               PM                                                  +----+-------------------+---------+------------+-------------+------+---------+   LV  11/8/2024 4:45:47 AIR REST         138            0    13                               PM                                                  +----+-------------------+---------+------------+-------------+------+---------+   LV  11/8/2024 4:45:57 AIR REST         125            2    19                               PM                                                  +----+-------------------+---------+------------+-------------+------+---------+  LVp  11/8/2024 4:46:03 AIR REST         124            0    17                               PM                                                  +----+-------------------+---------+------------+-------------+------+---------+  AOp  11/8/2024 4:46:14 AIR REST         107          -72             77                      PM                                                  +----+-------------------+---------+------------+-------------+------+---------+   AO  11/8/2024 4:47:57 AIR REST           0            0             -5                      PM                                                  +----+-------------------+---------+------------+-------------+------+---------+   Cardiac Cath Post Procedure Notes: Post Procedure Diagnosis: Double vessel disease. Blood Loss:               Estimated blood loss during the procedure was 10 cc                           mls. Specimens Removed:        Number of specimen(s) removed: none. ____________________________________________________________________________________ CONCLUSIONS:  1. Distal left main hazy lesion of 90% that continues to michel proximal LAD. LILY-3 flow at the level of the left system.  2. 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 There is a bending area in the distal RCA before giving obtuse marginal 3 that could be significant.  3. Recommend coronary artery bypass surgery since patient has LILY-3 flow in all vessels and has severe three-vessel coronary disease. Currently chest pain-free. ICD 10 Codes: ST elevation (STEMI) myocardial infarction involving right coronary artery-I21.11  CPT Codes: Left Heart Cath (visualization of coronaries) and LV-39034; Moderate Sedation Services initial 15 minutes patient >5 years-99327  [id number] Ale Brady MD Performing Physician Electronically signed by [id number] Ale Brady MD on 11/11/2024 at 9:07:59 AM  ** Final **     CT chest wo IV contrast    Result Date: 11/11/2024  Interpreted By:  Williams Lyles, STUDY: CT CHEST WO IV CONTRAST; ;  11/9/2024 3:04 pm   INDICATION: Signs/Symptoms:preop CABG.     COMPARISON: None.   ACCESSION NUMBER(S): [id number]   ORDERING CLINICIAN: NELDA JENKINS   TECHNIQUE: Serial axial unenhanced CT images obtained of the chest with EKG gating. Images reformatted in the coronal and sagittal projection.   All CT examinations are performed with 1 or more of the following dose reduction techniques: Automated exposure control, adjustment of mA and/or kv according to patient's size, or use of iterative reconstruction techniques.   FINDINGS: Mediastinum demonstrates no lymphadenopathy. Scattered  subcentimeter lymph nodes are demonstrated in the paratracheal and prevascular locations esophagus is unremarkable   Heart and great vessels demonstrate mid ascending thoracic aorta to measure 3.3 cm. Ascending thoracic aorta demonstrates mild vascular calcification of the left coronary cusp. No significant vascular calcification of the ascending thoracic aorta. Mild vascular calcification of the aortic arch. Fluid in the subaortic recess demonstrated. No cardiomegaly.   Lung parenchyma demonstrates mild basilar dependent atelectasis with linear appearing scar in the right lower lobe. Also, subtle ground-glass airspace infiltrate in the anterior segment of the right upper lobe without airspace consolidation. Other scattered areas of subpleural ground-glass airspace infiltrate in the right upper lobe laterally. There is a subpleural linear appearing scar.   Included portions visualized abdomen are unremarkable   Visualized osseous structures demonstrate multilevel anterior osteophyte formation mid to lower thoracic spine with mild endplate irregularity in particular lower thoracic spine. No compression deformity noted.               1. No significant vascular calcification of the ascending thoracic aorta. No aneurysmal dilatation.   2. Subpleural ground-glass airspace infiltrate in the right upper lobe laterally may reflect sequela of postinfectious/postinflammatory etiology with mild linear appearing scar noted.       MACRO: None   Signed by: Williams Lyles 11/11/2024 8:16 AM Dictation workstation:   [id number]    Transthoracic Echo (TTE) Complete    Result Date: 11/9/2024           [place] [id number]            Phone [phone] TRANSTHORACIC ECHOCARDIOGRAM REPORT Patient Name:       BARRY WRIGHT     Reading Physician:    [id number] Thai Castro DO Study Date:         11/9/2024            Ordering  Provider:    [id number] KEVIN MARTIN MRN/PID:            [id number]             Fellow: Accession#:         [id number]         Nurse: Date of Birth/Age:  1950 / 74 years Sonographer:          Mireya Jordan RDCS Gender Assigned at                      Additional Staff: Birth: Height:             175.00 cm            Admit Date: Weight:             99.00 kg             Admission Status:     Inpatient -                                                                Routine BSA / BMI:          2.14 m2 / 32.33      Department Location:  [facility]                     kg/m2 Blood Pressure: 116 /82 mmHg Study Type:    TRANSTHORACIC ECHO (TTE) COMPLETE Diagnosis/ICD: ST elevation (STEMI) myocardial infarction involving other                coronary artery of inferior wall-I21.19 Indication:    stemi CPT Codes:     Echo Complete w Full Doppler-[id number] Patient History: BMI:               Obese >30 Pertinent History: Chest Pain and Syncope. stemi i.w. Study Detail: The following Echo studies were performed: 2D, M-Mode, Doppler and               color flow. Technically challenging study due to prominent lung               artifact and body habitus.  PHYSICIAN INTERPRETATION: Left Ventricle: Left ventricular ejection fraction is normal, by visual estimate at 60-65%. There are no regional wall motion abnormalities. The left ventricular cavity size is normal. There is normal septal and normal posterior left ventricular wall thickness. Spectral Doppler shows a Grade II (pseudonormal pattern) of left ventricular diastolic filling with an elevated left atrial pressure. Left Atrium: The left atrium is normal in size. Right Ventricle: The right ventricle is normal in size. There is normal right ventricular global systolic function. Right Atrium: The right atrium is normal in size. Aortic Valve: The aortic  valve is trileaflet. The aortic valve dimensionless index is 0.84. There is no evidence of aortic valve regurgitation. The peak instantaneous gradient of the aortic valve is 5 mmHg. The mean gradient of the aortic valve is 3 mmHg. Mitral Valve: The mitral valve is normal in structure. There is trace mitral valve regurgitation. Tricuspid Valve: The tricuspid valve is structurally normal. No evidence of tricuspid regurgitation. Pulmonic Valve: The pulmonic valve is not well visualized. The pulmonic valve regurgitation was not well visualized. Pericardium: No pericardial effusion noted. Aorta: The aortic root is normal.  CONCLUSIONS:  1. Left ventricular ejection fraction is normal, by visual estimate at 60-65%.  2. Spectral Doppler shows a Grade II (pseudonormal pattern) of left ventricular diastolic filling with an elevated left atrial pressure.  3. There is normal right ventricular global systolic function. QUANTITATIVE DATA SUMMARY:  2D MEASUREMENTS:           Normal Ranges: LAs:             3.00 cm   (2.7-4.0cm) IVSd:            0.65 cm   (0.6-1.1cm) LVPWd:           0.84 cm   (0.6-1.1cm) LVIDd:           5.18 cm   (3.9-5.9cm) LV Mass Index:   61.8 g/m2  LA VOLUME:                    Normal Ranges: LA Vol A4C:        26.3 ml    (22+/-6mL/m2) LA Vol A2C:        25.4 ml LA Vol BP:         25.9 ml LA Vol Index A4C:  12.3ml/m2 LA Vol Index A2C:  11.9 ml/m2 LA Vol Index BP:   12.1 ml/m2 LA Area A4C:       12.8 cm2 LA Area A2C:       12.6 cm2 LA Major Axis A4C: 5.3 cm LA Major Axis A2C: 5.3 cm LA Volume Index:   11.5 ml/m2 LA Vol A4C:        26.7 ml LA Vol A2C:        22.8 ml LA Vol Index BSA:  11.6 ml/m2  LV SYSTOLIC FUNCTION BY 2D PLANIMETRY (MOD):                      Normal Ranges: EF-A4C View:    55 % (>=55%) EF-A2C View:    58 % EF-Biplane:     58 % EF-Visual:      63 % LV EF Reported: 63 %  LV DIASTOLIC FUNCTION:           Normal Ranges: MV Peak E:             0.63 m/s  (0.7-1.2 m/s) MV Peak A:             0.52  m/s  (0.42-0.7 m/s) E/A Ratio:             1.20      (1.0-2.2) MV e'                  0.075 m/s (>8.0) MV lateral e'          0.07 m/s MV medial e'           0.08 m/s E/e' Ratio:            8.45      (<8.0) a'                     0.07 m/s  MITRAL VALVE:          Normal Ranges: MV DT:        246 msec (150-240msec)  AORTIC VALVE:                     Normal Ranges: AoV Vmax:                1.17 m/s (<=1.7m/s) AoV Peak P.5 mmHg (<20mmHg) AoV Mean PG:             3.0 mmHg (1.7-11.5mmHg) LVOT Max Edmond:            1.03 m/s (<=1.1m/s) AoV VTI:                 24.90 cm (18-25cm) LVOT VTI:                20.80 cm LVOT Diameter:           2.00 cm  (1.8-2.4cm) AoV Area, VTI:           2.62 cm2 (2.5-5.5cm2) AoV Area,Vmax:           2.77 cm2 (2.5-4.5cm2) AoV Dimensionless Index: 0.84  RIGHT VENTRICLE: TAPSE: 24.9 mm RV s'  0.12 m/s  TRICUSPID VALVE/RVSP:         Normal Ranges: IVC Diam:             1.69 cm  PULMONIC VALVE:          Normal Ranges: PV Accel Time:  92 msec  (>120ms) PV Max Edmond:     1.0 m/s  (0.6-0.9m/s) PV Max P.2 mmHg  [id number] Thai Castro DO Electronically signed on 2024 at 12:27:27 PM  ** Final **     XR chest 1 view    Result Date: 2024  Interpreted By:  Kristopher Mariano, STUDY: XR CHEST 1 VIEW;  2024 4:21 pm   INDICATION: Signs/Symptoms:STEMI.   COMPARISON: None.   ACCESSION NUMBER(S): [id number]   ORDERING CLINICIAN: NATALIE PINA   FINDINGS:     CARDIOMEDIASTINAL SILHOUETTE: Cardiomediastinal silhouette is normal in size and configuration.   LUNGS: No consolidation, pleural effusion or pneumothorax. Mild interstitial prominence.   ABDOMEN: No remarkable upper abdominal findings.   BONES: Multilevel degenerative changes of the spine.       Mild interstitial prominence could be chronic or relate to component developing interstitial edema. Correlate clinically.   MACRO: None   Signed by: Kristopher Mariano 2024 4:23 PM Dictation workstation:   [id number]       LDA:  Bridgette  Wires (Active)   Placement Date/Time: 11/12/24 2320   Placed by: Dr. Aldo Harman  Type of Pacing Wires: Ventricular   Number of days: 15     NUTRITION: Adult diet Regular  EMERGENCY CONTACT: Extended Emergency Contact Information  Primary Emergency Contact: Mireya Dominguez  Address: [address] [place] [address] [facility]  Home Phone: [phone]  Mobile Phone: [phone]  Relation: Spouse  Secondary Emergency Contact: Casa Dominguez  Mobile Phone: [phone]  Relation: Son   needed? No  CODE STATUS: Full Code  DISPO: Discharge Planning  Living Arrangements: Spouse/significant other  Support Systems: Spouse/significant other  Assistance Needed: IPTA  Type of Residence: Private residence  Number of Stairs to Enter Residence: 4  Do you have animals or pets at home?: No  Who is requesting discharge planning?: Provider  Home or Post Acute Services: Post acute facilities (Rehab/SNF/etc)  Type of Post Acute Facility Services: Rehab  Type of Home Care Services: Other (Comment)  Expected Discharge Disposition: Inpatient Rehab Facility (IRF)  Does the patient need discharge transport arranged?: No (If home = no / If to post acute facility = yes)  AMPAC: Daily Activity - Total Score: 20    FOLLOWUP:   Future Appointments   Date Time Provider Department Center   12/30/2024  1:00 PM Rodrigo Harman MD [facility]

## 2024-11-28 NOTE — CARE PLAN
Problem: Skin  Goal: Participates in plan/prevention/treatment measures  Outcome: Progressing  Goal: Prevent/manage excess moisture  Outcome: Progressing  Goal: Prevent/minimize sheer/friction injuries  Outcome: Progressing  Goal: Promote/optimize nutrition  Outcome: Progressing     Problem: Discharge Planning  Goal: Discharge to home or other facility with appropriate resources  Outcome: Progressing     Problem: Chronic Conditions and Co-morbidities  Goal: Patient's chronic conditions and co-morbidity symptoms are monitored and maintained or improved  Outcome: Progressing     Problem: Fall/Injury  Goal: Not fall by end of shift  Outcome: Progressing  Goal: Be free from injury by end of the shift  Outcome: Progressing  Goal: Verbalize understanding of personal risk factors for fall in the hospital  Outcome: Progressing  Goal: Verbalize understanding of risk factor reduction measures to prevent injury from fall in the home  Outcome: Progressing  Goal: Use assistive devices by end of the shift  Outcome: Progressing  Goal: Pace activities to prevent fatigue by end of the shift  Outcome: Progressing     Problem: Pain  Goal: Takes deep breaths with improved pain control throughout the shift  Outcome: Progressing  Goal: Turns in bed with improved pain control throughout the shift  Outcome: Progressing  Goal: Walks with improved pain control throughout the shift  Outcome: Progressing  Goal: Performs ADL's with improved pain control throughout shift  Outcome: Progressing  Goal: Participates in PT with improved pain control throughout the shift  Outcome: Progressing  Goal: Free from opioid side effects throughout the shift  Outcome: Progressing  Goal: Free from acute confusion related to pain meds throughout the shift  Outcome: Progressing       The clinical goals for the shift include Patient will report adequate pain control and increase ambulation throughout shift.  Pattie Fuchs RN

## 2024-11-29 ENCOUNTER — APPOINTMENT (OUTPATIENT)
Dept: RADIOLOGY | Facility: HOSPITAL | Age: 74
DRG: 003 | End: 2024-11-29
Payer: MEDICARE

## 2024-11-29 LAB
ALBUMIN SERPL BCP-MCNC: 3.3 G/DL (ref 3.4–5)
ANION GAP SERPL CALC-SCNC: 10 MMOL/L (ref 10–20)
BUN SERPL-MCNC: 18 MG/DL (ref 6–23)
CALCIUM SERPL-MCNC: 8 MG/DL (ref 8.6–10.6)
CHLORIDE SERPL-SCNC: 106 MMOL/L (ref 98–107)
CO2 SERPL-SCNC: 24 MMOL/L (ref 21–32)
CREAT SERPL-MCNC: 0.74 MG/DL (ref 0.5–1.3)
EGFRCR SERPLBLD CKD-EPI 2021: >90 ML/MIN/1.73M*2
ERYTHROCYTE [DISTWIDTH] IN BLOOD BY AUTOMATED COUNT: 17.3 % (ref 11.5–14.5)
GLUCOSE SERPL-MCNC: 81 MG/DL (ref 74–99)
HCT VFR BLD AUTO: 28.7 % (ref 41–52)
HGB BLD-MCNC: 8.8 G/DL (ref 13.5–17.5)
MAGNESIUM SERPL-MCNC: 2.46 MG/DL (ref 1.6–2.4)
MCH RBC QN AUTO: 29.2 PG (ref 26–34)
MCHC RBC AUTO-ENTMCNC: 30.7 G/DL (ref 32–36)
MCV RBC AUTO: 95 FL (ref 80–100)
NRBC BLD-RTO: 0 /100 WBCS (ref 0–0)
PHOSPHATE SERPL-MCNC: 3.6 MG/DL (ref 2.5–4.9)
PLATELET # BLD AUTO: 621 X10*3/UL (ref 150–450)
POTASSIUM SERPL-SCNC: 3.8 MMOL/L (ref 3.5–5.3)
RBC # BLD AUTO: 3.01 X10*6/UL (ref 4.5–5.9)
SODIUM SERPL-SCNC: 136 MMOL/L (ref 136–145)
WBC # BLD AUTO: 10.5 X10*3/UL (ref 4.4–11.3)

## 2024-11-29 PROCEDURE — 97116 GAIT TRAINING THERAPY: CPT | Mod: GP,CQ

## 2024-11-29 PROCEDURE — 94660 CPAP INITIATION&MGMT: CPT

## 2024-11-29 PROCEDURE — 2500000001 HC RX 250 WO HCPCS SELF ADMINISTERED DRUGS (ALT 637 FOR MEDICARE OP): Performed by: NURSE PRACTITIONER

## 2024-11-29 PROCEDURE — 2500000004 HC RX 250 GENERAL PHARMACY W/ HCPCS (ALT 636 FOR OP/ED)

## 2024-11-29 PROCEDURE — 71045 X-RAY EXAM CHEST 1 VIEW: CPT

## 2024-11-29 PROCEDURE — 84100 ASSAY OF PHOSPHORUS: CPT | Performed by: NURSE PRACTITIONER

## 2024-11-29 PROCEDURE — 97530 THERAPEUTIC ACTIVITIES: CPT | Mod: GP,CQ

## 2024-11-29 PROCEDURE — 1200000002 HC GENERAL ROOM WITH TELEMETRY DAILY

## 2024-11-29 PROCEDURE — 85027 COMPLETE CBC AUTOMATED: CPT | Performed by: NURSE PRACTITIONER

## 2024-11-29 PROCEDURE — 97535 SELF CARE MNGMENT TRAINING: CPT | Mod: GO

## 2024-11-29 PROCEDURE — 2500000002 HC RX 250 W HCPCS SELF ADMINISTERED DRUGS (ALT 637 FOR MEDICARE OP, ALT 636 FOR OP/ED): Performed by: NURSE PRACTITIONER

## 2024-11-29 PROCEDURE — 71045 X-RAY EXAM CHEST 1 VIEW: CPT | Performed by: RADIOLOGY

## 2024-11-29 PROCEDURE — 83735 ASSAY OF MAGNESIUM: CPT | Performed by: NURSE PRACTITIONER

## 2024-11-29 PROCEDURE — 2500000004 HC RX 250 GENERAL PHARMACY W/ HCPCS (ALT 636 FOR OP/ED): Performed by: NURSE PRACTITIONER

## 2024-11-29 PROCEDURE — 99232 SBSQ HOSP IP/OBS MODERATE 35: CPT

## 2024-11-29 RX ORDER — FUROSEMIDE 10 MG/ML
40 INJECTION INTRAMUSCULAR; INTRAVENOUS ONCE
Status: COMPLETED | OUTPATIENT
Start: 2024-11-29 | End: 2024-11-29

## 2024-11-29 RX ORDER — FUROSEMIDE 10 MG/ML
20 INJECTION INTRAMUSCULAR; INTRAVENOUS ONCE
Status: COMPLETED | OUTPATIENT
Start: 2024-11-29 | End: 2024-11-29

## 2024-11-29 RX ADMIN — ACETAMINOPHEN 975 MG: 325 TABLET, FILM COATED ORAL at 21:46

## 2024-11-29 RX ADMIN — SPIRONOLACTONE 25 MG: 25 TABLET, FILM COATED ORAL at 09:51

## 2024-11-29 RX ADMIN — TICAGRELOR 90 MG: 90 TABLET ORAL at 09:50

## 2024-11-29 RX ADMIN — ASPIRIN 81 MG: 81 TABLET, COATED ORAL at 09:50

## 2024-11-29 RX ADMIN — HEPARIN SODIUM 5000 UNITS: 5000 INJECTION INTRAVENOUS; SUBCUTANEOUS at 17:19

## 2024-11-29 RX ADMIN — HEPARIN SODIUM 5000 UNITS: 5000 INJECTION INTRAVENOUS; SUBCUTANEOUS at 01:16

## 2024-11-29 RX ADMIN — METOPROLOL SUCCINATE 25 MG: 25 TABLET, EXTENDED RELEASE ORAL at 09:51

## 2024-11-29 RX ADMIN — FUROSEMIDE 20 MG: 10 INJECTION, SOLUTION INTRAVENOUS at 17:18

## 2024-11-29 RX ADMIN — POLYSACCHARIDE-IRON COMPLEX 150 MG: 150 CAPSULE ORAL at 09:51

## 2024-11-29 RX ADMIN — Medication 10 MG: at 21:46

## 2024-11-29 RX ADMIN — FUROSEMIDE 40 MG: 10 INJECTION, SOLUTION INTRAMUSCULAR; INTRAVENOUS at 12:52

## 2024-11-29 RX ADMIN — HEPARIN SODIUM 5000 UNITS: 5000 INJECTION INTRAVENOUS; SUBCUTANEOUS at 09:50

## 2024-11-29 RX ADMIN — ACETAMINOPHEN 975 MG: 325 TABLET, FILM COATED ORAL at 05:29

## 2024-11-29 RX ADMIN — EMPAGLIFLOZIN 10 MG: 10 TABLET, FILM COATED ORAL at 09:51

## 2024-11-29 RX ADMIN — Medication 1 TABLET: at 09:50

## 2024-11-29 RX ADMIN — TICAGRELOR 90 MG: 90 TABLET ORAL at 21:46

## 2024-11-29 RX ADMIN — ACETAMINOPHEN 975 MG: 325 TABLET, FILM COATED ORAL at 14:09

## 2024-11-29 RX ADMIN — ATORVASTATIN CALCIUM 80 MG: 80 TABLET, FILM COATED ORAL at 21:46

## 2024-11-29 RX ADMIN — AMIODARONE HYDROCHLORIDE 200 MG: 200 TABLET ORAL at 09:51

## 2024-11-29 ASSESSMENT — COGNITIVE AND FUNCTIONAL STATUS - GENERAL
PERSONAL GROOMING: A LITTLE
TOILETING: A LITTLE
WALKING IN HOSPITAL ROOM: A LITTLE
TOILETING: A LITTLE
HELP NEEDED FOR BATHING: A LITTLE
STANDING UP FROM CHAIR USING ARMS: A LITTLE
TURNING FROM BACK TO SIDE WHILE IN FLAT BAD: A LOT
MOBILITY SCORE: 17
DAILY ACTIVITIY SCORE: 19
CLIMB 3 TO 5 STEPS WITH RAILING: A LOT
MOVING FROM LYING ON BACK TO SITTING ON SIDE OF FLAT BED WITH BEDRAILS: A LITTLE
DRESSING REGULAR LOWER BODY CLOTHING: A LITTLE
TURNING FROM BACK TO SIDE WHILE IN FLAT BAD: A LITTLE
DRESSING REGULAR UPPER BODY CLOTHING: A LITTLE
MOBILITY SCORE: 19
MOVING TO AND FROM BED TO CHAIR: A LITTLE
HELP NEEDED FOR BATHING: A LITTLE
WALKING IN HOSPITAL ROOM: A LITTLE
PERSONAL GROOMING: A LITTLE
DAILY ACTIVITIY SCORE: 18
DRESSING REGULAR LOWER BODY CLOTHING: A LITTLE
MOVING TO AND FROM BED TO CHAIR: A LITTLE
EATING MEALS: A LITTLE
CLIMB 3 TO 5 STEPS WITH RAILING: A LITTLE
DRESSING REGULAR UPPER BODY CLOTHING: A LITTLE

## 2024-11-29 ASSESSMENT — ACTIVITIES OF DAILY LIVING (ADL): HOME_MANAGEMENT_TIME_ENTRY: 33

## 2024-11-29 ASSESSMENT — PAIN - FUNCTIONAL ASSESSMENT
PAIN_FUNCTIONAL_ASSESSMENT: 0-10

## 2024-11-29 ASSESSMENT — PAIN SCALES - GENERAL
PAINLEVEL_OUTOF10: 0 - NO PAIN

## 2024-11-29 NOTE — PROGRESS NOTES
Occupational Therapy    Occupational Therapy Treatment    Name: Jaime Dominguez  MRN: [id number]  : 1950  Date: 24  Room: [address] [place] [address]      Time Calculation  Start Time: 1023  Stop Time: 1056  Time Calculation (min): 33 min    Assessment:  OT Assessment: Pt demos deficits in activity tolerance and strength resulting in the need for increased assist w/ I/ADLs and the continued need for skilled OT services at High intensity.  Prognosis: Excellent  Barriers to Discharge: None  Evaluation/Treatment Tolerance: Patient tolerated treatment well  End of Session Communication: Bedside nurse  End of Session Patient Position: Up in chair, Alarm off, not on at start of session  Plan:  Treatment Interventions: ADL retraining, Functional transfer training, UE strengthening/ROM, Neuromuscular reeducation, Compensatory technique education, Fine motor coordination activities  OT Frequency: 3 times per week  OT Discharge Recommendations: High intensity level of continued care  Equipment Recommended upon Discharge: Wheeled walker  OT Recommended Transfer Status: Minimal assist  OT - OK to Discharge: Yes    Subjective   General:  OT Last Visit  OT Received On: 24  Reason for Referral: s/p PCI of mid LAD with HEATHER. Pt cannulated on VA ECMO, decannulated , IABP removed   Past Medical History Relevant to Rehab: basal cell carcinoma and HLD  Prior to Session Communication: Bedside nurse  Patient Position Received: Up in chair, Alarm off, not on at start of session  General Comment: Pt seated in chair on approach. Pleasant and agreeable to OT session   Precautions:  Medical Precautions: Cardiac precautions, Fall precautions  Post-Surgical Precautions: Move in the Tube  Vitals:  Vital Signs (Past 2hrs)        Date/Time Vitals Session Patient Position Pulse Resp SpO2 BP MAP (mmHg)    24 1012 --  --  84  20  97 %  95/61  --                   Lines/Tubes/Drains:  Pacer Wires (Active)   Number of days: 16        Cognition:  Overall Cognitive Status: Within Functional Limits    Pain Assessment:  Pain Assessment  Pain Assessment: 0-10  0-10 (Numeric) Pain Score: 0 - No pain     Objective   Activities of Daily Living:     LE Dressing  LE Dressing: Yes  LE Dressing Adaptive Equipment: Reacher, Sock aide  Pants Level of Assistance: Setup  Sock Level of Assistance: Setup  LE Dressing Where Assessed: Chair  LE Dressing Comments: Intro and edu on use of AE for LB dressing. Pt able to return demo w/ extended time and rest breaks d/t SOB.    Outcome Measures:  [facility] Daily Activity  Putting on and taking off regular lower body clothing: A little  Bathing (including washing, rinsing, drying): A little  Putting on and taking off regular upper body clothing: A little  Toileting, which includes using toilet, bedpan or urinal: A little  Taking care of personal grooming such as brushing teeth: A little  Eating Meals: None  Daily Activity - Total Score: 19     Education Documentation  Body Mechanics, taught by Jordyn Preston OT at 11/29/2024 11:23 AM.  Learner: Patient  Readiness: Acceptance  Method: Explanation  Response: Verbalizes Understanding    Precautions, taught by Jordyn Preston OT at 11/29/2024 11:23 AM.  Learner: Patient  Readiness: Acceptance  Method: Explanation  Response: Verbalizes Understanding    ADL Training, taught by Jordyn Preston OT at 11/29/2024 11:23 AM.  Learner: Patient  Readiness: Acceptance  Method: Explanation  Response: Verbalizes Understanding    Education Comments  No comments found.        Goals:  Encounter Problems       Encounter Problems (Active)       ADLs       Patient will complete grooming tasks with Sup in order to maximize functional Indep with task completion.        Start:  11/18/24    Expected End:  12/05/24               ADLs       Pt will demonstrate increased independence by completing LB dressing at EOB with min A (Progressing)       Start:  11/21/24    Expected End:  12/05/24             Pt will demonstrate increased ADL independence by completing toileting routine with min A and use of least restrictive mobility device       Start:  11/21/24    Expected End:  12/05/24               BALANCE       Pt will demonstrate increased balance as evidenced by sustaining dynamic standing while completing self-care tasks with modified independence and use of least restrictive mobility device       Start:  11/21/24    Expected End:  12/05/24               COGNITION/SAFETY       Patient will use ICU/hospital diary with independent level of assistance to allow improved recall of hospital events.       Start:  11/18/24    Expected End:  12/05/24               COGNITION/SAFETY       Pt will demonstrate increased safety awareness by maintaining MITT precautions while completing bed mobility with modified independence  (Progressing)       Start:  11/21/24    Expected End:  12/05/24               EXERCISE/STRENGTHENING       Pt will increase endurance to tolerate 15-20min of activity with no more than 1 rest break in order to increase ability to engage in ADL completion.  (Progressing)       Start:  11/18/24    Expected End:  12/05/24 11/29/24 at 11:24 AM   CAESAR NERI, OT   [phone]

## 2024-11-29 NOTE — PROGRESS NOTES
CARDIAC SURGERY DAILY PROGRESS NOTE    Jaime Dominguez is a 74 y.o. male, with a PMH of basal cell carcinoma and HLD who presented with STEMI to an OSH and transferred to [facility] (formerly [facility]3] on 11/10/2024 for CABG workup. S/p MidCAB x2 converted to full sternotomy w/ LIMA prolonged as a Y graft with a radial to LAD OM w/ Dr. Aldo Harman and Dr. Stanley 11/12. Postop course c/b increasing pressor requirement and echo revealing apical hypo-akinesis with subsequent IABP placement on 11/14. Overnight on 11/14, Troponin rise and ST elevation in anteroseptal leads, c/f ACS- went for LHC, underwent PCI of mid LAD with HEATHER. Cannulated for VA ECMO 11/14 for cardiogenic shock 2/2 multiple failed grafts. On 11/19, returned to OR for VA ECMO decannulation and left hemothorax washout. IABP removed 11/20. Transferred to floor 11/25.     OPERATION/PROCEDURE: 11/12 with Rodrigo Harman MD  1.  Mini invasive robotic assisted CABG x 2 converted to full sternotomy.  2.  CABG x 2 with LIMA  prolonged as a Y graft with a radial to LAD OM.  3.  Endoscopic radial harvest.  4.  Left femoral artery and vein cannulation for peripheral bypass.  5.  Partial LAD endarterectomy    CTICU Course: see above    Transferred to T3: on 11/25.    =======================================  Interval History:   Shortness of breath     SUBJECTIVE:  Patient seen and examined today. C/o SOB overnight and currently. CXR from today reviewed with improvements. Will provide diuresis today    Objective   BP 96/69 (BP Location: Left arm, Patient Position: Sitting)   Pulse 78   Temp 36.1 °C (97 °F) (Temporal)   Resp 20   Ht 1.829 m (6')   Wt 90.9 kg (200 lb 6.4 oz)   SpO2 99%   BMI 27.18 kg/m²   0-10 (Numeric) Pain Score: 0 - No pain   3 Day Weight Change: -2.9 kg (-6 lb 6.3 oz) per day    Intake and Output    Intake/Output Summary (Last 24 hours) at 11/29/2024 1710  Last data filed at 11/29/2024 1340  Gross per 24 hour   Intake 360 ml   Output 1650 ml    Net -1290 ml       Physical Exam  Physical Exam  Vitals reviewed.   Constitutional:       General: He is not in acute distress.     Appearance: Normal appearance. He is not ill-appearing.      Comments: Laying in bed    HENT:      Head: Normocephalic and atraumatic.      Nose: Nose normal.      Mouth/Throat:      Mouth: Mucous membranes are dry.   Eyes:      General: Lids are normal.      Conjunctiva/sclera: Conjunctivae normal.   Cardiovascular:      Rate and Rhythm: Normal rate and regular rhythm.      Pulses: Normal pulses.      Comments: Tele - NSR rate 80s   V wires set VVI @ 50, mA9    Pulmonary:      Effort: Pulmonary effort is normal.      Breath sounds: Normal breath sounds.      Comments: On room air with appropriate oxygenation   Short of breath with minimal movement  Abdominal:      General: Abdomen is flat.      Palpations: Abdomen is soft.      Comments: Last BM 11/28  Passing gas    Musculoskeletal:         General: Normal range of motion.      Cervical back: Normal range of motion.      Right lower leg: Edema present.      Left lower leg: Edema present.      Comments: Moving all extremities appropriately with equal strength.   Pitting edema of lower extremities, near graft site. Improved with diuresis.   Skin:     General: Skin is warm and dry.      Capillary Refill: Capillary refill takes less than 2 seconds.      Comments: Left chest and mid sternum incision dry, no erythema, no drainage and well approximated. Left groin suture noted to be in place, bruising noted and some firmness to palpation noted. Per patient left groin feels better than previously   Neurological:      General: No focal deficit present.      Mental Status: He is alert and oriented to person, place, and time.   Psychiatric:         Mood and Affect: Mood normal.         Behavior: Behavior normal. Behavior is cooperative.       Medications  Scheduled medications  acetaminophen, 975 mg, oral, q8h POWER  amiodarone, 200 mg, oral,  Daily  aspirin, 81 mg, oral, Daily  atorvastatin, 80 mg, oral, Nightly  empagliflozin, 10 mg, oral, Daily  furosemide, 20 mg, intravenous, Once  heparin, 5,000 Units, subcutaneous, q8h  iron polysaccharides, 150 mg, oral, Daily  melatonin, 10 mg, oral, Nightly  metoprolol succinate XL, 25 mg, oral, Daily  multivitamin with minerals, 1 tablet, oral, Daily  polyethylene glycol, 17 g, oral, BID  sennosides-docusate sodium, 2 tablet, oral, BID  spironolactone, 25 mg, oral, Daily  ticagrelor, 90 mg, oral, BID    Continuous medications   PRN medications  PRN medications: benzocaine-menthol, bisacodyl, naloxone, naloxone, ondansetron **OR** ondansetron, oxygen, sodium chloride, white petrolatum    Labs  Results for orders placed or performed during the hospital encounter of 11/10/24 (from the past 24 hours)   Magnesium   Result Value Ref Range    Magnesium 2.46 (H) 1.60 - 2.40 mg/dL   CBC   Result Value Ref Range    WBC 10.5 4.4 - 11.3 x10*3/uL    nRBC 0.0 0.0 - 0.0 /100 WBCs    RBC 3.01 (L) 4.50 - 5.90 x10*6/uL    Hemoglobin 8.8 (L) 13.5 - 17.5 g/dL    Hematocrit 28.7 (L) 41.0 - 52.0 %    MCV 95 80 - 100 fL    MCH 29.2 26.0 - 34.0 pg    MCHC 30.7 (L) 32.0 - 36.0 g/dL    RDW 17.3 (H) 11.5 - 14.5 %    Platelets 621 (H) 150 - 450 x10*3/uL   Renal Function Panel   Result Value Ref Range    Glucose 81 74 - 99 mg/dL    Sodium 136 136 - 145 mmol/L    Potassium 3.8 3.5 - 5.3 mmol/L    Chloride 106 98 - 107 mmol/L    Bicarbonate 24 21 - 32 mmol/L    Anion Gap 10 10 - 20 mmol/L    Urea Nitrogen 18 6 - 23 mg/dL    Creatinine 0.74 0.50 - 1.30 mg/dL    eGFR >90 >60 mL/min/1.73m*2    Calcium 8.0 (L) 8.6 - 10.6 mg/dL    Phosphorus 3.6 2.5 - 4.9 mg/dL    Albumin 3.3 (L) 3.4 - 5.0 g/dL     =========================  IMAGING/ DIAGNOSTIC TESTING:  ========================    11/28/24 1V CXR  Pending    11/26/24 XR CHEST 2 VIEWS:  IMPRESSION:  1.  Stable loculated left-sided pleural effusions-hematoma.  2. Similar bibasilar presumed  atelectasis with small bilateral  pleural effusions, left greater than right.  3. No pneumothorax.     11/27/24 TRANSTHORACIC ECHOCARDIOGRAM REPORT:  CONCLUSIONS:   1. Poorly visualized anatomical structures due to suboptimal image quality.   2. The left ventricle was not well visualized.   3. Left ventricular ejection fraction is suspected mildly decreased, by visual estimate at 45%.   4. Spectral Doppler shows a Grade I (impaired relaxation pattern) of left ventricular diastolic filling with normal left atrial filling pressure.   5. Abnormal septal motion consistent with post-operative status.   6. There is normal right ventricular global systolic function.    ================  IMPRESSION & PLAN:  ===============    POD #17 s/p midCAB x2 converted to full sternotomy on 11/12 with s/p PCI of mid LAD with HEATHER on 11/14    - VA ECMO 11/14-11/19  - IABP 11/14-11/20.  - Increase activity/ ambulation; PT/OT  - Encourage IS, C/DB; respiratory therapy; wean O2 as josué   - Cardiac rehab referral   - Continue cardiac meds: ASA, statin   - Continue DAPT (brilinta+ASA) for LAD HEATHER stent   - Pain and anticonstipation meds  - 2v CXR completed 11/26  - Postop echo completed 11/26 (EF 45%)   - Cut epicardial wires prior to discharge   - Tele until discharge  - Optimize nutrition and electrolytes    Rhythm,   - Previous afib   - Tele: NSR rate 80s   - Continue amio to 200mg daily  - Continue metoprolol 25mg BID (started 11/27)   - Adjust medications as tolerated    Cardiomyopathy   - appreciate heart failure consult, signed off on 11/28  - HF recommendations on 11/28:  - Diuresis: Continue diuresis per primary team.  - GDMT:   - SGLT-2i: c/w jardiance 10mg daily  - Beta-blocker: c/w metoprolol XL 25mg daily.   - ACE-I/ARB/ARNI: Will consider low dose entresto once SBP >120 mmHg. If that happens when he is inpatient, can start 12/13 mg BID entresto before discharge otherwise defer to OP initiation.               - MRA: c/w aldactone  25mg daily   - Should follow-up with heart failure specialist Dr. Gann after discharge, depart updated.   - AICD/CRT-D/Lifevest: As EF 45%, not indicated.   - Strict I/Os, Daily Na < 2g daily, fluid restriction.   -HF service will sign-off at this time.   - Continue jardiance 10mg daily  - Continue spironolactone 25mg daily  - Continue metoprolol 25mg BID     HLD: Home meds: atorvastatin 40mg daily   - Continue atorvastatin 80mg daily     Acute Blood Loss Anemia - Improving  Recent Labs     24  0720 24  0615 24  0643 24  0705 24  0111 24  1715 24  0114   HGB 8.8* 8.7* 8.4* 8.3* 8.3* 8.9* 8.4*   HCT 28.7* 27.5* 26.9* 26.0* 25.5* 27.5* 26.1*   - MV, PO Iron x1mo  - Daily labs, transfuse as indicated    Volume/Electrolyte Status: Preop wt Weight: 96.2 kg (212 lb)   Vitals:    24 0537   Weight: 90.9 kg (200 lb 6.4 oz)   - Pre-op weight 94kg,  90 kg  - Adjust diuresis as needed for postop cardiac surgery hypervolemia  - Replete electrolytes for hypokalemia/hypomagnesemia/hypophosphatemia as needed  - Daily weights and strict I&Os  - Daily RFP while admitted  -  Lasix 40 mg IV x2  -  Lasix 40 mg IV now and Lasix 20 mg IV at 1800 today, reassess need for more diuresis in am    Leukocytosis - Resolved  Recent Labs     24  0720 24  0615 24  0643 24  0705 24  0111 24  1715 24  0114   WBC 10.5 11.6* 12.7* 14.6* 18.6* 21.1* 21.3*     Temp (36hrs), Av.3 °C (97.4 °F), Min:36 °C (96.8 °F), Max:36.9 °C (98.4 °F)  - Cx data negative. Completed 7 day empiric course of zosyn  - aggressive pulmonary hygiene  - monitor for s/s infection  - daily CBC to follow    Left hemothorax s/p washout ; persisting loculated left pleural effusion, stable   - Patient currently on RA and in no acute respiratory distress   - monitor on serial CXR. Ordered for tomorrow  -  Improvement, will repeat CXR in AM    VTE Prophylaxis:  SCDs/TEDs, ambulation, SQ heparin  Code Status: Full Code    Dispo  - PT/OT recs High intensity level of continued care   - Would benefit from homecare RN for cardiac surgery carepath  - Anticipate discharge by weekend/Monday pending diuresis, heart failure optimization, acute rehab precerpt  - Will continue to assess discharge needs    EUGENIA White-CNP  Cardiac Surgery [facility]  [facility]  Team Phone [phone]    11/29/2024  5:10 PM

## 2024-11-29 NOTE — PROGRESS NOTES
Physical Therapy Treatment    Patient Name: Jaime Dominguez  MRN: [id number]  Today's Date: 11/29/2024  Room: [address] [place], [place] [address]  Time Calculation  Start Time: 0855  Stop Time: 0950  Time Calculation (min): 55 min       Assessment/Plan   PT Assessment  PT Assessment Results: Decreased strength, Decreased endurance, Impaired balance  Rehab Prognosis: Excellent  Barriers to Discharge: Medical acuity  Evaluation/Treatment Tolerance: Patient tolerated treatment well  Medical Staff Made Aware: Yes  Strengths: Ability to acquire knowledge, Attitude of self, Premorbid level of function, Rehab experience, Support and attitude of living partners, Support of extended family/friends, Leisure activity  End of Session Communication: Bedside nurse  PT Plan  Inpatient/Swing Bed or Outpatient: Inpatient  PT Plan  Treatment/Interventions: Bed mobility, Transfer training, Gait training, Stair training, Balance training, Strengthening, Endurance training, Therapeutic exercise, Therapeutic activity  PT Plan: Ongoing PT  PT Frequency: 5 times per week  PT Discharge Recommendations: High intensity level of continued care  Equipment Recommended upon Discharge: Wheeled walker  PT Recommended Transfer Status: Assist x2  PT - OK to Discharge: Yes  Assessment: Patient is progressing Well with therapy this date. Pt. Completed ambulation and balance activities with good tolerance, requiring increased time due to breathing difficulties and needed to catch breath. Vitals remained stable and no LOB noted. Would continue to benefit from continued skilled PT to address all mobility deficits; remains appropriate for HIGH intensity therapy when medically appropriate for discharge from acute stay.  Will continue to follow.     General Visit Information:   PT  Visit  PT Received On: 11/29/24  Response to Previous Treatment: Patient with no complaints from previous session.  Prior to Session Communication: Bedside nurse  Patient Position Received: Up in chair,  Alarm off, not on at start of session  Family/Caregiver Present: No     Subjective   Subjective: pt. Reports feeling tired, but is in good spirits and willing to participate in PT session.   Precautions:  Precautions  Medical Precautions: Cardiac precautions, Fall precautions  Post-Surgical Precautions: Move in the Tube  Vital Signs:  Vital Signs (Past 2hrs)        Date/Time Vitals Session Patient Position Pulse Resp SpO2 BP MAP (mmHg)    11/29/24 1012 --  --  84  20  97 %  95/61  7                     Objective   Pain:  Pain Assessment  Pain Assessment: 0-10  0-10 (Numeric) Pain Score: 0 - No pain  Cognition:  Cognition  Overall Cognitive Status: Within Functional Limits  Balance/Neuromuscular Re-Education  Balance/Neuromuscular Re-Education Activity Performed: Yes  Balance/Neuromuscular Re-Education Activity 1: top taps onto foot stool alt duane LE x 15 each (using fww for support duane UE)  Static Sitting balance:  Static Sitting Balance  Static Sitting-Balance Support: Feet supported  Static Sitting-Level of Assistance: Independent  Static Standing Balance:  Static Standing Balance  Static Standing-Balance Support: Bilateral upper extremity supported  Static Standing-Level of Assistance: Close supervision  Dynamic Sitting Balance:  Dynamic Sitting Balance  Dynamic Sitting-Balance Support: Feet supported  Dynamic Sitting-Level of Assistance: Independent  Dynamic Sitting-Balance: Reaching for objects  Dynamic Standing Balance:  Dynamic Standing Balance  Dynamic Standing-Balance Support: Bilateral upper extremity supported  Dynamic Standing-Level of Assistance: Close supervision  Dynamic Standing-Balance: Turning (Tinetti)  Dynamic Standing-Comments: with fww    Lines/Tubes/Drains:  Pacer Wires (Active)   Number of days: 16     PT Treatments:     Therapeutic Activity  Therapeutic Activity Performed: Yes  Therapeutic Activity 1: assisted with self care and gown change post amb  Balance/Neuromuscular  Re-Education  Balance/Neuromuscular Re-Education Activity Performed: Yes  Balance/Neuromuscular Re-Education Activity 1: top taps onto foot stool alt duane LE x 15 each (using fww for support duane UE)  Bed Mobility  Bed Mobility: No  Ambulation/Gait Training  Ambulation/Gait Training Performed: Yes  Ambulation/Gait Training 1  Surface 1: Level tile  Device 1: Rolling walker  Assistance 1: Close supervision  Quality of Gait 1: Narrow base of support, Decreased step length  Comments/Distance (ft) 1: x 2 trials; 150 ft with seated rest, 250 ft with 3 standing rest breaks (monitored vitals during amb; remained WNL)  Transfers  Transfer: Yes  Transfer 1  Transfer From 1: Sit to, Stand to  Transfer to 1: Stand, Sit  Technique 1: Sit to stand, Stand to sit  Transfer Device 1: Walker  Transfer Level of Assistance 1: Close supervision  Trials/Comments 1: x 3 trials; from various surface heights; vc for hand placement and pushing up from chair  Stairs  Stairs: No     Activity tolerance:  Activity Tolerance  Endurance: Tolerates 30 min exercise with multiple rests    Outcome Measures:  [facility] Basic Mobility  Turning from your back to your side while in a flat bed without using bedrails: A little  Moving from lying on your back to sitting on the side of a flat bed without using bedrails: A lot  Moving to and from bed to chair (including a wheelchair): A little  Standing up from a chair using your arms (e.g. wheelchair or bedside chair): None  To walk in hospital room: A little  Climbing 3-5 steps with railing: A lot  Basic Mobility - Total Score: 17     Tinetti  Sitting Balance: Steady, safe  Arises: Able without using arms  Attempts to Arise: Able to arise, one attempt  Immediate Standing Balance (First 5 Seconds): Steady but uses walker or other support  Standing Balance: Steady but wide stance, uses cane or other support  Nudged: Steady without walker or other support  Eyes Closed: Steady  Turned 360 Degrees: Steadiness:  Unsteady (Grabs, staggers)  Turned 360 Degrees: Continuity of Steps: Discontinuous steps  Sitting Down: Safe, smooth motion  Balance Score: 12  Initiation of Gait: No hesitancy  Step Height: R Swing Foot: Right foot complete clears floor  Step Length: R Swing Foot: Passes left stance foot  Step Height: L Swing Foot: Left foot complete clears floor  Step Length: L Swing Foot: Passes right stance foot  Step Symmetry: Right and left step appear equal  Step Continuity: Steps appear continuous  Path: Straight without walking aid  Trunk: No sway but flexion of knees or back or spreads arms out while walking  Walking Time: Heels almost touching while walking  Gait Score: 11  Total Score: 23     Education Documentation  Precautions, taught by LOR Funez at 11/29/2024 10:57 AM.  Learner: Patient  Readiness: Acceptance  Method: Explanation  Response: Verbalizes Understanding  Comment: reviewed PT rehab POC    Body Mechanics, taught by LOR Funez at 11/29/2024 10:57 AM.  Learner: Patient  Readiness: Acceptance  Method: Explanation  Response: Verbalizes Understanding  Comment: reviewed PT rehab POC    Mobility Training, taught by LOR Funez at 11/29/2024 10:57 AM.  Learner: Patient  Readiness: Acceptance  Method: Explanation  Response: Verbalizes Understanding  Comment: reviewed PT rehab POC    Education Comments  No comments found.      OP EDUCATION:     Encounter Problems       Encounter Problems (Active)       Balance       Pt will demonstrate improved sitting/standing static/dynamic balance activities without LOB via score of 24/28 on the Tinetti for increase in safety prior to DC.  (Progressing)       Start:  11/21/24    Expected End:  12/05/24               Mobility       Pt will ambulate >15ft with appropriate form, Mod A or less, LRAD, and no LOB for safe DC.  (Met)       Start:  11/21/24    Expected End:  12/05/24    Resolved:  11/25/24    Updated to: Pt will ambulate >150ft with  appropriate form, CGA, LRAD, and no LOB for safe DC.    Update reason: Met         Pt will tolerate >15 minutes of upright standing activity without seated rest breaks with no changes in vital signs for improved functional mobility.  (Progressing)       Start:  11/21/24    Expected End:  12/05/24            Pt will ambulate >150ft with appropriate form, CGA, LRAD, and no LOB for safe DC. (Progressing)       Start:  11/25/24    Expected End:  12/05/24                   PT Transfers       Pt will perform bed mobility with Mod A or less and use of LRAD to safely DC.  (Met)       Start:  11/21/24    Expected End:  12/05/24    Resolved:  11/25/24    Updated to: Pt will perform bed mobility with CGA and use of LRAD to safely DC.    Update reason: Met         Pt will perform sit<>stand transfers with Mod A or less and use of LRAD to safely DC.  (Met)       Start:  11/21/24    Expected End:  12/05/24    Resolved:  11/25/24    Updated to: Pt will perform sit<>stand transfers with CGA and use of LRAD to safely DC.    Update reason: Met         Pt will perform bed mobility with CGA and use of LRAD to safely DC. (Progressing)       Start:  11/25/24    Expected End:  12/05/24                Pt will perform sit<>stand transfers with CGA and use of LRAD to safely DC. (Progressing)       Start:  11/25/24    Expected End:  12/05/24

## 2024-11-29 NOTE — PROGRESS NOTES
Jaime Dominguez is a 74 y.o. male on day 19 of admission presenting with STEMI (ST elevation myocardial infarction) (Multi).    Transitional Care Coordination Progress Note:   Patient discussed during interdisciplinary rounds.   Team members present: NP/PA, TCC   Plan per Medical/Surgical team: Continue diuresing   Discharge disposition: [facility]   Status: Inpatient   Payer: Medicare   Potential Barriers: awaiting precert   ADOD: 12/1 vs 12/2

## 2024-11-30 ENCOUNTER — APPOINTMENT (OUTPATIENT)
Dept: CARDIOLOGY | Facility: HOSPITAL | Age: 74
DRG: 003 | End: 2024-11-30
Payer: MEDICARE

## 2024-11-30 ENCOUNTER — APPOINTMENT (OUTPATIENT)
Dept: RADIOLOGY | Facility: HOSPITAL | Age: 74
DRG: 003 | End: 2024-11-30
Payer: MEDICARE

## 2024-11-30 LAB
ALBUMIN SERPL BCP-MCNC: 3.4 G/DL (ref 3.4–5)
ANION GAP SERPL CALC-SCNC: 11 MMOL/L (ref 10–20)
BUN SERPL-MCNC: 20 MG/DL (ref 6–23)
CALCIUM SERPL-MCNC: 8.5 MG/DL (ref 8.6–10.6)
CHLORIDE SERPL-SCNC: 104 MMOL/L (ref 98–107)
CO2 SERPL-SCNC: 27 MMOL/L (ref 21–32)
CREAT SERPL-MCNC: 0.84 MG/DL (ref 0.5–1.3)
EGFRCR SERPLBLD CKD-EPI 2021: >90 ML/MIN/1.73M*2
ERYTHROCYTE [DISTWIDTH] IN BLOOD BY AUTOMATED COUNT: 17.2 % (ref 11.5–14.5)
GLUCOSE SERPL-MCNC: 88 MG/DL (ref 74–99)
HCT VFR BLD AUTO: 29.8 % (ref 41–52)
HGB BLD-MCNC: 9.2 G/DL (ref 13.5–17.5)
MAGNESIUM SERPL-MCNC: 2.41 MG/DL (ref 1.6–2.4)
MCH RBC QN AUTO: 29.3 PG (ref 26–34)
MCHC RBC AUTO-ENTMCNC: 30.9 G/DL (ref 32–36)
MCV RBC AUTO: 95 FL (ref 80–100)
NRBC BLD-RTO: 0 /100 WBCS (ref 0–0)
PHOSPHATE SERPL-MCNC: 4 MG/DL (ref 2.5–4.9)
PLATELET # BLD AUTO: 608 X10*3/UL (ref 150–450)
POTASSIUM SERPL-SCNC: 5.2 MMOL/L (ref 3.5–5.3)
RBC # BLD AUTO: 3.14 X10*6/UL (ref 4.5–5.9)
SODIUM SERPL-SCNC: 137 MMOL/L (ref 136–145)
WBC # BLD AUTO: 10.5 X10*3/UL (ref 4.4–11.3)

## 2024-11-30 PROCEDURE — 1200000002 HC GENERAL ROOM WITH TELEMETRY DAILY

## 2024-11-30 PROCEDURE — 93010 ELECTROCARDIOGRAM REPORT: CPT | Performed by: INTERNAL MEDICINE

## 2024-11-30 PROCEDURE — 2500000004 HC RX 250 GENERAL PHARMACY W/ HCPCS (ALT 636 FOR OP/ED): Performed by: NURSE PRACTITIONER

## 2024-11-30 PROCEDURE — 2500000001 HC RX 250 WO HCPCS SELF ADMINISTERED DRUGS (ALT 637 FOR MEDICARE OP): Performed by: NURSE PRACTITIONER

## 2024-11-30 PROCEDURE — 99232 SBSQ HOSP IP/OBS MODERATE 35: CPT

## 2024-11-30 PROCEDURE — 85027 COMPLETE CBC AUTOMATED: CPT | Performed by: NURSE PRACTITIONER

## 2024-11-30 PROCEDURE — 80069 RENAL FUNCTION PANEL: CPT | Performed by: NURSE PRACTITIONER

## 2024-11-30 PROCEDURE — 83735 ASSAY OF MAGNESIUM: CPT | Performed by: NURSE PRACTITIONER

## 2024-11-30 PROCEDURE — 2500000002 HC RX 250 W HCPCS SELF ADMINISTERED DRUGS (ALT 637 FOR MEDICARE OP, ALT 636 FOR OP/ED): Performed by: NURSE PRACTITIONER

## 2024-11-30 PROCEDURE — 93005 ELECTROCARDIOGRAM TRACING: CPT

## 2024-11-30 PROCEDURE — 2500000001 HC RX 250 WO HCPCS SELF ADMINISTERED DRUGS (ALT 637 FOR MEDICARE OP)

## 2024-11-30 PROCEDURE — 71045 X-RAY EXAM CHEST 1 VIEW: CPT | Performed by: RADIOLOGY

## 2024-11-30 PROCEDURE — 71045 X-RAY EXAM CHEST 1 VIEW: CPT

## 2024-11-30 PROCEDURE — 2500000004 HC RX 250 GENERAL PHARMACY W/ HCPCS (ALT 636 FOR OP/ED)

## 2024-11-30 RX ORDER — FUROSEMIDE 10 MG/ML
40 INJECTION INTRAMUSCULAR; INTRAVENOUS 2 TIMES DAILY
Status: COMPLETED | OUTPATIENT
Start: 2024-11-30 | End: 2024-11-30

## 2024-11-30 RX ORDER — FUROSEMIDE 10 MG/ML
40 INJECTION INTRAMUSCULAR; INTRAVENOUS 2 TIMES DAILY
Status: DISCONTINUED | OUTPATIENT
Start: 2024-11-30 | End: 2024-11-30

## 2024-11-30 RX ORDER — HYDROXYZINE HYDROCHLORIDE 10 MG/1
10 TABLET, FILM COATED ORAL ONCE
Status: COMPLETED | OUTPATIENT
Start: 2024-11-30 | End: 2024-11-30

## 2024-11-30 RX ADMIN — POLYSACCHARIDE-IRON COMPLEX 150 MG: 150 CAPSULE ORAL at 09:36

## 2024-11-30 RX ADMIN — EMPAGLIFLOZIN 10 MG: 10 TABLET, FILM COATED ORAL at 09:37

## 2024-11-30 RX ADMIN — HEPARIN SODIUM 5000 UNITS: 5000 INJECTION INTRAVENOUS; SUBCUTANEOUS at 01:10

## 2024-11-30 RX ADMIN — HYDROXYZINE HYDROCHLORIDE 10 MG: 10 TABLET ORAL at 21:13

## 2024-11-30 RX ADMIN — SPIRONOLACTONE 25 MG: 25 TABLET, FILM COATED ORAL at 09:37

## 2024-11-30 RX ADMIN — ACETAMINOPHEN 975 MG: 325 TABLET, FILM COATED ORAL at 06:27

## 2024-11-30 RX ADMIN — ACETAMINOPHEN 975 MG: 325 TABLET, FILM COATED ORAL at 13:20

## 2024-11-30 RX ADMIN — HEPARIN SODIUM 5000 UNITS: 5000 INJECTION INTRAVENOUS; SUBCUTANEOUS at 09:37

## 2024-11-30 RX ADMIN — AMIODARONE HYDROCHLORIDE 200 MG: 200 TABLET ORAL at 09:36

## 2024-11-30 RX ADMIN — METOPROLOL SUCCINATE 25 MG: 25 TABLET, EXTENDED RELEASE ORAL at 09:36

## 2024-11-30 RX ADMIN — TICAGRELOR 90 MG: 90 TABLET ORAL at 09:37

## 2024-11-30 RX ADMIN — FUROSEMIDE 40 MG: 10 INJECTION, SOLUTION INTRAVENOUS at 10:59

## 2024-11-30 RX ADMIN — TICAGRELOR 90 MG: 90 TABLET ORAL at 21:13

## 2024-11-30 RX ADMIN — ATORVASTATIN CALCIUM 80 MG: 80 TABLET, FILM COATED ORAL at 21:11

## 2024-11-30 RX ADMIN — HEPARIN SODIUM 5000 UNITS: 5000 INJECTION INTRAVENOUS; SUBCUTANEOUS at 17:41

## 2024-11-30 RX ADMIN — FUROSEMIDE 40 MG: 10 INJECTION, SOLUTION INTRAVENOUS at 17:41

## 2024-11-30 RX ADMIN — Medication 1 TABLET: at 09:36

## 2024-11-30 RX ADMIN — ACETAMINOPHEN 975 MG: 325 TABLET, FILM COATED ORAL at 21:11

## 2024-11-30 RX ADMIN — ASPIRIN 81 MG: 81 TABLET, COATED ORAL at 09:36

## 2024-11-30 ASSESSMENT — COGNITIVE AND FUNCTIONAL STATUS - GENERAL
CLIMB 3 TO 5 STEPS WITH RAILING: A LITTLE
DAILY ACTIVITIY SCORE: 21
MOVING TO AND FROM BED TO CHAIR: A LITTLE
STANDING UP FROM CHAIR USING ARMS: A LITTLE
HELP NEEDED FOR BATHING: A LITTLE
TURNING FROM BACK TO SIDE WHILE IN FLAT BAD: A LITTLE
WALKING IN HOSPITAL ROOM: A LITTLE
DRESSING REGULAR UPPER BODY CLOTHING: A LITTLE
DRESSING REGULAR LOWER BODY CLOTHING: A LITTLE
MOBILITY SCORE: 19

## 2024-11-30 ASSESSMENT — PAIN - FUNCTIONAL ASSESSMENT
PAIN_FUNCTIONAL_ASSESSMENT: 0-10

## 2024-11-30 ASSESSMENT — PAIN SCALES - GENERAL
PAINLEVEL_OUTOF10: 0 - NO PAIN

## 2024-11-30 NOTE — PROGRESS NOTES
CARDIAC SURGERY DAILY PROGRESS NOTE    Jaime Dominguez is a 74 y.o. male, with a PMH of basal cell carcinoma and HLD who presented with STEMI to an OSH and transferred to [facility] (formerly [facility]) on 11/10/2024 for CABG workup. S/p MidCAB x2 converted to full sternotomy w/ LIMA prolonged as a Y graft with a radial to LAD OM w/ Dr. Aldo Harman and Dr. Stanley 11/12. Postop course c/b increasing pressor requirement and echo revealing apical hypo-akinesis with subsequent IABP placement on 11/14. Overnight on 11/14, Troponin rise and ST elevation in anteroseptal leads, c/f ACS- went for LHC, underwent PCI of mid LAD with HEATHER. Cannulated for VA ECMO 11/14 for cardiogenic shock 2/2 multiple failed grafts. On 11/19, returned to OR for VA ECMO decannulation and left hemothorax washout. IABP removed 11/20. Transferred to floor 11/25.     OPERATION/PROCEDURE: 11/12 with Rodrigo Harman MD  1.  Mini invasive robotic assisted CABG x 2 converted to full sternotomy.  2.  CABG x 2 with LIMA  prolonged as a Y graft with a radial to LAD OM.  3.  Endoscopic radial harvest.  4.  Left femoral artery and vein cannulation for peripheral bypass.  5.  Partial LAD endarterectomy    CTICU Course: see above    Transferred to T3: on 11/25.    =======================================  Interval History:   Shortness of breath improving    SUBJECTIVE:  Patient seen and examined today. C/o SOB improving. CXR from today reviewed. Will provide diuresis today    Objective   BP 93/66 (BP Location: Left arm, Patient Position: Sitting)   Pulse 81   Temp 36.1 °C (97 °F) (Temporal)   Resp 20   Ht 1.829 m (6')   Wt 89.2 kg (196 lb 9.6 oz)   SpO2 94%   BMI 26.66 kg/m²   0-10 (Numeric) Pain Score: 0 - No pain   3 Day Weight Change: -2.601 kg (-5 lb 11.7 oz) per day    Intake and Output    Intake/Output Summary (Last 24 hours) at 11/30/2024 1217  Last data filed at 11/30/2024 1151  Gross per 24 hour   Intake 420 ml   Output 3050 ml   Net -2630 ml        Physical Exam  Physical Exam  Vitals reviewed.   Constitutional:       General: He is not in acute distress.     Appearance: Normal appearance. He is not ill-appearing.      Comments: Laying in bed    HENT:      Head: Normocephalic and atraumatic.      Nose: Nose normal.      Mouth/Throat:      Mouth: Mucous membranes are dry.   Eyes:      General: Lids are normal.      Conjunctiva/sclera: Conjunctivae normal.   Cardiovascular:      Rate and Rhythm: Normal rate and regular rhythm.      Pulses: Normal pulses.      Comments: Tele - NSR rate 80s   V wires set VVI @ 50, mA 9    Pulmonary:      Effort: Pulmonary effort is normal.      Breath sounds: Normal breath sounds.      Comments: On room air with appropriate oxygenation   Short of breath with minimal movement  Abdominal:      General: Abdomen is flat.      Palpations: Abdomen is soft.      Comments: Last BM 11/28  Passing gas    Musculoskeletal:         General: Normal range of motion.      Cervical back: Normal range of motion.      Right lower leg: Edema present.      Left lower leg: Edema present.      Comments: Moving all extremities appropriately with equal strength.   Pitting edema of lower extremities, near graft site. Improved with diuresis.   Skin:     General: Skin is warm and dry.      Capillary Refill: Capillary refill takes less than 2 seconds.      Comments: Left chest and mid sternum incision dry, no erythema, no drainage and well approximated. Left groin suture noted to be in place, bruising noted and some firmness to palpation noted (left inguinal hematoma mentioned on CT on 11/18/24). Per patient left groin feels better than previously.   Neurological:      General: No focal deficit present.      Mental Status: He is alert and oriented to person, place, and time.   Psychiatric:         Mood and Affect: Mood normal.         Behavior: Behavior normal. Behavior is cooperative.       Medications  Scheduled medications  acetaminophen, 975 mg,  oral, q8h POWER  amiodarone, 200 mg, oral, Daily  aspirin, 81 mg, oral, Daily  atorvastatin, 80 mg, oral, Nightly  empagliflozin, 10 mg, oral, Daily  furosemide, 40 mg, intravenous, BID  heparin, 5,000 Units, subcutaneous, q8h  iron polysaccharides, 150 mg, oral, Daily  melatonin, 10 mg, oral, Nightly  metoprolol succinate XL, 25 mg, oral, Daily  multivitamin with minerals, 1 tablet, oral, Daily  polyethylene glycol, 17 g, oral, BID  sennosides-docusate sodium, 2 tablet, oral, BID  spironolactone, 25 mg, oral, Daily  ticagrelor, 90 mg, oral, BID    Continuous medications   PRN medications  PRN medications: benzocaine-menthol, bisacodyl, naloxone, naloxone, ondansetron **OR** ondansetron, oxygen, sodium chloride, white petrolatum    Labs  Results for orders placed or performed during the hospital encounter of 11/10/24 (from the past 24 hours)   Magnesium   Result Value Ref Range    Magnesium 2.41 (H) 1.60 - 2.40 mg/dL   CBC   Result Value Ref Range    WBC 10.5 4.4 - 11.3 x10*3/uL    nRBC 0.0 0.0 - 0.0 /100 WBCs    RBC 3.14 (L) 4.50 - 5.90 x10*6/uL    Hemoglobin 9.2 (L) 13.5 - 17.5 g/dL    Hematocrit 29.8 (L) 41.0 - 52.0 %    MCV 95 80 - 100 fL    MCH 29.3 26.0 - 34.0 pg    MCHC 30.9 (L) 32.0 - 36.0 g/dL    RDW 17.2 (H) 11.5 - 14.5 %    Platelets 608 (H) 150 - 450 x10*3/uL   Renal Function Panel   Result Value Ref Range    Glucose 88 74 - 99 mg/dL    Sodium 137 136 - 145 mmol/L    Potassium 5.2 3.5 - 5.3 mmol/L    Chloride 104 98 - 107 mmol/L    Bicarbonate 27 21 - 32 mmol/L    Anion Gap 11 10 - 20 mmol/L    Urea Nitrogen 20 6 - 23 mg/dL    Creatinine 0.84 0.50 - 1.30 mg/dL    eGFR >90 >60 mL/min/1.73m*2    Calcium 8.5 (L) 8.6 - 10.6 mg/dL    Phosphorus 4.0 2.5 - 4.9 mg/dL    Albumin 3.4 3.4 - 5.0 g/dL     =========================  IMAGING/ DIAGNOSTIC TESTING:  ========================    11/30 XR chest 1 view   Impression  1.Large left-sided masslike opacity along lateral chest wall similar  prior study.  2.Patchy  left basilar airspace disease with small effusion again noted.    11/29 XR chest 1 view  IMPRESSION:  No interval change has occurred in the multiloculated left pleural  effusion/hemothorax nor in the small free-flowing left pleural  effusion.    11/28 XR chest 1 view  IMPRESSION:  1. No significant change in multiloculated left-sided pleural  effusion/hemothorax as well as small free left basilar pleural  effusion and associated atelectasis.  2. Unchanged mild right basilar atelectasis with or without trace  right pleural effusion.    11/26/24 XR CHEST 2 VIEWS:  IMPRESSION:  1.  Stable loculated left-sided pleural effusions-hematoma.  2. Similar bibasilar presumed atelectasis with small bilateral  pleural effusions, left greater than right.  3. No pneumothorax.     11/27/24 TTE:  CONCLUSIONS:   1. Poorly visualized anatomical structures due to suboptimal image quality.   2. The left ventricle was not well visualized.   3. Left ventricular ejection fraction is suspected mildly decreased, by visual estimate at 45%.   4. Spectral Doppler shows a Grade I (impaired relaxation pattern) of left ventricular diastolic filling with normal left atrial filling pressure.   5. Abnormal septal motion consistent with post-operative status.   6. There is normal right ventricular global systolic function.    11/18 CT chest abdomen pelvis without contrast  1.Stable 5.7 x 4.4 cm left inguinal hematoma is noted.  2.Increasing moderate left-sided hemothorax. Mediastinal hematoma has  slightly decreased. No new mediastinal or retroperitoneal hemorrhage  is evident.    ================  IMPRESSION & PLAN:  ===============    POD #18 s/p midCAB x2 converted to full sternotomy on 11/12 with s/p PCI of mid LAD with HEATHER on 11/14    - VA ECMO 11/14-11/19  - IABP 11/14-11/20.  - Increase activity/ ambulation; PT/OT  - Encourage IS, C/DB; respiratory therapy; wean O2 as josué   - Cardiac rehab referral   - Continue cardiac meds: ASA, statin   -  Continue DAPT (brilinta+ASA) for LAD HEATHER stent   - Pain and anticonstipation meds  - 2v CXR completed 11/26  - Postop echo completed 11/26 (EF 45%)   - Cut epicardial wires prior to discharge   - Tele until discharge  - Optimize nutrition and electrolytes  - 11/30 Getting anxious at night and difficulty sleeping. Will try one time dose of atarax 10 mg tonight and will obtain EKG now to assess QTC prior to given medication     Rhythm,   - Previous afib   - Tele: NSR rate 80s   - Continue amio to 200mg daily  - Continue metoprolol 25mg BID (started 11/27)   - Adjust medications as tolerated    Cardiomyopathy   - appreciate heart failure consult, signed off on 11/28  - HF recommendations on 11/28:  - Diuresis: Continue diuresis per primary team.  - GDMT:   - SGLT-2i: c/w jardiance 10mg daily  - Beta-blocker: c/w metoprolol XL 25mg daily.   - ACE-I/ARB/ARNI: Will consider low dose entresto once SBP >120 mmHg. If that happens when he is inpatient, can start 12/13 mg BID entresto before discharge otherwise defer to OP initiation.               - MRA: c/w aldactone 25mg daily   - Should follow-up with heart failure specialist Dr. Gann after discharge, depart updated.   - AICD/CRT-D/Lifevest: As EF 45%, not indicated.   - Strict I/Os, Daily Na < 2g daily, fluid restriction.   -HF service will sign-off at this time.   - Continue jardiance 10mg daily  - Continue spironolactone 25mg daily  - Continue metoprolol 25mg BID     HLD: Home meds: atorvastatin 40mg daily   - Continue atorvastatin 80mg daily     Acute Blood Loss Anemia - Improving  Recent Labs     11/30/24  0649 11/29/24  0720 11/28/24  0615 11/27/24  0643 11/26/24  0705 11/25/24  0111 11/24/24  1715   HGB 9.2* 8.8* 8.7* 8.4* 8.3* 8.3* 8.9*   HCT 29.8* 28.7* 27.5* 26.9* 26.0* 25.5* 27.5*   - MV, PO Iron x1mo  - Daily labs, transfuse as indicated    Volume/Electrolyte Status: Preop wt Weight: 96.2 kg (212 lb)   Vitals:    11/30/24 0107   Weight: 89.2 kg (196 lb  9.6 oz)   - Pre-op weight 94kg,  89kg  - Adjust diuresis as needed for postop cardiac surgery hypervolemia  - Replete electrolytes for hypokalemia/hypomagnesemia/hypophosphatemia as needed  - Daily weights and strict I&Os  - Daily RFP while admitted  -  Lasix 40 mg IV x2  -  Lasix 40 mg IV now and Lasix 20 mg IV at 1800 today, reassess need for more diuresis in am  -  Lasix 40 mg IV bid today, reassess in am    Leukocytosis - Resolved  Recent Labs     24  0649 24  0720 24  0615 24  0643 24  0705 24  0111 24  1715   WBC 10.5 10.5 11.6* 12.7* 14.6* 18.6* 21.1*     Temp (36hrs), Av.3 °C (97.3 °F), Min:35.9 °C (96.6 °F), Max:36.9 °C (98.4 °F)  - Cx data negative. Completed 7 day empiric course of zosyn  - aggressive pulmonary hygiene  - monitor for s/s infection  - daily CBC to follow    Left hemothorax s/p washout ; persisting loculated left pleural effusion, stable   -  CT chest abd and pelvis -> increasing moderate left-sided hemothorax.  Mediastinal hematoma has slightly decreased. No new mediastinal or retroperitoneal hemorrhage is evident.  - Patient currently on RA and in no acute respiratory distress   - monitor on serial CXR. Ordered for tomorrow  -  Improvement, will repeat CXR in AM  -  Check 2V CXR in AM, consider a CT chest to further assess and compare to prior CT on  if no clinical improvement     Left inguinal hematoma   -  CT -> Stable 5.7 x 4.4 cm left inguinal hematoma is noted  -  Left groin bruised, small firm area noted, no change from prior. Hg stable at 9.2 up trending. Continue to monitor       VTE Prophylaxis: SCDs/TEDs, ambulation, SQ heparin  Code Status: Full Code    Dispo  - PT/OT recs High intensity level of continued care   - Would benefit from homecare RN for cardiac surgery carepath  - Anticipate discharge by Monday or Tuesday pending diuresis, acute rehab precerpt  - Will continue to  assess discharge needs    EUGENIA White-CNP  Cardiac Surgery [facility]  [facility]  Team Phone [phone]    11/30/2024  12:17 PM

## 2024-11-30 NOTE — CARE PLAN
Problem: Skin  Goal: Participates in plan/prevention/treatment measures  Outcome: Progressing  Goal: Prevent/manage excess moisture  Outcome: Progressing  Goal: Prevent/minimize sheer/friction injuries  Outcome: Progressing  Goal: Promote/optimize nutrition  Outcome: Progressing     Problem: Discharge Planning  Goal: Discharge to home or other facility with appropriate resources  Outcome: Progressing     Problem: Chronic Conditions and Co-morbidities  Goal: Patient's chronic conditions and co-morbidity symptoms are monitored and maintained or improved  Outcome: Progressing     Problem: Knowledge Deficit  Goal: Patient/family/caregiver demonstrates understanding of disease process, treatment plan, medications, and discharge instructions  Outcome: Progressing     Problem: Fall/Injury  Goal: Not fall by end of shift  Outcome: Progressing  Goal: Be free from injury by end of the shift  Outcome: Progressing  Goal: Verbalize understanding of personal risk factors for fall in the hospital  Outcome: Progressing  Goal: Verbalize understanding of risk factor reduction measures to prevent injury from fall in the home  Outcome: Progressing  Goal: Use assistive devices by end of the shift  Outcome: Progressing  Goal: Pace activities to prevent fatigue by end of the shift  Outcome: Progressing     Problem: Pain  Goal: Takes deep breaths with improved pain control throughout the shift  Outcome: Progressing  Goal: Turns in bed with improved pain control throughout the shift  Outcome: Progressing  Goal: Walks with improved pain control throughout the shift  Outcome: Progressing  Goal: Performs ADL's with improved pain control throughout shift  Outcome: Progressing  Goal: Participates in PT with improved pain control throughout the shift  Outcome: Progressing  Goal: Free from opioid side effects throughout the shift  Outcome: Progressing  Goal: Free from acute confusion related to pain meds throughout the shift  Outcome:  Progressing       The clinical goals for the shift include Patient will remain safe and free from falls throughout shift.  Pattie Fuchs RN

## 2024-12-01 ENCOUNTER — APPOINTMENT (OUTPATIENT)
Dept: RADIOLOGY | Facility: HOSPITAL | Age: 74
DRG: 003 | End: 2024-12-01
Payer: MEDICARE

## 2024-12-01 VITALS
DIASTOLIC BLOOD PRESSURE: 75 MMHG | BODY MASS INDEX: 25.57 KG/M2 | HEIGHT: 72 IN | RESPIRATION RATE: 18 BRPM | SYSTOLIC BLOOD PRESSURE: 120 MMHG | HEART RATE: 87 BPM | WEIGHT: 188.8 LBS | OXYGEN SATURATION: 97 % | TEMPERATURE: 98.1 F

## 2024-12-01 LAB
ALBUMIN SERPL BCP-MCNC: 3.4 G/DL (ref 3.4–5)
ANION GAP SERPL CALC-SCNC: 12 MMOL/L (ref 10–20)
BUN SERPL-MCNC: 18 MG/DL (ref 6–23)
CALCIUM SERPL-MCNC: 8.4 MG/DL (ref 8.6–10.6)
CHLORIDE SERPL-SCNC: 102 MMOL/L (ref 98–107)
CO2 SERPL-SCNC: 26 MMOL/L (ref 21–32)
CREAT SERPL-MCNC: 0.81 MG/DL (ref 0.5–1.3)
EGFRCR SERPLBLD CKD-EPI 2021: >90 ML/MIN/1.73M*2
ERYTHROCYTE [DISTWIDTH] IN BLOOD BY AUTOMATED COUNT: 17 % (ref 11.5–14.5)
GLUCOSE SERPL-MCNC: 82 MG/DL (ref 74–99)
HCT VFR BLD AUTO: 31.8 % (ref 41–52)
HGB BLD-MCNC: 10 G/DL (ref 13.5–17.5)
MAGNESIUM SERPL-MCNC: 2.33 MG/DL (ref 1.6–2.4)
MCH RBC QN AUTO: 29.2 PG (ref 26–34)
MCHC RBC AUTO-ENTMCNC: 31.4 G/DL (ref 32–36)
MCV RBC AUTO: 93 FL (ref 80–100)
NRBC BLD-RTO: 0 /100 WBCS (ref 0–0)
PHOSPHATE SERPL-MCNC: 3.4 MG/DL (ref 2.5–4.9)
PLATELET # BLD AUTO: 633 X10*3/UL (ref 150–450)
POTASSIUM SERPL-SCNC: 3.8 MMOL/L (ref 3.5–5.3)
RBC # BLD AUTO: 3.42 X10*6/UL (ref 4.5–5.9)
SODIUM SERPL-SCNC: 136 MMOL/L (ref 136–145)
WBC # BLD AUTO: 11 X10*3/UL (ref 4.4–11.3)

## 2024-12-01 PROCEDURE — 71046 X-RAY EXAM CHEST 2 VIEWS: CPT

## 2024-12-01 PROCEDURE — 1200000002 HC GENERAL ROOM WITH TELEMETRY DAILY

## 2024-12-01 PROCEDURE — 2500000001 HC RX 250 WO HCPCS SELF ADMINISTERED DRUGS (ALT 637 FOR MEDICARE OP): Performed by: NURSE PRACTITIONER

## 2024-12-01 PROCEDURE — 2500000004 HC RX 250 GENERAL PHARMACY W/ HCPCS (ALT 636 FOR OP/ED): Performed by: NURSE PRACTITIONER

## 2024-12-01 PROCEDURE — 99232 SBSQ HOSP IP/OBS MODERATE 35: CPT | Performed by: NURSE PRACTITIONER

## 2024-12-01 PROCEDURE — 2500000002 HC RX 250 W HCPCS SELF ADMINISTERED DRUGS (ALT 637 FOR MEDICARE OP, ALT 636 FOR OP/ED): Performed by: NURSE PRACTITIONER

## 2024-12-01 PROCEDURE — 85027 COMPLETE CBC AUTOMATED: CPT | Performed by: NURSE PRACTITIONER

## 2024-12-01 PROCEDURE — 83735 ASSAY OF MAGNESIUM: CPT | Performed by: NURSE PRACTITIONER

## 2024-12-01 PROCEDURE — 80069 RENAL FUNCTION PANEL: CPT | Performed by: NURSE PRACTITIONER

## 2024-12-01 RX ORDER — POTASSIUM CHLORIDE 20 MEQ/1
40 TABLET, EXTENDED RELEASE ORAL ONCE
Status: COMPLETED | OUTPATIENT
Start: 2024-12-01 | End: 2024-12-01

## 2024-12-01 RX ORDER — FUROSEMIDE 10 MG/ML
40 INJECTION INTRAMUSCULAR; INTRAVENOUS ONCE
Status: COMPLETED | OUTPATIENT
Start: 2024-12-01 | End: 2024-12-01

## 2024-12-01 RX ADMIN — EMPAGLIFLOZIN 10 MG: 10 TABLET, FILM COATED ORAL at 09:09

## 2024-12-01 RX ADMIN — POTASSIUM CHLORIDE 40 MEQ: 1500 TABLET, EXTENDED RELEASE ORAL at 09:09

## 2024-12-01 RX ADMIN — TICAGRELOR 90 MG: 90 TABLET ORAL at 21:49

## 2024-12-01 RX ADMIN — FUROSEMIDE 40 MG: 10 INJECTION, SOLUTION INTRAMUSCULAR; INTRAVENOUS at 11:30

## 2024-12-01 RX ADMIN — TICAGRELOR 90 MG: 90 TABLET ORAL at 09:09

## 2024-12-01 RX ADMIN — POLYSACCHARIDE-IRON COMPLEX 150 MG: 150 CAPSULE ORAL at 09:10

## 2024-12-01 RX ADMIN — Medication 10 MG: at 21:49

## 2024-12-01 RX ADMIN — AMIODARONE HYDROCHLORIDE 200 MG: 200 TABLET ORAL at 09:09

## 2024-12-01 RX ADMIN — Medication 1 TABLET: at 09:09

## 2024-12-01 RX ADMIN — METOPROLOL SUCCINATE 25 MG: 25 TABLET, EXTENDED RELEASE ORAL at 09:10

## 2024-12-01 RX ADMIN — HEPARIN SODIUM 5000 UNITS: 5000 INJECTION INTRAVENOUS; SUBCUTANEOUS at 16:54

## 2024-12-01 RX ADMIN — ATORVASTATIN CALCIUM 80 MG: 80 TABLET, FILM COATED ORAL at 21:49

## 2024-12-01 RX ADMIN — ACETAMINOPHEN 975 MG: 325 TABLET, FILM COATED ORAL at 15:00

## 2024-12-01 RX ADMIN — ACETAMINOPHEN 975 MG: 325 TABLET, FILM COATED ORAL at 05:53

## 2024-12-01 RX ADMIN — ACETAMINOPHEN 975 MG: 325 TABLET, FILM COATED ORAL at 21:49

## 2024-12-01 RX ADMIN — HEPARIN SODIUM 5000 UNITS: 5000 INJECTION INTRAVENOUS; SUBCUTANEOUS at 01:19

## 2024-12-01 RX ADMIN — SPIRONOLACTONE 25 MG: 25 TABLET, FILM COATED ORAL at 09:09

## 2024-12-01 RX ADMIN — HEPARIN SODIUM 5000 UNITS: 5000 INJECTION INTRAVENOUS; SUBCUTANEOUS at 09:10

## 2024-12-01 RX ADMIN — ASPIRIN 81 MG: 81 TABLET, COATED ORAL at 09:09

## 2024-12-01 ASSESSMENT — PAIN - FUNCTIONAL ASSESSMENT
PAIN_FUNCTIONAL_ASSESSMENT: 0-10

## 2024-12-01 ASSESSMENT — PAIN SCALES - GENERAL
PAINLEVEL_OUTOF10: 0 - NO PAIN

## 2024-12-01 ASSESSMENT — COGNITIVE AND FUNCTIONAL STATUS - GENERAL
STANDING UP FROM CHAIR USING ARMS: A LITTLE
DAILY ACTIVITIY SCORE: 21
WALKING IN HOSPITAL ROOM: A LITTLE
MOVING TO AND FROM BED TO CHAIR: A LITTLE
DRESSING REGULAR UPPER BODY CLOTHING: A LITTLE
MOBILITY SCORE: 20
DRESSING REGULAR LOWER BODY CLOTHING: A LITTLE
HELP NEEDED FOR BATHING: A LITTLE
CLIMB 3 TO 5 STEPS WITH RAILING: A LITTLE

## 2024-12-01 NOTE — PROGRESS NOTES
CARDIAC SURGERY DAILY PROGRESS NOTE    Jaime Dominguez is a 74 y.o. male, with a PMH of basal cell carcinoma and HLD who presented with STEMI to an OSH and transferred to [facility] on 11/10/2024 for CABG workup. S/p MidCAB x2 converted to full sternotomy w/ LIMA prolonged as a Y graft with a radial to LAD OM w/ Dr. Aldo Harman and Dr. Stanley 11/12. Postop course c/b increasing pressor requirement and echo revealing apical hypo-akinesis with subsequent IABP placement on 11/14. Overnight on 11/14, Troponin rise and ST elevation in anteroseptal leads, c/f ACS- went for LHC, underwent PCI of mid LAD with HEATHER. Cannulated for VA ECMO 11/14 for cardiogenic shock 2/2 multiple failed grafts. On 11/19, returned to OR for VA ECMO decannulation and left hemothorax washout. IABP removed 11/20. Transferred to floor 11/25.     OPERATION/PROCEDURE: 11/12 with Rodrigo Harman MD  1.  Mini invasive robotic assisted CABG x 2 converted to full sternotomy.  2.  CABG x 2 with LIMA  prolonged as a Y graft with a radial to LAD OM.  3.  Endoscopic radial harvest.  4.  Left femoral artery and vein cannulation for peripheral bypass.  5.  Partial LAD endarterectomy    CTICU Course: see above    Transferred to T3: on 11/25.    =======================================  Interval History:   Shortness of breath improving    SUBJECTIVE:  -2570 from yesterday   -worsening chest xray yesterday -> scheduled for 2 view this am-> if 2 view worse may need chest CT    Objective   /63 (BP Location: Left arm, Patient Position: Lying)   Pulse 88   Temp 36.6 °C (97.9 °F) (Temporal)   Resp 18   Ht 1.829 m (6')   Wt 85.6 kg (188 lb 12.8 oz)   SpO2 99%   BMI 25.61 kg/m²   0-10 (Numeric) Pain Score: 0 - No pain   3 Day Weight Change: -3.281 kg (-7 lb 3.7 oz) per day    Intake and Output    Intake/Output Summary (Last 24 hours) at 12/1/2024 0742  Last data filed at 12/1/2024 0500  Gross per 24 hour   Intake 280 ml   Output 2850 ml    Net -2570 ml       Physical Exam  Physical Exam  Vitals reviewed.   Constitutional:       General: He is not in acute distress.     Appearance: Normal appearance. He is not ill-appearing.      Comments: Laying in bed    HENT:      Head: Normocephalic and atraumatic.      Nose: Nose normal.      Mouth/Throat:      Mouth: Mucous membranes are dry.   Eyes:      General: Lids are normal.      Conjunctiva/sclera: Conjunctivae normal.   Cardiovascular:      Rate and Rhythm: Normal rate and regular rhythm.      Pulses: Normal pulses.      Comments: Tele - NSR rate 80s   V wires set VVI @ 50, mA 9    Pulmonary:      Effort: Pulmonary effort is normal.      Breath sounds: Normal breath sounds.      Comments: On room air with appropriate oxygenation   Short of breath with minimal movement  Abdominal:      General: Abdomen is flat.      Palpations: Abdomen is soft.      Comments: Last BM 11/28  Passing gas    Musculoskeletal:         General: Normal range of motion.      Cervical back: Normal range of motion.      Right lower leg: Edema present.      Left lower leg: Edema present.      Comments: Moving all extremities appropriately with equal strength.   Pitting edema of lower extremities, near graft site. Improved with diuresis.   Skin:     General: Skin is warm and dry.      Capillary Refill: Capillary refill takes less than 2 seconds.      Comments: Left chest and mid sternum incision dry, no erythema, no drainage and well approximated. Left groin suture noted to be in place, bruising noted and some firmness to palpation noted (left inguinal hematoma mentioned on CT on 11/18/24). Per patient left groin feels better than previously.   Neurological:      General: No focal deficit present.      Mental Status: He is alert and oriented to person, place, and time.   Psychiatric:         Mood and Affect: Mood normal.         Behavior: Behavior normal. Behavior is cooperative.       Medications  Scheduled  medications  acetaminophen, 975 mg, oral, q8h POWER  amiodarone, 200 mg, oral, Daily  aspirin, 81 mg, oral, Daily  atorvastatin, 80 mg, oral, Nightly  empagliflozin, 10 mg, oral, Daily  heparin, 5,000 Units, subcutaneous, q8h  iron polysaccharides, 150 mg, oral, Daily  melatonin, 10 mg, oral, Nightly  metoprolol succinate XL, 25 mg, oral, Daily  multivitamin with minerals, 1 tablet, oral, Daily  polyethylene glycol, 17 g, oral, BID  sennosides-docusate sodium, 2 tablet, oral, BID  spironolactone, 25 mg, oral, Daily  ticagrelor, 90 mg, oral, BID    Continuous medications   PRN medications  PRN medications: benzocaine-menthol, bisacodyl, naloxone, naloxone, ondansetron **OR** ondansetron, oxygen, sodium chloride, white petrolatum    Labs  Results for orders placed or performed during the hospital encounter of 11/10/24 (from the past 24 hours)   CBC   Result Value Ref Range    WBC 11.0 4.4 - 11.3 x10*3/uL    nRBC 0.0 0.0 - 0.0 /100 WBCs    RBC 3.42 (L) 4.50 - 5.90 x10*6/uL    Hemoglobin 10.0 (L) 13.5 - 17.5 g/dL    Hematocrit 31.8 (L) 41.0 - 52.0 %    MCV 93 80 - 100 fL    MCH 29.2 26.0 - 34.0 pg    MCHC 31.4 (L) 32.0 - 36.0 g/dL    RDW 17.0 (H) 11.5 - 14.5 %    Platelets 633 (H) 150 - 450 x10*3/uL     =========================  IMAGING/ DIAGNOSTIC TESTING:  ========================    11/30 XR chest 1 view   Impression  1.Large left-sided masslike opacity along lateral chest wall similar  prior study.  2.Patchy left basilar airspace disease with small effusion again noted.    11/29 XR chest 1 view  IMPRESSION:  No interval change has occurred in the multiloculated left pleural  effusion/hemothorax nor in the small free-flowing left pleural  effusion.    11/28 XR chest 1 view  IMPRESSION:  1. No significant change in multiloculated left-sided pleural  effusion/hemothorax as well as small free left basilar pleural  effusion and associated atelectasis.  2. Unchanged mild right basilar atelectasis with or without  trace  right pleural effusion.    11/26/24 XR CHEST 2 VIEWS:  IMPRESSION:  1.  Stable loculated left-sided pleural effusions-hematoma.  2. Similar bibasilar presumed atelectasis with small bilateral  pleural effusions, left greater than right.  3. No pneumothorax.     11/27/24 TTE:  CONCLUSIONS:   1. Poorly visualized anatomical structures due to suboptimal image quality.   2. The left ventricle was not well visualized.   3. Left ventricular ejection fraction is suspected mildly decreased, by visual estimate at 45%.   4. Spectral Doppler shows a Grade I (impaired relaxation pattern) of left ventricular diastolic filling with normal left atrial filling pressure.   5. Abnormal septal motion consistent with post-operative status.   6. There is normal right ventricular global systolic function.    11/18 CT chest abdomen pelvis without contrast  1.Stable 5.7 x 4.4 cm left inguinal hematoma is noted.  2.Increasing moderate left-sided hemothorax. Mediastinal hematoma has  slightly decreased. No new mediastinal or retroperitoneal hemorrhage  is evident.    ================  IMPRESSION & PLAN:  ===============    POD #19 s/p midCAB x2 converted to full sternotomy on 11/12 with s/p PCI of mid LAD with HEATHER on 11/14    - VA ECMO 11/14-11/19  - IABP 11/14-11/20.  - Increase activity/ ambulation; PT/OT  - Encourage IS, C/DB; respiratory therapy; wean O2 as josué   - Cardiac rehab referral   - Continue cardiac meds: ASA, statin   - Continue DAPT (brilinta+ASA) for LAD HEATHER stent   - Pain and anticonstipation meds  - 2v CXR completed 11/26  - Postop echo completed 11/26 (EF 45%)   - Cut epicardial wires prior to discharge   - Tele until discharge  - Optimize nutrition and electrolytes  - 11/30 Getting anxious at night and difficulty sleeping. Will try one time dose of atarax 10 mg tonight and will obtain EKG now to assess QTC prior to given medication     Rhythm,   - Previous afib   - Tele: NSR rate 80s   - Continue amio to 200mg  daily  - Continue metoprolol 25mg BID (started 11/27)   - Adjust medications as tolerated    Cardiomyopathy   - appreciate heart failure consult, signed off on 11/28  - HF recommendations on 11/28:  - Diuresis: Continue diuresis per primary team.  - GDMT:   - SGLT-2i: c/w jardiance 10mg daily  - Beta-blocker: c/w metoprolol XL 25mg daily.   - ACE-I/ARB/ARNI: Will consider low dose entresto once SBP >120 mmHg. If that happens when he is inpatient, can start 12/13 mg BID entresto before discharge otherwise defer to OP initiation.               - MRA: c/w aldactone 25mg daily   - Should follow-up with heart failure specialist Dr. Gann after discharge, depart updated.   - AICD/CRT-D/Lifevest: As EF 45%, not indicated.   - Strict I/Os, Daily Na < 2g daily, fluid restriction.   -HF service will sign-off at this time.   - Continue jardiance 10mg daily  - Continue spironolactone 25mg daily  - Continue metoprolol 25mg BID     HLD: Home meds: atorvastatin 40mg daily   - Continue atorvastatin 80mg daily     Acute Blood Loss Anemia - Improving  Recent Labs     12/01/24  0706 11/30/24  0649 11/29/24  0720 11/28/24  0615 11/27/24  0643 11/26/24  0705 11/25/24  0111   HGB 10.0* 9.2* 8.8* 8.7* 8.4* 8.3* 8.3*   HCT 31.8* 29.8* 28.7* 27.5* 26.9* 26.0* 25.5*   - MV, PO Iron x1mo  - Daily labs, transfuse as indicated    Volume/Electrolyte Status: Preop wt Weight: 96.2 kg (212 lb)   Vitals:    12/01/24 0555   Weight: 85.6 kg (188 lb 12.8 oz)   - Pre-op weight 94kg, 11/30 89kg  - Adjust diuresis as needed for postop cardiac surgery hypervolemia  - Replete electrolytes for hypokalemia/hypomagnesemia/hypophosphatemia as needed  - Daily weights and strict I&Os  - Daily RFP while admitted  - 11/28 Lasix 40 mg IV x2  - 11/29 Lasix 40 mg IV now and Lasix 20 mg IV at 1800 today, reassess need for more diuresis in am  - 11/30 Lasix 40 mg IV   - 12/1 Lasix 40mg IV -> reassess in afternoon    Leukocytosis - Resolved  Recent Labs      24  0706 24  0649 24  0720 24  0615 24  0643 24  0705 24  0111   WBC 11.0 10.5 10.5 11.6* 12.7* 14.6* 18.6*     Temp (36hrs), Av.3 °C (97.3 °F), Min:35.9 °C (96.6 °F), Max:36.6 °C (97.9 °F)  - Cx data negative. Completed 7 day empiric course of zosyn  - aggressive pulmonary hygiene  - monitor for s/s infection  - daily CBC to follow    Left hemothorax s/p washout ; persisting loculated left pleural effusion, stable   -  CT chest abd and pelvis -> increasing moderate left-sided hemothorax.  Mediastinal hematoma has slightly decreased. No new mediastinal or retroperitoneal hemorrhage is evident.  - Patient currently on RA and in no acute respiratory distress   - monitor on serial CXR. Ordered for tomorrow  -  Improvement, will repeat CXR in AM  -  Check 2V CXR in AM today, consider a CT chest to further assess   -  2 View CXR->> A large left-sided masslike opacity along lateral chest wall, and  a lobulated opacity projecting over left heart border, favored to represent loculated pleural fluid collections, similar to prior exam. Left bibasilar pleural effusions and associated atelectasis.        Left inguinal hematoma   -  CT -> Stable 5.7 x 4.4 cm left inguinal hematoma is noted  -  Left groin bruised, small firm area noted, no change from prior. Hg stable at 9.2 up trending. Continue to monitor       VTE Prophylaxis: SCDs/TEDs, ambulation, SQ heparin  Code Status: Full Code    Dispo  - PT/OT recs High intensity level of continued care   - Would benefit from homecare RN for cardiac surgery carepath  - Anticipate discharge by Monday or Tuesday pending diuresis, acute rehab precerpt  - Will continue to assess discharge needs    EUGENIA Garcia-CNP  Cardiac Surgery [facility]  [facility]  Team Phone [phone]    2024  7:42 AM

## 2024-12-01 NOTE — CARE PLAN
Problem: Skin  Goal: Participates in plan/prevention/treatment measures  Outcome: Progressing  Goal: Prevent/manage excess moisture  Outcome: Progressing  Goal: Prevent/minimize sheer/friction injuries  Outcome: Progressing  Goal: Promote/optimize nutrition  Outcome: Progressing     Problem: Discharge Planning  Goal: Discharge to home or other facility with appropriate resources  Outcome: Progressing     Problem: Chronic Conditions and Co-morbidities  Goal: Patient's chronic conditions and co-morbidity symptoms are monitored and maintained or improved  Outcome: Progressing     Problem: Fall/Injury  Goal: Not fall by end of shift  Outcome: Progressing  Goal: Be free from injury by end of the shift  Outcome: Progressing  Goal: Verbalize understanding of personal risk factors for fall in the hospital  Outcome: Progressing  Goal: Verbalize understanding of risk factor reduction measures to prevent injury from fall in the home  Outcome: Progressing  Goal: Use assistive devices by end of the shift  Outcome: Progressing  Goal: Pace activities to prevent fatigue by end of the shift  Outcome: Progressing     Problem: Pain  Goal: Takes deep breaths with improved pain control throughout the shift  Outcome: Progressing  Goal: Turns in bed with improved pain control throughout the shift  Outcome: Progressing  Goal: Walks with improved pain control throughout the shift  Outcome: Progressing  Goal: Performs ADL's with improved pain control throughout shift  Outcome: Progressing  Goal: Participates in PT with improved pain control throughout the shift  Outcome: Progressing  Goal: Free from acute confusion related to pain meds throughout the shift  Outcome: Progressing       The clinical goals for the shift include Patient will remain safe and free from falls throughout shift.  Pattie Fuchs RN

## 2024-12-01 NOTE — CARE PLAN
The patient's goals for the shift include      The clinical goals for the shift include Pt will remain HDS through out shift     Over the shift, the patient did not make progress toward the following goals. Barriers to progression include ***. Recommendations to address these barriers include ***.

## 2024-12-02 LAB
ALBUMIN SERPL BCP-MCNC: 3.6 G/DL (ref 3.4–5)
ANION GAP SERPL CALC-SCNC: 12 MMOL/L (ref 10–20)
BUN SERPL-MCNC: 18 MG/DL (ref 6–23)
CALCIUM SERPL-MCNC: 8.6 MG/DL (ref 8.6–10.6)
CHLORIDE SERPL-SCNC: 105 MMOL/L (ref 98–107)
CO2 SERPL-SCNC: 22 MMOL/L (ref 21–32)
CREAT SERPL-MCNC: 0.85 MG/DL (ref 0.5–1.3)
EGFRCR SERPLBLD CKD-EPI 2021: >90 ML/MIN/1.73M*2
ERYTHROCYTE [DISTWIDTH] IN BLOOD BY AUTOMATED COUNT: 16.4 % (ref 11.5–14.5)
FUNGUS SPEC CULT: NORMAL
FUNGUS SPEC FUNGUS STN: NORMAL
GLUCOSE SERPL-MCNC: 83 MG/DL (ref 74–99)
HCT VFR BLD AUTO: 33.4 % (ref 41–52)
HGB BLD-MCNC: 10.5 G/DL (ref 13.5–17.5)
MAGNESIUM SERPL-MCNC: 2.57 MG/DL (ref 1.6–2.4)
MCH RBC QN AUTO: 29.5 PG (ref 26–34)
MCHC RBC AUTO-ENTMCNC: 31.4 G/DL (ref 32–36)
MCV RBC AUTO: 94 FL (ref 80–100)
NRBC BLD-RTO: 0 /100 WBCS (ref 0–0)
PHOSPHATE SERPL-MCNC: 3.5 MG/DL (ref 2.5–4.9)
PLATELET # BLD AUTO: 558 X10*3/UL (ref 150–450)
POTASSIUM SERPL-SCNC: 4.4 MMOL/L (ref 3.5–5.3)
RBC # BLD AUTO: 3.56 X10*6/UL (ref 4.5–5.9)
SODIUM SERPL-SCNC: 135 MMOL/L (ref 136–145)
WBC # BLD AUTO: 9.9 X10*3/UL (ref 4.4–11.3)

## 2024-12-02 PROCEDURE — 97116 GAIT TRAINING THERAPY: CPT | Mod: GP,CQ

## 2024-12-02 PROCEDURE — 1200000002 HC GENERAL ROOM WITH TELEMETRY DAILY

## 2024-12-02 PROCEDURE — 2500000002 HC RX 250 W HCPCS SELF ADMINISTERED DRUGS (ALT 637 FOR MEDICARE OP, ALT 636 FOR OP/ED): Performed by: NURSE PRACTITIONER

## 2024-12-02 PROCEDURE — 97110 THERAPEUTIC EXERCISES: CPT | Mod: GP,CQ

## 2024-12-02 PROCEDURE — 94760 N-INVAS EAR/PLS OXIMETRY 1: CPT

## 2024-12-02 PROCEDURE — 2500000001 HC RX 250 WO HCPCS SELF ADMINISTERED DRUGS (ALT 637 FOR MEDICARE OP): Performed by: NURSE PRACTITIONER

## 2024-12-02 PROCEDURE — 2500000004 HC RX 250 GENERAL PHARMACY W/ HCPCS (ALT 636 FOR OP/ED): Performed by: NURSE PRACTITIONER

## 2024-12-02 PROCEDURE — 99232 SBSQ HOSP IP/OBS MODERATE 35: CPT | Performed by: NURSE PRACTITIONER

## 2024-12-02 PROCEDURE — 99232 SBSQ HOSP IP/OBS MODERATE 35: CPT

## 2024-12-02 PROCEDURE — 83735 ASSAY OF MAGNESIUM: CPT | Performed by: NURSE PRACTITIONER

## 2024-12-02 PROCEDURE — 85027 COMPLETE CBC AUTOMATED: CPT | Performed by: NURSE PRACTITIONER

## 2024-12-02 PROCEDURE — 97530 THERAPEUTIC ACTIVITIES: CPT | Mod: GP,CQ

## 2024-12-02 PROCEDURE — 80069 RENAL FUNCTION PANEL: CPT | Performed by: NURSE PRACTITIONER

## 2024-12-02 RX ORDER — FUROSEMIDE 10 MG/ML
20 INJECTION INTRAMUSCULAR; INTRAVENOUS ONCE
Status: COMPLETED | OUTPATIENT
Start: 2024-12-02 | End: 2024-12-02

## 2024-12-02 RX ADMIN — ATORVASTATIN CALCIUM 80 MG: 80 TABLET, FILM COATED ORAL at 21:00

## 2024-12-02 RX ADMIN — HEPARIN SODIUM 5000 UNITS: 5000 INJECTION INTRAVENOUS; SUBCUTANEOUS at 01:29

## 2024-12-02 RX ADMIN — METOPROLOL SUCCINATE 25 MG: 25 TABLET, EXTENDED RELEASE ORAL at 08:40

## 2024-12-02 RX ADMIN — EMPAGLIFLOZIN 10 MG: 10 TABLET, FILM COATED ORAL at 08:51

## 2024-12-02 RX ADMIN — HEPARIN SODIUM 5000 UNITS: 5000 INJECTION INTRAVENOUS; SUBCUTANEOUS at 17:00

## 2024-12-02 RX ADMIN — SPIRONOLACTONE 25 MG: 25 TABLET, FILM COATED ORAL at 08:39

## 2024-12-02 RX ADMIN — Medication 10 MG: at 21:00

## 2024-12-02 RX ADMIN — ACETAMINOPHEN 975 MG: 325 TABLET, FILM COATED ORAL at 08:45

## 2024-12-02 RX ADMIN — POLYSACCHARIDE-IRON COMPLEX 150 MG: 150 CAPSULE ORAL at 08:39

## 2024-12-02 RX ADMIN — ACETAMINOPHEN 975 MG: 325 TABLET, FILM COATED ORAL at 21:00

## 2024-12-02 RX ADMIN — HEPARIN SODIUM 5000 UNITS: 5000 INJECTION INTRAVENOUS; SUBCUTANEOUS at 08:46

## 2024-12-02 RX ADMIN — Medication 1 TABLET: at 08:39

## 2024-12-02 RX ADMIN — TICAGRELOR 90 MG: 90 TABLET ORAL at 21:01

## 2024-12-02 RX ADMIN — AMIODARONE HYDROCHLORIDE 200 MG: 200 TABLET ORAL at 08:43

## 2024-12-02 RX ADMIN — FUROSEMIDE 20 MG: 10 INJECTION, SOLUTION INTRAVENOUS at 12:00

## 2024-12-02 RX ADMIN — ASPIRIN 81 MG: 81 TABLET, COATED ORAL at 08:41

## 2024-12-02 RX ADMIN — TICAGRELOR 90 MG: 90 TABLET ORAL at 08:44

## 2024-12-02 RX ADMIN — ACETAMINOPHEN 975 MG: 325 TABLET, FILM COATED ORAL at 06:09

## 2024-12-02 ASSESSMENT — PAIN - FUNCTIONAL ASSESSMENT
PAIN_FUNCTIONAL_ASSESSMENT: 0-10

## 2024-12-02 ASSESSMENT — COGNITIVE AND FUNCTIONAL STATUS - GENERAL
MOVING FROM LYING ON BACK TO SITTING ON SIDE OF FLAT BED WITH BEDRAILS: A LITTLE
MOVING TO AND FROM BED TO CHAIR: A LITTLE
WALKING IN HOSPITAL ROOM: A LITTLE
PERSONAL GROOMING: A LITTLE
HELP NEEDED FOR BATHING: A LITTLE
STANDING UP FROM CHAIR USING ARMS: A LITTLE
WALKING IN HOSPITAL ROOM: A LITTLE
TOILETING: A LITTLE
CLIMB 3 TO 5 STEPS WITH RAILING: A LITTLE
MOVING FROM LYING ON BACK TO SITTING ON SIDE OF FLAT BED WITH BEDRAILS: A LITTLE
MOVING TO AND FROM BED TO CHAIR: A LITTLE
DAILY ACTIVITIY SCORE: 19
MOBILITY SCORE: 18
TURNING FROM BACK TO SIDE WHILE IN FLAT BAD: A LOT
CLIMB 3 TO 5 STEPS WITH RAILING: A LOT
STANDING UP FROM CHAIR USING ARMS: A LITTLE
TURNING FROM BACK TO SIDE WHILE IN FLAT BAD: A LITTLE
MOBILITY SCORE: 16
DRESSING REGULAR LOWER BODY CLOTHING: A LITTLE
DRESSING REGULAR UPPER BODY CLOTHING: A LITTLE

## 2024-12-02 ASSESSMENT — PAIN DESCRIPTION - ORIENTATION: ORIENTATION: ANTERIOR

## 2024-12-02 ASSESSMENT — PAIN SCALES - GENERAL
PAINLEVEL_OUTOF10: 0 - NO PAIN
PAINLEVEL_OUTOF10: 2

## 2024-12-02 ASSESSMENT — PAIN SCALES - WONG BAKER: WONGBAKER_NUMERICALRESPONSE: HURTS LITTLE BIT

## 2024-12-02 ASSESSMENT — PAIN DESCRIPTION - LOCATION: LOCATION: CHEST

## 2024-12-02 NOTE — PROGRESS NOTES
CARDIAC SURGERY DAILY PROGRESS NOTE    Jaime Dominguez is a 74 y.o. male, with a PMH of basal cell carcinoma and HLD who presented with STEMI to an OSH and transferred to [facility] on 11/10/2024 for CABG workup. S/p MidCAB x2 converted to full sternotomy w/ LIMA prolonged as a Y graft with a radial to LAD OM w/ Dr. Aldo Harman and Dr. Stanley 11/12. Postop course c/b increasing pressor requirement and echo revealing apical hypo-akinesis with subsequent IABP placement on 11/14. Overnight on 11/14, Troponin rise and ST elevation in anteroseptal leads, c/f ACS- went for LHC, underwent PCI of mid LAD with HEATHER. Cannulated for VA ECMO 11/14 for cardiogenic shock 2/2 multiple failed grafts. On 11/19, returned to OR for VA ECMO decannulation and left hemothorax washout. IABP removed 11/20. Transferred to floor 11/25.     OPERATION/PROCEDURE: 11/12 with Rodrigo Harman MD  1.  Mini invasive robotic assisted CABG x 2 converted to full sternotomy.  2.  CABG x 2 with LIMA  prolonged as a Y graft with a radial to LAD OM.  3.  Endoscopic radial harvest.  4.  Left femoral artery and vein cannulation for peripheral bypass.  5.  Partial LAD endarterectomy    CTICU Course: see above    Transferred to T3: on 11/25.    =======================================  Interval History:   Shortness of breath improving    SUBJECTIVE:  -745 from yesterday   - will get chest CT this am to get better look at lt hemothorax ->on room air leg and arm swelling have diminished     Objective   /64 (BP Location: Left arm, Patient Position: Lying)   Pulse 87   Temp 36.5 °C (97.7 °F) (Temporal)   Resp 18   Ht 1.829 m (6')   Wt 83.9 kg (185 lb 0.4 oz)   SpO2 96%   BMI 25.09 kg/m²   0-10 (Numeric) Pain Score: 0 - No pain   3 Day Weight Change: -2.325 kg (-5 lb 2 oz) per day    Intake and Output    Intake/Output Summary (Last 24 hours) at 12/2/2024 0731  Last data filed at 12/2/2024 0700  Gross per 24 hour   Intake 480 ml   Output  1425 ml   Net -945 ml       Physical Exam  Physical Exam  Vitals reviewed.   Constitutional:       General: He is not in acute distress.     Appearance: Normal appearance. He is not ill-appearing.      Comments: Laying in bed    HENT:      Head: Normocephalic and atraumatic.      Nose: Nose normal.      Mouth/Throat:      Mouth: Mucous membranes are dry.   Eyes:      General: Lids are normal.      Conjunctiva/sclera: Conjunctivae normal.   Cardiovascular:      Rate and Rhythm: Normal rate and regular rhythm.      Pulses: Normal pulses.      Comments: Tele - NSR rate 80s   V wires set VVI @ 50, mA 9    Pulmonary:      Effort: Pulmonary effort is normal.      Breath sounds: Normal breath sounds.      Comments: On room air with appropriate oxygenation   Short of breath with minimal movement  Abdominal:      General: Abdomen is flat.      Palpations: Abdomen is soft.      Comments: Last BM 11/28  Passing gas    Musculoskeletal:         General: Normal range of motion.      Cervical back: Normal range of motion.      Right lower leg: Edema present.      Left lower leg: Edema present.      Comments: Moving all extremities appropriately with equal strength.   Pitting edema of lower extremities, near graft site. Improved with diuresis.   Skin:     General: Skin is warm and dry.      Capillary Refill: Capillary refill takes less than 2 seconds.      Comments: Left chest and mid sternum incision dry, no erythema, no drainage and well approximated. Left groin suture noted to be in place, bruising noted and some firmness to palpation noted (left inguinal hematoma mentioned on CT on 11/18/24). Per patient left groin feels better than previously.   Neurological:      General: No focal deficit present.      Mental Status: He is alert and oriented to person, place, and time.   Psychiatric:         Mood and Affect: Mood normal.         Behavior: Behavior normal. Behavior is cooperative.       Medications  Scheduled  medications  acetaminophen, 975 mg, oral, q8h POWER  amiodarone, 200 mg, oral, Daily  aspirin, 81 mg, oral, Daily  atorvastatin, 80 mg, oral, Nightly  empagliflozin, 10 mg, oral, Daily  heparin, 5,000 Units, subcutaneous, q8h  iron polysaccharides, 150 mg, oral, Daily  melatonin, 10 mg, oral, Nightly  metoprolol succinate XL, 25 mg, oral, Daily  multivitamin with minerals, 1 tablet, oral, Daily  polyethylene glycol, 17 g, oral, BID  sennosides-docusate sodium, 2 tablet, oral, BID  spironolactone, 25 mg, oral, Daily  ticagrelor, 90 mg, oral, BID    Continuous medications   PRN medications  PRN medications: benzocaine-menthol, bisacodyl, naloxone, naloxone, ondansetron **OR** ondansetron, oxygen, sodium chloride, white petrolatum    Labs  No results found for this or any previous visit (from the past 24 hours).    =========================  IMAGING/ DIAGNOSTIC TESTING:  ========================    11/30 XR chest 1 view   Impression  1.Large left-sided masslike opacity along lateral chest wall similar  prior study.  2.Patchy left basilar airspace disease with small effusion again noted.    11/29 XR chest 1 view  IMPRESSION:  No interval change has occurred in the multiloculated left pleural  effusion/hemothorax nor in the small free-flowing left pleural  effusion.    11/28 XR chest 1 view  IMPRESSION:  1. No significant change in multiloculated left-sided pleural  effusion/hemothorax as well as small free left basilar pleural  effusion and associated atelectasis.  2. Unchanged mild right basilar atelectasis with or without trace  right pleural effusion.    11/26/24 XR CHEST 2 VIEWS:  IMPRESSION:  1.  Stable loculated left-sided pleural effusions-hematoma.  2. Similar bibasilar presumed atelectasis with small bilateral  pleural effusions, left greater than right.  3. No pneumothorax.     11/27/24 TTE:  CONCLUSIONS:   1. Poorly visualized anatomical structures due to suboptimal image quality.   2. The left ventricle was  not well visualized.   3. Left ventricular ejection fraction is suspected mildly decreased, by visual estimate at 45%.   4. Spectral Doppler shows a Grade I (impaired relaxation pattern) of left ventricular diastolic filling with normal left atrial filling pressure.   5. Abnormal septal motion consistent with post-operative status.   6. There is normal right ventricular global systolic function.    11/18 CT chest abdomen pelvis without contrast  1.Stable 5.7 x 4.4 cm left inguinal hematoma is noted.  2.Increasing moderate left-sided hemothorax. Mediastinal hematoma has  slightly decreased. No new mediastinal or retroperitoneal hemorrhage  is evident.    ================  IMPRESSION & PLAN:  ===============    POD #20 s/p midCAB x2 converted to full sternotomy on 11/12 with s/p PCI of mid LAD with HEATHER on 11/14    - VA ECMO 11/14-11/19  - IABP 11/14-11/20.  - Increase activity/ ambulation; PT/OT  - Encourage IS, C/DB; respiratory therapy; wean O2 as josué   - Cardiac rehab referral   - Continue cardiac meds: ASA, statin   - Continue DAPT (brilinta+ASA) for LAD HEATHER stent   - Pain and anticonstipation meds  - 2v CXR completed 11/26  - Postop echo completed 11/26 (EF 45%)   - Cut epicardial wires prior to discharge   - Tele until discharge  - Optimize nutrition and electrolytes  - 11/30 Getting anxious at night and difficulty sleeping. Will try one time dose of atarax 10 mg tonight and will obtain EKG now to assess QTC prior to given medication     Rhythm,   - Previous afib   - Tele: NSR rate 80s   - Continue amio to 200mg daily  - Continue metoprolol 25mg BID (started 11/27)   - Adjust medications as tolerated    Cardiomyopathy   - appreciate heart failure consult, signed off on 11/28  - HF recommendations on 11/28:  - Diuresis: Continue diuresis per primary team.  - GDMT:   - SGLT-2i: c/w jardiance 10mg daily  - Beta-blocker: c/w metoprolol XL 25mg daily.   - ACE-I/ARB/ARNI: Will consider low dose entresto once SBP >120  mmHg. If that happens when he is inpatient, can start 12/13 mg BID entresto before discharge otherwise defer to OP initiation.               - MRA: c/w aldactone 25mg daily   - Should follow-up with heart failure specialist Dr. Gann after discharge, depart updated.   - AICD/CRT-D/Lifevest: As EF 45%, not indicated.   - Strict I/Os, Daily Na < 2g daily, fluid restriction.   -HF service will sign-off at this time.   - Continue jardiance 10mg daily  - Continue spironolactone 25mg daily  - Continue metoprolol 25mg BID     HLD: Home meds: atorvastatin 40mg daily   - Continue atorvastatin 80mg daily     Acute Blood Loss Anemia - Improving  Recent Labs     24  0706 24  0649 24  0720 24  0615 24  0643 24  0705 24  0111   HGB 10.0* 9.2* 8.8* 8.7* 8.4* 8.3* 8.3*   HCT 31.8* 29.8* 28.7* 27.5* 26.9* 26.0* 25.5*   - MV, PO Iron x1mo  - Daily labs, transfuse as indicated    Volume/Electrolyte Status: Preop wt Weight: 96.2 kg (212 lb)   Vitals:    24 0700   Weight: 83.9 kg (185 lb 0.4 oz)   - Pre-op weight 94kg,  89kg  - Adjust diuresis as needed for postop cardiac surgery hypervolemia  - Replete electrolytes for hypokalemia/hypomagnesemia/hypophosphatemia as needed  - Daily weights and strict I&Os  - Daily RFP while admitted  -  Lasix 40 mg IV x2  -  Lasix 40 mg IV now and Lasix 20 mg IV at 1800 today, reassess need for more diuresis in am  -  Lasix 40 mg IV   -  Lasix 40mg IV -> reassess in afternoon      Leukocytosis - Resolved  Recent Labs     24  0706 24  0649 24  0720 24  0615 24  0643 24  0705 24  0111   WBC 11.0 10.5 10.5 11.6* 12.7* 14.6* 18.6*     Temp (36hrs), Av.4 °C (97.5 °F), Min:36 °C (96.8 °F), Max:36.7 °C (98.1 °F)  - Cx data negative. Completed 7 day empiric course of zosyn  - aggressive pulmonary hygiene  - monitor for s/s infection  - daily CBC to follow    Left hemothorax s/p washout  11/19; persisting loculated left pleural effusion, stable   - 11/18 CT chest abd and pelvis -> increasing moderate left-sided hemothorax.  Mediastinal hematoma has slightly decreased. No new mediastinal or retroperitoneal hemorrhage is evident.  - Patient currently on RA and in no acute respiratory distress   - monitor on serial CXR. Ordered for tomorrow  - 11/29 Improvement, will repeat CXR in AM  - 11/30 Check 2V CXR in AM today, consider a CT chest to further assess   - 12/1 2 View CXR->> A large left-sided masslike opacity along lateral chest wall, and  a lobulated opacity projecting over left heart border, favored to represent loculated pleural fluid collections, similar to prior exam. Left bibasilar pleural effusions and associated atelectasis.  -12/2 Aldo Harman made aware or recent imaging(out of town [place] to look at imaging)-> chest xray looked somewhat improved-> repeat chest xray tomorrow in am       Left inguinal hematoma   - 11/18 CT -> Stable 5.7 x 4.4 cm left inguinal hematoma is noted  - 11/30 Left groin bruised, small firm area noted, no change from prior. Hg stable at 9.2 up trending. Continue to monitor       VTE Prophylaxis: SCDs/TEDs, ambulation, SQ heparin  Code Status: Full Code    Dispo  - PT/OT recs High intensity level of continued care   - Would benefit from homecare RN for cardiac surgery carepath  - Anticipate discharge by Monday or Tuesday->  acute rehab precerpt  - Will continue to assess discharge needs    EUGENIA Garcia-CNP  Cardiac Surgery [facility]  [facility]  Team Phone [phone]    12/2/2024  7:31 AM

## 2024-12-02 NOTE — SIGNIFICANT EVENT
Asked to see patient regarding new confusion. This seem to be temporarily related to received gabapentin.     Surgeon: Justin/Lizeth  DOS: Nov. 12, 2024  Procedure: MIDCABG x 2 with conversion to full sternotomy LIMA prolonged as a Y graft with a radial to LAD OM      Airway: grade I - full view of glottis   EF: ~45% (TTE Nov. 27)    FULL CODE    S- Patent comments on poor sleep and ongoing difficulties with a persistent cough and subjective inability to take a full breath. Patient comments on some hallucinations when he received gabapentin.    O-   Neuro: CAM- (able to do months of the year backwards), RASS 0, Neuro Intact  CVS:   Visit Vitals  /64 (BP Location: Left arm, Patient Position: Lying)   Pulse 87   Temp 36.5 °C (97.7 °F) (Temporal)   Resp 18   NSR. Minimal peripheral edema. Normal capillary refill,  Resp: On Room Air. Decreased BS to the L  Abdo: soft, NT, BS+  Skin/Wound Issues: Sternal incision healing well. No tenderness or clicks on palpation     CXR: Persistent opacity along left chest wall. Otherwise minimal pulmonary edema    A- No focal neuro deficits, screens negative for delirium at time of assessment.     P-     - Will ask for a non-contrast CT chest today to evaluate for intrathoracic collection; w   - HF team has been previously following and patient appears to be well managed at the present time. Additional recommendations per their note Nov. 28.   - DC gabapentin and continue other non-pharm sleep promotion processes.   - Continue with ongoing OT/PT    I have personally identified and managed all complex care issues to prevent clinical deterioration and engage in enhancing recovery protocols.  Clinical care time is spent at bedside and/or the immediate area and has included, but is not limited to, examination of the patient, review of the chart, diagnostic tests, labs, radiographs, review of consult team recommendations, discharge planning and family updates.  Time spent in  procedures and teaching are reported separately.     Clinical Care Time: 38 minutes.

## 2024-12-02 NOTE — PROGRESS NOTES
Physical Therapy    Physical Therapy Treatment    Patient Name: Jaime Dominguez  MRN: [id number]  Department: [place] [address]  Room: [address] [place] [address]  Today's Date: 12/2/2024  Time Calculation  Start Time: 0906  Stop Time: 0944  Time Calculation (min): 38 min         Assessment/Plan   PT Assessment  PT Assessment Results: Decreased strength, Decreased endurance, Impaired balance  Rehab Prognosis: Excellent  Barriers to Discharge: Medical acuity  Evaluation/Treatment Tolerance: Patient tolerated treatment well  Medical Staff Made Aware: Yes  Strengths: Ability to acquire knowledge, Attitude of self, Support of Caregivers  End of Session Communication: Bedside nurse  Assessment Comment: Patient required frequent seated rest breaks with short amb trials. Demos generalized weakness and decreased activity tolerance. Would benefit greatly from HIGH intensity PT upon hospital d/c.  End of Session Patient Position: Up in chair, Alarm off, not on at start of session  PT Plan  Inpatient/Swing Bed or Outpatient: Inpatient  PT Plan  Treatment/Interventions: Bed mobility, Transfer training, Gait training, Stair training, Balance training, Strengthening, Endurance training, Therapeutic exercise, Therapeutic activity  PT Plan: Ongoing PT  PT Frequency: 5 times per week  PT Discharge Recommendations: High intensity level of continued care  Equipment Recommended upon Discharge: Wheeled walker  PT Recommended Transfer Status: Assist x2  PT - OK to Discharge: Yes      General Visit Information:   PT  Visit  PT Received On: 12/02/24  Response to Previous Treatment: Patient with no complaints from previous session.  General  Family/Caregiver Present: No  Prior to Session Communication: Bedside nurse  Patient Position Received: Up in chair, Alarm off, not on at start of session  Preferred Learning Style: visual, verbal  General Comment: Pt seated in chair on approach. Pleasant and agreeable to PT session    Subjective    Precautions:  Precautions  Hearing/Visual Limitations: WFL  Medical Precautions: Cardiac precautions, Fall precautions  Post-Surgical Precautions: Move in the Tube    Vital Signs (Past 2hrs)        Date/Time Vitals Session Patient Position Pulse Resp SpO2 BP MAP (mmHg)    12/02/24 0906 During PT  Sitting  88  --  98 %  105/70  --                         Objective   Pain:  Pain Assessment  Pain Assessment: 0-10  0-10 (Numeric) Pain Score: 0 - No pain  Cognition:  Cognition  Overall Cognitive Status: Within Functional Limits  Orientation Level: Oriented X4  Coordination:  Movements are Fluid and Coordinated: Yes  Postural Control:  Postural Control  Postural Control: Within Functional Limits  Static Standing Balance  Static Standing-Balance Support: Bilateral upper extremity supported  Static Standing-Level of Assistance: Contact guard  Static Standing-Comment/Number of Minutes: with fww  Dynamic Standing Balance  Dynamic Standing-Balance Support: Bilateral upper extremity supported  Dynamic Standing-Level of Assistance: Contact guard  Dynamic Standing-Balance: Turning  Dynamic Standing-Comments: with fww    Activity Tolerance:  Activity Tolerance  Endurance: Tolerates 30 min exercise with multiple rests  Treatments:  Therapeutic Exercise  Therapeutic Exercise Performed: Yes  Therapeutic Exercise Activity 1: standing duane LE: marches in place x 12 total  Therapeutic Exercise Activity 2: attempted standing heel raises x 2 total with patient demonstrating duane knee buckling and requiring Min A x 1 for safety         Ambulation/Gait Training  Ambulation/Gait Training Performed: Yes  Ambulation/Gait Training 1  Surface 1: Level tile  Device 1: Rolling walker  Assistance 1: Contact guard  Quality of Gait 1: Narrow base of support, Decreased step length, Shuffling gait, Soft knee(s)  Comments/Distance (ft) 1: 10 ft, 50 ft x 2  Transfers  Transfer: Yes  Transfer 1  Transfer From 1: Sit to, Stand to  Transfer to 1: Stand,  Sit  Technique 1: Sit to stand, Stand to sit  Transfer Device 1: Walker  Transfer Level of Assistance 1: Close supervision  Trials/Comments 1: x3 trials    Outcome Measures:  [facility] Basic Mobility  Turning from your back to your side while in a flat bed without using bedrails: A little  Moving from lying on your back to sitting on the side of a flat bed without using bedrails: A lot  Moving to and from bed to chair (including a wheelchair): A little  Standing up from a chair using your arms (e.g. wheelchair or bedside chair): A little  To walk in hospital room: A little  Climbing 3-5 steps with railing: A lot  Basic Mobility - Total Score: 16    Education Documentation  Precautions, taught by Rosa May PTA at 12/2/2024 10:36 AM.  Learner: Patient  Readiness: Acceptance  Method: Explanation  Response: Verbalizes Understanding  Comment: Reviewed MITT precautions and safety with trans    Body Mechanics, taught by Rosa May PTA at 12/2/2024 10:36 AM.  Learner: Patient  Readiness: Acceptance  Method: Explanation  Response: Verbalizes Understanding  Comment: Reviewed MITT precautions and safety with trans    Mobility Training, taught by Rosa May PTA at 12/2/2024 10:36 AM.  Learner: Patient  Readiness: Acceptance  Method: Explanation  Response: Verbalizes Understanding  Comment: Reviewed MITT precautions and safety with trans    Education Comments  No comments found.        OP EDUCATION:       Encounter Problems       Encounter Problems (Active)       Balance       Pt will demonstrate improved sitting/standing static/dynamic balance activities without LOB via score of 24/28 on the Tinetti for increase in safety prior to DC.  (Progressing)       Start:  11/21/24    Expected End:  12/05/24               Mobility       Pt will ambulate >15ft with appropriate form, Mod A or less, LRAD, and no LOB for safe DC.  (Met)       Start:  11/21/24    Expected End:  12/05/24    Resolved:  11/25/24    Updated to: Pt  will ambulate >150ft with appropriate form, CGA, LRAD, and no LOB for safe DC.    Update reason: Met         Pt will tolerate >15 minutes of upright standing activity without seated rest breaks with no changes in vital signs for improved functional mobility.  (Progressing)       Start:  11/21/24    Expected End:  12/05/24            Pt will ambulate >150ft with appropriate form, CGA, LRAD, and no LOB for safe DC. (Progressing)       Start:  11/25/24    Expected End:  12/05/24                   PT Transfers       Pt will perform bed mobility with Mod A or less and use of LRAD to safely DC.  (Met)       Start:  11/21/24    Expected End:  12/05/24    Resolved:  11/25/24    Updated to: Pt will perform bed mobility with CGA and use of LRAD to safely DC.    Update reason: Met         Pt will perform sit<>stand transfers with Mod A or less and use of LRAD to safely DC.  (Met)       Start:  11/21/24    Expected End:  12/05/24    Resolved:  11/25/24    Updated to: Pt will perform sit<>stand transfers with CGA and use of LRAD to safely DC.    Update reason: Met         Pt will perform bed mobility with CGA and use of LRAD to safely DC. (Progressing)       Start:  11/25/24    Expected End:  12/05/24                Pt will perform sit<>stand transfers with CGA and use of LRAD to safely DC. (Progressing)       Start:  11/25/24    Expected End:  12/05/24

## 2024-12-03 ENCOUNTER — APPOINTMENT (OUTPATIENT)
Dept: RADIOLOGY | Facility: HOSPITAL | Age: 74
DRG: 003 | End: 2024-12-03
Payer: MEDICARE

## 2024-12-03 LAB
ALBUMIN SERPL BCP-MCNC: 3.6 G/DL (ref 3.4–5)
ANION GAP SERPL CALC-SCNC: 15 MMOL/L (ref 10–20)
ATRIAL RATE: 76 BPM
BUN SERPL-MCNC: 19 MG/DL (ref 6–23)
CALCIUM SERPL-MCNC: 8.5 MG/DL (ref 8.6–10.6)
CHLORIDE SERPL-SCNC: 104 MMOL/L (ref 98–107)
CO2 SERPL-SCNC: 21 MMOL/L (ref 21–32)
CREAT SERPL-MCNC: 0.8 MG/DL (ref 0.5–1.3)
EGFRCR SERPLBLD CKD-EPI 2021: >90 ML/MIN/1.73M*2
ERYTHROCYTE [DISTWIDTH] IN BLOOD BY AUTOMATED COUNT: 16.2 % (ref 11.5–14.5)
GLUCOSE SERPL-MCNC: 81 MG/DL (ref 74–99)
HCT VFR BLD AUTO: 35.1 % (ref 41–52)
HGB BLD-MCNC: 10.5 G/DL (ref 13.5–17.5)
MAGNESIUM SERPL-MCNC: 2.39 MG/DL (ref 1.6–2.4)
MCH RBC QN AUTO: 29.1 PG (ref 26–34)
MCHC RBC AUTO-ENTMCNC: 29.9 G/DL (ref 32–36)
MCV RBC AUTO: 97 FL (ref 80–100)
NRBC BLD-RTO: 0 /100 WBCS (ref 0–0)
P AXIS: 50 DEGREES
P OFFSET: 186 MS
P ONSET: 126 MS
PHOSPHATE SERPL-MCNC: 3.6 MG/DL (ref 2.5–4.9)
PLATELET # BLD AUTO: 516 X10*3/UL (ref 150–450)
POTASSIUM SERPL-SCNC: 4.4 MMOL/L (ref 3.5–5.3)
PR INTERVAL: 186 MS
Q ONSET: 219 MS
QRS COUNT: 13 BEATS
QRS DURATION: 92 MS
QT INTERVAL: 440 MS
QTC CALCULATION(BAZETT): 495 MS
QTC FREDERICIA: 475 MS
R AXIS: 90 DEGREES
RBC # BLD AUTO: 3.61 X10*6/UL (ref 4.5–5.9)
SODIUM SERPL-SCNC: 136 MMOL/L (ref 136–145)
T AXIS: 53 DEGREES
T OFFSET: 439 MS
VENTRICULAR RATE: 76 BPM
WBC # BLD AUTO: 9.3 X10*3/UL (ref 4.4–11.3)

## 2024-12-03 PROCEDURE — 2500000001 HC RX 250 WO HCPCS SELF ADMINISTERED DRUGS (ALT 637 FOR MEDICARE OP): Performed by: NURSE PRACTITIONER

## 2024-12-03 PROCEDURE — 2500000004 HC RX 250 GENERAL PHARMACY W/ HCPCS (ALT 636 FOR OP/ED): Performed by: NURSE PRACTITIONER

## 2024-12-03 PROCEDURE — 80069 RENAL FUNCTION PANEL: CPT | Performed by: NURSE PRACTITIONER

## 2024-12-03 PROCEDURE — 71045 X-RAY EXAM CHEST 1 VIEW: CPT

## 2024-12-03 PROCEDURE — 83735 ASSAY OF MAGNESIUM: CPT | Performed by: NURSE PRACTITIONER

## 2024-12-03 PROCEDURE — 2500000002 HC RX 250 W HCPCS SELF ADMINISTERED DRUGS (ALT 637 FOR MEDICARE OP, ALT 636 FOR OP/ED): Performed by: NURSE PRACTITIONER

## 2024-12-03 PROCEDURE — 2500000001 HC RX 250 WO HCPCS SELF ADMINISTERED DRUGS (ALT 637 FOR MEDICARE OP)

## 2024-12-03 PROCEDURE — 99232 SBSQ HOSP IP/OBS MODERATE 35: CPT

## 2024-12-03 PROCEDURE — 97110 THERAPEUTIC EXERCISES: CPT | Mod: GP,CQ

## 2024-12-03 PROCEDURE — 85027 COMPLETE CBC AUTOMATED: CPT | Performed by: NURSE PRACTITIONER

## 2024-12-03 PROCEDURE — 71045 X-RAY EXAM CHEST 1 VIEW: CPT | Performed by: RADIOLOGY

## 2024-12-03 PROCEDURE — 1200000002 HC GENERAL ROOM WITH TELEMETRY DAILY

## 2024-12-03 PROCEDURE — 2500000004 HC RX 250 GENERAL PHARMACY W/ HCPCS (ALT 636 FOR OP/ED)

## 2024-12-03 PROCEDURE — 97116 GAIT TRAINING THERAPY: CPT | Mod: GP,CQ

## 2024-12-03 RX ORDER — FUROSEMIDE 10 MG/ML
40 INJECTION INTRAMUSCULAR; INTRAVENOUS ONCE
Status: COMPLETED | OUTPATIENT
Start: 2024-12-03 | End: 2024-12-03

## 2024-12-03 RX ORDER — METOPROLOL SUCCINATE 25 MG/1
12.5 TABLET, EXTENDED RELEASE ORAL 2 TIMES DAILY
Status: DISCONTINUED | OUTPATIENT
Start: 2024-12-03 | End: 2024-12-04 | Stop reason: HOSPADM

## 2024-12-03 RX ADMIN — ASPIRIN 81 MG: 81 TABLET, COATED ORAL at 09:19

## 2024-12-03 RX ADMIN — ATORVASTATIN CALCIUM 80 MG: 80 TABLET, FILM COATED ORAL at 21:15

## 2024-12-03 RX ADMIN — HEPARIN SODIUM 5000 UNITS: 5000 INJECTION INTRAVENOUS; SUBCUTANEOUS at 09:19

## 2024-12-03 RX ADMIN — Medication 1 TABLET: at 09:18

## 2024-12-03 RX ADMIN — HEPARIN SODIUM 5000 UNITS: 5000 INJECTION INTRAVENOUS; SUBCUTANEOUS at 01:19

## 2024-12-03 RX ADMIN — SPIRONOLACTONE 25 MG: 25 TABLET, FILM COATED ORAL at 09:18

## 2024-12-03 RX ADMIN — AMIODARONE HYDROCHLORIDE 200 MG: 200 TABLET ORAL at 09:19

## 2024-12-03 RX ADMIN — METOPROLOL SUCCINATE 12.5 MG: 25 TABLET, EXTENDED RELEASE ORAL at 21:15

## 2024-12-03 RX ADMIN — HEPARIN SODIUM 5000 UNITS: 5000 INJECTION INTRAVENOUS; SUBCUTANEOUS at 16:54

## 2024-12-03 RX ADMIN — Medication 10 MG: at 21:15

## 2024-12-03 RX ADMIN — ACETAMINOPHEN 975 MG: 325 TABLET, FILM COATED ORAL at 15:00

## 2024-12-03 RX ADMIN — POLYSACCHARIDE-IRON COMPLEX 150 MG: 150 CAPSULE ORAL at 09:18

## 2024-12-03 RX ADMIN — ACETAMINOPHEN 975 MG: 325 TABLET, FILM COATED ORAL at 06:12

## 2024-12-03 RX ADMIN — POLYETHYLENE GLYCOL 3350 17 G: 17 POWDER, FOR SOLUTION ORAL at 09:20

## 2024-12-03 RX ADMIN — TICAGRELOR 90 MG: 90 TABLET ORAL at 21:15

## 2024-12-03 RX ADMIN — FUROSEMIDE 40 MG: 10 INJECTION, SOLUTION INTRAVENOUS at 15:00

## 2024-12-03 RX ADMIN — EMPAGLIFLOZIN 10 MG: 10 TABLET, FILM COATED ORAL at 09:19

## 2024-12-03 RX ADMIN — TICAGRELOR 90 MG: 90 TABLET ORAL at 09:19

## 2024-12-03 RX ADMIN — SENNOSIDES AND DOCUSATE SODIUM 2 TABLET: 50; 8.6 TABLET ORAL at 09:19

## 2024-12-03 ASSESSMENT — COGNITIVE AND FUNCTIONAL STATUS - GENERAL
DRESSING REGULAR UPPER BODY CLOTHING: A LITTLE
CLIMB 3 TO 5 STEPS WITH RAILING: A LOT
STANDING UP FROM CHAIR USING ARMS: A LITTLE
STANDING UP FROM CHAIR USING ARMS: A LITTLE
MOBILITY SCORE: 17
MOVING FROM LYING ON BACK TO SITTING ON SIDE OF FLAT BED WITH BEDRAILS: A LITTLE
WALKING IN HOSPITAL ROOM: A LITTLE
HELP NEEDED FOR BATHING: A LITTLE
DAILY ACTIVITIY SCORE: 19
MOBILITY SCORE: 18
PERSONAL GROOMING: A LITTLE
MOVING FROM LYING ON BACK TO SITTING ON SIDE OF FLAT BED WITH BEDRAILS: A LITTLE
DRESSING REGULAR LOWER BODY CLOTHING: A LITTLE
MOVING TO AND FROM BED TO CHAIR: A LITTLE
CLIMB 3 TO 5 STEPS WITH RAILING: A LITTLE
TURNING FROM BACK TO SIDE WHILE IN FLAT BAD: A LITTLE
TOILETING: A LITTLE
MOVING TO AND FROM BED TO CHAIR: A LITTLE
WALKING IN HOSPITAL ROOM: A LITTLE
TURNING FROM BACK TO SIDE WHILE IN FLAT BAD: A LITTLE

## 2024-12-03 ASSESSMENT — PAIN SCALES - GENERAL
PAINLEVEL_OUTOF10: 0 - NO PAIN
PAINLEVEL_OUTOF10: 2

## 2024-12-03 ASSESSMENT — PAIN - FUNCTIONAL ASSESSMENT: PAIN_FUNCTIONAL_ASSESSMENT: 0-10

## 2024-12-03 ASSESSMENT — PAIN DESCRIPTION - ORIENTATION: ORIENTATION: ANTERIOR

## 2024-12-03 NOTE — PROGRESS NOTES
Cardiac surgery NP was informed of [employer]'s intent to deny and information for Peer to Peer review that needs to be completed by noon today.  Karyn Vargas RN  Add: Peer to Peer was completed and NP was told patient could pay privately for Acute Rehab services. I called [facility] to start an expedited appeal but was told that the denial is still pending and I need to wait until the denial is received.

## 2024-12-03 NOTE — CARE PLAN
The patient's goals for the shift include      The clinical goals for the shift include pt will ambulate more throughout shift today    Over the shift, the patient did not make progress toward the following goals. Barriers to progression include . Recommendations to address these barriers include

## 2024-12-03 NOTE — PROGRESS NOTES
CARDIAC SURGERY DAILY PROGRESS NOTE    Jaime Dominguez is a 74 y.o. male, with a PMH of basal cell carcinoma and HLD who presented with STEMI to an OSH and transferred to [facility] on 11/10/2024 for CABG workup. S/p MidCAB x2 converted to full sternotomy w/ LIMA prolonged as a Y graft with a radial to LAD OM w/ Dr. Aldo Harman and Dr. Stanley 11/12. Postop course c/b increasing pressor requirement and echo revealing apical hypo-akinesis with subsequent IABP placement on 11/14. Overnight on 11/14, Troponin rise and ST elevation in anteroseptal leads, c/f ACS- went for LHC, underwent PCI of mid LAD with HEATHER. Cannulated for VA ECMO 11/14 for cardiogenic shock 2/2 multiple failed grafts. On 11/19, returned to OR for VA ECMO decannulation and left hemothorax washout. IABP removed 11/20. Transferred to floor 11/25.     OPERATION/PROCEDURE: 11/12 with Rodrigo Harman MD  1.  Mini invasive robotic assisted CABG x 2 converted to full sternotomy.  2.  CABG x 2 with LIMA  prolonged as a Y graft with a radial to LAD OM.  3.  Endoscopic radial harvest.  4.  Left femoral artery and vein cannulation for peripheral bypass.  5.  Partial LAD endarterectomy    CTICU Course: see above    Transferred to T3: on 11/25.    =======================================  Interval History:   Shortness of breath improving, no other events reported     SUBJECTIVE:  Patient seen and examined today. Still c/o SOB which is improving. CT scan was cancelled yesterday as daily CXR did not showed worsening findings per CTS. Follow up CXR today completed pending official read. Diuresis as needed. Awaiting rehab placement.      Objective   /73 (BP Location: Left arm, Patient Position: Lying)   Pulse 91   Temp 36.3 °C (97.3 °F) (Temporal)   Resp 16   Ht 1.829 m (6')   Wt 83.9 kg (185 lb)   SpO2 96%   BMI 25.09 kg/m²   0-10 (Numeric) Pain Score: 0 - No pain  Perera-Baker FACES Pain Rating: Hurts little bit   3 Day Weight Change:  -1.75 kg (-3 lb 13.7 oz) per day    Intake and Output    Intake/Output Summary (Last 24 hours) at 12/3/2024 1443  Last data filed at 12/3/2024 0600  Gross per 24 hour   Intake 360 ml   Output 1200 ml   Net -840 ml       Physical Exam  Physical Exam  Vitals reviewed.   Constitutional:       General: He is not in acute distress.     Appearance: Normal appearance. He is not ill-appearing.      Comments: Laying in bed    HENT:      Head: Normocephalic and atraumatic.      Nose: Nose normal.      Mouth/Throat:      Mouth: Mucous membranes are moist.   Eyes:      General: Lids are normal.      Conjunctiva/sclera: Conjunctivae normal.   Cardiovascular:      Rate and Rhythm: Normal rate and regular rhythm.      Pulses: Normal pulses.      Comments: Tele - NSR rate 80s   V wires set VVI @ 50, mA 9    Pulmonary:      Effort: Pulmonary effort is normal.      Comments: Diminished posterior left lungs sounds from mid lobe down. Otherwise clear lungs sounds AP in all fields except for what it was mentioned  Abdominal:      General: Bowel sounds are normal.      Palpations: Abdomen is soft.      Comments: Last BM 11/28  Passing gas    Musculoskeletal:         General: Normal range of motion.      Cervical back: Normal range of motion.      Right lower leg: Edema present.      Left lower leg: Edema present.      Comments: Moving all extremities appropriately with equal strength.   Pitting edema of lower extremities, near graft site. Improved with diuresis.   Skin:     General: Skin is warm and dry.      Capillary Refill: Capillary refill takes less than 2 seconds.      Comments: Left chest and mid sternum incision dry, no erythema, no drainage and well approximated. Left groin suture noted to be in place, bruising noted and some firmness to palpation noted (left inguinal hematoma mentioned on CT on 11/18/24). Per patient left groin feels better than previously.   Neurological:      General: No focal deficit present.      Mental  Status: He is alert and oriented to person, place, and time.   Psychiatric:         Mood and Affect: Mood normal.         Behavior: Behavior normal. Behavior is cooperative.       Medications  Scheduled medications  acetaminophen, 975 mg, oral, q8h POWER  amiodarone, 200 mg, oral, Daily  aspirin, 81 mg, oral, Daily  atorvastatin, 80 mg, oral, Nightly  empagliflozin, 10 mg, oral, Daily  furosemide, 40 mg, intravenous, Once  heparin, 5,000 Units, subcutaneous, q8h  iron polysaccharides, 150 mg, oral, Daily  melatonin, 10 mg, oral, Nightly  metoprolol succinate XL, 12.5 mg, oral, BID  multivitamin with minerals, 1 tablet, oral, Daily  polyethylene glycol, 17 g, oral, BID  sennosides-docusate sodium, 2 tablet, oral, BID  spironolactone, 25 mg, oral, Daily  ticagrelor, 90 mg, oral, BID    Continuous medications   PRN medications  PRN medications: benzocaine-menthol, bisacodyl, naloxone, naloxone, ondansetron **OR** ondansetron, oxygen, sodium chloride, white petrolatum    Labs  Results for orders placed or performed during the hospital encounter of 11/10/24 (from the past 24 hours)   Magnesium   Result Value Ref Range    Magnesium 2.39 1.60 - 2.40 mg/dL   CBC   Result Value Ref Range    WBC 9.3 4.4 - 11.3 x10*3/uL    nRBC 0.0 0.0 - 0.0 /100 WBCs    RBC 3.61 (L) 4.50 - 5.90 x10*6/uL    Hemoglobin 10.5 (L) 13.5 - 17.5 g/dL    Hematocrit 35.1 (L) 41.0 - 52.0 %    MCV 97 80 - 100 fL    MCH 29.1 26.0 - 34.0 pg    MCHC 29.9 (L) 32.0 - 36.0 g/dL    RDW 16.2 (H) 11.5 - 14.5 %    Platelets 516 (H) 150 - 450 x10*3/uL   Renal Function Panel   Result Value Ref Range    Glucose 81 74 - 99 mg/dL    Sodium 136 136 - 145 mmol/L    Potassium 4.4 3.5 - 5.3 mmol/L    Chloride 104 98 - 107 mmol/L    Bicarbonate 21 21 - 32 mmol/L    Anion Gap 15 10 - 20 mmol/L    Urea Nitrogen 19 6 - 23 mg/dL    Creatinine 0.80 0.50 - 1.30 mg/dL    eGFR >90 >60 mL/min/1.73m*2    Calcium 8.5 (L) 8.6 - 10.6 mg/dL    Phosphorus 3.6 2.5 - 4.9 mg/dL    Albumin 3.6  3.4 - 5.0 g/dL       =========================  IMAGING/ DIAGNOSTIC TESTING:  ========================    12/3/24 XR chest 1 view   Pending final read    11/30 XR chest 1 view   Impression  1.Large left-sided masslike opacity along lateral chest wall similar  prior study.  2.Patchy left basilar airspace disease with small effusion again noted.    11/29 XR chest 1 view  IMPRESSION:  No interval change has occurred in the multiloculated left pleural  effusion/hemothorax nor in the small free-flowing left pleural  effusion.    11/28 XR chest 1 view  IMPRESSION:  1. No significant change in multiloculated left-sided pleural  effusion/hemothorax as well as small free left basilar pleural  effusion and associated atelectasis.  2. Unchanged mild right basilar atelectasis with or without trace  right pleural effusion.    11/26/24 XR CHEST 2 VIEWS:  IMPRESSION:  1.  Stable loculated left-sided pleural effusions-hematoma.  2. Similar bibasilar presumed atelectasis with small bilateral  pleural effusions, left greater than right.  3. No pneumothorax.     11/27/24 TTE:  CONCLUSIONS:   1. Poorly visualized anatomical structures due to suboptimal image quality.   2. The left ventricle was not well visualized.   3. Left ventricular ejection fraction is suspected mildly decreased, by visual estimate at 45%.   4. Spectral Doppler shows a Grade I (impaired relaxation pattern) of left ventricular diastolic filling with normal left atrial filling pressure.   5. Abnormal septal motion consistent with post-operative status.   6. There is normal right ventricular global systolic function.    11/18 CT chest abdomen pelvis without contrast  1.Stable 5.7 x 4.4 cm left inguinal hematoma is noted.  2.Increasing moderate left-sided hemothorax. Mediastinal hematoma has  slightly decreased. No new mediastinal or retroperitoneal hemorrhage  is evident.    ================  IMPRESSION & PLAN:  ===============    POD #21 s/p midCAB x2 converted to  full sternotomy on 11/12 with s/p PCI of mid LAD with HEATHER on 11/14    - VA ECMO 11/14-11/19  - IABP 11/14-11/20.  - Increase activity/ ambulation; PT/OT  - Encourage IS, C/DB; respiratory therapy; wean O2 as josué   - Cardiac rehab referral   - Continue cardiac meds: ASA, statin   - Continue DAPT (brilinta+ASA) for LAD HEATHER stent   - Pain and anticonstipation meds  - 2v CXR completed 11/26  - Postop echo completed 11/26 (EF 45%)   - Cut epicardial wires prior to discharge   - Tele until discharge  - Optimize nutrition and electrolytes  - 11/30 Getting anxious at night and difficulty sleeping. Will try one time dose of atarax 10 mg tonight and will obtain EKG now to assess QTC prior to given medication   - 12/2 CT chest canceled by Dr. Stanley due to daily CXR has not shown progression of left lung pathology, CXR ordered for AM to follow up  - 12/3 CXR this morning final read still pending, per NP read CXR appears better    Rhythm,   - Previous afib   - Tele: NSR rate 80s   - Continue amio to 200mg daily  - Continue metoprolol 25mg BID (started 11/27)   - Adjust medications as tolerated    Cardiomyopathy   - appreciate heart failure consult, signed off on 11/28  - HF recommendations on 11/28:  - Diuresis: Continue diuresis per primary team.  - GDMT:   - SGLT-2i: c/w jardiance 10mg daily  - Beta-blocker: c/w metoprolol XL 25mg daily.   - ACE-I/ARB/ARNI: Will consider low dose entresto once SBP >120 mmHg. If that happens when he is inpatient, can start 12/13 mg BID entresto before discharge otherwise defer to OP initiation.               - MRA: c/w aldactone 25mg daily   - Should follow-up with heart failure specialist Dr. Gann after discharge, depart updated.   - AICD/CRT-D/Lifevest: As EF 45%, not indicated.   - Strict I/Os, Daily Na < 2g daily, fluid restriction.   -HF service will sign-off at this time.   - Continue jardiance 10mg daily  - Continue spironolactone 25mg daily  - Change metoprolol succinate 25mg  every day to 12.5 mg bid due low bp (90/60's)    HLD: Home meds: atorvastatin 40mg daily   - Continue atorvastatin 80mg daily     Acute Blood Loss Anemia - Stable  Recent Labs     24  0746 24  0754 24  0706 24  0649 24  0720 24  0615 24  0643   HGB 10.5* 10.5* 10.0* 9.2* 8.8* 8.7* 8.4*   HCT 35.1* 33.4* 31.8* 29.8* 28.7* 27.5* 26.9*   - MV, PO Iron x1mo  - Daily labs, transfuse as indicated    Volume/Electrolyte Status: Preop wt Weight: 96.2 kg (212 lb)   Vitals:    24 0600   Weight: 83.9 kg (185 lb)   - Pre-op weight 94kg, 12/3 83.9 kg  - Adjust diuresis as needed for postop cardiac surgery hypervolemia  - Replete electrolytes for hypokalemia/hypomagnesemia/hypophosphatemia as needed  - Daily weights and strict I&Os  - Daily RFP while admitted  -  Lasix 40 mg IV x2  -  Lasix 40 mg IV now and Lasix 20 mg IV at 1800 today, reassess need for more diuresis in am  -  Lasix 40 mg IV   -  Lasix 40mg IV -> reassess in afternoon  - 12/3 Lasix 40 mg IV x1, reassess in am    Leukocytosis - Resolved  Recent Labs     24  0746 24  0754 24  0706 24  0649 24  0720 24  0615 24  0643   WBC 9.3 9.9 11.0 10.5 10.5 11.6* 12.7*     Temp (36hrs), Av.2 °C (97.2 °F), Min:35.8 °C (96.4 °F), Max:36.5 °C (97.7 °F)  - Cx data negative. Completed 7 day empiric course of zosyn  - aggressive pulmonary hygiene  - monitor for s/s infection  - daily CBC to follow    Left hemothorax s/p washout ; persisting loculated left pleural effusion, stable   -  CT chest abd and pelvis -> increasing moderate left-sided hemothorax.  Mediastinal hematoma has slightly decreased. No new mediastinal or retroperitoneal hemorrhage is evident.  - Patient currently on RA and in no acute respiratory distress   - monitor on serial CXR. Ordered for tomorrow  -  Improvement, will repeat CXR in AM  -  Check 2V CXR in AM today, consider a CT  chest to further assess   - 12/1 2 View CXR->> A large left-sided masslike opacity along lateral chest wall, and  a lobulated opacity projecting over left heart border, favored to represent loculated pleural fluid collections, similar to prior exam. Left bibasilar pleural effusions and associated atelectasis.  -12/2 Aldo Harman made aware or recent imaging(out of town [place] Stanley to look at imaging)-> chest xray looked somewhat improved-> repeat chest xray tomorrow in am   - 12/3 CXR today final read still pending, per NP read, CXR better today than prior      Left inguinal hematoma   - 11/18 CT -> Stable 5.7 x 4.4 cm left inguinal hematoma is noted  - 11/30 Left groin bruised, small firm area noted, no change from prior. Hg stable at 9.2 up trending. Continue to monitor   - 12/3 No change in left inguinal hematoma    VTE Prophylaxis: SCDs/TEDs, ambulation, SQ heparin  Code Status: Full Code    Dispo  - PT/OT recs High intensity level of continued care   - Would benefit from homecare RN for cardiac surgery carepath  - Anticipate discharge by Friday awaiting acute rehab precerpt   - Will continue to assess discharge needs    EUGENIA White-CNP  Cardiac Surgery [facility]  [facility]  Team Phone [phone]    12/3/2024  2:43 PM

## 2024-12-03 NOTE — PROGRESS NOTES
Physical Therapy Treatment    Patient Name: Jaime Dominguez  MRN: [id number]  Today's Date: 12/3/2024  Room: [address] [place], [place] [address]  Time Calculation  Start Time: 1028  Stop Time: 1101  Time Calculation (min): 33 min       Assessment/Plan   PT Assessment  PT Assessment Results: Decreased strength, Decreased endurance, Impaired balance  Rehab Prognosis: Excellent  Barriers to Discharge: Medical acuity  Evaluation/Treatment Tolerance: Patient tolerated treatment well  Medical Staff Made Aware: Yes  Strengths: Ability to acquire knowledge, Attitude of self, Leisure activity, Support and attitude of living partners, Support of Caregivers  End of Session Communication: Bedside nurse  End of Session Patient Position: Up in chair, Alarm off, not on at start of session  PT Plan  Inpatient/Swing Bed or Outpatient: Inpatient  PT Plan  Treatment/Interventions: Bed mobility, Transfer training, Gait training, Stair training, Balance training, Strengthening, Endurance training, Therapeutic exercise, Therapeutic activity  PT Plan: Ongoing PT  PT Frequency: 5 times per week  PT Discharge Recommendations: High intensity level of continued care  Equipment Recommended upon Discharge: Wheeled walker  PT Recommended Transfer Status: Assist x2  PT - OK to Discharge: Yes  Assessment: Patient is progressing Well with therapy this date. Pt. Ambulated and completed there-ex with good tolerance. Would continue to benefit from continued skilled PT to address all mobility deficits; remains appropriate for HIGH intensity therapy when medically appropriate for discharge from acute stay.  Will continue to follow.     General Visit Information:   PT  Visit  PT Received On: 12/03/24  Response to Previous Treatment: Patient with no complaints from previous session.  Prior to Session Communication: Bedside nurse  Patient Position Received: Up in chair, Alarm off, not on at start of session  Family/Caregiver Present: No     Subjective   Subjective: Pt. reports  fatigue due to poor sleep quality. Positive and willing to participate in PT session.  Precautions:  Precautions  Medical Precautions: Cardiac precautions, Fall precautions  Post-Surgical Precautions: Move in the Tube  Vital Signs:  Vital Signs (Past 2hrs)        Date/Time Vitals Session Patient Position Pulse Resp SpO2 BP MAP (mmHg)    12/03/24 0929 --  --  82  17  100 %  99/67  78     12/03/24 1028 Pre PT  Sitting  84  --  97 %  106/76  --                     Objective   Pain:  Pain Assessment  Pain Assessment: 0-10  0-10 (Numeric) Pain Score: 0 - No pain  Cognition:  Cognition  Overall Cognitive Status: Within Functional Limits     Static Sitting balance:  Static Sitting Balance  Static Sitting-Balance Support: Feet supported  Static Sitting-Level of Assistance: Independent  Static Standing Balance:  Static Standing Balance  Static Standing-Balance Support: Bilateral upper extremity supported  Static Standing-Level of Assistance: Close supervision  Static Standing-Comment/Number of Minutes: with FWW  Dynamic Sitting Balance:  Dynamic Sitting Balance  Dynamic Sitting-Balance Support: Feet supported  Dynamic Sitting-Level of Assistance: Independent  Dynamic Sitting-Balance: Reaching for objects  Dynamic Standing Balance:  Dynamic Standing Balance  Dynamic Standing-Balance Support: Bilateral upper extremity supported  Dynamic Standing-Level of Assistance: Close supervision  Dynamic Standing-Balance: Turning    Lines/Tubes/Drains:  Pacer Wires (Active)   Number of days: 20     PT Treatments:  Therapeutic Exercise  Therapeutic Exercise Performed: Yes  Therapeutic Exercise Activity 1: seated hip flexion B LE x12 reps  Therapeutic Exercise Activity 2: seated LAQ B LE x12 reps        Bed Mobility  Bed Mobility: No  Ambulation/Gait Training  Ambulation/Gait Training Performed: Yes  Ambulation/Gait Training 1  Surface 1: Level tile  Device 1: Rolling walker  Assistance 1: CGA x 1  Quality of Gait 1: Narrow base of support,  Decreased step length, Shuffling gait, Soft knee(s)  Comments/Distance (ft) 1: x 2 trials; 150ft, 100ft; vc to reduce foot slide and increase step height during ambulation; vc to improve posture. Required prolonged seated rest break in hallway due to increased fatigue.   Transfers  Transfer: Yes  Transfer 1  Transfer From 1: Sit to, Stand to  Transfer to 1: Stand, Sit  Technique 1: Sit to stand, Stand to sit  Transfer Device 1: Walker  Transfer Level of Assistance 1: Close supervision  Trials/Comments 1: x2 trials; vc for hand placement  Stairs  Stairs: No          Activity tolerance:  Activity Tolerance  Endurance: Tolerates 30 min exercise with multiple rests    Outcome Measures:  [facility] Basic Mobility  Turning from your back to your side while in a flat bed without using bedrails: A little  Moving from lying on your back to sitting on the side of a flat bed without using bedrails: A little  Moving to and from bed to chair (including a wheelchair): A little  Standing up from a chair using your arms (e.g. wheelchair or bedside chair): A little  To walk in hospital room: A little  Climbing 3-5 steps with railing: A lot  Basic Mobility - Total Score: 17      Education Documentation  Precautions, taught by LOR Funez at 12/3/2024 11:19 AM.  Learner: Patient  Readiness: Acceptance  Method: Explanation  Response: Verbalizes Understanding  Comment: reviewed PT rehab POC    Body Mechanics, taught by LOR Funez at 12/3/2024 11:19 AM.  Learner: Patient  Readiness: Acceptance  Method: Explanation  Response: Verbalizes Understanding  Comment: reviewed PT rehab POC    Mobility Training, taught by LOR Funez at 12/3/2024 11:19 AM.  Learner: Patient  Readiness: Acceptance  Method: Explanation  Response: Verbalizes Understanding  Comment: reviewed PT rehab POC    Education Comments  No comments found.      OP EDUCATION:       Encounter Problems       Encounter Problems (Active)       Balance        Pt will demonstrate improved sitting/standing static/dynamic balance activities without LOB via score of 24/28 on the Tinetti for increase in safety prior to DC.  (Progressing)       Start:  11/21/24    Expected End:  12/05/24               Mobility       Pt will ambulate >15ft with appropriate form, Mod A or less, LRAD, and no LOB for safe DC.  (Met)       Start:  11/21/24    Expected End:  12/05/24    Resolved:  11/25/24    Updated to: Pt will ambulate >150ft with appropriate form, CGA, LRAD, and no LOB for safe DC.    Update reason: Met         Pt will tolerate >15 minutes of upright standing activity without seated rest breaks with no changes in vital signs for improved functional mobility.  (Progressing)       Start:  11/21/24    Expected End:  12/05/24            Pt will ambulate >150ft with appropriate form, CGA, LRAD, and no LOB for safe DC. (Progressing)       Start:  11/25/24    Expected End:  12/05/24                   PT Transfers       Pt will perform bed mobility with Mod A or less and use of LRAD to safely DC.  (Met)       Start:  11/21/24    Expected End:  12/05/24    Resolved:  11/25/24    Updated to: Pt will perform bed mobility with CGA and use of LRAD to safely DC.    Update reason: Met         Pt will perform sit<>stand transfers with Mod A or less and use of LRAD to safely DC.  (Met)       Start:  11/21/24    Expected End:  12/05/24    Resolved:  11/25/24    Updated to: Pt will perform sit<>stand transfers with CGA and use of LRAD to safely DC.    Update reason: Met         Pt will perform bed mobility with CGA and use of LRAD to safely DC. (Progressing)       Start:  11/25/24    Expected End:  12/05/24                Pt will perform sit<>stand transfers with CGA and use of LRAD to safely DC. (Met)       Start:  11/25/24    Expected End:  12/05/24    Resolved:  12/03/24

## 2024-12-04 ENCOUNTER — DOCUMENTATION (OUTPATIENT)
Dept: HOME HEALTH SERVICES | Facility: HOME HEALTH | Age: 74
End: 2024-12-04
Payer: MEDICARE

## 2024-12-04 ENCOUNTER — HOME HEALTH ADMISSION (OUTPATIENT)
Dept: HOME HEALTH SERVICES | Facility: HOME HEALTH | Age: 74
End: 2024-12-04
Payer: MEDICARE

## 2024-12-04 VITALS
DIASTOLIC BLOOD PRESSURE: 72 MMHG | WEIGHT: 185 LBS | BODY MASS INDEX: 25.06 KG/M2 | HEIGHT: 72 IN | HEART RATE: 86 BPM | TEMPERATURE: 96.8 F | RESPIRATION RATE: 16 BRPM | OXYGEN SATURATION: 99 % | SYSTOLIC BLOOD PRESSURE: 108 MMHG

## 2024-12-04 PROBLEM — I21.3 STEMI (ST ELEVATION MYOCARDIAL INFARCTION) (MULTI): Status: RESOLVED | Noted: 2024-11-10 | Resolved: 2024-12-04

## 2024-12-04 PROBLEM — J94.2 HEMOTHORAX ON LEFT: Status: RESOLVED | Noted: 2024-11-10 | Resolved: 2024-12-04

## 2024-12-04 PROBLEM — R57.0 CARDIOGENIC SHOCK (MULTI): Status: RESOLVED | Noted: 2024-11-10 | Resolved: 2024-12-04

## 2024-12-04 PROBLEM — N17.9 AKI (ACUTE KIDNEY INJURY) (CMS-HCC): Status: RESOLVED | Noted: 2024-11-14 | Resolved: 2024-12-04

## 2024-12-04 PROBLEM — Z98.890 ON INTRA-AORTIC BALLOON PUMP ASSIST: Status: RESOLVED | Noted: 2024-11-14 | Resolved: 2024-12-04

## 2024-12-04 PROBLEM — R41.0 DELIRIUM: Status: RESOLVED | Noted: 2024-11-16 | Resolved: 2024-12-04

## 2024-12-04 LAB
ACID FAST STN SPEC: NORMAL
ALBUMIN SERPL BCP-MCNC: 3.4 G/DL (ref 3.4–5)
ANION GAP SERPL CALC-SCNC: 12 MMOL/L (ref 10–20)
BUN SERPL-MCNC: 16 MG/DL (ref 6–23)
CALCIUM SERPL-MCNC: 8.5 MG/DL (ref 8.6–10.6)
CHLORIDE SERPL-SCNC: 104 MMOL/L (ref 98–107)
CO2 SERPL-SCNC: 23 MMOL/L (ref 21–32)
CREAT SERPL-MCNC: 0.75 MG/DL (ref 0.5–1.3)
EGFRCR SERPLBLD CKD-EPI 2021: >90 ML/MIN/1.73M*2
ERYTHROCYTE [DISTWIDTH] IN BLOOD BY AUTOMATED COUNT: 15.8 % (ref 11.5–14.5)
GLUCOSE SERPL-MCNC: 89 MG/DL (ref 74–99)
HCT VFR BLD AUTO: 33.1 % (ref 41–52)
HGB BLD-MCNC: 10.7 G/DL (ref 13.5–17.5)
MAGNESIUM SERPL-MCNC: 2.42 MG/DL (ref 1.6–2.4)
MCH RBC QN AUTO: 29.3 PG (ref 26–34)
MCHC RBC AUTO-ENTMCNC: 32.3 G/DL (ref 32–36)
MCV RBC AUTO: 91 FL (ref 80–100)
MYCOBACTERIUM SPEC CULT: NORMAL
NRBC BLD-RTO: 0 /100 WBCS (ref 0–0)
PHOSPHATE SERPL-MCNC: 3.7 MG/DL (ref 2.5–4.9)
PLATELET # BLD AUTO: 478 X10*3/UL (ref 150–450)
POTASSIUM SERPL-SCNC: 4.1 MMOL/L (ref 3.5–5.3)
RBC # BLD AUTO: 3.65 X10*6/UL (ref 4.5–5.9)
SODIUM SERPL-SCNC: 135 MMOL/L (ref 136–145)
WBC # BLD AUTO: 8.7 X10*3/UL (ref 4.4–11.3)

## 2024-12-04 PROCEDURE — 2500000001 HC RX 250 WO HCPCS SELF ADMINISTERED DRUGS (ALT 637 FOR MEDICARE OP)

## 2024-12-04 PROCEDURE — 97116 GAIT TRAINING THERAPY: CPT | Mod: GP,CQ

## 2024-12-04 PROCEDURE — 84132 ASSAY OF SERUM POTASSIUM: CPT | Performed by: NURSE PRACTITIONER

## 2024-12-04 PROCEDURE — 2500000001 HC RX 250 WO HCPCS SELF ADMINISTERED DRUGS (ALT 637 FOR MEDICARE OP): Performed by: NURSE PRACTITIONER

## 2024-12-04 PROCEDURE — 2500000004 HC RX 250 GENERAL PHARMACY W/ HCPCS (ALT 636 FOR OP/ED): Performed by: NURSE PRACTITIONER

## 2024-12-04 PROCEDURE — 2500000002 HC RX 250 W HCPCS SELF ADMINISTERED DRUGS (ALT 637 FOR MEDICARE OP, ALT 636 FOR OP/ED): Performed by: NURSE PRACTITIONER

## 2024-12-04 PROCEDURE — 83735 ASSAY OF MAGNESIUM: CPT | Performed by: NURSE PRACTITIONER

## 2024-12-04 PROCEDURE — 99239 HOSP IP/OBS DSCHRG MGMT >30: CPT

## 2024-12-04 PROCEDURE — 85027 COMPLETE CBC AUTOMATED: CPT | Performed by: NURSE PRACTITIONER

## 2024-12-04 PROCEDURE — 97530 THERAPEUTIC ACTIVITIES: CPT | Mod: GP,CQ

## 2024-12-04 RX ORDER — IRON POLYSACCHARIDE COMPLEX 150 MG
150 CAPSULE ORAL DAILY
Qty: 30 CAPSULE | Refills: 0 | Status: SHIPPED | OUTPATIENT
Start: 2024-12-05 | End: 2025-01-04

## 2024-12-04 RX ORDER — SPIRONOLACTONE 25 MG/1
25 TABLET ORAL DAILY
Qty: 30 TABLET | Refills: 0 | Status: SHIPPED | OUTPATIENT
Start: 2024-12-05 | End: 2025-01-04

## 2024-12-04 RX ORDER — METOPROLOL SUCCINATE 25 MG/1
12.5 TABLET, EXTENDED RELEASE ORAL 2 TIMES DAILY
Qty: 30 TABLET | Refills: 0 | Status: SHIPPED | OUTPATIENT
Start: 2024-12-04 | End: 2025-01-03

## 2024-12-04 RX ORDER — AMIODARONE HYDROCHLORIDE 200 MG/1
200 TABLET ORAL DAILY
Qty: 30 TABLET | Refills: 0 | Status: SHIPPED | OUTPATIENT
Start: 2024-12-05 | End: 2025-01-04

## 2024-12-04 RX ORDER — ACETAMINOPHEN 325 MG/1
650 TABLET ORAL EVERY 6 HOURS PRN
COMMUNITY
Start: 2024-12-04 | End: 2024-12-07

## 2024-12-04 RX ORDER — ATORVASTATIN CALCIUM 80 MG/1
80 TABLET, FILM COATED ORAL NIGHTLY
Qty: 30 TABLET | Refills: 0 | Status: SHIPPED | OUTPATIENT
Start: 2024-12-04 | End: 2025-01-03

## 2024-12-04 RX ORDER — FUROSEMIDE 40 MG/1
40 TABLET ORAL DAILY
Qty: 4 TABLET | Refills: 0 | Status: SHIPPED | OUTPATIENT
Start: 2024-12-04 | End: 2024-12-08

## 2024-12-04 RX ORDER — FUROSEMIDE 40 MG/1
40 TABLET ORAL DAILY
Status: DISCONTINUED | OUTPATIENT
Start: 2024-12-04 | End: 2024-12-04 | Stop reason: HOSPADM

## 2024-12-04 RX ORDER — MULTIVIT-MIN/IRON FUM/FOLIC AC 7.5 MG-4
1 TABLET ORAL DAILY
Qty: 30 TABLET | Refills: 0 | Status: SHIPPED | OUTPATIENT
Start: 2024-12-05 | End: 2025-01-04

## 2024-12-04 RX ADMIN — SPIRONOLACTONE 25 MG: 25 TABLET, FILM COATED ORAL at 08:55

## 2024-12-04 RX ADMIN — HEPARIN SODIUM 5000 UNITS: 5000 INJECTION INTRAVENOUS; SUBCUTANEOUS at 08:56

## 2024-12-04 RX ADMIN — AMIODARONE HYDROCHLORIDE 200 MG: 200 TABLET ORAL at 08:55

## 2024-12-04 RX ADMIN — POLYSACCHARIDE-IRON COMPLEX 150 MG: 150 CAPSULE ORAL at 08:55

## 2024-12-04 RX ADMIN — METOPROLOL SUCCINATE 12.5 MG: 25 TABLET, EXTENDED RELEASE ORAL at 08:55

## 2024-12-04 RX ADMIN — ACETAMINOPHEN 975 MG: 325 TABLET, FILM COATED ORAL at 05:31

## 2024-12-04 RX ADMIN — Medication 1 TABLET: at 08:55

## 2024-12-04 RX ADMIN — FUROSEMIDE 40 MG: 40 TABLET ORAL at 11:25

## 2024-12-04 RX ADMIN — TICAGRELOR 90 MG: 90 TABLET ORAL at 08:56

## 2024-12-04 RX ADMIN — EMPAGLIFLOZIN 10 MG: 10 TABLET, FILM COATED ORAL at 08:55

## 2024-12-04 RX ADMIN — HEPARIN SODIUM 5000 UNITS: 5000 INJECTION INTRAVENOUS; SUBCUTANEOUS at 00:56

## 2024-12-04 RX ADMIN — ASPIRIN 81 MG: 81 TABLET, COATED ORAL at 08:55

## 2024-12-04 ASSESSMENT — COGNITIVE AND FUNCTIONAL STATUS - GENERAL
WALKING IN HOSPITAL ROOM: A LITTLE
MOBILITY SCORE: 18
CLIMB 3 TO 5 STEPS WITH RAILING: A LITTLE
DRESSING REGULAR LOWER BODY CLOTHING: A LITTLE
WALKING IN HOSPITAL ROOM: A LITTLE
MOVING TO AND FROM BED TO CHAIR: A LITTLE
MOVING FROM LYING ON BACK TO SITTING ON SIDE OF FLAT BED WITH BEDRAILS: A LITTLE
HELP NEEDED FOR BATHING: A LITTLE
CLIMB 3 TO 5 STEPS WITH RAILING: A LITTLE
MOBILITY SCORE: 20
STANDING UP FROM CHAIR USING ARMS: A LITTLE
TURNING FROM BACK TO SIDE WHILE IN FLAT BAD: A LITTLE
TURNING FROM BACK TO SIDE WHILE IN FLAT BAD: A LITTLE
DRESSING REGULAR UPPER BODY CLOTHING: A LITTLE
DAILY ACTIVITIY SCORE: 19
MOVING FROM LYING ON BACK TO SITTING ON SIDE OF FLAT BED WITH BEDRAILS: A LITTLE
TOILETING: A LITTLE
PERSONAL GROOMING: A LITTLE

## 2024-12-04 ASSESSMENT — PAIN SCALES - GENERAL
PAINLEVEL_OUTOF10: 0 - NO PAIN
PAINLEVEL_OUTOF10: 0 - NO PAIN

## 2024-12-04 ASSESSMENT — PAIN - FUNCTIONAL ASSESSMENT
PAIN_FUNCTIONAL_ASSESSMENT: 0-10
PAIN_FUNCTIONAL_ASSESSMENT: 0-10

## 2024-12-04 NOTE — PROGRESS NOTES
Spoke to patient about the denial by insurance for Acute Rehab. Patient prefers to discharge to home with home care. Says he has a first floor set up with bed and bath and has a shower chair. Requesting to work on stairs with therapy today. Will update NP. Karyn Vargas RN  Add: Discussed home care options. Patient prefers to use  Home Care.   Add: Pt was given a FWW for home use. DME referral was placed.

## 2024-12-04 NOTE — HH CARE COORDINATION
Home Care received a Referral for Nursing and Physical Therapy. We have processed the referral for a Start of Care on 24-48 hrs.     If you have any questions or concerns, please feel free to contact us at [phone]. Follow the prompts, enter your five digit zip code, and you will be directed to your care team on E[facility] 1.

## 2024-12-04 NOTE — SIGNIFICANT EVENT
12/4  -Epicardial wires cut without complications  -A total of 7 sutures were removed (one from the left groin)

## 2024-12-04 NOTE — PROGRESS NOTES
CARDIAC SURGERY DAILY PROGRESS NOTE    Jaime Dominguez is a 74 y.o. male, with a PMH of basal cell carcinoma and HLD who presented with STEMI to an OSH and transferred to [facility] on 11/10/2024 for CABG workup. S/p MidCAB x2 converted to full sternotomy w/ LIMA prolonged as a Y graft with a radial to LAD OM w/ Dr. Aldo Harman and Dr. Stanley 11/12. Postop course c/b increasing pressor requirement and echo revealing apical hypo-akinesis with subsequent IABP placement on 11/14. Overnight on 11/14, Troponin rise and ST elevation in anteroseptal leads, c/f ACS- went for LHC, underwent PCI of mid LAD with HEATHER. Cannulated for VA ECMO 11/14 for cardiogenic shock 2/2 multiple failed grafts. On 11/19, returned to OR for VA ECMO decannulation and left hemothorax washout. IABP removed 11/20. Transferred to floor 11/25.     OPERATION/PROCEDURE: 11/12 with Rodrigo Harman MD  1.  Mini invasive robotic assisted CABG x 2 converted to full sternotomy.  2.  CABG x 2 with LIMA  prolonged as a Y graft with a radial to LAD OM.  3.  Endoscopic radial harvest.  4.  Left femoral artery and vein cannulation for peripheral bypass.  5.  Partial LAD endarterectomy    CTICU Course: see above    Transferred to T3: on 11/25.    =======================================  Interval History:   Shortness of breath improving, no other events reported     SUBJECTIVE:  Patient seen and examined today. Patient reports improvement in his SOB. Reviewed CXR with Dr Stanley. Patient clinically doing better.    Objective   BP 97/58   Pulse 88   Temp 36.3 °C (97.3 °F) (Temporal)   Resp 18   Ht 1.829 m (6')   Wt 83.9 kg (185 lb)   SpO2 94%   BMI 25.09 kg/m²   0-10 (Numeric) Pain Score: 0 - No pain   3 Day Weight Change: Unable to Calculate    Intake and Output    Intake/Output Summary (Last 24 hours) at 12/4/2024 1136  Last data filed at 12/4/2024 0530  Gross per 24 hour   Intake --   Output 1000 ml   Net -1000 ml       Physical  Exam  Physical Exam  Vitals reviewed.   Constitutional:       General: He is not in acute distress.     Appearance: Normal appearance. He is not ill-appearing.      Comments: Laying in bed    HENT:      Head: Normocephalic and atraumatic.      Nose: Nose normal.      Mouth/Throat:      Mouth: Mucous membranes are dry.   Eyes:      General: Lids are normal.      Conjunctiva/sclera: Conjunctivae normal.   Cardiovascular:      Rate and Rhythm: Normal rate and regular rhythm.      Pulses: Normal pulses.      Comments: Tele - NSR rate 80s   V wires set VVI @ 50, mA 9    Pulmonary:      Effort: Pulmonary effort is normal.      Comments: Diminished posterior left lungs sounds from mid lobe down. Otherwise clear lungs sounds AP in all fields except for what it was mentioned  Abdominal:      General: Bowel sounds are normal.      Palpations: Abdomen is soft.      Comments: Last BM 11/28  Passing gas    Musculoskeletal:         General: Normal range of motion.      Cervical back: Normal range of motion.      Right lower leg: Edema present.      Left lower leg: Edema present.      Comments: Moving all extremities appropriately with equal strength.   Pitting edema of lower extremities, near graft site. Improved with diuresis.   Skin:     General: Skin is warm and dry.      Capillary Refill: Capillary refill takes less than 2 seconds.      Comments: Left chest and mid sternum incision dry, no erythema, no drainage and well approximated. Left groin suture noted to be in place, bruising noted and some firmness to palpation noted (left inguinal hematoma mentioned on CT on 11/18/24). Left groin incision has erythema, mild serosanguineous discharge, incision is open and it appears to go deep. Per patient left groin feels better than previously.   Neurological:      General: No focal deficit present.      Mental Status: He is alert and oriented to person, place, and time.   Psychiatric:         Mood and Affect: Mood normal.          Behavior: Behavior normal. Behavior is cooperative.       Medications  Scheduled medications  acetaminophen, 975 mg, oral, q8h POWER  amiodarone, 200 mg, oral, Daily  aspirin, 81 mg, oral, Daily  atorvastatin, 80 mg, oral, Nightly  empagliflozin, 10 mg, oral, Daily  furosemide, 40 mg, oral, Daily  heparin, 5,000 Units, subcutaneous, q8h  iron polysaccharides, 150 mg, oral, Daily  melatonin, 10 mg, oral, Nightly  metoprolol succinate XL, 12.5 mg, oral, BID  multivitamin with minerals, 1 tablet, oral, Daily  polyethylene glycol, 17 g, oral, BID  sennosides-docusate sodium, 2 tablet, oral, BID  spironolactone, 25 mg, oral, Daily  ticagrelor, 90 mg, oral, BID    Continuous medications   PRN medications  PRN medications: benzocaine-menthol, bisacodyl, naloxone, naloxone, ondansetron **OR** ondansetron, oxygen, sodium chloride, white petrolatum    Labs  Results for orders placed or performed during the hospital encounter of 11/10/24 (from the past 24 hours)   Magnesium   Result Value Ref Range    Magnesium 2.42 (H) 1.60 - 2.40 mg/dL   CBC   Result Value Ref Range    WBC 8.7 4.4 - 11.3 x10*3/uL    nRBC 0.0 0.0 - 0.0 /100 WBCs    RBC 3.65 (L) 4.50 - 5.90 x10*6/uL    Hemoglobin 10.7 (L) 13.5 - 17.5 g/dL    Hematocrit 33.1 (L) 41.0 - 52.0 %    MCV 91 80 - 100 fL    MCH 29.3 26.0 - 34.0 pg    MCHC 32.3 32.0 - 36.0 g/dL    RDW 15.8 (H) 11.5 - 14.5 %    Platelets 478 (H) 150 - 450 x10*3/uL   Renal Function Panel   Result Value Ref Range    Glucose 89 74 - 99 mg/dL    Sodium 135 (L) 136 - 145 mmol/L    Potassium 4.1 3.5 - 5.3 mmol/L    Chloride 104 98 - 107 mmol/L    Bicarbonate 23 21 - 32 mmol/L    Anion Gap 12 10 - 20 mmol/L    Urea Nitrogen 16 6 - 23 mg/dL    Creatinine 0.75 0.50 - 1.30 mg/dL    eGFR >90 >60 mL/min/1.73m*2    Calcium 8.5 (L) 8.6 - 10.6 mg/dL    Phosphorus 3.7 2.5 - 4.9 mg/dL    Albumin 3.4 3.4 - 5.0 g/dL       =========================  IMAGING/ DIAGNOSTIC TESTING:  ========================    12/3/24 XR chest 1  view   Final reading pending. Reviewed CXR with Dr. Stanley    11/30 XR chest 1 view   Impression  1.Large left-sided masslike opacity along lateral chest wall similar  prior study.  2.Patchy left basilar airspace disease with small effusion again noted.    11/29 XR chest 1 view  IMPRESSION:  No interval change has occurred in the multiloculated left pleural  effusion/hemothorax nor in the small free-flowing left pleural  effusion.    11/28 XR chest 1 view  IMPRESSION:  1. No significant change in multiloculated left-sided pleural  effusion/hemothorax as well as small free left basilar pleural  effusion and associated atelectasis.  2. Unchanged mild right basilar atelectasis with or without trace  right pleural effusion.    11/26/24 XR CHEST 2 VIEWS:  IMPRESSION:  1.  Stable loculated left-sided pleural effusions-hematoma.  2. Similar bibasilar presumed atelectasis with small bilateral  pleural effusions, left greater than right.  3. No pneumothorax.     11/27/24 TTE:  CONCLUSIONS:   1. Poorly visualized anatomical structures due to suboptimal image quality.   2. The left ventricle was not well visualized.   3. Left ventricular ejection fraction is suspected mildly decreased, by visual estimate at 45%.   4. Spectral Doppler shows a Grade I (impaired relaxation pattern) of left ventricular diastolic filling with normal left atrial filling pressure.   5. Abnormal septal motion consistent with post-operative status.   6. There is normal right ventricular global systolic function.    11/18 CT chest abdomen pelvis without contrast  1.Stable 5.7 x 4.4 cm left inguinal hematoma is noted.  2.Increasing moderate left-sided hemothorax. Mediastinal hematoma has  slightly decreased. No new mediastinal or retroperitoneal hemorrhage  is evident.    ================  IMPRESSION & PLAN:  ===============    POD #22 s/p midCAB x2 converted to full sternotomy on 11/12 with s/p PCI of mid LAD with HEATHER on 11/14    - VA ECMO 11/14-11/19  -  "IABP 11/14-11/20.  - Increase activity/ ambulation; PT/OT  - Encourage IS, C/DB; respiratory therapy; wean O2 as josué   - Cardiac rehab referral   - Continue cardiac meds: ASA, statin   - Continue DAPT (brilinta+ASA) for LAD HEATHER stent   - Pain and anticonstipation meds  - 2v CXR completed 11/26  - Postop echo completed 11/26 (EF 45%)   - Cut epicardial wires prior to discharge   - Tele until discharge  - Optimize nutrition and electrolytes  - 11/30 Getting anxious at night and difficulty sleeping. Will try one time dose of atarax 10 mg tonight and will obtain EKG now to assess QTC prior to given medication   - 12/2 CT chest canceled by Dr. Stanley due to daily CXR has not shown progression of left lung pathology, CXR ordered for AM to follow up  - 12/3 CXR this morning final read still pending, per NP read CXR appears better  - 12/4 Most recent CXR from 12/3 reviewed with Dr. Stanley, CXR is better  - 12/4 Patient is cleared to go home medically with home care and a walker. PT will see patient today and write final recommendations     Rhythm,   - Previous afib   - Tele: NSR rate 80s   - Continue amio to 200mg daily  - Continue Toprol 25mg qd (started 11/27)   - 12/3 Change Toprol 25 mg every day to 12.5 mg bid due to soft bp mildly symptomatic (SBP 90's/50\"s)  - Adjust medications as tolerated    Cardiomyopathy   - appreciate heart failure consult, signed off on 11/28  - HF recommendations on 11/28:  - Diuresis: Continue diuresis per primary team.  - GDMT:   - SGLT-2i: c/w jardiance 10mg daily  - Beta-blocker: c/w metoprolol XL 25mg daily.   - ACE-I/ARB/ARNI: Will consider low dose entresto once SBP >120 mmHg. If that happens when he is inpatient, can start 12/13 mg BID entresto before discharge otherwise defer to OP initiation.               - MRA: c/w aldactone 25mg daily   - Should follow-up with heart failure specialist Dr. Gann after discharge, depart updated.   - AICD/CRT-D/Lifevest: As EF 45%, not indicated. "   - Strict I/Os, Daily Na < 2g daily, fluid restriction.   -HF service will sign-off at this time.   - Continue jardiance 10mg daily  - Continue spironolactone 25mg daily  - Change metoprolol succinate 25mg every day to 12.5 mg bid due low bp (90/50's)    HLD: Home meds: atorvastatin 40mg daily   - Continue atorvastatin 80mg daily     Acute Blood Loss Anemia - Stable  Recent Labs     24  0740 24  0746 24  0754 24  0706 24  0649 24  0720 24  0615   HGB 10.7* 10.5* 10.5* 10.0* 9.2* 8.8* 8.7*   HCT 33.1* 35.1* 33.4* 31.8* 29.8* 28.7* 27.5*   - MV, PO Iron x1mo  - Daily labs, transfuse as indicated    Volume/Electrolyte Status: Preop wt Weight: 96.2 kg (212 lb)   Vitals:    24 0600   Weight: 83.9 kg (185 lb)   - Pre-op weight 94kg, 12/3 83.9 kg  - Adjust diuresis as needed for postop cardiac surgery hypervolemia  - Replete electrolytes for hypokalemia/hypomagnesemia/hypophosphatemia as needed  - Daily weights and strict I&Os  - Daily RFP while admitted  -  Lasix 40 mg IV x2  -  Lasix 40 mg IV now and Lasix 20 mg IV at 1800 today, reassess need for more diuresis in am  -  Lasix 40 mg IV   -  Lasix 40mg IV -> reassess in afternoon  - 12/3 Lasix 40 mg IV x1, reassess in am  -  Start Lasix 40 mg oral every day x5 days    Leukocytosis - Resolved  Recent Labs     24  0740 24  0746 24  0754 24  0706 24  0649 24  0720 24  0615   WBC 8.7 9.3 9.9 11.0 10.5 10.5 11.6*     Temp (36hrs), Av.3 °C (97.3 °F), Min:36.2 °C (97.2 °F), Max:36.4 °C (97.5 °F)  - Cx data negative. Completed 7 day empiric course of zosyn  - aggressive pulmonary hygiene  - monitor for s/s infection  - daily CBC to follow    Left hemothorax s/p washout ; persisting loculated left pleural effusion, stable   -  CT chest abd and pelvis -> increasing moderate left-sided hemothorax.  Mediastinal hematoma has slightly decreased. No new  mediastinal or retroperitoneal hemorrhage is evident.  - Patient currently on RA and in no acute respiratory distress   - monitor on serial CXR. Ordered for tomorrow  - 11/29 Improvement, will repeat CXR in AM  - 11/30 Check 2V CXR in AM today, consider a CT chest to further assess   - 12/1 2 View CXR->> A large left-sided masslike opacity along lateral chest wall, and  a lobulated opacity projecting over left heart border, favored to represent loculated pleural fluid collections, similar to prior exam. Left bibasilar pleural effusions and associated atelectasis.  -12/2 Aldo Harman made aware or recent imaging(out of town Cierra Stanley to look at imaging)-> chest xray looked somewhat improved-> repeat chest xray tomorrow in am   - 12/3 CXR today final read still pending, per NP read, CXR better today than prior  - 12/4 Most recent CXR from 12/3 reviewed with Dr Stanley      Left inguinal hematoma   - 11/18 CT -> Stable 5.7 x 4.4 cm left inguinal hematoma is noted  - 11/30 Left groin bruised, small firm area noted, no change from prior. Hg stable at 9.2 up trending. Continue to monitor   - 12/3 No change in left inguinal hematoma  - 12/4 Left groin incision open, mild serosanguineous drainage, incision appears to go deeper than what is seen on the surface. Dr Stanley evaluated patient. Removed excess fibrinous tissue. Ok to be discharged, will need dry dressings daily and okay to shower ( 4x4 gauze and paper tape)    VTE Prophylaxis: SCDs/TEDs, ambulation, SQ heparin  Code Status: Full Code    Dispo  - PT/OT recs low intensity and home walker  - Would benefit from homecare RN for cardiac surgery carepath  - Anticipate discharge today     EUGENIA White-CNP  Cardiac Surgery [facility]  [facility]  Team Phone [phone]    12/4/2024  11:36 AM

## 2024-12-04 NOTE — PROGRESS NOTES
Physical Therapy Treatment    Patient Name: Jaime Dominguez  MRN: [id number]  Today's Date: 12/4/2024  Room: [address] [place] [address]  Time Calculation  Start Time: 1011  Stop Time: 1054  Time Calculation (min): 43 min       Assessment/Plan   PT Assessment  PT Assessment Results: Decreased strength, Decreased endurance, Impaired balance  Rehab Prognosis: Excellent  Barriers to Discharge: none  Evaluation/Treatment Tolerance: Patient tolerated treatment well  Medical Staff Made Aware: Yes  Strengths: Ability to acquire knowledge, Attitude of self, Insight into problems, Living arrangement secure, Premorbid level of function, Rehab experience, Support and attitude of living partners, Support of extended family/friends, Support of Caregivers  End of Session Communication: Bedside nurse, TCC  End of Session Patient Position: Up in chair, Alarm off, not on at start of session  PT Plan  Inpatient/Swing Bed or Outpatient: Inpatient  PT Plan  Treatment/Interventions: Bed mobility, Transfer training, Gait training, Stair training, Balance training, Strengthening, Endurance training, Therapeutic exercise, Therapeutic activity  PT Plan: Ongoing PT  PT Frequency: 5 times per week  PT Discharge Recommendations: LOW intensity level of continued care  Equipment Recommended upon Discharge: Wheeled walker  PT Recommended Transfer Status: Assist x1   PT - OK to Discharge: Yes  Assessment: Patient is progressing Well with therapy this date. Pt. Completed 5 stairs this session progressing to CGA. Ambulation with no device at end of session. Would continue to benefit from continued skilled PT to address all mobility deficits; Patient remains appropriate for LOW intensity therapy when medically appropriate for discharge from acute stay.  Will continue to follow.     General Visit Information:   PT  Visit  PT Received On: 12/04/24  Response to Previous Treatment: Patient with no complaints from previous session.  Prior to Session Communication:  Bedside nurse  Patient Position Received: Up in chair, Alarm off, not on at start of session  Family/Caregiver Present: Yes  Caregiver Feedback: son present and supportive     Subjective   Subjective: Pt. positive and eager to complete PT session. Son very supportive and encouraging to pt. during session.  Precautions:  Precautions  Medical Precautions: Cardiac precautions, Fall precautions  Post-Surgical Precautions: Move in the Tube  Vital Signs:  Vital Signs (Past 2hrs)        Date/Time Vitals Session Patient Position Pulse Resp SpO2 BP MAP (mmHg)    12/04/24 1011 Pre PT  Sitting  88  --  --  --  --                     Objective   Pain:  Pain Assessment  Pain Assessment: 0-10  0-10 (Numeric) Pain Score: 0 - No pain  Cognition:  Cognition  Overall Cognitive Status: Within Functional Limits  Orientation Level: Oriented X4     Static Sitting balance:  Static Sitting Balance  Static Sitting-Balance Support: Feet supported  Static Sitting-Level of Assistance: Independent  Static Standing Balance:  Static Standing Balance  Static Standing-Balance Support: No upper extremity supported  Static Standing-Level of Assistance: Close supervision  Dynamic Sitting Balance:  Dynamic Sitting Balance  Dynamic Sitting-Balance Support: Feet supported  Dynamic Sitting-Level of Assistance: Independent  Dynamic Sitting-Balance: Reaching for objects  Dynamic Standing Balance:  Dynamic Standing Balance  Dynamic Standing-Balance Support: No upper extremity supported  Dynamic Standing-Level of Assistance: Close supervision  Dynamic Standing-Balance: Turning    Lines/Tubes/Drains:  Pacer Wires (Active)   Number of days: 21     PT Treatments:     Therapeutic Activity  Therapeutic Activity Performed: Yes  Therapeutic Activity 1: stair training     Bed Mobility  Bed Mobility: No  Ambulation/Gait Training  Ambulation/Gait Training Performed: Yes  Ambulation/Gait Training 1  Surface 1: Level tile  Device 1: Rolling walker, No device (progressed  to no device)  Assistance 1: Close supervision  Quality of Gait 1: Narrow base of support, Decreased step length  Comments/Distance (ft) 1: x3 trials; 200ft, 90ft, 70ft; vc to lift feet to correct gait pattern; pt. progressed to no device for final 70ft ambulation  Transfers  Transfer: Yes  Transfer 1  Transfer From 1: Sit to, Stand to  Transfer to 1: Stand, Sit  Technique 1: Sit to stand, Stand to sit  Transfer Device 1: Walker (progressed to no device)  Transfer Level of Assistance 1: Close supervision  Trials/Comments 1: x4 trials  Stairs  Stairs: Yes  Stairs  Rails 1: Right (Rigth down)  Curb Step 1: No  Device 1: No device  Assistance 1: Hand held assistance, CGA (progressed to close sup)  Comment/Number of Steps 1: x5 steps up/down     Activity tolerance:  Activity Tolerance  Endurance: Tolerates 30 min exercise with multiple rests    Outcome Measures:  [facility] Basic Mobility  Turning from your back to your side while in a flat bed without using bedrails: A little  Moving from lying on your back to sitting on the side of a flat bed without using bedrails: A little  Moving to and from bed to chair (including a wheelchair): A little  Standing up from a chair using your arms (e.g. wheelchair or bedside chair): None  To walk in hospital room: A little  Climbing 3-5 steps with railing: A little  Basic Mobility - Total Score: 19        Education Documentation  Precautions, taught by LOR Funez at 12/4/2024 11:12 AM.  Learner: Family, Patient  Readiness: Eager  Method: Explanation  Response: Verbalizes Understanding, Demonstrated Understanding  Comment: reviewed d/c planning and arrangements    Body Mechanics, taught by LOR Funez at 12/4/2024 11:12 AM.  Learner: Family, Patient  Readiness: Eager  Method: Explanation  Response: Verbalizes Understanding, Demonstrated Understanding  Comment: reviewed d/c planning and arrangements    Mobility Training, taught by LOR Funez at 12/4/2024  11:12 AM.  Learner: Family, Patient  Readiness: Eager  Method: Explanation  Response: Verbalizes Understanding, Demonstrated Understanding  Comment: reviewed d/c planning and arrangements    Education Comments  No comments found.      OP EDUCATION:     Encounter Problems       Encounter Problems (Active)       Balance       Pt will demonstrate improved sitting/standing static/dynamic balance activities without LOB via score of 24/28 on the Tinetti for increase in safety prior to DC.  (Progressing)       Start:  11/21/24    Expected End:  12/05/24               Mobility       Pt will ambulate >15ft with appropriate form, Mod A or less, LRAD, and no LOB for safe DC.  (Met)       Start:  11/21/24    Expected End:  12/05/24    Resolved:  11/25/24    Updated to: Pt will ambulate >150ft with appropriate form, CGA, LRAD, and no LOB for safe DC.    Update reason: Met         Pt will tolerate >15 minutes of upright standing activity without seated rest breaks with no changes in vital signs for improved functional mobility.  (Progressing)       Start:  11/21/24    Expected End:  12/05/24            Pt will ambulate >150ft with appropriate form, CGA, LRAD, and no LOB for safe DC. (Progressing)       Start:  11/25/24    Expected End:  12/05/24                   PT Transfers       Pt will perform bed mobility with Mod A or less and use of LRAD to safely DC.  (Met)       Start:  11/21/24    Expected End:  12/05/24    Resolved:  11/25/24    Updated to: Pt will perform bed mobility with CGA and use of LRAD to safely DC.    Update reason: Met         Pt will perform sit<>stand transfers with Mod A or less and use of LRAD to safely DC.  (Met)       Start:  11/21/24    Expected End:  12/05/24    Resolved:  11/25/24    Updated to: Pt will perform sit<>stand transfers with CGA and use of LRAD to safely DC.    Update reason: Met         Pt will perform bed mobility with CGA and use of LRAD to safely DC. (Progressing)       Start:   11/25/24    Expected End:  12/05/24                Pt will perform sit<>stand transfers with CGA and use of LRAD to safely DC. (Met)       Start:  11/25/24    Expected End:  12/05/24    Resolved:  12/03/24

## 2024-12-04 NOTE — DISCHARGE SUMMARY
Discharge Diagnosis  STEMI (ST elevation myocardial infarction) (Multi)    Issues Requiring Follow-Up  Please  medications from pharmacy, Do not stop taking Brilinta unless approved by cardiothoracic surgeon or cardiologist. Left groin incision care ( apply dry guaze to incision and cover with paper tape until seen by home care).    Test Results Pending At Discharge  Pending Labs       Order Current Status    AFB Culture/Smear Preliminary result    Fungal Culture/Smear Preliminary result            Hospital Course  Jaime Dominguez is a 74 year old male with a PMH significant for basal cell carcinoma and HPL who presented to [facility] ED on 11/8 complaining of left sided chest pain, diaphoresis, SOB nausea and syncope. ECG showed ST elevation in the inferior leads and ST depression in anterior septal leads. LHC showed 90% occlusion of the LAD. Transferred to [facility] on 11/10 for cardiac surgery workup.     11/12/2024 OPERATION: by Rodrigo Harman and Cierra Stanley  1.  Mini invasive robotic assisted CABG x 2 converted to full sternotomy.  2.  CABG x 2 with LIMA  prolonged as a Y graft with a radial to LAD OM.  3.  Endoscopic radial harvest.  4.  Left femoral artery and vein cannulation for peripheral bypass.  5.  Partial LAD endarterectomy    Echo Pre/Post: Normal BV  Chest Tubes/Drains: 2 meds, L pleural   Temporary wires location/setting: [id number]     11/14/2024 Procedure:  Right femoral IABP placement    11/14/2024 Procedure:   - Ultrasound-guided access to femoral artery and vein  - Initiation of VA ECMO   - Antegrade femoral artery cannulation    11/19 2024 OPERATION: by Cierra Stanley  1. Mediastinal exploration   2. Left hemothorax evacuation   3. Left femoral VA ECMO decannulation   4. Chest closure    CTICU course: c/b increasing pressor requirement and echo revealing apical hypo-akinesis with subsequent IABP placement on 11/14 for further support. Overnight on 11/14, Troponin rise and ST elevation in anteroseptal  leads, c/f ACS - went for [facility], underwent PCI of mid LAD with EHATHER. Also 11/14- he was cannulated for VA ECMO for cardiogenic shock 2/2 multiple failed grafts. On 11/19, returned to OR for VA ECMO decannulation and left hemothorax washout. IABP removed 11/20. Interventional Cardiology folowing.    Transferred to the floor 11/25  ====================    Floor Course:  - Patient was diuresed for fluid volume overload post cardiac surgery; Preop weight: Weight: 96.2 kg (212 lb)kg.    Vital signs and weight at discharge: BP 99/65 (BP Location: Left arm, Patient Position: Sitting)   Pulse 77   Temp 36 °C (96.8 °F) (Temporal)   Resp 18   Ht 1.829 m (6')   Wt 99.6 kg (219 lb 9.3 oz)   SpO2 96%   BMI 29.78 kg/m²     - Cardiac rehab referral   - Continue cardiac meds: ASA, BB  - Epicardial wires CUT on 12/4  - telemetry at discharge SR  Previous Afib w/ RVR for which patient was started on amiodarone, now PO. Weaned off all pressors/inotropy 11/24, remains HDS. Continue aspirin and Ticagrelor 90mg BID   Interventional Recs:   - cont telemetry care per PCI protocol  - cont DAPT with Brilinta (6 months) and aspirin (lifelong)  - provided education on compliance with DAPT  - at discharge, PLEASE send 90 day prescription of Brilinta to [facility] (for price check and Meds to Beds) and remaining refills to patient's home pharmacy   - continue high intensity statin  - no BB with pressors  - please ensure cardiology follow up appointment after discharge in 1-3 weeks  - patient referred for cardiac rehab eval  - 5lb weight restriction for 5 days for right radial access  - no lifting anything heavier than 10 lbs for one week for groin access   Cardiogenic shock s/p VA ECMO and IABP   - TTE 11/16: LVEF 31%, abnormal wall motion; reduced RVSF.   - Please repeat TTE today  - Diuresis: Continue diuresis per primary team.  - GDMT:   - SGLT-2i: start jardiance 10mg daily              - Beta-blocker: On hold              - ACE-I/ARB/ARNI:  On hold              - MRA: start aldactone 25mg daily   - AICD/CRT-D/Lifevest: Follow up TTE in outpatient setting prior to considering.  - Strict I/Os, Daily Na < 2g daily, fluid restriction.  For any questions or concerns, feel free to reach out via [employer] chat or page at [phone].This plan was discussed with Dr. Gann, HF attending.   Acute blood loss anemia.  H&H stable. Will required iron supplement at discharge   Lab Results   Component Value Date    HCT 26.0 (L) 11/26/2024    HGB 8.3 (L) 11/26/2024   Hyperlipidemia: continue statin; follow up lipid panel with PCP/ cardio as appropriate  Leukocytosis -> MRSA negative. UA negative, blood cultures sent on 11/16 with no growth- final. MRSA, and blood cultures 11/18 negative. OR cultures from 11/19 bacterial NG final, AFB stain- final pending and fungal negative so far, final pending -->Completed 7day course of Zosyn  Lab Results   Component Value Date    WBC 14.6 (H) 11/26/2024   OARRS reviewed on 11/26/2024  Rx summary: 0 narcotics, 0 buprenorphine, 0 sedatives, 0 stimulants  Recent scripts:   Date: 03/18/2024 , Medication: Hydrocodone-Acetamin 5-325 Mg , Qty: 6, Days supply: 5, prescribed by Odalis Murphy Md     - 2v CXR done 11/26  - Postop echo done 11/21  - Cardiac rehab referral was placed  - PT recs acute rehab  - Anticipate discharge to acute rehab facility referral sent to [facility]     Discharged on 11/26/2024; POD 22 s/p  MIDCABG x 2 with conversion to full sternotomy LIMA prolonged as a Y graft with a radial to LAD OM on 11/12. With Dr MG LINTON     On day of discharge, vital signs were stable and no acute distress was noted. All questions were answered. After VS and labs were reviewed it was determined the patient was stable for discharge.   =================  Hospital day of discharge management- spent >30 minutes coordinating the discharge and counseling/educating patient and family regarding discharge instructions.  =================    Past  Medical History:  Diagnosis Date  · Basal cell carcinoma    · CAD (coronary artery disease)    · Hyperlipidemia    · STEMI (ST elevation myocardial infarction) (Multi)         Surgical History  Past Surgical History:  Procedure Laterality Date  · CARDIAC CATHETERIZATION N/A 11/08/2024    Procedure: Left Heart Cath, No LV;  Surgeon: Ale Brady MD;  Location: [facility] Cardiac Cath Lab;  Service: Cardiovascular;  Laterality: N/A;  · CARDIAC CATHETERIZATION N/A 11/08/2024    Procedure: PCI;  Surgeon: Ale Brady MD;  Location: [facility] Cardiac Cath Lab;  Service: Cardiovascular;  Laterality: N/A;  · CERVICAL FUSION        C5-7  · SKIN CANCER EXCISION N/A         Prescriptions Prior to Admission  Medications Prior to Admission  Medication Sig Dispense Refill Last Dose/Taking  · aspirin 81 mg EC tablet Take 1 tablet (81 mg) by mouth once daily.     Past Week  · atorvastatin (Lipitor) 40 mg tablet Take 1 tablet (40 mg) by mouth once daily. (Patient not taking: Reported on 11/8/2024)     More than a month       Patient has no known allergies.  Social History  Social History       Tobacco Use  · Smoking status: Never  · Smokeless tobacco: Never  Substance Use Topics  · Alcohol use: Not Currently  · Drug use: Never  ==================      Pertinent Physical Exam At Time of Discharge  Physical Exam See progress    Home Medications     Medication List      START taking these medications     acetaminophen 325 mg tablet; Commonly known as: Tylenol; Take 2 tablets   (650 mg) by mouth every 6 hours if needed for mild pain (1 - 3) for up to   3 days.   amiodarone 200 mg tablet; Commonly known as: Pacerone; Take 1 tablet   (200 mg) by mouth once daily.; Start taking on: December 5, 2024   empagliflozin 10 mg; Commonly known as: Jardiance; Take 1 tablet (10 mg)   by mouth once daily.; Start taking on: December 5, 2024   furosemide 40 mg tablet; Commonly known as: Lasix; Take 1 tablet (40 mg)   by mouth once daily for 4 doses.   iron  polysaccharides 150 mg iron capsule; Commonly known as:   Nu-Iron,Niferex; Take 1 capsule (150 mg) by mouth once daily.; Start   taking on: December 5, 2024   metoprolol succinate XL 25 mg 24 hr tablet; Commonly known as:   Toprol-XL; Take 0.5 tablets (12.5 mg) by mouth 2 times a day. Do not crush   or chew.   multivitamin with minerals tablet; Take 1 tablet by mouth once daily.;   Start taking on: December 5, 2024   spironolactone 25 mg tablet; Commonly known as: Aldactone; Take 1 tablet   (25 mg) by mouth once daily.; Start taking on: December 5, 2024   ticagrelor 90 mg tablet; Commonly known as: Brilinta; Take 1 tablet (90   mg) by mouth 2 times a day.     CHANGE how you take these medications     atorvastatin 80 mg tablet; Commonly known as: Lipitor; Take 1 tablet (80   mg) by mouth once daily at bedtime.; What changed: medication strength,   how much to take, when to take this     CONTINUE taking these medications     aspirin 81 mg EC tablet       Outpatient Follow-Up  Future Appointments   Date Time Provider Department Center   12/30/2024  1:00 PM [facility]

## 2024-12-04 NOTE — PROGRESS NOTES
Art Therapy Note    Jaime Dominguez     Therapy Session  Referral Type: New referral this admission  Visit Type: New visit  Session Start Time: 1444  Session End Time: 1454  Intervention Delivery: In-person  Number of family members present: 3  Family Present for Session: Spouse/Significant Other, Friend/Caregiver  Family Participation: Supportive              Treatment/Interventions  Areas of Focus: Coping, Emotional support, Stress reduction, Anxiety reduction  Art Therapy Interventions: Assessment, Education/instruction    Post-assessment  Total Session Time (min): 10 minutes    Narrative  Assessment Detail: Pt was sitting up in a chair w/his wife sitting on the bed next to him and two friends sitting across from him. Pt was welcoming and receptive to talking with ATR and open to trying it at a later time.  Plan: ATR introduced AT services and benefits to pt along with information regarding pt's needs for art interventions to reduce anxiety/stress and facilitate healing.  Evaluation: Pt's wife shared that pt was a  for 40 years and would like MT as well.  Follow-up: ATR will f/u w/pt another time if he remains admitted.    Education Documentation  No documentation found.

## 2024-12-06 ENCOUNTER — HOME CARE VISIT (OUTPATIENT)
Dept: HOME HEALTH SERVICES | Facility: HOME HEALTH | Age: 74
End: 2024-12-06
Payer: MEDICARE

## 2024-12-06 VITALS
RESPIRATION RATE: 18 BRPM | DIASTOLIC BLOOD PRESSURE: 65 MMHG | OXYGEN SATURATION: 99 % | TEMPERATURE: 96.8 F | HEART RATE: 82 BPM | SYSTOLIC BLOOD PRESSURE: 95 MMHG

## 2024-12-06 DIAGNOSIS — S31.104A NON-HEALING OPEN WOUND OF LEFT GROIN, INITIAL ENCOUNTER: Primary | ICD-10-CM

## 2024-12-06 PROCEDURE — G0299 HHS/HOSPICE OF RN EA 15 MIN: HCPCS | Mod: HHH

## 2024-12-06 PROCEDURE — 169592 NO-PAY CLAIM PROCEDURE

## 2024-12-06 ASSESSMENT — ENCOUNTER SYMPTOMS
PAIN: 1
SHORTNESS OF BREATH: 1
HIGHEST PAIN SEVERITY IN PAST 24 HOURS: 3/10
PAIN LOCATION: CHEST
APPETITE LEVEL: GOOD

## 2024-12-06 ASSESSMENT — ACTIVITIES OF DAILY LIVING (ADL)
ENTERING_EXITING_HOME: STAND BY ASSIST
OASIS_M1830: 03
CURRENT_FUNCTION: STAND BY ASSIST
AMBULATION ASSISTANCE: STAND BY ASSIST

## 2024-12-06 NOTE — PROGRESS NOTES
Contacted by Grace Carballo RN regarding groin wound. Plan to prescribe Santyl and clean with Vashe.     Cierra Stanley MD  Cardiac Surgery

## 2024-12-08 ENCOUNTER — HOME CARE VISIT (OUTPATIENT)
Dept: HOME HEALTH SERVICES | Facility: HOME HEALTH | Age: 74
End: 2024-12-08
Payer: MEDICARE

## 2024-12-08 VITALS
HEART RATE: 78 BPM | TEMPERATURE: 97.8 F | RESPIRATION RATE: 18 BRPM | OXYGEN SATURATION: 98 % | SYSTOLIC BLOOD PRESSURE: 104 MMHG | DIASTOLIC BLOOD PRESSURE: 70 MMHG

## 2024-12-08 DIAGNOSIS — S31.109S WOUND OF LEFT GROIN, SEQUELA: Primary | ICD-10-CM

## 2024-12-08 PROCEDURE — G0151 HHCP-SERV OF PT,EA 15 MIN: HCPCS | Mod: HHH

## 2024-12-08 SDOH — HEALTH STABILITY: PHYSICAL HEALTH: EXERCISE TYPE: WRITTEN

## 2024-12-08 SDOH — HEALTH STABILITY: PHYSICAL HEALTH: PHYSICAL EXERCISE: 10

## 2024-12-08 ASSESSMENT — GAIT ASSESSMENTS
BALANCE AND GAIT SCORE: 16
PATH SCORE: 1
WALKING STANCE: 0 - HEELS APART
INITIATION OF GAIT IMMEDIATELY AFTER GO: 0 - ANY HESITANCY OR MULTIPLE ATTEMPTS TO START
GAIT SCORE: 6
STEP SYMMETRY: 0 - RIGHT AND LEFT STEP LENGTH NOT EQUAL
PATH: 1 - MILD/MODERATE DEVIATION OR USES WALKING AID
TRUNK SCORE: 1
TRUNK: 1 - NO SWAY BUT FLEXION OF KNEES OR BACK OR SPREADS ARMS WHILE WALKING
STEP CONTINUITY: 0 - STOPPING OR DISCONTINUITY BETWEEN STEPS

## 2024-12-08 ASSESSMENT — ACTIVITIES OF DAILY LIVING (ADL)
PHYSICAL TRANSFERS ASSESSED: 1
AMBULATION ASSISTANCE: 1
CURRENT_FUNCTION: STAND BY ASSIST
AMBULATION ASSISTANCE: STAND BY ASSIST
AMBULATION ASSISTANCE ON FLAT SURFACES: 1
AMBULATION_DISTANCE/DURATION_TOLERATED: 50'

## 2024-12-08 ASSESSMENT — BALANCE ASSESSMENTS
IMMEDIATE STANDING BALANCE FIRST 5 SECONDS: 1 - STEADY BUT USES WALKER OR OTHER SUPPORT
NUDGED: 1 - STAGGERS, GRABS, CATCHES SELF
ARISES: 1 - ABLE, USES ARMS TO HELP
ATTEMPTS TO ARISE: 1 - ABLE, REQUIRES MORE THAN ONE ATTEMPT
SITTING DOWN: 1 - USES ARMS OR NOT SMOOTH MOTION
TURNING 360 DEGREES STEPS: 1 - CONTINUOUS STEPS
BALANCE SCORE: 10
SITTING BALANCE: 1 - STEADY, SAFE
STANDING BALANCE: 1 - STEADY BUT WIDE STANCE AND USES CANE OR OTHER SUPPORT
EYES CLOSED AT MAXIMUM POSITION NUDGED: 1 - STEADY
NUDGED SCORE: 1
ARISING SCORE: 1

## 2024-12-08 ASSESSMENT — ENCOUNTER SYMPTOMS
PERSON REPORTING PAIN: PATIENT
PAIN SEVERITY GOAL: 3/10
PAIN LOCATION - EXACERBATING FACTORS: MVMT
PAIN LOCATION - PAIN FREQUENCY: INTERMITTENT
PAIN LOCATION - PAIN SEVERITY: 6/10
PAIN: 1
ARTHRALGIAS: 1
MUSCLE WEAKNESS: 1
LOWEST PAIN SEVERITY IN PAST 24 HOURS: 4/10
SUBJECTIVE PAIN PROGRESSION: WAXING AND WANING
PAIN LOCATION - PAIN QUALITY: ACHING
PAIN LOCATION - RELIEVING FACTORS: REST
PAIN LOCATION: BACK
PAIN LOCATION - PAIN DURATION: VARIES

## 2024-12-08 NOTE — PROGRESS NOTES
Contacted by home health care about patients groin site and recs for Santyl for wound care. Will place order

## 2024-12-09 ENCOUNTER — HOME CARE VISIT (OUTPATIENT)
Dept: HOME HEALTH SERVICES | Facility: HOME HEALTH | Age: 74
End: 2024-12-09
Payer: MEDICARE

## 2024-12-09 VITALS
SYSTOLIC BLOOD PRESSURE: 95 MMHG | RESPIRATION RATE: 18 BRPM | DIASTOLIC BLOOD PRESSURE: 60 MMHG | OXYGEN SATURATION: 99 % | HEART RATE: 66 BPM | TEMPERATURE: 97.3 F

## 2024-12-09 LAB
ATRIAL RATE: 76 BPM
FUNGUS SPEC CULT: NORMAL
FUNGUS SPEC FUNGUS STN: NORMAL
P AXIS: 50 DEGREES
P OFFSET: 186 MS
P ONSET: 126 MS
PR INTERVAL: 186 MS
Q ONSET: 219 MS
QRS COUNT: 13 BEATS
QRS DURATION: 92 MS
QT INTERVAL: 440 MS
QTC CALCULATION(BAZETT): 495 MS
QTC FREDERICIA: 475 MS
R AXIS: 90 DEGREES
T AXIS: 53 DEGREES
T OFFSET: 439 MS
VENTRICULAR RATE: 76 BPM

## 2024-12-09 PROCEDURE — G0299 HHS/HOSPICE OF RN EA 15 MIN: HCPCS | Mod: HHH

## 2024-12-09 SDOH — ECONOMIC STABILITY: GENERAL

## 2024-12-09 ASSESSMENT — ENCOUNTER SYMPTOMS
DENIES PAIN: 1
COUGH: 1
SHORTNESS OF BREATH: 1

## 2024-12-09 ASSESSMENT — ACTIVITIES OF DAILY LIVING (ADL)
CURRENT_FUNCTION: STAND BY ASSIST
MONEY MANAGEMENT (EXPENSES/BILLS): INDEPENDENT
AMBULATION ASSISTANCE: STAND BY ASSIST

## 2024-12-10 ENCOUNTER — LAB (OUTPATIENT)
Dept: LAB | Facility: LAB | Age: 74
End: 2024-12-10
Payer: MEDICARE

## 2024-12-10 DIAGNOSIS — Z48.812 ENCOUNTER FOR SURGICAL AFTERCARE FOLLOWING SURGERY ON THE CIRCULATORY SYSTEM: Primary | ICD-10-CM

## 2024-12-10 LAB
ANION GAP SERPL CALCULATED.3IONS-SCNC: 14 MMOL/L (ref 10–20)
BUN SERPL-MCNC: 19 MG/DL (ref 6–23)
CALCIUM SERPL-MCNC: 9 MG/DL (ref 8.6–10.3)
CHLORIDE SERPL-SCNC: 98 MMOL/L (ref 98–107)
CO2 SERPL-SCNC: 24 MMOL/L (ref 21–32)
CREAT SERPL-MCNC: 1.06 MG/DL (ref 0.5–1.3)
EGFRCR SERPLBLD CKD-EPI 2021: 75 ML/MIN/1.73M*2
ERYTHROCYTE [DISTWIDTH] IN BLOOD BY AUTOMATED COUNT: 15.3 % (ref 11.5–14.5)
GLUCOSE SERPL-MCNC: 102 MG/DL (ref 74–99)
HCT VFR BLD AUTO: 43.9 % (ref 41–52)
HGB BLD-MCNC: 13.8 G/DL (ref 13.5–17.5)
MAGNESIUM SERPL-MCNC: 2.35 MG/DL (ref 1.6–2.4)
MCH RBC QN AUTO: 29.4 PG (ref 26–34)
MCHC RBC AUTO-ENTMCNC: 31.4 G/DL (ref 32–36)
MCV RBC AUTO: 93 FL (ref 80–100)
NRBC BLD-RTO: 0 /100 WBCS (ref 0–0)
PLATELET # BLD AUTO: 431 X10*3/UL (ref 150–450)
POTASSIUM SERPL-SCNC: 4.5 MMOL/L (ref 3.5–5.3)
RBC # BLD AUTO: 4.7 X10*6/UL (ref 4.5–5.9)
SODIUM SERPL-SCNC: 131 MMOL/L (ref 136–145)
WBC # BLD AUTO: 10.7 X10*3/UL (ref 4.4–11.3)

## 2024-12-10 PROCEDURE — 83735 ASSAY OF MAGNESIUM: CPT

## 2024-12-10 PROCEDURE — 85027 COMPLETE CBC AUTOMATED: CPT

## 2024-12-10 PROCEDURE — 80048 BASIC METABOLIC PNL TOTAL CA: CPT

## 2024-12-11 ENCOUNTER — HOME CARE VISIT (OUTPATIENT)
Dept: HOME HEALTH SERVICES | Facility: HOME HEALTH | Age: 74
End: 2024-12-11
Payer: MEDICARE

## 2024-12-11 VITALS
HEART RATE: 87 BPM | OXYGEN SATURATION: 97 % | DIASTOLIC BLOOD PRESSURE: 62 MMHG | TEMPERATURE: 96.8 F | SYSTOLIC BLOOD PRESSURE: 118 MMHG | RESPIRATION RATE: 16 BRPM

## 2024-12-11 LAB
ACID FAST STN SPEC: NORMAL
MYCOBACTERIUM SPEC CULT: NORMAL

## 2024-12-11 PROCEDURE — G0157 HHC PT ASSISTANT EA 15: HCPCS | Mod: HHH

## 2024-12-11 SDOH — HEALTH STABILITY: PHYSICAL HEALTH: EXERCISE TYPE: BLE EXS

## 2024-12-11 SDOH — HEALTH STABILITY: PHYSICAL HEALTH: EXERCISE COMMENTS: SPO2 96%, HR 98 AFTER SEATED, STANDING BLE EXS

## 2024-12-11 ASSESSMENT — ACTIVITIES OF DAILY LIVING (ADL)
AMBULATION ASSISTANCE: 1
CURRENT_FUNCTION: SUPERVISION
PHYSICAL TRANSFERS ASSESSED: 1
AMBULATION ASSISTANCE: SUPERVISION
AMBULATION ASSISTANCE ON FLAT SURFACES: 1

## 2024-12-11 ASSESSMENT — ENCOUNTER SYMPTOMS
DENIES PAIN: 1
PERSON REPORTING PAIN: PATIENT

## 2024-12-12 ENCOUNTER — HOME CARE VISIT (OUTPATIENT)
Dept: HOME HEALTH SERVICES | Facility: HOME HEALTH | Age: 74
End: 2024-12-12
Payer: MEDICARE

## 2024-12-12 VITALS
SYSTOLIC BLOOD PRESSURE: 110 MMHG | TEMPERATURE: 97.3 F | HEART RATE: 85 BPM | OXYGEN SATURATION: 95 % | RESPIRATION RATE: 18 BRPM | DIASTOLIC BLOOD PRESSURE: 65 MMHG

## 2024-12-12 DIAGNOSIS — R06.02 SHORTNESS OF BREATH: ICD-10-CM

## 2024-12-12 PROCEDURE — G0299 HHS/HOSPICE OF RN EA 15 MIN: HCPCS | Mod: HHH

## 2024-12-12 SDOH — HEALTH STABILITY: PHYSICAL HEALTH: EXERCISE ACTIVITIES SETS: 1

## 2024-12-12 SDOH — HEALTH STABILITY: PHYSICAL HEALTH: EXERCISE ACTIVITY: CALF RAISES, MARCHES, KNEE FLEX, HIP ABD

## 2024-12-12 SDOH — HEALTH STABILITY: PHYSICAL HEALTH: PHYSICAL EXERCISE: 10

## 2024-12-12 SDOH — ECONOMIC STABILITY: GENERAL

## 2024-12-12 SDOH — HEALTH STABILITY: PHYSICAL HEALTH: PHYSICAL EXERCISE: SEATED

## 2024-12-12 SDOH — HEALTH STABILITY: PHYSICAL HEALTH: PHYSICAL EXERCISE: STANDING

## 2024-12-12 SDOH — HEALTH STABILITY: PHYSICAL HEALTH: EXERCISE ACTIVITY: SEATED HAMSTRING, HEEL CORD/CALF STRETCH

## 2024-12-12 SDOH — HEALTH STABILITY: PHYSICAL HEALTH: EXERCISE ACTIVITY: AP,  LAQS, MARCHES

## 2024-12-12 ASSESSMENT — ENCOUNTER SYMPTOMS
DENIES PAIN: 1
DYSPNEA ACTIVITY LEVEL: AFTER AMBULATING LESS THAN 10 FT
COUGH: 1
SHORTNESS OF BREATH: 1
APPETITE LEVEL: FAIR

## 2024-12-12 ASSESSMENT — ACTIVITIES OF DAILY LIVING (ADL)
MONEY MANAGEMENT (EXPENSES/BILLS): INDEPENDENT
AMBULATION ASSISTANCE: STAND BY ASSIST
CURRENT_FUNCTION: STAND BY ASSIST

## 2024-12-16 ENCOUNTER — HOME CARE VISIT (OUTPATIENT)
Dept: HOME HEALTH SERVICES | Facility: HOME HEALTH | Age: 74
End: 2024-12-16
Payer: MEDICARE

## 2024-12-16 VITALS
SYSTOLIC BLOOD PRESSURE: 118 MMHG | RESPIRATION RATE: 16 BRPM | HEART RATE: 70 BPM | OXYGEN SATURATION: 100 % | TEMPERATURE: 97.5 F | DIASTOLIC BLOOD PRESSURE: 60 MMHG

## 2024-12-16 PROCEDURE — G0157 HHC PT ASSISTANT EA 15: HCPCS | Mod: HHH

## 2024-12-16 SDOH — HEALTH STABILITY: PHYSICAL HEALTH: EXERCISE TYPE: BLE EXS

## 2024-12-16 ASSESSMENT — ENCOUNTER SYMPTOMS: DENIES PAIN: 1

## 2024-12-17 SDOH — HEALTH STABILITY: PHYSICAL HEALTH: EXERCISE ACTIVITIES SETS: 1

## 2024-12-17 SDOH — HEALTH STABILITY: PHYSICAL HEALTH: EXERCISE ACTIVITY: MARCHES, CALF RAISES, HIP ABD, MINI SQUATS

## 2024-12-17 SDOH — HEALTH STABILITY: PHYSICAL HEALTH: PHYSICAL EXERCISE: 15

## 2024-12-17 SDOH — HEALTH STABILITY: PHYSICAL HEALTH: PHYSICAL EXERCISE: STANDING

## 2024-12-17 SDOH — HEALTH STABILITY: PHYSICAL HEALTH: PHYSICAL EXERCISE: SEATED

## 2024-12-17 SDOH — HEALTH STABILITY: PHYSICAL HEALTH: EXERCISE ACTIVITY: LAQS, MARCHES

## 2024-12-17 ASSESSMENT — ACTIVITIES OF DAILY LIVING (ADL)
CURRENT_FUNCTION: INDEPENDENT
PHYSICAL TRANSFERS ASSESSED: 1
AMBULATION ASSISTANCE ON FLAT SURFACES: 1
AMBULATION ASSISTANCE: 1
AMBULATION ASSISTANCE: SUPERVISION

## 2024-12-18 DIAGNOSIS — I25.10 CORONARY ARTERY DISEASE INVOLVING NATIVE CORONARY ARTERY OF NATIVE HEART WITHOUT ANGINA PECTORIS: ICD-10-CM

## 2024-12-18 LAB
ACID FAST STN SPEC: NORMAL
MYCOBACTERIUM SPEC CULT: NORMAL

## 2024-12-19 ENCOUNTER — HOME CARE VISIT (OUTPATIENT)
Dept: HOME HEALTH SERVICES | Facility: HOME HEALTH | Age: 74
End: 2024-12-19
Payer: MEDICARE

## 2024-12-19 VITALS
SYSTOLIC BLOOD PRESSURE: 95 MMHG | HEART RATE: 70 BPM | RESPIRATION RATE: 18 BRPM | DIASTOLIC BLOOD PRESSURE: 60 MMHG | TEMPERATURE: 97.7 F | OXYGEN SATURATION: 97 %

## 2024-12-19 PROCEDURE — G0299 HHS/HOSPICE OF RN EA 15 MIN: HCPCS | Mod: HHH

## 2024-12-19 PROCEDURE — G0157 HHC PT ASSISTANT EA 15: HCPCS | Mod: HHH

## 2024-12-19 SDOH — ECONOMIC STABILITY: GENERAL

## 2024-12-19 ASSESSMENT — ENCOUNTER SYMPTOMS
APPETITE LEVEL: GOOD
PAIN: 1
PAIN LOCATION: INCISION

## 2024-12-19 ASSESSMENT — ACTIVITIES OF DAILY LIVING (ADL)
AMBULATION ASSISTANCE: STAND BY ASSIST
MONEY MANAGEMENT (EXPENSES/BILLS): NEEDS ASSISTANCE
CURRENT_FUNCTION: STAND BY ASSIST

## 2024-12-20 VITALS
SYSTOLIC BLOOD PRESSURE: 115 MMHG | TEMPERATURE: 97.6 F | DIASTOLIC BLOOD PRESSURE: 60 MMHG | OXYGEN SATURATION: 98 % | RESPIRATION RATE: 16 BRPM | HEART RATE: 72 BPM

## 2024-12-20 SDOH — HEALTH STABILITY: PHYSICAL HEALTH: EXERCISE TYPE: BLE EXS

## 2024-12-20 ASSESSMENT — ACTIVITIES OF DAILY LIVING (ADL)
AMBULATION_DISTANCE/DURATION_TOLERATED: 10 MIN
AMBULATION ASSISTANCE ON FLAT SURFACES: 1

## 2024-12-20 ASSESSMENT — ENCOUNTER SYMPTOMS: DENIES PAIN: 1

## 2024-12-22 SDOH — HEALTH STABILITY: PHYSICAL HEALTH: EXERCISE ACTIVITIES SETS: 1

## 2024-12-22 SDOH — HEALTH STABILITY: PHYSICAL HEALTH: PHYSICAL EXERCISE: SEATED

## 2024-12-22 SDOH — HEALTH STABILITY: PHYSICAL HEALTH: PHYSICAL EXERCISE: 15

## 2024-12-22 SDOH — HEALTH STABILITY: PHYSICAL HEALTH: PHYSICAL EXERCISE: STANDING

## 2024-12-22 SDOH — HEALTH STABILITY: PHYSICAL HEALTH: EXERCISE ACTIVITY: MARCHES, HIP ABD/EXT, KNEE FLEX, MINI SQUATS, CALF RAISES

## 2024-12-22 SDOH — HEALTH STABILITY: PHYSICAL HEALTH: EXERCISE ACTIVITY: LAQS, MARCHES, APS

## 2024-12-22 ASSESSMENT — ACTIVITIES OF DAILY LIVING (ADL)
PHYSICAL TRANSFERS ASSESSED: 1
CURRENT_FUNCTION: INDEPENDENT
AMBULATION ASSISTANCE: 1
PHYSICAL_TRANSFERS_DEVICES: MI

## 2024-12-24 ENCOUNTER — HOME CARE VISIT (OUTPATIENT)
Dept: HOME HEALTH SERVICES | Facility: HOME HEALTH | Age: 74
End: 2024-12-24
Payer: MEDICARE

## 2024-12-24 LAB
ACID FAST STN SPEC: NORMAL
MYCOBACTERIUM SPEC CULT: NORMAL

## 2024-12-24 PROCEDURE — G0157 HHC PT ASSISTANT EA 15: HCPCS | Mod: HHH

## 2024-12-25 VITALS
RESPIRATION RATE: 16 BRPM | TEMPERATURE: 96.7 F | SYSTOLIC BLOOD PRESSURE: 120 MMHG | OXYGEN SATURATION: 98 % | DIASTOLIC BLOOD PRESSURE: 62 MMHG | HEART RATE: 79 BPM

## 2024-12-25 SDOH — HEALTH STABILITY: PHYSICAL HEALTH: EXERCISE ACTIVITIES SETS: 1

## 2024-12-25 SDOH — HEALTH STABILITY: PHYSICAL HEALTH: PHYSICAL EXERCISE: STANDING

## 2024-12-25 SDOH — HEALTH STABILITY: PHYSICAL HEALTH: EXERCISE ACTIVITY: CALF RAISES, MARCHES, HIP ABD/EXT, KNEE FLEX, MINI SQUATS

## 2024-12-25 SDOH — HEALTH STABILITY: PHYSICAL HEALTH: EXERCISE TYPE: BLE EXS

## 2024-12-25 SDOH — HEALTH STABILITY: PHYSICAL HEALTH: EXERCISE COMMENTS: SPO2 98%, HR 88

## 2024-12-25 SDOH — HEALTH STABILITY: PHYSICAL HEALTH: PHYSICAL EXERCISE: 15

## 2024-12-25 ASSESSMENT — ACTIVITIES OF DAILY LIVING (ADL)
AMBULATION ASSISTANCE: SUPERVISION
AMBULATION ASSISTANCE: 1
CURRENT_FUNCTION: INDEPENDENT
PHYSICAL TRANSFERS ASSESSED: 1
AMBULATION ASSISTANCE ON FLAT SURFACES: 1
PHYSICAL_TRANSFERS_DEVICES: NO DEVICE
AMBULATION_DISTANCE/DURATION_TOLERATED: 10 MIN

## 2024-12-25 ASSESSMENT — ENCOUNTER SYMPTOMS: DENIES PAIN: 1

## 2024-12-26 ENCOUNTER — HOME CARE VISIT (OUTPATIENT)
Dept: HOME HEALTH SERVICES | Facility: HOME HEALTH | Age: 74
End: 2024-12-26
Payer: MEDICARE

## 2024-12-26 VITALS
RESPIRATION RATE: 18 BRPM | SYSTOLIC BLOOD PRESSURE: 102 MMHG | OXYGEN SATURATION: 99 % | HEART RATE: 78 BPM | DIASTOLIC BLOOD PRESSURE: 60 MMHG | TEMPERATURE: 97 F

## 2024-12-26 PROCEDURE — G0299 HHS/HOSPICE OF RN EA 15 MIN: HCPCS | Mod: HHH

## 2024-12-26 SDOH — ECONOMIC STABILITY: GENERAL

## 2024-12-26 ASSESSMENT — ENCOUNTER SYMPTOMS
DENIES PAIN: 1
APPETITE LEVEL: GOOD

## 2024-12-26 ASSESSMENT — ACTIVITIES OF DAILY LIVING (ADL)
AMBULATION ASSISTANCE: STAND BY ASSIST
CURRENT_FUNCTION: STAND BY ASSIST

## 2024-12-27 ENCOUNTER — HOME CARE VISIT (OUTPATIENT)
Dept: HOME HEALTH SERVICES | Facility: HOME HEALTH | Age: 74
End: 2024-12-27
Payer: MEDICARE

## 2024-12-27 ENCOUNTER — OFFICE VISIT (OUTPATIENT)
Dept: WOUND CARE | Facility: HOSPITAL | Age: 74
End: 2024-12-27
Payer: MEDICARE

## 2024-12-27 VITALS
TEMPERATURE: 97.4 F | HEART RATE: 58 BPM | OXYGEN SATURATION: 98 % | DIASTOLIC BLOOD PRESSURE: 62 MMHG | RESPIRATION RATE: 16 BRPM | SYSTOLIC BLOOD PRESSURE: 120 MMHG

## 2024-12-27 DIAGNOSIS — S31.104D NON-HEALING OPEN WOUND OF LEFT GROIN, SUBSEQUENT ENCOUNTER: Primary | ICD-10-CM

## 2024-12-27 PROCEDURE — 87077 CULTURE AEROBIC IDENTIFY: CPT | Mod: WESLAB | Performed by: FAMILY MEDICINE

## 2024-12-27 PROCEDURE — G0157 HHC PT ASSISTANT EA 15: HCPCS | Mod: HHH

## 2024-12-27 PROCEDURE — 99213 OFFICE O/P EST LOW 20 MIN: CPT | Mod: 25

## 2024-12-27 PROCEDURE — 11042 DBRDMT SUBQ TIS 1ST 20SQCM/<: CPT

## 2024-12-27 ASSESSMENT — ENCOUNTER SYMPTOMS
DENIES PAIN: 1
PERSON REPORTING PAIN: PATIENT

## 2024-12-28 SDOH — HEALTH STABILITY: PHYSICAL HEALTH: EXERCISE TYPE: BLES

## 2024-12-28 SDOH — HEALTH STABILITY: PHYSICAL HEALTH: PHYSICAL EXERCISE: 10

## 2024-12-28 SDOH — HEALTH STABILITY: PHYSICAL HEALTH: EXERCISE ACTIVITY: MARCHES AND SQUATS FOR WARM UP

## 2024-12-28 SDOH — HEALTH STABILITY: PHYSICAL HEALTH: EXERCISE ACTIVITIES SETS: 1

## 2024-12-28 ASSESSMENT — ACTIVITIES OF DAILY LIVING (ADL)
AMBULATION ASSISTANCE: 1
AMBULATION ASSISTANCE ON FLAT SURFACES: 1
AMBULATION ASSISTANCE: INDEPENDENT
AMBULATION_DISTANCE/DURATION_TOLERATED: 15 MIN
PHYSICAL TRANSFERS ASSESSED: 1
CURRENT_FUNCTION: INDEPENDENT

## 2024-12-29 LAB
BACTERIA SPEC CULT: ABNORMAL
GRAM STN SPEC: ABNORMAL
GRAM STN SPEC: ABNORMAL

## 2024-12-30 ENCOUNTER — HOSPITAL ENCOUNTER (OUTPATIENT)
Dept: RADIOLOGY | Facility: HOSPITAL | Age: 74
Discharge: HOME | End: 2024-12-30
Payer: MEDICARE

## 2024-12-30 ENCOUNTER — APPOINTMENT (OUTPATIENT)
Dept: CARDIAC SURGERY | Facility: HOSPITAL | Age: 74
End: 2024-12-30
Payer: MEDICARE

## 2024-12-30 VITALS
OXYGEN SATURATION: 100 % | BODY MASS INDEX: 25.68 KG/M2 | HEIGHT: 71 IN | WEIGHT: 183.4 LBS | HEART RATE: 75 BPM | DIASTOLIC BLOOD PRESSURE: 76 MMHG | SYSTOLIC BLOOD PRESSURE: 109 MMHG

## 2024-12-30 DIAGNOSIS — I25.10 CORONARY ARTERY DISEASE INVOLVING NATIVE CORONARY ARTERY OF NATIVE HEART WITHOUT ANGINA PECTORIS: ICD-10-CM

## 2024-12-30 DIAGNOSIS — I25.10 CORONARY ARTERY DISEASE INVOLVING NATIVE HEART WITHOUT ANGINA PECTORIS, UNSPECIFIED VESSEL OR LESION TYPE: ICD-10-CM

## 2024-12-30 DIAGNOSIS — Z95.1 S/P CABG X 2: ICD-10-CM

## 2024-12-30 PROCEDURE — 71046 X-RAY EXAM CHEST 2 VIEWS: CPT | Performed by: STUDENT IN AN ORGANIZED HEALTH CARE EDUCATION/TRAINING PROGRAM

## 2024-12-30 PROCEDURE — 99213 OFFICE O/P EST LOW 20 MIN: CPT | Performed by: THORACIC SURGERY (CARDIOTHORACIC VASCULAR SURGERY)

## 2024-12-30 PROCEDURE — 71046 X-RAY EXAM CHEST 2 VIEWS: CPT

## 2024-12-30 NOTE — PROGRESS NOTES
Subjective   Patient is a 74 y.o. male who presents with aortic valve disease secondary to { ETIOLOGY:13787}. Current symptoms that the patient reports are {SYMPTOMS:81324}. Echocardiography reveals {ECHO FINDINGS:61132}. Other cardiac history includes {diagnoses; cardiac:91760}. Additional pertinant findings include { FINDINGS:05055}. Cardiac risk factors include {findings; risks cardiac:24502}.    Patient Active Problem List    Diagnosis Date Noted    Acute ST elevation myocardial infarction (STEMI) of inferior wall (Multi) 11/08/2024    ST elevation myocardial infarction (STEMI) involving other coronary artery of inferior wall 11/08/2024     Past Medical History:   Diagnosis Date    Basal cell carcinoma     CAD (coronary artery disease)     Hyperlipidemia     STEMI (ST elevation myocardial infarction) (Multi)       Past Surgical History:   Procedure Laterality Date    CARDIAC CATHETERIZATION N/A 11/08/2024    Procedure: Left Heart Cath, No LV;  Surgeon: Ale Brady MD;  Location: [facility] Cardiac Cath Lab;  Service: Cardiovascular;  Laterality: N/A;    CARDIAC CATHETERIZATION N/A 11/08/2024    Procedure: PCI;  Surgeon: Ale Brady MD;  Location: [facility] Cardiac Cath Lab;  Service: Cardiovascular;  Laterality: N/A;    CARDIAC CATHETERIZATION N/A 11/14/2024    Procedure: Left Heart Cath, With LV, Angriography And Grafts;  Surgeon: Giancarlo Quiles DO;  Location: [place] [address] Cardiac Cath Lab;  Service: Cardiovascular;  Laterality: N/A;    CARDIAC CATHETERIZATION N/A 11/14/2024    Procedure: ECMO;  Surgeon: Giancarlo Quiles DO;  Location: [place] [address] Cardiac Cath Lab;  Service: Cardiovascular;  Laterality: N/A;    CARDIAC CATHETERIZATION N/A 11/14/2024    Procedure: PCI HEATHER Stent- Coronary;  Surgeon: Giancarlo Quiles DO;  Location: [place] [address] Cardiac Cath Lab;  Service: Cardiovascular;  Laterality: N/A;    CERVICAL FUSION      C5-7    SKIN CANCER EXCISION N/A       (Not in a hospital  admission)      Review of systems negative except for ***.      Data Review: {icu labs:69890}    ECG: {EC}

## 2024-12-30 NOTE — PROGRESS NOTES
"CARDIOTHORACIC SURGERY FOLLOW-UP PROGRESS NOTE  Subjective   Jaime Dominguez [id number] 12/30/2024    Patient is a 74 y.o. male who presents for routine post-operative follow up. He denies any present complaints. Activity level has been appropriate.     Complains of some SOB that been improving  Objective   /76 (BP Location: Right arm, Patient Position: Sitting)   Pulse 75   Ht 1.803 m (5' 11\")   Wt 83.2 kg (183 lb 6.4 oz)   SpO2 100%   BMI 25.58 kg/m²   Heart: Regular rate and rhythm, S1, S2 normal, no murmur, click, rub or gallop.  Lungs: Clear to auscultation bilaterally.  Abdomen: Soft, non-distended, non-tender to palpation.  Extremities: Warm, well perfused, pulses intact, no cyanosis, clubbing, or edema.  Neuro: Alert and oriented X4, moving all extremities with no deficits observed.  Incision(s): Well healing without erythema or drainage.  Imaging: Chest x-ray reviewed. Sternum healing well.    Assessment/Plan   There are no diagnoses linked to this encounter.  Jaime is recovering quite well after a quite complicated CABG x 2 postoperative course.  He is currently on some short breath that has been improving.  He has been seen in the wound clinic for his groin ECMO cannulation site that is almost healed.  I would like to see this patient in early spring to follow his recovery.  Rodrigo Harman MD    "

## 2024-12-30 NOTE — PROGRESS NOTES
Primary Care Physician: Frank Mckeon MD  Patient's Location: [facility] [address]    Date of Visit: 12/31/2024  2:00 PM EST  Location of visit: [facility] [address]   Type of Visit: New  Last visit: Visit date not found    HPI / Summary:   Jaime Dominguez is a very pleasant 74 y.o. male presenting for management of Stage C iCM/HFrEF (last EF 11/27/2024: 45%). He has a history of HLD and CAD c/b inferior STEMI (11/8/2024) 2/2 to critical LAD stenosis s/p midCABx2, converted to full sternotomy, with LIMA extended as a Y graft to LAD and OM. Post-operative course was complicated by cardiogenic shock requiring IABP and subsequent VA-ECMO support after failed grafts. He then required PCI of the mid-LAD with HEATHER with improvement in EF and stabilization.     Initial CV History:   Admitted with an inferior STEMI on 11/8/2024. Initial workup showed a 90% LAD occlusion. After transfer for cardiac surgery, he underwent a robotic-assisted MIDCABG x2, later converted to full sternotomy with LIMA and radial graft to LAD/OM on 11/12. Postoperatively, complications included cardiogenic shock requiring VA ECMO and IABP. AF RVR on amiodarone The patient also underwent mediastinal exploration, hemothorax evacuation, and ECMO decannulation on 11/19.    In the CTICU, there was concern for graft failure and he underwent PCI to mid-LAD on 11/14. Transitioned to the floor on 11/25, he was stabilized on DAPT (Brilinta + ASA), initiated on GDMT (spironolactone, Jardiance), and referred for cardiac rehab. Final echocardiogram revealed LVEF of 45%. Discharged POD 22 on 11/26 to acute rehab, he was hemodynamically stable and counseled on medication compliance, groin incision care, and weight restrictions. Follow-up arranged for cardiology and cardiac surgrey    Interval history:   Saw Dr. Aldo Harman yesterday  Issues with wound healing of left groin    Today:  He is accompanied by: wife (who provides additional history)    Still has WINTER.  "No LE swelling. No orthopnea. Difficulty with sleep    He denies: dyspnea at rest, LE swelling, syncope, PND, orthopnea, chest pain, palpitation.    ROS: Full 10 pt review of symptoms of negative unless discussed above.     Objective   Medical History:   He has a past medical history of Basal cell carcinoma, CAD (coronary artery disease), Hyperlipidemia, and STEMI (ST elevation myocardial infarction) (Multi).  Surgical Hx:   He has a past surgical history that includes Skin cancer excision (N/A); Cardiac catheterization (N/A, 11/08/2024); Cardiac catheterization (N/A, 11/08/2024); Cervical fusion; Cardiac catheterization (N/A, 11/14/2024); Cardiac catheterization (N/A, 11/14/2024); and Cardiac catheterization (N/A, 11/14/2024).   Social Hx:   He reports that he has never smoked. He has never used smokeless tobacco. He reports that he does not currently use alcohol. He reports that he does not use drugs.  Family Hx:   His family history includes Heart attack in his brother and mother.   Allergies:   No Known Allergies  Exam:       12/31/2024     2:22 PM 12/30/2024     1:06 PM 12/27/2024     1:17 PM 12/26/2024     1:05 PM 12/24/2024    12:13 PM 12/19/2024     2:17 PM   Vitals   Systolic 95 109 120 102 120 115   Diastolic 67 76 62 60 62 60   BP Location  Right arm Left arm  Right arm    Heart Rate 76 75 58 78 79 72   Temp   36.3 °C (97.4 °F) 36.1 °C (97 °F) 35.9 °C (96.7 °F) 36.4 °C (97.6 °F)   Resp   16 18 16 16   Height 1.803 m (5' 11\") 1.803 m (5' 11\")       Weight (lb) 182 183.4       BMI 25.38 kg/m2 25.58 kg/m2       BSA (m2) 2.03 m2 2.04 m2       Visit Report Report Report Report        Wt Readings from Last 5 Encounters:   12/31/24 82.6 kg (182 lb)   12/30/24 83.2 kg (183 lb 6.4 oz)   12/03/24 83.9 kg (185 lb)   11/10/24 96.2 kg (212 lb)     GEN: Pleasant, well-appearing, no acute distress.  HEENT: JVP not elevated, no icterus. No HJR  CHEST: No wheeze, good air movement. Sternotomy c/d/i  CV: Normal rate, " "regular rhythm. Normal S1, inspiratory split S2, no m/r/g  ABD: Soft, ND, NT  EXT: Warm, well perfused, No LE edema. Left groin with shallow wound. No drainage.  NEURO: Pleasant, Oriented to plan    Labs:   CMP:  Recent Labs     12/10/24  1349 12/04/24  0740 12/03/24  0746 12/02/24  0754 12/01/24  0706   * 135* 136 135* 136   K 4.5 4.1 4.4 4.4 3.8   CL 98 104 104 105 102   CO2 24 23 21 22 26   ANIONGAP 14 12 15 12 12   BUN 19 16 19 18 18   CREATININE 1.06 0.75 0.80 0.85 0.81   EGFR 75 >90 >90 >90 >90   MG 2.35 2.42* 2.39 2.57* 2.33     Recent Labs     12/04/24  0740 12/03/24  0746 12/02/24  0754 11/14/24  0020 11/13/24  1350 11/12/24  0416 11/11/24  0401 11/10/24  1634 11/08/24  1612   ALBUMIN 3.4 3.6 3.6   < > 4.9  4.9   < > 4.4  4.1   < > 4.5   ALKPHOS  --   --   --   --  21*  --  52  --  46   ALT  --   --   --   --  35  --  26  --  29   AST  --   --   --   --  180*  --  30  --  27   BILITOT  --   --   --   --  0.9  --  0.8  --  0.5    < > = values in this interval not displayed.   CBC:  Recent Labs     12/10/24  1349 12/04/24  0740 12/03/24  0746 12/02/24  0754 12/01/24  0706   WBC 10.7 8.7 9.3 9.9 11.0   HGB 13.8 10.7* 10.5* 10.5* 10.0*   HCT 43.9 33.1* 35.1* 33.4* 31.8*    478* 516* 558* 633*   MCV 93 91 97 94 93   HEME/ENDO:  Recent Labs     11/10/24  2040 11/08/24  1612   TSH 3.12 3.07   HGBA1C  --  5.1    CARDIAC:   Recent Labs     11/18/24  0213 11/17/24  0317 11/15/24  1228 11/15/24  0142 11/14/24  0354 11/14/24  0020 11/13/24  2020 11/10/24  2040 11/10/24  1634   * 450*  --   --   --   --   --   --   --    TROPHS  --   --  44,705* 67,971* 73,314* 65,732* 63,067*   < > 2,317*   BNP  --   --   --   --   --   --   --   --  13    < > = values in this interval not displayed.     Recent Labs     11/08/24  1612   CHOL 228*   LDLCALC 126*   HDL 37.6   TRIG 322*   MICRO: No results for input(s): \"ESR\", \"CRP\", \"PROCAL\" in the last 82268 hours.Susceptibility data from last 90 days.  Collected " Specimen Info Organism Ampicillin Cefazolin Ciprofloxacin Gentamicin Levofloxacin Piperacillin/Tazobactam Trimethoprim/Sulfamethoxazole   12/27/24 Tissue/Biopsy from Wound/Tissue Escherichia coli  S  S  S  S  S  S  S       Notable Studies, reviewed:   EKG:   Recent Labs     11/30/24  1819 11/18/24  1550 11/16/24  1655   VENTRATE 76 138 96   ATRRATE 76 326 98   QTCFRED 475 332 383   QRSDUR 92 78 88   QTCCALCB 495 381 414     Encounter Date: 11/10/24   ECG 12 Lead   Result Value    Ventricular Rate 76    Atrial Rate 76    OK Interval 186    QRS Duration 92    QT Interval 440    QTC Calculation(Bazett) 495    P Axis 50    R Axis 90    T Axis 53    QRS Count 13    Q Onset 219    P Onset 126    P Offset 186    T Offset 439    QTC Fredericia 475    Narrative    Normal sinus rhythm  Rightward axis  Low voltage QRS  Incomplete right bundle branch block  Cannot rule out Anteroseptal infarct (cited on or before 13-NOV-2024)  Abnormal ECG  When compared with ECG of 18-NOV-2024 15:48,  Sinus rhythm has replaced Atrial fibrillation  Vent. rate has decreased BY  62 BPM  Incomplete right bundle branch block is now Present  Confirmed by Jaxon Camp ([id number]) on 12/9/2024 8:36:30 AM     Echocardiogram:   Recent Labs     11/27/24  1010 11/21/24  1549 11/19/24  1346 11/18/24  0845 11/16/24  1105 11/15/24  0911 11/12/24  1024 11/09/24  0918   EF 45 33 33 43 34 31   < > 63   LVIDD 4.80  --   --   --  3.90  --   --  5.18   RV 25.1  --   --   --  21.0  --   --   --    RVFRWALLPKSP 12.40  --   --   --  6.31 6.31  --  11.50   TAPSE 1.9  --   --   --  1.0  --   --  2.5    < > = values in this interval not displayed.   Transthoracic Echo (TTE) Complete With Contrast 11/27/2024  1. Poorly visualized anatomical structures due to suboptimal image quality.  2. The left ventricle was not well visualized.  3. Left ventricular ejection fraction is suspected mildly decreased, by visual estimate at 45%.  4. Spectral Doppler shows a Grade I (impaired  relaxation pattern) of left ventricular diastolic filling with normal left atrial filling pressure.  5. Abnormal septal motion consistent with post-operative status.  6. There is normal right ventricular global systolic function.    Coronary Angiography:   Left Heart Cath, With LV, Angriography And Grafts, Left Heart Cath, With LV, Angriography And Grafts, Left Heart Cath, With LV, Angriography And Grafts 11/14/2024  1. Coronary Angiogram: Mild to moderate LM stenosis with 100% occlusion of the mid LAD at the graft anastamosis site. Mod-sev focal disease in the native LCX/OM with competitive flow in OM2. Patent and dominant RCA.  2. Graft Angiogram: In-situ LIMA which transitions into a Y-graft with a patent limb to OM2 and 100% stenosis at the mid LAD anastamosis site.  3. Successful VA ECMO cannulation (left common femoral artery/vein) with concomitant distal perfusion catheter placement (left SFA).  4. Successful complex salvage PCI of the mid LAD with deployment of a 3.0 x 28 RUSLAN Wayne HEATHER, post-dilated with a 3.0 NC balloon.  5. DAPT: ASA/Ticagrelor for 6 months without interruption followed by lifelong ASA.    Right Heart Cath: No results found for this or any previous visit from the past 1800 days.    Cardiac Scoring: No results found for this or any previous visit from the past 1800 days.    Cardiac MRI: No results found for this or any previous visit from the past 1800 days.    Nuclear:No results found for this or any previous visit from the past 1800 days.    Metabolic Stress: No results found for this or any previous visit from the past 1800 days.      Current Outpatient Medications   Medication Instructions    amiodarone (PACERONE) 200 mg, oral, Daily    aspirin 81 mg EC tablet 1 tablet, Daily    atorvastatin (LIPITOR) 80 mg, oral, Nightly    empagliflozin (JARDIANCE) 10 mg, oral, Daily    furosemide (LASIX) 40 mg, oral, Daily    iron polysaccharides (NU-IRON,NIFEREX) 150 mg, oral, Daily     metoprolol succinate XL (TOPROL-XL) 12.5 mg, oral, 2 times daily, Do not crush or chew.    multivitamin with minerals tablet 1 tablet, oral, Daily    spironolactone (ALDACTONE) 25 mg, oral, Daily    ticagrelor (BRILINTA) 90 mg, oral, 2 times daily      Notable Therapies: *GDMT*   Class  Agent SAFETY    *ARNI* / ACE / ARB None due to relative hypotension during hospitalization Last BP: 95/67, Last BUN/Cr (GFR): 12/10/2024: 19/1.06 (75)    *Beta-Blocker* Metoprolol succinate 12.5 mg twice daily Last HR: 76    *MRA* Spironolactone 25 mg daily Last K: 12/10/2024: 4.5     *SGLT2* Jardiance 10 mg daily Last A1c 11/8/2024: 5.1     Diuretic Furosemide 40 mg daily Last BNP: 11/10/2024: 13    Hydralazine/ISDN       Digoxin  Last Digoxin level: No results found for requested labs within last 3650 days.    Anticoagulation He did have postop lone atrial fibrillation in the hospital with a [id number] of 4.  Not currently on anticoagulation Last Hgb: 12/10/2024: 13.8    Anti-arrhythmic Amiodarone 200 mg daily Last QTc: 11/30/2024: 495  Last TSH: 11/10/2024: 3.12    Antiplatelet Aspirin 81 mg daily  Ticagrelor 90 mg twice daily (x 6 months--May 15th 2025) Last Platelet: 12/10/2024: 431    Lipid-Lowering Atorvastatin 80 mg at night Last Tchol 11/8/2024: 228 / LDL No results found for requested labs within last 3650 days. or 11/8/2024: 126 / HDL 11/8/2024: 37.6 / TRIG 11/8/2024: 322    Other        Device(s)  Not indicated EF: 11/27/2024: 45%, QRS: 11/30/2024: 92ms    Cardiac Rehab,   if EF <35/MI/OHS  To refer      Problems Addressed:   # HFrEF / Chronic Systolic Heart Failure, last EF 11/27/2024: 45%, dx'd 11/2024, lowest EF 31%,   # Ischemic Cardiomyopathy, ACC/AHA Stage C, NYHA II, Warm, Euvolemic. ON BB/MRA/SGLT2i. No ARNI due to hypotension  # Inferior STEMI 2/2 to LAD occlusion s/p LIMA-LAD with Y graft to OM c/b graft occlusion s/p mid LAD PCI (11/14/2024). ASA/Ticagrelor for 6 months (until May 14th 2025) without  interruption followed by lifelong ASA.   # Post operative atrial fibrillation: continue amiodarone x 3 months, obtain Holter  # Hypertension: Last BP: 95/67. controlled  # Hyperlipidemia: Last Tchol 11/8/2024: 228 /  / HDL 37.6 / TRIG 322. Continue statin, recheck lipids  # CKD Stage 1: Last BUN/Cr (GFR): 12/10/2024: 19/1.06 (75) CTM  - Continue current medications with the exception of:   -- increase metoprolol XL from 12.5mg twice daily to 50mg daily  - Labwork: today  - Imaging/Procedures:   -- Cardiac MRI in 3 months. Please obtain a Cardiac MRI. Call [phone] to schedule.  -- Holter Monitor at next visit, we will stop amiodarone at next visit   - Referrals:   -- Cardiac Rehab  --  Clinical Pharmacy (Cardiology): Please call [phone] to schedule an intake interview  - Followup: 3 months    Patient Instructions   If you have any questions or need cardiac medication refills, please call the Heart Failure office at [phone], option 6.  You may also contact the  Heart Failure Nursing team via email at [email or web address] (Please include your name and date of birth). To reach Dr. Gann's office please call [phone] / Fax: [phone]. To schedule an office appointment call [phone].     If I placed a referral today for a specialist/procedure, please call the general scheduling line at [phone]. You may also schedule appointments on the QA on Request makayla. For radiology scheduling (Cardiac calcium score, CTA with HeartFlow, Cardiac MRI, NM cardiac stress test, PET viability study) the phone number is [phone].     - Continue current medications with the exception of:   -- increase metoprolol XL from 12.5mg twice daily to 50mg daily  - Labwork: today  - Imaging/Procedures:   -- Cardiac MRI in 3 months. Please obtain a Cardiac MRI. Call [phone] to schedule.  -- Holter Monitor at next visit, we will stop amiodarone at next visit   - Referrals:   -- Cardiac  Rehab  --  Clinical Pharmacy (Cardiology): Please call [phone] to schedule an intake interview  - Followup: 3 months       Orders:  No orders of the defined types were placed in this encounter.     Followup Appts:  Future Appointments   Date Time Provider Department Center   1/2/2025 12:00 PM Grace Carballo, RN [facility]   1/3/2025 10:45 AM Kiko Johnson MD [facility]   1/4/2025 12:00 PM Giselle Cintron, PT [facility]   1/8/2025 To Be Determined Grace Carballo RN [facility]   1/15/2025 To Be Determined Grace Carballo RN [facility]   1/22/2025 To Be Determined Grace Carballo RN [facility]   1/29/2025  9:30 AM Ale Brady MD [id number]   3/17/2025  1:00 PM MD [facility]   4/21/2025  1:00 PM MD [facility]       Time Spent: I spent 75 minutes reviewing medical testing, obtaining medical history and counselling and educating on diagnosis and documenting clinical encounter. The encounter involved a comprehensive review of extensive data, including prior medical records, imaging studies, laboratory results, and consultations, followed by in-depth counseling regarding the patient's complex cardiac condition and comorbidities. We discussed treatment options, risks and benefits, and recommendations for ongoing care and careful monitoring of adverse effects from medications.     ____________________________________________________________  Jayson Gann MD  Section of Advanced Heart Failure and Cardiac Transplantation  Division of Cardiovascular Medicine  [facility]

## 2024-12-31 ENCOUNTER — OFFICE VISIT (OUTPATIENT)
Dept: CARDIOLOGY | Facility: CLINIC | Age: 74
End: 2024-12-31
Payer: MEDICARE

## 2024-12-31 ENCOUNTER — LAB (OUTPATIENT)
Dept: LAB | Facility: LAB | Age: 74
End: 2024-12-31
Payer: MEDICARE

## 2024-12-31 ENCOUNTER — HOME CARE VISIT (OUTPATIENT)
Dept: HOME HEALTH SERVICES | Facility: HOME HEALTH | Age: 74
End: 2024-12-31
Payer: MEDICARE

## 2024-12-31 VITALS
DIASTOLIC BLOOD PRESSURE: 67 MMHG | SYSTOLIC BLOOD PRESSURE: 95 MMHG | OXYGEN SATURATION: 98 % | HEART RATE: 76 BPM | BODY MASS INDEX: 25.48 KG/M2 | HEIGHT: 71 IN | WEIGHT: 182 LBS

## 2024-12-31 DIAGNOSIS — I21.19 ST ELEVATION MYOCARDIAL INFARCTION (STEMI) INVOLVING OTHER CORONARY ARTERY OF INFERIOR WALL: ICD-10-CM

## 2024-12-31 DIAGNOSIS — Z95.1 S/P CABG (CORONARY ARTERY BYPASS GRAFT): ICD-10-CM

## 2024-12-31 DIAGNOSIS — Z95.1 STATUS POST CORONARY ARTERY BYPASS GRAFT: ICD-10-CM

## 2024-12-31 DIAGNOSIS — I97.89 POSTOPERATIVE ATRIAL FIBRILLATION (MULTI): ICD-10-CM

## 2024-12-31 DIAGNOSIS — I48.91 POSTOPERATIVE ATRIAL FIBRILLATION (MULTI): ICD-10-CM

## 2024-12-31 DIAGNOSIS — I25.5 CARDIOMYOPATHY, ISCHEMIC: ICD-10-CM

## 2024-12-31 DIAGNOSIS — Z95.5 H/O HEART ARTERY STENT: ICD-10-CM

## 2024-12-31 DIAGNOSIS — I50.20 HFREF (HEART FAILURE WITH REDUCED EJECTION FRACTION): Primary | ICD-10-CM

## 2024-12-31 DIAGNOSIS — R57.0 CARDIOGENIC SHOCK (MULTI): ICD-10-CM

## 2024-12-31 DIAGNOSIS — I50.20 HFREF (HEART FAILURE WITH REDUCED EJECTION FRACTION): ICD-10-CM

## 2024-12-31 LAB
ACID FAST STN SPEC: NORMAL
ALBUMIN SERPL BCP-MCNC: 4.4 G/DL (ref 3.4–5)
ALP SERPL-CCNC: 98 U/L (ref 33–136)
ALT SERPL W P-5'-P-CCNC: 17 U/L (ref 10–52)
ANION GAP SERPL CALC-SCNC: 14 MMOL/L (ref 10–20)
AST SERPL W P-5'-P-CCNC: 18 U/L (ref 9–39)
ATRIAL RATE: 73 BPM
BILIRUB SERPL-MCNC: 0.7 MG/DL (ref 0–1.2)
BUN SERPL-MCNC: 19 MG/DL (ref 6–23)
CALCIUM SERPL-MCNC: 9.8 MG/DL (ref 8.6–10.6)
CHLORIDE SERPL-SCNC: 101 MMOL/L (ref 98–107)
CHOLEST SERPL-MCNC: 138 MG/DL (ref 0–199)
CHOLESTEROL/HDL RATIO: 3.8
CO2 SERPL-SCNC: 27 MMOL/L (ref 21–32)
CREAT SERPL-MCNC: 0.85 MG/DL (ref 0.5–1.3)
EGFRCR SERPLBLD CKD-EPI 2021: >90 ML/MIN/1.73M*2
ERYTHROCYTE [DISTWIDTH] IN BLOOD BY AUTOMATED COUNT: 14 % (ref 11.5–14.5)
FERRITIN SERPL-MCNC: 1168 NG/ML (ref 20–300)
GLUCOSE SERPL-MCNC: 96 MG/DL (ref 74–99)
HCT VFR BLD AUTO: 41.8 % (ref 41–52)
HDLC SERPL-MCNC: 36.8 MG/DL
HGB BLD-MCNC: 13 G/DL (ref 13.5–17.5)
IRON SATN MFR SERPL: 29 % (ref 25–45)
IRON SERPL-MCNC: 91 UG/DL (ref 35–150)
LDLC SERPL CALC-MCNC: 69 MG/DL
MCH RBC QN AUTO: 28.8 PG (ref 26–34)
MCHC RBC AUTO-ENTMCNC: 31.1 G/DL (ref 32–36)
MCV RBC AUTO: 93 FL (ref 80–100)
MYCOBACTERIUM SPEC CULT: NORMAL
NON HDL CHOLESTEROL: 101 MG/DL (ref 0–149)
NRBC BLD-RTO: 0 /100 WBCS (ref 0–0)
P AXIS: 69 DEGREES
P OFFSET: 185 MS
P ONSET: 127 MS
PLATELET # BLD AUTO: 263 X10*3/UL (ref 150–450)
POTASSIUM SERPL-SCNC: 4.7 MMOL/L (ref 3.5–5.3)
PR INTERVAL: 182 MS
PROT SERPL-MCNC: 7.5 G/DL (ref 6.4–8.2)
Q ONSET: 218 MS
QRS COUNT: 12 BEATS
QRS DURATION: 92 MS
QT INTERVAL: 394 MS
QTC CALCULATION(BAZETT): 434 MS
QTC FREDERICIA: 420 MS
R AXIS: 102 DEGREES
RBC # BLD AUTO: 4.51 X10*6/UL (ref 4.5–5.9)
SODIUM SERPL-SCNC: 137 MMOL/L (ref 136–145)
T AXIS: 93 DEGREES
T OFFSET: 415 MS
TIBC SERPL-MCNC: 312 UG/DL (ref 240–445)
TRIGL SERPL-MCNC: 159 MG/DL (ref 0–149)
TSH SERPL-ACNC: 2.47 MIU/L (ref 0.44–3.98)
UIBC SERPL-MCNC: 221 UG/DL (ref 110–370)
VENTRICULAR RATE: 73 BPM
VLDL: 32 MG/DL (ref 0–40)
WBC # BLD AUTO: 8 X10*3/UL (ref 4.4–11.3)

## 2024-12-31 PROCEDURE — 1111F DSCHRG MED/CURRENT MED MERGE: CPT | Performed by: STUDENT IN AN ORGANIZED HEALTH CARE EDUCATION/TRAINING PROGRAM

## 2024-12-31 PROCEDURE — 84443 ASSAY THYROID STIM HORMONE: CPT

## 2024-12-31 PROCEDURE — 99215 OFFICE O/P EST HI 40 MIN: CPT | Performed by: STUDENT IN AN ORGANIZED HEALTH CARE EDUCATION/TRAINING PROGRAM

## 2024-12-31 PROCEDURE — 1157F ADVNC CARE PLAN IN RCRD: CPT | Performed by: STUDENT IN AN ORGANIZED HEALTH CARE EDUCATION/TRAINING PROGRAM

## 2024-12-31 PROCEDURE — G2211 COMPLEX E/M VISIT ADD ON: HCPCS | Performed by: STUDENT IN AN ORGANIZED HEALTH CARE EDUCATION/TRAINING PROGRAM

## 2024-12-31 PROCEDURE — 80061 LIPID PANEL: CPT

## 2024-12-31 PROCEDURE — 83880 ASSAY OF NATRIURETIC PEPTIDE: CPT

## 2024-12-31 PROCEDURE — 93010 ELECTROCARDIOGRAM REPORT: CPT | Performed by: INTERNAL MEDICINE

## 2024-12-31 PROCEDURE — 93005 ELECTROCARDIOGRAM TRACING: CPT | Performed by: STUDENT IN AN ORGANIZED HEALTH CARE EDUCATION/TRAINING PROGRAM

## 2024-12-31 PROCEDURE — 85027 COMPLETE CBC AUTOMATED: CPT

## 2024-12-31 PROCEDURE — 80053 COMPREHEN METABOLIC PANEL: CPT

## 2024-12-31 PROCEDURE — 3008F BODY MASS INDEX DOCD: CPT | Performed by: STUDENT IN AN ORGANIZED HEALTH CARE EDUCATION/TRAINING PROGRAM

## 2024-12-31 PROCEDURE — 83550 IRON BINDING TEST: CPT

## 2024-12-31 PROCEDURE — 82728 ASSAY OF FERRITIN: CPT

## 2024-12-31 PROCEDURE — 1123F ACP DISCUSS/DSCN MKR DOCD: CPT | Performed by: STUDENT IN AN ORGANIZED HEALTH CARE EDUCATION/TRAINING PROGRAM

## 2024-12-31 PROCEDURE — 83540 ASSAY OF IRON: CPT

## 2024-12-31 RX ORDER — ASPIRIN 81 MG/1
81 TABLET ORAL DAILY
Qty: 90 TABLET | Refills: 3 | Status: SHIPPED | OUTPATIENT
Start: 2024-12-31 | End: 2025-12-31

## 2024-12-31 RX ORDER — METOPROLOL SUCCINATE 50 MG/1
50 TABLET, EXTENDED RELEASE ORAL DAILY
Qty: 30 TABLET | Refills: 0 | Status: SHIPPED | OUTPATIENT
Start: 2024-12-31 | End: 2024-12-31 | Stop reason: SDUPTHER

## 2024-12-31 RX ORDER — SACUBITRIL AND VALSARTAN 24; 26 MG/1; MG/1
1 TABLET, FILM COATED ORAL 2 TIMES DAILY
Qty: 60 TABLET | Refills: 11 | Status: CANCELLED | OUTPATIENT
Start: 2024-12-31 | End: 2025-12-31

## 2024-12-31 RX ORDER — AMIODARONE HYDROCHLORIDE 200 MG/1
200 TABLET ORAL DAILY
Qty: 90 TABLET | Refills: 3 | Status: SHIPPED | OUTPATIENT
Start: 2024-12-31 | End: 2025-12-31

## 2024-12-31 RX ORDER — SPIRONOLACTONE 25 MG/1
25 TABLET ORAL DAILY
Qty: 90 TABLET | Refills: 3 | Status: SHIPPED | OUTPATIENT
Start: 2024-12-31

## 2024-12-31 RX ORDER — METOPROLOL SUCCINATE 50 MG/1
50 TABLET, EXTENDED RELEASE ORAL DAILY
Qty: 90 TABLET | Refills: 3 | Status: SHIPPED | OUTPATIENT
Start: 2024-12-31 | End: 2025-12-31

## 2024-12-31 RX ORDER — ATORVASTATIN CALCIUM 80 MG/1
80 TABLET, FILM COATED ORAL NIGHTLY
Qty: 90 TABLET | Refills: 3 | Status: SHIPPED | OUTPATIENT
Start: 2024-12-31 | End: 2025-12-31

## 2024-12-31 RX ORDER — IRON POLYSACCHARIDE COMPLEX 150 MG
150 CAPSULE ORAL DAILY
Qty: 90 CAPSULE | Refills: 3 | Status: SHIPPED | OUTPATIENT
Start: 2024-12-31 | End: 2025-12-31

## 2024-12-31 NOTE — PATIENT INSTRUCTIONS
If you have any questions or need cardiac medication refills, please call the Heart Failure office at [phone], option 6.  You may also contact the  Heart Failure Nursing team via email at [email or web address] (Please include your name and date of birth). To reach Dr. Gann's office please call [phone] / Fax: [phone]. To schedule an office appointment call [phone].     If I placed a referral today for a specialist/procedure, please call the general scheduling line at [phone]. You may also schedule appointments on the Lingt makayla. For radiology scheduling (Cardiac calcium score, CTA with HeartFlow, Cardiac MRI, NM cardiac stress test, PET viability study) the phone number is [phone].     - Continue current medications with the exception of:   -- increase metoprolol XL from 12.5mg twice daily to 50mg daily  - Labwork: today  - Imaging/Procedures:   -- Cardiac MRI in 3 months. Please obtain a Cardiac MRI. Call [phone] to schedule.  -- Holter Monitor at next visit, we will stop amiodarone at next visit   - Referrals:   -- Cardiac Rehab  --  Clinical Pharmacy (Cardiology): Please call [phone] to schedule an intake interview  - Followup: 3 months

## 2025-01-01 LAB
BACTERIA SPEC CULT: ABNORMAL
BNP SERPL-MCNC: 14 PG/ML (ref 0–99)
GRAM STN SPEC: ABNORMAL
GRAM STN SPEC: ABNORMAL

## 2025-01-02 ENCOUNTER — HOME CARE VISIT (OUTPATIENT)
Dept: HOME HEALTH SERVICES | Facility: HOME HEALTH | Age: 75
End: 2025-01-02
Payer: MEDICARE

## 2025-01-02 VITALS
HEART RATE: 76 BPM | DIASTOLIC BLOOD PRESSURE: 62 MMHG | OXYGEN SATURATION: 98 % | TEMPERATURE: 97.2 F | SYSTOLIC BLOOD PRESSURE: 110 MMHG | RESPIRATION RATE: 18 BRPM

## 2025-01-02 DIAGNOSIS — Z95.1 STATUS POST CORONARY ARTERY BYPASS GRAFT: Primary | ICD-10-CM

## 2025-01-02 PROCEDURE — G0299 HHS/HOSPICE OF RN EA 15 MIN: HCPCS | Mod: HHH

## 2025-01-02 SDOH — ECONOMIC STABILITY: GENERAL

## 2025-01-02 ASSESSMENT — ENCOUNTER SYMPTOMS
DENIES PAIN: 1
COUGH: 1
APPETITE LEVEL: GOOD

## 2025-01-02 ASSESSMENT — ACTIVITIES OF DAILY LIVING (ADL)
CURRENT_FUNCTION: STAND BY ASSIST
AMBULATION ASSISTANCE: STAND BY ASSIST

## 2025-01-03 ENCOUNTER — OFFICE VISIT (OUTPATIENT)
Dept: WOUND CARE | Facility: HOSPITAL | Age: 75
End: 2025-01-03
Payer: MEDICARE

## 2025-01-03 ENCOUNTER — APPOINTMENT (OUTPATIENT)
Dept: WOUND CARE | Facility: HOSPITAL | Age: 75
End: 2025-01-03
Payer: MEDICARE

## 2025-01-03 PROCEDURE — 99213 OFFICE O/P EST LOW 20 MIN: CPT

## 2025-01-04 ENCOUNTER — HOME CARE VISIT (OUTPATIENT)
Dept: HOME HEALTH SERVICES | Facility: HOME HEALTH | Age: 75
End: 2025-01-04
Payer: MEDICARE

## 2025-01-04 SDOH — HEALTH STABILITY: PHYSICAL HEALTH: EXERCISE TYPE: INDEPENDENT

## 2025-01-07 ENCOUNTER — APPOINTMENT (OUTPATIENT)
Dept: WOUND CARE | Facility: HOSPITAL | Age: 75
End: 2025-01-07
Payer: MEDICARE

## 2025-01-07 ENCOUNTER — HOME CARE VISIT (OUTPATIENT)
Dept: HOME HEALTH SERVICES | Facility: HOME HEALTH | Age: 75
End: 2025-01-07
Payer: MEDICARE

## 2025-01-07 VITALS
HEART RATE: 60 BPM | SYSTOLIC BLOOD PRESSURE: 105 MMHG | DIASTOLIC BLOOD PRESSURE: 60 MMHG | TEMPERATURE: 97.7 F | RESPIRATION RATE: 18 BRPM | OXYGEN SATURATION: 99 %

## 2025-01-07 PROCEDURE — G0299 HHS/HOSPICE OF RN EA 15 MIN: HCPCS | Mod: HHH

## 2025-01-07 SDOH — ECONOMIC STABILITY: GENERAL

## 2025-01-07 ASSESSMENT — ACTIVITIES OF DAILY LIVING (ADL)
OASIS_M1830: 00
MONEY MANAGEMENT (EXPENSES/BILLS): INDEPENDENT
HOME_HEALTH_OASIS: 00

## 2025-01-07 ASSESSMENT — ENCOUNTER SYMPTOMS
DENIES PAIN: 1
SHORTNESS OF BREATH: 1

## 2025-01-08 ENCOUNTER — APPOINTMENT (OUTPATIENT)
Dept: SLEEP MEDICINE | Facility: CLINIC | Age: 75
End: 2025-01-08
Payer: MEDICARE

## 2025-01-08 VITALS
SYSTOLIC BLOOD PRESSURE: 118 MMHG | WEIGHT: 181 LBS | DIASTOLIC BLOOD PRESSURE: 76 MMHG | BODY MASS INDEX: 25.34 KG/M2 | HEIGHT: 71 IN | OXYGEN SATURATION: 99 % | HEART RATE: 62 BPM

## 2025-01-08 DIAGNOSIS — G47.33 OSA (OBSTRUCTIVE SLEEP APNEA): Primary | ICD-10-CM

## 2025-01-08 DIAGNOSIS — I25.5 CARDIOMYOPATHY, ISCHEMIC: ICD-10-CM

## 2025-01-08 DIAGNOSIS — G47.00 PERSISTENT DISORDER OF INITIATING OR MAINTAINING SLEEP: ICD-10-CM

## 2025-01-08 DIAGNOSIS — I50.20 HFREF (HEART FAILURE WITH REDUCED EJECTION FRACTION): ICD-10-CM

## 2025-01-08 DIAGNOSIS — I21.19 ST ELEVATION MYOCARDIAL INFARCTION (STEMI) INVOLVING OTHER CORONARY ARTERY OF INFERIOR WALL: ICD-10-CM

## 2025-01-08 LAB
ACID FAST STN SPEC: NORMAL
MYCOBACTERIUM SPEC CULT: NORMAL

## 2025-01-08 PROCEDURE — 1160F RVW MEDS BY RX/DR IN RCRD: CPT | Performed by: PHYSICIAN ASSISTANT

## 2025-01-08 PROCEDURE — 1123F ACP DISCUSS/DSCN MKR DOCD: CPT | Performed by: PHYSICIAN ASSISTANT

## 2025-01-08 PROCEDURE — 3008F BODY MASS INDEX DOCD: CPT | Performed by: PHYSICIAN ASSISTANT

## 2025-01-08 PROCEDURE — 1036F TOBACCO NON-USER: CPT | Performed by: PHYSICIAN ASSISTANT

## 2025-01-08 PROCEDURE — G2211 COMPLEX E/M VISIT ADD ON: HCPCS | Performed by: PHYSICIAN ASSISTANT

## 2025-01-08 PROCEDURE — 99204 OFFICE O/P NEW MOD 45 MIN: CPT | Performed by: PHYSICIAN ASSISTANT

## 2025-01-08 PROCEDURE — 1157F ADVNC CARE PLAN IN RCRD: CPT | Performed by: PHYSICIAN ASSISTANT

## 2025-01-08 PROCEDURE — 1159F MED LIST DOCD IN RCRD: CPT | Performed by: PHYSICIAN ASSISTANT

## 2025-01-08 PROCEDURE — 1126F AMNT PAIN NOTED NONE PRSNT: CPT | Performed by: PHYSICIAN ASSISTANT

## 2025-01-08 ASSESSMENT — SLEEP AND FATIGUE QUESTIONNAIRES
HOW LIKELY ARE YOU TO NOD OFF OR FALL ASLEEP WHILE SITTING AND TALKING TO SOMEONE: WOULD NEVER DOZE
HOW LIKELY ARE YOU TO NOD OFF OR FALL ASLEEP WHILE SITTING QUIETLY AFTER LUNCH WITHOUT ALCOHOL: WOULD NEVER DOZE
ESS-CHAD TOTAL SCORE: 4
HOW LIKELY ARE YOU TO NOD OFF OR FALL ASLEEP WHEN YOU ARE A PASSENGER IN A CAR FOR AN HOUR WITHOUT A BREAK: WOULD NEVER DOZE
HOW LIKELY ARE YOU TO NOD OFF OR FALL ASLEEP WHILE SITTING AND READING: WOULD NEVER DOZE
HOW LIKELY ARE YOU TO NOD OFF OR FALL ASLEEP IN A CAR, WHILE STOPPED FOR A FEW MINUTES IN TRAFFIC: WOULD NEVER DOZE
SITING INACTIVE IN A PUBLIC PLACE LIKE A CLASS ROOM OR A MOVIE THEATER: WOULD NEVER DOZE
HOW LIKELY ARE YOU TO NOD OFF OR FALL ASLEEP WHILE LYING DOWN TO REST IN THE AFTERNOON WHEN CIRCUMSTANCES PERMIT: MODERATE CHANCE OF DOZING
HOW LIKELY ARE YOU TO NOD OFF OR FALL ASLEEP WHILE WATCHING TV: MODERATE CHANCE OF DOZING

## 2025-01-08 ASSESSMENT — PAIN SCALES - GENERAL: PAINLEVEL_OUTOF10: 0-NO PAIN

## 2025-01-08 NOTE — PATIENT INSTRUCTIONS
Good seeing you today,    Order sent to [facility] with auto pressure 5-15 cm H2O. If you feel uncomfortable with pressure settings let me know and I can change them online.    Some mask options I recommend    Resmed N30 nasal mask (tube in front)  Tapia cyndee fx nasal pillow mask (tube in front)  Resmed P10 nasal pillow mask (tube in front)  Resmed P30i nasal mask (tube on top of head) - good option if you toss and turn a lot    You can change humidity settings based on comfort    We will plan on seeing you back in 31-90 days for a required compliance appointment. Try to use at least 4 hours per night for >70% of nights.    Larry Benítez PA-C    IMPORTANT PHONE NUMBERS     [phone] ([phone])  [employer] ([employer]): [phone]  For clinical questions and refilling prescriptions: [phone]  Estefani Acevedo (For Mxa/Jackelin): P: [phone]

## 2025-01-08 NOTE — PROGRESS NOTES
[facility]  New Visit Note        HISTORY OF PRESENT ILLNESS     The patient's referring provider is: Jayson Gann MD    HISTORY OF PRESENT ILLNESS   Jaime Dominguez is a 74 y.o. male who presents to a [facility] for a sleep medicine evaluation with concerns of Snoring, Unrefreshing Sleep, Difficulties Falling Asleep, Inspire - Initial, Sleep Apnea, Pap Adherence Followup, and Pap Intolerance.     PAST SLEEP HISTORY    Sleep-related diagnoses and sleep study results: Sleep study in 2014 through [facility]. Currently on CPAP therapy. Restarted after STEMI.    CURRENT HISTORY    Reports snoring, unrefreshing sleep, difficulty falling asleep, waking twice per night. Currently using CPAP but finds it difficult to wear and wakes frequently with mask on. Interested in but declined Inspire therapy after discussion.    CPAP Machine is 12-13 years old with dry rotted parts including mask straps. Current CPAP appears to be set at pressure level 1, which feels insufficient. Interested in alternative therapy options including Inspire.    Recent hx of STEMI and CHF. Has been working hard to recover. Goal is to get EF to 50% and was 45% most recent echo.    Sleep schedule on weekdays / work days:  Usual Bedtime 11:00 PM  Falls asleep around 1:00 AM  Wake time 8:00 AM    Sleep schedule on weekends/non work days:  Same    Naps: None    Average sleep duration: 6 hours/day    Preferred sleeping position: Side    Sleep-related ROS:    Sleep Initiation: Difficulty falling asleep    Sleep Maintenance: Wakes 2x/night due to bathroom needs and CPAP discomfort    Recreational drug use  Smoking: Former smoker, quit many years ago  Alcohol consumption: Currently none due to recent hospitalization. Previously occasional beer  Caffeine consumption: One cup of coffee in morning  Marijuana: Occasional use for pain management    ESS: 4      PHYSICAL EXAM     VITAL  "SIGNS: /76   Pulse 62   Ht 1.803 m (5' 11\")   Wt 82.1 kg (181 lb)   SpO2 99%   BMI 25.24 kg/m²      PREVIOUS WEIGHTS:  Wt Readings from Last 3 Encounters:   01/08/25 82.1 kg (181 lb)   12/31/24 82.6 kg (182 lb)   12/30/24 83.2 kg (183 lb 6.4 oz)       PHYSICAL EXAM: GENERAL: alert oriented x 3 pleasant and cooperative no acute distress  MODIFIED MALLAMPATI SCORE: 3+  LATERAL PHARYNGEAL WALL: 2+  NECK EXAM: normal supple no adenopathy    RESULTS/DATA     Iron (ug/dL)   Date Value   12/31/2024 91     % Saturation (%)   Date Value   12/31/2024 29     TIBC (ug/dL)   Date Value   12/31/2024 312     Ferritin (ng/mL)   Date Value   12/31/2024 1,168 (H)       ASSESSMENT/PLAN     Mr. Dominguez is a 74 y.o. male and was referred to the [facility] for the following issues:    OBSTRUCTIVE SLEEP APNEA/EXCESSIVE DAYTIME SLEEPINESS/FREQUENT WAKING  - Order new CPAP machine with pressure settings 5-15 cm H2O  - HSAT if sleep study is required  - Provided sample N30 nasal mask to try  - currently pressure set to 12 cm h2o    CHF / STEMI  -followed by cardiology    Follow-up in 3 months       "

## 2025-01-10 ENCOUNTER — OFFICE VISIT (OUTPATIENT)
Dept: WOUND CARE | Facility: HOSPITAL | Age: 75
End: 2025-01-10
Payer: MEDICARE

## 2025-01-10 PROCEDURE — 99213 OFFICE O/P EST LOW 20 MIN: CPT

## 2025-01-13 ENCOUNTER — CLINICAL SUPPORT (OUTPATIENT)
Dept: CARDIAC REHAB | Facility: CLINIC | Age: 75
End: 2025-01-13
Payer: MEDICARE

## 2025-01-13 DIAGNOSIS — I97.89 POSTOPERATIVE ATRIAL FIBRILLATION (MULTI): ICD-10-CM

## 2025-01-13 DIAGNOSIS — I21.19 ST ELEVATION MYOCARDIAL INFARCTION (STEMI) INVOLVING OTHER CORONARY ARTERY OF INFERIOR WALL: ICD-10-CM

## 2025-01-13 DIAGNOSIS — I50.20 HFREF (HEART FAILURE WITH REDUCED EJECTION FRACTION): ICD-10-CM

## 2025-01-13 DIAGNOSIS — Z95.5 H/O HEART ARTERY STENT: ICD-10-CM

## 2025-01-13 DIAGNOSIS — Z95.1 S/P CABG (CORONARY ARTERY BYPASS GRAFT): ICD-10-CM

## 2025-01-13 DIAGNOSIS — I25.5 CARDIOMYOPATHY, ISCHEMIC: ICD-10-CM

## 2025-01-13 DIAGNOSIS — Z95.1 STATUS POST CORONARY ARTERY BYPASS GRAFT: ICD-10-CM

## 2025-01-13 DIAGNOSIS — I48.91 POSTOPERATIVE ATRIAL FIBRILLATION (MULTI): ICD-10-CM

## 2025-01-15 ENCOUNTER — CLINICAL SUPPORT (OUTPATIENT)
Dept: CARDIAC REHAB | Facility: CLINIC | Age: 75
End: 2025-01-15
Payer: MEDICARE

## 2025-01-15 DIAGNOSIS — Z95.1 STATUS POST CORONARY ARTERY BYPASS GRAFT: ICD-10-CM

## 2025-01-15 PROCEDURE — 93798 PHYS/QHP OP CAR RHAB W/ECG: CPT | Performed by: INTERNAL MEDICINE

## 2025-01-17 ENCOUNTER — APPOINTMENT (OUTPATIENT)
Dept: PHARMACY | Facility: HOSPITAL | Age: 75
End: 2025-01-17
Payer: MEDICARE

## 2025-01-17 ENCOUNTER — CLINICAL SUPPORT (OUTPATIENT)
Dept: CARDIAC REHAB | Facility: CLINIC | Age: 75
End: 2025-01-17
Payer: MEDICARE

## 2025-01-17 DIAGNOSIS — I21.19 ST ELEVATION MYOCARDIAL INFARCTION (STEMI) INVOLVING OTHER CORONARY ARTERY OF INFERIOR WALL: ICD-10-CM

## 2025-01-17 DIAGNOSIS — Z95.1 STATUS POST CORONARY ARTERY BYPASS GRAFT: ICD-10-CM

## 2025-01-17 DIAGNOSIS — I97.89 POSTOPERATIVE ATRIAL FIBRILLATION (MULTI): ICD-10-CM

## 2025-01-17 DIAGNOSIS — I25.5 CARDIOMYOPATHY, ISCHEMIC: ICD-10-CM

## 2025-01-17 DIAGNOSIS — Z95.1 S/P CABG (CORONARY ARTERY BYPASS GRAFT): ICD-10-CM

## 2025-01-17 DIAGNOSIS — Z95.5 H/O HEART ARTERY STENT: ICD-10-CM

## 2025-01-17 DIAGNOSIS — I48.91 POSTOPERATIVE ATRIAL FIBRILLATION (MULTI): ICD-10-CM

## 2025-01-17 DIAGNOSIS — I50.20 HFREF (HEART FAILURE WITH REDUCED EJECTION FRACTION): ICD-10-CM

## 2025-01-17 PROCEDURE — 93798 PHYS/QHP OP CAR RHAB W/ECG: CPT | Performed by: INTERNAL MEDICINE

## 2025-01-17 RX ORDER — GABAPENTIN 300 MG/1
600 CAPSULE ORAL NIGHTLY
COMMUNITY

## 2025-01-17 RX ORDER — MULTIVIT-MIN/IRON FUM/FOLIC AC 7.5 MG-4
1 TABLET ORAL DAILY
COMMUNITY

## 2025-01-17 NOTE — PROGRESS NOTES
Pharmacist Clinic: Cardiology Management    Jaime Dominguez is a 74 y.o. male was referred to Clinical Pharmacy Team for hf management.     Referring Provider: Jayson Gann MD    THIS IS A NEW PATIENT APPOINTMENT. PATIENT WILL BE ESTABLISHING CARE WITH CLINICAL PHARMACY.    Appointment was completed by park who was reached at .    REVIEW OF PAST APPNT (IF APPLICABLE):   N/a    Allergies Reviewed? Yes    No Known Allergies    Past Medical History:   Diagnosis Date    Basal cell carcinoma     CAD (coronary artery disease)     Hyperlipidemia     STEMI (ST elevation myocardial infarction) (Multi)        Current Outpatient Medications on File Prior to Visit   Medication Sig Dispense Refill    amiodarone (Pacerone) 200 mg tablet Take 1 tablet (200 mg) by mouth once daily. 90 tablet 3    aspirin 81 mg EC tablet Take 1 tablet (81 mg) by mouth once daily. 90 tablet 3    atorvastatin (Lipitor) 80 mg tablet Take 1 tablet (80 mg) by mouth once daily at bedtime. 90 tablet 3    empagliflozin (Jardiance) 10 mg Take 1 tablet (10 mg) by mouth once daily. 90 tablet 3    furosemide (Lasix) 40 mg tablet Take 1 tablet (40 mg) by mouth once daily for 4 doses. 4 tablet 0    iron polysaccharides (Nu-Iron,Niferex) 150 mg iron capsule Take 1 capsule (150 mg) by mouth once daily. 90 capsule 3    metoprolol succinate XL (Toprol-XL) 50 mg 24 hr tablet Take 1 tablet (50 mg) by mouth once daily. Do not crush or chew. 90 tablet 3    spironolactone (Aldactone) 25 mg tablet Take 1 tablet (25 mg) by mouth once daily. 90 tablet 3    ticagrelor (Brilinta) 90 mg tablet Take 1 tablet (90 mg) by mouth 2 times a day. Take until May 15th 2025, then stop 180 tablet 3     Current Facility-Administered Medications on File Prior to Visit   Medication Dose Route Frequency Provider Last Rate Last Admin    collagenase 250 unit/gram ointment   Topical Once Cierra Stanley MD        collagenase 250 unit/gram ointment   Topical Once Gabe NAIR  "FREDY Kelly             RELEVANT LAB RESULTS:  Lab Results   Component Value Date    BILITOT 0.7 2024    CALCIUM 9.8 2024    CO2 27 2024     2024    CREATININE 0.85 2024    GLUCOSE 96 2024    ALKPHOS 98 2024    K 4.7 2024    PROT 7.5 2024     2024    AST 18 2024    ALT 17 2024    BUN 19 2024    ANIONGAP 14 2024    MG 2.35 12/10/2024    PHOS 3.7 2024     (H) 2024    ALBUMIN 4.4 2024     Lab Results   Component Value Date    TRIG 159 (H) 2024    CHOL 138 2024    LDLCALC 69 2024    HDL 36.8 2024     No results found for: \"BMCBC\", \"CBCDIF\"     PHARMACEUTICAL ASSESSMENT:    MEDICATION RECONCILIATION    Home Pharmacy Reviewed? Yes, describe: [address] [facility]        Drug Interactions? No    Medication Documentation Review Audit       Reviewed by Larry Benítez PA-C (Physician Assistant) on 25 at 1630      Medication Order Taking? Sig Documenting Provider Last Dose Status   amiodarone (Pacerone) 200 mg tablet [id number] Yes Take 1 tablet (200 mg) by mouth once daily. Jayson Gann MD  Active   aspirin 81 mg EC tablet [id number] Yes Take 1 tablet (81 mg) by mouth once daily. aJyson Gann MD  Active   atorvastatin (Lipitor) 80 mg tablet [id number] Yes Take 1 tablet (80 mg) by mouth once daily at bedtime. Jayson Gann MD  Active   collagenase 250 unit/gram ointment [id number]   Cierra Stnaley MD  Active   collagenase 250 unit/gram ointment [id number]   Gabe Kelly PA-C  Active   empagliflozin (Jardiance) 10 mg [id number] Yes Take 1 tablet (10 mg) by mouth once daily. Jayson Gann MD  Active   furosemide (Lasix) 40 mg tablet [id number]  Take 1 tablet (40 mg) by mouth once daily for 4 doses. Grace Ya, APRN-CNP   24 [id number]   iron polysaccharides (Nu-Iron,Niferex) 150 mg iron capsule [id number] Yes Take 1 capsule " (150 mg) by mouth once daily. Jayson Gann MD  Active   metoprolol succinate XL (Toprol-XL) 50 mg 24 hr tablet [id number] Yes Take 1 tablet (50 mg) by mouth once daily. Do not crush or chew. Jayson Gann MD  Active   multivitamin with minerals tablet [id number]  Take 1 tablet by mouth once daily. Grace Ya, APRN-CNP   25 2359   spironolactone (Aldactone) 25 mg tablet [id number] Yes Take 1 tablet (25 mg) by mouth once daily. Jayson Gann MD  Active   ticagrelor (Brilinta) 90 mg tablet [id number] Yes Take 1 tablet (90 mg) by mouth 2 times a day. Take until May 15th 2025, then stop Jayson Gann MD  Active                    DISEASE MANAGEMENT ASSESSMENT:     CHF ASSESSMENT     Symptom/Staging:  -Most recent ejection fraction: 30-35    Results for orders placed during the hospital encounter of 11/10/24    Transthoracic Echo (TTE) Complete    Narrative  [facility], [address] [place] [address]  Tel [phone] and Fax [phone]    TRANSTHORACIC ECHOCARDIOGRAM REPORT      Patient Name:       BARRY Vincent Physician:    [id number] Darinel Mckeon MD  Study Date:         2024          Ordering Provider:    [id number] OMER ONEILL  MRN/PID:            [id number]            Fellow:  Accession#:         [id number]        Nurse:  Date of Birth/Age:  1950      Sonographer:          Stacie Flood RDCS  years  Gender assigned at                     Additional Staff:  Birth:  Height:             182.88 cm           Admit Date:           11/10/2024  Weight:             96.62 kg            Admission Status:     Inpatient -  Routine  BSA / BMI:          2.19 m2 / 28.89     Encounter#:           [id number]  kg/m2  Blood Pressure:     106/65 mmHg         Department Location:  [place] [address]    Study Type:    TRANSTHORACIC ECHO (TTE) COMPLETE  Diagnosis/ICD: ST elevation (STEMI) myocardial infarction of  unspecified  site-I21.3  Indication:    STEMI  CPT Code:      Echo Complete w Full Doppler-81510    Patient History:  Pertinent History: Hyperlipidemia. HFrEF 30-35%, STEMI, CAD s/p CABG,  cardiogenic shock, s/p VA ECMO decannulation, basal cell  carcinoma, TREE.    Study Detail: The following Echo studies were performed: 2D, M-Mode, Doppler and  color flow. Technically challenging study due to poor acoustic  windows, prominent lung artifact, postoperative dressings and  patient lying in supine position. Definity used as a contrast  agent for endocardial border definition. Total contrast used for  this procedure was 4.0 mL via IV push. Unable to obtain subcostal  view.      PHYSICIAN INTERPRETATION:  Left Ventricle: Left ventricular ejection fraction is suspected mildly decreased, by visual estimate at 45%. The left ventricle was not well visualized. The left ventricular cavity size is normal. There is normal septal and normal posterior left ventricular wall thickness. Abnormal (paradoxical) septal motion consistent with post-operative status. Spectral Doppler shows a Grade I (impaired relaxation pattern) of left ventricular diastolic filling with normal left atrial filling pressure.  Left Atrium: The left atrium is normal in size.  Right Ventricle: The right ventricle is normal in size. There is normal right ventricular global systolic function.  Right Atrium: The right atrium is normal in size.  Aortic Valve: The aortic valve is probably trileaflet. There is aortic valve annular calcification. There is trace aortic valve regurgitation. The peak instantaneous gradient of the aortic valve is 10 mmHg.  Mitral Valve: The mitral valve is normal in structure. There is trace mitral valve regurgitation.  Tricuspid Valve: The tricuspid valve is structurally normal. There is trace tricuspid regurgitation.  Pulmonic Valve: The pulmonic valve is not well visualized. There is trace pulmonic valve regurgitation.  Pericardium:  Trivial pericardial effusion.  Aorta: The aortic root is normal. The aortic root is at the upper limits of normal size.      CONCLUSIONS:  1. Poorly visualized anatomical structures due to suboptimal image quality.  2. The left ventricle was not well visualized.  3. Left ventricular ejection fraction is suspected mildly decreased, by visual estimate at 45%.  4. Spectral Doppler shows a Grade I (impaired relaxation pattern) of left ventricular diastolic filling with normal left atrial filling pressure.  5. Abnormal septal motion consistent with post-operative status.  6. There is normal right ventricular global systolic function.    QUANTITATIVE DATA SUMMARY:    2D MEASUREMENTS:         Normal Ranges:  Ao Root d:       3.80 cm (2.0-3.7cm)  IVSd:            0.90 cm (0.6-1.1cm)  LVPWd:           0.80 cm (0.6-1.1cm)  LVIDd:           4.80 cm (3.9-5.9cm)  LVIDs:           3.30 cm  LV Mass Index:   63 g/m2  LV % FS          31.2 %      M-MODE MEASUREMENTS:         Normal Ranges:  AoV Exc:             2.10 cm (1.5-2.5cm)      AORTA MEASUREMENTS:         Normal Ranges:  AoV Exc:            2.10 cm (1.5-2.5cm)  Ao Sinus, d:        3.60 cm (2.1-3.5cm)  Ao Arch:            3.30 cm (2.0-3.6cm)      LV SYSTOLIC FUNCTION BY 2D PLANIMETRY (MOD):  Normal Ranges:  EF-Visual:      45 %  LV EF Reported: 45 %      LV DIASTOLIC FUNCTION:             Normal Ranges:  MV Peak E:             0.78 m/s    (0.7-1.2 m/s)  MV Peak A:             0.90 m/s    (0.42-0.7 m/s)  E/A Ratio:             0.88        (1.0-2.2)  MV e'                  0.067 m/s   (>8.0)  MV lateral e'          0.06 m/s  MV medial e'           0.07 m/s  MV A Dur:              137.00 msec  E/e' Ratio:            11.72       (<8.0)  MV DT:                 195 msec    (150-240 msec)      MITRAL VALVE:          Normal Ranges:  MV DT:        195 msec (150-240msec)      AORTIC VALVE:            Normal Ranges:  AoV Vmax:      1.60 m/s  (<=1.7m/s)  AoV Peak PG:   10.2 mmHg  (<20mmHg)  LVOT [place]:  1.42 m/s  (<=1.1m/s)  LVOT VTI:      23.20 cm  LVOT Diameter: 2.00 cm   (1.8-2.4cm)  AoV Area,Vmax: 2.79 cm2  (2.5-4.5cm2)      RIGHT VENTRICLE:  TAPSE: 18.6 mm  RV s'  0.12 m/s      TRICUSPID VALVE/RVSP:          Normal Ranges:  Peak TR Velocity:     2.35 m/s  RV Syst Pressure:     25 mmHg  (< 30mmHg)      PULMONIC VALVE:          Normal Ranges:  PV [place]:     1.3 m/s  (0.6-0.9m/s)  PV Max P.8 mmHg      [id number] Darinel Mckeon MD  Electronically signed on 2024 at 1:26:53 PM        ** Final **      Guideline-Directed Medical Therapy:  -ARNI: Yes, describe: no 2/2 hypotension  -Beta Blocker: Yes, describe: metoprolol 50mg qd  -MRA: Yes, describe: spironolactone 25mg qd  -SGLT2i: Yes, describe: jardiance 10mg qd    Secondary Prevention:  -The ASCVD Risk score (Arturo DK, et al., 2019) failed to calculate for the following reasons:    Risk score cannot be calculated because patient has a medical history suggesting prior/existing ASCVD   -Aspirin 81mg? yes  -Statin?: Yes, describe: atorvastatin    Affordability: jardiance and brilinta?  Adherence/Compliance: no issues   Adverse Effects: none reported        Past In Office Weight Readings:   Wt Readings from Last 6 Encounters:   25 82.1 kg (181 lb)   24 82.6 kg (182 lb)   24 83.2 kg (183 lb 6.4 oz)   24 83.9 kg (185 lb)   11/10/24 96.2 kg (212 lb)           Past In Office BP Readings:   BP Readings from Last 6 Encounters:   25 118/76   25 105/60   25 110/62   24 95/67   24 109/76   24 120/62       HEALTH MANAGEMENT    Maintaining fluid restriction (<2 L/day): N/A  Edema/swelling: No  Shortness of breath: No nothing new also improving with cpap  Trouble sleeping/lying down: No  Dry/hacking cough: No  Recent Hospitalizations: No    EDUCATION/COUNSELING:   - Counseled patient on MOA, expectations, duration of therapy, contraindications, administration, and monitoring  parameters  - Counseled patient on lifestyle modifications that can decrease your risk of having complications (smoking cessation, losing weight, daily weights, vaccines)  - Counseled patient on fluid intake and weight management. Recommended to not consume more than 2 liters of fliuids per day. If they have gained more than 2-3 pounds within a 24 hours period (or 5 pounds in a week), contact their cardiologist  - Answered all patient questions and concerns        DISCUSSION/NOTES:    Patient Assistance Program (PAP)    Application for program to be submitted for the following medications: [facility] Permanent Address: [place]   Prescription Insurance:   Yes   Members of Household: 2   Files Taxes: Yes     Patient verbally reports monthly or yearly income which is greater than 400% federal poverty level  Cost of brilinta and jardiance reasonable through patients plan.      ASSESSMENT:    Assessment/Plan   Problem List Items Addressed This Visit    None        RECOMMENDATIONS/PLAN:    Continue current medications     Last Appnt with Referring Provider: 12/31/24  Next Appnt with Referring Provider: 4/1/25  Clinical Pharmacist follow up: 4/18 2:40p  VAF/Application Expiration: no  Type of Encounter: Colin Zaragoza PharmD    Verbal consent to manage patient's drug therapy was obtained from the patient . They were informed they may decline to participate or withdraw from participation in pharmacy services at any time.    Continue all meds under the continuation of care with the referring provider and clinical pharmacy team.

## 2025-01-20 ENCOUNTER — CLINICAL SUPPORT (OUTPATIENT)
Dept: CARDIAC REHAB | Facility: CLINIC | Age: 75
End: 2025-01-20
Payer: MEDICARE

## 2025-01-20 DIAGNOSIS — Z95.1 STATUS POST CORONARY ARTERY BYPASS GRAFT: ICD-10-CM

## 2025-01-20 PROCEDURE — 93798 PHYS/QHP OP CAR RHAB W/ECG: CPT | Performed by: INTERNAL MEDICINE

## 2025-01-21 ENCOUNTER — APPOINTMENT (OUTPATIENT)
Dept: CARDIOLOGY | Facility: CLINIC | Age: 75
End: 2025-01-21
Payer: MEDICARE

## 2025-01-22 ENCOUNTER — CLINICAL SUPPORT (OUTPATIENT)
Dept: CARDIAC REHAB | Facility: CLINIC | Age: 75
End: 2025-01-22
Payer: MEDICARE

## 2025-01-22 DIAGNOSIS — Z95.1 STATUS POST CORONARY ARTERY BYPASS GRAFT: ICD-10-CM

## 2025-01-22 PROCEDURE — 93798 PHYS/QHP OP CAR RHAB W/ECG: CPT | Performed by: INTERNAL MEDICINE

## 2025-01-24 ENCOUNTER — APPOINTMENT (OUTPATIENT)
Dept: CARDIAC REHAB | Facility: CLINIC | Age: 75
End: 2025-01-24
Payer: MEDICARE

## 2025-01-27 ENCOUNTER — CLINICAL SUPPORT (OUTPATIENT)
Dept: CARDIAC REHAB | Facility: CLINIC | Age: 75
End: 2025-01-27
Payer: MEDICARE

## 2025-01-27 DIAGNOSIS — Z95.1 STATUS POST CORONARY ARTERY BYPASS GRAFT: ICD-10-CM

## 2025-01-27 PROCEDURE — 93798 PHYS/QHP OP CAR RHAB W/ECG: CPT | Performed by: INTERNAL MEDICINE

## 2025-01-29 ENCOUNTER — OFFICE VISIT (OUTPATIENT)
Dept: CARDIOLOGY | Facility: CLINIC | Age: 75
End: 2025-01-29
Payer: MEDICARE

## 2025-01-29 ENCOUNTER — CLINICAL SUPPORT (OUTPATIENT)
Dept: CARDIAC REHAB | Facility: CLINIC | Age: 75
End: 2025-01-29
Payer: MEDICARE

## 2025-01-29 VITALS
WEIGHT: 187 LBS | HEART RATE: 54 BPM | OXYGEN SATURATION: 95 % | SYSTOLIC BLOOD PRESSURE: 84 MMHG | DIASTOLIC BLOOD PRESSURE: 66 MMHG | BODY MASS INDEX: 26.08 KG/M2

## 2025-01-29 DIAGNOSIS — I48.91 POSTOPERATIVE ATRIAL FIBRILLATION (MULTI): ICD-10-CM

## 2025-01-29 DIAGNOSIS — I25.5 CARDIOMYOPATHY, ISCHEMIC: ICD-10-CM

## 2025-01-29 DIAGNOSIS — Z95.5 H/O HEART ARTERY STENT: ICD-10-CM

## 2025-01-29 DIAGNOSIS — I97.89 POSTOPERATIVE ATRIAL FIBRILLATION (MULTI): ICD-10-CM

## 2025-01-29 DIAGNOSIS — I50.20 HFREF (HEART FAILURE WITH REDUCED EJECTION FRACTION): ICD-10-CM

## 2025-01-29 DIAGNOSIS — R57.0 CARDIOGENIC SHOCK (MULTI): ICD-10-CM

## 2025-01-29 DIAGNOSIS — I21.19 ACUTE ST ELEVATION MYOCARDIAL INFARCTION (STEMI) OF INFERIOR WALL (MULTI): Primary | ICD-10-CM

## 2025-01-29 DIAGNOSIS — Z95.1 STATUS POST CORONARY ARTERY BYPASS GRAFT: ICD-10-CM

## 2025-01-29 PROCEDURE — 1126F AMNT PAIN NOTED NONE PRSNT: CPT | Performed by: INTERNAL MEDICINE

## 2025-01-29 PROCEDURE — 93798 PHYS/QHP OP CAR RHAB W/ECG: CPT | Performed by: INTERNAL MEDICINE

## 2025-01-29 PROCEDURE — 1123F ACP DISCUSS/DSCN MKR DOCD: CPT | Performed by: INTERNAL MEDICINE

## 2025-01-29 PROCEDURE — 1159F MED LIST DOCD IN RCRD: CPT | Performed by: INTERNAL MEDICINE

## 2025-01-29 PROCEDURE — 99214 OFFICE O/P EST MOD 30 MIN: CPT | Performed by: INTERNAL MEDICINE

## 2025-01-29 PROCEDURE — 1160F RVW MEDS BY RX/DR IN RCRD: CPT | Performed by: INTERNAL MEDICINE

## 2025-01-29 PROCEDURE — 1157F ADVNC CARE PLAN IN RCRD: CPT | Performed by: INTERNAL MEDICINE

## 2025-01-29 ASSESSMENT — LIFESTYLE VARIABLES
HOW OFTEN DURING THE LAST YEAR HAVE YOU FAILED TO DO WHAT WAS NORMALLY EXPECTED FROM YOU BECAUSE OF DRINKING: NEVER
SKIP TO QUESTIONS 9-10: 0
HOW OFTEN DO YOU HAVE A DRINK CONTAINING ALCOHOL: 2-4 TIMES A MONTH
HAS A RELATIVE, FRIEND, DOCTOR, OR ANOTHER HEALTH PROFESSIONAL EXPRESSED CONCERN ABOUT YOUR DRINKING OR SUGGESTED YOU CUT DOWN: NO
HOW OFTEN DO YOU HAVE SIX OR MORE DRINKS ON ONE OCCASION: NEVER
HAVE YOU OR SOMEONE ELSE BEEN INJURED AS A RESULT OF YOUR DRINKING: NO
HOW OFTEN DURING THE LAST YEAR HAVE YOU BEEN UNABLE TO REMEMBER WHAT HAPPENED THE NIGHT BEFORE BECAUSE YOU HAD BEEN DRINKING: NEVER
HOW MANY STANDARD DRINKS CONTAINING ALCOHOL DO YOU HAVE ON A TYPICAL DAY: 3 OR 4
AUDIT-C TOTAL SCORE: 3
HOW OFTEN DURING THE LAST YEAR HAVE YOU HAD A FEELING OF GUILT OR REMORSE AFTER DRINKING: NEVER
HOW OFTEN DURING THE LAST YEAR HAVE YOU NEEDED AN ALCOHOLIC DRINK FIRST THING IN THE MORNING TO GET YOURSELF GOING AFTER A NIGHT OF HEAVY DRINKING: NEVER
AUDIT TOTAL SCORE: -1

## 2025-01-29 ASSESSMENT — PATIENT HEALTH QUESTIONNAIRE - PHQ9
SUM OF ALL RESPONSES TO PHQ9 QUESTIONS 1 AND 2: 0
1. LITTLE INTEREST OR PLEASURE IN DOING THINGS: NOT AT ALL
2. FEELING DOWN, DEPRESSED OR HOPELESS: NOT AT ALL

## 2025-01-29 ASSESSMENT — ENCOUNTER SYMPTOMS
OCCASIONAL FEELINGS OF UNSTEADINESS: 0
DEPRESSION: 0
LOSS OF SENSATION IN FEET: 0

## 2025-01-29 ASSESSMENT — PAIN SCALES - GENERAL: PAINLEVEL_OUTOF10: 0-NO PAIN

## 2025-01-29 NOTE — PROGRESS NOTES
Consulting Physician:  Frank Mckeon MD    Reason for the consult:  Cardiomyopathy, coronary artery disease, atrial fibrillation.    History of present illness:  This is a very pleasant 74-year-old male who history of hypertension hyperlipidemia.  Presented and November 8, 2024 with possible inferior STEMI to [facility].  Was found to have distal left main disease.  Patient subsequently underwent LOOMIS to LAD Y graft to circumflex MIDCAB that was converted to open sternotomy.  Hospital course complicated by cardiogenic shock requiring intra-aortic balloon pump subsequently VA ECMO patient had a PCI to mid LAD since the LAD graft failed.  His LAD was 100% occluded in the midsegment and underwent PCI with Dr. Wesley.  Patient last EF was 45%.  Was seen by Dr. Gann on December 31, 2024.  Is improving slowly feeling better.  Denies having any chest pain.  Shortness of breath improving.  Tolerating cardiac rehab.  Presented to my office to establish care in the local area close to his home.    Past Medical History:   Diagnosis Date    Basal cell carcinoma     CAD (coronary artery disease)     Hyperlipidemia     STEMI (ST elevation myocardial infarction) (Multi)        Past Surgical History:   Procedure Laterality Date    CARDIAC CATHETERIZATION N/A 11/08/2024    Procedure: Left Heart Cath, No LV;  Surgeon: Ale Brady MD;  Location: [facility] Cardiac Cath Lab;  Service: Cardiovascular;  Laterality: N/A;    CARDIAC CATHETERIZATION N/A 11/08/2024    Procedure: PCI;  Surgeon: Ale Brady MD;  Location: [facility] Cardiac Cath Lab;  Service: Cardiovascular;  Laterality: N/A;    CARDIAC CATHETERIZATION N/A 11/14/2024    Procedure: Left Heart Cath, With LV, Angriography And Grafts;  Surgeon: Giancarlo Quiles DO;  Location: [place] [address] Cardiac Cath Lab;  Service: Cardiovascular;  Laterality: N/A;    CARDIAC CATHETERIZATION N/A 11/14/2024    Procedure: ECMO;  Surgeon: Giancarlo Quiles DO;  Location: [facility]  [facility] Cardiac Cath Lab;  Service: Cardiovascular;  Laterality: N/A;    CARDIAC CATHETERIZATION N/A 11/14/2024    Procedure: PCI HEATHER Stent- Coronary;  Surgeon: Giancarlo Quiles DO;  [place] [facility] Cardiac Cath Lab;  Service: Cardiovascular;  Laterality: N/A;    CERVICAL FUSION      C5-7    SKIN CANCER EXCISION N/A        No Known Allergies     reports that he has never smoked. He has never used smokeless tobacco. He reports that he does not currently use alcohol after a past usage of about 9.0 standard drinks of alcohol per week. He reports that he does not currently use drugs after having used the following drugs: Marijuana.    Family History   Problem Relation Name Age of Onset    Heart attack Mother      Heart attack Brother         Patient's Medications   New Prescriptions    No medications on file   Previous Medications    AMIODARONE (PACERONE) 200 MG TABLET    Take 1 tablet (200 mg) by mouth once daily.    ASPIRIN 81 MG EC TABLET    Take 1 tablet (81 mg) by mouth once daily.    ATORVASTATIN (LIPITOR) 80 MG TABLET    Take 1 tablet (80 mg) by mouth once daily at bedtime.    EMPAGLIFLOZIN (JARDIANCE) 10 MG    Take 1 tablet (10 mg) by mouth once daily.    GABAPENTIN (NEURONTIN) 300 MG CAPSULE    Take 2 capsules (600 mg) by mouth once daily at bedtime.    IRON POLYSACCHARIDES (NU-IRON,NIFEREX) 150 MG IRON CAPSULE    Take 1 capsule (150 mg) by mouth once daily.    METOPROLOL SUCCINATE XL (TOPROL-XL) 50 MG 24 HR TABLET    Take 1 tablet (50 mg) by mouth once daily. Do not crush or chew.    MULTIVITAMIN WITH MINERALS TABLET    Take 1 tablet by mouth once daily.    SPIRONOLACTONE (ALDACTONE) 25 MG TABLET    Take 1 tablet (25 mg) by mouth once daily.    TICAGRELOR (BRILINTA) 90 MG TABLET    Take 1 tablet (90 mg) by mouth 2 times a day. Take until May 15th 2025, then stop   Modified Medications    No medications on file   Discontinued Medications    No medications on file         Review of Systems  10 point  review of systems otherwise negative     Objective   Physical Exam  General: Patient in no acute distress   HEENT: Atraumatic normocephalic.  Neck: Supple, jugular venous pressure within normal limit.  No bruits  Lungs: Clear to auscultation bilaterally  Cardiovascular: Regular rate and rhythm, normal heart sounds, no murmurs rubs or gallops  Abdomen: Soft nontender nondistended.  Normal bowel sounds.  Extremities: Warm to touch, no edema.  Neurologic examination: Patient is awake alert oriented x3.  Psychiatric examination: Patient denies being depressed or suicidal.  Good insight  Vascular examination: Pulses intact bilaterally     Lab Review   Lab on 12/31/2024   Component Date Value    BNP 12/31/2024 14     WBC 12/31/2024 8.0     nRBC 12/31/2024 0.0     RBC 12/31/2024 4.51     Hemoglobin 12/31/2024 13.0 (L)     Hematocrit 12/31/2024 41.8     MCV 12/31/2024 93     MCH 12/31/2024 28.8     MCHC 12/31/2024 31.1 (L)     RDW 12/31/2024 14.0     Platelets 12/31/2024 263     Glucose 12/31/2024 96     Sodium 12/31/2024 137     Potassium 12/31/2024 4.7     Chloride 12/31/2024 101     Bicarbonate 12/31/2024 27     Anion Gap 12/31/2024 14     Urea Nitrogen 12/31/2024 19     Creatinine 12/31/2024 0.85     eGFR 12/31/2024 >90     Calcium 12/31/2024 9.8     Albumin 12/31/2024 4.4     Alkaline Phosphatase 12/31/2024 98     Total Protein 12/31/2024 7.5     AST 12/31/2024 18     Bilirubin, Total 12/31/2024 0.7     ALT 12/31/2024 17     Ferritin 12/31/2024 1,168 (H)     Iron 12/31/2024 91     UIBC 12/31/2024 221     TIBC 12/31/2024 312     % Saturation 12/31/2024 29     Cholesterol 12/31/2024 138     HDL-Cholesterol 12/31/2024 36.8     Cholesterol/HDL Ratio 12/31/2024 3.8     LDL Calculated 12/31/2024 69     VLDL 12/31/2024 32     Triglycerides 12/31/2024 159 (H)     Non HDL Cholesterol 12/31/2024 101     Thyroid Stimulating Horm* 12/31/2024 2.47    Office Visit on 12/31/2024   Component Date Value    Ventricular Rate 12/31/2024  73     Atrial Rate 12/31/2024 73     MD Interval 12/31/2024 182     QRS Duration 12/31/2024 92     QT Interval 12/31/2024 394     QTC Calculation(Bazett) 12/31/2024 434     P Axis 12/31/2024 69     R [place] 12/31/2024 102     T Axis 12/31/2024 93     QRS Count 12/31/2024 12     Q Onset 12/31/2024 218     P Onset 12/31/2024 127     P Offset 12/31/2024 185     T Offset 12/31/2024 415     QTC Fredericia 12/31/2024 420    Office Visit on 12/27/2024   Component Date Value    Tissue/Wound Culture/Sme* 12/27/2024 (1+) Rare Escherichia coli (A)     Gram Stain 12/27/2024 (1+) Rare Polymorphonuclear leukocytes (A)     Gram Stain 12/27/2024 (3+) Moderate Gram negative bacilli (A)    Lab on 12/10/2024   Component Date Value    Glucose 12/10/2024 102 (H)     Sodium 12/10/2024 131 (L)     Potassium 12/10/2024 4.5     Chloride 12/10/2024 98     Bicarbonate 12/10/2024 24     Anion Gap 12/10/2024 14     Urea Nitrogen 12/10/2024 19     Creatinine 12/10/2024 1.06     eGFR 12/10/2024 75     Calcium 12/10/2024 9.0     WBC 12/10/2024 10.7     nRBC 12/10/2024 0.0     RBC 12/10/2024 4.70     Hemoglobin 12/10/2024 13.8     Hematocrit 12/10/2024 43.9     MCV 12/10/2024 93     MCH 12/10/2024 29.4     MCHC 12/10/2024 31.4 (L)     RDW 12/10/2024 15.3 (H)     Platelets 12/10/2024 431     Magnesium 12/10/2024 2.35    No results displayed because visit has over 200 results.           Assessment/Plan    This is a very pleasant 74-year-old male who history of hypertension hyperlipidemia.  Presented and November 8, 2024 with possible inferior STEMI to [facility].  Was found to have distal left main disease.  Patient subsequently underwent LOOMIS to LAD Y graft to circumflex MIDCAB that was converted to open sternotomy.  Hospital course complicated by cardiogenic shock requiring intra-aortic balloon pump subsequently VA ECMO patient had a PCI to mid LAD since the LAD graft failed.  His LAD was 100% occluded in the midsegment and underwent PCI with  Dr. Wesley.  Patient last EF was 45%.  Was seen by Dr. Gann on December 31, 2024.  Is improving slowly feeling better.  Denies having any chest pain.  Shortness of breath improving.  Tolerating cardiac rehab.  Presented to my office to establish care in local area close to his home.    Patient with ischemic cardiomyopathy ejection fraction 45%.  Blood pressure running on the low side.  Had postop A-fib.  Currently on amiodarone.  Had a monitor ordered by Dr. Gann as well as MRI of the heart.  I will continue current medications.  If his blood pressures drops and he is dizzy lightheaded may cut back on his spironolactone.  Otherwise he is on optimal medical therapy.  Not candidate for ACE ARB's or Arni due to hypotension.  We will follow-up in 3 months.  Reviewed in detail of his hospitalization November 24 at [facility].  Reviewed his cath report and catheter lungs as well as his echocardiogram.  Discussed with him lifestyle modification.    Ale Brady MD

## 2025-01-31 ENCOUNTER — CLINICAL SUPPORT (OUTPATIENT)
Dept: CARDIAC REHAB | Facility: CLINIC | Age: 75
End: 2025-01-31
Payer: MEDICARE

## 2025-01-31 DIAGNOSIS — Z95.1 STATUS POST CORONARY ARTERY BYPASS GRAFT: ICD-10-CM

## 2025-01-31 PROCEDURE — 93798 PHYS/QHP OP CAR RHAB W/ECG: CPT | Performed by: INTERNAL MEDICINE

## 2025-02-03 ENCOUNTER — CLINICAL SUPPORT (OUTPATIENT)
Dept: CARDIAC REHAB | Facility: CLINIC | Age: 75
End: 2025-02-03
Payer: MEDICARE

## 2025-02-03 DIAGNOSIS — Z95.1 STATUS POST CORONARY ARTERY BYPASS GRAFT: ICD-10-CM

## 2025-02-03 PROCEDURE — 93798 PHYS/QHP OP CAR RHAB W/ECG: CPT | Performed by: INTERNAL MEDICINE

## 2025-02-05 ENCOUNTER — CLINICAL SUPPORT (OUTPATIENT)
Dept: CARDIAC REHAB | Facility: CLINIC | Age: 75
End: 2025-02-05
Payer: MEDICARE

## 2025-02-05 DIAGNOSIS — Z95.1 STATUS POST CORONARY ARTERY BYPASS GRAFT: ICD-10-CM

## 2025-02-05 PROCEDURE — 93798 PHYS/QHP OP CAR RHAB W/ECG: CPT | Performed by: INTERNAL MEDICINE

## 2025-02-07 ENCOUNTER — CLINICAL SUPPORT (OUTPATIENT)
Dept: CARDIAC REHAB | Facility: CLINIC | Age: 75
End: 2025-02-07
Payer: MEDICARE

## 2025-02-07 DIAGNOSIS — Z95.1 STATUS POST CORONARY ARTERY BYPASS GRAFT: ICD-10-CM

## 2025-02-07 PROCEDURE — 93798 PHYS/QHP OP CAR RHAB W/ECG: CPT | Performed by: INTERNAL MEDICINE

## 2025-02-10 ENCOUNTER — CLINICAL SUPPORT (OUTPATIENT)
Dept: CARDIAC REHAB | Facility: CLINIC | Age: 75
End: 2025-02-10
Payer: MEDICARE

## 2025-02-10 DIAGNOSIS — Z95.1 STATUS POST CORONARY ARTERY BYPASS GRAFT: ICD-10-CM

## 2025-02-10 PROCEDURE — 93798 PHYS/QHP OP CAR RHAB W/ECG: CPT | Performed by: INTERNAL MEDICINE

## 2025-02-12 ENCOUNTER — CLINICAL SUPPORT (OUTPATIENT)
Dept: CARDIAC REHAB | Facility: CLINIC | Age: 75
End: 2025-02-12
Payer: MEDICARE

## 2025-02-12 DIAGNOSIS — Z95.1 STATUS POST CORONARY ARTERY BYPASS GRAFT: ICD-10-CM

## 2025-02-12 PROCEDURE — 93798 PHYS/QHP OP CAR RHAB W/ECG: CPT | Performed by: INTERNAL MEDICINE

## 2025-02-14 ENCOUNTER — CLINICAL SUPPORT (OUTPATIENT)
Dept: CARDIAC REHAB | Facility: CLINIC | Age: 75
End: 2025-02-14
Payer: MEDICARE

## 2025-02-14 DIAGNOSIS — Z95.1 STATUS POST CORONARY ARTERY BYPASS GRAFT: ICD-10-CM

## 2025-02-14 PROCEDURE — 93798 PHYS/QHP OP CAR RHAB W/ECG: CPT | Performed by: INTERNAL MEDICINE

## 2025-02-17 ENCOUNTER — CLINICAL SUPPORT (OUTPATIENT)
Dept: CARDIAC REHAB | Facility: CLINIC | Age: 75
End: 2025-02-17
Payer: MEDICARE

## 2025-02-17 DIAGNOSIS — Z95.1 STATUS POST CORONARY ARTERY BYPASS GRAFT: ICD-10-CM

## 2025-02-17 PROCEDURE — 93798 PHYS/QHP OP CAR RHAB W/ECG: CPT | Performed by: INTERNAL MEDICINE

## 2025-02-19 ENCOUNTER — CLINICAL SUPPORT (OUTPATIENT)
Dept: CARDIAC REHAB | Facility: CLINIC | Age: 75
End: 2025-02-19
Payer: MEDICARE

## 2025-02-19 DIAGNOSIS — Z95.1 STATUS POST CORONARY ARTERY BYPASS GRAFT: ICD-10-CM

## 2025-02-19 PROCEDURE — 93798 PHYS/QHP OP CAR RHAB W/ECG: CPT | Performed by: INTERNAL MEDICINE

## 2025-02-21 ENCOUNTER — CLINICAL SUPPORT (OUTPATIENT)
Dept: CARDIAC REHAB | Facility: CLINIC | Age: 75
End: 2025-02-21
Payer: MEDICARE

## 2025-02-21 DIAGNOSIS — Z95.1 STATUS POST CORONARY ARTERY BYPASS GRAFT: ICD-10-CM

## 2025-02-21 PROCEDURE — 93798 PHYS/QHP OP CAR RHAB W/ECG: CPT | Performed by: INTERNAL MEDICINE

## 2025-02-24 ENCOUNTER — CLINICAL SUPPORT (OUTPATIENT)
Dept: CARDIAC REHAB | Facility: CLINIC | Age: 75
End: 2025-02-24
Payer: MEDICARE

## 2025-02-24 DIAGNOSIS — Z95.1 STATUS POST CORONARY ARTERY BYPASS GRAFT: ICD-10-CM

## 2025-02-24 PROCEDURE — 93798 PHYS/QHP OP CAR RHAB W/ECG: CPT | Performed by: INTERNAL MEDICINE

## 2025-02-26 ENCOUNTER — CLINICAL SUPPORT (OUTPATIENT)
Dept: CARDIAC REHAB | Facility: CLINIC | Age: 75
End: 2025-02-26
Payer: MEDICARE

## 2025-02-26 ENCOUNTER — TELEPHONE (OUTPATIENT)
Dept: CARDIOLOGY | Facility: CLINIC | Age: 75
End: 2025-02-26

## 2025-02-26 DIAGNOSIS — Z95.1 STATUS POST CORONARY ARTERY BYPASS GRAFT: ICD-10-CM

## 2025-02-26 PROCEDURE — 93798 PHYS/QHP OP CAR RHAB W/ECG: CPT | Performed by: INTERNAL MEDICINE

## 2025-02-27 ENCOUNTER — TELEPHONE (OUTPATIENT)
Dept: CARDIOLOGY | Facility: CLINIC | Age: 75
End: 2025-02-27
Payer: MEDICARE

## 2025-02-27 DIAGNOSIS — I25.5 CARDIOMYOPATHY, ISCHEMIC: ICD-10-CM

## 2025-02-27 DIAGNOSIS — I50.20 HFREF (HEART FAILURE WITH REDUCED EJECTION FRACTION): ICD-10-CM

## 2025-02-27 DIAGNOSIS — I21.19 ST ELEVATION MYOCARDIAL INFARCTION (STEMI) INVOLVING OTHER CORONARY ARTERY OF INFERIOR WALL: ICD-10-CM

## 2025-02-27 DIAGNOSIS — Z95.1 S/P CABG (CORONARY ARTERY BYPASS GRAFT): ICD-10-CM

## 2025-02-27 DIAGNOSIS — I97.89 POSTOPERATIVE ATRIAL FIBRILLATION (MULTI): ICD-10-CM

## 2025-02-27 DIAGNOSIS — Z95.5 H/O HEART ARTERY STENT: ICD-10-CM

## 2025-02-27 DIAGNOSIS — Z95.1 STATUS POST CORONARY ARTERY BYPASS GRAFT: ICD-10-CM

## 2025-02-27 DIAGNOSIS — I48.91 POSTOPERATIVE ATRIAL FIBRILLATION (MULTI): ICD-10-CM

## 2025-02-27 NOTE — TELEPHONE ENCOUNTER
Received voicemail from patient's wife Mireya to general cardiology nursing office.  Mireya reports they went to pharmacy to  90 day refill for Brilinta and pharmacy received 90 day refill but would not provide refill as pharmacy records indicate patient would be due to stop Brilinta 5/15/2025 and pharmacy requesting call from Dr. Gann office to verify if patient to continue Brilinta past that date.  Patient also going out of town Saturday 3/1 and needs to obtain refill by tomorrow to take with patient on vacation.  Phoned and spoke to Mireya.  Confirmed to Mireya voicemail was received and would be forwarded to nursing staff with Dr. Gann.

## 2025-02-28 ENCOUNTER — CLINICAL SUPPORT (OUTPATIENT)
Dept: CARDIAC REHAB | Facility: CLINIC | Age: 75
End: 2025-02-28
Payer: MEDICARE

## 2025-02-28 DIAGNOSIS — Z95.1 STATUS POST CORONARY ARTERY BYPASS GRAFT: ICD-10-CM

## 2025-02-28 PROCEDURE — 93798 PHYS/QHP OP CAR RHAB W/ECG: CPT | Performed by: INTERNAL MEDICINE

## 2025-02-28 NOTE — TELEPHONE ENCOUNTER
ticagrelor (Brilinta) 90 mg tablet        Sig: Take 1 tablet (90 mg) by mouth 2 times a day. Take until May 15th 2025, then stop        Sent to pharmacy as: ticagrelor 90 mg tablet (Brilinta)        Class: Normal        Route: oral        E-Prescribing Status: Receipt confirmed by pharmacy (2/27/2025  4:11 PM EST)

## 2025-03-03 ENCOUNTER — APPOINTMENT (OUTPATIENT)
Dept: CARDIAC REHAB | Facility: CLINIC | Age: 75
End: 2025-03-03
Payer: MEDICARE

## 2025-03-05 ENCOUNTER — APPOINTMENT (OUTPATIENT)
Dept: CARDIAC REHAB | Facility: CLINIC | Age: 75
End: 2025-03-05
Payer: MEDICARE

## 2025-03-07 ENCOUNTER — APPOINTMENT (OUTPATIENT)
Dept: CARDIAC REHAB | Facility: CLINIC | Age: 75
End: 2025-03-07
Payer: MEDICARE

## 2025-03-10 ENCOUNTER — CLINICAL SUPPORT (OUTPATIENT)
Dept: CARDIAC REHAB | Facility: CLINIC | Age: 75
End: 2025-03-10
Payer: MEDICARE

## 2025-03-10 DIAGNOSIS — Z95.1 STATUS POST CORONARY ARTERY BYPASS GRAFT: ICD-10-CM

## 2025-03-10 PROCEDURE — 93798 PHYS/QHP OP CAR RHAB W/ECG: CPT | Performed by: INTERNAL MEDICINE

## 2025-03-12 ENCOUNTER — CLINICAL SUPPORT (OUTPATIENT)
Dept: CARDIAC REHAB | Facility: CLINIC | Age: 75
End: 2025-03-12
Payer: MEDICARE

## 2025-03-12 DIAGNOSIS — Z95.1 STATUS POST CORONARY ARTERY BYPASS GRAFT: ICD-10-CM

## 2025-03-12 PROCEDURE — 93798 PHYS/QHP OP CAR RHAB W/ECG: CPT | Performed by: INTERNAL MEDICINE

## 2025-03-13 ENCOUNTER — CLINICAL SUPPORT (OUTPATIENT)
Dept: CARDIAC REHAB | Facility: CLINIC | Age: 75
End: 2025-03-13
Payer: MEDICARE

## 2025-03-13 DIAGNOSIS — Z95.1 STATUS POST CORONARY ARTERY BYPASS GRAFT: ICD-10-CM

## 2025-03-13 PROCEDURE — 93798 PHYS/QHP OP CAR RHAB W/ECG: CPT | Performed by: INTERNAL MEDICINE

## 2025-03-14 ENCOUNTER — APPOINTMENT (OUTPATIENT)
Dept: CARDIAC REHAB | Facility: CLINIC | Age: 75
End: 2025-03-14
Payer: MEDICARE

## 2025-03-14 ENCOUNTER — TELEPHONE (OUTPATIENT)
Dept: CARDIOLOGY | Facility: CLINIC | Age: 75
End: 2025-03-14
Payer: MEDICARE

## 2025-03-14 NOTE — TELEPHONE ENCOUNTER
Patient needs to get a crown replaced by Wednesday and they need to know if he can get anesthetic with epinephrine, if he needs an antibiotic, and he takes aspirin 81 mg and Brilinta. Please advise.

## 2025-03-17 ENCOUNTER — CLINICAL SUPPORT (OUTPATIENT)
Dept: CARDIAC REHAB | Facility: CLINIC | Age: 75
End: 2025-03-17
Payer: MEDICARE

## 2025-03-17 ENCOUNTER — OFFICE VISIT (OUTPATIENT)
Dept: CARDIAC SURGERY | Facility: HOSPITAL | Age: 75
End: 2025-03-17
Payer: MEDICARE

## 2025-03-17 ENCOUNTER — HOSPITAL ENCOUNTER (OUTPATIENT)
Dept: RADIOLOGY | Facility: HOSPITAL | Age: 75
Discharge: HOME | End: 2025-03-17
Payer: MEDICARE

## 2025-03-17 VITALS
HEART RATE: 60 BPM | BODY MASS INDEX: 27.62 KG/M2 | DIASTOLIC BLOOD PRESSURE: 64 MMHG | WEIGHT: 198 LBS | SYSTOLIC BLOOD PRESSURE: 99 MMHG

## 2025-03-17 DIAGNOSIS — R06.02 SHORTNESS OF BREATH: ICD-10-CM

## 2025-03-17 DIAGNOSIS — Z95.1 S/P CABG X 2: Primary | ICD-10-CM

## 2025-03-17 DIAGNOSIS — Z95.1 STATUS POST CORONARY ARTERY BYPASS GRAFT: ICD-10-CM

## 2025-03-17 PROCEDURE — 99212 OFFICE O/P EST SF 10 MIN: CPT | Mod: 25 | Performed by: THORACIC SURGERY (CARDIOTHORACIC VASCULAR SURGERY)

## 2025-03-17 PROCEDURE — 71046 X-RAY EXAM CHEST 2 VIEWS: CPT | Performed by: RADIOLOGY

## 2025-03-17 PROCEDURE — 1157F ADVNC CARE PLAN IN RCRD: CPT | Performed by: THORACIC SURGERY (CARDIOTHORACIC VASCULAR SURGERY)

## 2025-03-17 PROCEDURE — 1123F ACP DISCUSS/DSCN MKR DOCD: CPT | Performed by: THORACIC SURGERY (CARDIOTHORACIC VASCULAR SURGERY)

## 2025-03-17 PROCEDURE — 99212 OFFICE O/P EST SF 10 MIN: CPT | Performed by: THORACIC SURGERY (CARDIOTHORACIC VASCULAR SURGERY)

## 2025-03-17 PROCEDURE — 71046 X-RAY EXAM CHEST 2 VIEWS: CPT

## 2025-03-17 PROCEDURE — 93798 PHYS/QHP OP CAR RHAB W/ECG: CPT | Performed by: INTERNAL MEDICINE

## 2025-03-17 NOTE — PROGRESS NOTES
CARDIOTHORACIC SURGERY FOLLOW-UP PROGRESS NOTE  Subjective   Jaime Dominguez [id number]  3/17/2025    Patient is a 74 y.o. male who presents for routine post-operative follow up. He denies any present complaints. Activity level has been appropriate.       Objective   There were no vitals taken for this visit.  Heart: Regular rate and rhythm, S1, S2 normal, no murmur, click, rub or gallop.  Lungs: Clear to auscultation bilaterally.  Abdomen: Soft, non-distended, non-tender to palpation.  Extremities: Warm, well perfused, pulses intact, no cyanosis, clubbing, or edema.  Neuro: Alert and oriented X4, moving all extremities with no deficits observed.  Incision(s): Well healing without erythema or drainage.  Imaging: Chest x-ray reviewed. Sternum healing well.    Assessment/Plan   There are no diagnoses linked to this encounter.  Doing very well. Will see him in 3 months   Rodrigo Harman MD

## 2025-03-19 ENCOUNTER — CLINICAL SUPPORT (OUTPATIENT)
Dept: CARDIAC REHAB | Facility: CLINIC | Age: 75
End: 2025-03-19
Payer: MEDICARE

## 2025-03-19 DIAGNOSIS — Z95.1 STATUS POST CORONARY ARTERY BYPASS GRAFT: ICD-10-CM

## 2025-03-19 PROCEDURE — 93798 PHYS/QHP OP CAR RHAB W/ECG: CPT | Performed by: INTERNAL MEDICINE

## 2025-03-21 ENCOUNTER — CLINICAL SUPPORT (OUTPATIENT)
Dept: CARDIAC REHAB | Facility: CLINIC | Age: 75
End: 2025-03-21
Payer: MEDICARE

## 2025-03-21 DIAGNOSIS — Z95.1 STATUS POST CORONARY ARTERY BYPASS GRAFT: ICD-10-CM

## 2025-03-21 PROCEDURE — 93798 PHYS/QHP OP CAR RHAB W/ECG: CPT | Performed by: INTERNAL MEDICINE

## 2025-03-24 ENCOUNTER — CLINICAL SUPPORT (OUTPATIENT)
Dept: CARDIAC REHAB | Facility: CLINIC | Age: 75
End: 2025-03-24
Payer: MEDICARE

## 2025-03-24 DIAGNOSIS — Z95.1 STATUS POST CORONARY ARTERY BYPASS GRAFT: ICD-10-CM

## 2025-03-24 PROCEDURE — 93798 PHYS/QHP OP CAR RHAB W/ECG: CPT | Performed by: INTERNAL MEDICINE

## 2025-03-26 ENCOUNTER — CLINICAL SUPPORT (OUTPATIENT)
Dept: CARDIAC REHAB | Facility: CLINIC | Age: 75
End: 2025-03-26
Payer: MEDICARE

## 2025-03-26 DIAGNOSIS — Z95.1 STATUS POST CORONARY ARTERY BYPASS GRAFT: ICD-10-CM

## 2025-03-26 PROCEDURE — 93798 PHYS/QHP OP CAR RHAB W/ECG: CPT | Performed by: INTERNAL MEDICINE

## 2025-03-28 ENCOUNTER — HOSPITAL ENCOUNTER (OUTPATIENT)
Dept: RADIOLOGY | Facility: HOSPITAL | Age: 75
Discharge: HOME | End: 2025-03-28
Payer: MEDICARE

## 2025-03-28 ENCOUNTER — APPOINTMENT (OUTPATIENT)
Dept: CARDIAC REHAB | Facility: CLINIC | Age: 75
End: 2025-03-28
Payer: MEDICARE

## 2025-03-28 VITALS — HEIGHT: 71 IN | BODY MASS INDEX: 27.78 KG/M2 | WEIGHT: 198.41 LBS

## 2025-03-28 DIAGNOSIS — I21.19 ST ELEVATION MYOCARDIAL INFARCTION (STEMI) INVOLVING OTHER CORONARY ARTERY OF INFERIOR WALL: ICD-10-CM

## 2025-03-28 DIAGNOSIS — Z95.1 S/P CABG (CORONARY ARTERY BYPASS GRAFT): ICD-10-CM

## 2025-03-28 DIAGNOSIS — Z95.5 H/O HEART ARTERY STENT: ICD-10-CM

## 2025-03-28 DIAGNOSIS — I50.20 HFREF (HEART FAILURE WITH REDUCED EJECTION FRACTION): ICD-10-CM

## 2025-03-28 DIAGNOSIS — I48.91 POSTOPERATIVE ATRIAL FIBRILLATION (MULTI): ICD-10-CM

## 2025-03-28 DIAGNOSIS — I97.89 POSTOPERATIVE ATRIAL FIBRILLATION (MULTI): ICD-10-CM

## 2025-03-28 DIAGNOSIS — I25.5 CARDIOMYOPATHY, ISCHEMIC: ICD-10-CM

## 2025-03-28 DIAGNOSIS — Z95.1 STATUS POST CORONARY ARTERY BYPASS GRAFT: ICD-10-CM

## 2025-03-28 PROCEDURE — 2550000001 HC RX 255 CONTRASTS: Performed by: STUDENT IN AN ORGANIZED HEALTH CARE EDUCATION/TRAINING PROGRAM

## 2025-03-28 PROCEDURE — 75561 CARDIAC MRI FOR MORPH W/DYE: CPT

## 2025-03-28 PROCEDURE — A9575 INJ GADOTERATE MEGLUMI 0.1ML: HCPCS | Performed by: STUDENT IN AN ORGANIZED HEALTH CARE EDUCATION/TRAINING PROGRAM

## 2025-03-28 RX ORDER — GADOTERATE MEGLUMINE 376.9 MG/ML
36 INJECTION INTRAVENOUS
Status: COMPLETED | OUTPATIENT
Start: 2025-03-28 | End: 2025-03-28

## 2025-03-28 RX ADMIN — GADOTERATE MEGLUMINE 36 ML: 376.9 INJECTION INTRAVENOUS at 09:52

## 2025-03-31 ENCOUNTER — CLINICAL SUPPORT (OUTPATIENT)
Dept: CARDIAC REHAB | Facility: CLINIC | Age: 75
End: 2025-03-31
Payer: MEDICARE

## 2025-03-31 DIAGNOSIS — Z95.1 STATUS POST CORONARY ARTERY BYPASS GRAFT: ICD-10-CM

## 2025-03-31 PROCEDURE — 93798 PHYS/QHP OP CAR RHAB W/ECG: CPT | Performed by: INTERNAL MEDICINE

## 2025-03-31 NOTE — PROGRESS NOTES
Primary Care Physician: Frank Mckeon MD  Patient's Location: [place] [address]    Date of Visit: 04/01/2025  2:30 PM EDT  Location of visit: [facility]   Type of Visit: Established - First Seen: 12/31/2024   Last visit: 12/31/2024    HPI / Summary:   Jaime Dominguez is a very pleasant 74 y.o. male presenting for management of Stage C iCM/HFrEF (last EF 11/27/2024: 45%). He has a history of HLD and CAD c/b inferior STEMI (11/8/2024) 2/2 to critical LAD stenosis s/p midCABx2, converted to full sternotomy, with LIMA extended as a Y graft to LAD and OM. Post-operative course was complicated by cardiogenic shock requiring IABP and subsequent VA-ECMO support after failed grafts. He then required PCI of the mid-LAD with HEATHER with improvement in EF and stabilization.     Initial CV History:   Admitted with an inferior STEMI on 11/8/2024. Initial workup showed a 90% LAD occlusion. After transfer for cardiac surgery, he underwent a robotic-assisted MIDCABG x2, later converted to full sternotomy with LIMA and radial graft to LAD/OM on 11/12. Postoperatively, complications included cardiogenic shock requiring VA ECMO and IABP. AF RVR on amiodarone The patient also underwent mediastinal exploration, hemothorax evacuation, and ECMO decannulation on 11/19.    In the CTICU, there was concern for graft failure and he underwent PCI to mid-LAD on 11/14. Transitioned to the floor on 11/25, he was stabilized on DAPT (Brilinta + ASA), initiated on GDMT (spironolactone, Jardiance), and referred for cardiac rehab. Final echocardiogram revealed LVEF of 45%. Discharged POD 22 on 11/26 to acute rehab, he was hemodynamically stable and counseled on medication compliance, groin incision care, and weight restrictions. Follow-up arranged for cardiology and cardiac surgrey    Saw Dr. Aldo Harman yesterday  Issues with wound healing of left groin    Last Visit: 12/31/2025  - Continue current medications with the exception of:   --  increase metoprolol XL from 12.5mg twice daily to 50mg daily  - Labwork: today  - Imaging/Procedures:   -- Cardiac MRI in 3 months. Please obtain a Cardiac MRI. Call [phone] to schedule.  -- Holter Monitor at next visit, we will stop amiodarone at next visit   - Referrals:   -- Cardiac Rehab  --  Clinical Pharmacy (Cardiology): Please call [phone] to schedule an intake interview  - Followup: 3 months    Interval history:   Attending cardiac rehab  CMR 3/28/2025  1. Left ventricle is normal in size with mildly reduced systolic  function. LVEF 43.4%. Severe hypokinesis of the apex and apical  segment of septum. Quantitative values are as noted above.  2. There is near transmural delayed enhancement involving the entire  apex and mid and apical segments of the septum and apical segment of  the anterior wall in keeping with sequela of myocardial infarction.  No significant residual viable tissue in these segments. Suggestion  of small area of microvascular obstruction in the apical segment of  the septum.  3. No evidence of LV thrombus.  4. Normal aortic, mitral, and tricuspid valve function.    Today:  He is accompanied by: wife (who provides additional history)    Still has very mild WINTER. No LE swelling. No orthopnea. Sleep is improving. Notes rhinorrhea    He denies: dyspnea at rest, LE swelling, syncope, PND, orthopnea, chest pain, palpitation.    ROS: Full 10 pt review of symptoms of negative unless discussed above.     Objective   Medical History:   He has a past medical history of Basal cell carcinoma, CAD (coronary artery disease), Hyperlipidemia, and STEMI (ST elevation myocardial infarction) (Multi).  Surgical Hx:   He has a past surgical history that includes Skin cancer excision (N/A); Cardiac catheterization (N/A, 11/08/2024); Cardiac catheterization (N/A, 11/08/2024); Cervical fusion; Cardiac catheterization (N/A, 11/14/2024); Cardiac catheterization (N/A, 11/14/2024); and Cardiac  "catheterization (N/A, 11/14/2024).   Social Hx:   He reports that he has never smoked. He has never used smokeless tobacco. He reports that he does not currently use alcohol after a past usage of about 9.0 standard drinks of alcohol per week. He reports that he does not currently use drugs after having used the following drugs: Marijuana.  Family Hx:   His family history includes Heart attack in his brother and mother.   Allergies:   No Known Allergies  Exam:       4/1/2025     2:46 PM 3/28/2025     9:21 AM 3/17/2025     1:01 PM 1/29/2025    10:00 AM 1/8/2025     3:57 PM 1/7/2025    11:55 AM   Vitals   Systolic 92  99 84 118 105   Diastolic 60  64 66 76 60   BP Location Left arm   Right arm     Heart Rate 54  60 54 62 60   Temp      36.5 °C (97.7 °F)   Resp 20     18   Height 1.803 m (5' 11\") 1.8 m (5' 10.87\")   1.803 m (5' 11\")    Weight (lb) 196 198.41 198 187 181    BMI 27.34 kg/m2 27.78 kg/m2 27.62 kg/m2 26.08 kg/m2 25.24 kg/m2    BSA (m2) 2.11 m2 2.12 m2 2.12 m2 2.06 m2 2.03 m2    Visit Report Report  Report Report Report      Wt Readings from Last 5 Encounters:   04/01/25 88.9 kg (196 lb)   03/28/25 90 kg (198 lb 6.6 oz)   03/17/25 89.8 kg (198 lb)   01/29/25 84.8 kg (187 lb)   01/08/25 82.1 kg (181 lb)     GEN: Pleasant, well-appearing, no acute distress.  HEENT: JVP not elevated, no icterus. No HJR  CHEST: No wheeze, good air movement. Sternotomy c/d/i  CV: Normal rate, regular rhythm. Normal S1, inspiratory split S2, no m/r/g  ABD: Soft, ND, NT  EXT: Warm, well perfused, No LE edema.  NEURO: Pleasant, Oriented to plan    Labs:   CMP:  Recent Labs     12/31/24  1516 12/10/24  1349 12/04/24  0740 12/03/24  0746 12/02/24  0754    131* 135* 136 135*   K 4.7 4.5 4.1 4.4 4.4    98 104 104 105   CO2 27 24 23 21 22   ANIONGAP 14 14 12 15 12   BUN 19 19 16 19 18   CREATININE 0.85 1.06 0.75 0.80 0.85   EGFR >90 75 >90 >90 >90   MG  --  2.35 2.42* 2.39 2.57*     Recent Labs     12/31/24  1516 " "12/04/24  0740 12/03/24  0746 11/14/24  0020 11/13/24  1350 11/12/24  0416 11/11/24  0401   ALBUMIN 4.4 3.4 3.6   < > 4.9  4.9   < > 4.4  4.1   ALKPHOS 98  --   --   --  21*  --  52   ALT 17  --   --   --  35  --  26   AST 18  --   --   --  180*  --  30   BILITOT 0.7  --   --   --  0.9  --  0.8    < > = values in this interval not displayed.   CBC:  Recent Labs     12/31/24  1516 12/10/24  1349 12/04/24  0740 12/03/24  0746 12/02/24  0754   WBC 8.0 10.7 8.7 9.3 9.9   HGB 13.0* 13.8 10.7* 10.5* 10.5*   HCT 41.8 43.9 33.1* 35.1* 33.4*    431 478* 516* 558*   MCV 93 93 91 97 94   HEME/ENDO:  Recent Labs     12/31/24  1516 11/10/24  2040 11/08/24  1612   FERRITIN 1,168*  --   --    IRONSAT 29  --   --    TSH 2.47 3.12 3.07   HGBA1C  --   --  5.1    CARDIAC:   Recent Labs     12/31/24  1516 11/18/24  0213 11/17/24  0317 11/15/24  1228 11/15/24  0142 11/14/24  0354 11/14/24  0020 11/13/24  2020 11/10/24  2040 11/10/24  1634   LDH  --  370* 450*  --   --   --   --   --   --   --    TROPHS  --   --   --  44,705* 67,971* 73,314* 65,732* 63,067*   < > 2,317*   BNP 14  --   --   --   --   --   --   --   --  13    < > = values in this interval not displayed.     Recent Labs     12/31/24  1516 11/08/24  1612   CHOL 138 228*   LDLCALC 69 126*   HDL 36.8 37.6   TRIG 159* 322*   MICRO: No results for input(s): \"ESR\", \"CRP\", \"PROCAL\" in the last 25769 hours.No results found for the last 90 days.        Notable Studies, reviewed:   EKG:   Recent Labs     12/31/24  1400 11/30/24  1819 11/18/24  1550   VENTRATE 73 76 138   ATRRATE 73 76 326   QTCFRED 420 475 332   QRSDUR 92 92 78   QTCCALCB 434 495 381     Encounter Date: 12/31/24   ECG 12 lead (Clinic Performed)   Result Value    Ventricular Rate 73    Atrial Rate 73    CT Interval 182    QRS Duration 92    QT Interval 394    QTC Calculation(Bazett) 434    P Axis 69    R Axis 102    T Axis 93    QRS Count 12    Q Onset 218    P Onset 127    P Offset 185    T Offset 415    QTC " Jarvis 420    Narrative    Normal sinus rhythm  Rightward axis  Possible Inferior infarct , age undetermined  Anteroseptal infarct (cited on or before 13-NOV-2024)  Abnormal ECG  When compared with ECG of 30-NOV-2024 18:13,  Borderline criteria for Inferior infarct are now Present    Confirmed by Dawit Marmolejo ([id number]) on 12/31/2024 4:07:16 PM     Echocardiogram:   Recent Labs     11/27/24  1010 11/21/24  1549 11/19/24  1346 11/18/24  0845 11/16/24  1105 11/15/24  0911 11/12/24  1024 11/09/24  0918   EF 45 33 33 43 34 31   < > 63   LVIDD 4.80  --   --   --  3.90  --   --  5.18   RV 25.1  --   --   --  21.0  --   --   --    RVFRWALLPKSP 12.40  --   --   --  6.31 6.31  --  11.50   TAPSE 1.9  --   --   --  1.0  --   --  2.5    < > = values in this interval not displayed.   Transthoracic Echo (TTE) Complete With Contrast 11/27/2024  1. Poorly visualized anatomical structures due to suboptimal image quality.  2. The left ventricle was not well visualized.  3. Left ventricular ejection fraction is suspected mildly decreased, by visual estimate at 45%.  4. Spectral Doppler shows a Grade I (impaired relaxation pattern) of left ventricular diastolic filling with normal left atrial filling pressure.  5. Abnormal septal motion consistent with post-operative status.  6. There is normal right ventricular global systolic function.    Coronary Angiography:   Left Heart Cath, With LV, Angriography And Grafts, Left Heart Cath, With LV, Angriography And Grafts, Left Heart Cath, With LV, Angriography And Grafts 11/14/2024  1. Coronary Angiogram: Mild to moderate LM stenosis with 100% occlusion of the mid LAD at the graft anastamosis site. Mod-sev focal disease in the native LCX/OM with competitive flow in OM2. Patent and dominant RCA.  2. Graft Angiogram: In-situ LIMA which transitions into a Y-graft with a patent limb to OM2 and 100% stenosis at the mid LAD anastamosis site.  3. Successful VA ECMO cannulation (left common femoral  artery/vein) with concomitant distal perfusion catheter placement (left SFA).  4. Successful complex salvage PCI of the mid LAD with deployment of a 3.0 x 28 RUSLAN Holden HEATHER, post-dilated with a 3.0 NC balloon.  5. DAPT: ASA/Ticagrelor for 6 months without interruption followed by lifelong ASA.    Right Heart Cath: No results found for this or any previous visit from the past 1800 days.    Cardiac Scoring: No results found for this or any previous visit from the past 1800 days.    Cardiac MRI 3/28/2025:  1. Left ventricle is normal in size with mildly reduced systolic  function. LVEF 43.4%. Severe hypokinesis of the apex and apical  segment of septum. Quantitative values are as noted above.  2. There is near transmural delayed enhancement involving the entire  apex and mid and apical segments of the septum and apical segment of  the anterior wall in keeping with sequela of myocardial infarction.  No significant residual viable tissue in these segments. Suggestion  of small area of microvascular obstruction in the apical segment of  the septum.  3. No evidence of LV thrombus.  4. Normal aortic, mitral, and tricuspid valve function.    Nuclear:No results found for this or any previous visit from the past 1800 days.    Metabolic Stress: No results found for this or any previous visit from the past 1800 days.      Current Outpatient Medications   Medication Instructions    amiodarone (PACERONE) 200 mg, oral, Daily    aspirin 81 mg, oral, Daily    atorvastatin (LIPITOR) 80 mg, oral, Nightly    empagliflozin (JARDIANCE) 10 mg, oral, Daily    fluticasone (Flonase) 50 mcg/actuation nasal spray 2 sprays, Daily    gabapentin (NEURONTIN) 600 mg, Nightly    iron polysaccharides (NU-IRON,NIFEREX) 150 mg, oral, Daily    melatonin 10 mg/day, Nightly    metoprolol succinate XL (TOPROL-XL) 50 mg, oral, Daily, Do not crush or chew.    multivitamin with minerals tablet 1 tablet, Daily    sacubitriL-valsartan (Entresto) 24-26 mg  tablet 1 tablet, oral, 2 times daily    spironolactone (ALDACTONE) 25 mg, oral, Daily    ticagrelor (BRILINTA) 90 mg, oral, 2 times daily, Take until May 15th 2025, then stop      Notable Therapies: *GDMT*   Class  Agent SAFETY    *ARNI* / ACE / ARB None due to relative hypotension, though BP has been better at cardiac rehab Last BP: 92/60, Last BUN/Cr (GFR): 12/31/2024: 19/0.85 (>90)    *Beta-Blocker* Metoprolol succinate 50mg daily Last HR: 54    *MRA* Spironolactone 25 mg daily Last K: 12/31/2024: 4.7     *SGLT2* Jardiance 10 mg daily Last A1c 11/8/2024: 5.1     Diuretic  Last BNP: 12/31/2024: 14    Hydralazine/ISDN       Digoxin  Last Digoxin level: No results found for requested labs within last 3650 days.    Anticoagulation He did have postop lone atrial fibrillation in the hospital with a [id number] of 4.  Not currently on anticoagulation Last Hgb: 12/31/2024: 13.0    Anti-arrhythmic Amiodarone 200 mg daily (stop) Last QTc: 12/31/2024: 434  Last TSH: 12/31/2024: 2.47    Antiplatelet Aspirin 81 mg daily  Ticagrelor 90 mg twice daily (x 6 months--May 15th 2025) Last Platelet: 12/31/2024: 263    Lipid-Lowering Atorvastatin 80 mg at night Last Tchol 12/31/2024: 138 / LDL No results found for requested labs within last 3650 days. or 12/31/2024: 69 / HDL 12/31/2024: 36.8 / TRIG 12/31/2024: 159    Other        Device(s)  Not indicated EF: 11/27/2024: 45%, QRS: 12/31/2024: 92ms    Cardiac Rehab,   if EF <35/MI/OHS  attending      Problems Addressed:   # HF improved EF / Chronic Systolic Heart Failure, last EF 11/27/2024: 45%, dx'd 11/2024, lowest EF 31%,   # Ischemic Cardiomyopathy, ACC/AHA Stage C, NYHA I, Warm, Euvolemic. ON BB/MRA/SGLT2i. No ARNI due to hypotension, but BP appears improved with cardiac rehab  # Inferior STEMI 2/2 to LAD occlusion s/p LIMA-LAD with Y graft to OM c/b graft occlusion s/p mid LAD PCI (11/14/2024). ASA/Ticagrelor for 6 months (until May 14th 2025, per Dr. Quiles) without  interruption followed by lifelong ASA.   # Post operative atrial fibrillation: continue amiodarone x 3 months, obtain Holter  # Hypertension: Last BP: 92/60. controlled  # Hyperlipidemia: Last Tchol 12/31/2024: 138 / LDL 69 / HDL 36.8 / TRIG 159. Continue statin, recheck lipids  # CKD Stage 1: Last BUN/Cr (GFR): 12/31/2024: 19/0.85 (>90) CTM  - continue BB, MRA, SGLT2i  - start 1/2 tablet Entresto 24-26mg twice daily  - stop amiodarone, holter monitor  - c/w cardiac rehab    Patient Instructions   If you have any questions or need cardiac medication refills, please call the Heart Failure office at [phone], option 6.  You may also contact the  Heart Failure Nursing team via email at [email or web address] (Please include your name and date of birth). To reach Dr. Gann's office please call [phone] / Fax: [phone]. To schedule an office appointment call [phone].     If I placed a referral today for a specialist/procedure, please call the general scheduling line at [phone]. You may also schedule appointments on the [employer] makayla. For radiology scheduling (Cardiac calcium score, CTA with HeartFlow, Cardiac MRI, NM cardiac stress test, PET viability study) the phone number is [phone].     - Continue current medications with the exception of:   -- stop amiodarone   -- start 1/2 tablet Entresto 24-26mg twice daily (dose will be 12-13mg twice daily)  -- stop ticagrelor on May 15th 2025 per Dr. Quiles's recommendations (6 months total therapy)  - Labwork: 1 week  - Imaging/Procedures: we will followup holter monitor placed today.  - Referrals:   -- c/w Cardiac Rehab  - Followup: 3 months       Orders:  Orders Placed This Encounter   Procedures    Basic Metabolic Panel    B-Type Natriuretic Peptide    CBC      Followup Appts:  Future Appointments   Date Time Provider Department [place]   4/2/2025 11:00 AM [facility]   4/4/2025 11:00 AM LILI WINSLOW  CARDIAC REHAB CLASS [facility]   4/7/2025 11:00 AM LILI BSD CARDIAC REHAB CLASS [facility]   4/8/2025  3:00 PM Larry Benítez PA-C [id number] [facility]   4/9/2025 11:00 AM LILI BSD CARDIAC REHAB CLASS [facility]   4/11/2025 11:00 AM LILI BSD CARDIAC REHAB CLASS [facility]   4/14/2025 11:00 AM LILI BSD CARDIAC REHAB CLASS [facility]   4/16/2025 11:00 AM LILI BSD CARDIAC REHAB CLASS [facility]   4/18/2025 11:00 AM LILI BSD CARDIAC REHAB CLASS [facility]   4/18/2025  2:40 PM Teto Zaragoza, PharmD [id number] [facility]   4/21/2025 11:00 AM LILI BSD CARDIAC REHAB CLASS [facility]   4/23/2025 11:00 AM LILI BSD CARDIAC REHAB CLASS [facility]   4/25/2025 11:00 AM LILI BSD CARDIAC REHAB CLASS [facility]   4/28/2025 11:00 AM LILI BSD CARDIAC REHAB CLASS [facility]   4/29/2025 10:45 AM Ale Brady MD [id number] [facility]   5/16/2025 10:00 AM EUGENIA Boo-CNP [id number]       Time Spent: I spent 56 minutes reviewing medical testing, obtaining medical history and counselling and educating on diagnosis and documenting clinical encounter. The encounter involved a comprehensive review of extensive data, including prior medical records, imaging studies, laboratory results, and consultations, followed by in-depth counseling regarding the patient's complex cardiac condition and comorbidities. We discussed treatment options, risks and benefits, and recommendations for ongoing care and careful monitoring of adverse effects from medications.     ____________________________________________________________  Jayson Gann MD  Section of Advanced Heart Failure and Cardiac Transplantation  Division of Cardiovascular Medicine  [facility]  [facility]

## 2025-04-01 ENCOUNTER — OFFICE VISIT (OUTPATIENT)
Dept: CARDIOLOGY | Facility: CLINIC | Age: 75
End: 2025-04-01
Payer: MEDICARE

## 2025-04-01 ENCOUNTER — ANCILLARY PROCEDURE (OUTPATIENT)
Dept: CARDIOLOGY | Facility: CLINIC | Age: 75
End: 2025-04-01
Payer: MEDICARE

## 2025-04-01 VITALS
SYSTOLIC BLOOD PRESSURE: 92 MMHG | BODY MASS INDEX: 27.44 KG/M2 | DIASTOLIC BLOOD PRESSURE: 60 MMHG | WEIGHT: 196 LBS | HEIGHT: 71 IN | OXYGEN SATURATION: 96 % | HEART RATE: 54 BPM | RESPIRATION RATE: 20 BRPM

## 2025-04-01 DIAGNOSIS — Z95.1 S/P CABG (CORONARY ARTERY BYPASS GRAFT): ICD-10-CM

## 2025-04-01 DIAGNOSIS — I21.19 ST ELEVATION MYOCARDIAL INFARCTION (STEMI) INVOLVING OTHER CORONARY ARTERY OF INFERIOR WALL: ICD-10-CM

## 2025-04-01 DIAGNOSIS — I97.89 POSTOPERATIVE ATRIAL FIBRILLATION (MULTI): ICD-10-CM

## 2025-04-01 DIAGNOSIS — Z51.81 ENCOUNTER FOR THERAPEUTIC DRUG MONITORING: ICD-10-CM

## 2025-04-01 DIAGNOSIS — I48.91 POSTOPERATIVE ATRIAL FIBRILLATION (MULTI): ICD-10-CM

## 2025-04-01 DIAGNOSIS — I25.5 CARDIOMYOPATHY, ISCHEMIC: ICD-10-CM

## 2025-04-01 DIAGNOSIS — Z95.5 H/O HEART ARTERY STENT: ICD-10-CM

## 2025-04-01 DIAGNOSIS — I50.20 HFREF (HEART FAILURE WITH REDUCED EJECTION FRACTION): ICD-10-CM

## 2025-04-01 DIAGNOSIS — Z95.1 STATUS POST CORONARY ARTERY BYPASS GRAFT: ICD-10-CM

## 2025-04-01 DIAGNOSIS — I50.20 HFREF (HEART FAILURE WITH REDUCED EJECTION FRACTION): Primary | ICD-10-CM

## 2025-04-01 PROCEDURE — 1036F TOBACCO NON-USER: CPT | Performed by: STUDENT IN AN ORGANIZED HEALTH CARE EDUCATION/TRAINING PROGRAM

## 2025-04-01 PROCEDURE — 3008F BODY MASS INDEX DOCD: CPT | Performed by: STUDENT IN AN ORGANIZED HEALTH CARE EDUCATION/TRAINING PROGRAM

## 2025-04-01 PROCEDURE — 99215 OFFICE O/P EST HI 40 MIN: CPT | Performed by: STUDENT IN AN ORGANIZED HEALTH CARE EDUCATION/TRAINING PROGRAM

## 2025-04-01 PROCEDURE — 1123F ACP DISCUSS/DSCN MKR DOCD: CPT | Performed by: STUDENT IN AN ORGANIZED HEALTH CARE EDUCATION/TRAINING PROGRAM

## 2025-04-01 PROCEDURE — 93246 EXT ECG>7D<15D RECORDING: CPT

## 2025-04-01 PROCEDURE — 1157F ADVNC CARE PLAN IN RCRD: CPT | Performed by: STUDENT IN AN ORGANIZED HEALTH CARE EDUCATION/TRAINING PROGRAM

## 2025-04-01 PROCEDURE — G2211 COMPLEX E/M VISIT ADD ON: HCPCS | Performed by: STUDENT IN AN ORGANIZED HEALTH CARE EDUCATION/TRAINING PROGRAM

## 2025-04-01 PROCEDURE — 1159F MED LIST DOCD IN RCRD: CPT | Performed by: STUDENT IN AN ORGANIZED HEALTH CARE EDUCATION/TRAINING PROGRAM

## 2025-04-01 RX ORDER — SACUBITRIL AND VALSARTAN 24; 26 MG/1; MG/1
0.5 TABLET, FILM COATED ORAL 2 TIMES DAILY
Qty: 30 TABLET | Refills: 11 | Status: SHIPPED | OUTPATIENT
Start: 2025-04-01 | End: 2026-04-01

## 2025-04-01 RX ORDER — SACUBITRIL AND VALSARTAN 24; 26 MG/1; MG/1
1 TABLET, FILM COATED ORAL 2 TIMES DAILY
Qty: 60 TABLET | Refills: 11 | Status: SHIPPED | OUTPATIENT
Start: 2025-04-01 | End: 2025-04-01

## 2025-04-01 RX ORDER — FLUTICASONE PROPIONATE 50 MCG
2 SPRAY, SUSPENSION (ML) NASAL DAILY
COMMUNITY

## 2025-04-01 ASSESSMENT — PATIENT HEALTH QUESTIONNAIRE - PHQ9
2. FEELING DOWN, DEPRESSED OR HOPELESS: NOT AT ALL
1. LITTLE INTEREST OR PLEASURE IN DOING THINGS: NOT AT ALL
SUM OF ALL RESPONSES TO PHQ9 QUESTIONS 1 AND 2: 0

## 2025-04-01 ASSESSMENT — COLUMBIA-SUICIDE SEVERITY RATING SCALE - C-SSRS
6. HAVE YOU EVER DONE ANYTHING, STARTED TO DO ANYTHING, OR PREPARED TO DO ANYTHING TO END YOUR LIFE?: NO
1. IN THE PAST MONTH, HAVE YOU WISHED YOU WERE DEAD OR WISHED YOU COULD GO TO SLEEP AND NOT WAKE UP?: NO
2. HAVE YOU ACTUALLY HAD ANY THOUGHTS OF KILLING YOURSELF?: NO

## 2025-04-01 ASSESSMENT — ENCOUNTER SYMPTOMS
DEPRESSION: 0
LOSS OF SENSATION IN FEET: 0
OCCASIONAL FEELINGS OF UNSTEADINESS: 0

## 2025-04-01 NOTE — PATIENT INSTRUCTIONS
If you have any questions or need cardiac medication refills, please call the Heart Failure office at [phone], option 6.  You may also contact the  Heart Failure Nursing team via email at [email or web address] (Please include your name and date of birth). To reach Dr. Gann's office please call [phone] / Fax: [phone]. To schedule an office appointment call [phone].     If I placed a referral today for a specialist/procedure, please call the general scheduling line at [phone]. You may also schedule appointments on the [employer] makayla. For radiology scheduling (Cardiac calcium score, CTA with HeartFlow, Cardiac MRI, NM cardiac stress test, PET viability study) the phone number is [phone].     - Continue current medications with the exception of:   -- stop amiodarone   -- stop ticagrelor/Brilinta on May 15th 2025 per Dr. Quiles's recommendations (6 months total therapy)  -- start 1/2 tablet Entresto 24-26mg twice daily (dose will be 12-13mg twice daily)  -- stop iron  - Labwork: 1 week  - Imaging/Procedures: we will followup holter monitor placed today.  - Referrals:   -- c/w Cardiac Rehab  - Followup: 3 months

## 2025-04-02 ENCOUNTER — CLINICAL SUPPORT (OUTPATIENT)
Dept: CARDIAC REHAB | Facility: CLINIC | Age: 75
End: 2025-04-02
Payer: MEDICARE

## 2025-04-02 DIAGNOSIS — Z95.1 STATUS POST CORONARY ARTERY BYPASS GRAFT: ICD-10-CM

## 2025-04-02 PROCEDURE — 93798 PHYS/QHP OP CAR RHAB W/ECG: CPT | Performed by: INTERNAL MEDICINE

## 2025-04-03 ENCOUNTER — CLINICAL SUPPORT (OUTPATIENT)
Dept: CARDIAC REHAB | Facility: CLINIC | Age: 75
End: 2025-04-03
Payer: MEDICARE

## 2025-04-03 DIAGNOSIS — Z95.1 STATUS POST CORONARY ARTERY BYPASS GRAFT: ICD-10-CM

## 2025-04-03 PROCEDURE — 93798 PHYS/QHP OP CAR RHAB W/ECG: CPT | Performed by: INTERNAL MEDICINE

## 2025-04-04 ENCOUNTER — APPOINTMENT (OUTPATIENT)
Dept: CARDIAC REHAB | Facility: CLINIC | Age: 75
End: 2025-04-04
Payer: MEDICARE

## 2025-04-07 ENCOUNTER — CLINICAL SUPPORT (OUTPATIENT)
Dept: CARDIAC REHAB | Facility: CLINIC | Age: 75
End: 2025-04-07
Payer: MEDICARE

## 2025-04-07 DIAGNOSIS — Z95.1 STATUS POST CORONARY ARTERY BYPASS GRAFT: ICD-10-CM

## 2025-04-07 PROCEDURE — 93798 PHYS/QHP OP CAR RHAB W/ECG: CPT | Performed by: INTERNAL MEDICINE

## 2025-04-08 ENCOUNTER — APPOINTMENT (OUTPATIENT)
Dept: SLEEP MEDICINE | Facility: CLINIC | Age: 75
End: 2025-04-08
Payer: MEDICARE

## 2025-04-08 VITALS
BODY MASS INDEX: 27.44 KG/M2 | DIASTOLIC BLOOD PRESSURE: 64 MMHG | HEIGHT: 71 IN | WEIGHT: 196 LBS | SYSTOLIC BLOOD PRESSURE: 108 MMHG | HEART RATE: 58 BPM | OXYGEN SATURATION: 97 %

## 2025-04-08 DIAGNOSIS — G47.00 PERSISTENT DISORDER OF INITIATING OR MAINTAINING SLEEP: ICD-10-CM

## 2025-04-08 DIAGNOSIS — I50.20 HFREF (HEART FAILURE WITH REDUCED EJECTION FRACTION): ICD-10-CM

## 2025-04-08 DIAGNOSIS — G47.33 OSA (OBSTRUCTIVE SLEEP APNEA): Primary | ICD-10-CM

## 2025-04-08 DIAGNOSIS — I21.19 ST ELEVATION MYOCARDIAL INFARCTION (STEMI) INVOLVING OTHER CORONARY ARTERY OF INFERIOR WALL: ICD-10-CM

## 2025-04-08 DIAGNOSIS — I25.5 CARDIOMYOPATHY, ISCHEMIC: ICD-10-CM

## 2025-04-08 LAB — BODY SURFACE AREA: 2.11 M2

## 2025-04-08 PROCEDURE — 1160F RVW MEDS BY RX/DR IN RCRD: CPT | Performed by: PHYSICIAN ASSISTANT

## 2025-04-08 PROCEDURE — 1036F TOBACCO NON-USER: CPT | Performed by: PHYSICIAN ASSISTANT

## 2025-04-08 PROCEDURE — 1126F AMNT PAIN NOTED NONE PRSNT: CPT | Performed by: PHYSICIAN ASSISTANT

## 2025-04-08 PROCEDURE — 1123F ACP DISCUSS/DSCN MKR DOCD: CPT | Performed by: PHYSICIAN ASSISTANT

## 2025-04-08 PROCEDURE — 1157F ADVNC CARE PLAN IN RCRD: CPT | Performed by: PHYSICIAN ASSISTANT

## 2025-04-08 PROCEDURE — 1159F MED LIST DOCD IN RCRD: CPT | Performed by: PHYSICIAN ASSISTANT

## 2025-04-08 PROCEDURE — 3008F BODY MASS INDEX DOCD: CPT | Performed by: PHYSICIAN ASSISTANT

## 2025-04-08 PROCEDURE — G2211 COMPLEX E/M VISIT ADD ON: HCPCS | Performed by: PHYSICIAN ASSISTANT

## 2025-04-08 PROCEDURE — 99214 OFFICE O/P EST MOD 30 MIN: CPT | Performed by: PHYSICIAN ASSISTANT

## 2025-04-08 ASSESSMENT — SLEEP AND FATIGUE QUESTIONNAIRES
ESS-CHAD TOTAL SCORE: 3
SITING INACTIVE IN A PUBLIC PLACE LIKE A CLASS ROOM OR A MOVIE THEATER: WOULD NEVER DOZE
HOW LIKELY ARE YOU TO NOD OFF OR FALL ASLEEP WHILE SITTING QUIETLY AFTER LUNCH WITHOUT ALCOHOL: WOULD NEVER DOZE
HOW LIKELY ARE YOU TO NOD OFF OR FALL ASLEEP WHILE SITTING AND READING: SLIGHT CHANCE OF DOZING
HOW LIKELY ARE YOU TO NOD OFF OR FALL ASLEEP WHILE WATCHING TV: MODERATE CHANCE OF DOZING
HOW LIKELY ARE YOU TO NOD OFF OR FALL ASLEEP IN A CAR, WHILE STOPPED FOR A FEW MINUTES IN TRAFFIC: WOULD NEVER DOZE
HOW LIKELY ARE YOU TO NOD OFF OR FALL ASLEEP WHEN YOU ARE A PASSENGER IN A CAR FOR AN HOUR WITHOUT A BREAK: WOULD NEVER DOZE
HOW LIKELY ARE YOU TO NOD OFF OR FALL ASLEEP WHILE LYING DOWN TO REST IN THE AFTERNOON WHEN CIRCUMSTANCES PERMIT: WOULD NEVER DOZE
HOW LIKELY ARE YOU TO NOD OFF OR FALL ASLEEP WHILE SITTING AND TALKING TO SOMEONE: WOULD NEVER DOZE

## 2025-04-08 ASSESSMENT — PAIN SCALES - GENERAL: PAINLEVEL_OUTOF10: 0-NO PAIN

## 2025-04-08 NOTE — PROGRESS NOTES
"[facility]  Followup Visit Note    HISTORY OF PRESENT ILLNESS   Jaime Dominguez is a 74 y.o. male who presents to a [facility] for followup.       Current History    His new CPAP machine feels much better than his previous.    He thinks pressure is comfortable. A little too low in the beginning.    Occasionally has very dry mouth but most of the time its okay.    He likes P10 mask.    PAP Adherence:  Durable Medical Equipment Company: [employer]  Pressure Range: 4-20 cm H2O Mask: p10    A PAP adherence download was obtained and data was reviewed personally today in clinic. (see scanned document in EPIC)  % days >4 hours use: 97  Average use : 7h 27 m  95% pressure 12.7 cm H2O  95% leak : 35.9 L/min  AHI: 4.2     Sleep Scales:  ESS: 3     REVIEW OF SYSTEMS    All other systems negative      PHYSICAL EXAM     VITAL SIGNS: /64   Pulse 58   Ht 1.803 m (5' 11\")   Wt 88.9 kg (196 lb)   SpO2 97%   BMI 27.34 kg/m²      PREVIOUS WEIGHTS:  Wt Readings from Last 3 Encounters:   04/08/25 88.9 kg (196 lb)   04/01/25 88.9 kg (196 lb)   03/28/25 90 kg (198 lb 6.6 oz)       Constitutional: Alert and oriented, cooperative, no obvious distress   HEENT: Non icteric or anemic, EOM WNL bilaterally  Extremities: no LL edema   Neuromuscular: Cranial nerves grossly intact, no focal deficits     RESULTS/DATA     Iron (ug/dL)   Date Value   12/31/2024 91     % Saturation (%)   Date Value   12/31/2024 29     TIBC (ug/dL)   Date Value   12/31/2024 312     Ferritin (ng/mL)   Date Value   12/31/2024 1,168 (H)         ASSESSMENT/PLAN     Mr. Dominguez is a 74 y.o. male and returns in followup for the following issues:    OBSTRUCTIVE SLEEP APNEA  -Benefiting from CPAP  -Great compliance, NARCISA well controlled per download  -ramp turned off , pressure increased to 6-20  -supplies ordered with [facility]    CHF / STEMI  -follows with cardiology    Follow up 1 year         "

## 2025-04-08 NOTE — PATIENT INSTRUCTIONS
Great seeing you today,    CPAP data looks very good. We will order supplies for your CPAP.    Let us know if you have any issues with pressure, humidity, or mask.    Follow up 1 year     Larry Benítez PA-C    IMPORTANT PHONE NUMBERS     [phone] ([phone])  [employer] ([email or web address]): [phone]  For clinical questions and refilling prescriptions: [phone]  Estefani Acevedo (For Max/Jackelin): P: [phone]

## 2025-04-09 ENCOUNTER — CLINICAL SUPPORT (OUTPATIENT)
Dept: CARDIAC REHAB | Facility: CLINIC | Age: 75
End: 2025-04-09
Payer: MEDICARE

## 2025-04-09 DIAGNOSIS — Z95.1 STATUS POST CORONARY ARTERY BYPASS GRAFT: ICD-10-CM

## 2025-04-09 PROCEDURE — 93798 PHYS/QHP OP CAR RHAB W/ECG: CPT | Performed by: INTERNAL MEDICINE

## 2025-04-11 ENCOUNTER — CLINICAL SUPPORT (OUTPATIENT)
Dept: CARDIAC REHAB | Facility: CLINIC | Age: 75
End: 2025-04-11
Payer: MEDICARE

## 2025-04-11 DIAGNOSIS — Z95.1 STATUS POST CORONARY ARTERY BYPASS GRAFT: ICD-10-CM

## 2025-04-11 PROCEDURE — 93798 PHYS/QHP OP CAR RHAB W/ECG: CPT | Performed by: INTERNAL MEDICINE

## 2025-04-12 LAB
ANION GAP SERPL CALCULATED.4IONS-SCNC: 9 MMOL/L (CALC) (ref 7–17)
BNP SERPL-MCNC: NORMAL PG/ML
BUN SERPL-MCNC: 20 MG/DL (ref 7–25)
BUN/CREAT SERPL: NORMAL (CALC) (ref 6–22)
CALCIUM SERPL-MCNC: 9.6 MG/DL (ref 8.6–10.3)
CHLORIDE SERPL-SCNC: 101 MMOL/L (ref 98–110)
CO2 SERPL-SCNC: 27 MMOL/L (ref 20–32)
CREAT SERPL-MCNC: 1.01 MG/DL (ref 0.7–1.28)
EGFRCR SERPLBLD CKD-EPI 2021: 79 ML/MIN/1.73M2
ERYTHROCYTE [DISTWIDTH] IN BLOOD BY AUTOMATED COUNT: 13 % (ref 11–15)
GLUCOSE SERPL-MCNC: 78 MG/DL (ref 65–99)
HCT VFR BLD AUTO: 47 % (ref 38.5–50)
HGB BLD-MCNC: 15.4 G/DL (ref 13.2–17.1)
MCH RBC QN AUTO: 30.3 PG (ref 27–33)
MCHC RBC AUTO-ENTMCNC: 32.8 G/DL (ref 32–36)
MCV RBC AUTO: 92.3 FL (ref 80–100)
PLATELET # BLD AUTO: 174 THOUSAND/UL (ref 140–400)
PMV BLD REES-ECKER: 12.1 FL (ref 7.5–12.5)
POTASSIUM SERPL-SCNC: 4.7 MMOL/L (ref 3.5–5.3)
RBC # BLD AUTO: 5.09 MILLION/UL (ref 4.2–5.8)
SODIUM SERPL-SCNC: 137 MMOL/L (ref 135–146)
WBC # BLD AUTO: 6.7 THOUSAND/UL (ref 3.8–10.8)

## 2025-04-14 ENCOUNTER — CLINICAL SUPPORT (OUTPATIENT)
Dept: CARDIAC REHAB | Facility: CLINIC | Age: 75
End: 2025-04-14
Payer: MEDICARE

## 2025-04-14 DIAGNOSIS — Z95.1 STATUS POST CORONARY ARTERY BYPASS GRAFT: ICD-10-CM

## 2025-04-14 LAB
ANION GAP SERPL CALCULATED.4IONS-SCNC: 9 MMOL/L (CALC) (ref 7–17)
BNP SERPL-MCNC: 119 PG/ML
BUN SERPL-MCNC: 20 MG/DL (ref 7–25)
BUN/CREAT SERPL: NORMAL (CALC) (ref 6–22)
CALCIUM SERPL-MCNC: 9.6 MG/DL (ref 8.6–10.3)
CHLORIDE SERPL-SCNC: 101 MMOL/L (ref 98–110)
CO2 SERPL-SCNC: 27 MMOL/L (ref 20–32)
CREAT SERPL-MCNC: 1.01 MG/DL (ref 0.7–1.28)
EGFRCR SERPLBLD CKD-EPI 2021: 79 ML/MIN/1.73M2
ERYTHROCYTE [DISTWIDTH] IN BLOOD BY AUTOMATED COUNT: 13 % (ref 11–15)
GLUCOSE SERPL-MCNC: 78 MG/DL (ref 65–99)
HCT VFR BLD AUTO: 47 % (ref 38.5–50)
HGB BLD-MCNC: 15.4 G/DL (ref 13.2–17.1)
MCH RBC QN AUTO: 30.3 PG (ref 27–33)
MCHC RBC AUTO-ENTMCNC: 32.8 G/DL (ref 32–36)
MCV RBC AUTO: 92.3 FL (ref 80–100)
PLATELET # BLD AUTO: 174 THOUSAND/UL (ref 140–400)
PMV BLD REES-ECKER: 12.1 FL (ref 7.5–12.5)
POTASSIUM SERPL-SCNC: 4.7 MMOL/L (ref 3.5–5.3)
RBC # BLD AUTO: 5.09 MILLION/UL (ref 4.2–5.8)
SODIUM SERPL-SCNC: 137 MMOL/L (ref 135–146)
WBC # BLD AUTO: 6.7 THOUSAND/UL (ref 3.8–10.8)

## 2025-04-14 PROCEDURE — 93798 PHYS/QHP OP CAR RHAB W/ECG: CPT | Performed by: INTERNAL MEDICINE

## 2025-04-16 ENCOUNTER — CLINICAL SUPPORT (OUTPATIENT)
Dept: CARDIAC REHAB | Facility: CLINIC | Age: 75
End: 2025-04-16
Payer: MEDICARE

## 2025-04-16 DIAGNOSIS — Z95.1 STATUS POST CORONARY ARTERY BYPASS GRAFT: ICD-10-CM

## 2025-04-16 PROCEDURE — 93798 PHYS/QHP OP CAR RHAB W/ECG: CPT | Performed by: INTERNAL MEDICINE

## 2025-04-18 ENCOUNTER — APPOINTMENT (OUTPATIENT)
Dept: PHARMACY | Facility: HOSPITAL | Age: 75
End: 2025-04-18
Payer: MEDICARE

## 2025-04-18 ENCOUNTER — CLINICAL SUPPORT (OUTPATIENT)
Dept: CARDIAC REHAB | Facility: CLINIC | Age: 75
End: 2025-04-18
Payer: MEDICARE

## 2025-04-18 DIAGNOSIS — I25.5 CARDIOMYOPATHY, ISCHEMIC: ICD-10-CM

## 2025-04-18 DIAGNOSIS — Z95.1 STATUS POST CORONARY ARTERY BYPASS GRAFT: ICD-10-CM

## 2025-04-18 DIAGNOSIS — Z95.1 S/P CABG (CORONARY ARTERY BYPASS GRAFT): ICD-10-CM

## 2025-04-18 DIAGNOSIS — I97.89 POSTOPERATIVE ATRIAL FIBRILLATION (MULTI): ICD-10-CM

## 2025-04-18 DIAGNOSIS — I50.20 HFREF (HEART FAILURE WITH REDUCED EJECTION FRACTION): ICD-10-CM

## 2025-04-18 DIAGNOSIS — I48.91 POSTOPERATIVE ATRIAL FIBRILLATION (MULTI): ICD-10-CM

## 2025-04-18 DIAGNOSIS — Z95.5 H/O HEART ARTERY STENT: ICD-10-CM

## 2025-04-18 DIAGNOSIS — I21.19 ST ELEVATION MYOCARDIAL INFARCTION (STEMI) INVOLVING OTHER CORONARY ARTERY OF INFERIOR WALL: ICD-10-CM

## 2025-04-18 PROCEDURE — 93798 PHYS/QHP OP CAR RHAB W/ECG: CPT | Performed by: INTERNAL MEDICINE

## 2025-04-18 NOTE — PROGRESS NOTES
Pharmacist Clinic: Cardiology Management    Jaime Dominguez is a 74 y.o. male was referred to Clinical Pharmacy Team for hf management.     Referring Provider: Jayson Gann MD    THIS IS A NEW PATIENT APPOINTMENT. PATIENT WILL BE ESTABLISHING CARE WITH CLINICAL PHARMACY.    Appointment was completed by park who was reached at .    REVIEW OF PAST APPNT (IF APPLICABLE):   N/a    Allergies Reviewed? Yes    No Known Allergies    Past Medical History:   Diagnosis Date    Basal cell carcinoma     CAD (coronary artery disease)     Hyperlipidemia     STEMI (ST elevation myocardial infarction) (Multi)        Current Outpatient Medications on File Prior to Visit   Medication Sig Dispense Refill    amiodarone (Pacerone) 200 mg tablet Take 1 tablet (200 mg) by mouth once daily. 90 tablet 3    aspirin 81 mg EC tablet Take 1 tablet (81 mg) by mouth once daily. 90 tablet 3    atorvastatin (Lipitor) 80 mg tablet Take 1 tablet (80 mg) by mouth once daily at bedtime. 90 tablet 3    empagliflozin (Jardiance) 10 mg Take 1 tablet (10 mg) by mouth once daily. 90 tablet 3    fluticasone (Flonase) 50 mcg/actuation nasal spray Administer 2 sprays into each nostril once daily.      gabapentin (Neurontin) 300 mg capsule Take 2 capsules (600 mg) by mouth once daily at bedtime.      iron polysaccharides (Nu-Iron,Niferex) 150 mg iron capsule Take 1 capsule (150 mg) by mouth once daily. 90 capsule 3    melatonin 10 mg tablet,chewable Chew 10 mg/day once daily at bedtime.      metoprolol succinate XL (Toprol-XL) 50 mg 24 hr tablet Take 1 tablet (50 mg) by mouth once daily. Do not crush or chew. 90 tablet 3    multivitamin with minerals tablet Take 1 tablet by mouth once daily.      sacubitriL-valsartan (Entresto) 24-26 mg tablet Take 0.5 tablets by mouth 2 times a day. 30 tablet 11    spironolactone (Aldactone) 25 mg tablet Take 1 tablet (25 mg) by mouth once daily. 90 tablet 3    ticagrelor (Brilinta) 90 mg tablet Take 1  "tablet (90 mg) by mouth 2 times a day. Take until May 15th 2025, then stop 154 tablet 0     Current Facility-Administered Medications on File Prior to Visit   Medication Dose Route Frequency Provider Last Rate Last Admin    collagenase 250 unit/gram ointment   Topical Once Cierra Stanley MD        collagenase 250 unit/gram ointment   Topical Once Gabe Kelly PA-C             RELEVANT LAB RESULTS:  Lab Results   Component Value Date    BILITOT 0.7 12/31/2024    CALCIUM 9.6 04/11/2025    CO2 27 04/11/2025     04/11/2025    CREATININE 1.01 04/11/2025    GLUCOSE 78 04/11/2025    ALKPHOS 98 12/31/2024    K 4.7 04/11/2025    PROT 7.5 12/31/2024     04/11/2025    AST 18 12/31/2024    ALT 17 12/31/2024    BUN 20 04/11/2025    ANIONGAP 9 04/11/2025    MG 2.35 12/10/2024    PHOS 3.7 12/04/2024     (H) 11/18/2024    ALBUMIN 4.4 12/31/2024     Lab Results   Component Value Date    TRIG 159 (H) 12/31/2024    CHOL 138 12/31/2024    LDLCALC 69 12/31/2024    HDL 36.8 12/31/2024     No results found for: \"BMCBC\", \"CBCDIF\"     PHARMACEUTICAL ASSESSMENT:    MEDICATION RECONCILIATION    Home Pharmacy Reviewed? Yes, describe: [facility]        Drug Interactions? No    Medication Documentation Review Audit       Reviewed by Larry Benítez PA-C (Physician Assistant) on 04/08/25 at 1521      Medication Order Taking? Sig Documenting Provider Last Dose Status   amiodarone (Pacerone) 200 mg tablet [id number] Yes Take 1 tablet (200 mg) by mouth once daily. Jayson Gann MD  Active   aspirin 81 mg EC tablet [id number] Yes Take 1 tablet (81 mg) by mouth once daily. Jayson Gann MD  Active   atorvastatin (Lipitor) 80 mg tablet [id number] Yes Take 1 tablet (80 mg) by mouth once daily at bedtime. Jayson Gann MD  Active   collagenase 250 unit/gram ointment [id number]   Cierra Stanley MD  Active   collagenase 250 unit/gram ointment [id number]   Gabe Kelly PA-C  Active "   empagliflozin (Jardiance) 10 mg [id number] Yes Take 1 tablet (10 mg) by mouth once daily. Jayson Gann MD  Active   fluticasone (Flonase) 50 mcg/actuation nasal spray [id number] Yes Administer 2 sprays into each nostril once daily. Historical Provider, MD  Active   gabapentin (Neurontin) 300 mg capsule [id number] Yes Take 2 capsules (600 mg) by mouth once daily at bedtime. Historical Provider, MD  Active   iron polysaccharides (Nu-Iron,Niferex) 150 mg iron capsule [id number] Yes Take 1 capsule (150 mg) by mouth once daily. Jayson Gann MD  Active   melatonin 10 mg tablet,chewable [id number] Yes Chew 10 mg/day once daily at bedtime. Historical Provider, MD  Active   metoprolol succinate XL (Toprol-XL) 50 mg 24 hr tablet [id number] Yes Take 1 tablet (50 mg) by mouth once daily. Do not crush or chew. Jayson Gann MD  Active   multivitamin with minerals tablet [id number] Yes Take 1 tablet by mouth once daily. Historical Provider, MD  Active   sacubitriL-valsartan (Entresto) 24-26 mg tablet [id number] Yes Take 0.5 tablets by mouth 2 times a day. Jayson Gann MD  Active   spironolactone (Aldactone) 25 mg tablet [id number] Yes Take 1 tablet (25 mg) by mouth once daily. Jayson Gann MD  Active   ticagrelor (Brilinta) 90 mg tablet [id number] Yes Take 1 tablet (90 mg) by mouth 2 times a day. Take until May 15th 2025, then stop Jayson Gann MD  Active                    DISEASE MANAGEMENT ASSESSMENT:     CHF ASSESSMENT     Symptom/Staging:  -Most recent ejection fraction: 30-35    Results for orders placed during the hospital encounter of 11/10/24    Transthoracic Echo (TTE) Complete    Narrative  [facility], [address] [place] [address]  Tel [phone] and Fax [phone]    TRANSTHORACIC ECHOCARDIOGRAM REPORT      Patient Name:       BARRY Vincent Physician:    [id number] Darinel Mckeon MD  Study Date:         11/27/2024          Ordering  Provider:    [id number] OMER ONEILL  MRN/PID:            [id number]            Fellow:  Accession#:         [id number]        Nurse:  Date of Birth/Age:  1950 / 74      Sonographer:          Stacie Flood RDCS  years  Gender assigned at                     Additional Staff:  Birth:  Height:             182.88 cm           Admit Date:           11/10/2024  Weight:             96.62 kg            Admission Status:     Inpatient -  Routine  BSA / BMI:          2.19 m2 / 28.89     Encounter#:           [id number]  kg/m2  Blood Pressure:     106/65 mmHg         Department Location:  [place] [address]    Study Type:    TRANSTHORACIC ECHO (TTE) COMPLETE  Diagnosis/ICD: ST elevation (STEMI) myocardial infarction of unspecified  site-I21.3  Indication:    STEMI  CPT Code:      Echo Complete w Full Doppler-[id number]    Patient History:  Pertinent History: Hyperlipidemia. HFrEF 30-35%, STEMI, CAD s/p CABG,  cardiogenic shock, s/p VA ECMO decannulation, basal cell  carcinoma, TREE.    Study Detail: The following Echo studies were performed: 2D, M-Mode, Doppler and  color flow. Technically challenging study due to poor acoustic  windows, prominent lung artifact, postoperative dressings and  patient lying in supine position. Definity used as a contrast  agent for endocardial border definition. Total contrast used for  this procedure was 4.0 mL via IV push. Unable to obtain subcostal  view.      PHYSICIAN INTERPRETATION:  Left Ventricle: Left ventricular ejection fraction is suspected mildly decreased, by visual estimate at 45%. The left ventricle was not well visualized. The left ventricular cavity size is normal. There is normal septal and normal posterior left ventricular wall thickness. Abnormal (paradoxical) septal motion consistent with post-operative status. Spectral Doppler shows a Grade I (impaired relaxation pattern) of left ventricular diastolic filling with normal left atrial filling pressure.  Left Atrium: The  left atrium is normal in size.  Right Ventricle: The right ventricle is normal in size. There is normal right ventricular global systolic function.  Right Atrium: The right atrium is normal in size.  Aortic Valve: The aortic valve is probably trileaflet. There is aortic valve annular calcification. There is trace aortic valve regurgitation. The peak instantaneous gradient of the aortic valve is 10 mmHg.  Mitral Valve: The mitral valve is normal in structure. There is trace mitral valve regurgitation.  Tricuspid Valve: The tricuspid valve is structurally normal. There is trace tricuspid regurgitation.  Pulmonic Valve: The pulmonic valve is not well visualized. There is trace pulmonic valve regurgitation.  Pericardium: Trivial pericardial effusion.  Aorta: The aortic root is normal. The aortic root is at the upper limits of normal size.      CONCLUSIONS:  1. Poorly visualized anatomical structures due to suboptimal image quality.  2. The left ventricle was not well visualized.  3. Left ventricular ejection fraction is suspected mildly decreased, by visual estimate at 45%.  4. Spectral Doppler shows a Grade I (impaired relaxation pattern) of left ventricular diastolic filling with normal left atrial filling pressure.  5. Abnormal septal motion consistent with post-operative status.  6. There is normal right ventricular global systolic function.    QUANTITATIVE DATA SUMMARY:    2D MEASUREMENTS:         Normal Ranges:  Ao Root d:       3.80 cm (2.0-3.7cm)  IVSd:            0.90 cm (0.6-1.1cm)  LVPWd:           0.80 cm (0.6-1.1cm)  LVIDd:           4.80 cm (3.9-5.9cm)  LVIDs:           3.30 cm  LV Mass Index:   63 g/m2  LV % FS          31.2 %      M-MODE MEASUREMENTS:         Normal Ranges:  AoV Exc:             2.10 cm (1.5-2.5cm)      AORTA MEASUREMENTS:         Normal Ranges:  AoV Exc:            2.10 cm (1.5-2.5cm)  Ao Sinus, d:        3.60 cm (2.1-3.5cm)  Ao Arch:            3.30 cm (2.0-3.6cm)      LV SYSTOLIC  FUNCTION BY 2D PLANIMETRY (MOD):  Normal Ranges:  EF-Visual:      45 %  LV EF Reported: 45 %      LV DIASTOLIC FUNCTION:             Normal Ranges:  MV Peak E:             0.78 m/s    (0.7-1.2 m/s)  MV Peak A:             0.90 m/s    (0.42-0.7 m/s)  E/A Ratio:             0.88        (1.0-2.2)  MV e'                  0.067 m/s   (>8.0)  MV lateral e'          0.06 m/s  MV medial e'           0.07 m/s  MV A Dur:              137.00 msec  E/e' Ratio:            11.72       (<8.0)  MV DT:                 195 msec    (150-240 msec)      MITRAL VALVE:          Normal Ranges:  MV DT:        195 msec (150-240msec)      AORTIC VALVE:            Normal Ranges:  AoV Vmax:      1.60 m/s  (<=1.7m/s)  AoV Peak PG:   10.2 mmHg (<20mmHg)  LVOT Max Edmond:  1.42 m/s  (<=1.1m/s)  LVOT VTI:      23.20 cm  LVOT Diameter: 2.00 cm   (1.8-2.4cm)  AoV Area,Vmax: 2.79 cm2  (2.5-4.5cm2)      RIGHT VENTRICLE:  TAPSE: 18.6 mm  RV s'  0.12 m/s      TRICUSPID VALVE/RVSP:          Normal Ranges:  Peak TR Velocity:     2.35 m/s  RV Syst Pressure:     25 mmHg  (< 30mmHg)      PULMONIC VALVE:          Normal Ranges:  PV Max Edmond:     1.3 m/s  (0.6-0.9m/s)  PV Max P.8 mmHg      [id number] Darinel Mckeno MD  Electronically signed on 2024 at 1:26:53 PM        ** Final **      Guideline-Directed Medical Therapy:  -ARNI: Yes, describe: no 2/2 hypotension  -Beta Blocker: Yes, describe: metoprolol 50mg qd  -MRA: Yes, describe: spironolactone 25mg qd  -SGLT2i: Yes, describe: jardiance 10mg qd    Secondary Prevention:  -The ASCVD Risk score (Arturo ROSALES, et al., 2019) failed to calculate for the following reasons:    Risk score cannot be calculated because patient has a medical history suggesting prior/existing ASCVD   -Aspirin 81mg? yes  -Statin?: Yes, describe: atorvastatin    Affordability: no issues noted  Adherence/Compliance: no issues   Adverse Effects: none reported        Past In Office Weight Readings:   Wt Readings from Last 6 Encounters:    04/08/25 88.9 kg (196 lb)   04/01/25 88.9 kg (196 lb)   03/28/25 90 kg (198 lb 6.6 oz)   03/17/25 89.8 kg (198 lb)   01/29/25 84.8 kg (187 lb)   01/08/25 82.1 kg (181 lb)           Past In Office BP Readings:   BP Readings from Last 6 Encounters:   04/08/25 108/64   04/01/25 92/60   03/17/25 99/64   01/29/25 84/66   01/08/25 118/76   01/07/25 105/60       HEALTH MANAGEMENT    Maintaining fluid restriction (<2 L/day): N/A  Edema/swelling: No  Shortness of breath: No nothing new also improving with cpap  Trouble sleeping/lying down: No  Dry/hacking cough: No  Recent Hospitalizations: No    EDUCATION/COUNSELING:   - Counseled patient on MOA, expectations, duration of therapy, contraindications, administration, and monitoring parameters  - Counseled patient on lifestyle modifications that can decrease your risk of having complications (smoking cessation, losing weight, daily weights, vaccines)  - Counseled patient on fluid intake and weight management. Recommended to not consume more than 2 liters of fliuids per day. If they have gained more than 2-3 pounds within a 24 hours period (or 5 pounds in a week), contact their cardiologist  - Answered all patient questions and concerns        DISCUSSION/NOTES:   - stopping brillinta in may.  - now about 2 weeks on entresto 24-26 1/2tab bid tolerating fine no noted issues with dizziness.     ASSESSMENT:    Assessment/Plan   Problem List Items Addressed This Visit    None        RECOMMENDATIONS/PLAN:    Continue current medications     Last Appnt with Referring Provider: 12/31/24  Next Appnt with Referring Provider: 4/1/25  Clinical Pharmacist follow up: prn  VAF/Application Expiration: no  Type of Encounter: Colin Zaragoza PharmD    Verbal consent to manage patient's drug therapy was obtained from the patient . They were informed they may decline to participate or withdraw from participation in pharmacy services at any time.    Continue all meds under the  continuation of care with the referring provider and clinical pharmacy team.

## 2025-04-21 ENCOUNTER — APPOINTMENT (OUTPATIENT)
Dept: CARDIAC REHAB | Facility: CLINIC | Age: 75
End: 2025-04-21
Payer: MEDICARE

## 2025-04-21 ENCOUNTER — APPOINTMENT (OUTPATIENT)
Dept: CARDIAC SURGERY | Facility: HOSPITAL | Age: 75
End: 2025-04-21
Payer: MEDICARE

## 2025-04-22 LAB — BODY SURFACE AREA: 2.11 M2

## 2025-04-22 PROCEDURE — 93248 EXT ECG>7D<15D REV&INTERPJ: CPT | Performed by: INTERNAL MEDICINE

## 2025-04-23 ENCOUNTER — APPOINTMENT (OUTPATIENT)
Dept: CARDIAC REHAB | Facility: CLINIC | Age: 75
End: 2025-04-23
Payer: MEDICARE

## 2025-04-25 ENCOUNTER — APPOINTMENT (OUTPATIENT)
Dept: CARDIAC REHAB | Facility: CLINIC | Age: 75
End: 2025-04-25
Payer: MEDICARE

## 2025-04-28 ENCOUNTER — APPOINTMENT (OUTPATIENT)
Dept: CARDIAC REHAB | Facility: CLINIC | Age: 75
End: 2025-04-28
Payer: MEDICARE

## 2025-04-29 ENCOUNTER — APPOINTMENT (OUTPATIENT)
Dept: CARDIOLOGY | Facility: CLINIC | Age: 75
End: 2025-04-29
Payer: MEDICARE

## 2025-05-16 ENCOUNTER — APPOINTMENT (OUTPATIENT)
Dept: CARDIAC SURGERY | Facility: CLINIC | Age: 75
End: 2025-05-16
Payer: MEDICARE

## 2025-05-30 ENCOUNTER — OFFICE VISIT (OUTPATIENT)
Dept: CARDIAC SURGERY | Facility: CLINIC | Age: 75
End: 2025-05-30
Payer: MEDICARE

## 2025-05-30 ENCOUNTER — APPOINTMENT (OUTPATIENT)
Dept: CARDIAC SURGERY | Facility: CLINIC | Age: 75
End: 2025-05-30
Payer: MEDICARE

## 2025-05-30 VITALS
WEIGHT: 202 LBS | HEART RATE: 64 BPM | DIASTOLIC BLOOD PRESSURE: 65 MMHG | BODY MASS INDEX: 28.17 KG/M2 | SYSTOLIC BLOOD PRESSURE: 101 MMHG | OXYGEN SATURATION: 97 %

## 2025-05-30 DIAGNOSIS — Z95.1 S/P CABG X 2: ICD-10-CM

## 2025-05-30 PROCEDURE — 1126F AMNT PAIN NOTED NONE PRSNT: CPT

## 2025-05-30 PROCEDURE — 99214 OFFICE O/P EST MOD 30 MIN: CPT

## 2025-05-30 PROCEDURE — 1159F MED LIST DOCD IN RCRD: CPT

## 2025-05-30 ASSESSMENT — ENCOUNTER SYMPTOMS
SLEEP DISTURBANCES DUE TO BREATHING: 0
WHEEZING: 0
DECREASED APPETITE: 0
CHILLS: 0
FEVER: 0
NEAR-SYNCOPE: 0
IRREGULAR HEARTBEAT: 0

## 2025-05-30 ASSESSMENT — PAIN SCALES - GENERAL: PAINLEVEL_OUTOF10: 0-NO PAIN

## 2025-05-30 NOTE — PROGRESS NOTES
Subjective   Jaime Dominguez is a 75 y.o. male.    Chief Complaint:  Follow-up    HPI  Mr. Dominguez is a 75 year-old male who presents for wound check. He underwent a mini invasive CABG x 2 and partial LAD endarterectomy with Dr. Aldo Harman on November 12, 2024. He presents today for follow up wound check. Denies chest pain, shortness of breath, lower extremity edema, palpitations, or syncope.      Review of Systems   Constitutional: Negative for chills, decreased appetite and fever.   Cardiovascular:  Negative for irregular heartbeat and near-syncope.   Respiratory:  Negative for sleep disturbances due to breathing and wheezing.        Objective   Constitutional:       Appearance: Normal and healthy appearance.   Pulmonary:      Effort: Pulmonary effort is normal.      Breath sounds: Normal breath sounds. No wheezing.   Cardiovascular:      Normal rate. Regular rhythm.   Psychiatric:         Attention and Perception: Attention normal.         Speech: Speech normal.         Behavior: Behavior is cooperative.         Thought Content: Thought content normal.         Judgment: Judgment normal.       Assessment/Plan   In summary Mr. Dominguez is doing well overall his groin wound at the ECMO cannulation site has fully healed and he is back to his normal activity. He will continue to follow-up with his cardiologist and primary MD's.    The encounter diagnosis was S/P CABG x 2.    Time: 30 minutes

## 2025-06-06 ENCOUNTER — APPOINTMENT (OUTPATIENT)
Facility: CLINIC | Age: 75
End: 2025-06-06
Payer: MEDICARE

## 2025-06-06 VITALS
DIASTOLIC BLOOD PRESSURE: 68 MMHG | WEIGHT: 199 LBS | OXYGEN SATURATION: 97 % | BODY MASS INDEX: 27.75 KG/M2 | SYSTOLIC BLOOD PRESSURE: 110 MMHG | HEART RATE: 74 BPM

## 2025-06-06 DIAGNOSIS — I25.5 CARDIOMYOPATHY, ISCHEMIC: ICD-10-CM

## 2025-06-06 DIAGNOSIS — Z95.5 H/O HEART ARTERY STENT: ICD-10-CM

## 2025-06-06 DIAGNOSIS — I48.91 POSTOPERATIVE ATRIAL FIBRILLATION (MULTI): ICD-10-CM

## 2025-06-06 DIAGNOSIS — I21.19 ACUTE ST ELEVATION MYOCARDIAL INFARCTION (STEMI) OF INFERIOR WALL (MULTI): ICD-10-CM

## 2025-06-06 DIAGNOSIS — I97.89 POSTOPERATIVE ATRIAL FIBRILLATION (MULTI): ICD-10-CM

## 2025-06-06 DIAGNOSIS — I50.31 ACUTE HEART FAILURE WITH PRESERVED EJECTION FRACTION (HFPEF): Primary | ICD-10-CM

## 2025-06-06 DIAGNOSIS — I50.20 HFREF (HEART FAILURE WITH REDUCED EJECTION FRACTION): ICD-10-CM

## 2025-06-06 DIAGNOSIS — I21.19 ST ELEVATION MYOCARDIAL INFARCTION (STEMI) INVOLVING OTHER CORONARY ARTERY OF INFERIOR WALL: ICD-10-CM

## 2025-06-06 PROCEDURE — 1159F MED LIST DOCD IN RCRD: CPT | Performed by: INTERNAL MEDICINE

## 2025-06-06 PROCEDURE — 1126F AMNT PAIN NOTED NONE PRSNT: CPT | Performed by: INTERNAL MEDICINE

## 2025-06-06 PROCEDURE — 1160F RVW MEDS BY RX/DR IN RCRD: CPT | Performed by: INTERNAL MEDICINE

## 2025-06-06 PROCEDURE — 99214 OFFICE O/P EST MOD 30 MIN: CPT | Performed by: INTERNAL MEDICINE

## 2025-06-06 ASSESSMENT — ENCOUNTER SYMPTOMS
OCCASIONAL FEELINGS OF UNSTEADINESS: 0
LOSS OF SENSATION IN FEET: 0
DEPRESSION: 0

## 2025-06-06 ASSESSMENT — PATIENT HEALTH QUESTIONNAIRE - PHQ9
SUM OF ALL RESPONSES TO PHQ9 QUESTIONS 1 AND 2: 0
2. FEELING DOWN, DEPRESSED OR HOPELESS: NOT AT ALL
1. LITTLE INTEREST OR PLEASURE IN DOING THINGS: NOT AT ALL

## 2025-06-06 ASSESSMENT — PAIN SCALES - GENERAL: PAINLEVEL_OUTOF10: 0-NO PAIN

## 2025-06-06 ASSESSMENT — LIFESTYLE VARIABLES: TOTAL SCORE: 0

## 2025-06-06 NOTE — PROGRESS NOTES
[facility] [place]    Interventional Cardiology    History of present illness:  This is a very pleasant 74-year-old male who history of hypertension hyperlipidemia.  Presented and November 8, 2024 with possible inferior STEMI to [facility].  Was found to have distal left main disease.  Patient subsequently underwent LOOMIS to LAD Y graft to circumflex MIDCAB that was converted to open sternotomy.  Hospital course complicated by cardiogenic shock requiring intra-aortic balloon pump subsequently VA ECMO patient had a PCI to mid LAD since the LAD graft failed.  His LAD was 100% occluded in the midsegment and underwent PCI with Dr. Wesley.  Patient last EF was 45%.  Was seen by Dr. Gann on December 31, 2024.  Is improving slowly feeling better.  Denies having any chest pain.  Shortness of breath improving.  Tolerating cardiac rehab.  Patient currently NYHA class I-II.  Feeling really good.  Denies having orthopnea PND chest pain or shortness of breath.    Medical History[1]    Surgical History[2]    Allergies[3]     reports that he quit smoking about 45 years ago. His smoking use included cigarettes. He started smoking about 55 years ago. He has a 5 pack-year smoking history. He has never used smokeless tobacco. He reports that he does not currently use alcohol after a past usage of about 10.0 standard drinks of alcohol per week. He reports that he does not use drugs.    Family History[4]    Patient's Medications   New Prescriptions    No medications on file   Previous Medications    ASPIRIN 81 MG EC TABLET    Take 1 tablet (81 mg) by mouth once daily.    ATORVASTATIN (LIPITOR) 80 MG TABLET    Take 1 tablet (80 mg) by mouth once daily at bedtime.    DOCUSATE SODIUM (COLACE) 50 MG CAPSULE    Take 2 capsules (100 mg) by mouth every other day.    EMPAGLIFLOZIN (JARDIANCE) 10 MG    Take 1 tablet (10 mg) by mouth once daily.    FLUTICASONE (FLONASE) 50 MCG/ACTUATION  NASAL SPRAY    Administer 2 sprays into each nostril once daily.    GABAPENTIN (NEURONTIN) 300 MG CAPSULE    Take 2 capsules (600 mg) by mouth once daily at bedtime.    MELATONIN 10 MG TABLET,CHEWABLE    Chew 20 mg/day once daily at bedtime.    METOPROLOL SUCCINATE XL (TOPROL-XL) 50 MG 24 HR TABLET    Take 1 tablet (50 mg) by mouth once daily. Do not crush or chew.    MULTIVITAMIN WITH MINERALS TABLET    Take 1 tablet by mouth once daily.    SACUBITRIL-VALSARTAN (ENTRESTO) 24-26 MG TABLET    Take 0.5 tablets by mouth 2 times a day.    SPIRONOLACTONE (ALDACTONE) 25 MG TABLET    Take 1 tablet (25 mg) by mouth once daily.   Modified Medications    No medications on file   Discontinued Medications    No medications on file       Objective   Physical Exam  General: Patient in no acute distress   HEENT: Atraumatic normocephalic.  Neck: Supple, jugular venous pressure within normal limit.  No bruits  Lungs: Clear to auscultation bilaterally  Cardiovascular: Regular rate and rhythm, normal heart sounds, no murmurs rubs or gallops  Abdomen: Soft nontender nondistended.  Normal bowel sounds.  Extremities: Warm to touch, no edema.    Lab Review   No visits with results within 2 Month(s) from this visit.   Latest known visit with results is:   Office Visit on 04/01/2025   Component Date Value    GLUCOSE 04/11/2025 78     UREA NITROGEN (BUN) 04/11/2025 20     CREATININE 04/11/2025 1.01     EGFR 04/11/2025 79     BUN/CREATININE RATIO 04/11/2025 SEE NOTE:     SODIUM 04/11/2025 137     POTASSIUM 04/11/2025 4.7     CHLORIDE 04/11/2025 101     CARBON DIOXIDE 04/11/2025 27     ELECTROLYTE BALANCE 04/11/2025 9     CALCIUM 04/11/2025 9.6     B TYPE NATRIURETIC PEPTI* 04/11/2025 119 (H)     WHITE BLOOD CELL COUNT 04/11/2025 6.7     RED BLOOD CELL COUNT 04/11/2025 5.09     HEMOGLOBIN 04/11/2025 15.4     HEMATOCRIT 04/11/2025 47.0     MCV 04/11/2025 92.3     MCH 04/11/2025 30.3     MCHC 04/11/2025 32.8     RDW 04/11/2025 13.0     PLATELET  COUNT 04/11/2025 174     MPV 04/11/2025 12.1         Assessment/Plan   Problem List[5]   This is a very pleasant 74-year-old male who history of hypertension hyperlipidemia.  Presented and November 8, 2024 with possible inferior STEMI to [facility].  Was found to have distal left main disease.  Patient subsequently underwent LOOMIS to LAD Y graft to circumflex MIDCAB that was converted to open sternotomy.  Hospital course complicated by cardiogenic shock requiring intra-aortic balloon pump subsequently VA ECMO patient had a PCI to mid LAD since the LAD graft failed.  His LAD was 100% occluded in the midsegment and underwent PCI with Dr. Wesley.  Patient last EF was 45%.  Was seen by Dr. Gann on December 31, 2024.  Is improving slowly feeling better.  Denies having any chest pain.  Shortness of breath improving.  Tolerating cardiac rehab.  Patient currently NYHA class I-II.  Feeling really good.  Denies having orthopnea PND chest pain or shortness of breath.    Patient saw Dr. Gann recently and he was started on Entresto.  His blood pressure and heart rate well-controlled.  Patient on optimal medical therapy for heart failure systolic dysfunction.  Ejection fraction by MRI was 43% with anterior apical wall large scar.  Patient stable cardiac wise.  We will increase his metoprolol to 100 mg oral daily in 3 months when I see him.  We may also try to increase Entresto to full tablet.  Otherwise is not on medical therapy.  Recommend to continue current medications.  Will follow-up in 3 months.  We will obtain labs in couple weeks to make sure his kidney function and potassium are stable after starting Entresto.      Ale Brady MD              [1]   Past Medical History:  Diagnosis Date    Basal cell carcinoma     CAD (coronary artery disease)     Hyperlipidemia     STEMI (ST elevation myocardial infarction) (Multi)    [2]   Past Surgical History:  Procedure Laterality Date    CARDIAC CATHETERIZATION  N/A 11/08/2024    Procedure: Left Heart Cath, No LV;  Surgeon: Ale Brady MD;  Location: [facility] Cardiac Cath Lab;  Service: Cardiovascular;  Laterality: N/A;    CARDIAC CATHETERIZATION N/A 11/08/2024    Procedure: PCI;  Surgeon: Ale Brady MD;  Location: [facility] Cardiac Cath Lab;  Service: Cardiovascular;  Laterality: N/A;    CARDIAC CATHETERIZATION N/A 11/14/2024    Procedure: Left Heart Cath, With LV, Angriography And Grafts;  Surgeon: Giancarlo Quiles DO;  Location: [place] [address] Cardiac Cath Lab;  Service: Cardiovascular;  Laterality: N/A;    CARDIAC CATHETERIZATION N/A 11/14/2024    Procedure: ECMO;  Surgeon: Giancarlo Quiles DO;  Location: [place] [address] Cardiac Cath Lab;  Service: Cardiovascular;  Laterality: N/A;    CARDIAC CATHETERIZATION N/A 11/14/2024    Procedure: PCI HEATHER Stent- Coronary;  Surgeon: Giancarlo Quiles DO;  Location: [place] [address] Cardiac Cath Lab;  Service: Cardiovascular;  Laterality: N/A;    CERVICAL FUSION      C5-7    CORONARY ARTERY BYPASS GRAFT  November15 2024    CORONARY STENT PLACEMENT      SKIN CANCER EXCISION N/A    [3] No Known Allergies  [4]   Family History  Problem Relation Name Age of Onset    Heart attack Mother Christy     Cancer Mother Christy     Heart attack Brother Sachin dominguez     Stroke Brother Fran Dominguez     Stroke Brother Javier    [5]   Patient Active Problem List  Diagnosis    Acute ST elevation myocardial infarction (STEMI) of inferior wall (Multi)    ST elevation myocardial infarction (STEMI) involving other coronary artery of inferior wall    HFrEF (heart failure with reduced ejection fraction)    Cardiomyopathy, ischemic    Postoperative atrial fibrillation (Multi)    Status post coronary artery bypass graft    H/O heart artery stent

## 2025-07-01 ENCOUNTER — OFFICE VISIT (OUTPATIENT)
Dept: CARDIOLOGY | Facility: CLINIC | Age: 75
End: 2025-07-01
Payer: MEDICARE

## 2025-07-01 VITALS
SYSTOLIC BLOOD PRESSURE: 88 MMHG | OXYGEN SATURATION: 98 % | RESPIRATION RATE: 17 BRPM | DIASTOLIC BLOOD PRESSURE: 59 MMHG | BODY MASS INDEX: 28.24 KG/M2 | HEIGHT: 71 IN | WEIGHT: 201.7 LBS | HEART RATE: 64 BPM

## 2025-07-01 DIAGNOSIS — I48.91 POSTOPERATIVE ATRIAL FIBRILLATION (MULTI): ICD-10-CM

## 2025-07-01 DIAGNOSIS — I97.89 POSTOPERATIVE ATRIAL FIBRILLATION (MULTI): ICD-10-CM

## 2025-07-01 DIAGNOSIS — I25.5 CARDIOMYOPATHY, ISCHEMIC: ICD-10-CM

## 2025-07-01 DIAGNOSIS — I50.20 HFREF (HEART FAILURE WITH REDUCED EJECTION FRACTION): Primary | ICD-10-CM

## 2025-07-01 DIAGNOSIS — Z95.1 S/P CABG (CORONARY ARTERY BYPASS GRAFT): ICD-10-CM

## 2025-07-01 DIAGNOSIS — Z95.1 STATUS POST CORONARY ARTERY BYPASS GRAFT: ICD-10-CM

## 2025-07-01 DIAGNOSIS — Z95.5 H/O HEART ARTERY STENT: ICD-10-CM

## 2025-07-01 DIAGNOSIS — I21.19 ST ELEVATION MYOCARDIAL INFARCTION (STEMI) INVOLVING OTHER CORONARY ARTERY OF INFERIOR WALL: ICD-10-CM

## 2025-07-01 PROCEDURE — 99212 OFFICE O/P EST SF 10 MIN: CPT

## 2025-07-01 PROCEDURE — 1159F MED LIST DOCD IN RCRD: CPT | Performed by: STUDENT IN AN ORGANIZED HEALTH CARE EDUCATION/TRAINING PROGRAM

## 2025-07-01 PROCEDURE — 99214 OFFICE O/P EST MOD 30 MIN: CPT | Performed by: STUDENT IN AN ORGANIZED HEALTH CARE EDUCATION/TRAINING PROGRAM

## 2025-07-01 PROCEDURE — 3008F BODY MASS INDEX DOCD: CPT | Performed by: STUDENT IN AN ORGANIZED HEALTH CARE EDUCATION/TRAINING PROGRAM

## 2025-07-01 PROCEDURE — G2211 COMPLEX E/M VISIT ADD ON: HCPCS | Performed by: STUDENT IN AN ORGANIZED HEALTH CARE EDUCATION/TRAINING PROGRAM

## 2025-07-01 RX ORDER — SPIRONOLACTONE 25 MG/1
12.5 TABLET ORAL DAILY
Qty: 45 TABLET | Refills: 3 | Status: SHIPPED | OUTPATIENT
Start: 2025-07-01

## 2025-07-01 NOTE — PROGRESS NOTES
Primary Care Physician: Frank Mckeon MD  Patient's Location: [place] [address]    Date of Visit: 07/01/2025  2:30 PM EDT  Location of visit: [facility]   Type of Visit: Established - First Seen: 12/31/2024   Last visit: 4/1/2025    HPI / Summary:   Jaime Dominguez is a very pleasant 75 y.o. male presenting for management of Stage C iCM/HFrEF (last EF 11/27/2024: 45%). He has a history of HLD and CAD c/b inferior STEMI (11/8/2024) 2/2 to critical LAD stenosis s/p midCABx2, converted to full sternotomy, with LIMA extended as a Y graft to LAD and OM. Post-operative course was complicated by cardiogenic shock requiring IABP and subsequent VA-ECMO support after failed grafts. He then required PCI of the mid-LAD with HEATHER with improvement in EF and stabilization.     Initial CV History:   Admitted with an inferior STEMI on 11/8/2024. Initial workup showed a 90% LAD occlusion. After transfer for cardiac surgery, he underwent a robotic-assisted MIDCABG x2, later converted to full sternotomy with LIMA and radial graft to LAD/OM on 11/12. Postoperatively, complications included cardiogenic shock requiring VA ECMO and IABP. AF RVR on amiodarone The patient also underwent mediastinal exploration, hemothorax evacuation, and ECMO decannulation on 11/19.    In the CTICU, there was concern for graft failure and he underwent PCI to mid-LAD on 11/14. Transitioned to the floor on 11/25, he was stabilized on DAPT (Brilinta + ASA), initiated on GDMT (spironolactone, Jardiance), and referred for cardiac rehab. Final echocardiogram revealed LVEF of 45%. Discharged POD 22 on 11/26 to acute rehab, he was hemodynamically stable and counseled on medication compliance, groin incision care, and weight restrictions. Follow-up arranged for cardiology and cardiac surgrey    Saw Dr. Aldo Harman yesterday  Issues with wound healing of left groin    Last Visit: 12/31/2025  - Continue current medications with the exception of:   --  increase metoprolol XL from 12.5mg twice daily to 50mg daily  - Labwork: today  - Imaging/Procedures:   -- Cardiac MRI in 3 months. Please obtain a Cardiac MRI. Call [phone] to schedule.  -- Holter Monitor at next visit, we will stop amiodarone at next visit   - Referrals:   -- Cardiac Rehab  --  Clinical Pharmacy (Cardiology): Please call [phone] to schedule an intake interview  - Followup: 3 months    Attending cardiac rehab:    CMR 3/28/2025  1. Left ventricle is normal in size with mildly reduced systolic function. LVEF 43.4%. Severe hypokinesis of the apex and apical segment of septum. Quantitative values are as noted above.  2. There is near transmural delayed enhancement involving the entire apex and mid and apical segments of the septum and apical segment of the anterior wall in keeping with sequela of myocardial infarction. No significant residual viable tissue in these segments. Suggestion of small area of microvascular obstruction in the apical segment of the septum.  3. No evidence of LV thrombus.   4. Normal aortic, mitral, and tricuspid valve function.    History of Present Illness  Jaime Dominguez is a 75 year old male who presents for a follow-up visit after completing cardiac rehab.    He has successfully completed cardiac rehabilitation and is now engaging in activities such as golfing and yard work. He generally feels well but experiences fatigue in hot weather and notes a slower walking pace.    He experienced dizziness on one occasion when getting up for an appointment, which he attributes to not eating that day. He currently denies any dizziness or lightheadedness.    His current medications include Entresto at half a tablet twice a day, aspirin, atorvastatin, metoprolol 50 mg daily, empagliflozin (Jardiance) daily, and spironolactone 25 mg daily. He has discontinued amiodarone and ticagrelor. He also takes iron every other day.    He mentions that he no longer needs to see the  "surgeon and notes that his heart monitor showed no arrhythmias.    Objective   Medical History:   He has a past medical history of Basal cell carcinoma, CAD (coronary artery disease), Hyperlipidemia, and STEMI (ST elevation myocardial infarction) (Multi).  Surgical Hx:   He has a past surgical history that includes Skin cancer excision (N/A); Cardiac catheterization (N/A, 11/08/2024); Cardiac catheterization (N/A, 11/08/2024); Cervical fusion; Cardiac catheterization (N/A, 11/14/2024); Cardiac catheterization (N/A, 11/14/2024); Cardiac catheterization (N/A, 11/14/2024); Coronary artery bypass graft (November15 2024); and Coronary stent placement.   Social Hx:   He reports that he quit smoking about 45 years ago. His smoking use included cigarettes. He started smoking about 55 years ago. He has a 5 pack-year smoking history. He has never used smokeless tobacco. He reports that he does not currently use alcohol after a past usage of about 10.0 standard drinks of alcohol per week. He reports that he does not use drugs.  Family Hx:   His family history includes Cancer in his mother; Heart attack in his brother and mother; Stroke in his brother and brother.   Allergies:   No Known Allergies  Exam:       7/1/2025     2:31 PM 6/6/2025    10:30 AM 5/30/2025    11:15 AM 4/8/2025     3:02 PM 4/1/2025     2:46 PM 3/28/2025     9:21 AM   Vitals   Systolic 88 110 101 108 92    Diastolic 59 68 65 64 60    BP Location Left arm  Left arm  Left arm    Heart Rate 64 74 64 58 54    Resp 17    20    Height 1.803 m (5' 11\")   1.803 m (5' 11\") 1.803 m (5' 11\") 1.8 m (5' 10.87\")   Weight (lb) 201.7 199 202 196 196 198.41   BMI 28.13 kg/m2 27.75 kg/m2 28.17 kg/m2 27.34 kg/m2 27.34 kg/m2 27.78 kg/m2   BSA (m2) 2.14 m2 2.13 m2 2.14 m2 2.11 m2 2.11 m2 2.12 m2   Visit Report Report Report Report Report Report      Wt Readings from Last 5 Encounters:   07/01/25 91.5 kg (201 lb 11.2 oz)   06/06/25 90.3 kg (199 lb)   05/30/25 91.6 kg (202 lb) "   04/08/25 88.9 kg (196 lb)   04/01/25 88.9 kg (196 lb)     GEN: Pleasant, well-appearing, no acute distress.  HEENT: JVP not elevated, no icterus. No HJR  CHEST: No wheeze, good air movement. Sternotomy c/d/i  CV: Normal rate, regular rhythm. Normal S1, inspiratory split S2, no m/r/g  ABD: Soft, ND, NT  EXT: Warm, well perfused, No LE edema.  NEURO: Pleasant, Oriented to plan    Labs:   CMP:  Recent Labs     04/11/25  1312 12/31/24  1516 12/10/24  1349 12/04/24  0740 12/03/24  0746    137 131* 135* 136   K 4.7 4.7 4.5 4.1 4.4    101 98 104 104   CO2 27 27 24 23 21   ANIONGAP 9 14 14 12 15   BUN 20 19 19 16 19   CREATININE 1.01 0.85 1.06 0.75 0.80   EGFR 79 >90 75 >90 >90   MG  --   --  2.35 2.42* 2.39     Recent Labs     12/31/24  1516 12/04/24  0740 12/03/24  0746 11/14/24  0020 11/13/24  1350 11/12/24  0416 11/11/24  0401   ALBUMIN 4.4 3.4 3.6   < > 4.9  4.9   < > 4.4  4.1   ALKPHOS 98  --   --   --  21*  --  52   ALT 17  --   --   --  35  --  26   AST 18  --   --   --  180*  --  30   BILITOT 0.7  --   --   --  0.9  --  0.8    < > = values in this interval not displayed.   CBC:  Recent Labs     04/11/25  1312 12/31/24  1516 12/10/24  1349 12/04/24  0740 12/03/24  0746   WBC 6.7 8.0 10.7 8.7 9.3   HGB 15.4 13.0* 13.8 10.7* 10.5*   HCT 47.0 41.8 43.9 33.1* 35.1*    263 431 478* 516*   MCV 92.3 93 93 91 97   HEME/ENDO:  Recent Labs     12/31/24  1516 11/10/24  2040 11/08/24  1612   FERRITIN 1,168*  --   --    IRONSAT 29  --   --    TSH 2.47 3.12 3.07   HGBA1C  --   --  5.1    CARDIAC:   Recent Labs     04/11/25  1312 12/31/24  1516 11/18/24  0213 11/17/24  0317 11/15/24  1228 11/15/24  0142 11/14/24  0354 11/14/24  0020 11/13/24  2020 11/10/24  2040 11/10/24  1634   LDH  --   --  370* 450*  --   --   --   --   --   --   --    TROPHS  --   --   --   --  44,705* 67,971* 73,314* 65,732* 63,067*   < > 2,317*   * 14  --   --   --   --   --   --   --   --  13    < > = values in this interval  "not displayed.     Recent Labs     12/31/24  1516 11/08/24  1612   CHOL 138 228*   LDLCALC 69 126*   HDL 36.8 37.6   TRIG 159* 322*   MICRO: No results for input(s): \"ESR\", \"CRP\", \"PROCAL\" in the last 8760 hours.No results found for the last 90 days.        Notable Studies, reviewed:   EKG:   Recent Labs     12/31/24  1400 11/30/24  1819 11/18/24  1550   VENTRATE 73 76 138   ATRRATE 73 76 326   QTCFRED 420 475 332   QRSDUR 92 92 78   QTCCALCB 434 495 381     Encounter Date: 12/31/24   ECG 12 lead (Clinic Performed)   Result Value    Ventricular Rate 73    Atrial Rate 73    ND Interval 182    QRS Duration 92    QT Interval 394    QTC Calculation(Bazett) 434    P Axis 69    R Axis 102    T Axis 93    QRS Count 12    Q Onset 218    P Onset 127    P Offset 185    T Offset 415    QTC Fredericia 420    Narrative    Normal sinus rhythm  Rightward axis  Possible Inferior infarct , age undetermined  Anteroseptal infarct (cited on or before 13-NOV-2024)  Abnormal ECG  When compared with ECG of 30-NOV-2024 18:13,  Borderline criteria for Inferior infarct are now Present    Confirmed by Dawit Marmolejo ([id number]) on 12/31/2024 4:07:16 PM     Echocardiogram:   Recent Labs     11/27/24  1010 11/21/24  1549 11/19/24  1346 11/18/24  0845 11/16/24  1105 11/15/24  0911 11/12/24  1024 11/09/24  0918   EF 45 33 33 43 34 31   < > 63   LVIDD 4.80  --   --   --  3.90  --   --  5.18   RV 25.1  --   --   --  21.0  --   --   --    RVFRWALLPKSP 12.40  --   --   --  6.31 6.31  --  11.50   TAPSE 1.9  --   --   --  1.0  --   --  2.5    < > = values in this interval not displayed.   Transthoracic Echo (TTE) Complete With Contrast 11/27/2024  1. Poorly visualized anatomical structures due to suboptimal image quality.  2. The left ventricle was not well visualized.  3. Left ventricular ejection fraction is suspected mildly decreased, by visual estimate at 45%.  4. Spectral Doppler shows a Grade I (impaired relaxation pattern) of left ventricular " diastolic filling with normal left atrial filling pressure.  5. Abnormal septal motion consistent with post-operative status.  6. There is normal right ventricular global systolic function.    Coronary Angiography:   Left Heart Cath, With LV, Angriography And Grafts, Left Heart Cath, With LV, Angriography And Grafts, Left Heart Cath, With LV, Angriography And Grafts 11/14/2024  1. Coronary Angiogram: Mild to moderate LM stenosis with 100% occlusion of the mid LAD at the graft anastamosis site. Mod-sev focal disease in the native LCX/OM with competitive flow in OM2. Patent and dominant RCA.  2. Graft Angiogram: In-situ LIMA which transitions into a Y-graft with a patent limb to OM2 and 100% stenosis at the mid LAD anastamosis site.  3. Successful VA ECMO cannulation (left common femoral artery/vein) with concomitant distal perfusion catheter placement (left SFA).  4. Successful complex salvage PCI of the mid LAD with deployment of a 3.0 x 28 RUSLAN Braxton HEATHER, post-dilated with a 3.0 NC balloon.  5. DAPT: ASA/Ticagrelor for 6 months without interruption followed by lifelong ASA.    Right Heart Cath: No results found for this or any previous visit from the past 1800 days.    Cardiac Scoring: No results found for this or any previous visit from the past 1800 days.    Cardiac MRI 3/28/2025:  1. Left ventricle is normal in size with mildly reduced systolic  function. LVEF 43.4%. Severe hypokinesis of the apex and apical  segment of septum. Quantitative values are as noted above.  2. There is near transmural delayed enhancement involving the entire  apex and mid and apical segments of the septum and apical segment of  the anterior wall in keeping with sequela of myocardial infarction.  No significant residual viable tissue in these segments. Suggestion  of small area of microvascular obstruction in the apical segment of  the septum.  3. No evidence of LV thrombus.  4. Normal aortic, mitral, and tricuspid valve  function.    Nuclear:No results found for this or any previous visit from the past 1800 days.    Metabolic Stress: No results found for this or any previous visit from the past 1800 days.      Current Outpatient Medications   Medication Instructions    aspirin 81 mg, oral, Daily    atorvastatin (LIPITOR) 80 mg, oral, Nightly    docusate sodium (COLACE) 100 mg, Every other day    empagliflozin (JARDIANCE) 10 mg, oral, Daily    fluticasone (Flonase) 50 mcg/actuation nasal spray 2 sprays, Daily    gabapentin (NEURONTIN) 600 mg, Nightly    melatonin 20 mg/day, Nightly    metoprolol succinate XL (TOPROL-XL) 50 mg, oral, Daily, Do not crush or chew.    multivitamin with minerals tablet 1 tablet, Daily    sacubitriL-valsartan (Entresto) 24-26 mg tablet 0.5 tablets, oral, 2 times daily    spironolactone (ALDACTONE) 12.5 mg, oral, Daily      Notable Therapies: *GDMT*   Class  Agent SAFETY    *ARNI* / ACE / ARB Entresto 12-13mg BID Last BP: 88/59, Last BUN/Cr (GFR): 4/11/2025: 20/1.01 (79)    *Beta-Blocker* Metoprolol succinate 50mg daily Last HR: 64    *MRA* Spironolactone 25 mg daily ->12.5 Last K: 4/11/2025: 4.7     *SGLT2* Jardiance 10 mg daily Last A1c 11/8/2024: 5.1     Diuretic  Last BNP: 4/11/2025: 119    Hydralazine/ISDN       Digoxin  Last Digoxin level: No results found for requested labs within last 3650 days.    Anticoagulation He did have postop lone atrial fibrillation in the hospital with a [id number] of 4.  Not currently on anticoagulation  Monitor April 2025 off amiodarone showed no AF.  Last Hgb: 4/11/2025: 15.4    Anti-arrhythmic  Last QTc: 12/31/2024: 434  Last TSH: 12/31/2024: 2.47    Antiplatelet Aspirin 81 mg daily Last Platelet: 4/11/2025: 174    Lipid-Lowering Atorvastatin 80 mg at night Last Tchol 12/31/2024: 138 / LDL No results found for requested labs within last 3650 days. or 12/31/2024: 69 / HDL 12/31/2024: 36.8 / TRIG 12/31/2024: 159    Other        Device(s)  Not indicated EF: 11/27/2024:  45%, QRS: 12/31/2024: 92ms    Cardiac Rehab,   if EF <35/MI/OHS  attending      Problems Addressed:   # HF improved EF / Chronic Systolic Heart Failure, last EF 11/27/2024: 45%, dx'd 11/2024, lowest EF 31%,   # Ischemic Cardiomyopathy, ACC/AHA Stage C, NYHA I, Warm, Euvolemic. ON BB/MRA/SGLT2i. No ARNI due to hypotension, but BP appears improved with cardiac rehab  # Inferior STEMI 2/2 to LAD occlusion s/p LIMA-LAD with Y graft to OM c/b graft occlusion s/p mid LAD PCI (11/14/2024). ASA/Ticagrelor for 6 months (until May 14th 2025, per Dr. Quiles) without interruption followed by lifelong ASA.   # Post operative atrial fibrillation: He did have postop lone atrial fibrillation in the hospital with a [id number] of 4.  Not currently on anticoagulation  Monitor April 2025 off amiodarone showed no AF.   # Hypertension: Last BP: 88/59. controlled  # Hyperlipidemia: Last Tchol 12/31/2024: 138 / LDL 69 / HDL 36.8 / TRIG 159. Continue statin, recheck lipids  # CKD Stage 1: Last BUN/Cr (GFR): 4/11/2025: 20/1.01 (79) CTM  - continue BB, MRA, SGLT2i, ARNI, decrease MRA  - check labs  - c/w cardiac rehab    Patient Instructions   If you have any questions or need cardiac medication refills, please call the Heart Failure office at [phone], option 6.  You may also contact the  Heart Failure Nursing team via email at [email or web address] (Please include your name and date of birth). To reach Dr. Gann's office please call [phone] / Fax: [phone]. To schedule an office appointment call [phone].     If I placed a referral today for a specialist/procedure, please call the general scheduling line at [phone]. You may also schedule appointments on the P21 makayla. For radiology scheduling (Cardiac calcium score, CTA with HeartFlow, Cardiac MRI, NM cardiac stress test, PET viability study) the phone number is [phone].     - Continue current medications with the exception of:   -  decrease spironolactone to 12.5mg daily from 25mg daily  - Labwork: today  - Imaging/Procedures: not  - Referrals: none  - Followup: 6 months ~ January 2026       Orders:  Orders Placed This Encounter   Procedures    Basic Metabolic Panel    B-Type Natriuretic Peptide    CBC    Iron and TIBC    Ferritin      Followup Appts:  Future Appointments   Date Time Provider Department Center   9/12/2025 10:15 AM Ale Brady MD [id number] [facility]   4/8/2026 10:30 AM Larry Benítez PA-C [id number] [facility]     The encounter involved a comprehensive review of extensive data, including prior medical records, imaging studies, laboratory results, and consultations, followed by in-depth counseling regarding the patient's complex cardiac condition and comorbidities. We discussed treatment options, risks and benefits, and recommendations for ongoing care and careful monitoring of adverse effects from medications.     ____________________________________________________________  Jayson Gann MD  Section of Advanced Heart Failure and Cardiac Transplantation  Division of Cardiovascular Medicine  [facility]  [facility]

## 2025-07-01 NOTE — PATIENT INSTRUCTIONS
If you have any questions or need cardiac medication refills, please call the Heart Failure office at [phone], option 6.  You may also contact the  Heart Failure Nursing team via email at [email or web address] (Please include your name and date of birth). To reach Dr. Gann's office please call [phone] / Fax: [phone]. To schedule an office appointment call [phone].     If I placed a referral today for a specialist/procedure, please call the general scheduling line at [phone]. You may also schedule appointments on the [employer] makayla. For radiology scheduling (Cardiac calcium score, CTA with HeartFlow, Cardiac MRI, NM cardiac stress test, PET viability study) the phone number is [phone].     - Continue current medications with the exception of:   - decrease spironolactone to 12.5mg daily from 25mg daily  - Labwork: today  - Imaging/Procedures: not  - Referrals: none  - Followup: 6 months ~ January 2026

## 2025-07-02 LAB
ANION GAP SERPL CALCULATED.4IONS-SCNC: 8 MMOL/L (CALC) (ref 7–17)
BNP SERPL-MCNC: 103 PG/ML
BUN SERPL-MCNC: 19 MG/DL (ref 7–25)
BUN/CREAT SERPL: NORMAL (CALC) (ref 6–22)
CALCIUM SERPL-MCNC: 8.8 MG/DL (ref 8.6–10.3)
CHLORIDE SERPL-SCNC: 104 MMOL/L (ref 98–110)
CO2 SERPL-SCNC: 26 MMOL/L (ref 20–32)
CREAT SERPL-MCNC: 0.83 MG/DL (ref 0.7–1.28)
EGFRCR SERPLBLD CKD-EPI 2021: 92 ML/MIN/1.73M2
ERYTHROCYTE [DISTWIDTH] IN BLOOD BY AUTOMATED COUNT: 12.3 % (ref 11–15)
FERRITIN SERPL-MCNC: 433 NG/ML (ref 24–380)
GLUCOSE SERPL-MCNC: 81 MG/DL (ref 65–139)
HCT VFR BLD AUTO: 45.1 % (ref 38.5–50)
HGB BLD-MCNC: 14.6 G/DL (ref 13.2–17.1)
IRON SATN MFR SERPL: 69 % (CALC) (ref 20–48)
IRON SERPL-MCNC: 180 MCG/DL (ref 50–180)
MCH RBC QN AUTO: 31.5 PG (ref 27–33)
MCHC RBC AUTO-ENTMCNC: 32.4 G/DL (ref 32–36)
MCV RBC AUTO: 97.4 FL (ref 80–100)
PLATELET # BLD AUTO: 173 THOUSAND/UL (ref 140–400)
PMV BLD REES-ECKER: 12.1 FL (ref 7.5–12.5)
POTASSIUM SERPL-SCNC: 4.4 MMOL/L (ref 3.5–5.3)
RBC # BLD AUTO: 4.63 MILLION/UL (ref 4.2–5.8)
SODIUM SERPL-SCNC: 138 MMOL/L (ref 135–146)
TIBC SERPL-MCNC: 262 MCG/DL (CALC) (ref 250–425)
WBC # BLD AUTO: 6.6 THOUSAND/UL (ref 3.8–10.8)

## 2025-07-16 DIAGNOSIS — I25.5 CARDIOMYOPATHY, ISCHEMIC: ICD-10-CM

## 2025-07-16 DIAGNOSIS — I50.20 HFREF (HEART FAILURE WITH REDUCED EJECTION FRACTION): ICD-10-CM

## 2025-07-16 DIAGNOSIS — I48.91 POSTOPERATIVE ATRIAL FIBRILLATION (MULTI): ICD-10-CM

## 2025-07-16 DIAGNOSIS — Z95.1 STATUS POST CORONARY ARTERY BYPASS GRAFT: ICD-10-CM

## 2025-07-16 DIAGNOSIS — I21.19 ST ELEVATION MYOCARDIAL INFARCTION (STEMI) INVOLVING OTHER CORONARY ARTERY OF INFERIOR WALL: ICD-10-CM

## 2025-07-16 DIAGNOSIS — Z95.5 H/O HEART ARTERY STENT: ICD-10-CM

## 2025-07-16 DIAGNOSIS — I97.89 POSTOPERATIVE ATRIAL FIBRILLATION (MULTI): ICD-10-CM

## 2025-07-16 DIAGNOSIS — Z95.1 S/P CABG (CORONARY ARTERY BYPASS GRAFT): ICD-10-CM

## 2025-07-17 ENCOUNTER — TELEPHONE (OUTPATIENT)
Dept: CARDIOLOGY | Facility: HOSPITAL | Age: 75
End: 2025-07-17
Payer: MEDICARE

## 2025-07-17 RX ORDER — TICAGRELOR 90 MG/1
TABLET ORAL
Qty: 154 TABLET | Refills: 0 | Status: SHIPPED | OUTPATIENT
Start: 2025-07-17 | End: 2025-07-17 | Stop reason: ALTCHOICE

## 2025-08-28 ENCOUNTER — TELEPHONE (OUTPATIENT)
Age: 75
End: 2025-08-28
Payer: MEDICARE

## 2025-09-02 ENCOUNTER — APPOINTMENT (OUTPATIENT)
Dept: CARDIOLOGY | Facility: CLINIC | Age: 75
End: 2025-09-02
Payer: MEDICARE

## 2025-09-02 ASSESSMENT — LIFESTYLE VARIABLES
HOW OFTEN DO YOU HAVE SIX OR MORE DRINKS ON ONE OCCASION: NEVER
HOW OFTEN DO YOU HAVE A DRINK CONTAINING ALCOHOL: 4 OR MORE TIMES A WEEK
HAVE YOU OR SOMEONE ELSE BEEN INJURED AS A RESULT OF YOUR DRINKING: NO
HOW MANY STANDARD DRINKS CONTAINING ALCOHOL DO YOU HAVE ON A TYPICAL DAY: 1 OR 2
SKIP TO QUESTIONS 9-10: 1
HAS A RELATIVE, FRIEND, DOCTOR, OR ANOTHER HEALTH PROFESSIONAL EXPRESSED CONCERN ABOUT YOUR DRINKING OR SUGGESTED YOU CUT DOWN: NO
AUDIT TOTAL SCORE: 4
AUDIT-C TOTAL SCORE: 4

## 2025-09-02 ASSESSMENT — ENCOUNTER SYMPTOMS
DEPRESSION: 0
OCCASIONAL FEELINGS OF UNSTEADINESS: 0
LOSS OF SENSATION IN FEET: 0

## 2025-09-02 ASSESSMENT — PAIN SCALES - GENERAL: PAINLEVEL_OUTOF10: 0-NO PAIN

## 2025-09-12 ENCOUNTER — APPOINTMENT (OUTPATIENT)
Facility: CLINIC | Age: 75
End: 2025-09-12
Payer: MEDICARE

## 2025-09-26 ENCOUNTER — APPOINTMENT (OUTPATIENT)
Facility: CLINIC | Age: 75
End: 2025-09-26
Payer: MEDICARE

## 2026-04-08 ENCOUNTER — APPOINTMENT (OUTPATIENT)
Dept: SLEEP MEDICINE | Facility: CLINIC | Age: 76
End: 2026-04-08
Payer: MEDICARE

## (undated) DEVICE — TOWEL, SURGICAL, NEURO, O/R, 16 X 26, BLUE, STERILE

## (undated) DEVICE — ELECTRODE, QUICK-COMBO, EDGE SYSTEM, REDI PACK

## (undated) DEVICE — 5FR X 100CM INFINITI TL DIAGNOSTIC CATHETER, INTERNAL MAMMORY, IM, FEMORAL SELECTIVE THRULUMEN, 0.038IN MAX GUIDEWIRE

## (undated) DEVICE — SUTURE, VICRYL 0, TAPER POINT, CT-1 VIOLET 27 INCH

## (undated) DEVICE — CONNECTOR, STRAIGHT, 0.5 X 0.5 IN

## (undated) DEVICE — CANNULA, VESSEL, FREE FLOW, BLUNT TIP, 3 MM X 5 CM

## (undated) DEVICE — GOWN, ASTOUND, XL

## (undated) DEVICE — TOWELS 4-PK

## (undated) DEVICE — TUBE SET, PNEUMOLAR HEATED, SMOKE EVACU, HIGH-FLOW

## (undated) DEVICE — GUIDEWIRE, INQWIRE, 3MM J, .035, 150

## (undated) DEVICE — CLIP, SPRING, BULLDOG, FOGARTY, SOFT JAW, 6 MM, LF

## (undated) DEVICE — GUIDEWIRE, ASAHI PROWATER, .014/180CM, STRAIGHT"

## (undated) DEVICE — COLLECTION UNIT, DRAINAGE, THORACIC, SINGLE TUBE, DRY SUCTION, ATS COMPATIBLE, OASIS 3600, LF

## (undated) DEVICE — PITCHER, GRADUATE, 32 OZ (1200CC), STERILE

## (undated) DEVICE — SUTURE, PROLENE, 7-0, 30 IN, BV1, DA, BLUE

## (undated) DEVICE — CATHETER, GUIDING, LAUNCHER, 6FR, JL 3.5

## (undated) DEVICE — GRASPER, MICRO, BIPOLAR, DAVINCI XI

## (undated) DEVICE — COVER, TABLE, 44 X 75 IN, DISPOSABLE, LF, STERILE

## (undated) DEVICE — DRESSING, MEPILEX, BORDER, HEEL, 8.7 X 9.1 IN

## (undated) DEVICE — SUTURE, PROLENE, 3-0, 36 IN, SH, DA, BLUE

## (undated) DEVICE — Device

## (undated) DEVICE — CATHETER, ANGIO, IMPULSE, FL4, 5 FR X 100 CM

## (undated) DEVICE — SUTURE, PROLENE, 5-0, 30 IN, RB2, DA, BLUE

## (undated) DEVICE — SEAL, UNIVERSAL, 5-12MM

## (undated) DEVICE — LEAD, PACING, MYOCARDIAL, BIPOLAR, TEMPORARY

## (undated) DEVICE — CONNECTOR, STRAIGHT, 0.375 X 0.375 IN

## (undated) DEVICE — CATHETER, GUIDELINER, 6FR V2

## (undated) DEVICE — SUTURE, PROLENE 4-0, TAPER POINT, SH-1 BLUE 30 INCH

## (undated) DEVICE — TUBING SET, BIFURCATED, SMOKE EVAC, FILTERED, F/AIRSEAL

## (undated) DEVICE — DRESSING, DRAIN SPONGE, EXCILON AMD, 4X4 IN, 6 PLY, ST

## (undated) DEVICE — COVER, EQUIPMENT, ZERO GRAVITY

## (undated) DEVICE — ELECTRODE, ELECTROSURGICAL, BLADE EXT 4 INCH, INSULATED

## (undated) DEVICE — BANDAGE, ELASTIC, ACE, ACE, DOUBLE LENGTH, 6 X 550 IN, LF

## (undated) DEVICE — CONNECTOR, Y, 0.375 X 0.375 X 0.5 IN

## (undated) DEVICE — SUTURE, PROLENE, 4-0, 36 IN, RB1, DA, BLUE

## (undated) DEVICE — CAUTERY, PENCIL, PUSH BUTTON, SMOKE EVAC, 70MM

## (undated) DEVICE — CANNULA, ARTERIAL, BIO-MEDICUS, 19FR, VENTED

## (undated) DEVICE — GOWN, SURGICAL, SMARTGOWN, XLARGE, STERILE

## (undated) DEVICE — CLIPPER, SURGICAL BLADE ASSEMBLY, SPECIALITY, SINGLE USE

## (undated) DEVICE — CATHETER, BALLOON DILATION, EUPHORA SEMICOMPLIANT 2.0  X 15 MM X 142CM

## (undated) DEVICE — SUTURE, SILK, 2-0, 30 IN, SH, CONTROL RELEASE, MULTIPACK, BLACK

## (undated) DEVICE — DRAPE, FLUID WARMER

## (undated) DEVICE — MANIFOLD, 4 PORT NEPTUNE STANDARD

## (undated) DEVICE — SYRINGE, 20 CC, LUER LOCK, MONOJECT, W/O CAP, LF

## (undated) DEVICE — SHEATH, GLIDESHEATH, SLENDER, 7FR 10CM

## (undated) DEVICE — DRAPE, SHEET, CARDIOVASCULAR, ANTIMICROBIAL, W/ANESTHESIA SCREEN, IOBAN 2, STERI DRAPE, 107 X 133 IN, DISPOSABLE, FABRIC, BLUE, STERILE

## (undated) DEVICE — KNIFE, OPHTHALMIC, SLIT, 22.5 DEG

## (undated) DEVICE — TUBING PACK, OXYGENATOR, ADULT

## (undated) DEVICE — COVER, CART, 45 X 27 X 48 IN, CLEAR

## (undated) DEVICE — NEEDLE, PERCUTANEOUS ENTRY, THINWALL, W/O BASEPLATE, 18 G X 7 CM

## (undated) DEVICE — APPLIER, CLIP, SMALL XI

## (undated) DEVICE — CANNULA, BIOMEDICUS 25FR, FEMORAL VENOUS

## (undated) DEVICE — APPLICATOR, CHLORAPREP, W/ORANGE TINT, 26ML

## (undated) DEVICE — SUTURE, MONOCRYL, 3-0, 18 IN, PS2, UNDYED

## (undated) DEVICE — TUBE SET, PNEUMOCLEAR, SMOKE EVACU, HIGH-FLOW

## (undated) DEVICE — SUTURE, SILK, 5-0, 18 IN TF, BLACK

## (undated) DEVICE — ELECTRODE, ELECTROSURGICAL, BLADE, INSULATED, ENT/IMA, STERILE

## (undated) DEVICE — SHEATH, PINNACLE, 10 CM,  6FR INTRODUCER, 6FR DIA, 2.5 CM DIALATOR

## (undated) DEVICE — ACCESS KIT, S-MAK MINI, 4FR 10CM 0.018IN 40CM, NT/PT, ECHO ENHANCE NEEDLE

## (undated) DEVICE — SUTURE, PROLENE, 8-0, 24 IN, BV 175-6, BLUE

## (undated) DEVICE — SUTURE, NUROLON, 0, 18 IN, CT1, DETACHABLE, MULTIPACK, BLACK

## (undated) DEVICE — PACING CABLE, 300CM  BLUE, 6FT

## (undated) DEVICE — CLIP, LIGATING, W/ADHESIVE PAD, MEDIUM, TITANIUM

## (undated) DEVICE — VALVE, CONTROL BLEEDBACK

## (undated) DEVICE — INSERT, CLAMP, SURGICAL, SOFT/TRACTION, STEALTH, 5 MM

## (undated) DEVICE — LOOP, VESSEL, MAXI, RED

## (undated) DEVICE — CARE KIT, LAPAROSCOPIC, ADVANCED

## (undated) DEVICE — DRAPE, INSTRUMENT, W/POUCH, STERI DRAPE, 7 X 11 IN, DISPOSABLE, STERILE

## (undated) DEVICE — CONNECTOR, STRAIGHT, 0.5 X 0.375 IN

## (undated) DEVICE — CANNULA, CARDIAC SUMP

## (undated) DEVICE — CATHETER, INIFINITI, 5FR TG 4.0

## (undated) DEVICE — TR BAND, RADIAL COMPRESSION, LONG, 29CM

## (undated) DEVICE — CATHETER, GUIDING, LAUNCHER, 7FR, EBU 3.5

## (undated) DEVICE — TRAY, SURESTEP, URINE METER, 14FR, SILICONE

## (undated) DEVICE — DRAPE, SHEET, FAN FOLDED, HALF, 44 X 58 IN, DISPOSABLE, LF, STERILE

## (undated) DEVICE — MARKER, SKIN, DUAL TIP INK W/9 LABEL AND REMOVABLE TIME OUT SLEEVE

## (undated) DEVICE — BAG, DECANTER

## (undated) DEVICE — CAUTERY SPATULA, PERMANENT

## (undated) DEVICE — CATHETER, DRAINAGE, NASOGASTRIC, DOUBLE LUMEN, FUNNEL END, SUMP, SALEM, 18 FR, 48 IN, PVC, STERILE

## (undated) DEVICE — PACK, ANGIO P2, CUSTOM, LAKE

## (undated) DEVICE — POSITIONER, STARFISH, EVO

## (undated) DEVICE — SPONGE, GAUZE, XRAY DECT, 16 PLY, 4 X 4, W/MASTER DMT,STERILE

## (undated) DEVICE — GUIDEWIRE, J TIP, 3 MM, 0.035 IN X 260 CM, PTFE

## (undated) DEVICE — YANKAUER, RIGID, STRAIGHT, OPEN TIP, NO VENT

## (undated) DEVICE — BONE WAX, 3.5G ABSORBABLE, OSTENE

## (undated) DEVICE — DRESSING, MEPILEX, BORDER, SACRUM, 8.7 X 9.8 IN

## (undated) DEVICE — SPONGE GAUZE, XRAY SC+RFID, 4X4 16 PLY, STERILE

## (undated) DEVICE — DRAPE PACK, UNIVERSAL II

## (undated) DEVICE — TOWEL, OR XRAY, RFID DETECT, WHITE, 17X26, STERILE

## (undated) DEVICE — APPLIER, CLIP MED-LG XI

## (undated) DEVICE — SPONGE, HEMOSTATIC, CELLULOSE, SURGICEL, 2 X 14 IN

## (undated) DEVICE — WASH SET, XTRA, 125ML

## (undated) DEVICE — KIT, TOURNIQUET, 7"

## (undated) DEVICE — OXYGENATOR FX 25, W/HR, ARTERIAL FILTER

## (undated) DEVICE — TR BAND, RADIAL COMPRESSION, STANDARD, 24CM

## (undated) DEVICE — MARKER, SKIN, RULER AND LABEL PACK, CUSTOM

## (undated) DEVICE — PUNCH, AORTIC 4MM

## (undated) DEVICE — PACING CABLE, EXTENSION, 12 FT BLUE, DISPOSABLE

## (undated) DEVICE — KIT, NAMIC STANDARD LEFT HEART, CUSTOM, LWMC

## (undated) DEVICE — LOOP, VESSEL, MAXI, BLUE

## (undated) DEVICE — SYSTEM, OCTOPUS NUVO TISSUE STABILIZER

## (undated) DEVICE — TAPE, POLYESTER, BRAIDED, PRE-CUT 2 X 30, WHITE"

## (undated) DEVICE — CLOSURE DEVICE, VASCULAR, ANGIO-SEAL, VIP, 8FR

## (undated) DEVICE — DRAPE, COLUMN, DAVINCI XI

## (undated) DEVICE — SUTURE, ETHIBOND, XTRA, 30 IN, 0, CT-1, GREEN

## (undated) DEVICE — SURGISLEEVE, WOUND PROTECTOR, SMALL 2.5-6CM

## (undated) DEVICE — GUIDEWIRE, J TIP, 3 MM, 0.035 IN X 150 CM, PTFE

## (undated) DEVICE — SUTURE, SURGICAL STEEL, STERNUM 7, 18 IN, KV40, SINGL-WIRE

## (undated) DEVICE — WAX, BONE, 2.5 GM

## (undated) DEVICE — CLIP, LIGATING, W/ADHESIVE, WIDE SLOT, SMALL, TITANIUM

## (undated) DEVICE — TIP, SUCTION, YANKAUER, FLEXIBLE

## (undated) DEVICE — CLEANER, ELECTROSURGICAL, TIP, 5 X 5 CM, LF

## (undated) DEVICE — INSERT, CLAMP, SURGICAL, SOFT/TRACTION, STEALTH, 1 MM

## (undated) DEVICE — CATHETER, GUIDING, LAUNCHER, 6FR EBU 3.0

## (undated) DEVICE — CASSETTE, BLOOD, PLEGIC SET

## (undated) DEVICE — CATHETER, BALLOON, MONORAIL, NC EMERGE, PTCA, 12MM X 3.00MM

## (undated) DEVICE — SCISSORS, MONOPOLAR, CURVED, 8MM

## (undated) DEVICE — DRAPE, MAGENTIC INSTRUMENT, 12X16

## (undated) DEVICE — SYSTEM KIT, INCISION, PREVENA PEEL AND PLACE, 20CM

## (undated) DEVICE — COVER, TABLE, UHC

## (undated) DEVICE — SYRINGE, LUER LOCK, 12ML

## (undated) DEVICE — PACING CABLE, 300CM  BEIGE, 6FT

## (undated) DEVICE — DRESSING, ISLAND, TELFA, 4 X 5 IN

## (undated) DEVICE — TUBING, SUCTION, CONNECTING, NON-CONDUCTIVE, SURE GRIP CONNECTORS, 3/16 X 18 IN, PVC

## (undated) DEVICE — CATHETER, THORACIC, STRAIGHT, ADULT, 28 FR, PVC

## (undated) DEVICE — TUBING, SMOKE EVAC, 3/8 X 10 FT

## (undated) DEVICE — KIT, BLOWER / MISTER II

## (undated) DEVICE — WASH SET, XTRA, 225ML

## (undated) DEVICE — SUTURE, VICRYL, 2-0, 27 IN, CT-1, VIOLET

## (undated) DEVICE — CATHETER, SUCTION, SAFE-T-VAC VALVE, COIL PACKED, 14 FR

## (undated) DEVICE — SUTURE, PROLENE, 6-0, 30 IN, RB-2, BLUE

## (undated) DEVICE — FILTER, IV, BLOOD, MICROAGGREGATE, 40 MIC, RBC TRANSFUSION

## (undated) DEVICE — ARM PROTECTORS, FOAM

## (undated) DEVICE — INFLATION KIT, ADVANTAGE, ENCORE 26 (1/BOX)

## (undated) DEVICE — DEVICE, DRAIN/TUBE ATTACHMENT, HORIZONTAL, STERILE, LF

## (undated) DEVICE — SUTURE, PROLENE, 5-0, 36 IN, RB1, DA, BLUE

## (undated) DEVICE — DRAIN, CHANNEL, BLAKE, HUBLESS, ROUND, 28FR

## (undated) DEVICE — RETRACTOR, SUTURE, HOLDING, INSERT, OCTOBASE, DISPOSABLE

## (undated) DEVICE — CATHETER, ANGIO, IMPULSE, FR4, 5 FR X 100 CM

## (undated) DEVICE — DRAPE, ARM XI

## (undated) DEVICE — GUIDEWIRE, HI-TORQUE, VERSACORE, 260CM, FLOPPY

## (undated) DEVICE — SUTURE, PROLENE, 5-0, 36 IN, C-1, CV-11, BLUE

## (undated) DEVICE — GEL, ULTRASOUND, AQUASONIC 100, 20 GM, STERILE

## (undated) DEVICE — PENCIL, ELECTROSURG BUTTON SWITCH

## (undated) DEVICE — SHUNT, INTRACORONARY, 2.0 MM, CLEARVIEW

## (undated) DEVICE — APPLIER,  LIGACLIP MULTI CLIP, 30 MED 11 1/2

## (undated) DEVICE — DEVICE, ENDOSCOPIC VESSEL HARVESTING, SAPHENA VENAPAX

## (undated) DEVICE — SHEATH, GLIDESHEATH, SLENDER, 6FR 10CM

## (undated) DEVICE — GUIDEWIRE, RUN THROUGH WIRE, 180CM

## (undated) DEVICE — CLOSURE DEVICE, VASCULAR, MANTA, 18FR

## (undated) DEVICE — CATHETER, DIAG, EXPO, 5FR 110CM, PIG

## (undated) DEVICE — SPONGE, LAP, XRAY DECT, 18IN X 18IN, W/LOOP, STERILE

## (undated) DEVICE — DRAPE, INCISE, ANTIMICROBIAL, IOBAN 2, LARGE, 17 X 23 IN, DISPOSABLE, STERILE

## (undated) DEVICE — CATHETER, BALLOON, NC EUPHORA NONCOMPLIANT 2.5 X 20 X 142CM

## (undated) DEVICE — LUBRICANT, ELECTROLUBE, F/ELECTRODE TIPS

## (undated) DEVICE — SUMP, INTRACARDIAC, MULTI PORT, VENT TIP, PEDIATRIC, 12 FR X 30 CM

## (undated) DEVICE — CLIPPER, SURGICAL BLADE ASSEMBLY, GENERAL PURPOSE, SINGLE USE

## (undated) DEVICE — COVER, TIP HOT SHEARS ENDOWRIST

## (undated) DEVICE — BLADE, SAW STERNUM, STERILE

## (undated) DEVICE — DRESSING, ADHESIVE, ISLAND, TELFA, 2 X 3.75 IN, LF

## (undated) DEVICE — KIT, CELL SAVER, W/COLLECTION SET, 225ML WASH SET

## (undated) DEVICE — PACING CABLE, EXTENSION, 12 FT BEIGE, DISPOSABLE

## (undated) DEVICE — DRAPE, SHEET, UTILITY, NON ABSORBENT, 18 X 26 IN, LF

## (undated) DEVICE — INTRODUCER, SHEATH, AVANTI PLUS, W/MINI WIRE, STANDARD, 6MS FR, 11 CM

## (undated) DEVICE — DEVICE KIT, COR-KNOT MICRO

## (undated) DEVICE — INTRODUCER SHEATH, GLIDESHEATH, 6FR 25CM

## (undated) DEVICE — SUTURE, PROLENE, 6-0, 30 IN, C-1, CV-11, BLUE

## (undated) DEVICE — MICROCOAGULATION TEST, ACT+ TEST CUVETTE

## (undated) DEVICE — KIT, COLLECTION, CARDIO

## (undated) DEVICE — SENSOR, OXYGEN, CEREBRAL, SOMASENSOR, ADULT